# Patient Record
Sex: MALE | Race: WHITE | NOT HISPANIC OR LATINO | Employment: OTHER | URBAN - NONMETROPOLITAN AREA
[De-identification: names, ages, dates, MRNs, and addresses within clinical notes are randomized per-mention and may not be internally consistent; named-entity substitution may affect disease eponyms.]

---

## 2018-04-24 ENCOUNTER — OFFICE VISIT (OUTPATIENT)
Dept: UROLOGY | Facility: CLINIC | Age: 63
End: 2018-04-24

## 2018-04-24 VITALS — WEIGHT: 273 LBS | HEIGHT: 68 IN | BODY MASS INDEX: 41.37 KG/M2

## 2018-04-24 DIAGNOSIS — R79.89 LOW TESTOSTERONE: ICD-10-CM

## 2018-04-24 DIAGNOSIS — N40.0 BPH WITHOUT URINARY OBSTRUCTION: ICD-10-CM

## 2018-04-24 DIAGNOSIS — N39.3 STRESS INCONTINENCE OF URINE: Primary | ICD-10-CM

## 2018-04-24 DIAGNOSIS — N32.81 DETRUSOR INSTABILITY: ICD-10-CM

## 2018-04-24 LAB
BILIRUB BLD-MCNC: NEGATIVE MG/DL
CLARITY, POC: CLEAR
COLOR UR: YELLOW
GLUCOSE UR STRIP-MCNC: ABNORMAL MG/DL
KETONES UR QL: NEGATIVE
LEUKOCYTE EST, POC: NEGATIVE
NITRITE UR-MCNC: NEGATIVE MG/ML
PH UR: 5.5 [PH] (ref 5–8)
PROT UR STRIP-MCNC: NEGATIVE MG/DL
PSA SERPL-MCNC: 2.82 NG/ML (ref 0–4)
RBC # UR STRIP: NEGATIVE /UL
SP GR UR: 1.02 (ref 1–1.03)
TESTOST SERPL-MCNC: 248.12 NG/DL (ref 86.98–780.1)
UROBILINOGEN UR QL: NORMAL

## 2018-04-24 PROCEDURE — 36415 COLL VENOUS BLD VENIPUNCTURE: CPT | Performed by: UROLOGY

## 2018-04-24 PROCEDURE — 99204 OFFICE O/P NEW MOD 45 MIN: CPT | Performed by: UROLOGY

## 2018-04-24 PROCEDURE — 84153 ASSAY OF PSA TOTAL: CPT | Performed by: UROLOGY

## 2018-04-24 PROCEDURE — 81003 URINALYSIS AUTO W/O SCOPE: CPT | Performed by: UROLOGY

## 2018-04-24 PROCEDURE — 84403 ASSAY OF TOTAL TESTOSTERONE: CPT | Performed by: UROLOGY

## 2018-04-24 PROCEDURE — 51798 US URINE CAPACITY MEASURE: CPT | Performed by: UROLOGY

## 2018-04-24 RX ORDER — MONTELUKAST SODIUM 10 MG/1
10 TABLET ORAL NIGHTLY
COMMUNITY

## 2018-04-24 RX ORDER — ALLOPURINOL 300 MG/1
300 TABLET ORAL DAILY
COMMUNITY

## 2018-04-24 RX ORDER — FUROSEMIDE 40 MG/1
TABLET ORAL
COMMUNITY
Start: 2014-10-20

## 2018-04-24 RX ORDER — LOSARTAN POTASSIUM 100 MG/1
TABLET ORAL
COMMUNITY
Start: 2014-10-20

## 2018-04-24 RX ORDER — IBUPROFEN 600 MG/1
600 TABLET ORAL EVERY 6 HOURS PRN
COMMUNITY

## 2018-04-24 RX ORDER — METOPROLOL TARTRATE 100 MG/1
100 TABLET ORAL 2 TIMES DAILY
COMMUNITY
End: 2021-02-09 | Stop reason: SDUPTHER

## 2018-04-24 RX ORDER — DABIGATRAN ETEXILATE 150 MG/1
150 CAPSULE ORAL 2 TIMES DAILY
COMMUNITY
End: 2020-05-15

## 2018-04-24 NOTE — PROGRESS NOTES
"Chief Complaint:          Chief Complaint   Patient presents with   • Urinary Incontinence       HPI:   62 y.o. male.  62-year-old white male referred with the sudden onset of severe loss of control of his bladder he was constipated he has urge and urge related incontinence there is dribbling there is no blood there is no dysuria there is no fevers or chills there is no trauma he's morbidly obese he thinks he has good control of his diabetes and his atrial fibrillation.  His urine is basically positive for sugar at 1000 mg/dL and his postvoid residuals 15 cc. DARRIUS-androgen deficiency in the age male questionnaire  The patient was queried regarding the androgen deficiency in the age male questionnaire.  This is a validated questionnaire that was performed on a set of 314 [place] male physicians when it was positive it correlated directly with a 94% chance of low testosterone.  Patient indicates there is a decrease in libido or sex drive, a lack of energy, Decreased  strength and endurance, a decreased \"enjoyment of life\", sad and grumpy feelings with significant difficulty maintaining erections.  He is also been a recent deterioration regarding work performance.    Past Medical History:      No past medical history on file.      Current Meds:     No current outpatient prescriptions on file.     No current facility-administered medications for this visit.         Allergies:      Allergies not on file     Past Surgical History:     No past surgical history on file.      Social History:     Social History     Social History   • Marital status: Unknown     Spouse name: N/A   • Number of children: N/A   • Years of education: N/A     Occupational History   • Not on file.     Social History Main Topics   • Smoking status: Not on file   • Smokeless tobacco: Not on file   • Alcohol use Not on file   • Drug use: Unknown   • Sexual activity: Not on file     Other Topics Concern   • Not on file     Social History Narrative   • " No narrative on file       Family History:     No family history on file.    Review of Systems:     Review of Systems   Constitutional: Negative.  Negative for chills, fatigue and fever.   HENT: Negative.    Eyes: Negative.    Respiratory: Negative.  Negative for cough and shortness of breath.    Cardiovascular: Negative.  Negative for leg swelling.   Gastrointestinal: Negative.  Negative for abdominal pain, nausea and vomiting.   Endocrine: Negative.    Genitourinary: Positive for frequency and urgency.   Musculoskeletal: Negative.  Negative for back pain and joint swelling.   Allergic/Immunologic: Negative.    Neurological: Negative.  Negative for dizziness and headaches.   Hematological: Negative.    Psychiatric/Behavioral: Negative.  Negative for confusion.       Physical Exam:     Physical Exam   Constitutional: He is oriented to person, place, and time. He appears well-developed and well-nourished.   HENT:   Head: Normocephalic and atraumatic.   Eyes: Conjunctivae and EOM are normal. Pupils are equal, round, and reactive to light.   Neck: Normal range of motion.   Cardiovascular: Normal rate, regular rhythm, normal heart sounds and intact distal pulses.    Pulmonary/Chest: Effort normal and breath sounds normal.   Abdominal: Soft. Bowel sounds are normal.   Genitourinary: Rectum normal, prostate normal and penis normal.   Genitourinary Comments: Morbidly obese, Soft nontender abdomen with no organomegaly, rigidity, or tenderness.  He has normal external genitalia and uncircumcised phallus with a freely movable foreskin bilaterally descended testes without masses there is no inguinal hernias adenopathy or abnormalities he had good rectal tone and a large smooth firm prostate.  There is no nodularity or any suspicious rectal abnormalities     Musculoskeletal: Normal range of motion.   Neurological: He is alert and oriented to person, place, and time. He has normal reflexes.   Skin: Skin is warm and dry.    Psychiatric: He has a normal mood and affect. His behavior is normal. Judgment and thought content normal.   Nursing note and vitals reviewed.      I have reviewed the following portions of the patient's history: allergies, current medications, past family history, past medical history, past social history, past surgical history, problem list and ROS and confirm it's accurate.      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  I discussed the pathophysiology of PSA testing indicating its use in the diagnosis and management of prostate cancer.  I discussed the normal range being 0-4 but more appropriately being much closer to 0-2 in a normal male.  I discussed the fact that after certain age we don't recommend PSA testing especially in view of numerous comorbidities.  That this will not be a useful test.  I discussed many of the things that can artificially raised PSA including a recent infection, urinary tract infection, recent sexual intercourse.  Where even the type of movement such as manipulation of the prostate  riding a bicycle.  After all this is taken into account when the test is reviewed  the most important use of PSA which is the velocity measurement in other words the change of PSA with time as a very important factor in the use and that we look for greater than 20% rise over a year to help us make the prediction of prostate cancer.  I also discussed that the use with prostate cancer indicating that after a radical prostatectomy the PSA should be 0 and any rise indicates an early biochemical recurrence.  Low TestosteroneThis pleasant male patient presents today with signs and symptoms that are consistent with low testosterone he has positive Roel questionnaire by history this includes both  the sexual and nonsexual side effects.  Sexual side effects include inability to achieve and maintain an erection, in ability to maintain his erection and decreased interest and sexual activity.  Nonsexual symptomatology includes fatigue, difficulty completing a job, tiredness.  He has a discussion of the various forms testosterone available including parenteral, topical, and the form of a patch.  We discussed the efficacy of the gels, and the injections.  As well as the cost and benefits analysis.  We discussed the the studies a talked about heart disease and its effect on prostate cancer both of which are negligible.  He gives verbal consent to proceed with treatment.  He understands the risks and benefits of length he also completed his attempts at fertility he understands the partial effect on spermatogenesis     Patient's There is no height or weight on file to calculate BMI. BMI is above normal parameters. Follow-up plan includes:  educational material.          This document has been electronically signed by ISMA CASH MD April 24, 2018 2:17 PM

## 2018-04-25 PROBLEM — N32.81 DETRUSOR INSTABILITY: Status: ACTIVE | Noted: 2018-04-25

## 2018-05-08 ENCOUNTER — OFFICE VISIT (OUTPATIENT)
Dept: UROLOGY | Facility: CLINIC | Age: 63
End: 2018-05-08

## 2018-05-08 VITALS — WEIGHT: 273 LBS | BODY MASS INDEX: 41.37 KG/M2 | HEIGHT: 68 IN

## 2018-05-08 DIAGNOSIS — N39.3 STRESS INCONTINENCE OF URINE: Primary | ICD-10-CM

## 2018-05-08 DIAGNOSIS — N32.81 DETRUSOR INSTABILITY: ICD-10-CM

## 2018-05-08 DIAGNOSIS — R79.89 LOW TESTOSTERONE IN MALE: ICD-10-CM

## 2018-05-08 LAB
BILIRUB BLD-MCNC: NEGATIVE MG/DL
CLARITY, POC: CLEAR
COLOR UR: YELLOW
GLUCOSE UR STRIP-MCNC: ABNORMAL MG/DL
KETONES UR QL: NEGATIVE
LEUKOCYTE EST, POC: NEGATIVE
NITRITE UR-MCNC: NEGATIVE MG/ML
PH UR: 5 [PH] (ref 5–8)
PROT UR STRIP-MCNC: NEGATIVE MG/DL
RBC # UR STRIP: NEGATIVE /UL
SP GR UR: 1.01 (ref 1–1.03)
UROBILINOGEN UR QL: NORMAL

## 2018-05-08 PROCEDURE — 81003 URINALYSIS AUTO W/O SCOPE: CPT | Performed by: UROLOGY

## 2018-05-08 PROCEDURE — 99213 OFFICE O/P EST LOW 20 MIN: CPT | Performed by: UROLOGY

## 2018-05-08 RX ORDER — TESTOSTERONE CYPIONATE 200 MG/ML
INJECTION, SOLUTION INTRAMUSCULAR
Qty: 10 ML | Refills: 2 | Status: SHIPPED | OUTPATIENT
Start: 2018-05-08 | End: 2018-09-18 | Stop reason: SDUPTHER

## 2018-05-08 NOTE — PROGRESS NOTES
"Chief Complaint:          No chief complaint on file.      HPI:   62 y.o. male.  62-year-old white male referred with the sudden onset of severe loss of control of his bladder he was constipated he has urge and urge related incontinence there is dribbling there is no blood there is no dysuria there is no fevers or chills there is no trauma he's morbidly obese he thinks he has good control of his diabetes and his atrial fibrillation.  His urine is basically positive for sugar at 1000 mg/dL and his postvoid residuals 15 cc. ROEL-androgen deficiency in the age male questionnaire  The patient was queried regarding the androgen deficiency in the age male questionnaire.  This is a validated questionnaire that was performed on a set of 314 [place] male physicians when it was positive it correlated directly with a 94% chance of low testosterone.  Patient indicates there is a decrease in libido or sex drive, a lack of energy, Decreased  strength and endurance, a decreased \"enjoyment of life\", sad and grumpy feelings with significant difficulty maintaining erections.  He is also been a recent deterioration regarding work performance.  He returns today's PSA is 2.82 and his testosterone was 248 his frequency is certainly better.  These dramatically improved his sugars doing better as well he wants to try testosterone.  6 is reasonable I had an extensive discussion with him.Low TestosteroneThis pleasant male patient presents today with signs and symptoms that are consistent with low testosterone he has positive Roel questionnaire by history this includes both the sexual and nonsexual side effects.  Sexual side effects include inability to achieve and maintain an erection, in ability to maintain his erection and decreased interest and sexual activity.  Nonsexual symptomatology includes fatigue, difficulty completing a job, tiredness.  He has a discussion of the various forms testosterone available including parenteral, topical, " and the form of a patch.  We discussed the efficacy of the gels, and the injections.  As well as the cost and benefits analysis.  We discussed the the studies a talked about heart disease and its effect on prostate cancer both of which are negligible.  He gives verbal consent to proceed with treatment.  He understands the risks and benefits of length he also completed his attempts at fertility he understands the partial effect on spermatogenesis.  His urinalysis is negative today    Past Medical History:        Past Medical History:   Diagnosis Date   • Diabetes mellitus    • Hypertension          Current Meds:     Current Outpatient Prescriptions   Medication Sig Dispense Refill   • allopurinol (ZYLOPRIM) 300 MG tablet Take 300 mg by mouth Daily.     • dabigatran etexilate (PRADAXA) 150 MG capsu Take 150 mg by mouth 2 (Two) Times a Day.     • furosemide (LASIX) 40 MG tablet Take  by mouth.     • ibuprofen (ADVIL,MOTRIN) 600 MG tablet Take 600 mg by mouth Every 6 (Six) Hours As Needed for Mild Pain .     • losartan (COZAAR) 100 MG tablet Take  by mouth.     • metFORMIN (GLUCOPHAGE) 500 MG tablet Take 500 mg by mouth 2 (Two) Times a Day With Meals.     • metoprolol tartrate (LOPRESSOR) 100 MG tablet Take 100 mg by mouth 2 (Two) Times a Day.     • montelukast (SINGULAIR) 10 MG tablet Take 10 mg by mouth Every Night.       No current facility-administered medications for this visit.         Allergies:      No Known Allergies     Past Surgical History:     No past surgical history on file.      Social History:     Social History     Social History   • Marital status: Unknown     Spouse name: N/A   • Number of children: N/A   • Years of education: N/A     Occupational History   • Not on file.     Social History Main Topics   • Smoking status: Never Smoker   • Smokeless tobacco: Never Used   • Alcohol use No   • Drug use: No   • Sexual activity: No     Other Topics Concern   • Not on file     Social History Narrative   •  No narrative on file       Family History:     Family History   Problem Relation Age of Onset   • No Known Problems Father    • No Known Problems Mother        Review of Systems:     Review of Systems   Constitutional: Negative for chills, fatigue and fever.   Respiratory: Negative for cough, shortness of breath and wheezing.    Gastrointestinal: Negative for abdominal pain, nausea and vomiting.   Musculoskeletal: Negative for back pain and joint swelling.   Neurological: Negative for dizziness and headaches.   Psychiatric/Behavioral: Negative for confusion.       Physical Exam:     Physical Exam   Constitutional: He is oriented to person, place, and time. He appears well-developed and well-nourished.   HENT:   Head: Normocephalic and atraumatic.   Eyes: Conjunctivae and EOM are normal. Pupils are equal, round, and reactive to light.   Neck: Normal range of motion.   Cardiovascular: Normal rate, regular rhythm, normal heart sounds and intact distal pulses.    Pulmonary/Chest: Effort normal and breath sounds normal.   Abdominal: Soft. Bowel sounds are normal.   Genitourinary: Rectum normal, prostate normal and penis normal.   Musculoskeletal: Normal range of motion.   Neurological: He is alert and oriented to person, place, and time. He has normal reflexes.   Skin: Skin is warm and dry.   Psychiatric: He has a normal mood and affect. His behavior is normal. Judgment and thought content normal.   Nursing note and vitals reviewed.      I have reviewed the following portions of the patient's history: allergies, current medications, past family history, past medical history, past social history, past surgical history, problem list and ROS and confirm it's accurate.      Procedure:       Assessment/Plan:   Detrusor instability- continue Myrbetriq  Low TestosteroneThis pleasant male patient presents today with signs and symptoms that are consistent with low testosterone he has positive Roel questionnaire by history this  includes both the sexual and nonsexual side effects.  Sexual side effects include inability to achieve and maintain an erection, in ability to maintain his erection and decreased interest and sexual activity.  Nonsexual symptomatology includes fatigue, difficulty completing a job, tiredness.  He has a discussion of the various forms testosterone available including parenteral, topical, and the form of a patch.  We discussed the efficacy of the gels, and the injections.  As well as the cost and benefits analysis.  We discussed the the studies a talked about heart disease and its effect on prostate cancer both of which are negligible.  He gives verbal consent to proceed with treatment.  He understands the risks and benefits of length he also completed his attempts at fertility he understands the partial effect on spermatogenesis.  He has chosen to go with parenteral therapy he was given a prescription and talked the technique     Patient's There is no height or weight on file to calculate BMI. BMI is above normal parameters. Recommendations include: educational material.          This document has been electronically signed by ISMA CASH MD May 8, 2018 1:47 PM

## 2018-05-10 PROBLEM — R79.89 LOW TESTOSTERONE IN MALE: Status: ACTIVE | Noted: 2018-05-10

## 2018-06-19 ENCOUNTER — OFFICE VISIT (OUTPATIENT)
Dept: UROLOGY | Facility: CLINIC | Age: 63
End: 2018-06-19

## 2018-06-19 VITALS — BODY MASS INDEX: 41.43 KG/M2 | WEIGHT: 273.37 LBS | HEIGHT: 68 IN

## 2018-06-19 DIAGNOSIS — R79.89 LOW TESTOSTERONE IN MALE: ICD-10-CM

## 2018-06-19 DIAGNOSIS — N32.81 DETRUSOR INSTABILITY: Primary | ICD-10-CM

## 2018-06-19 PROCEDURE — 99214 OFFICE O/P EST MOD 30 MIN: CPT | Performed by: UROLOGY

## 2018-06-19 NOTE — PROGRESS NOTES
"Chief Complaint:          Chief Complaint   Patient presents with   • Urinary Incontinence     stress- 6 week follow up       HPI:   62 y.o. male.  He returns today for follow-up his urinary incontinence is completely resolved with the realization that the Jardience causes him to have a urinary frequency from the glucose load.  His sugars are doing better he is very happy with the testosterone and has lost a total of 19 pounds. Patient returns today for follow-up.  He is been on testosterone replacement therapy.  He reports a dramatic improvement in his Roel questionnaire: -ROEL-androgen deficiency in the age male questionnaire  The patient was queried regarding the androgen deficiency in the age male questionnaire.  This is a validated questionnaire that was performed on a set of 314 [place] male physicians when it was positive it correlated directly with a 94% chance of low testosterone.  Patient indicates there is a decrease in libido or sex drive, a lack of energy, Decreased  strength and endurance, a decreased \"enjoyment of life\", sad and grumpy feelings with significant difficulty maintaining erections.  He is also been a recent deterioration regarding work performance.  He reports weight loss.  He has good facility and the use of subcutaneous and intramuscular injections as well as comfort level and using the medication in a sterile fashion.  He understands he should use only the prescribed dose.  He's here for appropriate lab monitoring regarding this.  He understands this is a controlled substance and therefore must be watched closely will not be refilled and the medical loss or miss calculation of the dose.  He is very happy with the treatment and therefore wants to continue it.    Past Medical History:        Past Medical History:   Diagnosis Date   • Diabetes mellitus    • Hypertension          Current Meds:     Current Outpatient Prescriptions   Medication Sig Dispense Refill   • allopurinol (ZYLOPRIM) " 300 MG tablet Take 300 mg by mouth Daily.     • dabigatran etexilate (PRADAXA) 150 MG capsu Take 150 mg by mouth 2 (Two) Times a Day.     • furosemide (LASIX) 40 MG tablet Take  by mouth.     • ibuprofen (ADVIL,MOTRIN) 600 MG tablet Take 600 mg by mouth Every 6 (Six) Hours As Needed for Mild Pain .     • losartan (COZAAR) 100 MG tablet Take  by mouth.     • metFORMIN (GLUCOPHAGE) 500 MG tablet Take 500 mg by mouth 2 (Two) Times a Day With Meals.     • metoprolol tartrate (LOPRESSOR) 100 MG tablet Take 100 mg by mouth 2 (Two) Times a Day.     • montelukast (SINGULAIR) 10 MG tablet Take 10 mg by mouth Every Night.     • Syringe, Disposable, 3 ML misc Use 3 ml syringe with a 25 gauge  5/8 inch needle 24 each 6   • Testosterone Cypionate (DEPO-TESTOSTERONE) 200 MG/ML injection He is to use 1/2 cc every Monday and Thursday SQ 10 mL 2     No current facility-administered medications for this visit.         Allergies:      No Known Allergies     Past Surgical History:     No past surgical history on file.      Social History:     Social History     Social History   • Marital status: Unknown     Spouse name: N/A   • Number of children: N/A   • Years of education: N/A     Occupational History   • Not on file.     Social History Main Topics   • Smoking status: Never Smoker   • Smokeless tobacco: Never Used   • Alcohol use No   • Drug use: No   • Sexual activity: No     Other Topics Concern   • Not on file     Social History Narrative   • No narrative on file       Family History:     Family History   Problem Relation Age of Onset   • No Known Problems Father    • No Known Problems Mother        Review of Systems:     Review of Systems   Constitutional: Negative.    HENT: Negative.    Eyes: Negative.    Respiratory: Negative.    Cardiovascular: Negative.    Gastrointestinal: Negative.    Endocrine: Negative.    Musculoskeletal: Negative.    Allergic/Immunologic: Negative.    Neurological: Negative.    Hematological: Negative.     Psychiatric/Behavioral: Negative.        Physical Exam:     Physical Exam   Constitutional: He is oriented to person, place, and time. He appears well-developed and well-nourished.   HENT:   Head: Normocephalic and atraumatic.   Eyes: Conjunctivae and EOM are normal. Pupils are equal, round, and reactive to light.   Neck: Normal range of motion.   Cardiovascular: Normal rate, regular rhythm, normal heart sounds and intact distal pulses.    Pulmonary/Chest: Effort normal and breath sounds normal.   Abdominal: Soft. Bowel sounds are normal.   Musculoskeletal: Normal range of motion.   Neurological: He is alert and oriented to person, place, and time. He has normal reflexes.   Skin: Skin is warm and dry.   Psychiatric: He has a normal mood and affect. His behavior is normal. Judgment and thought content normal.   Nursing note and vitals reviewed.      I have reviewed the following portions of the patient's history: allergies, current medications, past family history, past medical history, past social history, past surgical history, problem list and ROS and confirm it's accurate.      Procedure:       Assessment/Plan:   Detrusor instability-resolved with the optimization of his blood sugar  -Low testosterone: patient is here for follow-up.  Since beginning the medication he's been very pleased.  He reports a dramatic improvement in his erections, ability to achieve and maintain an erection, improvement in libido, increase in frequency of morning erections, a noticeable weight loss consistent with the treatment.  No development of breast problems or abnormalities.  He's going to have appropriate safety laboratory parameters checked.   He understands that the new data implicates testosterone with the development of prostate cancer and this is all but been disproven and the medical literature as well as the risks of cardiovascular disease which is actually also been disproven.  He understands that while he is a candidate  for topical therapy if he is in contact with children this is not an option because it's been shown to accentuate genitalia development at an early age that this frequently irreversible.  He also understands this is a controlled substance and as such will not be prescribed without appropriate follow-up and appropriate laboratory investigation.  He understands effects on spermatogenesis including the fact that this is not always completely reversible and not always completely limited his ability to father a child.  He has demonstrated facility in the technique of both intramuscular and subcutaneous injection.  And has been taught sterility one drawing up the medication.     Patient's Body mass index is 41.58 kg/m². BMI is above normal parameters. Recommendations include: educational material.          This document has been electronically signed by ISMA CASH MD June 19, 2018 1:46 PM

## 2018-09-18 ENCOUNTER — OFFICE VISIT (OUTPATIENT)
Dept: UROLOGY | Facility: CLINIC | Age: 63
End: 2018-09-18

## 2018-09-18 VITALS — WEIGHT: 273.37 LBS | BODY MASS INDEX: 41.43 KG/M2 | HEIGHT: 68 IN

## 2018-09-18 DIAGNOSIS — E29.1 HYPOGONADISM IN MALE: Primary | ICD-10-CM

## 2018-09-18 DIAGNOSIS — R79.89 LOW TESTOSTERONE IN MALE: ICD-10-CM

## 2018-09-18 LAB
BASOPHILS # BLD AUTO: 0.02 10*3/MM3 (ref 0–0.3)
BASOPHILS NFR BLD AUTO: 0.2 % (ref 0–2)
DEPRECATED RDW RBC AUTO: 41.3 FL (ref 37–54)
EOSINOPHIL # BLD AUTO: 0.18 10*3/MM3 (ref 0–0.7)
EOSINOPHIL NFR BLD AUTO: 1.7 % (ref 0–5)
ERYTHROCYTE [DISTWIDTH] IN BLOOD BY AUTOMATED COUNT: 14.3 % (ref 11.5–14.5)
HCT VFR BLD AUTO: 42.2 % (ref 42–52)
HGB BLD-MCNC: 13.2 G/DL (ref 14–18)
IMM GRANULOCYTES # BLD: 0.02 10*3/MM3 (ref 0–0.03)
IMM GRANULOCYTES NFR BLD: 0.2 % (ref 0–0.5)
LYMPHOCYTES # BLD AUTO: 2.19 10*3/MM3 (ref 1–3)
LYMPHOCYTES NFR BLD AUTO: 21.2 % (ref 21–51)
MCH RBC QN AUTO: 25.3 PG (ref 27–33)
MCHC RBC AUTO-ENTMCNC: 31.3 G/DL (ref 33–37)
MCV RBC AUTO: 80.8 FL (ref 80–94)
MONOCYTES # BLD AUTO: 1.31 10*3/MM3 (ref 0.1–0.9)
MONOCYTES NFR BLD AUTO: 12.7 % (ref 0–10)
NEUTROPHILS # BLD AUTO: 6.59 10*3/MM3 (ref 1.4–6.5)
NEUTROPHILS NFR BLD AUTO: 64 % (ref 30–70)
PLATELET # BLD AUTO: 345 10*3/MM3 (ref 130–400)
PMV BLD AUTO: 9.3 FL (ref 6–10)
PSA SERPL-MCNC: 4.72 NG/ML (ref 0–4)
RBC # BLD AUTO: 5.22 10*6/MM3 (ref 4.7–6.1)
TESTOST SERPL-MCNC: 463.47 NG/DL (ref 86.98–780.1)
WBC NRBC COR # BLD: 10.31 10*3/MM3 (ref 4.5–12.5)

## 2018-09-18 PROCEDURE — 36415 COLL VENOUS BLD VENIPUNCTURE: CPT | Performed by: UROLOGY

## 2018-09-18 PROCEDURE — 84153 ASSAY OF PSA TOTAL: CPT | Performed by: UROLOGY

## 2018-09-18 PROCEDURE — 85025 COMPLETE CBC W/AUTO DIFF WBC: CPT | Performed by: UROLOGY

## 2018-09-18 PROCEDURE — 84403 ASSAY OF TOTAL TESTOSTERONE: CPT | Performed by: UROLOGY

## 2018-09-18 PROCEDURE — 99213 OFFICE O/P EST LOW 20 MIN: CPT | Performed by: UROLOGY

## 2018-09-18 RX ORDER — SILDENAFIL CITRATE 20 MG/1
TABLET ORAL
Qty: 24 TABLET | Refills: 11 | Status: SHIPPED | OUTPATIENT
Start: 2018-09-18 | End: 2019-01-14 | Stop reason: SDUPTHER

## 2018-09-18 RX ORDER — TESTOSTERONE CYPIONATE 200 MG/ML
INJECTION, SOLUTION INTRAMUSCULAR
Qty: 10 ML | Refills: 2 | Status: SHIPPED | OUTPATIENT
Start: 2018-09-18 | End: 2019-01-14 | Stop reason: SDUPTHER

## 2018-09-18 NOTE — PROGRESS NOTES
"Chief Complaint:          Chief Complaint   Patient presents with   • STRESS INCONTIENCE       HPI:   63 y.o. male.  63-year-old white male returns today he's doing a lot better he gets up twice at night now and otherwise loves the testosterone. Patient returns today for follow-up.  He is been on testosterone replacement therapy.  He reports a dramatic improvement in his Roel questionnaire: -ROEL-androgen deficiency in the age male questionnaire  The patient was queried regarding the androgen deficiency in the age male questionnaire.  This is a validated questionnaire that was performed on a set of 314 [place] male physicians when it was positive it correlated directly with a 94% chance of low testosterone.  Patient indicates there is a decrease in libido or sex drive, a lack of energy, Decreased  strength and endurance, a decreased \"enjoyment of life\", sad and grumpy feelings with significant difficulty maintaining erections.  He is also been a recent deterioration regarding work performance.  He reports weight loss.  He has good facility and the use of subcutaneous and intramuscular injections as well as comfort level and using the medication in a sterile fashion.  He understands he should use only the prescribed dose.  He's here for appropriate lab monitoring regarding this.  He understands this is a controlled substance and therefore must be watched closely will not be refilled and the medical loss or miss calculation of the dose.  He is very happy with the treatment and therefore wants to continue it.    Past Medical History:        Past Medical History:   Diagnosis Date   • Diabetes mellitus (CMS/HCC)    • Hypertension          Current Meds:     Current Outpatient Prescriptions   Medication Sig Dispense Refill   • allopurinol (ZYLOPRIM) 300 MG tablet Take 300 mg by mouth Daily.     • dabigatran etexilate (PRADAXA) 150 MG capsu Take 150 mg by mouth 2 (Two) Times a Day.     • furosemide (LASIX) 40 MG tablet " Take  by mouth.     • ibuprofen (ADVIL,MOTRIN) 600 MG tablet Take 600 mg by mouth Every 6 (Six) Hours As Needed for Mild Pain .     • losartan (COZAAR) 100 MG tablet Take  by mouth.     • metFORMIN (GLUCOPHAGE) 500 MG tablet Take 500 mg by mouth 2 (Two) Times a Day With Meals.     • metoprolol tartrate (LOPRESSOR) 100 MG tablet Take 100 mg by mouth 2 (Two) Times a Day.     • montelukast (SINGULAIR) 10 MG tablet Take 10 mg by mouth Every Night.     • Syringe, Disposable, 3 ML misc Use 3 ml syringe with a 25 gauge  5/8 inch needle 24 each 6   • Testosterone Cypionate (DEPO-TESTOSTERONE) 200 MG/ML injection He is to use 1/2 cc every Monday and Thursday SQ 10 mL 2     No current facility-administered medications for this visit.         Allergies:      No Known Allergies     Past Surgical History:     No past surgical history on file.      Social History:     Social History     Social History   • Marital status: Unknown     Spouse name: N/A   • Number of children: N/A   • Years of education: N/A     Occupational History   • Not on file.     Social History Main Topics   • Smoking status: Never Smoker   • Smokeless tobacco: Never Used   • Alcohol use No   • Drug use: No   • Sexual activity: No     Other Topics Concern   • Not on file     Social History Narrative   • No narrative on file       Family History:     Family History   Problem Relation Age of Onset   • No Known Problems Father    • No Known Problems Mother        Review of Systems:     Review of Systems   Constitutional: Negative.    HENT: Negative.    Eyes: Negative.    Respiratory: Negative.    Cardiovascular: Negative.    Gastrointestinal: Negative.    Endocrine: Negative.    Musculoskeletal: Negative.    Allergic/Immunologic: Negative.    Neurological: Negative.    Hematological: Negative.    Psychiatric/Behavioral: Negative.        Physical Exam:     Physical Exam   Constitutional: He is oriented to person, place, and time. He appears well-developed and  well-nourished.   HENT:   Head: Normocephalic and atraumatic.   Eyes: Pupils are equal, round, and reactive to light. Conjunctivae and EOM are normal.   Neck: Normal range of motion.   Cardiovascular: Normal rate, regular rhythm, normal heart sounds and intact distal pulses.    Pulmonary/Chest: Effort normal and breath sounds normal.   Abdominal: Soft. Bowel sounds are normal.   Genitourinary: Rectum normal, prostate normal and penis normal.   Musculoskeletal: Normal range of motion.   Neurological: He is alert and oriented to person, place, and time. He has normal reflexes.   Skin: Skin is warm and dry.   Psychiatric: He has a normal mood and affect. His behavior is normal. Judgment and thought content normal.   Nursing note and vitals reviewed.      I have reviewed the following portions of the patient's history: allergies, current medications, past family history, past medical history, past social history, past surgical history, problem list and ROS and confirm it's accurate.      Procedure:       Assessment/Plan:   -Low testosterone: patient is here for follow-up.  Since beginning the medication he's been very pleased.  He reports a dramatic improvement in his erections, ability to achieve and maintain an erection, improvement in libido, increase in frequency of morning erections, a noticeable weight loss consistent with the treatment.  No development of breast problems or abnormalities.  He's going to have appropriate safety laboratory parameters checked.   He understands that the new data implicates testosterone with the development of prostate cancer and this is all but been disproven and the medical literature as well as the risks of cardiovascular disease which is actually also been disproven.  He understands that while he is a candidate for topical therapy if he is in contact with children this is not an option because it's been shown to accentuate genitalia development at an early age that this frequently  irreversible.  He also understands this is a controlled substance and as such will not be prescribed without appropriate follow-up and appropriate laboratory investigation.  He understands effects on spermatogenesis including the fact that this is not always completely reversible and not always completely limited his ability to father a child.  He has demonstrated facility in the technique of both intramuscular and subcutaneous injection.  And has been taught sterility one drawing up the medication.     Patient's Body mass index is 41.58 kg/m². BMI is above normal parameters. Recommendations include: educational material.          This document has been electronically signed by ISMA CASH MD September 18, 2018 2:05 PM

## 2019-01-14 ENCOUNTER — OFFICE VISIT (OUTPATIENT)
Dept: UROLOGY | Facility: CLINIC | Age: 64
End: 2019-01-14

## 2019-01-14 VITALS — HEIGHT: 68 IN | WEIGHT: 273 LBS | BODY MASS INDEX: 41.37 KG/M2

## 2019-01-14 DIAGNOSIS — R79.89 LOW TESTOSTERONE IN MALE: ICD-10-CM

## 2019-01-14 DIAGNOSIS — E29.1 HYPOGONADISM IN MALE: ICD-10-CM

## 2019-01-14 LAB
DEPRECATED RDW RBC AUTO: 41.2 FL (ref 37–54)
ERYTHROCYTE [DISTWIDTH] IN BLOOD BY AUTOMATED COUNT: 13.8 % (ref 11.5–14.5)
HCT VFR BLD AUTO: 43.2 % (ref 42–52)
HGB BLD-MCNC: 14.3 G/DL (ref 14–18)
MCH RBC QN AUTO: 27.5 PG (ref 27–33)
MCHC RBC AUTO-ENTMCNC: 33.1 G/DL (ref 33–37)
MCV RBC AUTO: 83.1 FL (ref 80–94)
PLATELET # BLD AUTO: 334 10*3/MM3 (ref 130–400)
PMV BLD AUTO: 9.5 FL (ref 6–10)
PSA SERPL-MCNC: 3.18 NG/ML (ref 0–4)
RBC # BLD AUTO: 5.2 10*6/MM3 (ref 4.7–6.1)
TESTOST SERPL-MCNC: 250.19 NG/DL (ref 86.98–780.1)
WBC NRBC COR # BLD: 6.6 10*3/MM3 (ref 4.5–12.5)

## 2019-01-14 PROCEDURE — 36415 COLL VENOUS BLD VENIPUNCTURE: CPT | Performed by: UROLOGY

## 2019-01-14 PROCEDURE — 84403 ASSAY OF TOTAL TESTOSTERONE: CPT | Performed by: UROLOGY

## 2019-01-14 PROCEDURE — 85027 COMPLETE CBC AUTOMATED: CPT | Performed by: UROLOGY

## 2019-01-14 PROCEDURE — 84153 ASSAY OF PSA TOTAL: CPT | Performed by: UROLOGY

## 2019-01-14 PROCEDURE — 99214 OFFICE O/P EST MOD 30 MIN: CPT | Performed by: UROLOGY

## 2019-01-14 RX ORDER — TESTOSTERONE CYPIONATE 200 MG/ML
INJECTION, SOLUTION INTRAMUSCULAR
Qty: 10 ML | Refills: 2 | Status: SHIPPED | OUTPATIENT
Start: 2019-01-14 | End: 2020-05-15 | Stop reason: SDUPTHER

## 2019-01-14 RX ORDER — SILDENAFIL CITRATE 20 MG/1
TABLET ORAL
Qty: 24 TABLET | Refills: 11 | Status: SHIPPED | OUTPATIENT
Start: 2019-01-14 | End: 2020-03-30

## 2019-01-14 NOTE — PROGRESS NOTES
"Chief Complaint:          Chief Complaint   Patient presents with   • Hypogonadism     3 month f/u        HPI:   63 y.o. male.  63-year-old white male returns today he's doing a lot better he gets up twice at night now and otherwise loves the testosterone. Patient returns today for follow-up.  He is been on testosterone replacement therapy.  He reports a dramatic improvement in his Roel questionnaire: -ROEL-androgen deficiency in the age male questionnaire  The patient was queried regarding the androgen deficiency in the age male questionnaire.  This is a validated questionnaire that was performed on a set of 314 [place] male physicians when it was positive it correlated directly with a 94% chance of low testosterone.  Patient indicates there is a decrease in libido or sex drive, a lack of energy, Decreased  strength and endurance, a decreased \"enjoyment of life\", sad and grumpy feelings with significant difficulty maintaining erections.  He is also been a recent deterioration regarding work performance.  He reports weight loss.  He has good facility and the use of subcutaneous and intramuscular injections as well as comfort level and using the medication in a sterile fashion.  He understands he should use only the prescribed dose.  He's here for appropriate lab monitoring regarding this.  He understands this is a controlled substance and therefore must be watched closely will not be refilled and the medical loss or miss calculation of the dose.  He is very happy with the treatment and therefore wants to continue it.  He continues to do fantastic.  He has continued weight loss, there is no fevers, chills, he gets greater erections and now is getting morning erections as well    Past Medical History:        Past Medical History:   Diagnosis Date   • Diabetes mellitus (CMS/[facility])    • Hypertension          Current Meds:     Current Outpatient Medications   Medication Sig Dispense Refill   • allopurinol (ZYLOPRIM) 300 " MG tablet Take 300 mg by mouth Daily.     • dabigatran etexilate (PRADAXA) 150 MG capsu Take 150 mg by mouth 2 (Two) Times a Day.     • furosemide (LASIX) 40 MG tablet Take  by mouth.     • ibuprofen (ADVIL,MOTRIN) 600 MG tablet Take 600 mg by mouth Every 6 (Six) Hours As Needed for Mild Pain .     • losartan (COZAAR) 100 MG tablet Take  by mouth.     • metFORMIN (GLUCOPHAGE) 500 MG tablet Take 500 mg by mouth 2 (Two) Times a Day With Meals.     • metoprolol tartrate (LOPRESSOR) 100 MG tablet Take 100 mg by mouth 2 (Two) Times a Day.     • montelukast (SINGULAIR) 10 MG tablet Take 10 mg by mouth Every Night.     • sildenafil (REVATIO) 20 MG tablet 1-5 by mouth one hour prior to intercourse on an empty stomach 24 tablet 11   • Syringe, Disposable, 3 ML misc Use 3 ml syringe with a 25 gauge  5/8 inch needle 24 each 6   • Testosterone Cypionate (DEPO-TESTOSTERONE) 200 MG/ML injection He is to use 1/2 cc every Monday and Thursday SQ 10 mL 2     No current facility-administered medications for this visit.         Allergies:      No Known Allergies     Past Surgical History:     History reviewed. No pertinent surgical history.      Social History:     Social History     Socioeconomic History   • Marital status: Unknown     Spouse name: Not on file   • Number of children: Not on file   • Years of education: Not on file   • Highest education level: Not on file   Social Needs   • Financial resource strain: Not on file   • Food insecurity - worry: Not on file   • Food insecurity - inability: Not on file   • Transportation needs - medical: Not on file   • Transportation needs - non-medical: Not on file   Occupational History   • Not on file   Tobacco Use   • Smoking status: Never Smoker   • Smokeless tobacco: Never Used   Substance and Sexual Activity   • Alcohol use: No   • Drug use: No   • Sexual activity: No   Other Topics Concern   • Not on file   Social History Narrative   • Not on file       Family History:     Family  History   Problem Relation Age of Onset   • No Known Problems Father    • No Known Problems Mother        Review of Systems:     Review of Systems   Constitutional: Negative.    HENT: Negative.    Eyes: Negative.    Respiratory: Negative.    Cardiovascular: Negative.    Gastrointestinal: Negative.    Endocrine: Negative.    Musculoskeletal: Negative.    Allergic/Immunologic: Negative.    Neurological: Negative.    Hematological: Negative.    Psychiatric/Behavioral: Negative.        Physical Exam:     Physical Exam   Constitutional: He is oriented to person, place, and time. He appears well-developed and well-nourished.   HENT:   Head: Normocephalic and atraumatic.   Eyes: Conjunctivae and EOM are normal. Pupils are equal, round, and reactive to light.   Neck: Normal range of motion.   Cardiovascular: Normal rate, regular rhythm, normal heart sounds and intact distal pulses.   Pulmonary/Chest: Effort normal and breath sounds normal.   Abdominal: Soft. Bowel sounds are normal.   Genitourinary: Rectum normal, prostate normal and penis normal.   Musculoskeletal: Normal range of motion.   Neurological: He is alert and oriented to person, place, and time. He has normal reflexes.   Skin: Skin is warm and dry.   Psychiatric: He has a normal mood and affect. His behavior is normal. Judgment and thought content normal.   Nursing note and vitals reviewed.      I have reviewed the following portions of the patient's history: allergies, current medications, past family history, past medical history, past social history, past surgical history, problem list and ROS and confirm it's accurate.      Procedure:       Assessment/Plan:   -Low testosterone: patient is here for follow-up.  Since beginning the medication he's been very pleased.  He reports a dramatic improvement in his erections, ability to achieve and maintain an erection, improvement in libido, increase in frequency of morning erections, a noticeable weight loss  consistent with the treatment.  No development of breast problems or abnormalities.  He's going to have appropriate safety laboratory parameters checked.   He understands that the new data implicates testosterone with the development of prostate cancer and this is all but been disproven and the medical literature as well as the risks of cardiovascular disease which is actually also been disproven.  He understands that while he is a candidate for topical therapy if he is in contact with children this is not an option because it's been shown to accentuate genitalia development at an early age that this frequently irreversible.  He also understands this is a controlled substance and as such will not be prescribed without appropriate follow-up and appropriate laboratory investigation.  He understands effects on spermatogenesis including the fact that this is not always completely reversible and not always completely limited his ability to father a child.  He has demonstrated facility in the technique of both intramuscular and subcutaneous injection.  And has been taught sterility one drawing up the medication.     Patient's Body mass index is 41.51 kg/m². BMI is above normal parameters. Recommendations include: educational material.          This document has been electronically signed by ISMA CASH MD January 14, 2019 10:01 AM

## 2019-07-05 ENCOUNTER — TELEPHONE (OUTPATIENT)
Dept: UROLOGY | Facility: CLINIC | Age: 64
End: 2019-07-05

## 2019-07-05 NOTE — TELEPHONE ENCOUNTER
Pt wanted to if we had any samples of myrbetriq, cielo checked and we did not have any.     Requesting a refill called into [facility] in [place]     pts number is [phone]

## 2019-07-05 NOTE — TELEPHONE ENCOUNTER
Left message for the patient to call me regarding refill on myrbetriq not seeing where patient has taken the medication before.

## 2019-07-15 ENCOUNTER — OFFICE VISIT (OUTPATIENT)
Dept: UROLOGY | Facility: CLINIC | Age: 64
End: 2019-07-15

## 2019-07-15 VITALS
WEIGHT: 267.1 LBS | HEIGHT: 68 IN | DIASTOLIC BLOOD PRESSURE: 90 MMHG | BODY MASS INDEX: 40.48 KG/M2 | SYSTOLIC BLOOD PRESSURE: 148 MMHG

## 2019-07-15 DIAGNOSIS — R79.89 LOW TESTOSTERONE IN MALE: ICD-10-CM

## 2019-07-15 DIAGNOSIS — N32.81 DETRUSOR INSTABILITY: Primary | ICD-10-CM

## 2019-07-15 DIAGNOSIS — N42.9 DISORDER OF PROSTATE: ICD-10-CM

## 2019-07-15 LAB
DEPRECATED RDW RBC AUTO: 41.1 FL (ref 37–54)
ERYTHROCYTE [DISTWIDTH] IN BLOOD BY AUTOMATED COUNT: 12.5 % (ref 12.3–15.4)
HCT VFR BLD AUTO: 46.1 % (ref 37.5–51)
HGB BLD-MCNC: 14.8 G/DL (ref 13–17.7)
MCH RBC QN AUTO: 28.7 PG (ref 26.6–33)
MCHC RBC AUTO-ENTMCNC: 32.1 G/DL (ref 31.5–35.7)
MCV RBC AUTO: 89.3 FL (ref 79–97)
PLATELET # BLD AUTO: 352 10*3/MM3 (ref 140–450)
PMV BLD AUTO: 9.7 FL (ref 6–12)
PSA SERPL-MCNC: 5.52 NG/ML (ref 0–4)
RBC # BLD AUTO: 5.16 10*6/MM3 (ref 4.14–5.8)
TESTOST SERPL-MCNC: 363 NG/DL (ref 193–740)
WBC NRBC COR # BLD: 7.21 10*3/MM3 (ref 3.4–10.8)

## 2019-07-15 PROCEDURE — 85027 COMPLETE CBC AUTOMATED: CPT | Performed by: UROLOGY

## 2019-07-15 PROCEDURE — 84403 ASSAY OF TOTAL TESTOSTERONE: CPT | Performed by: UROLOGY

## 2019-07-15 PROCEDURE — 84153 ASSAY OF PSA TOTAL: CPT | Performed by: UROLOGY

## 2019-07-15 PROCEDURE — 99213 OFFICE O/P EST LOW 20 MIN: CPT | Performed by: UROLOGY

## 2019-07-15 RX ORDER — SOTALOL HYDROCHLORIDE 80 MG/1
0.5 TABLET ORAL EVERY 12 HOURS
COMMUNITY

## 2019-07-15 RX ORDER — SILDENAFIL CITRATE 20 MG/1
20 TABLET ORAL DAILY
Qty: 24 TABLET | Refills: 6 | Status: SHIPPED | OUTPATIENT
Start: 2019-07-15 | End: 2019-09-01

## 2019-07-15 RX ORDER — TESTOSTERONE CYPIONATE 200 MG/ML
INJECTION, SOLUTION INTRAMUSCULAR
Qty: 10 ML | Refills: 2 | Status: SHIPPED | OUTPATIENT
Start: 2019-07-15 | End: 2022-04-18

## 2019-07-15 RX ORDER — SYRINGE WITH NEEDLE, 1 ML 25GX5/8"
SYRINGE, EMPTY DISPOSABLE MISCELLANEOUS SEE ADMIN INSTRUCTIONS
Refills: 6 | COMMUNITY
Start: 2019-06-13 | End: 2022-04-18

## 2019-07-15 RX ORDER — CLONIDINE HYDROCHLORIDE 0.2 MG/1
0.2 TABLET ORAL
Refills: 2 | COMMUNITY
Start: 2019-05-28

## 2019-07-15 RX ORDER — PREDNISONE 10 MG/1
1 TABLET ORAL
COMMUNITY
Start: 2012-03-02

## 2019-07-15 RX ORDER — GLYBURIDE 5 MG/1
1 TABLET ORAL
COMMUNITY

## 2019-07-15 NOTE — PROGRESS NOTES
"Chief Complaint:          Chief Complaint   Patient presents with   • Hypogonadism     6mnth f/u       HPI:   63 y.o. male.  He was doing great with testosterone and is very pleased with the Myrbetriq has lost 20 pounds doing great.  Patient returns today for follow-up.  He is been on testosterone replacement therapy.  He reports a dramatic improvement in his Roel questionnaire: -ROEL-androgen deficiency in the age male questionnaire  The patient was queried regarding the androgen deficiency in the age male questionnaire.  This is a validated questionnaire that was performed on a set of 314 [place] male physicians when it was positive it correlated directly with a 94% chance of low testosterone.  Patient indicates there is a decrease in libido or sex drive, a lack of energy, Decreased  strength and endurance, a decreased \"enjoyment of life\", sad and grumpy feelings with significant difficulty maintaining erections.  He is also been a recent deterioration regarding work performance.  He reports weight loss.  He has good facility and the use of subcutaneous and intramuscular injections as well as comfort level and using the medication in a sterile fashion.  He understands he should use only the prescribed dose.  He's here for appropriate lab monitoring regarding this.  He understands this is a controlled substance and therefore must be watched closely will not be refilled and the medical loss or miss calculation of the dose.  He is very happy with the treatment and therefore wants to continue it.    Past Medical History:        Past Medical History:   Diagnosis Date   • Diabetes mellitus (CMS/HCC)    • Hypertension          Current Meds:     Current Outpatient Medications   Medication Sig Dispense Refill   • allopurinol (ZYLOPRIM) 300 MG tablet Take 300 mg by mouth Daily.     • B-D 3CC LUER-AGNIESZKA SYR 21GX1\" 21G X 1\" 3 ML misc See Admin Instructions.  6   • CloNIDine (CATAPRES) 0.2 MG tablet Take 0.2 mg by mouth every " night at bedtime.  2   • dabigatran etexilate (PRADAXA) 150 MG capsu Take 150 mg by mouth 2 (Two) Times a Day.     • furosemide (LASIX) 40 MG tablet Take  by mouth.     • glyBURIDE (DIAbeta) 5 MG tablet Take 1 tablet by mouth.     • ibuprofen (ADVIL,MOTRIN) 600 MG tablet Take 600 mg by mouth Every 6 (Six) Hours As Needed for Mild Pain .     • losartan (COZAAR) 100 MG tablet Take  by mouth.     • metFORMIN (GLUCOPHAGE) 500 MG tablet Take 500 mg by mouth 2 (Two) Times a Day With Meals.     • metoprolol tartrate (LOPRESSOR) 100 MG tablet Take 100 mg by mouth 2 (Two) Times a Day.     • montelukast (SINGULAIR) 10 MG tablet Take 10 mg by mouth Every Night.     • predniSONE (DELTASONE) 10 MG tablet Take 1 tablet by mouth.     • sildenafil (REVATIO) 20 MG tablet 1-5 by mouth one hour prior to intercourse on an empty stomach 24 tablet 11   • Syringe, Disposable, 3 ML misc Use 3 ml syringe with a 25 gauge  5/8 inch needle 24 each 6   • Testosterone Cypionate (DEPO-TESTOSTERONE) 200 MG/ML injection He is to use 1/2 cc every Monday and Thursday SQ 10 mL 2   • sotalol (BETAPACE) 80 MG tablet Take 0.5 tablets by mouth Every 12 (Twelve) Hours.       No current facility-administered medications for this visit.         Allergies:      No Known Allergies     Past Surgical History:     No past surgical history on file.      Social History:     Social History     Socioeconomic History   • Marital status: Unknown     Spouse name: Not on file   • Number of children: Not on file   • Years of education: Not on file   • Highest education level: Not on file   Tobacco Use   • Smoking status: Never Smoker   • Smokeless tobacco: Never Used   Substance and Sexual Activity   • Alcohol use: No   • Drug use: No   • Sexual activity: No       Family History:     Family History   Problem Relation Age of Onset   • No Known Problems Father    • No Known Problems Mother        Review of Systems:     Review of Systems   Constitutional: Negative.    HENT:  Negative.    Eyes: Negative.    Respiratory: Negative.    Cardiovascular: Negative.    Gastrointestinal: Negative.    Endocrine: Negative.    Musculoskeletal: Negative.    Allergic/Immunologic: Negative.    Neurological: Negative.    Hematological: Negative.    Psychiatric/Behavioral: Negative.        Physical Exam:     Physical Exam   Constitutional: He is oriented to person, place, and time. He appears well-developed and well-nourished.   HENT:   Head: Normocephalic and atraumatic.   Eyes: Conjunctivae and EOM are normal. Pupils are equal, round, and reactive to light.   Neck: Normal range of motion.   Cardiovascular: Normal rate, regular rhythm, normal heart sounds and intact distal pulses.   Pulmonary/Chest: Effort normal and breath sounds normal.   Abdominal: Soft. Bowel sounds are normal.   Musculoskeletal: Normal range of motion.   Neurological: He is alert and oriented to person, place, and time. He has normal reflexes.   Skin: Skin is warm and dry.   Psychiatric: He has a normal mood and affect. His behavior is normal. Judgment and thought content normal.   Nursing note and vitals reviewed.      I have reviewed the following portions of the patient's history: allergies, current medications, past family history, past medical history, past social history, past surgical history, problem list and ROS and confirm it's accurate.      Procedure:       Assessment/Plan:   Low testosterone:  patient is here for follow-up.  Since beginning the medication he's been very pleased.  He reports a dramatic improvement in his erections, ability to achieve and maintain an erection, improvement in libido, increase in frequency of morning erections, a noticeable weight loss consistent with the treatment.  No development of breast problems or abnormalities.  He's going to have appropriate safety laboratory parameters checked.   He understands that the new data implicates testosterone with the development of prostate cancer and  this is all but been disproven and the medical literature as well as the risks of cardiovascular disease which is actually also been disproven.  He understands that while he is a candidate for topical therapy if he is in contact with children this is not an option because it's been shown to accentuate genitalia development at an early age that this frequently irreversible.  He also understands this is a controlled substance and as such will not be prescribed without appropriate follow-up and appropriate laboratory investigation.  He understands effects on spermatogenesis including the fact that this is not always completely reversible and not always completely limited his ability to father a child.  He has demonstrated facility in the technique of both intramuscular and subcutaneous injection.  And has been taught sterility one drawing up the medication.  Detrusor instability-patient has been diagnosed with detrusor instability which is in irritative bladder symptomatology most likely related to factors such as intake of bladder irritants, postinfectious irritation, prolapse, with a very large differential diagnosis.  The mainstay of treatment has been tight cholinergics which basically caused the bladder to have decreased contractility.  We have discussed the side effects of these treatments including dry mouth, double vision, and increasing constipation.  Anticholinergic medication-we are recommending the use of an anticholinergic.  We discussed the class of drugs.  We discussed the older drug such as oxybutynin with his increased spectrum of side effects such as dry mouth, dry eyes constipation versus the newer medications with lower side effects but more difficult to obtain via insurance and much more expensive finally the newest class of drugs which is Myrbetriq which has a very favorable side effect profile but unfortunately is prohibitively expensive and oftentimes requires precertification of which may be  unsuccessful in many other cases            Patient's Body mass index is 40.61 kg/m². BMI is above normal parameters. Recommendations include: educational material.      Smoking Cessation Counseling:  Never a smoker.  Patient does not currently use any tobacco products.                    This document has been electronically signed by ISMA CASH MD July 15, 2019 10:03 AM

## 2019-07-16 ENCOUNTER — PRIOR AUTHORIZATION (OUTPATIENT)
Dept: UROLOGY | Facility: CLINIC | Age: 64
End: 2019-07-16

## 2019-07-17 DIAGNOSIS — N32.81 DETRUSOR INSTABILITY: Primary | ICD-10-CM

## 2019-07-17 RX ORDER — TROSPIUM CHLORIDE 20 MG/1
20 TABLET, FILM COATED ORAL DAILY
Qty: 30 TABLET | Refills: 11 | Status: SHIPPED | OUTPATIENT
Start: 2019-07-17 | End: 2022-04-18

## 2020-03-28 DIAGNOSIS — E29.1 HYPOGONADISM IN MALE: ICD-10-CM

## 2020-03-30 RX ORDER — SILDENAFIL CITRATE 20 MG/1
TABLET ORAL
Qty: 24 TABLET | Refills: 11 | Status: SHIPPED | OUTPATIENT
Start: 2020-03-30

## 2020-05-15 ENCOUNTER — OFFICE VISIT (OUTPATIENT)
Dept: UROLOGY | Facility: CLINIC | Age: 65
End: 2020-05-15

## 2020-05-15 VITALS — HEIGHT: 68 IN | BODY MASS INDEX: 40.61 KG/M2 | TEMPERATURE: 97.8 F

## 2020-05-15 DIAGNOSIS — E29.1 HYPOGONADISM IN MALE: Primary | ICD-10-CM

## 2020-05-15 DIAGNOSIS — N42.9 DISORDER OF PROSTATE: ICD-10-CM

## 2020-05-15 PROCEDURE — 82670 ASSAY OF TOTAL ESTRADIOL: CPT | Performed by: UROLOGY

## 2020-05-15 PROCEDURE — 85027 COMPLETE CBC AUTOMATED: CPT | Performed by: UROLOGY

## 2020-05-15 PROCEDURE — 84403 ASSAY OF TOTAL TESTOSTERONE: CPT | Performed by: UROLOGY

## 2020-05-15 PROCEDURE — 84153 ASSAY OF PSA TOTAL: CPT | Performed by: UROLOGY

## 2020-05-15 PROCEDURE — 99213 OFFICE O/P EST LOW 20 MIN: CPT | Performed by: UROLOGY

## 2020-05-15 RX ORDER — ATORVASTATIN CALCIUM 80 MG/1
TABLET, FILM COATED ORAL
COMMUNITY
Start: 2020-04-28

## 2020-05-15 RX ORDER — COLCHICINE 0.6 MG/1
TABLET ORAL
COMMUNITY
Start: 2020-05-07

## 2020-05-15 RX ORDER — TAMSULOSIN HYDROCHLORIDE 0.4 MG/1
1 CAPSULE ORAL NIGHTLY
Qty: 30 CAPSULE | Refills: 5 | Status: SHIPPED | OUTPATIENT
Start: 2020-05-15 | End: 2020-05-15 | Stop reason: SDUPTHER

## 2020-05-15 RX ORDER — METFORMIN HYDROCHLORIDE 500 MG/1
TABLET, EXTENDED RELEASE ORAL
COMMUNITY
Start: 2020-04-07 | End: 2021-02-09 | Stop reason: SDUPTHER

## 2020-05-15 RX ORDER — RIVAROXABAN 20 MG/1
TABLET, FILM COATED ORAL
COMMUNITY
Start: 2020-04-28

## 2020-05-15 RX ORDER — CEPHALEXIN 500 MG/1
CAPSULE ORAL
COMMUNITY
Start: 2020-05-07 | End: 2022-04-18

## 2020-05-15 RX ORDER — TAMSULOSIN HYDROCHLORIDE 0.4 MG/1
1 CAPSULE ORAL NIGHTLY
Qty: 30 CAPSULE | Refills: 5 | Status: SHIPPED | OUTPATIENT
Start: 2020-05-15 | End: 2021-04-09 | Stop reason: SDUPTHER

## 2020-05-15 NOTE — PROGRESS NOTES
"Chief Complaint:          Chief Complaint   Patient presents with   • Hypogonadism     6 mnth f/u       HPI:   64 y.o. male returns today.  He is in a wheelchair because of recurrent severe gout.  He stopped his testosterone claiming he gets microabscesses in his abdomen likely secondary to poor hygiene.  He is not interested in restarting and I will check appropriate labs.  He has had no other complaints or problems.  He has a PSA currently pending at the time of this dictation.  I am not going to restart testosterone      Past Medical History:        Past Medical History:   Diagnosis Date   • Diabetes mellitus (CMS/HCC)    • Hypertension          Current Meds:     Current Outpatient Medications   Medication Sig Dispense Refill   • allopurinol (ZYLOPRIM) 300 MG tablet Take 300 mg by mouth Daily.     • atorvastatin (LIPITOR) 80 MG tablet      • B-D 3CC LUER-AGNIESZKA SYR 21GX1\" 21G X 1\" 3 ML misc See Admin Instructions.  6   • cephalexin (KEFLEX) 500 MG capsule      • CloNIDine (CATAPRES) 0.2 MG tablet Take 0.2 mg by mouth every night at bedtime.  2   • colchicine 0.6 MG tablet      • furosemide (LASIX) 40 MG tablet Take  by mouth.     • glyBURIDE (DIAbeta) 5 MG tablet Take 1 tablet by mouth.     • ibuprofen (ADVIL,MOTRIN) 600 MG tablet Take 600 mg by mouth Every 6 (Six) Hours As Needed for Mild Pain .     • losartan (COZAAR) 100 MG tablet Take  by mouth.     • metFORMIN (GLUCOPHAGE) 500 MG tablet Take 500 mg by mouth 2 (Two) Times a Day With Meals.     • metFORMIN ER (GLUCOPHAGE-XR) 500 MG 24 hr tablet      • metoprolol tartrate (LOPRESSOR) 100 MG tablet Take 100 mg by mouth 2 (Two) Times a Day.     • Mirabegron ER (MYRBETRIQ) 50 MG tablet sustained-release 24 hour 24 hr tablet Take 50 mg by mouth Daily. 30 tablet 6   • montelukast (SINGULAIR) 10 MG tablet Take 10 mg by mouth Every Night.     • predniSONE (DELTASONE) 10 MG tablet Take 1 tablet by mouth.     • sildenafil (REVATIO) 20 MG tablet TAKE 1 TO 5 TABLETS ON HOUR " "PRIOR TO INTERCOURSE ON AN EMPTY STOMACH 24 tablet 11   • sotalol (BETAPACE) 80 MG tablet Take 0.5 tablets by mouth Every 12 (Twelve) Hours.     • Syringe 25G X 5/8\" 3 ML misc Use as directed 2 x weekly 24 each 3   • Syringe, Disposable, 3 ML misc Use 3 ml syringe with a 25 gauge  5/8 inch needle 24 each 6   • Testosterone Cypionate (DEPO-TESTOSTERONE) 200 MG/ML injection Inject 1/2 cc subcutaneously every Monday and Thursday 10 mL 2   • trospium (SANCTURA) 20 MG tablet Take 1 tablet by mouth Daily. 30 tablet 11   • XARELTO 20 MG tablet        No current facility-administered medications for this visit.         Allergies:      No Known Allergies     Past Surgical History:     History reviewed. No pertinent surgical history.      Social History:     Social History     Socioeconomic History   • Marital status: Unknown     Spouse name: Not on file   • Number of children: Not on file   • Years of education: Not on file   • Highest education level: Not on file   Tobacco Use   • Smoking status: Never Smoker   • Smokeless tobacco: Never Used   Substance and Sexual Activity   • Alcohol use: No   • Drug use: No   • Sexual activity: Never       Family History:     Family History   Problem Relation Age of Onset   • No Known Problems Father    • No Known Problems Mother        Review of Systems:     Review of Systems   Constitutional: Negative.    HENT: Negative.    Eyes: Negative.    Respiratory: Negative.    Cardiovascular: Negative.    Gastrointestinal: Negative.    Endocrine: Negative.    Musculoskeletal: Negative.    Allergic/Immunologic: Negative.    Neurological: Negative.    Hematological: Negative.    Psychiatric/Behavioral: Negative.        Physical Exam:     Physical Exam   Constitutional: He is oriented to person, place, and time. He appears well-developed and well-nourished.   HENT:   Head: Normocephalic and atraumatic.   Eyes: Pupils are equal, round, and reactive to light. Conjunctivae and EOM are normal.   Neck: " Normal range of motion.   Cardiovascular: Normal rate, regular rhythm, normal heart sounds and intact distal pulses.   Pulmonary/Chest: Effort normal and breath sounds normal.   Abdominal: Soft. Bowel sounds are normal.   Musculoskeletal: Normal range of motion.   Neurological: He is alert and oriented to person, place, and time. He has normal reflexes.   Skin: Skin is warm and dry.   Psychiatric: He has a normal mood and affect. His behavior is normal. Judgment and thought content normal.   Nursing note and vitals reviewed.      I have reviewed the following portions of the patient's history: allergies, current medications, past family history, past medical history, past social history, past surgical history, problem list and ROS and confirm it's accurate.      Procedure:       Assessment/Plan:   Elevated prostate specific antigen-we discussed the diagnosis of elevated prostate-specific antigen.  I explained the pathophysiology of PSA.  It is a serine protease that's function in the male reproductive tract is to facilitate the liquefaction of semen.  It is for this reason the body does not want it freely floating in the serum and why typically bound tightly to albumin.  We discussed why we used both a PSA free and total to determine the need for more aggressive therapy I discussed the normal range.  Additionally it was in the range of 1-4 but more recently has been downgraded to something less than 2 or even approaching 1.  I discussed the risk of family history particularly the fact that the average male has a 14% risk of prostate cancer and that in the face of a positive diagnosis in a father it will tablet and any other first-generation relative continued tablet insofar that a father and brother with prostate cancer will produce almost a 50% risk of prostate cancer.  I discussed the use of the temporal use of PSA as the best option for monitoring.  We also discussed the fact that an elevated PSA is an isolated  event does not mean that this is prostate cancer and should not engender worry in this regard. I discussed other things that can elevate PSA including constipation, prostatitis, infection, recent intercourse etc. as well as the risks and benefits associated with this.  Also discussed the fact that this is with a dilutional test and as a consequence of such were present produce slight variations on a single specimen.  Further discussed the risks and benefits of a prostate biopsy as well.      .      Patient's Body mass index is 40.61 kg/m². BMI is above normal parameters. Recommendations include: educational material.              This document has been electronically signed by ISMA CASH MD May 15, 2020 15:52

## 2020-05-16 LAB
DEPRECATED RDW RBC AUTO: 40.2 FL (ref 37–54)
ERYTHROCYTE [DISTWIDTH] IN BLOOD BY AUTOMATED COUNT: 13.1 % (ref 12.3–15.4)
ESTRADIOL SERPL HS-MCNC: 9.8 PG/ML
HCT VFR BLD AUTO: 42.3 % (ref 37.5–51)
HGB BLD-MCNC: 13.8 G/DL (ref 13–17.7)
MCH RBC QN AUTO: 27.9 PG (ref 26.6–33)
MCHC RBC AUTO-ENTMCNC: 32.6 G/DL (ref 31.5–35.7)
MCV RBC AUTO: 85.6 FL (ref 79–97)
PLATELET # BLD AUTO: 701 10*3/MM3 (ref 140–450)
PMV BLD AUTO: 9.3 FL (ref 6–12)
PSA SERPL-MCNC: 8.99 NG/ML (ref 0–4)
RBC # BLD AUTO: 4.94 10*6/MM3 (ref 4.14–5.8)
TESTOST SERPL-MCNC: 249 NG/DL (ref 193–740)
WBC NRBC COR # BLD: 9.36 10*3/MM3 (ref 3.4–10.8)

## 2020-05-18 PROBLEM — N42.9 DISORDER OF PROSTATE: Status: ACTIVE | Noted: 2020-05-18

## 2021-02-09 ENCOUNTER — OFFICE VISIT (OUTPATIENT)
Dept: UROLOGY | Facility: CLINIC | Age: 66
End: 2021-02-09

## 2021-02-09 VITALS — TEMPERATURE: 97 F | BODY MASS INDEX: 40.61 KG/M2 | HEIGHT: 68 IN

## 2021-02-09 DIAGNOSIS — N52.02 CORPORO-VENOUS OCCLUSIVE ERECTILE DYSFUNCTION: ICD-10-CM

## 2021-02-09 DIAGNOSIS — E29.1 HYPOGONADISM IN MALE: Primary | ICD-10-CM

## 2021-02-09 PROCEDURE — 99213 OFFICE O/P EST LOW 20 MIN: CPT | Performed by: UROLOGY

## 2021-02-09 PROCEDURE — 84153 ASSAY OF PSA TOTAL: CPT | Performed by: UROLOGY

## 2021-02-09 RX ORDER — GABAPENTIN 300 MG/1
1 CAPSULE ORAL DAILY
COMMUNITY
Start: 2021-01-27

## 2021-02-09 RX ORDER — TADALAFIL 20 MG/1
20 TABLET ORAL AS NEEDED
Qty: 20 TABLET | Refills: 1 | Status: SHIPPED | OUTPATIENT
Start: 2021-02-09 | End: 2021-04-30 | Stop reason: SDUPTHER

## 2021-02-09 RX ORDER — TERBINAFINE HYDROCHLORIDE 250 MG/1
TABLET ORAL TAKE AS DIRECTED
COMMUNITY
Start: 2021-01-27

## 2021-02-09 NOTE — PROGRESS NOTES
"Chief Complaint:          Chief Complaint   Patient presents with   • Hypogonadism     6 mnth f/u       HPI:   65 y.o. male returns today.  He was 4 months in the hospital he is currently in a wheelchair is lost his mother and he had a nervous breakdown he is on Viagra he has a new girlfriend at home no I want Cialis he says he is better off off testosterone he is voiding okay he did not refill his alpha blockade labs currently pending I will see him back on an as-needed basis      Past Medical History:        Past Medical History:   Diagnosis Date   • Diabetes mellitus (CMS/HCC)    • Hypertension    • Stroke (CMS/HCC)          Current Meds:     Current Outpatient Medications   Medication Sig Dispense Refill   • allopurinol (ZYLOPRIM) 300 MG tablet Take 300 mg by mouth Daily.     • atorvastatin (LIPITOR) 80 MG tablet      • B-D 3CC LUER-AGNIESZKA SYR 21GX1\" 21G X 1\" 3 ML misc See Admin Instructions.  6   • cephalexin (KEFLEX) 500 MG capsule      • CloNIDine (CATAPRES) 0.2 MG tablet Take 0.2 mg by mouth every night at bedtime.  2   • colchicine 0.6 MG tablet      • furosemide (LASIX) 40 MG tablet Take  by mouth.     • gabapentin (NEURONTIN) 300 MG capsule Take 1 capsule by mouth Daily.     • glyBURIDE (DIAbeta) 5 MG tablet Take 1 tablet by mouth.     • ibuprofen (ADVIL,MOTRIN) 600 MG tablet Take 600 mg by mouth Every 6 (Six) Hours As Needed for Mild Pain .     • losartan (COZAAR) 100 MG tablet Take  by mouth.     • metFORMIN (GLUCOPHAGE) 1000 MG tablet Take 1 tablet by mouth 2 (two) times a day.     • metoprolol tartrate (LOPRESSOR) 25 MG tablet Take 1 tablet by mouth 2 (two) times a day.     • Mirabegron ER (MYRBETRIQ) 50 MG tablet sustained-release 24 hour 24 hr tablet Take 50 mg by mouth Daily. 30 tablet 6   • montelukast (SINGULAIR) 10 MG tablet Take 10 mg by mouth Every Night.     • predniSONE (DELTASONE) 10 MG tablet Take 1 tablet by mouth.     • sildenafil (REVATIO) 20 MG tablet TAKE 1 TO 5 TABLETS ON HOUR PRIOR TO " "INTERCOURSE ON AN EMPTY STOMACH 24 tablet 11   • sotalol (BETAPACE) 80 MG tablet Take 0.5 tablets by mouth Every 12 (Twelve) Hours.     • Syringe 25G X 5/8\" 3 ML misc Use as directed 2 x weekly 24 each 3   • Syringe, Disposable, 3 ML misc Use 3 ml syringe with a 25 gauge  5/8 inch needle 24 each 6   • tamsulosin (Flomax) 0.4 MG capsule 24 hr capsule Take 1 capsule by mouth Every Night. 30 capsule 5   • terbinafine (lamiSIL) 250 MG tablet Take As Directed.     • Testosterone Cypionate (DEPO-TESTOSTERONE) 200 MG/ML injection Inject 1/2 cc subcutaneously every Monday and Thursday 10 mL 2   • trospium (SANCTURA) 20 MG tablet Take 1 tablet by mouth Daily. 30 tablet 11   • XARELTO 20 MG tablet        No current facility-administered medications for this visit.         Allergies:      No Known Allergies     Past Surgical History:     History reviewed. No pertinent surgical history.      Social History:     Social History     Socioeconomic History   • Marital status: Unknown     Spouse name: Not on file   • Number of children: Not on file   • Years of education: Not on file   • Highest education level: Not on file   Tobacco Use   • Smoking status: Never Smoker   • Smokeless tobacco: Never Used   Substance and Sexual Activity   • Alcohol use: No   • Drug use: No   • Sexual activity: Never       Family History:     Family History   Problem Relation Age of Onset   • No Known Problems Father    • No Known Problems Mother        Review of Systems:     Review of Systems   Constitutional: Negative.    HENT: Negative.    Eyes: Negative.    Respiratory: Negative.    Cardiovascular: Negative.    Gastrointestinal: Negative.    Endocrine: Negative.    Musculoskeletal: Negative.    Allergic/Immunologic: Negative.    Neurological: Negative.    Hematological: Negative.    Psychiatric/Behavioral: Negative.        Physical Exam:     Physical Exam  Vitals signs and nursing note reviewed.   Constitutional:       Appearance: He is " well-developed.   HENT:      Head: Normocephalic and atraumatic.   Eyes:      Conjunctiva/sclera: Conjunctivae normal.      Pupils: Pupils are equal, round, and reactive to light.   Neck:      Musculoskeletal: Normal range of motion.   Cardiovascular:      Rate and Rhythm: Normal rate and regular rhythm.      Heart sounds: Normal heart sounds.   Pulmonary:      Effort: Pulmonary effort is normal.      Breath sounds: Normal breath sounds.   Abdominal:      General: Bowel sounds are normal.      Palpations: Abdomen is soft.   Musculoskeletal: Normal range of motion.   Skin:     General: Skin is warm and dry.   Neurological:      Mental Status: He is alert and oriented to person, place, and time.      Deep Tendon Reflexes: Reflexes are normal and symmetric.   Psychiatric:         Behavior: Behavior normal.         Thought Content: Thought content normal.         Judgment: Judgment normal.         I have reviewed the following portions of the patient's history: allergies, current medications, past family history, past medical history, past social history, past surgical history, problem list and ROS and confirm it's accurate.      Procedure:       Assessment/Plan:   Erectile dysfunction-we discussed the anatomy and physiology of the penis and the endothelium.  We discussed the various forms of erectile dysfunction including peripheral vascular occlusive disease, postoperative, secondary to radiation treatments of the prostate, and arterial inflow.  We discussed the various treatment options available including oral medication and its various forms.  We discussed the use of both generic and non-generic Viagra.  We discussed Cialis and a longer half-life of 17 hours as well as the other 2 medications.  We discussed cost involved with this including the fact that the generic is much cheaper but is taken has multiple pills because they are 20 mg dosages.  We did discuss the other alternatives including Penile injections,  vacuum erection devices and surgical intervention reserved for only the most severe cases.  We discussed the need for testosterone in about 20% of cases of erectile dysfunction.  Start daily Cialis            Patient's Body mass index is 40.61 kg/m². BMI is above normal parameters. Recommendations include: educational material.              This document has been electronically signed by ISMA CASH MD February 9, 2021 10:35 EST

## 2021-02-10 LAB — PSA SERPL-MCNC: 4.41 NG/ML (ref 0–4)

## 2021-02-16 PROBLEM — N52.02 CORPORO-VENOUS OCCLUSIVE ERECTILE DYSFUNCTION: Status: ACTIVE | Noted: 2021-02-16

## 2021-04-02 DIAGNOSIS — R79.89 LOW TESTOSTERONE IN MALE: ICD-10-CM

## 2021-04-02 RX ORDER — TESTOSTERONE CYPIONATE 200 MG/ML
INJECTION, SOLUTION INTRAMUSCULAR
Qty: 10 ML | Refills: 2 | Status: CANCELLED | OUTPATIENT
Start: 2021-04-02

## 2021-04-07 DIAGNOSIS — E29.1 HYPOGONADISM IN MALE: ICD-10-CM

## 2021-04-07 RX ORDER — SILDENAFIL CITRATE 20 MG/1
TABLET ORAL
Qty: 24 TABLET | Refills: 11 | OUTPATIENT
Start: 2021-04-07

## 2021-04-09 DIAGNOSIS — E29.1 HYPOGONADISM IN MALE: ICD-10-CM

## 2021-04-09 RX ORDER — METOPROLOL TARTRATE 100 MG/1
1 TABLET ORAL 2 TIMES DAILY
COMMUNITY
Start: 2021-03-09

## 2021-04-09 RX ORDER — MECLIZINE HYDROCHLORIDE 25 MG/1
1 TABLET ORAL AS NEEDED
COMMUNITY
Start: 2021-03-02

## 2021-04-09 NOTE — TELEPHONE ENCOUNTER
He needs a refill on the flomax called in to do until his appointment on 4/30.  We had to reschedule his last appointment.

## 2021-04-11 RX ORDER — TAMSULOSIN HYDROCHLORIDE 0.4 MG/1
1 CAPSULE ORAL NIGHTLY
Qty: 30 CAPSULE | Refills: 5 | Status: SHIPPED | OUTPATIENT
Start: 2021-04-11 | End: 2021-04-30 | Stop reason: SDUPTHER

## 2021-04-30 ENCOUNTER — OFFICE VISIT (OUTPATIENT)
Dept: UROLOGY | Facility: CLINIC | Age: 66
End: 2021-04-30

## 2021-04-30 VITALS — BODY MASS INDEX: 40.62 KG/M2 | TEMPERATURE: 97.5 F | HEIGHT: 68 IN

## 2021-04-30 DIAGNOSIS — E29.1 HYPOGONADISM IN MALE: ICD-10-CM

## 2021-04-30 DIAGNOSIS — R79.89 LOW TESTOSTERONE IN MALE: ICD-10-CM

## 2021-04-30 DIAGNOSIS — N52.02 CORPORO-VENOUS OCCLUSIVE ERECTILE DYSFUNCTION: Primary | ICD-10-CM

## 2021-04-30 DIAGNOSIS — N42.9 DISORDER OF PROSTATE: ICD-10-CM

## 2021-04-30 PROCEDURE — 99213 OFFICE O/P EST LOW 20 MIN: CPT | Performed by: UROLOGY

## 2021-04-30 RX ORDER — SILDENAFIL CITRATE 20 MG/1
TABLET ORAL
Qty: 24 TABLET | Refills: 11 | OUTPATIENT
Start: 2021-04-30

## 2021-04-30 RX ORDER — TADALAFIL 20 MG/1
20 TABLET ORAL AS NEEDED
Qty: 20 TABLET | Refills: 1 | Status: SHIPPED | OUTPATIENT
Start: 2021-04-30 | End: 2022-02-16

## 2021-04-30 RX ORDER — TAMSULOSIN HYDROCHLORIDE 0.4 MG/1
1 CAPSULE ORAL NIGHTLY
Qty: 30 CAPSULE | Refills: 5 | Status: SHIPPED | OUTPATIENT
Start: 2021-04-30 | End: 2021-12-14

## 2021-12-14 DIAGNOSIS — E29.1 HYPOGONADISM IN MALE: ICD-10-CM

## 2021-12-14 RX ORDER — TAMSULOSIN HYDROCHLORIDE 0.4 MG/1
1 CAPSULE ORAL NIGHTLY
Qty: 30 CAPSULE | Refills: 5 | Status: SHIPPED | OUTPATIENT
Start: 2021-12-14 | End: 2022-09-06

## 2022-02-16 DIAGNOSIS — E29.1 HYPOGONADISM IN MALE: ICD-10-CM

## 2022-02-16 RX ORDER — TADALAFIL 20 MG/1
TABLET ORAL
Qty: 10 TABLET | Refills: 1 | Status: SHIPPED | OUTPATIENT
Start: 2022-02-16 | End: 2022-02-28

## 2022-02-26 DIAGNOSIS — E29.1 HYPOGONADISM IN MALE: ICD-10-CM

## 2022-02-28 RX ORDER — TADALAFIL 20 MG/1
TABLET ORAL
Qty: 10 TABLET | Refills: 1 | Status: SHIPPED | OUTPATIENT
Start: 2022-02-28 | End: 2022-04-18 | Stop reason: SDUPTHER

## 2022-04-18 ENCOUNTER — OFFICE VISIT (OUTPATIENT)
Dept: UROLOGY | Facility: CLINIC | Age: 67
End: 2022-04-18

## 2022-04-18 DIAGNOSIS — E29.1 HYPOGONADISM IN MALE: ICD-10-CM

## 2022-04-18 DIAGNOSIS — R79.89 LOW TESTOSTERONE IN MALE: Primary | ICD-10-CM

## 2022-04-18 PROCEDURE — 99213 OFFICE O/P EST LOW 20 MIN: CPT | Performed by: UROLOGY

## 2022-04-18 RX ORDER — ESCITALOPRAM OXALATE 10 MG/1
10 TABLET ORAL DAILY
COMMUNITY
Start: 2022-04-08

## 2022-04-18 RX ORDER — CHOLECALCIFEROL (VITAMIN D3) 1250 MCG
50000 CAPSULE ORAL WEEKLY
COMMUNITY
Start: 2022-02-26

## 2022-04-18 RX ORDER — LIDOCAINE AND PRILOCAINE 25; 25 MG/G; MG/G
CREAM TOPICAL TAKE AS DIRECTED
COMMUNITY
Start: 2022-04-04

## 2022-04-18 RX ORDER — TADALAFIL 20 MG/1
20 TABLET ORAL DAILY PRN
Qty: 30 TABLET | Refills: 6 | Status: SHIPPED | OUTPATIENT
Start: 2022-04-18 | End: 2022-11-17 | Stop reason: SDUPTHER

## 2022-04-18 RX ORDER — DILTIAZEM HYDROCHLORIDE 120 MG/1
120 CAPSULE, COATED, EXTENDED RELEASE ORAL DAILY
COMMUNITY
Start: 2022-02-23

## 2022-04-18 NOTE — PROGRESS NOTES
Chief Complaint:          ed    HPI:   66 y.o. male doing better is out of the wheelchair wants a refill on his tadalafil he is not on testosterone is not a candidate I will see him back on an as-needed basis      Past Medical History:        Past Medical History:   Diagnosis Date   • Diabetes mellitus (CMS/HCC)    • Hypertension    • Stroke (CMS/HCC)          Current Meds:     Current Outpatient Medications   Medication Sig Dispense Refill   • allopurinol (ZYLOPRIM) 300 MG tablet Take 300 mg by mouth Daily.     • atorvastatin (LIPITOR) 80 MG tablet      • Cholecalciferol (Vitamin D3) 1.25 MG (76561 UT) capsule Take 50,000 Units by mouth 1 (One) Time Per Week.     • CloNIDine (CATAPRES) 0.2 MG tablet Take 0.2 mg by mouth every night at bedtime.  2   • colchicine 0.6 MG tablet      • dilTIAZem CD (CARDIZEM CD) 120 MG 24 hr capsule Take 120 mg by mouth Daily.     • escitalopram (LEXAPRO) 10 MG tablet Take 10 mg by mouth Daily.     • furosemide (LASIX) 40 MG tablet Take  by mouth.     • gabapentin (NEURONTIN) 300 MG capsule Take 1 capsule by mouth Daily.     • glyBURIDE (DIAbeta) 5 MG tablet Take 1 tablet by mouth.     • ibuprofen (ADVIL,MOTRIN) 600 MG tablet Take 600 mg by mouth Every 6 (Six) Hours As Needed for Mild Pain .     • lidocaine-prilocaine (EMLA) 2.5-2.5 % cream Take As Directed.     • losartan (COZAAR) 100 MG tablet Take  by mouth.     • meclizine (ANTIVERT) 25 MG tablet Take 1 tablet by mouth As Needed.     • metFORMIN (GLUCOPHAGE) 1000 MG tablet Take 1 tablet by mouth 2 (two) times a day.     • metoprolol tartrate (LOPRESSOR) 100 MG tablet Take 1 tablet by mouth 2 (two) times a day.     • Mirabegron ER (MYRBETRIQ) 50 MG tablet sustained-release 24 hour 24 hr tablet Take 50 mg by mouth Daily. 30 tablet 6   • montelukast (SINGULAIR) 10 MG tablet Take 10 mg by mouth Every Night.     • predniSONE (DELTASONE) 10 MG tablet Take 1 tablet by mouth.     • sildenafil (REVATIO) 20 MG tablet TAKE 1 TO 5 TABLETS ON  HOUR PRIOR TO INTERCOURSE ON AN EMPTY STOMACH 24 tablet 11   • sotalol (BETAPACE) 80 MG tablet Take 0.5 tablets by mouth Every 12 (Twelve) Hours.     • tadalafil (CIALIS) 20 MG tablet TAKE 1 TABLET BY MOUTH DAILY AS NEEDED FOR ERECTILE DYSFUNCTION 10 tablet 1   • tamsulosin (FLOMAX) 0.4 MG capsule 24 hr capsule TAKE 1 CAPSULE BY MOUTH EVERY NIGHT. 30 capsule 5   • terbinafine (lamiSIL) 250 MG tablet Take As Directed.     • XARELTO 20 MG tablet        No current facility-administered medications for this visit.        Allergies:      No Known Allergies     Past Surgical History:     No past surgical history on file.      Social History:     Social History     Socioeconomic History   • Marital status: Unknown   Tobacco Use   • Smoking status: Never Smoker   • Smokeless tobacco: Never Used   Substance and Sexual Activity   • Alcohol use: No   • Drug use: No   • Sexual activity: Never       Family History:     Family History   Problem Relation Age of Onset   • No Known Problems Father    • No Known Problems Mother        Review of Systems:     Review of Systems   Constitutional: Negative.    HENT: Negative.    Eyes: Negative.    Respiratory: Negative.    Cardiovascular: Negative.    Gastrointestinal: Negative.    Endocrine: Negative.    Musculoskeletal: Negative.    Allergic/Immunologic: Negative.    Neurological: Negative.    Hematological: Negative.    Psychiatric/Behavioral: Negative.        Physical Exam:     Physical Exam  Vitals and nursing note reviewed.   Constitutional:       Appearance: He is well-developed.   HENT:      Head: Normocephalic and atraumatic.   Eyes:      Conjunctiva/sclera: Conjunctivae normal.      Pupils: Pupils are equal, round, and reactive to light.   Cardiovascular:      Rate and Rhythm: Normal rate and regular rhythm.      Heart sounds: Normal heart sounds.   Pulmonary:      Effort: Pulmonary effort is normal.      Breath sounds: Normal breath sounds.   Abdominal:      General: Bowel  sounds are normal.      Palpations: Abdomen is soft.   Musculoskeletal:         General: Normal range of motion.      Cervical back: Normal range of motion.   Skin:     General: Skin is warm and dry.   Neurological:      Mental Status: He is alert and oriented to person, place, and time.      Deep Tendon Reflexes: Reflexes are normal and symmetric.   Psychiatric:         Behavior: Behavior normal.         Thought Content: Thought content normal.         Judgment: Judgment normal.         I have reviewed the following portions of the patient's history: Allergies, current medications, past family history, past medical history, past social history, past surgical history, problem list, and ROS and confirm it is accurate.      Procedure:       Assessment/Plan:   Erectile dysfunction-we discussed the anatomy and physiology of the penis and the endothelium.  We discussed the various forms of erectile dysfunction including peripheral vascular occlusive disease, postoperative, secondary to radiation treatments of the prostate, and arterial inflow.  We discussed the various treatment options available including oral medication and its various forms.  We discussed the use of both generic and non-generic Viagra.  We discussed Cialis and a longer half-life of 17 hours as well as the other 2 medications.  We discussed cost involved with this including the fact that the generic is much cheaper but is taken as multiple pills because they are 20 mg dosages.  We did discuss the other alternatives including penile injections, vacuum erection devices and surgical intervention reserved for only the most severe cases.  We discussed the need for testosterone in about 20% of cases of erectile dysfunction.  Refill tadalafil              This document has been electronically signed by ISMA CASH MD April 18, 2022 15:17 EDT

## 2022-08-18 ENCOUNTER — TELEPHONE (OUTPATIENT)
Dept: UROLOGY | Facility: CLINIC | Age: 67
End: 2022-08-18

## 2022-08-18 NOTE — TELEPHONE ENCOUNTER
Provider: DR CASH  Caller: ALOK KARU      Phone Number: [phone]    Reason for Call: TO RESCHEDULE APPT ON 8/18/. PT INS LAPSED. HE IS RESCHEDULED FOR 11/17

## 2022-09-03 DIAGNOSIS — E29.1 HYPOGONADISM IN MALE: ICD-10-CM

## 2022-09-06 RX ORDER — TAMSULOSIN HYDROCHLORIDE 0.4 MG/1
CAPSULE ORAL
Qty: 30 CAPSULE | Refills: 5 | Status: SHIPPED | OUTPATIENT
Start: 2022-09-06

## 2022-11-17 ENCOUNTER — OFFICE VISIT (OUTPATIENT)
Dept: UROLOGY | Facility: CLINIC | Age: 67
End: 2022-11-17

## 2022-11-17 VITALS — BODY MASS INDEX: 40.47 KG/M2 | WEIGHT: 267 LBS | HEIGHT: 68 IN

## 2022-11-17 DIAGNOSIS — E29.1 HYPOGONADISM IN MALE: ICD-10-CM

## 2022-11-17 DIAGNOSIS — R79.89 LOW TESTOSTERONE IN MALE: Primary | ICD-10-CM

## 2022-11-17 DIAGNOSIS — N52.02 CORPORO-VENOUS OCCLUSIVE ERECTILE DYSFUNCTION: ICD-10-CM

## 2022-11-17 PROCEDURE — 99213 OFFICE O/P EST LOW 20 MIN: CPT | Performed by: UROLOGY

## 2022-11-17 RX ORDER — TADALAFIL 20 MG/1
20 TABLET ORAL DAILY PRN
Qty: 30 TABLET | Refills: 6 | Status: SHIPPED | OUTPATIENT
Start: 2022-11-17

## 2022-11-17 NOTE — PROGRESS NOTES
Chief Complaint:      Chief Complaint   Patient presents with   • Erectile Dysfunction     Follow up       HPI:   67 y.o. male returns today he is doing great he looks great feels great stream is good I refilled his Cialis form he is having active intercourse he has a girlfriend I will see him back on an as-needed basis    Past Medical History:     Past Medical History:   Diagnosis Date   • Diabetes mellitus (HCC)    • Hypertension    • Stroke (HCC)        Current Meds:     Current Outpatient Medications   Medication Sig Dispense Refill   • allopurinol (ZYLOPRIM) 300 MG tablet Take 300 mg by mouth Daily.     • atorvastatin (LIPITOR) 80 MG tablet      • Cholecalciferol (Vitamin D3) 1.25 MG (22861 UT) capsule Take 50,000 Units by mouth 1 (One) Time Per Week.     • CloNIDine (CATAPRES) 0.2 MG tablet Take 0.2 mg by mouth every night at bedtime.  2   • colchicine 0.6 MG tablet      • dilTIAZem CD (CARDIZEM CD) 120 MG 24 hr capsule Take 120 mg by mouth Daily.     • escitalopram (LEXAPRO) 10 MG tablet Take 10 mg by mouth Daily.     • furosemide (LASIX) 40 MG tablet Take  by mouth.     • gabapentin (NEURONTIN) 300 MG capsule Take 1 capsule by mouth Daily.     • glyBURIDE (DIAbeta) 5 MG tablet Take 1 tablet by mouth.     • ibuprofen (ADVIL,MOTRIN) 600 MG tablet Take 600 mg by mouth Every 6 (Six) Hours As Needed for Mild Pain .     • lidocaine-prilocaine (EMLA) 2.5-2.5 % cream Take As Directed.     • losartan (COZAAR) 100 MG tablet Take  by mouth.     • meclizine (ANTIVERT) 25 MG tablet Take 1 tablet by mouth As Needed.     • metFORMIN (GLUCOPHAGE) 1000 MG tablet Take 1 tablet by mouth 2 (two) times a day.     • metoprolol tartrate (LOPRESSOR) 100 MG tablet Take 1 tablet by mouth 2 (two) times a day.     • Mirabegron ER (MYRBETRIQ) 50 MG tablet sustained-release 24 hour 24 hr tablet Take 50 mg by mouth Daily. 30 tablet 6   • montelukast (SINGULAIR) 10 MG tablet Take 10 mg by mouth Every Night.     • predniSONE (DELTASONE) 10  MG tablet Take 1 tablet by mouth.     • sildenafil (REVATIO) 20 MG tablet TAKE 1 TO 5 TABLETS ON HOUR PRIOR TO INTERCOURSE ON AN EMPTY STOMACH 24 tablet 11   • sotalol (BETAPACE) 80 MG tablet Take 0.5 tablets by mouth Every 12 (Twelve) Hours.     • tadalafil (CIALIS) 20 MG tablet Take 1 tablet by mouth Daily As Needed for Erectile Dysfunction. 30 tablet 6   • tamsulosin (FLOMAX) 0.4 MG capsule 24 hr capsule TAKE 1 CAPSULE NIGHTLY 30 capsule 5   • terbinafine (lamiSIL) 250 MG tablet Take As Directed.     • XARELTO 20 MG tablet        No current facility-administered medications for this visit.        Allergies:      No Known Allergies     Past Surgical History:     History reviewed. No pertinent surgical history.    Social History:     Social History     Socioeconomic History   • Marital status: Unknown   Tobacco Use   • Smoking status: Never   • Smokeless tobacco: Never   Substance and Sexual Activity   • Alcohol use: No   • Drug use: No   • Sexual activity: Never       Family History:     Family History   Problem Relation Age of Onset   • No Known Problems Father    • No Known Problems Mother        Review of Systems:     Review of Systems   Constitutional: Negative.    HENT: Negative.    Eyes: Negative.    Respiratory: Negative.    Cardiovascular: Negative.    Gastrointestinal: Negative.    Endocrine: Negative.    Musculoskeletal: Negative.    Allergic/Immunologic: Negative.    Neurological: Negative.    Hematological: Negative.    Psychiatric/Behavioral: Negative.        Physical Exam:     Physical Exam  Vitals and nursing note reviewed.   Constitutional:       Appearance: He is well-developed.   HENT:      Head: Normocephalic and atraumatic.   Eyes:      Conjunctiva/sclera: Conjunctivae normal.      Pupils: Pupils are equal, round, and reactive to light.   Cardiovascular:      Rate and Rhythm: Normal rate and regular rhythm.      Heart sounds: Normal heart sounds.   Pulmonary:      Effort: Pulmonary effort is  normal.      Breath sounds: Normal breath sounds.   Abdominal:      General: Bowel sounds are normal.      Palpations: Abdomen is soft.   Musculoskeletal:         General: Normal range of motion.      Cervical back: Normal range of motion.   Skin:     General: Skin is warm and dry.   Neurological:      Mental Status: He is alert and oriented to person, place, and time.      Deep Tendon Reflexes: Reflexes are normal and symmetric.   Psychiatric:         Behavior: Behavior normal.         Thought Content: Thought content normal.         Judgment: Judgment normal.         I have reviewed the following portions of the patient's history: Allergies, current medications, past family history, past medical history, past social history, past surgical history, problem list, and ROS and confirm it is accurate.    Recent Image (CT and/or KUB):      CT Abdomen and Pelvis: No results found for this or any previous visit.       CT Stone Protocol: No results found for this or any previous visit.       KUB: No results found for this or any previous visit.       Labs (past 3 months):      No visits with results within 3 Month(s) from this visit.   Latest known visit with results is:   Office Visit on 02/09/2021   Component Date Value Ref Range Status   • PSA 02/09/2021 4.410 (H)  0.000 - 4.000 ng/mL Final        Procedure:       Assessment/Plan:   Erectile dysfunction-we discussed the anatomy and physiology of the penis and the endothelium.  We discussed the various forms of erectile dysfunction including peripheral vascular occlusive disease, postoperative, secondary to radiation treatments of the prostate, and arterial inflow.  We discussed the various treatment options available including oral medication and its various forms.  We discussed the use of both generic and non-generic Viagra.  We discussed Cialis and a longer half-life of 17 hours as well as the other 2 medications.  We discussed cost involved with this including the  fact that the generic is much cheaper but is taken as multiple pills because they are 20 mg dosages.  We did discuss the other alternatives including penile injections, vacuum erection devices and surgical intervention reserved for only the most severe cases.  We discussed the need for testosterone in about 20% of cases of erectile dysfunction.  Refill Cialis            This document has been electronically signed by ISMA CASH MD November 17, 2022 15:37 EST    Dictated Utilizing Dragon Dictation: Part of this note may be an electronic transcription/translation of spoken language to printed text using the Dragon Dictation System.

## 2023-05-08 DIAGNOSIS — E29.1 HYPOGONADISM IN MALE: ICD-10-CM

## 2023-05-08 RX ORDER — TAMSULOSIN HYDROCHLORIDE 0.4 MG/1
CAPSULE ORAL
Qty: 30 CAPSULE | Refills: 5 | Status: SHIPPED | OUTPATIENT
Start: 2023-05-08

## 2023-07-06 ENCOUNTER — HOSPITAL ENCOUNTER (INPATIENT)
Facility: HOSPITAL | Age: 68
LOS: 48 days | Discharge: HOME HEALTH | DRG: 056 | End: 2023-08-23
Attending: INTERNAL MEDICINE | Admitting: INTERNAL MEDICINE
Payer: MEDICARE

## 2023-07-06 DIAGNOSIS — I63.9 CEREBROVASCULAR ACCIDENT (CVA), UNSPECIFIED MECHANISM: Primary | ICD-10-CM

## 2023-07-06 LAB — GLUCOSE BLDC GLUCOMTR-MCNC: 114 MG/DL (ref 70–130)

## 2023-07-06 PROCEDURE — 82948 REAGENT STRIP/BLOOD GLUCOSE: CPT

## 2023-07-06 PROCEDURE — 99223 1ST HOSP IP/OBS HIGH 75: CPT | Performed by: INTERNAL MEDICINE

## 2023-07-06 RX ORDER — GLIPIZIDE 5 MG/1
5 TABLET ORAL
Status: CANCELLED | OUTPATIENT
Start: 2023-07-07

## 2023-07-06 RX ORDER — METOPROLOL TARTRATE 50 MG/1
50 TABLET, FILM COATED ORAL EVERY 12 HOURS SCHEDULED
Status: DISCONTINUED | OUTPATIENT
Start: 2023-07-06 | End: 2023-07-17

## 2023-07-06 RX ORDER — TAMSULOSIN HYDROCHLORIDE 0.4 MG/1
0.4 CAPSULE ORAL NIGHTLY
Status: CANCELLED | OUTPATIENT
Start: 2023-07-06

## 2023-07-06 RX ORDER — TAMSULOSIN HYDROCHLORIDE 0.4 MG/1
0.4 CAPSULE ORAL DAILY
Status: DISCONTINUED | OUTPATIENT
Start: 2023-07-07 | End: 2023-08-23 | Stop reason: HOSPADM

## 2023-07-06 RX ORDER — CHOLECALCIFEROL (VITAMIN D3) 1250 MCG
50000 CAPSULE ORAL WEEKLY
Status: CANCELLED | OUTPATIENT
Start: 2023-07-06

## 2023-07-06 RX ORDER — ALLOPURINOL 300 MG/1
300 TABLET ORAL DAILY
Status: DISCONTINUED | OUTPATIENT
Start: 2023-07-07 | End: 2023-08-23 | Stop reason: HOSPADM

## 2023-07-06 RX ORDER — TAMSULOSIN HYDROCHLORIDE 0.4 MG/1
1 CAPSULE ORAL NIGHTLY
Status: ON HOLD | COMMUNITY
End: 2023-08-23 | Stop reason: SDUPTHER

## 2023-07-06 RX ORDER — ATORVASTATIN CALCIUM 40 MG/1
80 TABLET, FILM COATED ORAL DAILY
Status: DISCONTINUED | OUTPATIENT
Start: 2023-07-07 | End: 2023-08-23 | Stop reason: HOSPADM

## 2023-07-06 RX ORDER — AMOXICILLIN 250 MG
2 CAPSULE ORAL 2 TIMES DAILY
Status: DISCONTINUED | OUTPATIENT
Start: 2023-07-06 | End: 2023-07-09

## 2023-07-06 RX ORDER — ERGOCALCIFEROL 1.25 MG/1
50000 CAPSULE ORAL WEEKLY
Status: ON HOLD | COMMUNITY
End: 2023-07-06

## 2023-07-06 RX ORDER — POLYETHYLENE GLYCOL 3350 17 G/17G
17 POWDER, FOR SOLUTION ORAL DAILY
Status: DISCONTINUED | OUTPATIENT
Start: 2023-07-07 | End: 2023-07-09

## 2023-07-06 RX ORDER — LOSARTAN POTASSIUM 25 MG/1
25 TABLET ORAL
Status: DISCONTINUED | OUTPATIENT
Start: 2023-07-07 | End: 2023-07-17

## 2023-07-06 RX ORDER — ONDANSETRON 4 MG/1
8 TABLET, ORALLY DISINTEGRATING ORAL EVERY 12 HOURS PRN
Status: CANCELLED | OUTPATIENT
Start: 2023-07-06

## 2023-07-06 RX ORDER — ALLOPURINOL 300 MG/1
300 TABLET ORAL DAILY
Status: CANCELLED | OUTPATIENT
Start: 2023-07-06

## 2023-07-06 RX ORDER — OXYCODONE HYDROCHLORIDE AND ACETAMINOPHEN 5; 325 MG/1; MG/1
1 TABLET ORAL EVERY 6 HOURS PRN
Status: DISCONTINUED | OUTPATIENT
Start: 2023-07-06 | End: 2023-07-18

## 2023-07-06 RX ORDER — ACETAMINOPHEN 325 MG/1
650 TABLET ORAL EVERY 6 HOURS PRN
Status: DISCONTINUED | OUTPATIENT
Start: 2023-07-06 | End: 2023-08-23 | Stop reason: HOSPADM

## 2023-07-06 RX ORDER — METOPROLOL TARTRATE 50 MG/1
50 TABLET, FILM COATED ORAL 2 TIMES DAILY
COMMUNITY
End: 2023-08-23 | Stop reason: HOSPADM

## 2023-07-06 RX ORDER — DILTIAZEM HCL 90 MG
90 TABLET ORAL DAILY
COMMUNITY
End: 2023-08-23 | Stop reason: HOSPADM

## 2023-07-06 RX ORDER — ONDANSETRON 8 MG/1
8 TABLET, ORALLY DISINTEGRATING ORAL EVERY 12 HOURS PRN
COMMUNITY
End: 2023-08-23 | Stop reason: HOSPADM

## 2023-07-06 RX ADMIN — DOCUSATE SODIUM 50 MG AND SENNOSIDES 8.6 MG 2 TABLET: 8.6; 5 TABLET, FILM COATED ORAL at 20:40

## 2023-07-06 RX ADMIN — RIVAROXABAN 20 MG: 20 TABLET, FILM COATED ORAL at 17:35
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
## 2023-07-06 NOTE — PROGRESS NOTES
"Patient Assessment Instrument  Quality Indicators - Admission FY 2023    Section A. Ethnicity/ Race/Language  Ethnicity:  Not of , /a, Armenian Origin  Race:  White  Preferred Language:  English  Requests  to Communicate:   No    Section A. Transportation      Section B. Hearing and Vision  Expression of Ideas and Wants: Expresses complex messages without difficulty and  with speech that is clear and easy to understand.  Understanding Verbal and Non-Verbal Content: Understands: Clear comprehension  without cues or repetitions.  Ability to Hear:  Adequate - no difficulty in normal conversation, social  interaction, listening to TV  Ability to See in Adequate Light:  Adequate - sees fine detail, such as regular  print in newspapers/books    Section B. Health Literacy  Frequency of Needing Assistance Reading:  Sometimes      Section C. Cognitive Patterns  Brief Interview for Mental Status (BIMS) was conducted.  Repetition of Three Words: Three words  Able to report correct year: Correct  Able to report correct month: Accurate within 5 days  Able to report correct day of the week: Correct  Able to recall \"sock\": Yes, after cuing  Able to recall \"blue\": Yes, no cue required  Able to recall \"bed\": Yes, no cue required    BIMS SUMMARY SCORE: 14 Cognitively intact Patient was able to complete the Brief  Interview for Mental Status    Section C. Signs and Symptoms of Delirium (from CAM)  Evidence of Acute Change in Mental Status:   No  Inattention: Behavior not present  Thinking Disorganized or Incoherent:   Behavior not present  Altered Level of Consciousness:   Behavior not present    Section D. Mood  Presence of little interest or pleasure in doing things:   No  Frequency of having little interest or pleasure in doing things:   Never or 1  day  Presence of feeling down, depressed, or hopeless:   No  Frequency of feeling down, depressed, or hopeless:   Never or 1 day   Interview Ended. Above " responses do not meet criteria to continue.  Total Severity Score:   00    Section D. Social Isolation  Frequecy of Feeling Lonely or Isolated:  Never    Section JI1512. Prior Functioning      Section VY9000. Prior Device Use      Section WW1704. Self Care Performance      Section [id number]. Self Care Discharge Goals      Section PF9379. Mobility Performance      Section [id number]. Mobility Discharge Goals      Section H. Bladder and Bowel      Section I. Active Diagnosis      Section J. Health Conditions      Section J. Health Conditions (Pain)  Pain Effect on Sleep:   Does not apply - Patient has not had any pain or hurting  in the past 5 days    Section K. Swallowing/Nutritional Status    Nutritional Approaches on Admission:    Section M. Skin Conditions  Unhealed Pressure Ulcer/Injuries at Stage 1 or Higher on Admission:  No.    Section N. Medication    Potential Clinically Significant Medication Issues: No issues found during  review      Section O. Special Treatments, Procedures, and Programs  None    Signed by: Valeria Maza RN

## 2023-07-06 NOTE — PROGRESS NOTES
Rehabilitation Nursing  Inpatient Rehabilitation Plan of Care Note    Plan of Care  Body Function Structure    Skin Integrity (Active)  Current Status (7/6/2023 4:18:00 PM): At Risk for Skin Breakdown  Weekly Goal: No Skin Breakdown  Discharge Goal: No Skin Breakdown    Safety    Potential for Injury (Active)  Current Status (7/6/2023 4:18:00 PM): Potential for Falls  Weekly Goal: No Falls  Discharge Goal: No Falls    Signed by: Valeria Maza RN

## 2023-07-07 ENCOUNTER — APPOINTMENT (OUTPATIENT)
Dept: GENERAL RADIOLOGY | Facility: HOSPITAL | Age: 68
DRG: 056 | End: 2023-07-07
Payer: MEDICARE

## 2023-07-07 LAB
ALBUMIN SERPL-MCNC: 3.3 G/DL (ref 3.5–5.2)
ALBUMIN/GLOB SERPL: 1.1 G/DL
ALP SERPL-CCNC: 77 U/L (ref 39–117)
ALT SERPL W P-5'-P-CCNC: 13 U/L (ref 1–41)
ANION GAP SERPL CALCULATED.3IONS-SCNC: 10 MMOL/L (ref 5–15)
AST SERPL-CCNC: 18 U/L (ref 1–40)
BASOPHILS # BLD AUTO: 0.06 10*3/MM3 (ref 0–0.2)
BASOPHILS NFR BLD AUTO: 0.8 % (ref 0–1.5)
BILIRUB SERPL-MCNC: 0.4 MG/DL (ref 0–1.2)
BUN SERPL-MCNC: 25 MG/DL (ref 8–23)
BUN/CREAT SERPL: 26.3 (ref 7–25)
CALCIUM SPEC-SCNC: 9.2 MG/DL (ref 8.6–10.5)
CHLORIDE SERPL-SCNC: 104 MMOL/L (ref 98–107)
CO2 SERPL-SCNC: 22 MMOL/L (ref 22–29)
CREAT SERPL-MCNC: 0.95 MG/DL (ref 0.76–1.27)
DEPRECATED RDW RBC AUTO: 40.8 FL (ref 37–54)
EGFRCR SERPLBLD CKD-EPI 2021: 88.8 ML/MIN/1.73
EOSINOPHIL # BLD AUTO: 0.1 10*3/MM3 (ref 0–0.4)
EOSINOPHIL NFR BLD AUTO: 1.3 % (ref 0.3–6.2)
ERYTHROCYTE [DISTWIDTH] IN BLOOD BY AUTOMATED COUNT: 12.8 % (ref 12.3–15.4)
GLOBULIN UR ELPH-MCNC: 3.1 GM/DL
GLUCOSE BLDC GLUCOMTR-MCNC: 145 MG/DL (ref 70–130)
GLUCOSE BLDC GLUCOMTR-MCNC: 162 MG/DL (ref 70–130)
GLUCOSE BLDC GLUCOMTR-MCNC: 168 MG/DL (ref 70–130)
GLUCOSE SERPL-MCNC: 113 MG/DL (ref 65–99)
HBA1C MFR BLD: 6 % (ref 4.8–5.6)
HCT VFR BLD AUTO: 34.3 % (ref 37.5–51)
HGB BLD-MCNC: 10.5 G/DL (ref 13–17.7)
IMM GRANULOCYTES # BLD AUTO: 0.03 10*3/MM3 (ref 0–0.05)
IMM GRANULOCYTES NFR BLD AUTO: 0.4 % (ref 0–0.5)
LYMPHOCYTES # BLD AUTO: 1.46 10*3/MM3 (ref 0.7–3.1)
LYMPHOCYTES NFR BLD AUTO: 19.6 % (ref 19.6–45.3)
MAGNESIUM SERPL-MCNC: 2.1 MG/DL (ref 1.6–2.4)
MCH RBC QN AUTO: 26.8 PG (ref 26.6–33)
MCHC RBC AUTO-ENTMCNC: 30.6 G/DL (ref 31.5–35.7)
MCV RBC AUTO: 87.5 FL (ref 79–97)
MONOCYTES # BLD AUTO: 0.94 10*3/MM3 (ref 0.1–0.9)
MONOCYTES NFR BLD AUTO: 12.6 % (ref 5–12)
NEUTROPHILS NFR BLD AUTO: 4.85 10*3/MM3 (ref 1.7–7)
NEUTROPHILS NFR BLD AUTO: 65.3 % (ref 42.7–76)
NRBC BLD AUTO-RTO: 0 /100 WBC (ref 0–0.2)
PLATELET # BLD AUTO: 361 10*3/MM3 (ref 140–450)
PMV BLD AUTO: 9.2 FL (ref 6–12)
POTASSIUM SERPL-SCNC: 4.5 MMOL/L (ref 3.5–5.2)
PROT SERPL-MCNC: 6.4 G/DL (ref 6–8.5)
RBC # BLD AUTO: 3.92 10*6/MM3 (ref 4.14–5.8)
SODIUM SERPL-SCNC: 136 MMOL/L (ref 136–145)
WBC NRBC COR # BLD: 7.44 10*3/MM3 (ref 3.4–10.8)

## 2023-07-07 PROCEDURE — 83036 HEMOGLOBIN GLYCOSYLATED A1C: CPT | Performed by: INTERNAL MEDICINE

## 2023-07-07 PROCEDURE — 80053 COMPREHEN METABOLIC PANEL: CPT | Performed by: INTERNAL MEDICINE

## 2023-07-07 PROCEDURE — 82948 REAGENT STRIP/BLOOD GLUCOSE: CPT

## 2023-07-07 PROCEDURE — 97112 NEUROMUSCULAR REEDUCATION: CPT

## 2023-07-07 PROCEDURE — 97112 NEUROMUSCULAR REEDUCATION: CPT | Performed by: OCCUPATIONAL THERAPIST

## 2023-07-07 PROCEDURE — 97167 OT EVAL HIGH COMPLEX 60 MIN: CPT | Performed by: OCCUPATIONAL THERAPIST

## 2023-07-07 PROCEDURE — 92523 SPEECH SOUND LANG COMPREHEN: CPT

## 2023-07-07 PROCEDURE — 99231 SBSQ HOSP IP/OBS SF/LOW 25: CPT | Performed by: INTERNAL MEDICINE

## 2023-07-07 PROCEDURE — 97161 PT EVAL LOW COMPLEX 20 MIN: CPT

## 2023-07-07 PROCEDURE — 92611 MOTION FLUOROSCOPY/SWALLOW: CPT

## 2023-07-07 PROCEDURE — 85025 COMPLETE CBC W/AUTO DIFF WBC: CPT | Performed by: INTERNAL MEDICINE

## 2023-07-07 PROCEDURE — 74230 X-RAY XM SWLNG FUNCJ C+: CPT | Performed by: RADIOLOGY

## 2023-07-07 PROCEDURE — 83735 ASSAY OF MAGNESIUM: CPT | Performed by: INTERNAL MEDICINE

## 2023-07-07 PROCEDURE — 97535 SELF CARE MNGMENT TRAINING: CPT | Performed by: OCCUPATIONAL THERAPIST

## 2023-07-07 PROCEDURE — 97530 THERAPEUTIC ACTIVITIES: CPT

## 2023-07-07 PROCEDURE — 74230 X-RAY XM SWLNG FUNCJ C+: CPT

## 2023-07-07 PROCEDURE — 97110 THERAPEUTIC EXERCISES: CPT

## 2023-07-07 PROCEDURE — 97110 THERAPEUTIC EXERCISES: CPT | Performed by: OCCUPATIONAL THERAPIST

## 2023-07-07 RX ORDER — CHOLECALCIFEROL (VITAMIN D3) 125 MCG
5 CAPSULE ORAL NIGHTLY PRN
Status: DISCONTINUED | OUTPATIENT
Start: 2023-07-07 | End: 2023-07-08

## 2023-07-07 RX ADMIN — ATORVASTATIN CALCIUM 80 MG: 40 TABLET, FILM COATED ORAL at 09:25

## 2023-07-07 RX ADMIN — DOCUSATE SODIUM 50 MG AND SENNOSIDES 8.6 MG 2 TABLET: 8.6; 5 TABLET, FILM COATED ORAL at 09:25

## 2023-07-07 RX ADMIN — LOSARTAN POTASSIUM 25 MG: 25 TABLET, FILM COATED ORAL at 09:25

## 2023-07-07 RX ADMIN — ALLOPURINOL 300 MG: 300 TABLET ORAL at 09:25

## 2023-07-07 RX ADMIN — MAGNESIUM GLUCONATE 500 MG ORAL TABLET 400 MG: 500 TABLET ORAL at 09:26

## 2023-07-07 RX ADMIN — Medication 5 MG: at 20:28

## 2023-07-07 RX ADMIN — RIVAROXABAN 20 MG: 20 TABLET, FILM COATED ORAL at 17:02

## 2023-07-07 RX ADMIN — POLYETHYLENE GLYCOL 3350 17 G: 17 POWDER, FOR SOLUTION ORAL at 09:25

## 2023-07-07 RX ADMIN — TAMSULOSIN HYDROCHLORIDE 0.4 MG: 0.4 CAPSULE ORAL at 09:25

## 2023-07-07 RX ADMIN — METOPROLOL TARTRATE 50 MG: 50 TABLET, FILM COATED ORAL at 09:25

## 2023-07-07 NOTE — PLAN OF CARE
Goal Outcome Evaluation:  Plan of Care Reviewed With: patient        Progress: improving  Outcome Evaluation: CONTINUE PLAN OF CARE; BED ALARM NOTED; MONITOR

## 2023-07-07 NOTE — MBS/VFSS/FEES
Inpatient Rehabilitation - Speech Language Pathology Initial Evaluation  [facility]  Speech/Language/Cognitive Assessment     Patient Name: Antonio Canseco  : 1955  MRN: [id number]  Today's Date: 2023             Admit Date: 2023     Antonio Canseco  was seen this am on IPR room 6B to participate in a s/l and cognitive  assessment for safety/function in adls. He was positioned upright and centered in bed to cooperatively participate. All results and observations of this evaluation are felt to be representative of his current functional status.    Mr. Canseco was transferred from [facility] to [facility] 23 for continued functional mobility intervention and reeducation s/p multiple embolic strokes predominantly in the right cerebellar right posterior communicating artery and right MCA and RADHA territories, along with embolic shower on left cerebral hemisphere. He initially presented to [facility] ED on  with hemoptysis. He was intubated for airway protection, transferred to [facility]. CT done at [facility] showed active extravasation into the oropharynx likely originating from the tonsillar mass.  Once the patient got to [facility] there was no further bleeding ENT follow the patient and ultimately a CT angiogram was done  without evidence of active extravasation noted.  Patient did develop left-sided weakness and confusion immediately after the  angiogram and a stroke alert was called.  Because of the risk of bleeding tPA was not pursued.  Initial CT was negative but CT on  showed interval development of a right frontal gray and white matter differentiation which could suggest a watershed infarct or embolic right MCA infarct.  Patient had been off anticoagulation because of the previously mentioned bleeding.  An MRI done of the head  showed multiple embolic strokes predominantly in the right cerebellar right posterior communicating artery and right MCA and RADHA  "territories along with embolic shower on left cerebral hemisphere.  Patient was started on Xarelto and has tolerated this well without further bleeding.  Due to the bleeding however it was not recommended he take antiplatelets at this time.  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  Patient is planning to follow-up with local oncologist.  CT/PET June 23 demonstrated innumerable avid nodes and mass on the left oropharynx.  It is noted on the Xarelto patient was noted to have some black tarry stools on June 21 and 22 however no hemoglobin drop was noted so this was not pursued further.  Patient continued to progress medically. He was transferred for further therapeutic interventions at [facility] level.    PMH is significant for atrial fibrillation, HTN, remote stroke w/o residual deficits.     He was participating in OT/PT at [facility], no formal SLP services noted, aside from MBS 6/21/23. Please see full dysphagia note for further details.     Social History     Socioeconomic History    Marital status: Unknown   Tobacco Use    Smoking status: Never    Smokeless tobacco: Never   Vaping Use    Vaping Use: Never used   Substance and Sexual Activity    Alcohol use: No    Drug use: No    Sexual activity: Defer      Diet Orders (active) (From admission, onward)       Start     Ordered    07/06/23 1650  Diet: Cardiac Diets; Healthy Heart (2-3 Na+); Texture: Regular Texture (IDDSI 7); Fluid Consistency: Thin (IDDSI 0)  Diet Effective Now         07/06/23 1649                  He is observed on ra w/o complications.     Receptive language skills are intact. He is able to id common objects and personal body parts, follow simple and multi-step directives, understand complex \"wh\" questions and participate in conversational exchange w/o difficulties.    Expressive language skills are intact. He is able to complete " "automatic speech tasks, confrontation naming tasks, complete complex oe sentences, respond to complet oe \"wh\" questions, repeat at word/sentence and multiple digit levels, as well as participate in complex conversational exchange. No aphasia, anomia or verbal apraxia evidenced.    Mr. Canseco is independently oriented to person, place and time. Immediate, STM and LTM are wfl w/ pt recalling recent adls and providing personal information w/o delays. Thought organization, processing and problem solving are wfl w/ pt demonstrating appropriate comparing/contrasting, understanding of idiomatic language, convergent and divergent thinking skills.    Facial/oral structures reveal a very trace asymmetry with left weakness, most notably in ROM tasks. No significant lingual deviation upon protrusion. Sensation intact.  Oral mucosa are moist, pink and clean. Secretions are clear, thin and controlled. OROM/JESSIKA is generally weak to imitate oral postures. Gag is not assessed. Volitional cough is adequate in intensity, clear in quality, nonproductive. Vocal quality is adequate in intensity, clear in quality w/ intelligible speech w/o oral apraxia noted.    Graphic and reading skills are intact, premorbid baseline status. He is able to id isolated letters, simple and moderate words, as well as sentences and small paragraphs. He is noted to present with visual inattention to the left, however, is aware of left environment, visually tracks to the left with target. He is further appreciated with moderate-severe deficits of left proprioception, requiring cues for awareness of left upper body leaning. He does not present with environmental, nor physical body neglect.     Pragmatic skills are wfl w/ appropriate eye gaze patterns and visual tracking. Language is appropriate in context w/ topic initiation and maintenance independently. Humor response is intact w/ appropriate affect.    Visit Dx:  No diagnosis found.  Patient Active Problem " List   Diagnosis    Detrusor instability    Low testosterone in male    Disorder of prostate    Corporo-venous occlusive erectile dysfunction    CVA (cerebral vascular accident)     Past Medical History:   Diagnosis Date    Diabetes mellitus     Hypertension     Stroke      Past Surgical History:   Procedure Laterality Date    US GUIDED LYMPH NODE BIOPSY  6/23/2023     EDUCATION  The patient has been educated in the following areas:   Cognitive Impairment Communication Impairment.    Impression: Mr. Canseco presents with speech, language and cognitive skills representative of his premorbid baseline function. He presents with a very trace left facial weakness in ROM, intact sensation, however, this does not negatively impact his speech intelligibilty, but is rather cosmetic. He is noted to present with visual inattention to the left, however, is aware of left environment, visually tracks to the left with target. He is further appreciated with moderate-severe deficits of left proprioception, requiring cues for awareness of left upper body leaning. He does not present with environmental, nor physical body neglect.     SLP Recommendation and Plan   No further formal SLP f/u recommended for s/l and cognitive skills. He did participate in instrumental MBS for objective evaluation of swallowing function.Please see full dysphagia note for details.    D/w Mr. Canseco results and recommendations w/ verbal understanding and agreement.    Thank you for allowing me to participate in the care of your patient-   Carmen Mendez M.S., [facility]/SLP                                                                                    Time Calculation:      Time Calculation- SLP       Time Calculation- SLP       Row Name 07/07/23 1033    SLP Start Time 0830  -Recorded by: VINNIE    SLP Stop Time 0915  -Recorded by: VINNIE    SLP Time Calculation (min) 45 min  -Recorded by: VINNIE    SLP Received On 07/07/23  -Recorded by: VINNEI              Untimed Charges        Row Name 23 1033    SLP Eval/Re-eval  ST Motion Fluoro Eval Swallow - 81397;ST Eval Speech and Production w/ Language - 38483  -Recorded by: VINNIE    09199-AS Eval Speech and Production w/ Language Minutes 45 (Comment) [id number]  -Recorded by: VINNIE    93606-TP Motion Fluoro Eval Swallow Minutes 45 (Comment) [id number]  -Recorded by: VINNIE              Total Minutes       Row Name 23 1033    Untimed Charges Total Minutes 90  -Recorded by: VINNIE     Total Minutes 90  -Recorded by: VINNIE                      User Key       Initials Name Provider Type    Carmen Santos, MS CCC-SLP Speech and Language Pathologist                    Therapy Charges for Today       Code Description Service Date Service Provider Modifiers Qty    44829400375 HC ST MOTION FLUORO EVAL SWALLOW 3 2023 Carmen Mendez, MS CCC-SLP GN 1    49182576002 HC ST EVAL SPEECH AND PROD W LANG  3 2023 Carmen Mendez, MS CCC-SLP GN 1               Carmen Mendez MS CCC-SLP  2023   and Inpatient Rehabilitation - Speech Language Pathology   Swallow Initial Evaluation  Ronnie  MODIFIED BARIUM SWALLOW STUDY     Patient Name: Antonio Canseco  : 1955  MRN: [id number]  Today's Date: 2023             Admit Date: 2023      Antonio Canseco  presents to the radiology suite this am from [facility] 6B to participate in an instrumental MBS to evaluate safety/efficacy of swallowing fnx, determine safest/least restrictive diet.    Mr. Canseco was transferred from [facility] to [facility] 23 for continued functional mobility intervention and reeducation s/p multiple embolic strokes predominantly in the right cerebellar right posterior communicating artery and right MCA and RADHA territories, along with embolic shower on left cerebral hemisphere. He initially presented to [facility] ED on  with hemoptysis. He was intubated for airway protection, transferred to [facility]. CT done at [facility] showed active  extravasation into the oropharynx likely originating from the tonsillar mass.  Once the patient got to [facility] there was no further bleeding ENT follow the patient and ultimately a CT angiogram was done June 20 without evidence of active extravasation noted.  Patient did develop left-sided weakness and confusion immediately after the June 20 angiogram and a stroke alert was called.  Because of the risk of bleeding tPA was not pursued.  Initial CT was negative but CT on June 22 showed interval development of a right frontal gray and white matter differentiation which could suggest a watershed infarct or embolic right MCA infarct.  Patient had been off anticoagulation because of the previously mentioned bleeding.  An MRI done of the head June 23 showed multiple embolic strokes predominantly in the right cerebellar right posterior communicating artery and right MCA and RADHA territories along with embolic shower on left cerebral hemisphere.  Patient was started on Xarelto and has tolerated this well without further bleeding.  Due to the bleeding however it was not recommended he take antiplatelets at this time.  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  Patient is planning to follow-up with local oncologist.  CT/PET June 23 demonstrated innumerable avid nodes and mass on the left oropharynx.  It is noted on the Xarelto patient was noted to have some black tarry stools on June 21 and 22 however no hemoglobin drop was noted so this was not pursued further.  Patient continued to progress medically. He was transferred for further therapeutic interventions at [facility] level.    PMH is significant for atrial fibrillation, HTN, remote stroke w/o residual deficits.     He was participating in OT/PT at [facility] prior to discharge to [facility]. No  formal SLP services noted, aside from MBS 6/21/23, which  revealed no laryngeal penetration or aspiration per radiology report available in EPIC EMR review.     He is currently on a regular consistency po diet, thin liquids.     He was felt to most benefit from instrumental MBS to re-evaluate swallowing function, rule out aspiration/silent aspiration per aforementioned oropharyngeal CA, pending oncology follow up. He was agreeable to this re-evaluation.        Social History     Socioeconomic History    Marital status: Unknown   Tobacco Use    Smoking status: Never    Smokeless tobacco: Never   Vaping Use    Vaping Use: Never used   Substance and Sexual Activity    Alcohol use: No    Drug use: No    Sexual activity: Defer      Diet Orders (active) (From admission, onward)       Start     Ordered    07/06/23 1650  Diet: Cardiac Diets; Healthy Heart (2-3 Na+); Texture: Regular Texture (IDDSI 7); Fluid Consistency: Thin (IDDSI 0)  Diet Effective Now         07/06/23 1649                  He was observed on ra w/o complications.     Risks and benefits of the procedure are explained w/ verbalizing understanding/agreement to participate. Proceed per protocol.     Mr. Canseco was positioned upright and centered in a soft strap supportive chair to accept multiple po presentations of solid cracker, puree, honey thick, nectar thick, and thin liquids via spoon, cup and straw, along w/ whole placebo pill in puree. He was able to self feed.     All views are from the lateral plane.     Facial/oral structures reveal a very trace asymmetry with left weakness, most notably in ROM tasks. No significant lingual deviation upon protrusion. Sensation intact.  Oral mucosa are moist, pink and clean. Secretions are clear, thin and controlled. OROM/JESSIKA is generally weak to imitate oral postures. Gag is not assessed. Volitional cough is adequate in intensity, clear in quality, nonproductive. Vocal quality is adequate in intensity, clear in quality w/ intelligible speech. He is a/a and cooperative  to participate, oriented to person, place, and time, follows simple directives, and participates in simple conversational exchanges. He denies any recent dysphagia or odynophagia. He reports good appetite. PO tray present in room this am for breakfast with good acceptance/intake appreciated.     Upon po presentations, adequate bolus anticipation w/ good labial seal for bolus clearance via spoon bowl, cup rim stability and suction via straw.  Bolus formation, manipulation,  and control are generally weak  w/ rotary mastication pattern. A-p transit is timely w/o oral residue. Tongue base retraction and linguavelar seal are adequate w/o premature spillage. No laryngeal penetration or aspiration is evidenced before the swallow.     Pharyngeal swallow is timely w/ adequate hyolaryngeal elevation and epiglottic inversion. Pharyngeal contraction is adequate w/o significant residue. No laryngeal penetration or aspiration evidenced during or after the swallow.     Partial esophageal sweep reveals no mucosal abnormalities. Motility is wfl w/o retrograde flow noted.      Visit Dx:   No diagnosis found.  Patient Active Problem List   Diagnosis    Detrusor instability    Low testosterone in male    Disorder of prostate    Corporo-venous occlusive erectile dysfunction    CVA (cerebral vascular accident)     Past Medical History:   Diagnosis Date    Diabetes mellitus     Hypertension     Stroke      Past Surgical History:   Procedure Laterality Date    US GUIDED LYMPH NODE BIOPSY  6/23/2023     EDUCATION  The patient has been educated in the following areas:   Dysphagia (Swallowing Impairment) Oral Care/Hydration.     Impression: Mr. Canseco presents with  trace facial asymmetry with left weakness, most notably in ROM tasks. No signficant lingual deviation upon protrusion. Sensation intact. This does not impact his oral phase of swallow, nor his speech clarity (please see full s/l/cognitive assessment for further details). He  presents with wfl oropharyngeal swallow w/o laryngeal penetration or aspiration across this evaluation.     Partial esophageal sweep reveals no mucosal abnormalities. Motility is wfl w/o retrograde flow noted.     He is recommended to continue least restrictive po diet regular consistencies, thin liquids. Medications whole in puree/thins. Upright and centered for all po intake. It is noted that he is tentative to follow up with local oncology for initiation of tx for his oropharyngeal CA. Today's MBS is an excellent baseline for his swallowing function. He will benefit from SLP follow up once his oncology treatment plan is initiated to monitor/assess swallowing function as his treatment progesses across his established clinical pathway. I did discuss this with him with verbal understanding/agreement.     SLP Recommendation and Plan   1. Continue regular consistency po diet, thin liquids.   2. Medications whole in puree/thins.   3. [facility] precautions.   4. Oral care protocol.  5.  It is noted that he is tentative to follow up with local oncology for initiation of tx for his oropharyngeal CA. Today's MBS is an excellent baseline for his swallowing function. He will benefit from SLP follow up once his oncology treatment plan is initiated to monitor/assess swallowing function as his treatment progesses across his established clinical pathway. I did discuss this with him with verbal understanding/agreement.     D/w Mr. Canseco results and recommendations w/ verbal understanding and agreement.     Thank you for allowing me to participate in the care of your patient-  Carmen Mendez M.S., [facility]/SLP                                                                                                Time Calculation:    Time Calculation- SLP       Time Calculation- SLP       Row Name 07/07/23 1033    SLP Start Time 0830  -Recorded by: VINNIE    SLP Stop Time 0915  -Recorded by: VINNIE    SLP Time Calculation (min) 45 min  -Recorded by: VINNIE     SLP Received On 07/07/23  -Recorded by: VINNIE              Untimed Charges       Row Name 07/07/23 1033    SLP Eval/Re-eval  ST Motion Fluoro Eval Swallow - 77389;ST Eval Speech and Production w/ Language - 32804  -Recorded by: VINNIE    97760-PM Eval Speech and Production w/ Language Minutes 45 (Comment) [id number]  -Recorded by: VINNIE    90688-TX Motion Fluoro Eval Swallow Minutes 45 (Comment) [id number]  -Recorded by: VINNIE              Total Minutes       Row Name 07/07/23 1033    Untimed Charges Total Minutes 90  -Recorded by: VINNIE     Total Minutes 90  -Recorded by: VINNIE                      User Key       Initials Name Provider Type    Carmen Santos, MS CCC-SLP Speech and Language Pathologist                    Therapy Charges for Today       Code Description Service Date Service Provider Modifiers Qty    34453100943 HC ST MOTION FLUORO EVAL SWALLOW 3 7/7/2023 Carmen Mendez, MS CCC-SLP GN 1    [id number] HC ST EVAL SPEECH AND PROD W LANG  3 7/7/2023 Carmen Mendez, MS CCC-SLP GN 1                 Carmen Mendez MS CCC-SLP  7/7/2023

## 2023-07-07 NOTE — PROGRESS NOTES
Patient Assessment Instrument  Quality Indicators - Admission FY 2023    Section A. Ethnicity/ Race/Language  Ethnicity:  Race:  Preferred Language:    Section A. Transportation  Issues Due to Lack of Transportation:   No    Section B. Hearing and Vision        Section B. Health Literacy        Section C. Cognitive Patterns      Section C. Signs and Symptoms of Delirium (from CAM)      Section D. Mood      Section D. Social Isolation      Section WH9042. Prior Functioning      Section JE8908. Prior Device Use  Patient does not use manual or motorized wheelchair or scooter, mechanical lift,  walker, or an orthotic/prosthesis.    Section FR4024. Self Care Performance      Section IK6920. Self Care Discharge Goals      Section PC8799. Mobility Performance      Section GK6569. Mobility Discharge Goals      Section H. Bladder and Bowel      Section I. Active Diagnosis      Section J. Health Conditions      Section J. Health Conditions (Pain)      Section K. Swallowing/Nutritional Status    Nutritional Approaches on Admission:    Section M. Skin Conditions      Section N. Medication        Section O. Special Treatments, Procedures, and Programs    Signed by: SUJATA Breen

## 2023-07-07 NOTE — PROGRESS NOTES
Inpatient Rehabilitation Functional Measures Assessment and Plan of Care    Plan of Care  Self Care    [OT] Dressing (Upper)(Active)  Current Status(07/07/2023): Max A  Weekly Goal(07/11/2023): Mod A  Discharge Goal: Min A    Performed Intervention(s)  ADL retraining, AE education, GMC/FMC theract, ROM, neuro re-ed, strengthening,  fxal mobility    Functional Measures  SOBIA Eating:  SOBIA Grooming:  SOBIA Bathing:  SOBIA Upper Body Dressing:  SOBIA Lower Body Dressing:  SOBIA Toileting:    SOBIA Bladder Management  Level of Assistance:  Frequency/Number of Accidents this Shift:    SOBIA Bowel Management  Level of Assistance:  Frequency/Number of Accidents this Shift:    SOBIA Bed/Chair/Wheelchair Transfer:  SOBIA Toilet Transfer:  SOBIA Tub/Shower Transfer:    Previously Documented Mode of Locomotion at Discharge:  [facility] Expected Mode of Locomotion at Discharge:  SOBIA Walk/Wheelchair:  SOBIA Stairs:    SOBIA Comprehension:  SOBIA Expression:  SOBIA Social Interaction:  SOBIA Problem Solving:  SOBIA Memory:    Therapy Mode Minutes  Occupational Therapy: Individual: 105 minutes.  Physical Therapy:  Speech Language Pathology:    Discharge Functional Goals:    Signed by: Lisa Sanchez OT

## 2023-07-07 NOTE — SIGNIFICANT NOTE
07/07/23 1236   Living Environment   People in Home other relative(s)   Name(s) of People in Home Cele Quinonez   Current Living Arrangements home   Primary Care Provided by self   Provides Primary Care For no one   Family Caregiver if Needed other relative(s);sibling(s);child(buddy), adult   Family Caregiver Names Pt says cousin Alejandra Quinonez, sister Mariah Conde and daughter Karolyn Padron will assist him with caregiving needs.   Quality of Family Relationships helpful;involved;supportive   Able to Return to Prior Arrangements yes   Living Arrangement Comments SS spoke to pt who states being  and living with cousin Alejandra Quinonez in a single wide mobile home with W/C ramp.  Pt has four children:  Karolyn Padron ([place]), Dr. Nickie Clements ([place], [place]), Arsen Canseco ([place]), and Farhad Canseco ([place]).   Pt receives Social Security income, SNAP benefits, [employer] Medicare replacement insurance and Medicaid QMB.  Pt does not have a POA or living will.  PCP is Dr. Adriana Ledesma.  Pt uses [facility] [place] # [facility].  Pt says he was independent with mobility, ADL's, cooking, doing laundry, driving, and shopping with cousin prior to admission.   Resource/Environmental Concerns   Resource/Environmental Concerns none   Transportation Concerns none   Transition Planning   Patient/Family Anticipates Transition to home with family   Patient/Family Anticipated Services at Transition   (To be determined closer to discharge.)   Transportation Anticipated family or friend will provide   Discharge Needs Assessment (IRF)   Current Outpatient/Agency/Support Group   (Pt did not receive home health or outpatient therapy prior to admission.  Pt has received [facility] in the past and prefers to use them again if HH is recommended at discharge.)   Concerns to be Addressed adjustment to diagnosis/illness   Equipment Currently Used at Home bath bench;wheelchair;rollator;ramp;commode;cane, straight;hospital bed;bp  cuff;oxygen;crutches  (Pt says he wears home oxygen at night x hours but unsure of liter flow.  Pt says most of his DME is from [facility].)   Current Discharge Risk chronically ill   Discharge Coordination/Progress Pt plans to return home with cousin Alejandra Quinonez who will assist with caregiving needs.  Pt says his sister Mariah Conde and daughter Karolyn Padron will assist with caregiving needs too.  Team conference will be held on 7-11-23.  SS will follow and assist with discharge planning.

## 2023-07-07 NOTE — PROGRESS NOTES
Patient Assessment Instrument  Quality Indicators - Admission FY 2023    Section A. Ethnicity/ Race/Language  Ethnicity:  Race:  Preferred Language:    Section A. Transportation      Section B. Hearing and Vision        Section B. Health Literacy        Section C. Cognitive Patterns      Section C. Signs and Symptoms of Delirium (from CAM)      Section D. Mood      Section D. Social Isolation      Section PI6520. Prior Functioning      Section OZ6982. Prior Device Use      Section AU4914. Self Care Performance     Eating: [place] sets up or cleans up; patient completes activity. [place] assists  only prior to or following the activity.   Oral Hygiene: [place] does less than half the effort. [place] lifts, holds or  supports trunk or limbs but provides less than half the effort.   Toileting Hygiene: [place] does all of the effort. Patient does none of the  effort to complete the activity. Or, the assistance of 2 or more helpers is  required for the patient to complete the activity.   Shower/Bathe Self: [place] does all of the effort. Patient does none of the  effort to complete the activity. Or, the assistance of 2 or more helpers is  required for the patient to complete the activity.   Upper Body Dressing: [place] does more than half the effort. [place] lifts or  holds trunk or limbs and provides more than half the effort.   Lower Body Dressing: [place] does all of the effort. Patient does none of the  effort to complete the activity. Or, the assistance of 2 or more helpers is  required for the patient to complete the activity.   Putting On/Taking Off Footwear: [place] does all of the effort. Patient does  none of the effort to complete the activity. Or, the assistance of 2 or more  helpers is required for the patient to complete the activity.    Section RN3567. Self Care Discharge Goals     Eating: [place] sets up or cleans up; patient completes activity. [place] assists  only prior to or following the activity.   Oral  Hygiene: [place] does less than half the effort. [place] lifts, holds or  supports trunk or limbs but provides less than half the effort.   Toileting Hygiene: [place] does less than half the effort. [place] lifts, holds  or supports trunk or limbs but provides less than half the effort.   Shower/Bathe Self: [place] does less than half the effort. [place] lifts, holds  or supports trunk or limbs but provides less than half the effort.   Upper Body Dressing: [place] does less than half the effort. [place] lifts, holds  or supports trunk or limbs but provides less than half the effort.   Lower Body Dressing: [place] does less than half the effort. [place] lifts, holds  or supports trunk or limbs but provides less than half the effort.   Putting On/Taking Off Footwear: [place] does less than half the effort. [place]  lifts, holds or supports trunk or limbs but provides less than half the effort.    Section [id number]. Mobility Performance      Section PD2906. Mobility Discharge Goals      Section H. Bladder and Bowel      Section I. Active Diagnosis      Section J. Health Conditions      Section J. Health Conditions (Pain)      Section K. Swallowing/Nutritional Status    Nutritional Approaches on Admission:    Section M. Skin Conditions      Section N. Medication        Section O. Special Treatments, Procedures, and Programs    Signed by: Lisa Sanchez, OT

## 2023-07-07 NOTE — PROGRESS NOTES
Assisted By: Samantha [facility]    CC: Follow-up on CVA    Interview History/HPI: Patient states he is doing well denies any pain but states he did not sleep well.  Otherwise he states he is breathing okay, he has eaten most of his breakfast.          Current Hospital Meds:  allopurinol, 300 mg, Oral, Daily  atorvastatin, 80 mg, Oral, Daily  losartan, 25 mg, Oral, Q24H  magnesium oxide, 400 mg, Oral, Daily  metoprolol tartrate, 50 mg, Oral, Q12H  polyethylene glycol, 17 g, Oral, Daily  rivaroxaban, 20 mg, Oral, Daily With Dinner  senna-docusate sodium, 2 tablet, Oral, BID  tamsulosin, 0.4 mg, Oral, Daily         Vitals:    07/06/23 2038   BP: 104/55   Pulse: 93   Resp:    Temp:    SpO2:          Intake/Output Summary (Last 24 hours) at 7/7/2023 0922  Last data filed at 7/7/2023 0900  Gross per 24 hour   Intake 840 ml   Output --   Net 840 ml       EXAM: 98.1, 20, 68, 134/74.  Lungs have bilateral breath sounds are clear without rhonchi rales wheezing, heart ris irregularly irregular rhythm with controlled rate without murmur or gallop, no change in strength from yesterday, his left leg appears flaccid, he has some left neglect of the leg and appears.      Diet: Cardiac Diets; Healthy Heart (2-3 Na+); Texture: Regular Texture (IDDSI 7); Fluid Consistency: Thin (IDDSI 0)        LABS:     Lab Results (last 48 hours)       Procedure Component Value Units Date/Time    Magnesium [[id number]]  (Normal) Collected: 07/07/23 0152    Specimen: Blood Updated: 07/07/23 0245     Magnesium 2.1 mg/dL     Comprehensive Metabolic Panel [[id number]]  (Abnormal) Collected: 07/07/23 0152    Specimen: Blood Updated: 07/07/23 0245     Glucose 113 mg/dL      BUN 25 mg/dL      Creatinine 0.95 mg/dL      Sodium 136 mmol/L      Potassium 4.5 mmol/L      Comment: Slight hemolysis detected by analyzer. Results may be affected.        Chloride 104 mmol/L      CO2 22.0 mmol/L      Calcium 9.2 mg/dL      Total Protein 6.4 g/dL      Albumin 3.3 g/dL      ALT  (SGPT) 13 U/L      AST (SGOT) 18 U/L      Alkaline Phosphatase 77 U/L      Total Bilirubin 0.4 mg/dL      Globulin 3.1 gm/dL      A/G Ratio 1.1 g/dL      BUN/Creatinine Ratio 26.3     Anion Gap 10.0 mmol/L      eGFR 88.8 mL/min/1.73     Narrative:      GFR Normal >60  Chronic Kidney Disease <60  Kidney Failure <15      Hemoglobin A1c [[id number]]  (Abnormal) Collected: 07/07/23 0152    Specimen: Blood Updated: 07/07/23 0232     Hemoglobin A1C 6.00 %     Narrative:      Hemoglobin A1C Ranges:    Increased Risk for Diabetes  5.7% to 6.4%  Diabetes                     >= 6.5%  Diabetic Goal                < 7.0%    CBC & Differential [[id number]]  (Abnormal) Collected: 07/07/23 0152    Specimen: Blood Updated: 07/07/23 0215    Narrative:      The following orders were created for panel order CBC & Differential.  Procedure                               Abnormality         Status                     ---------                               -----------         ------                     CBC Auto Differential[[id number]]        Abnormal            Final result                 Please view results for these tests on the individual orders.    CBC Auto Differential [[id number]]  (Abnormal) Collected: 07/07/23 0152    Specimen: Blood Updated: 07/07/23 0215     WBC 7.44 10*3/mm3      RBC 3.92 10*6/mm3      Hemoglobin 10.5 g/dL      Hematocrit 34.3 %      MCV 87.5 fL      MCH 26.8 pg      MCHC 30.6 g/dL      RDW 12.8 %      RDW-SD 40.8 fl      MPV 9.2 fL      Platelets 361 10*3/mm3      Neutrophil % 65.3 %      Lymphocyte % 19.6 %      Monocyte % 12.6 %      Eosinophil % 1.3 %      Basophil % 0.8 %      Immature Grans % 0.4 %      Neutrophils, Absolute 4.85 10*3/mm3      Lymphocytes, Absolute 1.46 10*3/mm3      Monocytes, Absolute 0.94 10*3/mm3      Eosinophils, Absolute 0.10 10*3/mm3      Basophils, Absolute 0.06 10*3/mm3      Immature Grans, Absolute 0.03 10*3/mm3      nRBC 0.0 /100 WBC     POC Glucose Once [[id number]]  (Normal)  Collected: 07/06/23 1937    Specimen: Blood Updated: 07/06/23 1942     Glucose 114 mg/dL      Comment: Meter: [id number] : [id number] Wilson                    Radiology:    Imaging Results (Last 72 Hours)       ** No results found for the last 72 hours. **                Assessment/Plan:   Status post bilateral CVAs but now with significant left deficit, he has some neglect, discussed with speech therapist, she is going to do another video on the patient to make sure consistencies are accurate, patient will undergo his physical and Occupational Therapy evaluations today as well.  She is on Xarelto for further stroke prevention as well as Lipitor.  Antiplatelets are being avoided due to risk of bleeding.    Diabetes,/impaired fasting glucose, A1c is 6 and glucose is acceptable off medication at this time.    Constipation, bowels did move overnight.    Hypertension, controlled, continue current medication    Atrial fibrillation, rate controlled on Lopressor and on Xarelto for for stroke prevention.    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    Hayder Mendoza MD

## 2023-07-07 NOTE — SIGNIFICANT NOTE
07/07/23 1236   Living Environment   People in Home other relative(s)   Name(s) of People in Home Cele Quinonez   Current Living Arrangements home   Primary Care Provided by self   Provides Primary Care For no one   Family Caregiver if Needed other relative(s);sibling(s);child(buddy), adult   Family Caregiver Names Pt says cousin Alejandra Quinonez, sister Mariah Conde and daughter Karolyn Padron will assist him with caregiving needs.   Quality of Family Relationships helpful;involved;supportive   Able to Return to Prior Arrangements yes   Living Arrangement Comments SS spoke to pt who states being  and living with cousin Alejandra Quinonez in a single wide mobile home with W/C ramp.  Pt has four children:  Karolyn Padron ([place]), Dr. Nickie Clements ([place], [place]), Arsen Canseco ([place]), and Farhad Canseco ([place]).   Pt receives Social Security income, SNAP benefits, [employer] Medicare replacement insurance and Medicaid QMB.  Pt does not have a POA or living will.  PCP is Dr. Adriana Ledesma.  Pt uses [facility] [place] # [facility].  Pt says he was independent with mobility, ADL's, cooking, doing laundry, driving, and shopping with cousin prior to admission.   Resource/Environmental Concerns   Resource/Environmental Concerns none   Transportation Concerns none   Transition Planning   Patient/Family Anticipates Transition to home with family   Patient/Family Anticipated Services at Transition   (To be determined closer to discharge.)   Transportation Anticipated family or friend will provide   Discharge Needs Assessment (IRF)   Current Outpatient/Agency/Support Group   (Pt did not receive home health or outpatient therapy prior to admission.  Pt has received [facility] in the past and prefers to use them again if HH is recommended at discharge.)   Concerns to be Addressed adjustment to diagnosis/illness   Equipment Currently Used at Home bath bench;wheelchair;rollator;ramp;commode;cane, straight;hospital bed;bp  cuff;oxygen;crutches  (Pt says he wears home oxygen at night x hours but unsure of liter flow.  Pt says most of his DME is from [facility].)   Current Discharge Risk chronically ill   Discharge Coordination/Progress Pt plans to return home with cousin Alejandra Quinonez who will assist with caregiving needs.  Pt says his sister Mariah Conde and daughter Karolyn Padron will assist with caregiving needs too.  Pt wants a DexCom at discharge and he plans to talk to MD about this request.  Team conference will be held on 7-11-23.  SS will follow and assist with discharge planning.

## 2023-07-07 NOTE — SIGNIFICANT NOTE
07/07/23 1252   Plan   Plan Spoke to pt's sister Mariah Conde [phone] who is agreeable to assist pt with needs and is hopeful pt can return home with cousin Alejandra at discharge.  Informed sister about team conference meeting on 7-11-23 and explained discharge date will be recommended.  Review SS role with discharge planning.  Will follow.   Patient/Family in Agreement with Plan yes

## 2023-07-07 NOTE — PROGRESS NOTES
Patient Assessment Instrument  Quality Indicators - Admission FY 2023    Section A. Ethnicity/ Race/Language  Ethnicity:  Race:  Preferred Language:    Section A. Transportation      Section B. Hearing and Vision        Section B. Health Literacy        Section C. Cognitive Patterns      Section C. Signs and Symptoms of Delirium (from [facility])      Section D. Mood      Section D. Social Isolation      Section EW1002. Prior Functioning      Section GM2786. Prior Device Use      Section UK4866. Self Care Performance      Section KI1602. Self Care Discharge Goals      Section CF8516. Mobility Performance     Roll Left and Right: [place] does all of the effort. Patient does none of the  effort to complete the activity. Or, the assistance of 2 or more helpers is  required for the patient to complete the activity.   Sit to Lying: [place] does all of the effort. Patient does none of the effort to  complete the activity. Or, the assistance of 2 or more helpers is required for  the patient to complete the activity.   Lying to Sitting on Side of Bed: [place] does all of the effort. Patient does  none of the effort to complete the activity. Or, the assistance of 2 or more  helpers is required for the patient to complete the activity.   Sit to Stand: Not attempted due to medical or safety concerns.   Chair/Bed to Chair Transfer: [place] does all of the effort. Patient does none  of the effort to complete the activity. Or, the assistance of 2 or more helpers  is required for the patient to complete the activity.   Toilet Transfer Not attempted due to medical or safety concerns.   Car Transfer: Not attempted due to medical or safety concerns.   Walk 10 Feet:   Not attempted due to medical or safety concerns.  1 Step Over Curb or Up/Down Stair:   Not attempted due to medical or safety  concerns.  Picking up an Object:   Not attempted due to medical or safety concerns.  Uses Wheelchair/Scooter: No    Section KC0552. Mobility Discharge  Goals     Sit to Stand: [place] provides verbal cues or touching/steadying assistance as  patient completes activity.   Chair/Bed to Chair Transfer: [place] provides verbal cues or touching/steadying  assistance as patient completes activity.   Walk Discharge Goals:   Walk 150 Feet: [place] provides verbal cues or touching/steadying assistance as  patient completes activity.    Section H. Bladder and Bowel      Section I. Active Diagnosis      Section J. Health Conditions      Section J. Health Conditions (Pain)      Section K. Swallowing/Nutritional Status    Nutritional Approaches on Admission:    Section M. Skin Conditions      Section N. Medication        Section O. Special Treatments, Procedures, and Programs    Signed by: Joslyn Garcia, Physical Therapist

## 2023-07-07 NOTE — PROGRESS NOTES
Inpatient Rehabilitation Functional Measures Assessment and Plan of Care    Plan of Care      Functional Measures  SOBIA Eating:  SOBIA Grooming:  SOBIA Bathing:  SOBIA Upper Body Dressing:  SOBIA Lower Body Dressing:  SOBIA Toileting:    SOBIA Bladder Management  Level of Assistance:  Frequency/Number of Accidents this Shift:    SOBIA Bowel Management  Level of Assistance:  Frequency/Number of Accidents this Shift:    SOBIA Bed/Chair/Wheelchair Transfer:  Bed/chair/wheelchair Transfer = 1.  Patient  performs less than 25% of effort, uses a mechanical device/total assistance for  transferring to and from the bed/chair/wheelchair.  SOBIA Toilet Transfer:  SOBIA Tub/Shower Transfer:    Previously Documented Mode of Locomotion at Discharge:  SOBIA Expected Mode of Locomotion at Discharge: The expected mode of most  frequently used locomotion, at discharge, is expected to be both walking and  wheelchair.  SOBIA Walk/Wheelchair:  WHEELCHAIR OBSERVATION   Wheelchair did not occur.    WALK OBSERVATION   Walking did not occur.  SOBIA Stairs:  Stairs did not occur.    SOBIA Comprehension:  SOBIA Expression:  SOBIA Social Interaction:  SOBIA Problem Solving:  SOBIA Memory:    Therapy Mode Minutes  Occupational Therapy:  Physical Therapy: Individual: 60 minutes.  Speech Language Pathology:    Discharge Functional Goals:  Bed, Chair, Wheelchair Transfers: 5  Walk: 5    Signed by: Joslyn Garcia, Physical Therapist

## 2023-07-07 NOTE — PROGRESS NOTES
Rehabilitation Nursing  Inpatient Rehabilitation Plan of Care Note    Plan of Care  Copy from POCBody Function Structure    Skin Integrity (Active)  Current Status (7/6/2023 4:18:00 PM): At Risk for Skin Breakdown  Weekly Goal: No Skin Breakdown  Discharge Goal: No Skin Breakdown    Safety    Potential for Injury (Active)  Current Status (7/6/2023 4:18:00 PM): Potential for Falls  Weekly Goal: No Falls  Discharge Goal: No Falls    Signed by: Nina Lamb, Nurse

## 2023-07-07 NOTE — THERAPY EVALUATION
Inpatient Rehabilitation - Occupational Therapy Initial Evaluation and Treatment Note    THALIA Trujillo     Patient Name: Antonio Canseco  : 1955  MRN: [id number]    Today's Date: 2023                 Admit Date: 2023       No diagnosis found.    Patient Active Problem List   Diagnosis    Detrusor instability    Low testosterone in male    Disorder of prostate    Corporo-venous occlusive erectile dysfunction    CVA (cerebral vascular accident)       Past Medical History:   Diagnosis Date    Diabetes mellitus     Hypertension     Stroke        Past Surgical History:   Procedure Laterality Date    US GUIDED LYMPH NODE BIOPSY  2023             IRF OT ASSESSMENT FLOWSHEET (last 12 hours)       IRF OT Evaluation and Treatment       Row Name 23 1442          OT Time and Intention    Document Type initial evaluation;daily treatment  -BF     Mode of Treatment occupational therapy  -BF     Patient Effort good  -BF     Symptoms Noted During/After Treatment none  -BF       Row Name 23 1442          General Information    Patient Profile Reviewed yes  -BF     Patient/Family/Caregiver Comments/Observations Pt supine in bed, agreeable to OT. 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 Pt required intubation and then had CVA on 23.  -BF     General Observations of Patient Pt leans to the L when sitting in w/c.  -BF     Existing Precautions/Restrictions fall;other (see comments)  L HP  -BF       Row Name 23 1442          Previous Level of Function/Home Environm    BADLs, Premorbid Functional Level independent  -BF       Row Name 23 1442          Living Environment    Current Living Arrangements home  -BF     People in Home other (see comments)  cousin  -BF       Row Name 23 1442          Home Use of Assistive/Adaptive Equipment    Equipment Currently Used at Home bath bench;wheelchair;rollator;ramp;commode;cane, straight;hospital bed;bp  cuff;oxygen;crutches  -BF       Row Name 07/07/23 1442          Cognition/Psychosocial    Orientation Status (Cognition) oriented x 3  -BF     Follows Commands (Cognition) verbal cues/prompting required;repetition of directions required;physical/tactile prompts required  -BF       Row Name 07/07/23 1442          Range of Motion Comprehensive    Comment, General Range of Motion L elbow 50% AROM, L hand and wrist 75% (limited opposition), no L shoulder AROM; RUE AROM WFL  -BF       Row Name 07/07/23 1442          Strength Comprehensive (MMT)    Comment, General Manual Muscle Testing (MMT) Assessment L elbow, hand, wrist 3-/5, L shoulder 0/5; RUE grossly 3+/5  -BF       Row Name 07/07/23 1442          Basic Activities of Daily Living (BADLs)    Basic Activities of Daily Living bathing;lower body dressing;upper body dressing;grooming;toileting;self-feeding  -BF       Row Name 07/07/23 1442          Bathing    Comment (Bathing) Total A  -BF       Row Name 07/07/23 1442          Upper Body Dressing    [place] Level (Upper Body Dressing) maximal assist (25-49% patient effort);verbal cues;nonverbal cues (demo/gesture)  -BF     Position (Upper Body Dressing) supine  -BF     Comment (Upper Body Dressing) Max A  -BF       Row Name 07/07/23 1442          Lower Body Dressing    [place] Level (Lower Body Dressing) dependent (less than 25% patient effort);verbal cues;nonverbal cues (demo/gesture)  -BF     Position (Lower Body Dressing) supine  -BF     Comment (Lower Body Dressing) Total A  -BF       Row Name 07/07/23 1442          Grooming    [place] Level (Grooming) moderate assist (50% patient effort);verbal cues;nonverbal cues (demo/gesture)  -BF     Position (Grooming) supported sitting  -BF     Comment (Grooming) Mod A  -BF       Row Name 07/07/23 1442          Toileting    [place] Level (Toileting) dependent (less than 25% patient effort);verbal cues  -BF     Position (Toileting) supine  -BF     Comment  (Toileting) Total A  -BF       Row Name 07/07/23 1442          Self-Feeding    Comment (Self-Feeding) Set-up  -       Row Name 07/07/23 1442          Transfer Assessment/Treatment    Transfers bed-chair transfer  -       Row Name 07/07/23 1442          Bed-Chair Transfer    Bed-Chair [place] (Transfers) dependent (less than 25% patient effort);2 person assist;verbal cues;nonverbal cues (demo/gesture)  -     Assistive Device (Bed-Chair Transfers) lift device;wheelchair  -BF       Row Name 07/07/23 1442          Motor Skills    Motor Skills neuro-muscular function;motor control/coordination interventions;coordination  -     Coordination fine motor deficit;gross motor deficit;left;upper extremity  -     Neuromuscular Function left;upper extremity;moderate impairment;severe impairment  -     Motor Control/Coordination Interventions neuro-muscular re-education;gross motor coordination activities;therapeutic exercise/ROM  LUE NDT, A/AAROM (L shoulder PROM), gross grasp/release (pt requires cues for gross release of objects), handgripper therex; BUE rickshaw 20 lbs X15X3  -       Row Name 07/07/23 1442          Neuromuscular Re-education    Interventions (Neuromuscular Re-education) facilitation/inhibition;massage  LUE  -     Positioning (Neuromuscular Re-education) supported;sitting  -       Row Name 07/07/23 1442          Positioning and Restraints    Post Treatment Position wheelchair  -BF     In Wheelchair sitting;call light within reach;encouraged to call for assist;with PT  w/ PT in PM  -       Row Name 07/07/23 1442          Therapy Assessment/Plan (OT)    Patient's Goals For Discharge return home;take care of myself at home  -       Row Name 07/07/23 1442          Therapy Assessment/Plan (OT)    OT Diagnosis CVA  -BF     Rehab Potential/Prognosis (OT) good, to achieve stated therapy goals;adequate, monitor progress closely  -     Frequency of Treatment (OT) 5 times per week  -      Estimated Duration of Therapy (OT) 3 weeks;4 weeks  -BF     Problem List (OT) problems related to;balance;coordination;mobility;motor control;range of motion (ROM);strength;postural control  -BF     Activity Limitations Related to Problem List (OT) unable to transfer safely;BADLs not performed adequately or safely  -BF     Planned Therapy Interventions (OT) activity tolerance training;adaptive equipment training;BADL retraining;neuromuscular control/coordination retraining;passive ROM/stretching;ROM/therapeutic exercise;strengthening exercise;transfer/mobility retraining  -BF       Row Name 07/07/23 1442          Therapy Plan Review/Discharge Plan (OT)    Therapy Plan Review (OT) patient  -BF     Anticipated Discharge Disposition (OT) home with assist;home with outpatient therapy services  TBD  -BF       Row Name 07/07/23 [facility]          IRF OT Goals    UB Dressing Goal Selection (OT-IRF) UB dressing, OT goal 1;UB dressing, OT goal 2  -BF     LB Dressing Goal Selection (OT-IRF) LB dressing, OT goal 1;LB dressing, OT goal 2  -BF       [facility] Name 07/07/23 [facility]          UB Dressing Goal 1 (OT-IRF)    Activity/Device (UB Dressing Goal 1, OT-IRF) upper body dressing  -BF     [place] (UB Dress Goal 1, OT-IRF) moderate assist (50-74% patient effort)  -BF     Time Frame (UB Dressing Goal 1, OT-IRF) short-term goal (STG);1 week;2 weeks  -BF       [facility] Name 07/07/23 [facility]          UB Dressing Goal 2 (OT-IRF)    Activity/Device (UB Dressing Goal 2, OT-IRF) upper body dressing  -BF     [place] (UB Dress Goal 2, OT-IRF) minimum assist (75% or more patient effort)  -BF     Time Frame (UB Dressing Goal 2, OT-IRF) by discharge  -BF       [facility] Name 07/07/23 [facility]          LB Dressing Goal 1 (OT-IRF)    Activity/Device (LB Dressing Goal 1, OT-IRF) lower body dressing  -BF     [place] (LB Dressing Goal 1, OT-IRF) maximum assist (25-49% patient effort)  -BF     Time Frame (LB Dressing Goal 1, OT-IRF) 1 week;2 weeks  -BF        Row Name 07/07/23 1442          LB Dressing Goal 2 (OT-IRF)    Activity/Device (LB Dressing Goal 2, OT-IRF) lower body dressing  -BF     [place] (LB Dressing Goal 2, OT-IRF) moderate assist (50-74% patient effort)  -BF     Time Frame (LB Dressing Goal 2, OT-IRF) by discharge  -BF               User Key  (r) = Recorded By, (t) = Taken By, (c) = Cosigned By      Initials Name Effective Dates     Lisa Sanchez OT 05/31/23 -                      Occupational Therapy Education       Title: PT OT SLP Therapies (Done)       Topic: Occupational Therapy (Done)       Point: ADL training (Done)       Description:   Instruct learner(s) on proper safety adaptation and remediation techniques during self care or transfers.   Instruct in proper use of assistive devices.                  Learning Progress Summary             Patient Acceptance, E, VU,NR by  at 7/7/2023 1442                         Point: Precautions (Done)       Description:   Instruct learner(s) on prescribed precautions during self-care and functional transfers.                  Learning Progress Summary             Patient Acceptance, E, VU,NR by  at 7/7/2023 1442                                         User Key       Initials Effective Dates Name Provider Type Discipline     05/31/23 -  Lisa Sanchez, CORY Occupational Therapist OT                        OT Recommendation and Plan    Planned Therapy Interventions (OT): activity tolerance training, adaptive equipment training, BADL retraining, neuromuscular control/coordination retraining, passive ROM/stretching, ROM/therapeutic exercise, strengthening exercise, transfer/mobility retraining                    Time Calculation:      Time Calculation- OT       Row Name 07/07/23 1518 07/07/23 1045          Time Calculation- OT    OT Start Time 1245  -BF 1045  -BF     OT Stop Time 1330  -BF 1145  -BF     OT Time Calculation (min) 45 min  -BF 60 min  -BF     Total Timed Code Minutes- OT  45 minute(s)  -BF 60 minute(s)  -BF     OT Non-Billable Time (min) -- 10 min  -BF               User Key  (r) = Recorded By, (t) = Taken By, (c) = Cosigned By      Initials Name Provider Type    BF Lisa Sanchez OT Occupational Therapist                  Therapy Charges for Today       Code Description Service Date Service Provider Modifiers Qty    15948815335 HC OT EVAL HIGH COMPLEXITY 3 7/7/2023 Lisa Sanchez OT GO 1    43745298961 HC OT SELF CARE/MGMT/TRAIN EA 15 MIN 7/7/2023 Lisa Sanchez OT GO 1    31077200091 HC OT NEUROMUSC RE EDUCATION EA 15 MIN 7/7/2023 Lisa Sanchez OT GO 2    52821985265 HC OT THER PROC EA 15 MIN 7/7/2023 Lisa Sanchez OT GO 1                     Lisa Sanchez OT  7/7/2023

## 2023-07-07 NOTE — PLAN OF CARE
Problem: Rehabilitation (IRF) Plan of Care  Goal: Patient-Specific Goal (Individualized)  Outcome: Ongoing, Progressing   Goal Outcome Evaluation:

## 2023-07-07 NOTE — PLAN OF CARE
Goal Outcome Evaluation:               Resting in bed with no complaints at this time. Visited with sister Mariah at bedside this evening. New admit to physical rehab unit. PT and OT to eval. Continue with current plan of care.

## 2023-07-07 NOTE — PROGRESS NOTES
Case Management  Inpatient Rehabilitation Plan of Care and Discharge Plan Note    Rehabilitation Diagnosis:  stroke  Date of Onset:  6-17-23    Medical Summary:  stroke, right RADHA, PCA, MCA infarct    Plan of Care      Expected Intensity:  Average of 3 hours of therapy 5 days/week.  Interdisciplinary Team:  Interdisciplinary Team: Medical Supervision and 24 Hour Rehabilitation Nursing.,  Physical Therapy:, Occupational Therapy:, Social Work, Therapeutic Recreation.  Physical Therapy Intensity/Duration: PT 1.5 hours per day/5 days per week  Occupational Therapy Intensity/Duration: OT 1.5 hours per day/5 days per week  Estimated Length of Stay/Anticipated Discharge Date: 14 days  Anticipated Discharge Destination:  Anticipated discharge destination from inpatient rehabilitation is community  discharge with assistance. Pt plans to return home with cousin at discharge.  Pt  says his cousin, sister and daughter can assist with needs.      Based on the patient's medical and functional status, their prognosis and  expected level of functional improvement is:  fair-good    Signed by: SUJATA Breen

## 2023-07-07 NOTE — THERAPY EVALUATION
Inpatient Rehabilitation - Physical Therapy Initial Evaluation        Ronnie     Patient Name: Antonio Canseco  : 1955  MRN: [id number]    Today's Date: 2023                    Admit Date: 2023      Visit Dx:   No diagnosis found.    Patient Active Problem List   Diagnosis   • Detrusor instability   • Low testosterone in male   • Disorder of prostate   • Corporo-venous occlusive erectile dysfunction   • CVA (cerebral vascular accident)       Past Medical History:   Diagnosis Date   • Diabetes mellitus    • Hypertension    • Stroke        Past Surgical History:   Procedure Laterality Date   • US GUIDED LYMPH NODE BIOPSY  2023       PT ASSESSMENT (last 12 hours)     IRF PT Evaluation and Treatment     Row Name 23 1430          PT Time and Intention    Document Type initial evaluation;daily treatment  -LB     Mode of Treatment individual therapy;physical therapy  -LB     Row Name 23 143          General Information    Patient Profile Reviewed yes  -LB     Existing Precautions/Restrictions fall  L HP, <trunk support/balance  -LB     Row Name 23 143          Pain Scale: FACES Pre/Post-Treatment    Pain: FACES Scale, Pretreatment 0-->no hurt  -LB     Posttreatment Pain Rating 0-->no hurt  -LB     Row Name 23 1430          Cognition/Psychosocial    Affect/Mental Status (Cognition) WFL  -LB     Follows Commands (Cognition) verbal cues/prompting required;physical/tactile prompts required  -LB     Personal Safety Interventions nonskid shoes/slippers when out of bed;supervised activity  vamshi for transfers, leg rests and L arm tray for extremity support  -LB     Row Name 23 1430          Range of Motion (ROM)    Range of Motion --  BLE with <flexibility, limited 25%, knees lack 10-15 degrees of extension, may be partially due to OA  -LB     Row Name 23 1430          Strength (Manual Muscle Testing)    Strength (Manual Muscle Testing) --  LLE flaccid 0/5  -LB     Row  Name 07/07/23 1430          Sensory    Additional Documentation --  he reports intact sensation in LLE and denies N/T or altered sensation  -LB     Row Name 07/07/23 1430          Bed-Chair Transfer    Bed-Chair [place]p (Transfers) dependent (less than 25% patient effort)  -LB     Assistive Device (Bed-Chair Transfers) lift device  -LB     Row Name 07/07/23 1430          Chair-Bed Transfer    Chair-Bed [place]p (Transfers) dependent (less than 25% patient effort)  -LB     Assistive Device (Chair-Bed Transfers) lift device  -LB     Row Name 07/07/23 1430          Safety Issues, Functional Mobility    Safety Issues Affecting Function (Mobility) --  he appears to have normal cognition for safety but is at high risk for falls due to LHP and poor trunk control  -LB     Impairments Affecting Function (Mobility) balance;coordination;endurance/activity tolerance;grasp;motor control;muscle tone abnormal;postural/trunk control;range of motion (ROM);strength  -LB     Row Name 07/07/23 1430          Balance    Static Sitting Balance --  max-dep in w/c with shifting forward without back support  -LB     Dynamic Sitting Balance --  sitting bag toss with RUE, assist required for balance  -LB     Comment, Balance he does not have enough trunk support yet to attempt unsupported sitting at EOB or standing frame  -LB     Row Name 07/07/23 1430          Motor Skills    Therapeutic Exercise --  sitting and supine ex-RLE AROM, LLE PROM  -LB     Row Name 07/07/23 1430          Positioning and Restraints    Pre-Treatment Position sitting in chair/recliner  -LB     In Wheelchair with other staff  with Karmen-Activities  -LB     Row Name 07/07/23 1430          Therapy Assessment/Plan (PT)    Patient's Goals For Discharge return home  -LB     Row Name 07/07/23 1430          Therapy Assessment/Plan (PT)    PT Diagnosis (PT) CVA with L HP, following angiography due to hemoptysis and recent diagnosis of throat CA  -LB     Rehab  Potential/Prognosis (PT) adequate, monitor progress closely  -LB     Frequency of Treatment (PT) 5 times per week  -LB     Estimated Duration of Therapy (PT) 4 weeks  -LB     Problem List (PT) balance;coordination;hemiparesis/hemiplegia;mobility;motor control;muscle tone;range of motion (ROM);strength;postural control  -LB     Activity Limitations Related to Problem List (PT) unable to ambulate safely;unable to transfer safely  -LB     Row Name 07/07/23 1430          Therapy Plan Review/Discharge Plan (PT)    Therapy Plan Review (PT) evaluation/treatment results reviewed;care plan/treatment goals reviewed;risks/benefits reviewed;participants voiced agreement with care plan  -LB     Anticipated Equipment Needs at Discharge (PT Eval) --  tbd  -LB     Anticipated Discharge Disposition (PT) home with assist;home with home health;home with outpatient therapy services  -LB     Row Name 07/07/23 1430          IRF PT Goals    Bed Mobility Goal Selection (PT-IRF) bed mobility, PT goal 1;bed mobility, PT goal 2  -LB     Transfer Goal Selection (PT-IRF) transfers, PT goal 1;transfers, PT goal 2  -LB     Gait (Walking Locomotion) Goal Selection (PT-IRF) gait, PT goal 1;gait, PT goal 2  -LB     Row Name 07/07/23 1430          Bed Mobility Goal 1 (PT-IRF)    Activity/Assistive Device (Bed Mobility Goal 1, PT-IRF) sit to supine/supine to sit  -LB     [place] Level (Bed Mobility Goal 1, PT-IRF) moderate assist (50-74% patient effort)  -LB     Time Frame (Bed Mobility Goal 1, PT-IRF) short-term goal (STG);2 weeks  -LB     Row Name 07/07/23 1430          Bed Mobility Goal 2 (PT-IRF)    Activity/Assistive Device (Bed Mobility Goal 2, PT-IRF) sit to supine/supine to sit  -LB     [place] Level (Bed Mobility Goal 2, PT-IRF) standby assist  -LB     Time Frame (Bed Mobility Goal 2, PT-IRF) by discharge  -LB     Row Name 07/07/23 1430          Transfer Goal 1 (PT-IRF)    Activity/Assistive Device (Transfer Goal 1, PT-IRF)  sit-to-stand/stand-to-sit;bed-to-chair/chair-to-bed  -LB     [place] Level (Transfer Goal 1, PT-IRF) moderate assist (50-74% patient effort)  -LB     Time Frame (Transfer Goal 1, PT-IRF) short-term goal (STG);2 weeks  -LB     Row Name 07/07/23 1430          Transfer Goal 2 (PT-IRF)    Activity/Assistive Device (Transfer Goal 2, PT-IRF) sit-to-stand/stand-to-sit;bed-to-chair/chair-to-bed  -LB     [place] Level (Transfer Goal 2, PT-IRF) standby assist  -LB     Time Frame (Transfer Goal 2, PT-IRF) by discharge  -LB     Row Name 07/07/23 1430          Gait/Walking Locomotion Goal 1 (PT-IRF)    Activity/Assistive Device (Gait/Walking Locomotion Goal 1, PT-IRF) --  handrail in leroy  -LB     Gait/Walking Locomotion Distance Goal 1 (PT-IRF) 20'  -LB     [place] Level (Gait/Walking Locomotion Goal 1, PT-IRF) moderate assist (50-74% patient effort)  x2  -LB     Time Frame (Gait/Walking Locomotion Goal 1, PT-IRF) short-term goal (STG);2 weeks  -LB     Row Name 07/07/23 1430          Gait/Walking Locomotion Goal 2 (PT-IRF)    Activity/Assistive Device (Gait/Walking Locomotion Goal 2, PT-IRF) walker, kevin  or AAD  -LB     Gait/Walking Locomotion Distance Goal 2 (PT-IRF) 150'  -LB     [place] Level (Gait/Walking Locomotion Goal 2, PT-IRF) standby assist  -LB     Time Frame (Gait/Walking Locomotion Goal 2, PT-IRF) by discharge  -LB           User Key  (r) = Recorded By, (t) = Taken By, (c) = Cosigned By    Initials Name Provider Type    Joslyn Beckford PT Physical Therapist                 Physical Therapy Education     Title: PT OT SLP Therapies (Done)     Topic: Physical Therapy (Done)     Point: Mobility training (Done)     Learning Progress Summary           Patient Acceptance, E,D, VU,NR by LB at 7/7/2023 1449                   Point: Home exercise program (Done)     Learning Progress Summary           Patient Acceptance, E,D, VU,NR by LB at 7/7/2023 1449                   Point: Body mechanics  (Done)     Learning Progress Summary           Patient Acceptance, E,D, VU,NR by LB at 7/7/2023 1449                   Point: Precautions (Done)     Learning Progress Summary           Patient Acceptance, E,D, VU,NR by  at 7/7/2023 1449                               User Key     Initials Effective Dates Name Provider Type Discipline     06/16/21 -  Joslyn Garcia PT Physical Therapist PT                PT Recommendation and Plan    Planned Therapy Interventions (PT): balance training, bed mobility training, gait training, motor coordination training, neuromuscular re-education, patient/family education, postural re-education, ROM (range of motion), strengthening, transfer training, wheelchair management/propulsion training  Frequency of Treatment (PT): 5 times per week  Anticipated Equipment Needs at Discharge (PT Eval):  (tbd)                  Time Calculation:      PT Charges     Row Name 07/07/23 1449             Time Calculation    Start Time 1330  -LB      Stop Time 1430  -LB      Time Calculation (min) 60 min  -LB      PT Received On 07/07/23  -LB      PT Goal Re-Cert Due Date 07/14/23  -LB            User Key  (r) = Recorded By, (t) = Taken By, (c) = Cosigned By    Initials Name Provider Type    LB Joslyn Garcia, PT Physical Therapist                Therapy Charges for Today     Code Description Service Date Service Provider Modifiers Qty    83556933804 HC PT EVAL LOW COMPLEXITY 1 7/7/2023 Joslyn Garcia, PT GP 1    30408985736 HC PT THER PROC EA 15 MIN 7/7/2023 Joslyn Garcia, PT GP 1    48934471266 HC PT NEUROMUSC RE EDUCATION EA 15 MIN 7/7/2023 Joslyn Garcia, PT GP 1    16222641598 HC PT THERAPEUTIC ACT EA 15 MIN 7/7/2023 Joslyn Garcia, PT GP 1                   Joslyn Garcia PT  7/7/2023

## 2023-07-08 LAB
GLUCOSE BLDC GLUCOMTR-MCNC: 110 MG/DL (ref 70–130)
GLUCOSE BLDC GLUCOMTR-MCNC: 114 MG/DL (ref 70–130)
GLUCOSE BLDC GLUCOMTR-MCNC: 130 MG/DL (ref 70–130)
GLUCOSE BLDC GLUCOMTR-MCNC: 203 MG/DL (ref 70–130)
HCT VFR BLD AUTO: 32.5 % (ref 37.5–51)
HGB BLD-MCNC: 10 G/DL (ref 13–17.7)

## 2023-07-08 PROCEDURE — 97112 NEUROMUSCULAR REEDUCATION: CPT

## 2023-07-08 PROCEDURE — 97530 THERAPEUTIC ACTIVITIES: CPT

## 2023-07-08 PROCEDURE — 82948 REAGENT STRIP/BLOOD GLUCOSE: CPT

## 2023-07-08 PROCEDURE — 85018 HEMOGLOBIN: CPT | Performed by: INTERNAL MEDICINE

## 2023-07-08 PROCEDURE — 97535 SELF CARE MNGMENT TRAINING: CPT

## 2023-07-08 PROCEDURE — 97110 THERAPEUTIC EXERCISES: CPT

## 2023-07-08 PROCEDURE — 99232 SBSQ HOSP IP/OBS MODERATE 35: CPT | Performed by: INTERNAL MEDICINE

## 2023-07-08 PROCEDURE — 85014 HEMATOCRIT: CPT | Performed by: INTERNAL MEDICINE

## 2023-07-08 RX ORDER — NYSTATIN 100000 [USP'U]/G
POWDER TOPICAL EVERY 12 HOURS SCHEDULED
Status: DISCONTINUED | OUTPATIENT
Start: 2023-07-08 | End: 2023-08-23 | Stop reason: HOSPADM

## 2023-07-08 RX ORDER — HYDROXYZINE HYDROCHLORIDE 25 MG/1
25 TABLET, FILM COATED ORAL NIGHTLY PRN
Status: DISCONTINUED | OUTPATIENT
Start: 2023-07-08 | End: 2023-08-23 | Stop reason: HOSPADM

## 2023-07-08 RX ORDER — PANTOPRAZOLE SODIUM 40 MG/1
40 TABLET, DELAYED RELEASE ORAL
Status: DISCONTINUED | OUTPATIENT
Start: 2023-07-08 | End: 2023-08-23 | Stop reason: HOSPADM

## 2023-07-08 RX ADMIN — TAMSULOSIN HYDROCHLORIDE 0.4 MG: 0.4 CAPSULE ORAL at 09:40

## 2023-07-08 RX ADMIN — ATORVASTATIN CALCIUM 80 MG: 40 TABLET, FILM COATED ORAL at 09:40

## 2023-07-08 RX ADMIN — RIVAROXABAN 20 MG: 20 TABLET, FILM COATED ORAL at 16:50

## 2023-07-08 RX ADMIN — PANTOPRAZOLE SODIUM 40 MG: 40 TABLET, DELAYED RELEASE ORAL at 12:55

## 2023-07-08 RX ADMIN — NYSTATIN: 100000 POWDER TOPICAL at 12:55

## 2023-07-08 RX ADMIN — ALLOPURINOL 300 MG: 300 TABLET ORAL at 09:40

## 2023-07-08 RX ADMIN — HYDROXYZINE HYDROCHLORIDE 25 MG: 25 TABLET, FILM COATED ORAL at 20:16

## 2023-07-08 RX ADMIN — LOSARTAN POTASSIUM 25 MG: 25 TABLET, FILM COATED ORAL at 09:40

## 2023-07-08 RX ADMIN — METOPROLOL TARTRATE 50 MG: 50 TABLET, FILM COATED ORAL at 09:40

## 2023-07-08 RX ADMIN — NYSTATIN 1 APPLICATION: 100000 POWDER TOPICAL at 20:12

## 2023-07-08 RX ADMIN — MAGNESIUM GLUCONATE 500 MG ORAL TABLET 400 MG: 500 TABLET ORAL at 09:45

## 2023-07-08 RX ADMIN — METOPROLOL TARTRATE 50 MG: 50 TABLET, FILM COATED ORAL at 20:15

## 2023-07-08 NOTE — PROGRESS NOTES
Occupational Therapy:    Physical Therapy: Individual: 100 minutes.    Speech Language Pathology:    Signed by: Connie Velasquez PTA

## 2023-07-08 NOTE — THERAPY TREATMENT NOTE
Inpatient Rehabilitation - Occupational Therapy Treatment Note     Ronnie     Patient Name: Antonio Canseco  : 1955  MRN: [id number]    Today's Date: 2023                 Admit Date: 2023       No diagnosis found.    Patient Active Problem List   Diagnosis   • Detrusor instability   • Low testosterone in male   • Disorder of prostate   • Corporo-venous occlusive erectile dysfunction   • CVA (cerebral vascular accident)       Past Medical History:   Diagnosis Date   • Diabetes mellitus    • Hypertension    • Stroke        Past Surgical History:   Procedure Laterality Date   • US GUIDED LYMPH NODE BIOPSY  2023             IRF OT ASSESSMENT FLOWSHEET (last 12 hours)     IRF OT Evaluation and Treatment     Row Name 23 1115          OT Time and Intention    Document Type daily treatment  -HB     Mode of Treatment individual therapy;occupational therapy  -HB     Total Minutes, Occupational Therapy 90  -HB     Patient Effort good  -HB     Symptoms Noted During/After Treatment none  -HB     Row Name 23 1115          General Information    Patient Profile Reviewed yes  -HB     Patient/Family/Caregiver Comments/Observations Patient and RN okd OT this date.  -HB     General Observations of Patient Patient tolerated OT well with no adverse reactions.  -HB     Existing Precautions/Restrictions fall;other (see comments)  L HP  -HB     Limitations/Impairments safety/cognitive  -HB     Row Name 23 1115          Pain Assessment    Pretreatment Pain Rating 0/10 - no pain  -HB     Posttreatment Pain Rating 0/10 - no pain  -HB     Row Name 23 1115          Cognition/Psychosocial    Affect/Mental Status (Cognition) WFL  -HB     Orientation Status (Cognition) oriented x 3  -HB     Follows Commands (Cognition) verbal cues/prompting required;repetition of directions required;physical/tactile prompts required  -HB     Row Name 23 1115          Grooming    [place] Level (Grooming)  grooming skills;moderate assist (50% patient effort);hair care, combing/brushing  -     Position (Grooming) supported sitting  -     Row Name 07/08/23 1115          Bed-Chair Transfer    Bed-Chair [place] (Transfers) dependent (less than 25% patient effort);2 person assist;verbal cues;nonverbal cues (demo/gesture)  -     Assistive Device (Bed-Chair Transfers) lift device;wheelchair  -     Row Name 07/08/23 1115          Safety Issues, Functional Mobility    Impairments Affecting Function (Mobility) strength;postural/trunk control;endurance/activity tolerance  inattention to left lateral lean in chair  -     Row Name 07/08/23 1115          Motor Skills    Motor Skills coordination;functional endurance;neuro-muscular function;motor control/coordination interventions  -     Coordination fine motor deficit;gross motor deficit;left;upper extremity;moderate impairment  -     Motor Control/Coordination Interventions fine motor manipulation/dexterity activities;gross motor coordination activities;motor pattern re-training;neuro-muscular re-education;therapeutic exercise/ROM;weight-bearing activities  -     Therapeutic Exercise shoulder;elbow/forearm;wrist;hand  -     Additional Documentation --  grasp and release/PROM/AROM;GE TA; rickshaw 20 lbs 3 sets 10 reps; pulleys; arm skate;emphasis to LUE; RUE for task completion; rest between tasks. full PROM noted throughout LUE; increased AROM in all joint of LUE.  -[facility] Name 07/08/23 1115          Neuromuscular Re-education    Interventions (Neuromuscular Re-education) facilitation/inhibition;weight bearing;massage  LUE  -     Positioning (Neuromuscular Re-education) gravity eliminated position for activity  LUE in GE plane; pt in sitting position  -[facility] Name 07/08/23 1115          Positioning and Restraints    Pre-Treatment Position --  pt getting back into chair from mat with PT  -     Post Treatment Position wheelchair  -     In  Wheelchair with PT  -           User Key  (r) = Recorded By, (t) = Taken By, (c) = Cosigned By    Initials Name Effective Dates     Veronica Robles OT 05/25/21 -                  Occupational Therapy Education     Title: PT OT SLP Therapies (Done)     Topic: Occupational Therapy (Done)     Point: ADL training (Done)     Description:   Instruct learner(s) on proper safety adaptation and remediation techniques during self care or transfers.   Instruct in proper use of assistive devices.              Learning Progress Summary           Patient Acceptance, E, VU,NR by  at 7/8/2023 1140    Acceptance, E, VU,NR by  at 7/7/2023 1442                   Point: Precautions (Done)     Description:   Instruct learner(s) on prescribed precautions during self-care and functional transfers.              Learning Progress Summary           Patient Acceptance, E, VU,NR by  at 7/8/2023 1140    Acceptance, E, VU,NR by  at 7/7/2023 1442                               User Key     Initials Effective Dates Name Provider Type Discipline     05/31/23 -  Lisa Sanchez OT Occupational Therapist OT     05/25/21 -  Veronica Robles OT Occupational Therapist OT                    OT Recommendation and Plan                         Time Calculation:      Time Calculation- OT     Row Name 07/08/23 1140             Time Calculation- OT    OT Start Time 0830  -      OT Stop Time 1000  -      OT Time Calculation (min) 90 min  -      Total Timed Code Minutes- OT 90 minute(s)  -            User Key  (r) = Recorded By, (t) = Taken By, (c) = Cosigned By    Initials Name Provider Type     Veronica Robles OT Occupational Therapist              Therapy Charges for Today     Code Description Service Date Service Provider Modifiers Qty    46345465255 HC OT SELF CARE/MGMT/TRAIN EA 15 MIN 7/8/2023 Veronica Robles OT GO 1    30748037585 HC OT NEUROMUSC RE EDUCATION EA 15 MIN 7/8/2023 Veronica Robles OT GO 3    [id number]  OT  THERAPEUTIC ACT EA 15 MIN 7/8/2023 Veronica Robles, OT GO 2                   Veronica Robles, CORY  7/8/2023

## 2023-07-08 NOTE — PLAN OF CARE
Goal Outcome Evaluation:  Plan of Care Reviewed With: patient        Progress: improving  Outcome Evaluation: patient up with therapies this shift and now resting in bed. continue plan of care

## 2023-07-08 NOTE — NURSING NOTE
HELD SCHEDULED LOPRESSOR 50MG PO D/T FAILURE TO MEET PREVIOUSLY ORDERED BLOOD PRESSURE AND HEART RATE PARAMETERS

## 2023-07-08 NOTE — PROGRESS NOTES
Rehabilitation Nursing  Inpatient Rehabilitation Plan of Care Note    Plan of Care  Copy from POC    Body Function Structure    Skin Integrity (Active)  Current Status (7/6/2023 4:18:00 PM): At Risk for Skin Breakdown  Weekly Goal: No Skin Breakdown  Discharge Goal: No Skin Breakdown    Safety    Potential for Injury (Active)  Current Status (7/6/2023 4:18:00 PM): Potential for Falls  Weekly Goal: No Falls  Discharge Goal: No Falls    RN Interventions    Body Function Structure -  RN: Assess skin q shift & PRN;turn patient q 2 hrs [RN]    Safety -  RN: Assess safety q hr; call light and items in reach; siderails up X2 [RN]    Signed by: Nurse Vitaliy

## 2023-07-08 NOTE — PROGRESS NOTES
Assisted By: Connie HANEY    CC: Follow-up on CVA    Interview History/HPI: Patient is without specific complaints, denies any pain, states his breathing is okay, he did not however sleep well with the melatonin.  Nursing also reports that his stool was black.  This had happened at [facility], his hemoglobin never changed so this was not pursued further.          Current Hospital Meds:  allopurinol, 300 mg, Oral, Daily  atorvastatin, 80 mg, Oral, Daily  losartan, 25 mg, Oral, Q24H  magnesium oxide, 400 mg, Oral, Daily  metoprolol tartrate, 50 mg, Oral, Q12H  polyethylene glycol, 17 g, Oral, Daily  rivaroxaban, 20 mg, Oral, Daily With Dinner  senna-docusate sodium, 2 tablet, Oral, BID  tamsulosin, 0.4 mg, Oral, Daily         Vitals:    07/08/23 0730   BP: 123/81   Pulse: 90   Resp: 20   Temp: 97.4 °F (36.3 °C)   SpO2: 96%         Intake/Output Summary (Last 24 hours) at 7/8/2023 1101  Last data filed at 7/8/2023 0900  Gross per 24 hour   Intake 2400 ml   Output 450 ml   Net 1950 ml       EXAM: His mucous membranes are pink both eyes and mouth, dentures in place speech is normal, lungs clear heart irregularly irregular controlled rhythm without murmur abdomen soft and benign no edema he can still move his left arm at the elbow and has some  strength minimal shoulder movement, still has a flaccid left leg.      Diet: Cardiac Diets; Healthy Heart (2-3 Na+); Texture: Regular Texture (IDDSI 7); Fluid Consistency: Thin (IDDSI 0)        LABS:     Lab Results (last 48 hours)       Procedure Component Value Units Date/Time    Hemoglobin & Hematocrit, Blood [[id number]]  (Abnormal) Collected: 07/08/23 0730    Specimen: Blood Updated: 07/08/23 0737     Hemoglobin 10.0 g/dL      Hematocrit 32.5 %     POC Glucose Once [[id number]]  (Normal) Collected: 07/08/23 0608    Specimen: Blood Updated: 07/08/23 0619     Glucose 110 mg/dL      Comment: Meter: [id number] : [id number] Neal       POC Glucose Once  [[id number]]  (Abnormal) Collected: 07/07/23 2005    Specimen: Blood Updated: 07/07/23 2020     Glucose 162 mg/dL      Comment: Meter: [id number] : [id number] Quang Neal       POC Glucose Once [[id number]]  (Abnormal) Collected: 07/07/23 1606    Specimen: Blood Updated: 07/07/23 1626     Glucose 168 mg/dL      Comment: Meter: [id number] : [id number] emiliano bailey       POC Glucose Once [[id number]]  (Abnormal) Collected: 07/07/23 1051    Specimen: Blood Updated: 07/07/23 1057     Glucose 145 mg/dL      Comment: Meter: [id number] : [id number] CHAYA FIRERO       Magnesium [[id number]]  (Normal) Collected: 07/07/23 0152    Specimen: Blood Updated: 07/07/23 0245     Magnesium 2.1 mg/dL     Comprehensive Metabolic Panel [[id number]]  (Abnormal) Collected: 07/07/23 0152    Specimen: Blood Updated: 07/07/23 0245     Glucose 113 mg/dL      BUN 25 mg/dL      Creatinine 0.95 mg/dL      Sodium 136 mmol/L      Potassium 4.5 mmol/L      Comment: Slight hemolysis detected by analyzer. Results may be affected.        Chloride 104 mmol/L      CO2 22.0 mmol/L      Calcium 9.2 mg/dL      Total Protein 6.4 g/dL      Albumin 3.3 g/dL      ALT (SGPT) 13 U/L      AST (SGOT) 18 U/L      Alkaline Phosphatase 77 U/L      Total Bilirubin 0.4 mg/dL      Globulin 3.1 gm/dL      A/G Ratio 1.1 g/dL      BUN/Creatinine Ratio 26.3     Anion Gap 10.0 mmol/L      eGFR 88.8 mL/min/1.73     Narrative:      GFR Normal >60  Chronic Kidney Disease <60  Kidney Failure <15      Hemoglobin A1c [[id number]]  (Abnormal) Collected: 07/07/23 0152    Specimen: Blood Updated: 07/07/23 0232     Hemoglobin A1C 6.00 %     Narrative:      Hemoglobin A1C Ranges:    Increased Risk for Diabetes  5.7% to 6.4%  Diabetes                     >= 6.5%  Diabetic Goal                < 7.0%    CBC & Differential [[id number]]  (Abnormal) Collected: 07/07/23 0152    Specimen: Blood Updated: 07/07/23 0215    Narrative:      The following orders were created for  panel order CBC & Differential.  Procedure                               Abnormality         Status                     ---------                               -----------         ------                     CBC Auto Differential[[id number]]        Abnormal            Final result                 Please view results for these tests on the individual orders.    CBC Auto Differential [[id number]]  (Abnormal) Collected: 07/07/23 0152    Specimen: Blood Updated: 07/07/23 0215     WBC 7.44 10*3/mm3      RBC 3.92 10*6/mm3      Hemoglobin 10.5 g/dL      Hematocrit 34.3 %      MCV 87.5 fL      MCH 26.8 pg      MCHC 30.6 g/dL      RDW 12.8 %      RDW-SD 40.8 fl      MPV 9.2 fL      Platelets 361 10*3/mm3      Neutrophil % 65.3 %      Lymphocyte % 19.6 %      Monocyte % 12.6 %      Eosinophil % 1.3 %      Basophil % 0.8 %      Immature Grans % 0.4 %      Neutrophils, Absolute 4.85 10*3/mm3      Lymphocytes, Absolute 1.46 10*3/mm3      Monocytes, Absolute 0.94 10*3/mm3      Eosinophils, Absolute 0.10 10*3/mm3      Basophils, Absolute 0.06 10*3/mm3      Immature Grans, Absolute 0.03 10*3/mm3      nRBC 0.0 /100 WBC     POC Glucose Once [[id number]]  (Normal) Collected: 07/06/23 1937    Specimen: Blood Updated: 07/06/23 1942     Glucose 114 mg/dL      Comment: Meter: [id number] : [id number] Andreina Crystal                    Radiology:    Imaging Results (Last 72 Hours)       Procedure Component Value Units Date/Time    FL Video Swallow Single Contrast [[id number]] Collected: 07/07/23 1050     Updated: 07/07/23 1052    Narrative:      EXAM:    FL Swallowing Function With Video/Cine     EXAM DATE:    7/7/2023 10:09 AM     CLINICAL HISTORY:    oropharyngeal CA     TECHNIQUE:    Fluoroscopy of the oral cavity through upper esophagus with video/cine  recording.  The study was performed in conjunction with speech  pathology.  Various consistencies of liquid and food were administered  orally by the speech pathologist.     Fluoroscopy  exposure time of  approximately 1 minute or less.     COMPARISON:    No relevant prior studies available.     FINDINGS:    PHARYNGEAL PHASE:  No penetration or aspiration.       Impression:        Normal fluoroscopic video swallow exam.  Please see the speech  pathologist's report for additional information.     This report was finalized on 7/7/2023 10:50 AM by Dr. Porfirio Montgomery MD.                   Assessment/Plan:   Status post bilateral CVAs with new left deficit and some neglect.  I did discuss with speech therapy yesterday, swallow was intact.  No further formal SLP was thought to be needed at this time.  With OT, patient required max assist with bathing, max assist with upper body dressing, he was dependent with lower body dressing, mod assist with grooming, dependent with toileting hygiene, set up for self-feeding.  With PT, he was dependent with transfers with a lift.,  They are working on balance training, bed mobility training, eventually helped to work on gait training, motor coordination training, neuromuscular reeducation as well as range of motion and strengthening.  Continue Xarelto for further stroke prevention as well as statin.    Black stool, this happened at [facility] as well, patient's mucous membranes are moist, he is not tachycardic and blood pressure is adequate, recheck H&H actually improved over yesterday, he has no abdominal pain, I did add a PPI but otherwise will monitor expectantly, recheck CBC in the a.m.    History of atrial fibrillation, rate controlled on Lopressor, continue Xarelto for stroke prevention.    Hypertension, controlled on Cozaar and Lopressor.    History of laryngeal cancer and B lineage non-Hodgkin's lymphoma, follow-up with oncology  as an outpatient.    Constipation, bowels did move yesterday.    Impaired fasting glucose, continue to monitor, with exception of one this a.m. glucoses have overall been controlled    Hayder Mendoza MD

## 2023-07-08 NOTE — THERAPY TREATMENT NOTE
Inpatient Rehabilitation - Physical Therapy Treatment Note       THALIA Trujillo     Patient Name: Antonio Canseco  : 1955  MRN: [id number]    Today's Date: 2023                    Admit Date: 2023      Visit Dx:   No diagnosis found.    Patient Active Problem List   Diagnosis    Detrusor instability    Low testosterone in male    Disorder of prostate    Corporo-venous occlusive erectile dysfunction    CVA (cerebral vascular accident)       Past Medical History:   Diagnosis Date    Diabetes mellitus     Hypertension     Stroke        Past Surgical History:   Procedure Laterality Date    US GUIDED LYMPH NODE BIOPSY  2023       PT ASSESSMENT (last 12 hours)       IRF PT Evaluation and Treatment       Row Name 23 1217          PT Time and Intention    Document Type daily treatment  -LL     Mode of Treatment individual therapy;physical therapy  -LL     Patient/Family/Caregiver Comments/Observations Patient reports that he didn't sleep well last night and is tired, but is agreeable to PT participation this date.  -LL       Row Name 23 1217          General Information    Patient Profile Reviewed yes  -LL     Existing Precautions/Restrictions fall  L HP, <trunk support/balance  -LL       Row Name 23 1217          Pain Scale: FACES Pre/Post-Treatment    Pain: FACES Scale, Pretreatment 0-->no hurt  -LL     Posttreatment Pain Rating 0-->no hurt  -LL       Row Name 23 1217          Cognition/Psychosocial    Affect/Mental Status (Cognition) WFL  -LL     Orientation Status (Cognition) oriented x 3  -LL     Follows Commands (Cognition) verbal cues/prompting required;physical/tactile prompts required  -LL     Personal Safety Interventions nonskid shoes/slippers when out of bed;supervised activity  vamshi for transfers, leg rests and L arm tray for extremity support  -LL       Row Name 23 1217          Bed Mobility    Rolling Left Major (Bed Mobility) maximum assist (25% patient  effort);verbal cues;nonverbal cues (demo/gesture)  -LL     Rolling Right Jewell (Bed Mobility) maximum assist (25% patient effort);dependent (less than 25% patient effort);verbal cues;nonverbal cues (demo/gesture)  -LL     Bed Mobility, Safety Issues decreased use of arms for pushing/pulling;decreased use of legs for bridging/pushing  -LL     Comment, (Bed Mobility) Bed mobility of rolling L/R for donning shorts and placement/removal of vamshi sling  -LL       Row Name 07/08/23 1217          Bed-Chair Transfer    Bed-Chair Jewell (Transfers) dependent (less than 25% patient effort)  -LL     Assistive Device (Bed-Chair Transfers) lift device  -LL       Row Name 07/08/23 1217          Chair-Bed Transfer    Chair-Bed Jewell (Transfers) dependent (less than 25% patient effort)  -LL     Assistive Device (Chair-Bed Transfers) lift device  -       Row Name 07/08/23 1217          Safety Issues, Functional Mobility    Impairments Affecting Function (Mobility) balance;coordination;endurance/activity tolerance;grasp;motor control;muscle tone abnormal;postural/trunk control;range of motion (ROM);strength  -LL       Row Name 07/08/23 1217          Balance    Comment, Balance WC balance working on maintaining nuetral sitting position  -       Row Name 07/08/23 1217          Motor Skills    Therapeutic Exercise hip;knee;ankle  Stretching to L LE; bridging, GS  -LL       Row Name 07/08/23 1217          Hip (Therapeutic Exercise)    Hip (Therapeutic Exercise) strengthening exercise  -LL     Hip Strengthening (Therapeutic Exercise) bilateral;flexion;extension;aBduction;aDduction;external rotation;internal rotation;heel slides;marching while seated;sitting;supine  L LE PROM; R LE AROM  -LL       Row Name 07/08/23 1217          Knee (Therapeutic Exercise)    Knee (Therapeutic Exercise) strengthening exercise  -LL     Knee Strengthening (Therapeutic Exercise) bilateral;flexion;extension;heel slides;marching while  seated;SAQ (short arc quad);LAQ (long arc quad);sitting;supine  -[facility] Name 07/08/23 1217          Ankle (Therapeutic Exercise)    Ankle (Therapeutic Exercise) strengthening exercise  -     Ankle Strengthening (Therapeutic Exercise) bilateral;dorsiflexion;plantarflexion;sitting;supine  L LE PROM; R LE AROM  -[facility] Name 07/08/23 1217          Positioning and Restraints    Pre-Treatment Position in bed  Prior to 1st; in WC w/ OT prior to 2nd  -LL     Post Treatment Position bed  After 2nd; in WC w/ OT after 1st  -LL     In Bed notified nsg;supine;call light within reach;encouraged to call for assist;side rails up x3;heels elevated;LUE elevated;RUE elevated  -[facility] Name 07/08/23 1217          Therapy Assessment/Plan (PT)    Patient's Goals For Discharge return home  -LL       Row Name 07/08/23 1217          Therapy Assessment/Plan (PT)    Rehab Potential/Prognosis (PT) adequate, monitor progress closely  -     Frequency of Treatment (PT) 5 times per week  -     Estimated Duration of Therapy (PT) 4 weeks  -     Problem List (PT) balance;coordination;hemiparesis/hemiplegia;mobility;motor control;muscle tone;range of motion (ROM);strength;postural control  -     Activity Limitations Related to Problem List (PT) unable to ambulate safely;unable to transfer safely  -[facility] Name 07/08/23 1217          Therapy Plan Review/Discharge Plan (PT)    Anticipated Equipment Needs at Discharge (PT Eval) --  tbd  -LL     Anticipated Discharge Disposition (PT) home with assist;home with home health;home with outpatient therapy services  -[facility] Name 07/08/23 1217          IRF PT Goals    Bed Mobility Goal Selection (PT-IRF) bed mobility, PT goal 1;bed mobility, PT goal 2  -LL     Transfer Goal Selection (PT-IRF) transfers, PT goal 1;transfers, PT goal 2  -LL     Gait (Walking Locomotion) Goal Selection (PT-IRF) gait, PT goal 1;gait, PT goal 2  -[facility] Name 07/08/23 1217          Bed  Mobility Goal 1 (PT-IRF)    Activity/Assistive Device (Bed Mobility Goal 1, PT-IRF) sit to supine/supine to sit  -LL     [place] Level (Bed Mobility Goal 1, PT-IRF) moderate assist (50-74% patient effort)  -LL     Time Frame (Bed Mobility Goal 1, PT-IRF) short-term goal (STG);2 weeks  -LL       Row Name 07/08/23 1217          Bed Mobility Goal 2 (PT-IRF)    Activity/Assistive Device (Bed Mobility Goal 2, PT-IRF) sit to supine/supine to sit  -LL     [place] Level (Bed Mobility Goal 2, PT-IRF) standby assist  -LL     Time Frame (Bed Mobility Goal 2, PT-IRF) by discharge  -LL       Row Name 07/08/23 1217          Transfer Goal 1 (PT-IRF)    Activity/Assistive Device (Transfer Goal 1, PT-IRF) sit-to-stand/stand-to-sit;bed-to-chair/chair-to-bed  -LL     [place] Level (Transfer Goal 1, PT-IRF) moderate assist (50-74% patient effort)  -LL     Time Frame (Transfer Goal 1, PT-IRF) short-term goal (STG);2 weeks  -LL       Row Name 07/08/23 1217          Transfer Goal 2 (PT-IRF)    Activity/Assistive Device (Transfer Goal 2, PT-IRF) sit-to-stand/stand-to-sit;bed-to-chair/chair-to-bed  -LL     [place] Level (Transfer Goal 2, PT-IRF) standby assist  -LL     Time Frame (Transfer Goal 2, PT-IRF) by discharge  -       Row Name 07/08/23 1217          Gait/Walking Locomotion Goal 1 (PT-IRF)    Activity/Assistive Device (Gait/Walking Locomotion Goal 1, PT-IRF) --  handrail in leroy  -LL     Gait/Walking Locomotion Distance Goal 1 (PT-IRF) 20'  -LL     [place] Level (Gait/Walking Locomotion Goal 1, PT-IRF) moderate assist (50-74% patient effort)  x2  -LL     Time Frame (Gait/Walking Locomotion Goal 1, PT-IRF) short-term goal (STG);2 weeks  -LL       Row Name 07/08/23 1217          Gait/Walking Locomotion Goal 2 (PT-IRF)    Activity/Assistive Device (Gait/Walking Locomotion Goal 2, PT-IRF) walker, kvein  or AAD  -LL     Gait/Walking Locomotion Distance Goal 2 (PT-IRF) 150'  -LL     [place] Level  (Gait/Walking Locomotion Goal 2, PT-IRF) standby assist  -LL     Time Frame (Gait/Walking Locomotion Goal 2, PT-IRF) by discharge  -LL               User Key  (r) = Recorded By, (t) = Taken By, (c) = Cosigned By      Initials Name Provider Type    LL Connie Velasquez PTA Physical Therapist Assistant                     Physical Therapy Education       Title: PT OT SLP Therapies (Done)       Topic: Physical Therapy (Done)       Point: Mobility training (Done)       Learning Progress Summary             Patient Acceptance, E,D, VU,NR by LL at 7/8/2023 1228    Acceptance, E,D, VU,NR by LB at 7/7/2023 1449                         Point: Home exercise program (Done)       Learning Progress Summary             Patient Acceptance, E,D, VU,NR by LL at 7/8/2023 1228    Acceptance, E,D, VU,NR by LB at 7/7/2023 1449                         Point: Body mechanics (Done)       Learning Progress Summary             Patient Acceptance, E,D, VU,NR by LL at 7/8/2023 1228    Acceptance, E,D, VU,NR by LB at 7/7/2023 1449                         Point: Precautions (Done)       Learning Progress Summary             Patient Acceptance, E,D, VU,NR by LL at 7/8/2023 1228    Acceptance, E,D, VU,NR by LB at 7/7/2023 1449                                         User Key       Initials Effective Dates Name Provider Type Discipline    LB 06/16/21 -  Joslyn Garcia, PT Physical Therapist PT    LL 05/02/16 -  Connie Velasquez PTA Physical Therapist Assistant PT                    PT Recommendation and Plan    Frequency of Treatment (PT): 5 times per week  Anticipated Equipment Needs at Discharge (PT Eval):  (tbd)                  Time Calculation:      PT Charges       Row Name 07/08/23 1229 07/08/23 1228          Time Calculation    Start Time 1000  -LL 0735  -LL     Stop Time 1045  -LL 0830  -LL     Time Calculation (min) 45 min  -LL 55 min  -LL     PT Received On -- 07/08/23  -LL        Time Calculation- PT    Total Timed Code Minutes-  PT 45 minute(s)  -LL 55 minute(s)  -LL               User Key  (r) = Recorded By, (t) = Taken By, (c) = Cosigned By      Initials Name Provider Type     Connie Velasquez PTA Physical Therapist Assistant                    Therapy Charges for Today       Code Description Service Date Service Provider Modifiers Qty    97805557690 HC PT NEUROMUSC RE EDUCATION EA 15 MIN 7/8/2023 Connie Velasquez PTA GP, CQ 3    46637598693 HC PT THERAPEUTIC ACT EA 15 MIN 7/8/2023 Connie Velasquez PTA GP, CQ 3    96634435670 HC PT THER PROC EA 15 MIN 7/8/2023 Connie Velasquez, JILL GP, CQ 1                     Connie. JILL Velasquez  7/8/2023

## 2023-07-08 NOTE — PROGRESS NOTES
Occupational Therapy: Individual: 90 minutes.    Physical Therapy:    Speech Language Pathology:    Signed by: Veronica Robles OT

## 2023-07-08 NOTE — PLAN OF CARE
Goal Outcome Evaluation:  Plan of Care Reviewed With: patient        Progress: improving  Outcome Evaluation: BED ALARM NOTED; CONTINUE PLAN OF CARE; MONITOR

## 2023-07-09 LAB
ANION GAP SERPL CALCULATED.3IONS-SCNC: 9.2 MMOL/L (ref 5–15)
BASOPHILS # BLD AUTO: 0.08 10*3/MM3 (ref 0–0.2)
BASOPHILS NFR BLD AUTO: 1.2 % (ref 0–1.5)
BUN SERPL-MCNC: 24 MG/DL (ref 8–23)
BUN/CREAT SERPL: 27.6 (ref 7–25)
CALCIUM SPEC-SCNC: 9 MG/DL (ref 8.6–10.5)
CHLORIDE SERPL-SCNC: 104 MMOL/L (ref 98–107)
CO2 SERPL-SCNC: 21.8 MMOL/L (ref 22–29)
CREAT SERPL-MCNC: 0.87 MG/DL (ref 0.76–1.27)
DEPRECATED RDW RBC AUTO: 40.1 FL (ref 37–54)
EGFRCR SERPLBLD CKD-EPI 2021: 95.8 ML/MIN/1.73
EOSINOPHIL # BLD AUTO: 0.17 10*3/MM3 (ref 0–0.4)
EOSINOPHIL NFR BLD AUTO: 2.6 % (ref 0.3–6.2)
ERYTHROCYTE [DISTWIDTH] IN BLOOD BY AUTOMATED COUNT: 12.6 % (ref 12.3–15.4)
GLUCOSE BLDC GLUCOMTR-MCNC: 119 MG/DL (ref 70–130)
GLUCOSE BLDC GLUCOMTR-MCNC: 135 MG/DL (ref 70–130)
GLUCOSE BLDC GLUCOMTR-MCNC: 172 MG/DL (ref 70–130)
GLUCOSE BLDC GLUCOMTR-MCNC: 179 MG/DL (ref 70–130)
GLUCOSE SERPL-MCNC: 119 MG/DL (ref 65–99)
HCT VFR BLD AUTO: 32.8 % (ref 37.5–51)
HGB BLD-MCNC: 10 G/DL (ref 13–17.7)
IMM GRANULOCYTES # BLD AUTO: 0.01 10*3/MM3 (ref 0–0.05)
IMM GRANULOCYTES NFR BLD AUTO: 0.2 % (ref 0–0.5)
LYMPHOCYTES # BLD AUTO: 1.33 10*3/MM3 (ref 0.7–3.1)
LYMPHOCYTES NFR BLD AUTO: 20.7 % (ref 19.6–45.3)
MCH RBC QN AUTO: 26.8 PG (ref 26.6–33)
MCHC RBC AUTO-ENTMCNC: 30.5 G/DL (ref 31.5–35.7)
MCV RBC AUTO: 87.9 FL (ref 79–97)
MONOCYTES # BLD AUTO: 0.79 10*3/MM3 (ref 0.1–0.9)
MONOCYTES NFR BLD AUTO: 12.3 % (ref 5–12)
NEUTROPHILS NFR BLD AUTO: 4.05 10*3/MM3 (ref 1.7–7)
NEUTROPHILS NFR BLD AUTO: 63 % (ref 42.7–76)
NRBC BLD AUTO-RTO: 0 /100 WBC (ref 0–0.2)
PLATELET # BLD AUTO: 355 10*3/MM3 (ref 140–450)
PMV BLD AUTO: 9 FL (ref 6–12)
POTASSIUM SERPL-SCNC: 4.2 MMOL/L (ref 3.5–5.2)
RBC # BLD AUTO: 3.73 10*6/MM3 (ref 4.14–5.8)
SODIUM SERPL-SCNC: 135 MMOL/L (ref 136–145)
WBC NRBC COR # BLD: 6.43 10*3/MM3 (ref 3.4–10.8)

## 2023-07-09 PROCEDURE — 85025 COMPLETE CBC W/AUTO DIFF WBC: CPT | Performed by: INTERNAL MEDICINE

## 2023-07-09 PROCEDURE — 80048 BASIC METABOLIC PNL TOTAL CA: CPT | Performed by: INTERNAL MEDICINE

## 2023-07-09 PROCEDURE — 82948 REAGENT STRIP/BLOOD GLUCOSE: CPT

## 2023-07-09 RX ORDER — POLYETHYLENE GLYCOL 3350 17 G/17G
17 POWDER, FOR SOLUTION ORAL DAILY
Status: DISCONTINUED | OUTPATIENT
Start: 2023-07-09 | End: 2023-07-13

## 2023-07-09 RX ADMIN — ALLOPURINOL 300 MG: 300 TABLET ORAL at 08:26

## 2023-07-09 RX ADMIN — HYDROXYZINE HYDROCHLORIDE 25 MG: 25 TABLET, FILM COATED ORAL at 21:40

## 2023-07-09 RX ADMIN — RIVAROXABAN 20 MG: 20 TABLET, FILM COATED ORAL at 17:53

## 2023-07-09 RX ADMIN — LOSARTAN POTASSIUM 25 MG: 25 TABLET, FILM COATED ORAL at 08:25

## 2023-07-09 RX ADMIN — NYSTATIN: 100000 POWDER TOPICAL at 08:26

## 2023-07-09 RX ADMIN — METOPROLOL TARTRATE 50 MG: 50 TABLET, FILM COATED ORAL at 21:40

## 2023-07-09 RX ADMIN — PANTOPRAZOLE SODIUM 40 MG: 40 TABLET, DELAYED RELEASE ORAL at 05:22

## 2023-07-09 RX ADMIN — TAMSULOSIN HYDROCHLORIDE 0.4 MG: 0.4 CAPSULE ORAL at 08:25

## 2023-07-09 RX ADMIN — ATORVASTATIN CALCIUM 80 MG: 40 TABLET, FILM COATED ORAL at 08:25

## 2023-07-09 RX ADMIN — NYSTATIN: 100000 POWDER TOPICAL at 21:40

## 2023-07-09 RX ADMIN — MAGNESIUM GLUCONATE 500 MG ORAL TABLET 400 MG: 500 TABLET ORAL at 08:29

## 2023-07-09 NOTE — PROGRESS NOTES
Patient is without complaints, he recorded 5 bowel movements yesterday but in discussion with nursing, this was at times very small and they thought that this was about the equivalent of 1-1/2 large bowel movements.  This being the case I have stopped the senna but left the MiraLAX going.  They still report this is being a black stool however his hemoglobin remained stable today, do not think he is having GI bleeding and again this was going on at the [facility].  Vital signs are good.  Patient is on Xarelto for his A-fib and stroke prevention.  Patient will be followed up with oncology as an outpatient for his B lineage lymphoma and laryngeal cancer.  He is going to see

## 2023-07-09 NOTE — PLAN OF CARE
Goal Outcome Evaluation:  Plan of Care Reviewed With: patient        Progress: improving  Outcome Evaluation: patient resting in bed at this time. continue plan of care.

## 2023-07-09 NOTE — PROGRESS NOTES
Rehabilitation Nursing  Inpatient Rehabilitation Plan of Care Note    Plan of Care  Copy from POC    Body Function Structure    Skin Integrity (Active)  Current Status (7/6/2023 4:18:00 PM): At Risk for Skin Breakdown  Weekly Goal: No Skin Breakdown  Discharge Goal: No Skin Breakdown    Safety    Potential for Injury (Active)  Current Status (7/6/2023 4:18:00 PM): Potential for Falls  Weekly Goal: No Falls  Discharge Goal: No Falls    Signed by: Darby Fritz Nurse

## 2023-07-10 LAB
GLUCOSE BLDC GLUCOMTR-MCNC: 140 MG/DL (ref 70–130)
GLUCOSE BLDC GLUCOMTR-MCNC: 150 MG/DL (ref 70–130)
GLUCOSE BLDC GLUCOMTR-MCNC: 185 MG/DL (ref 70–130)
GLUCOSE BLDC GLUCOMTR-MCNC: 186 MG/DL (ref 70–130)

## 2023-07-10 PROCEDURE — 97535 SELF CARE MNGMENT TRAINING: CPT | Performed by: OCCUPATIONAL THERAPIST

## 2023-07-10 PROCEDURE — 97112 NEUROMUSCULAR REEDUCATION: CPT

## 2023-07-10 PROCEDURE — 97110 THERAPEUTIC EXERCISES: CPT

## 2023-07-10 PROCEDURE — 97112 NEUROMUSCULAR REEDUCATION: CPT | Performed by: OCCUPATIONAL THERAPIST

## 2023-07-10 PROCEDURE — 97530 THERAPEUTIC ACTIVITIES: CPT

## 2023-07-10 PROCEDURE — 97110 THERAPEUTIC EXERCISES: CPT | Performed by: OCCUPATIONAL THERAPIST

## 2023-07-10 PROCEDURE — 82948 REAGENT STRIP/BLOOD GLUCOSE: CPT

## 2023-07-10 PROCEDURE — 99231 SBSQ HOSP IP/OBS SF/LOW 25: CPT | Performed by: FAMILY MEDICINE

## 2023-07-10 RX ADMIN — ALLOPURINOL 300 MG: 300 TABLET ORAL at 08:38

## 2023-07-10 RX ADMIN — PANTOPRAZOLE SODIUM 40 MG: 40 TABLET, DELAYED RELEASE ORAL at 06:17

## 2023-07-10 RX ADMIN — HYDROXYZINE HYDROCHLORIDE 25 MG: 25 TABLET, FILM COATED ORAL at 20:37

## 2023-07-10 RX ADMIN — ATORVASTATIN CALCIUM 80 MG: 40 TABLET, FILM COATED ORAL at 08:38

## 2023-07-10 RX ADMIN — NYSTATIN: 100000 POWDER TOPICAL at 20:37

## 2023-07-10 RX ADMIN — METOPROLOL TARTRATE 50 MG: 50 TABLET, FILM COATED ORAL at 20:37

## 2023-07-10 RX ADMIN — MAGNESIUM GLUCONATE 500 MG ORAL TABLET 400 MG: 500 TABLET ORAL at 08:40

## 2023-07-10 RX ADMIN — RIVAROXABAN 20 MG: 20 TABLET, FILM COATED ORAL at 17:44

## 2023-07-10 RX ADMIN — TAMSULOSIN HYDROCHLORIDE 0.4 MG: 0.4 CAPSULE ORAL at 08:37

## 2023-07-10 RX ADMIN — LOSARTAN POTASSIUM 25 MG: 25 TABLET, FILM COATED ORAL at 08:38

## 2023-07-10 RX ADMIN — NYSTATIN: 100000 POWDER TOPICAL at 08:38

## 2023-07-10 NOTE — PROGRESS NOTES
Inpatient Rehabilitation Functional Measures Assessment and Plan of Care    Plan of Care  Self Care    [OT] Dressing (Upper)(Active)  Current Status(07/10/2023): Max A  Weekly Goal(07/18/2023): Mod A  Discharge Goal: Min A    Functional Measures  SOBIA Eating:  SOBIA Grooming:  SOBIA Bathing:  SOBIA Upper Body Dressing:  SOBIA Lower Body Dressing:  SOBIA Toileting:    SOBIA Bladder Management  Level of Assistance:  Frequency/Number of Accidents this Shift:    SOBIA Bowel Management  Level of Assistance:  Frequency/Number of Accidents this Shift:    SOBIA Bed/Chair/Wheelchair Transfer:  SOBIA Toilet Transfer:  SOBIA Tub/Shower Transfer:    Previously Documented Mode of Locomotion at Discharge:  SOBIA Expected Mode of Locomotion at Discharge:  SOBIA Walk/Wheelchair:  SOBIA Stairs:    SOBIA Comprehension:  SOBIA Expression:  SOBIA Social Interaction:  SOBIA Problem Solving:  SOBIA Memory:    Therapy Mode Minutes  Occupational Therapy: Individual: 90 minutes.  Physical Therapy:  Speech Language Pathology:    Discharge Functional Goals:    Signed by: Lisa Sanchez OT

## 2023-07-10 NOTE — THERAPY TREATMENT NOTE
Inpatient Rehabilitation - Occupational Therapy Treatment Note     Ronnie     Patient Name: Antonio Canseco  : 1955  MRN: [id number]    Today's Date: 7/10/2023                 Admit Date: 2023       No diagnosis found.    Patient Active Problem List   Diagnosis    Detrusor instability    Low testosterone in male    Disorder of prostate    Corporo-venous occlusive erectile dysfunction    CVA (cerebral vascular accident)       Past Medical History:   Diagnosis Date    Diabetes mellitus     Hypertension     Stroke        Past Surgical History:   Procedure Laterality Date    US GUIDED LYMPH NODE BIOPSY  2023             IRF OT ASSESSMENT FLOWSHEET (last 12 hours)       IRF OT Evaluation and Treatment       Row Name 07/10/23 1456          OT Time and Intention    Document Type daily treatment  -BF     Mode of Treatment occupational therapy  -BF     Patient Effort good  -BF     Symptoms Noted During/After Treatment none  -BF       Row Name 07/10/23 1456          General Information    Patient/Family/Caregiver Comments/Observations Pt agreeable to OT,pt required frequent correction for leaning to the L. Shoulder flexion AROM improved this date to <25%.  -BF     Existing Precautions/Restrictions fall  L HP, <trunk support/balance  -BF     Limitations/Impairments safety/cognitive  -BF       Row Name 07/10/23 1456          Cognition/Psychosocial    Orientation Status (Cognition) oriented x 3  -BF     Follows Commands (Cognition) verbal cues/prompting required;repetition of directions required;physical/tactile prompts required;delayed response/completion;increased processing time needed  -BF       Row Name 07/10/23 1456          Grooming    [place] Level (Grooming) moderate assist (50% patient effort);verbal cues;nonverbal cues (demo/gesture)  -BF     Position (Grooming) supported sitting  -BF       Row Name 07/10/23 1456          Motor Skills    Neuromuscular Function left;upper extremity;moderate  impairment;severe impairment  -BF     Motor Control/Coordination Interventions gross motor coordination activities;neuro-muscular re-education;therapeutic exercise/ROM  LUE NDT, A/AAROM, gross grasp/release (pt requires frequent cues to use L hand vs. R and to release object from L hand). BUE rickshaw 20 lbs X15X4, BUE PRE 3 mins X2 w/ LUE supported  -BF       Row Name 07/10/23 1456          Neuromuscular Re-education    Interventions (Neuromuscular Re-education) facilitation/inhibition;massage  LUE  -BF     Positioning (Neuromuscular Re-education) supported;sitting  -BF       Row Name 07/10/23 1456          Positioning and Restraints    In Wheelchair sitting;with PT;with other staff  w/  in AM, w/ PT in PM  -BF               User Key  (r) = Recorded By, (t) = Taken By, (c) = Cosigned By      Initials Name Effective Dates     Lisa Sanchez OT 05/31/23 -                      Occupational Therapy Education       Title: PT OT SLP Therapies (Done)       Topic: Occupational Therapy (Done)       Point: ADL training (Done)       Description:   Instruct learner(s) on proper safety adaptation and remediation techniques during self care or transfers.   Instruct in proper use of assistive devices.                  Learning Progress Summary             Patient Acceptance, E, VU,NR by BF at 7/10/2023 1456    Acceptance, E, VU,NR by HB at 7/8/2023 1140    Acceptance, E, VU,NR by BF at 7/7/2023 1442                         Point: Precautions (Done)       Description:   Instruct learner(s) on prescribed precautions during self-care and functional transfers.                  Learning Progress Summary             Patient Acceptance, E, VU,NR by BF at 7/10/2023 1456    Acceptance, E, VU,NR by HB at 7/8/2023 1140    Acceptance, E, VU,NR by BF at 7/7/2023 1442                                         User Key       Initials Effective Dates Name Provider Type Discipline     05/31/23 -  Lisa Sanchez  Janie OT Occupational Therapist OT     05/25/21 -  Veronica Robles OT Occupational Therapist OT                        OT Recommendation and Plan    Planned Therapy Interventions (OT): activity tolerance training, adaptive equipment training, BADL retraining, neuromuscular control/coordination retraining, passive ROM/stretching, ROM/therapeutic exercise, strengthening exercise, transfer/mobility retraining                    Time Calculation:      Time Calculation- OT       Row Name 07/10/23 1506 07/10/23 1100          Time Calculation- OT    OT Start Time 1245  -BF 1100  -BF     OT Stop Time 1330  -BF 1145  -BF     OT Time Calculation (min) 45 min  -BF 45 min  -BF     Total Timed Code Minutes- OT 45 minute(s)  -BF 45 minute(s)  -BF     OT Non-Billable Time (min) -- 10 min  -BF               User Key  (r) = Recorded By, (t) = Taken By, (c) = Cosigned By      Initials Name Provider Type    BF Lisa Sanchez OT Occupational Therapist                  Therapy Charges for Today       Code Description Service Date Service Provider Modifiers Qty    54454057883 HC OT NEUROMUSC RE EDUCATION EA 15 MIN 7/10/2023 Lisa Sanchez OT GO 3    37025470599 HC OT THER PROC EA 15 MIN 7/10/2023 Lisa Sanchez OT GO 2    86715523609 HC OT SELF CARE/MGMT/TRAIN EA 15 MIN 7/10/2023 Lisa Sanchez OT GO 1                     Lisa Sanchez OT  7/10/2023

## 2023-07-10 NOTE — PLAN OF CARE
Goal Outcome Evaluation:  Plan of Care Reviewed With: patient        Progress: improving  Outcome Evaluation: patient up with therapies this shift. continue plan of care.

## 2023-07-10 NOTE — PROGRESS NOTES
Occupational Therapy:    Physical Therapy: Individual: 90 minutes.    Speech Language Pathology:    Signed by: Joslyn Garcia, Physical Therapist

## 2023-07-10 NOTE — THERAPY TREATMENT NOTE
Inpatient Rehabilitation - Physical Therapy Treatment Note        Ronnie     Patient Name: Antonio Canseco  : 1955  MRN: [id number]    Today's Date: 7/10/2023                    Admit Date: 2023      Visit Dx:   No diagnosis found.    Patient Active Problem List   Diagnosis    Detrusor instability    Low testosterone in male    Disorder of prostate    Corporo-venous occlusive erectile dysfunction    CVA (cerebral vascular accident)       Past Medical History:   Diagnosis Date    Diabetes mellitus     Hypertension     Stroke        Past Surgical History:   Procedure Laterality Date    US GUIDED LYMPH NODE BIOPSY  2023       PT ASSESSMENT (last 12 hours)       [facility] PT Evaluation and Treatment       Row Name 07/10/23 1446          PT Time and Intention    Document Type daily treatment  -LB     Mode of Treatment individual therapy;physical therapy  -LB       Row Name 07/10/23 1446          General Information    Patient Profile Reviewed yes  -LB     Existing Precautions/Restrictions fall  L HP, <trunk support/balance  -LB       Row Name 07/10/23 1446          Pain Scale: FACES Pre/Post-Treatment    Pain: FACES Scale, Pretreatment 0-->no hurt  -LB     Posttreatment Pain Rating 0-->no hurt  -LB       Row Name 07/10/23 1446          Cognition/Psychosocial    Affect/Mental Status (Cognition) WFL  -LB     Follows Commands (Cognition) verbal cues/prompting required;physical/tactile prompts required  -LB     Personal Safety Interventions gait belt;nonskid shoes/slippers when out of bed;supervised activity  L inattention  -LB       Row Name 07/10/23 1446          Bed Mobility    Rolling Left [place] (Bed Mobility) moderate assist (50% patient effort);verbal cues;nonverbal cues (demo/gesture)  -LB     Rolling Right [place] (Bed Mobility) dependent (less than 25% patient effort);verbal cues;nonverbal cues (demo/gesture)  -LB     Assistive Device (Bed Mobility) bed rails;draw sheet  -LB       Row Name  07/10/23 1446          Bed-Chair Transfer    Bed-Chair [place] (Transfers) dependent (less than 25% patient effort)  -LB     Assistive Device (Bed-Chair Transfers) lift device  -LB       Row Name 07/10/23 1446          Chair-Bed Transfer    Chair-Bed [place] (Transfers) dependent (less than 25% patient effort)  -LB     Assistive Device (Chair-Bed Transfers) lift device  -LB       Row Name 07/10/23 1446          Sit-Stand Transfer    Comment, (Sit-Stand Transfer) standing frame  -LB       Row Name 07/10/23 1446          Balance    Static Sitting Balance --  in w/c with no back support min A, cues for center of gravity  -LB     Dynamic Sitting Balance --  sitting in w/c bean bag toss and  with reacher bid, max A with leaning to L side and forward  -LB     Static Standing Balance --  standing frame 7 min, slumped to L side and forward on tray table  -LB       Row Name 07/10/23 1446          Motor Skills    Therapeutic Exercise --  sitting ex bid, R active, L passive  -LB       Row Name 07/10/23 1446          Positioning and Restraints    Pre-Treatment Position --  am-bed, pm-w/c  -LB     In Bed with other staff  pm, with PCA's  -LB     In Wheelchair with OT  am  -LB       Row Name 07/10/23 1446          Therapy Assessment/Plan (PT)    Patient's Goals For Discharge return home  -LB       Row Name 07/10/23 1446          Therapy Assessment/Plan (PT)    Rehab Potential/Prognosis (PT) adequate, monitor progress closely  -LB     Frequency of Treatment (PT) 5 times per week  -LB     Estimated Duration of Therapy (PT) 4 weeks  -LB     Problem List (PT) balance;coordination;hemiparesis/hemiplegia;mobility;motor control;muscle tone;range of motion (ROM);strength;postural control  -LB     Activity Limitations Related to Problem List (PT) unable to ambulate safely;unable to transfer safely  -LB       Row Name 07/10/23 1446          Daily Progress Summary (PT)    Recommendations (PT) continue PT  -LB       Row Name  07/10/23 1446          Therapy Plan Review/Discharge Plan (PT)    Anticipated Equipment Needs at Discharge (PT Eval) --  tbd  -LB     Anticipated Discharge Disposition (PT) home with assist;home with home health;home with outpatient therapy services  -LB       Row Name 07/10/23 1446          IRF PT Goals    Bed Mobility Goal Selection (PT-IRF) bed mobility, PT goal 1;bed mobility, PT goal 2  -LB     Transfer Goal Selection (PT-IRF) transfers, PT goal 1;transfers, PT goal 2  -LB     Gait (Walking Locomotion) Goal Selection (PT-IRF) gait, PT goal 1;gait, PT goal 2  -LB       Row Name 07/10/23 1446          Bed Mobility Goal 1 (PT-IRF)    Activity/Assistive Device (Bed Mobility Goal 1, PT-IRF) sit to supine/supine to sit  -LB     [place] Level (Bed Mobility Goal 1, PT-IRF) moderate assist (50-74% patient effort)  -LB     Time Frame (Bed Mobility Goal 1, PT-IRF) short-term goal (STG);2 weeks  -LB       Row Name 07/10/23 1446          Bed Mobility Goal 2 (PT-IRF)    Activity/Assistive Device (Bed Mobility Goal 2, PT-IRF) sit to supine/supine to sit  -LB     [place] Level (Bed Mobility Goal 2, PT-IRF) standby assist  -LB     Time Frame (Bed Mobility Goal 2, PT-IRF) by discharge  -LB       Row Name 07/10/23 1446          Transfer Goal 1 (PT-IRF)    Activity/Assistive Device (Transfer Goal 1, PT-IRF) sit-to-stand/stand-to-sit;bed-to-chair/chair-to-bed  -LB     [place] Level (Transfer Goal 1, PT-IRF) moderate assist (50-74% patient effort)  -LB     Time Frame (Transfer Goal 1, PT-IRF) short-term goal (STG);2 weeks  -LB       Row Name 07/10/23 1446          Transfer Goal 2 (PT-IRF)    Activity/Assistive Device (Transfer Goal 2, PT-IRF) sit-to-stand/stand-to-sit;bed-to-chair/chair-to-bed  -LB     [place] Level (Transfer Goal 2, PT-IRF) standby assist  -LB     Time Frame (Transfer Goal 2, PT-IRF) by discharge  -LB       Row Name 07/10/23 1446          Gait/Walking Locomotion Goal 1 (PT-IRF)     Activity/Assistive Device (Gait/Walking Locomotion Goal 1, PT-IRF) --  handrail in [place]  -LB     Gait/Walking Locomotion Distance Goal 1 (PT-IRF) 20'  -LB     [place] Level (Gait/Walking Locomotion Goal 1, PT-IRF) moderate assist (50-74% patient effort)  x2  -LB     Time Frame (Gait/Walking Locomotion Goal 1, PT-IRF) short-term goal (STG);2 weeks  -LB       Row Name 07/10/23 1446          Gait/Walking Locomotion Goal 2 (PT-IRF)    Activity/Assistive Device (Gait/Walking Locomotion Goal 2, PT-IRF) walker, kevin  or AAD  -LB     Gait/Walking Locomotion Distance Goal 2 (PT-IRF) 150'  -LB     [place] Level (Gait/Walking Locomotion Goal 2, PT-IRF) standby assist  -LB     Time Frame (Gait/Walking Locomotion Goal 2, PT-IRF) by discharge  -LB               User Key  (r) = Recorded By, (t) = Taken By, (c) = Cosigned By      Initials Name Provider Type    Joslyn Beckford, PT Physical Therapist                     Physical Therapy Education       Title: PT OT SLP Therapies (Done)       Topic: Physical Therapy (Done)       Point: Mobility training (Done)       Learning Progress Summary             Patient Acceptance, E, VU,NR by LB at 7/10/2023 1452    Acceptance, E,D, VU,NR by LL at 7/8/2023 1228    Acceptance, E,D, VU,NR by LB at 7/7/2023 1449                         Point: Home exercise program (Done)       Learning Progress Summary             Patient Acceptance, E, VU,NR by LB at 7/10/2023 1452    Acceptance, E,D, VU,NR by LL at 7/8/2023 1228    Acceptance, E,D, VU,NR by LB at 7/7/2023 1449                         Point: Body mechanics (Done)       Learning Progress Summary             Patient Acceptance, E, VU,NR by LB at 7/10/2023 1452    Acceptance, E,D, VU,NR by LL at 7/8/2023 1228    Acceptance, E,D, VU,NR by LB at 7/7/2023 1449                         Point: Precautions (Done)       Learning Progress Summary             Patient Acceptance, E, VU,NR by LB at 7/10/2023 1452    Acceptance, E,D, VU,NR  by LL at 7/8/2023 1228    Acceptance, E,D, VU,NR by LB at 7/7/2023 1449                                         User Key       Initials Effective Dates Name Provider Type Discipline     06/16/21 -  Joslyn Garcia, PT Physical Therapist PT    LL 05/02/16 -  Connie Velasquez PTA Physical Therapist Assistant PT                    PT Recommendation and Plan    Planned Therapy Interventions (PT): balance training, bed mobility training, gait training, motor coordination training, neuromuscular re-education, patient/family education, postural re-education, ROM (range of motion), strengthening, transfer training, wheelchair management/propulsion training  Frequency of Treatment (PT): 5 times per week  Anticipated Equipment Needs at Discharge (PT Eval):  (tbd)  Daily Progress Summary (PT)  Recommendations (PT): continue PT               Time Calculation:      PT Charges       Row Name 07/10/23 1453 07/10/23 1452          Time Calculation    Start Time 1330  -LB 1000  -LB     Stop Time 1415  -LB 1045  -LB     Time Calculation (min) 45 min  -LB 45 min  -LB     PT Received On -- 07/10/23  -LB               User Key  (r) = Recorded By, (t) = Taken By, (c) = Cosigned By      Initials Name Provider Type    LB Joslyn Garcia, PT Physical Therapist                    Therapy Charges for Today       Code Description Service Date Service Provider Modifiers Qty    09765506725 HC PT NEUROMUSC RE EDUCATION EA 15 MIN 7/10/2023 Joslyn Garcia, PT GP 2    60306054757 HC PT THER PROC EA 15 MIN 7/10/2023 Joslyn Garcia, PT GP 2    42600947537 HC PT THERAPEUTIC ACT EA 15 MIN 7/10/2023 Joslyn Garcia, PT GP 2                     Joslyn Garcia PT  7/10/2023

## 2023-07-10 NOTE — PLAN OF CARE
Pt Goal Outcome Evaluation:      Pt is progressing medically and physically with all therapies, continue with current plan of care.

## 2023-07-10 NOTE — PAYOR COMM NOTE
"Carmen Doe, RN   Utilization Review Nurse for Inpatient Rehab   Phone: [phone]  Fax: [phone]  Email: [email or web address]  [facility]  Facility NPI: [id number]    CLINICAL REVIEW FOR CONTINUED REHAB STAY APPROVAL  Member:  Antonio Canseco  :  1955  ID# [id number]  Auth# [id number]  Admission Date:  23  Discharge date is pending at this time;  Team Conference will be held on 23 to discuss discharge planning.    *Requesting additional 7 days    Antonio Canseco (67 y.o. Male)       Date of Birth   1955    Social Security Number       Address   [address] [place] [address]    Home Phone   [phone]    MRN   [id number]       Baptism   None    Marital Status   Unknown                            Admission Date   23    Admission Type   Urgent    Admitting Provider   Hayder Mendoza MD    Attending Provider   Hayder Mendoza MD    Department, Room/Bed   [facility], 106/2S       Discharge Date       Discharge Disposition       Discharge Destination                                 Attending Provider: Hayder Mendoza MD    Allergies: No Known Allergies    Isolation: None   Infection: None   Code Status: CPR    Ht: 172.7 cm (67.99\")   Wt: 121 kg (266 lb 12.1 oz)    Admission Cmt: None   Principal Problem: CVA (cerebral vascular accident) [I63.9]               Greer Conner, MSW     Case Management     Significant Note      Signed     Date of Service: 23 1253  Creation Time: 23     Signed              23 125   Plan   Plan Spoke to pt's sister Mariah Conde [phone] who is agreeable to assist pt with needs and is hopeful pt can return home with cousin Alejandra at discharge.  Informed sister about team conference meeting on 23 and explained discharge date will be recommended.  Review SS role with discharge planning.  Will follow.   Patient/Family in Agreement with Plan yes             "       Sj Jay MD   Physician  Hospitalist     Progress Notes      Signed     Date of Service: 07/10/23 0918  Creation Time: 07/10/23 0918     Signed       [facility]  PROGRESS NOTE     Patient Identification:  Name:  Antonio Canseco  Age:  67 y.o.  Sex:  male  :  1955  MRN:  [id number]  Visit Number:  [id number]  ROOM: [facility]      Primary Care Provider:  Adriana Ledesma MD     Length of stay in inpatient status:  4     Subjective      Chief Compliant:  No chief complaint on file.        History of Presenting Illness: 67-year-old gentleman with recent bilateral CVAs with new left-sided deficit.  Patient has a fairly dense left-sided hemiparesis with little to no movement in the left leg and is some restricted strength in the left upper extremity.  Patient has had black stools as well recently but apparently had had bleeding when he was at  in the back of his throat from a squamous cell CA and is thought that this may be some retained blood.  Hemoglobin has been stable throughout the admission.  Patient is treated for atrial fibrillation, hypertension and has non-Hodgkin's lymphoma as well.  Patient has no new complaints this morning     Objective      Current Hospital Meds:allopurinol, 300 mg, Oral, Daily  atorvastatin, 80 mg, Oral, Daily  losartan, 25 mg, Oral, Q24H  magnesium oxide, 400 mg, Oral, Daily  metoprolol tartrate, 50 mg, Oral, Q12H  nystatin, , Topical, Q12H  pantoprazole, 40 mg, Oral, Q AM  polyethylene glycol, 17 g, Oral, Daily  rivaroxaban, 20 mg, Oral, Daily With Dinner  tamsulosin, 0.4 mg, Oral, Daily     ----------------------------------------------------------------------------------------------------------------------  Vital Signs:  Temp:  [97.6 °F (36.4 °C)] 97.6 °F (36.4 °C)  Heart Rate:  [90-94] 90  Resp:  [18] 18  BP: (107-120)/(69-78) 107/69  SpO2:  [94 %-97 %] 97 %  on   ;   Device (Oxygen Therapy): room air  Body mass index is 40.57  kg/m².         Wt Readings from Last 3 Encounters:   07/07/23 121 kg (266 lb 12.1 oz)   11/17/22 121 kg (267 lb)   07/15/19 121 kg (267 lb 1.6 oz)      Intake & Output (last 3 days)           07/07 0701 07/08 0700 07/08 0701 07/09 0700 07/09 0701  07/10 0700 07/10 0701 07/11 0700     P.O. 2400 2520 2400       Total Intake(mL/kg) 2400 (19.8) 2520 (20.8) 2400 (19.8)       Urine (mL/kg/hr)   825 (0.3)         Stool   0         Total Output   825         Net +2400 +1695 +2400                     Urine Unmeasured Occurrence 6 x 7 x 7 x       Stool Unmeasured Occurrence 3 x 5 x                 Diet: Cardiac Diets; Healthy Heart (2-3 Na+); Texture: Regular Texture (IDDSI 7); Fluid Consistency: Thin (IDDSI 0)  ----------------------------------------------------------------------------------------------------------------------  Physical exam:  Constitutional: No acute distress  HEENT: Normocephalic atraumatic  Neck: Supple  Cardiovascular: Regular rate and rhythm at this time  Pulmonary/Chest: Clear to auscultation  Abdominal: Positive bowel sounds soft.   Musculoskeletal: No arthropathy  Neurological: Left-sided paresis 0-1 out of 5 strength in the left lower extremity 2 out of 5 strength to 3 out of 5 strength in the left upper extremity.  Skin: No rash  Peripheral vascular: No edema  Genitourinary:  ----------------------------------------------------------------------------------------------------------------------     Last echocardiogram:     ----------------------------------------------------------------------------------------------------------------------         Results from last 7 days   Lab Units 07/09/23 0125 07/08/23 0730 07/07/23 0152   WBC 10*3/mm3 6.43  --  7.44   HEMOGLOBIN g/dL 10.0* 10.0* 10.5*   HEMATOCRIT % 32.8* 32.5* 34.3*   MCV fL 87.9  --  87.5   MCHC g/dL 30.5*  --  30.6*   PLATELETS 10*3/mm3 355  --  361                Results from last 7 days   Lab Units 07/09/23 0125 07/07/23 0152    SODIUM mmol/L 135* 136   POTASSIUM mmol/L 4.2 4.5   MAGNESIUM mg/dL  --  2.1   CHLORIDE mmol/L 104 104   CO2 mmol/L 21.8* 22.0   BUN mg/dL 24* 25*   CREATININE mg/dL 0.87 0.95   CALCIUM mg/dL 9.0 9.2   GLUCOSE mg/dL 119* 113*   ALBUMIN g/dL  --  3.3*   BILIRUBIN mg/dL  --  0.4   ALK PHOS U/L  --  77   AST (SGOT) U/L  --  18   ALT (SGPT) U/L  --  13   Estimated Creatinine Clearance: 107 mL/min (by C-G formula based on SCr of 0.87 mg/dL).  No results found for: AMMONIA                      Glucose   Date/Time Value Ref Range Status   07/10/2023 0558 150 (H) 70 - 130 mg/dL Final       Comment:       Meter: [id number] : [id number] Calvin Crystal   07/09/2023 2029 172 (H) 70 - 130 mg/dL Final       Comment:       Meter: [id number] : [id number] Bhavna Crystal   07/09/2023 1600 135 (H) 70 - 130 mg/dL Final       Comment:       Meter: [id number] : [id number] CHAYA FIERRO   07/09/2023 1116 179 (H) 70 - 130 mg/dL Final       Comment:       Meter: [id number] : [id number] CHAYA FIRERO   07/09/2023 0557 119 70 - 130 mg/dL Final       Comment:       Meter: [id number] : [id number] SILVANO STACY   07/08/2023 1950 130 70 - 130 mg/dL Final       Comment:       Meter: [id number] : [id number] SILVANO REGIS   07/08/2023 1625 114 70 - 130 mg/dL Final       Comment:       Meter: [id number] : [id number] CHAYA FIERRO   07/08/2023 1055 203 (H) 70 - 130 mg/dL Final       Comment:       Meter: [id number] : [id number] CHAYA FIERRO      No results found for: TSH, FREET4  No results found for: PREGTESTUR, PREGSERUM, HCG, HCGQUANT  Pain Management Panel                 No data to display                   Brief Urine Lab Results         None             No results found for: BLOODCX      No results found for: URINECX  No results found for: WOUNDCX  No results found for: STOOLCX         I have personally looked at the labs and they are summarized  above.  ----------------------------------------------------------------------------------------------------------------------  Detailed radiology reports for the last 24 hours:     Imaging Results (Last 24 Hours)         ** No results found for the last 24 hours. **             Final impressions for the last 30 days of radiology reports:     CT Head Without Contrast     Result Date: 6/29/2023  Old and maturing subacute infarctions without gross hemorrhagic transformation. The possibility of new small interval infarctions in this case cannot be excluded. CRITICAL RESULT:   No. COMMUNICATION: Per this written report. Drafted by Neil Kearns on 6/29/2023 1:45 PM Final report signed by Neil Kearns on 6/29/2023 2:04 PM     CT Head Without Contrast     Result Date: 6/25/2023  There are again the multifocal areas of evolving acute infarction. There are again a small amount of blood products related to the right occipital lobe infarct. There is mild swelling related to the areas of infarction. No large hematoma or new infarct has otherwise developed. CRITICAL RESULT:   No. COMMUNICATION: Per this written report. Drafted by Greyson Bobby MD on 6/25/2023 8:38 AM Final report signed by Greyson Bobby MD on 6/25/2023 8:49 AM     CT Head Without Contrast     Result Date: 6/21/2023  Interval development of loss of gray-white differentiation over the right frontal convexity, probable middle cerebral artery-anterior cerebral artery watershed infarct or embolic right middle cerebral artery infarct. No hemorrhagic transformation, minimal mass-effect on adjacent sulci. Findings conveyed via Epic Messenger at 1846 hours, read at 1846 hours. Greyson Hamilton M.D. This report has been electronically signed and verified by the Radiologist whose name is printed above. DD:  06/21/2023/DT:  06/21/2023 This report contains privileged and confidential information and is intended solely for the use of the individual or  entity to which it is addressed. If you are not the intended recipient of this report, you are hereby notified that any copying, distribution, dissemination or action taken in relation to the contents of this report is strictly prohibited and may be unlawful. If you have received this report in error, please notify the sender immediately at [phone] and permanently delete the original report and destroy any copies or printouts.     CT Head Without Contrast     Result Date: 6/20/2023  Unchanged CT head, CT angiography head and neck. No definite acute brain abnormality. Right frontoparietal encephalomalacia. Old basal-ganglia lacunar infarct on the left. No hemodynamically significant arterial stenosis in the neck, unchanged. No extravasation. Multifocal moderate-marked intracranial arterial stenosis, unchanged. Left tonsillar mass, extensive cervical adenopathy, partially visualized mediastinal adenopathy, unchanged. Greyson Hamilton M.D. This report has been electronically signed and verified by the Radiologist whose name is printed above. DD:  06/20/2023/DT:  06/20/2023 This report contains privileged and confidential information and is intended solely for the use of the individual or entity to which it is addressed. If you are not the intended recipient of this report, you are hereby notified that any copying, distribution, dissemination or action taken in relation to the contents of this report is strictly prohibited and may be unlawful. If you have received this report in error, please notify the sender immediately at [phone] and permanently delete the original report and destroy any copies or printouts.     CT Head Without Contrast     Result Date: 6/17/2023  Head CT: 1.No acute intracranial abnormality. 2.Multiple old infarcts, largest within the right temporoparietal region. Neck CTA: 1.On the outside neck CT from earlier today, there was contrast visible pooling within the oropharynx and  hypopharynx which appeared to originate from the inferior pole of the left tonsillar mass. This pooling contrast is no longer visible, and there is currently no evidence of active contrast extravasation. There are prominent vessels which appear to be supplying the left tonsillar mass. 2.Extensive bilateral cervical lymphadenopathy as well as lymphadenopathy within the visualized mediastinum and axillae. 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 However, the remainder of the lymphadenopathy is very extensive and solid suggesting the possibility of a synchronous lymphoproliferative disorder. 3.No significant stenosis is identified within the cervical carotid and vertebral systems within the limitations of the exam. Head CTA: 1.Focal severe stenoses of the bilateral ACAs, MCAs, and PCAs as well as the right ICA. These are all unchanged with the exception of the stenosis at the left A3 segment, which is new compared to January 2021. 2.Moderate stenoses of the bilateral intradural vertebral arteries and intracranial left ICA. CRITICAL RESULT: No. COMMUNICATION: Per this written report. Drafted by Dominic Patterson MD on 6/17/2023 9:47 PM Final report signed by Dominic Patterson MD on 6/17/2023 11:04 PM     CT Angiogram Neck     Result Date: 6/20/2023  Unchanged CT head, CT angiography head and neck. No definite acute brain abnormality. Right frontoparietal encephalomalacia. Old basal-ganglia lacunar infarct on the left. No hemodynamically significant arterial stenosis in the neck, unchanged. No extravasation. Multifocal moderate-marked intracranial arterial stenosis, unchanged. Left tonsillar mass, extensive cervical adenopathy, partially visualized mediastinal adenopathy, unchanged. Greyson Hamilton M.D. This report has been electronically signed and verified by the Radiologist whose name is  printed above. DD:  06/20/2023/DT:  06/20/2023 This report contains privileged and confidential information and is intended solely for the use of the individual or entity to which it is addressed. If you are not the intended recipient of this report, you are hereby notified that any copying, distribution, dissemination or action taken in relation to the contents of this report is strictly prohibited and may be unlawful. If you have received this report in error, please notify the sender immediately at [phone] and permanently delete the original report and destroy any copies or printouts.     CT Angiogram Neck     Result Date: 6/17/2023  Head CT: 1.No acute intracranial abnormality. 2.Multiple old infarcts, largest within the right temporoparietal region. Neck CTA: 1.On the outside neck CT from earlier today, there was contrast visible pooling within the oropharynx and hypopharynx which appeared to originate from the inferior pole of the left tonsillar mass. This pooling contrast is no longer visible, and there is currently no evidence of active contrast extravasation. There are prominent vessels which appear to be supplying the left tonsillar mass. 2.Extensive bilateral cervical lymphadenopathy as well as lymphadenopathy within the visualized mediastinum and axillae. 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 However, the remainder of the lymphadenopathy is very extensive and solid suggesting the possibility of a synchronous lymphoproliferative disorder. 3.No significant stenosis is identified within the cervical carotid and vertebral systems within the limitations of the exam. Head CTA: 1.Focal severe stenoses of the bilateral ACAs, MCAs, and PCAs as well as the right ICA. These are all unchanged with the exception of the stenosis at the left A3 segment, which is new compared to January  2021. 2.Moderate stenoses of the bilateral intradural vertebral arteries and intracranial left ICA. CRITICAL RESULT: No. COMMUNICATION: Per this written report. Drafted by Dominic Patterson MD on 6/17/2023 9:47 PM Final report signed by Dominic Patterson MD on 6/17/2023 11:04 PM     MRI Brain Without Contrast     Result Date: 6/23/2023  Multifocal acute infarcts of the brain. Multifocal chronic infarcts of the brain, many of which were present in the 11/21/2019 study. Cervical lymphadenopathy. 27 mm nodule in the left neck may represent a pathologic lymph node or other mass. The previously described left oropharyngeal mass is better visualized in the 6/20/2023 CTA neck. CRITICAL RESULT: No. COMMUNICATION: Per this written report. Drafted by Melonie Polk MD on 6/23/2023 2:10 PM Final report signed by Melonie Polk MD on 6/23/2023 2:24 PM     CT Abdomen Pelvis With Contrast     Result Date: 6/17/2023  No findings of gastrointestinal bleed. Extensive upper abdominal, retroperitoneal and pelvic adenopathy. In the setting of known malignancy this is concerning for metastatic disease. Right adrenal gland indeterminate nodule. CRITICAL RESULT: No. COMMUNICATION: Per this written report. Drafted by Bre Shirley MD on 6/17/2023 9:32 PM Final report signed by Bre Shirley MD on 6/17/2023 9:43 PM     CT Angiogram Chest Pulmonary Embolism     Result Date: 6/17/2023  No pulmonary embolism. Left lower lobe groundglass nodular opacities may represent infectious or inflammatory process. Enlarged mediastinal and bilateral axillary lymph nodes unchanged from comparison. CRITICAL RESULT:   No. COMMUNICATION: Per this written report. Drafted by Bre Shirley MD on 6/17/2023 9:14 PM Final report signed by Bre Shirley MD on 6/17/2023 9:24 PM     US Guided Lymph Node Biopsy     Result Date: 6/23/2023  Successful ultrasound-guided right inguinal lymph node biopsy. Okay to return to floor 30 minutes postprocedure. CRITICAL RESULT: No.  COMMUNICATION: Per this written report. Preliminary report signed by Tony Ortega DO on 6/23/2023 10:04 AM By electronically signing this report, I, the attending physician, attest that I was present for the entire procedure(s) and agree with the final edited report. Drafted by Tony Ortega DO on 6/23/2023 9:53 AM Final report signed by Johnie Schmitt MD on 6/23/2023 11:43 AM     CT Angiogram Head     Result Date: 6/20/2023  Unchanged CT head, CT angiography head and neck. No definite acute brain abnormality. Right frontoparietal encephalomalacia. Old basal-ganglia lacunar infarct on the left. No hemodynamically significant arterial stenosis in the neck, unchanged. No extravasation. Multifocal moderate-marked intracranial arterial stenosis, unchanged. Left tonsillar mass, extensive cervical adenopathy, partially visualized mediastinal adenopathy, unchanged. Greyson Hamilton M.D. This report has been electronically signed and verified by the Radiologist whose name is printed above. DD:  06/20/2023/DT:  06/20/2023 This report contains privileged and confidential information and is intended solely for the use of the individual or entity to which it is addressed. If you are not the intended recipient of this report, you are hereby notified that any copying, distribution, dissemination or action taken in relation to the contents of this report is strictly prohibited and may be unlawful. If you have received this report in error, please notify the sender immediately at [phone] and permanently delete the original report and destroy any copies or printouts.     CT Angiogram Head     Result Date: 6/17/2023  Head CT: 1.No acute intracranial abnormality. 2.Multiple old infarcts, largest within the right temporoparietal region. Neck CTA: 1.On the outside neck CT from earlier today, there was contrast visible pooling within the oropharynx and hypopharynx which appeared to originate from the inferior pole of the  left tonsillar mass. This pooling contrast is no longer visible, and there is currently no evidence of active contrast extravasation. There are prominent vessels which appear to be supplying the left tonsillar mass. 2.Extensive bilateral cervical lymphadenopathy as well as lymphadenopathy within the visualized mediastinum and axillae. 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 However, the remainder of the lymphadenopathy is very extensive and solid suggesting the possibility of a synchronous lymphoproliferative disorder. 3.No significant stenosis is identified within the cervical carotid and vertebral systems within the limitations of the exam. Head CTA: 1.Focal severe stenoses of the bilateral ACAs, MCAs, and PCAs as well as the right ICA. These are all unchanged with the exception of the stenosis at the left A3 segment, which is new compared to January 2021. 2.Moderate stenoses of the bilateral intradural vertebral arteries and intracranial left ICA. CRITICAL RESULT: No. COMMUNICATION: Per this written report. Drafted by Dominic Patterson MD on 6/17/2023 9:47 PM Final report signed by Dominic Patterson MD on 6/17/2023 11:04 PM     FL Video Swallow Single Contrast     Result Date: 7/7/2023    Normal fluoroscopic video swallow exam.  Please see the speech pathologist's report for additional information.  This report was finalized on 7/7/2023 10:50 AM by Dr. Porfirio Montgomery MD.       PET/CT FDG Skull Base To Mid Thigh     Result Date: 6/26/2023  1. Innumerable enlarged FDG avid cervical, mediastinal, axillary, mesenteric, retroperitoneal and inguinal lymph nodes are suspicious for lymphoma correlation with tissue biopsy is suggested. 2. FDG avid asymmetric mass involving the left oropharynx most likely consistent with lymphoma involvement correlation with tissue biopsy is suggested. 3. Status post  ischemia involving left MCA and PCA. CRITICAL RESULT: No. COMMUNICATION: Per this written report. Drafted by Jj Johnson MD on 6/26/2023 5:21 PM Final report signed by Jj Johnson MD on 6/26/2023 8:12 PM     EEG     Result Date: 6/20/2023   Abnormal EEG Background was diffusely slow. This finding may suggest mild diffuse neuronal dysfunction that is non specific of etiology. No epileptiform activity. No seizure. Correlate clinically.      IR Angiogram Carotid Cerebral Bilateral     Result Date: 6/20/2023  Selective angiograms of the bilateral external carotid arteries and superselective angiograms of bilateral linguo-facial trunk and the internal maxillary artery is within normal limits. No evidence of active contrast extravasation, pseudoaneurysm, vasospasm or vessel injury. Since no target for embolization was identified and the patient does not have active oropharyngeal bleed, embolization was not performed. COMMUNICATION: Per this written report. The findings were communicated to the care team. Preliminary report signed by Nicolas Stallings MD on 6/20/2023 5:15 PM By electronically signing this report, I, the attending physician, attest that I was present for the entire procedure(s) and agree with the final edited report. Drafted by Nicolas Stallings MD on 6/20/2023 4:47 PM Final report signed by Kaylin Lee MD on 6/20/2023 5:33 PM     XR chest AP portable     Result Date: 6/17/2023  Endotracheal tube in good position. Images reviewed, interpreted, and dictated by Dr. Boo Hendrickson. Transcribed by Valente Nichols PA-C.     CT neck soft tissue with IV contrast     Result Date: 6/17/2023  Extensive, new cervical adenopathy concerning for underlying lymphoma or metastatic disease Images reviewed, interpreted, and dictated by Dr. Boo Hendrickson. Transcribed by Valente Nichols PA-C.     CT chest abdomen pelvis with contrast     Result Date: 6/9/2023  Extensive multistational adenopathy. Images reviewed,  interpreted, and dictated by Dr. SWETHA Singh. Transcribed by SAURAV Agudelo (ANGELES).     FL Esophogram w Modified Barium Swallow Single Contrast     Result Date: 6/21/2023  No aspiration or penetration Please see separate note by Speech therapy team for dietary recommendations. CRITICAL RESULT:   No. COMMUNICATION: Per this written report. Drafted by Itz Jack MD on 6/21/2023 9:00 AM Final report signed by Itz Jack MD on 6/21/2023 9:00 AM   I have personally looked at the radiology images and read the final radiology report.     Assessment & Plan    Status post bilateral CVAs with a new dense left-sided deficit.  Patient to resume PT and OT.  Patient currently on Xarelto.  Patient also on statin therapy.     Melanotic stool--patient has no signs or symptoms otherwise of bleeding.  Patient is on PPI at this time and will continue to follow hemoglobin closely.  Patient not tachycardic and not hypotensive either.  Suspect that he swallowed some blood when he was acutely bleeding from his throat while at      History of atrial fibrillation continue Lopressor and Xarelto     Hypertension well-controlled     History of laryngeal CA     Non-Hodgkin's lymphoma follow-up with Dr. Gregory as an outpatient     Anemia--hemoglobin has been stable.  Will monitor closely     VTE Prophylaxis:   Mechanical Order History:         None             Pharmalogical Order History:           Ordered     Dose Route Frequency Stop     07/06/23 1649   rivaroxaban (XARELTO) tablet 20 mg        Question:  Are you ordering rivaroxaban 10 mg for the prevention of blood clots in an acutely ill medical patient?  Answer:  No    20 mg PO Daily With Dinner --                          Sj Jay MD  [facility]  07/10/23  09:18 Connie Parkinson PTA   Physical Therapist Assistant  Physical Therapy     Therapy Treatment Note      Signed     Date of Service: 07/08/23 1229  Creation Time:  23 1229     Signed       Expand All Collapse All    Inpatient Rehabilitation - Physical Therapy Treatment Note                                                              THALIA Ronnie     Patient Name: Antonio Canseco                  : 1955                        MRN: [id number]     Today's Date: 2023                                                                                              Admit Date: 2023                 Visit Dx:   Visit Diagnosis   No diagnosis found.        Problem List       Patient Active Problem List   Diagnosis    Detrusor instability    Low testosterone in male    Disorder of prostate    Corporo-venous occlusive erectile dysfunction    CVA (cerebral vascular accident)            Medical History        Past Medical History:   Diagnosis Date    Diabetes mellitus      Hypertension      Stroke              Surgical History         Past Surgical History:   Procedure Laterality Date    US GUIDED LYMPH NODE BIOPSY   2023            PT ASSESSMENT (last 12 hours)            IRF PT Evaluation and Treatment         Row Name 23 1217                 PT Time and Intention     Document Type daily treatment  -LL       Mode of Treatment individual therapy;physical therapy  -LL       Patient/Family/Caregiver Comments/Observations Patient reports that he didn't sleep well last night and is tired, but is agreeable to PT participation this date.  -LL          Row Name 23 1217                 General Information     Patient Profile Reviewed yes  -LL       Existing Precautions/Restrictions fall  L HP, <trunk support/balance  -LL          Row Name 23 1217                 Pain Scale: FACES Pre/Post-Treatment     Pain: FACES Scale, Pretreatment 0-->no hurt  -LL       Posttreatment Pain Rating 0-->no hurt  -LL          Row Name 23 121                 Cognition/Psychosocial     Affect/Mental Status (Cognition) WFL  -LL       Orientation Status (Cognition) oriented x  3  -LL       Follows Commands (Cognition) verbal cues/prompting required;physical/tactile prompts required  -LL       Personal Safety Interventions nonskid shoes/slippers when out of bed;supervised activity  vamshi for transfers, leg rests and L arm tray for extremity support  -          Row Name 07/08/23 1217                 Bed Mobility     Rolling Left [place] (Bed Mobility) maximum assist (25% patient effort);verbal cues;nonverbal cues (demo/gesture)  -LL       Rolling Right [place] (Bed Mobility) maximum assist (25% patient effort);dependent (less than 25% patient effort);verbal cues;nonverbal cues (demo/gesture)  -LL       Bed Mobility, Safety Issues decreased use of arms for pushing/pulling;decreased use of legs for bridging/pushing  -LL       Comment, (Bed Mobility) Bed mobility of rolling L/R for donning shorts and placement/removal of vamshi sling  -          Row Name 07/08/23 1217                 Bed-Chair Transfer     Bed-Chair [place] (Transfers) dependent (less than 25% patient effort)  -LL       Assistive Device (Bed-Chair Transfers) lift device  -          Row Name 07/08/23 1217                 Chair-Bed Transfer     Chair-Bed [place] (Transfers) dependent (less than 25% patient effort)  -LL       Assistive Device (Chair-Bed Transfers) lift device  -          Row Name 07/08/23 1217                 Safety Issues, Functional Mobility     Impairments Affecting Function (Mobility) balance;coordination;endurance/activity tolerance;grasp;motor control;muscle tone abnormal;postural/trunk control;range of motion (ROM);strength  -          Row Name 07/08/23 1217                 Balance     Comment, Balance WC balance working on maintaining nuetral sitting position  -          Row Name 07/08/23 1217                 Motor Skills     Therapeutic Exercise hip;knee;ankle  Stretching to L LE; bridging, GS  -          Row Name 07/08/23 1217                 Hip (Therapeutic Exercise)      Hip (Therapeutic Exercise) strengthening exercise  -LL       Hip Strengthening (Therapeutic Exercise) bilateral;flexion;extension;aBduction;aDduction;external rotation;internal rotation;heel slides;marching while seated;sitting;supine  L LE PROM; R LE AROM  -LL          Row Name 07/08/23 1217                 Knee (Therapeutic Exercise)     Knee (Therapeutic Exercise) strengthening exercise  -LL       Knee Strengthening (Therapeutic Exercise) bilateral;flexion;extension;heel slides;marching while seated;SAQ (short arc quad);LAQ (long arc quad);sitting;supine  -LL          Row Name 07/08/23 1217                 Ankle (Therapeutic Exercise)     Ankle (Therapeutic Exercise) strengthening exercise  -LL       Ankle Strengthening (Therapeutic Exercise) bilateral;dorsiflexion;plantarflexion;sitting;supine  L LE PROM; R LE AROM  -LL          Row Name 07/08/23 1217                 Positioning and Restraints     Pre-Treatment Position in bed  Prior to 1st; in WC w/ OT prior to 2nd  -LL       Post Treatment Position bed  After 2nd; in WC w/ OT after 1st  -LL       In Bed notified nsg;supine;call light within reach;encouraged to call for assist;side rails up x3;heels elevated;LUE elevated;RUE elevated  -          Row Name 07/08/23 1217                 Therapy Assessment/Plan (PT)     Patient's Goals For Discharge return home  -          Row Name 07/08/23 1217                 Therapy Assessment/Plan (PT)     Rehab Potential/Prognosis (PT) adequate, monitor progress closely  -       Frequency of Treatment (PT) 5 times per week  -       Estimated Duration of Therapy (PT) 4 weeks  -       Problem List (PT) balance;coordination;hemiparesis/hemiplegia;mobility;motor control;muscle tone;range of motion (ROM);strength;postural control  -       Activity Limitations Related to Problem List (PT) unable to ambulate safely;unable to transfer safely  -          Row Name 07/08/23 1217                 Therapy Plan  Review/Discharge Plan (PT)     Anticipated Equipment Needs at Discharge (PT Eval) --  tbd  -LL       Anticipated Discharge Disposition (PT) home with assist;home with home health;home with outpatient therapy services  -          Row Name 07/08/23 1217                 IRF PT Goals     Bed Mobility Goal Selection (PT-IRF) bed mobility, PT goal 1;bed mobility, PT goal 2  -LL       Transfer Goal Selection (PT-IRF) transfers, PT goal 1;transfers, PT goal 2  -LL       Gait (Walking Locomotion) Goal Selection (PT-IRF) gait, PT goal 1;gait, PT goal 2  -LL          Row Name 07/08/23 1217                 Bed Mobility Goal 1 (PT-IRF)     Activity/Assistive Device (Bed Mobility Goal 1, PT-IRF) sit to supine/supine to sit  -LL       [place] Level (Bed Mobility Goal 1, PT-IRF) moderate assist (50-74% patient effort)  -LL       Time Frame (Bed Mobility Goal 1, PT-IRF) short-term goal (STG);2 weeks  -          Row Name 07/08/23 1217                 Bed Mobility Goal 2 (PT-IRF)     Activity/Assistive Device (Bed Mobility Goal 2, PT-IRF) sit to supine/supine to sit  -LL       [place] (Bed Mobility Goal 2, PT-IRF) standby assist  -LL       Time Frame (Bed Mobility Goal 2, PT-IRF) by discharge  -[facility] Name 07/08/23 1217                 Transfer Goal 1 (PT-IRF)     Activity/Assistive Device (Transfer Goal 1, PT-IRF) sit-to-stand/stand-to-sit;bed-to-chair/chair-to-bed  -LL       [place] (Transfer Goal 1, PT-IRF) moderate assist (50-74% patient effort)  -LL       Time Frame (Transfer Goal 1, PT-IRF) short-term goal (STG);2 weeks  -          Row Name 07/08/23 1217                 Transfer Goal 2 (PT-IRF)     Activity/Assistive Device (Transfer Goal 2, PT-IRF) sit-to-stand/stand-to-sit;bed-to-chair/chair-to-bed  -LL       [place] (Transfer Goal 2, PT-IRF) standby assist  -LL       Time Frame (Transfer Goal 2, PT-IRF) by discharge  -          Row Name 07/08/23 1217                  Gait/Walking Locomotion Goal 1 (PT-IRF)     Activity/Assistive Device (Gait/Walking Locomotion Goal 1, PT-IRF) --  handrail in [place]  -LL       Gait/Walking Locomotion Distance Goal 1 (PT-IRF) 20'  -LL       [place] Level (Gait/Walking Locomotion Goal 1, PT-IRF) moderate assist (50-74% patient effort)  x2  -LL       Time Frame (Gait/Walking Locomotion Goal 1, PT-IRF) short-term goal (STG);2 weeks  -LL          Row Name 23 1217                 Gait/Walking Locomotion Goal 2 (PT-IRF)     Activity/Assistive Device (Gait/Walking Locomotion Goal 2, PT-IRF) walker, kevin  or AAD  -LL       Gait/Walking Locomotion Distance Goal 2 (PT-IRF) 150'  -LL       [place] Level (Gait/Walking Locomotion Goal 2, PT-IRF) standby assist  -LL       Time Frame (Gait/Walking Locomotion Goal 2, PT-IRF) by discharge  -LL                    User Key  (r) = Recorded By, (t) = Taken By, (c) = Cosigned By        Initials Name Provider Type     LL Connie Velasquez PTA Physical Therapist Assistant                               PT Recommendation and Plan     Frequency of Treatment (PT): 5 times per week  Anticipated Equipment Needs at Discharge (PT Eval):  (tbd)            Time Calculation:        PT Charges         Row Name 23 1229 23 1228                  Time Calculation     Start Time 1000  -LL 0735  -LL       Stop Time 1045  -LL 0830  -LL       Time Calculation (min) 45 min  -LL 55 min  -LL       PT Received On -- 23  -LL               Time Calculation- PT     Total Timed Code Minutes- PT 45 minute(s)  -LL 55 minute(s)  -LL                     Veronica Robles, CORY   Occupational Therapist  Occupational Therapy     Therapy Treatment Note      Signed     Date of Service: 23 1141  Creation Time: 23 114     Signed       Expand All Collapse All    Inpatient Rehabilitation - Occupational Therapy Treatment Note     THALIA Trujillo     Patient Name: Antonio Canseco                  : 1955                         MRN: [id number]     Today's Date: 7/8/2023                                                                               Admit Date: 7/6/2023        Visit Diagnosis   No diagnosis found.        Problem List       Patient Active Problem List   Diagnosis    Detrusor instability    Low testosterone in male    Disorder of prostate    Corporo-venous occlusive erectile dysfunction    CVA (cerebral vascular accident)            Medical History        Past Medical History:   Diagnosis Date    Diabetes mellitus      Hypertension      Stroke              Surgical History         Past Surgical History:   Procedure Laterality Date    US GUIDED LYMPH NODE BIOPSY   6/23/2023                               IRF OT ASSESSMENT FLOWSHEET (last 12 hours)               IRF OT Evaluation and Treatment      Row Name 07/08/23 1115                 OT Time and Intention     Document Type daily treatment  -HB       Mode of Treatment individual therapy;occupational therapy  -HB       Total Minutes, Occupational Therapy 90  -HB       Patient Effort good  -HB       Symptoms Noted During/After Treatment none  -HB       Row Name 07/08/23 1115                 General Information     Patient Profile Reviewed yes  -HB       Patient/Family/Caregiver Comments/Observations Patient and RN okd OT this date.  -HB       General Observations of Patient Patient tolerated OT well with no adverse reactions.  -HB       Existing Precautions/Restrictions fall;other (see comments)  L HP  -HB       Limitations/Impairments safety/cognitive  -HB       Row Name 07/08/23 1115                 Pain Assessment     Pretreatment Pain Rating 0/10 - no pain  -HB       Posttreatment Pain Rating 0/10 - no pain  -HB       Row Name 07/08/23 1115                 Cognition/Psychosocial     Affect/Mental Status (Cognition) WFL  -HB       Orientation Status (Cognition) oriented x 3  -HB       Follows Commands (Cognition) verbal cues/prompting required;repetition of directions  required;physical/tactile prompts required  -       Row Name 07/08/23 1115                 Grooming     [place] Level (Grooming) grooming skills;moderate assist (50% patient effort);hair care, combing/brushing  -       Position (Grooming) supported sitting  -       Row Name 07/08/23 1115                 Bed-Chair Transfer     Bed-Chair [place] (Transfers) dependent (less than 25% patient effort);2 person assist;verbal cues;nonverbal cues (demo/gesture)  -       Assistive Device (Bed-Chair Transfers) lift device;wheelchair  -       Row Name 07/08/23 1115                 Safety Issues, Functional Mobility     Impairments Affecting Function (Mobility) strength;postural/trunk control;endurance/activity tolerance  inattention to left lateral lean in chair  -       Row Name 07/08/23 1115                 Motor Skills     Motor Skills coordination;functional endurance;neuro-muscular function;motor control/coordination interventions  -       Coordination fine motor deficit;gross motor deficit;left;upper extremity;moderate impairment  -       Motor Control/Coordination Interventions fine motor manipulation/dexterity activities;gross motor coordination activities;motor pattern re-training;neuro-muscular re-education;therapeutic exercise/ROM;weight-bearing activities  -       Therapeutic Exercise shoulder;elbow/forearm;wrist;hand  -       Additional Documentation --  grasp and release/PROM/AROM;GE TA; rickshaw 20 lbs 3 sets 10 reps; pulleys; arm skate;emphasis to LUE; RUE for task completion; rest between tasks. full PROM noted throughout LUE; increased AROM in all joint of LUE.  -       Row Name 07/08/23 1115                 Neuromuscular Re-education     Interventions (Neuromuscular Re-education) facilitation/inhibition;weight bearing;massage  LUE  -       Positioning (Neuromuscular Re-education) gravity eliminated position for activity  LUE in GE plane; pt in sitting position  -[facility]  Name 23 1115                 Positioning and Restraints     Pre-Treatment Position --  pt getting back into chair from mat with PT  -HB       Post Treatment Position wheelchair  -HB       In Wheelchair with PT  -HB                     User Key  (r) = Recorded By, (t) = Taken By, (c) = Cosigned By     Initials Name Effective Dates     HB Veronica Robles, OT 21 -                          OT Recommendation and Plan        Time Calculation:               Time Calculation- OT      Row Name 23 1140                       Time Calculation- OT     OT Start Time 0830  -HB         OT Stop Time 1000  -HB         OT Time Calculation (min) 90 min  -HB         Total Timed Code Minutes- OT 90 minute(s)  -HB                         Lisa Sanchez, OT   Occupational Therapist  Occupational Therapy     Therapy Evaluation      Signed     Date of Service: 23  Creation Time: 23     Signed       Expand All Collapse All    Inpatient Rehabilitation - Occupational Therapy Initial Evaluation and Treatment Note     THALIA Whitlash     Patient Name: Antonio Canseco                  : 1955                        MRN: [id number]     Today's Date: 2023                                                                               Admit Date: 2023        Visit Diagnosis   No diagnosis found.        Problem List       Patient Active Problem List   Diagnosis    Detrusor instability    Low testosterone in male    Disorder of prostate    Corporo-venous occlusive erectile dysfunction    CVA (cerebral vascular accident)            Medical History        Past Medical History:   Diagnosis Date    Diabetes mellitus      Hypertension      Stroke              Surgical History         Past Surgical History:   Procedure Laterality Date    US GUIDED LYMPH NODE BIOPSY   2023                           IRF OT ASSESSMENT FLOWSHEET (last 12 hours)            IRF OT Evaluation and Treatment         Row Name  07/07/23 1442                 OT Time and Intention     Document Type initial evaluation;daily treatment  -BF       Mode of Treatment occupational therapy  -BF       Patient Effort good  -BF       Symptoms Noted During/After Treatment none  -BF          Row Name 07/07/23 1442                 General Information     Patient Profile Reviewed yes  -BF       Patient/Family/Caregiver Comments/Observations Pt supine in bed, agreeable to OT. 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 Pt required intubation and then had CVA on 6/20/23.  -BF       General Observations of Patient Pt leans to the L when sitting in w/c.  -BF       Existing Precautions/Restrictions fall;other (see comments)  L HP  -BF          Row Name 07/07/23 1442                 Previous Level of Function/Home Environm     BADLs, Premorbid Functional Level independent  -BF          Row Name 07/07/23 1442                 Living Environment     Current Living Arrangements home  -BF       People in Home other (see comments)  cousin  -BF          Row Name 07/07/23 1442                 Home Use of Assistive/Adaptive Equipment     Equipment Currently Used at Home bath bench;wheelchair;rollator;ramp;commode;cane, straight;hospital bed;bp cuff;oxygen;crutches  -BF          Row Name 07/07/23 1442                 Cognition/Psychosocial     Orientation Status (Cognition) oriented x 3  -BF       Follows Commands (Cognition) verbal cues/prompting required;repetition of directions required;physical/tactile prompts required  -BF          Row Name 07/07/23 1442                 Range of Motion Comprehensive     Comment, General Range of Motion L elbow 50% AROM, L hand and wrist 75% (limited opposition), no L shoulder AROM; RUE AROM WFL  -BF          Row Name 07/07/23 1442                 Strength Comprehensive (MMT)     Comment, General Manual Muscle Testing (MMT) Assessment L elbow, hand, wrist 3-/5, L shoulder 0/5; RUE grossly  3+/5  -BF          Row Name 07/07/23 1442                 Basic Activities of Daily Living (BADLs)     Basic Activities of Daily Living bathing;lower body dressing;upper body dressing;grooming;toileting;self-feeding  -BF          Row Name 07/07/23 1442                 Bathing     Comment (Bathing) Total A  -BF          Row Name 07/07/23 1442                 Upper Body Dressing     [place] Level (Upper Body Dressing) maximal assist (25-49% patient effort);verbal cues;nonverbal cues (demo/gesture)  -BF       Position (Upper Body Dressing) supine  -BF       Comment (Upper Body Dressing) Max A  -BF          Row Name 07/07/23 1442                 Lower Body Dressing     [place] Level (Lower Body Dressing) dependent (less than 25% patient effort);verbal cues;nonverbal cues (demo/gesture)  -BF       Position (Lower Body Dressing) supine  -BF       Comment (Lower Body Dressing) Total A  -BF          Row Name 07/07/23 1442                 Grooming     [place] Level (Grooming) moderate assist (50% patient effort);verbal cues;nonverbal cues (demo/gesture)  -BF       Position (Grooming) supported sitting  -BF       Comment (Grooming) Mod A  -BF          Row Name 07/07/23 1442                 Toileting     [place] Level (Toileting) dependent (less than 25% patient effort);verbal cues  -BF       Position (Toileting) supine  -BF       Comment (Toileting) Total A  -BF          Row Name 07/07/23 1442                 Self-Feeding     Comment (Self-Feeding) Set-up  -BF          Row Name 07/07/23 1442                 Transfer Assessment/Treatment     Transfers bed-chair transfer  -BF          Row Name 07/07/23 1442                 Bed-Chair Transfer     Bed-Chair [place] (Transfers) dependent (less than 25% patient effort);2 person assist;verbal cues;nonverbal cues (demo/gesture)  -BF       Assistive Device (Bed-Chair Transfers) lift device;wheelchair  -BF          Row Name 07/07/23 1442                 Motor  Skills     Motor Skills neuro-muscular function;motor control/coordination interventions;coordination  -BF       Coordination fine motor deficit;gross motor deficit;left;upper extremity  -BF       Neuromuscular Function left;upper extremity;moderate impairment;severe impairment  -BF       Motor Control/Coordination Interventions neuro-muscular re-education;gross motor coordination activities;therapeutic exercise/ROM  LUE NDT, A/AAROM (L shoulder PROM), gross grasp/release (pt requires cues for gross release of objects), handgripper therex; BUE rickshaw 20 lbs [id number]  -BF          Row Name 07/07/23 1442                 Neuromuscular Re-education     Interventions (Neuromuscular Re-education) facilitation/inhibition;massage  LUE  -BF       Positioning (Neuromuscular Re-education) supported;sitting  -BF          Row Name 07/07/23 1442                 Positioning and Restraints     Post Treatment Position wheelchair  -BF       In Wheelchair sitting;call light within reach;encouraged to call for assist;with PT  w/ PT in PM  -BF          Row Name 07/07/23 1442                 Therapy Assessment/Plan (OT)     Patient's Goals For Discharge return home;take care of myself at home  -          Row Name 07/07/23 1442                 Therapy Assessment/Plan (OT)     OT Diagnosis CVA  -BF       Rehab Potential/Prognosis (OT) good, to achieve stated therapy goals;adequate, monitor progress closely  -BF       Frequency of Treatment (OT) 5 times per week  -BF       Estimated Duration of Therapy (OT) 3 weeks;4 weeks  -BF       Problem List (OT) problems related to;balance;coordination;mobility;motor control;range of motion (ROM);strength;postural control  -       Activity Limitations Related to Problem List (OT) unable to transfer safely;BADLs not performed adequately or safely  -       Planned Therapy Interventions (OT) activity tolerance training;adaptive equipment training;BADL retraining;neuromuscular control/coordination  retraining;passive ROM/stretching;ROM/therapeutic exercise;strengthening exercise;transfer/mobility retraining  -BF          Row Name 07/07/23 1442                 Therapy Plan Review/Discharge Plan (OT)     Therapy Plan Review (OT) patient  -BF       Anticipated Discharge Disposition (OT) home with assist;home with outpatient therapy services  TBD  -BF          Row Name 07/07/23 1442                 IRF OT Goals     UB Dressing Goal Selection (OT-IRF) UB dressing, OT goal 1;UB dressing, OT goal 2  -BF       LB Dressing Goal Selection (OT-IRF) LB dressing, OT goal 1;LB dressing, OT goal 2  -BF          [facility] Name 07/07/23 1442                 UB Dressing Goal 1 (OT-IRF)     Activity/Device (UB Dressing Goal 1, OT-IRF) upper body dressing  -BF       [place] (UB Dress Goal 1, OT-IRF) moderate assist (50-74% patient effort)  -BF       Time Frame (UB Dressing Goal 1, OT-IRF) short-term goal (STG);1 week;2 weeks  -BF          Row Name 07/07/23 [facility]                 UB Dressing Goal 2 (OT-IRF)     Activity/Device (UB Dressing Goal 2, OT-IRF) upper body dressing  -BF       [place] (UB Dress Goal 2, OT-IRF) minimum assist (75% or more patient effort)  -BF       Time Frame (UB Dressing Goal 2, OT-IRF) by discharge  -BF          [facility] Name 07/07/23 [facility]                 LB Dressing Goal 1 (OT-IRF)     Activity/Device (LB Dressing Goal 1, OT-IRF) lower body dressing  -BF       [place] (LB Dressing Goal 1, OT-IRF) maximum assist (25-49% patient effort)  -BF       Time Frame (LB Dressing Goal 1, OT-IRF) 1 week;2 weeks  -BF          Row Name 07/07/23 1442                 LB Dressing Goal 2 (OT-IRF)     Activity/Device (LB Dressing Goal 2, OT-IRF) lower body dressing  -BF       [place] (LB Dressing Goal 2, OT-IRF) moderate assist (50-74% patient effort)  -BF       Time Frame (LB Dressing Goal 2, OT-IRF) by discharge  -BF                    User Key  (r) = Recorded By, (t) = Taken By, (c) = Cosigned By        Initials  Name Effective Dates     BF Lisa Sanchez, OT 23 -                                   OT Recommendation and Plan     Planned Therapy Interventions (OT): activity tolerance training, adaptive equipment training, BADL retraining, neuromuscular control/coordination retraining, passive ROM/stretching, ROM/therapeutic exercise, strengthening exercise, transfer/mobility retraining         Time Calculation:        Time Calculation- OT         Row Name 23 1518 23 1045                  Time Calculation- OT     OT Start Time 1245  -BF 1045  -BF       OT Stop Time 1330  -BF 1145  -BF       OT Time Calculation (min) 45 min  -BF 60 min  -BF       Total Timed Code Minutes- OT 45 minute(s)  -BF 60 minute(s)  -BF       OT Non-Billable Time (min) -- 10 min  -BF                         Carmen Mendez MS CCC-SLP   Speech and Language Pathologist  Speech Therapy     MBS/VFSS/FEES      Signed     Date of Service: 23 1036  Creation Time: 23     Signed       Expand All Collapse All    Inpatient Rehabilitation - Speech Language Pathology Initial Evaluation   Ronnie  Speech/Language/Cognitive Assessment     Patient Name: Antonio Canseco                  : 1955                        MRN: [id number]  Today's Date: 2023                                                                          Admit Date: 2023     Antonio Canseco  was seen this am on [facility] room 6B to participate in a s/l and cognitive  assessment for safety/function in adls. He was positioned upright and centered in bed to cooperatively participate. All results and observations of this evaluation are felt to be representative of his current functional status.     Mr. Canseco was transferred from [facility] to [facility] 23 for continued functional mobility intervention and reeducation s/p multiple embolic strokes predominantly in the right cerebellar right posterior communicating artery and right MCA and RADHA  territories, along with embolic shower on left cerebral hemisphere. He initially presented to [facility] ED on June 17 with hemoptysis. He was intubated for airway protection, transferred to [facility]. CT done at [facility] showed active extravasation into the oropharynx likely originating from the tonsillar mass.  Once the patient got to [facility] there was no further bleeding ENT follow the patient and ultimately a CT angiogram was done June 20 without evidence of active extravasation noted.  Patient did develop left-sided weakness and confusion immediately after the June 20 angiogram and a stroke alert was called.  Because of the risk of bleeding tPA was not pursued.  Initial CT was negative but CT on June 22 showed interval development of a right frontal gray and white matter differentiation which could suggest a watershed infarct or embolic right MCA infarct.  Patient had been off anticoagulation because of the previously mentioned bleeding.  An MRI done of the head June 23 showed multiple embolic strokes predominantly in the right cerebellar right posterior communicating artery and right MCA and RADHA territories along with embolic shower on left cerebral hemisphere.  Patient was started on Xarelto and has tolerated this well without further bleeding.  Due to the bleeding however it was not recommended he take antiplatelets at this time.  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  Patient is planning to follow-up with local oncologist.  CT/PET June 23 demonstrated innumerable avid nodes and mass on the left oropharynx.  It is noted on the Xarelto patient was noted to have some black tarry stools on June 21 and 22 however no hemoglobin drop was noted so this was not pursued further.  Patient continued to progress medically. He was transferred for further therapeutic interventions at  "IPR level.     PMH is significant for atrial fibrillation, HTN, remote stroke w/o residual deficits.      He was participating in OT/PT at [facility], no formal SLP services noted, aside from MBS 6/21/23. Please see full dysphagia note for further details.      Social History   Social History           Socioeconomic History    Marital status: Unknown   Tobacco Use    Smoking status: Never    Smokeless tobacco: Never   Vaping Use    Vaping Use: Never used   Substance and Sexual Activity    Alcohol use: No    Drug use: No    Sexual activity: Defer         Diet Orders (active) (From admission, onward)          Start     Ordered     07/06/23 1650   Diet: Cardiac Diets; Healthy Heart (2-3 Na+); Texture: Regular Texture (IDDSI 7); Fluid Consistency: Thin (IDDSI 0)  Diet Effective Now         07/06/23 1649                       He is observed on ra w/o complications.      Receptive language skills are intact. He is able to id common objects and personal body parts, follow simple and multi-step directives, understand complex \"wh\" questions and participate in conversational exchange w/o difficulties.     Expressive language skills are intact. He is able to complete automatic speech tasks, confrontation naming tasks, complete complex oe sentences, respond to complet oe \"wh\" questions, repeat at word/sentence and multiple digit levels, as well as participate in complex conversational exchange. No aphasia, anomia or verbal apraxia evidenced.     Mr. Canseco is independently oriented to person, place and time. Immediate, STM and LTM are wfl w/ pt recalling recent adls and providing personal information w/o delays. Thought organization, processing and problem solving are wfl w/ pt demonstrating appropriate comparing/contrasting, understanding of idiomatic language, convergent and divergent thinking skills.     Facial/oral structures reveal a very trace asymmetry with left weakness, most notably in ROM tasks. No significant lingual " deviation upon protrusion. Sensation intact.  Oral mucosa are moist, pink and clean. Secretions are clear, thin and controlled. OROM/JESSIKA is generally weak to imitate oral postures. Gag is not assessed. Volitional cough is adequate in intensity, clear in quality, nonproductive. Vocal quality is adequate in intensity, clear in quality w/ intelligible speech w/o oral apraxia noted.     Graphic and reading skills are intact, premorbid baseline status. He is able to id isolated letters, simple and moderate words, as well as sentences and small paragraphs. He is noted to present with visual inattention to the left, however, is aware of left environment, visually tracks to the left with target. He is further appreciated with moderate-severe deficits of left proprioception, requiring cues for awareness of left upper body leaning. He does not present with environmental, nor physical body neglect.      Pragmatic skills are wfl w/ appropriate eye gaze patterns and visual tracking. Language is appropriate in context w/ topic initiation and maintenance independently. Humor response is intact w/ appropriate affect.     Visit Dx:  Visit Diagnosis   No diagnosis found.     Problem List       Patient Active Problem List   Diagnosis    Detrusor instability    Low testosterone in male    Disorder of prostate    Corporo-venous occlusive erectile dysfunction    CVA (cerebral vascular accident)         Medical History        Past Medical History:   Diagnosis Date    Diabetes mellitus      Hypertension      Stroke           Surgical History         Past Surgical History:   Procedure Laterality Date    US GUIDED LYMPH NODE BIOPSY   6/23/2023         EDUCATION  The patient has been educated in the following areas:   Cognitive Impairment Communication Impairment.     Impression: Mr. Canseco presents with speech, language and cognitive skills representative of his premorbid baseline function. He presents with a very trace left facial weakness  in ROM, intact sensation, however, this does not negatively impact his speech intelligibilty, but is rather cosmetic. He is noted to present with visual inattention to the left, however, is aware of left environment, visually tracks to the left with target. He is further appreciated with moderate-severe deficits of left proprioception, requiring cues for awareness of left upper body leaning. He does not present with environmental, nor physical body neglect.      SLP Recommendation and Plan   No further formal SLP f/u recommended for s/l and cognitive skills. He did participate in instrumental MBS for objective evaluation of swallowing function.Please see full dysphagia note for details.     D/w Mr. Canseco results and recommendations w/ verbal understanding and agreement.     Thank you for allowing me to participate in the care of your patient-   Carmen Mendez M.S., [facility]/SLP           SLP GOALS            Time Calculation:        Time Calculation- SLP         Time Calculation- SLP         Row Name 07/07/23 1033     SLP Start Time 0830  -Recorded by: VINNIE     SLP Stop Time 0915  -Recorded by: VINNIE     SLP Time Calculation (min) 45 min  -Recorded by: IVNNIE     SLP Received On 07/07/23  -Recorded by: VINNIE                  Untimed Charges         Row Name 07/07/23 1033     SLP Eval/Re-eval  ST Motion Fluoro Eval Swallow - 19410;ST Eval Speech and Production w/ Language - 68005  -Recorded by: VINNIE     59839-W[place] Eval Speech and Production w/ Language Minutes 45 (Comment) [id number]  -Recorded by: VINNIE     58788-TM Motion Fluoro Eval Swallow Minutes 45 (Comment) [id number]  -Recorded by: VINNIE                  Total Minutes         Row Name 07/07/23 1033     Untimed Charges Total Minutes 90  -Recorded by: VINNIE      Total Minutes 90  -Recorded by: VINNIE

## 2023-07-10 NOTE — PROGRESS NOTES
[facility]  PROGRESS NOTE     Patient Identification:  Name:  Antonio Canseco  Age:  67 y.o.  Sex:  male  :  1955  MRN:  [id number]  Visit Number:  [id number]  ROOM: [facility]     Primary Care Provider:  Adriana Ledesma MD    Length of stay in inpatient status:  4    Subjective     Chief Compliant:  No chief complaint on file.      History of Presenting Illness: 67-year-old gentleman with recent bilateral CVAs with new left-sided deficit.  Patient has a fairly dense left-sided hemiparesis with little to no movement in the left leg and is some restricted strength in the left upper extremity.  Patient has had black stools as well recently but apparently had had bleeding when he was at  in the back of his throat from a squamous cell CA and is thought that this may be some retained blood.  Hemoglobin has been stable throughout the admission.  Patient is treated for atrial fibrillation, hypertension and has non-Hodgkin's lymphoma as well.  Patient has no new complaints this morning    Objective     Current Hospital Meds:allopurinol, 300 mg, Oral, Daily  atorvastatin, 80 mg, Oral, Daily  losartan, 25 mg, Oral, Q24H  magnesium oxide, 400 mg, Oral, Daily  metoprolol tartrate, 50 mg, Oral, Q12H  nystatin, , Topical, Q12H  pantoprazole, 40 mg, Oral, Q AM  polyethylene glycol, 17 g, Oral, Daily  rivaroxaban, 20 mg, Oral, Daily With Dinner  tamsulosin, 0.4 mg, Oral, Daily       ----------------------------------------------------------------------------------------------------------------------  Vital Signs:  Temp:  [97.6 °F (36.4 °C)] 97.6 °F (36.4 °C)  Heart Rate:  [90-94] 90  Resp:  [18] 18  BP: (107-120)/(69-78) 107/69  SpO2:  [94 %-97 %] 97 %  on   ;   Device (Oxygen Therapy): room air  Body mass index is 40.57 kg/m².    Wt Readings from Last 3 Encounters:   23 121 kg (266 lb 12.1 oz)   22 121 kg (267 lb)   07/15/19 121 kg (267 lb 1.6 oz)     Intake & Output (last 3 days)           0701 07/08 0700 07/08 0701 07/09 0700 07/09 0701  07/10 0700 07/10 0701 07/11 0700    P.O. 2400 2520 2400     Total Intake(mL/kg) 2400 (19.8) 2520 (20.8) 2400 (19.8)     Urine (mL/kg/hr)  825 (0.3)      Stool  0      Total Output  825      Net +2400 +1695 +2400             Urine Unmeasured Occurrence 6 x 7 x 7 x     Stool Unmeasured Occurrence 3 x 5 x            Diet: Cardiac Diets; Healthy Heart (2-3 Na+); Texture: Regular Texture (IDDSI 7); Fluid Consistency: Thin (IDDSI 0)  ----------------------------------------------------------------------------------------------------------------------  Physical exam:  Constitutional: No acute distress  HEENT: Normocephalic atraumatic  Neck: Supple  Cardiovascular: Regular rate and rhythm at this time  Pulmonary/Chest: Clear to auscultation  Abdominal: Positive bowel sounds soft.   Musculoskeletal: No arthropathy  Neurological: Left-sided paresis 0-1 out of 5 strength in the left lower extremity 2 out of 5 strength to 3 out of 5 strength in the left upper extremity.  Skin: No rash  Peripheral vascular: No edema  Genitourinary:  ----------------------------------------------------------------------------------------------------------------------    Last echocardiogram:    ----------------------------------------------------------------------------------------------------------------------  Results from last 7 days   Lab Units 07/09/23 0125 07/08/23 0730 07/07/23  0152   WBC 10*3/mm3 6.43  --  7.44   HEMOGLOBIN g/dL 10.0* 10.0* 10.5*   HEMATOCRIT % 32.8* 32.5* 34.3*   MCV fL 87.9  --  87.5   MCHC g/dL 30.5*  --  30.6*   PLATELETS 10*3/mm3 355  --  361         Results from last 7 days   Lab Units 07/09/23 0125 07/07/23  0152   SODIUM mmol/L 135* 136   POTASSIUM mmol/L 4.2 4.5   MAGNESIUM mg/dL  --  2.1   CHLORIDE mmol/L 104 104   CO2 mmol/L 21.8* 22.0   BUN mg/dL 24* 25*   CREATININE mg/dL 0.87 0.95   CALCIUM mg/dL 9.0 9.2   GLUCOSE mg/dL 119* 113*   ALBUMIN g/dL  --   3.3*   BILIRUBIN mg/dL  --  0.4   ALK PHOS U/L  --  77   AST (SGOT) U/L  --  18   ALT (SGPT) U/L  --  13   Estimated Creatinine Clearance: 107 mL/min (by C-G formula based on SCr of 0.87 mg/dL).  No results found for: AMMONIA              Glucose   Date/Time Value Ref Range Status   07/10/2023 0558 150 (H) 70 - 130 mg/dL Final     Comment:     Meter: [id number] : [id number] Bhavna Crystal   07/09/2023 2029 172 (H) 70 - 130 mg/dL Final     Comment:     Meter: [id number] : [id number] Saxon Crystal   07/09/2023 1600 135 (H) 70 - 130 mg/dL Final     Comment:     Meter: [id number] : [id number] CHAYA FIERRO   07/09/2023 1116 179 (H) 70 - 130 mg/dL Final     Comment:     Meter: [id number] : [id number] CHAYA FIERRO   07/09/2023 0557 119 70 - 130 mg/dL Final     Comment:     Meter: [id number] : [id number] SILVANO STACY   07/08/2023 1950 130 70 - 130 mg/dL Final     Comment:     Meter: [id number] : [id number] SILVANO STACY   07/08/2023 1625 114 70 - 130 mg/dL Final     Comment:     Meter: [id number] : [id number] CHAYA MANCINIPHERD   07/08/2023 1055 203 (H) 70 - 130 mg/dL Final     Comment:     Meter: [id number] : [id number] CHAYA MANCINIPHERD     No results found for: TSH, FREET4  No results found for: PREGTESTUR, PREGSERUM, HCG, HCGQUANT  Pain Management Panel           No data to display              Brief Urine Lab Results       None          No results found for: BLOODCX      No results found for: URINECX  No results found for: WOUNDCX  No results found for: STOOLCX        I have personally looked at the labs and they are summarized above.  ----------------------------------------------------------------------------------------------------------------------  Detailed radiology reports for the last 24 hours:    Imaging Results (Last 24 Hours)       ** No results found for the last 24 hours. **          Final impressions for the last 30 days of radiology reports:    CT Head Without  Contrast    Result Date: 6/29/2023  Old and maturing subacute infarctions without gross hemorrhagic transformation. The possibility of new small interval infarctions in this case cannot be excluded. CRITICAL RESULT:   No. COMMUNICATION: Per this written report. Drafted by Neil Kearns on 6/29/2023 1:45 PM Final report signed by Neil Kearns on 6/29/2023 2:04 PM    CT Head Without Contrast    Result Date: 6/25/2023  There are again the multifocal areas of evolving acute infarction. There are again a small amount of blood products related to the right occipital lobe infarct. There is mild swelling related to the areas of infarction. No large hematoma or new infarct has otherwise developed. CRITICAL RESULT:   No. COMMUNICATION: Per this written report. Drafted by Greyson Bobby MD on 6/25/2023 8:38 AM Final report signed by Greyson Bobby MD on 6/25/2023 8:49 AM    CT Head Without Contrast    Result Date: 6/21/2023  Interval development of loss of gray-white differentiation over the right frontal convexity, probable middle cerebral artery-anterior cerebral artery watershed infarct or embolic right middle cerebral artery infarct. No hemorrhagic transformation, minimal mass-effect on adjacent sulci. Findings conveyed via Epic Messenger at 1846 hours, read at 1846 hours. Greyson Hamilton M.D. This report has been electronically signed and verified by the Radiologist whose name is printed above. DD:  06/21/2023/DT:  06/21/2023 This report contains privileged and confidential information and is intended solely for the use of the individual or entity to which it is addressed. If you are not the intended recipient of this report, you are hereby notified that any copying, distribution, dissemination or action taken in relation to the contents of this report is strictly prohibited and may be unlawful. If you have received this report in error, please notify the sender immediately at [phone] and  permanently delete the original report and destroy any copies or printouts.    CT Head Without Contrast    Result Date: 6/20/2023  Unchanged CT head, CT angiography head and neck. No definite acute brain abnormality. Right frontoparietal encephalomalacia. Old basal-ganglia lacunar infarct on the left. No hemodynamically significant arterial stenosis in the neck, unchanged. No extravasation. Multifocal moderate-marked intracranial arterial stenosis, unchanged. Left tonsillar mass, extensive cervical adenopathy, partially visualized mediastinal adenopathy, unchanged. Greyson Hamilton M.D. This report has been electronically signed and verified by the Radiologist whose name is printed above. DD:  06/20/2023/DT:  06/20/2023 This report contains privileged and confidential information and is intended solely for the use of the individual or entity to which it is addressed. If you are not the intended recipient of this report, you are hereby notified that any copying, distribution, dissemination or action taken in relation to the contents of this report is strictly prohibited and may be unlawful. If you have received this report in error, please notify the sender immediately at [phone] and permanently delete the original report and destroy any copies or printouts.    CT Head Without Contrast    Result Date: 6/17/2023  Head CT: 1.No acute intracranial abnormality. 2.Multiple old infarcts, largest within the right temporoparietal region. Neck CTA: 1.On the outside neck CT from earlier today, there was contrast visible pooling within the oropharynx and hypopharynx which appeared to originate from the inferior pole of the left tonsillar mass. This pooling contrast is no longer visible, and there is currently no evidence of active contrast extravasation. There are prominent vessels which appear to be supplying the left tonsillar mass. 2.Extensive bilateral cervical lymphadenopathy as well as lymphadenopathy within the  visualized mediastinum and axillae. 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 However, the remainder of the lymphadenopathy is very extensive and solid suggesting the possibility of a synchronous lymphoproliferative disorder. 3.No significant stenosis is identified within the cervical carotid and vertebral systems within the limitations of the exam. Head CTA: 1.Focal severe stenoses of the bilateral ACAs, MCAs, and PCAs as well as the right ICA. These are all unchanged with the exception of the stenosis at the left A3 segment, which is new compared to January 2021. 2.Moderate stenoses of the bilateral intradural vertebral arteries and intracranial left ICA. CRITICAL RESULT: No. COMMUNICATION: Per this written report. Drafted by Dominic Patterson MD on 6/17/2023 9:47 PM Final report signed by Dominic Patterson MD on 6/17/2023 11:04 PM    CT Angiogram Neck    Result Date: 6/20/2023  Unchanged CT head, CT angiography head and neck. No definite acute brain abnormality. Right frontoparietal encephalomalacia. Old basal-ganglia lacunar infarct on the left. No hemodynamically significant arterial stenosis in the neck, unchanged. No extravasation. Multifocal moderate-marked intracranial arterial stenosis, unchanged. Left tonsillar mass, extensive cervical adenopathy, partially visualized mediastinal adenopathy, unchanged. Greyson Hamilton M.D. This report has been electronically signed and verified by the Radiologist whose name is printed above. DD:  06/20/2023/DT:  06/20/2023 This report contains privileged and confidential information and is intended solely for the use of the individual or entity to which it is addressed. If you are not the intended recipient of this report, you are hereby notified that any copying, distribution, dissemination or action taken in relation to the contents of this  report is strictly prohibited and may be unlawful. If you have received this report in error, please notify the sender immediately at [phone] and permanently delete the original report and destroy any copies or printouts.    CT Angiogram Neck    Result Date: 6/17/2023  Head CT: 1.No acute intracranial abnormality. 2.Multiple old infarcts, largest within the right temporoparietal region. Neck CTA: 1.On the outside neck CT from earlier today, there was contrast visible pooling within the oropharynx and hypopharynx which appeared to originate from the inferior pole of the left tonsillar mass. This pooling contrast is no longer visible, and there is currently no evidence of active contrast extravasation. There are prominent vessels which appear to be supplying the left tonsillar mass. 2.Extensive bilateral cervical lymphadenopathy as well as lymphadenopathy within the visualized mediastinum and axillae. 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 However, the remainder of the lymphadenopathy is very extensive and solid suggesting the possibility of a synchronous lymphoproliferative disorder. 3.No significant stenosis is identified within the cervical carotid and vertebral systems within the limitations of the exam. Head CTA: 1.Focal severe stenoses of the bilateral ACAs, MCAs, and PCAs as well as the right ICA. These are all unchanged with the exception of the stenosis at the left A3 segment, which is new compared to January 2021. 2.Moderate stenoses of the bilateral intradural vertebral arteries and intracranial left ICA. CRITICAL RESULT: No. COMMUNICATION: Per this written report. Drafted by Dominic Patterson MD on 6/17/2023 9:47 PM Final report signed by Dominic Patterson MD on 6/17/2023 11:04 PM    MRI Brain Without Contrast    Result Date: 6/23/2023  Multifocal acute infarcts of the brain.  Multifocal chronic infarcts of the brain, many of which were present in the 11/21/2019 study. Cervical lymphadenopathy. 27 mm nodule in the left neck may represent a pathologic lymph node or other mass. The previously described left oropharyngeal mass is better visualized in the 6/20/2023 CTA neck. CRITICAL RESULT: No. COMMUNICATION: Per this written report. Drafted by Melonie Polk MD on 6/23/2023 2:10 PM Final report signed by Melonie Polk MD on 6/23/2023 2:24 PM    CT Abdomen Pelvis With Contrast    Result Date: 6/17/2023  No findings of gastrointestinal bleed. Extensive upper abdominal, retroperitoneal and pelvic adenopathy. In the setting of known malignancy this is concerning for metastatic disease. Right adrenal gland indeterminate nodule. CRITICAL RESULT: No. COMMUNICATION: Per this written report. Drafted by Bre Shirley MD on 6/17/2023 9:32 PM Final report signed by Bre Shirley MD on 6/17/2023 9:43 PM    CT Angiogram Chest Pulmonary Embolism    Result Date: 6/17/2023  No pulmonary embolism. Left lower lobe groundglass nodular opacities may represent infectious or inflammatory process. Enlarged mediastinal and bilateral axillary lymph nodes unchanged from comparison. CRITICAL RESULT:   No. COMMUNICATION: Per this written report. Drafted by Bre Shirley MD on 6/17/2023 9:14 PM Final report signed by Bre Shirley MD on 6/17/2023 9:24 PM    US Guided Lymph Node Biopsy    Result Date: 6/23/2023  Successful ultrasound-guided right inguinal lymph node biopsy. Okay to return to floor 30 minutes postprocedure. CRITICAL RESULT: No. COMMUNICATION: Per this written report. Preliminary report signed by Tony Ortega DO on 6/23/2023 10:04 AM By electronically signing this report, I, the attending physician, attest that I was present for the entire procedure(s) and agree with the final edited report. Drafted by Tony Ortega DO on 6/23/2023 9:53 AM Final report signed by Johnie Schmitt MD on 6/23/2023 11:43 AM    CT  Angiogram Head    Result Date: 6/20/2023  Unchanged CT head, CT angiography head and neck. No definite acute brain abnormality. Right frontoparietal encephalomalacia. Old basal-ganglia lacunar infarct on the left. No hemodynamically significant arterial stenosis in the neck, unchanged. No extravasation. Multifocal moderate-marked intracranial arterial stenosis, unchanged. Left tonsillar mass, extensive cervical adenopathy, partially visualized mediastinal adenopathy, unchanged. Greyson Hamilton M.D. This report has been electronically signed and verified by the Radiologist whose name is printed above. DD:  06/20/2023/DT:  06/20/2023 This report contains privileged and confidential information and is intended solely for the use of the individual or entity to which it is addressed. If you are not the intended recipient of this report, you are hereby notified that any copying, distribution, dissemination or action taken in relation to the contents of this report is strictly prohibited and may be unlawful. If you have received this report in error, please notify the sender immediately at [phone] and permanently delete the original report and destroy any copies or printouts.    CT Angiogram Head    Result Date: 6/17/2023  Head CT: 1.No acute intracranial abnormality. 2.Multiple old infarcts, largest within the right temporoparietal region. Neck CTA: 1.On the outside neck CT from earlier today, there was contrast visible pooling within the oropharynx and hypopharynx which appeared to originate from the inferior pole of the left tonsillar mass. This pooling contrast is no longer visible, and there is currently no evidence of active contrast extravasation. There are prominent vessels which appear to be supplying the left tonsillar mass. 2.Extensive bilateral cervical lymphadenopathy as well as lymphadenopathy within the visualized mediastinum and axillae. 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 However, the remainder of the lymphadenopathy is very extensive and solid suggesting the possibility of a synchronous lymphoproliferative disorder. 3.No significant stenosis is identified within the cervical carotid and vertebral systems within the limitations of the exam. Head CTA: 1.Focal severe stenoses of the bilateral ACAs, MCAs, and PCAs as well as the right ICA. These are all unchanged with the exception of the stenosis at the left A3 segment, which is new compared to January 2021. 2.Moderate stenoses of the bilateral intradural vertebral arteries and intracranial left ICA. CRITICAL RESULT: No. COMMUNICATION: Per this written report. Drafted by Dominic Patterson MD on 6/17/2023 9:47 PM Final report signed by Dominic Patterson MD on 6/17/2023 11:04 PM    FL Video Swallow Single Contrast    Result Date: 7/7/2023    Normal fluoroscopic video swallow exam.  Please see the speech pathologist's report for additional information.  This report was finalized on 7/7/2023 10:50 AM by Dr. Porfirio Montgomery MD.      PET/CT FDG Skull Base To Mid Thigh    Result Date: 6/26/2023  1. Innumerable enlarged FDG avid cervical, mediastinal, axillary, mesenteric, retroperitoneal and inguinal lymph nodes are suspicious for lymphoma correlation with tissue biopsy is suggested. 2. FDG avid asymmetric mass involving the left oropharynx most likely consistent with lymphoma involvement correlation with tissue biopsy is suggested. 3. Status post ischemia involving left MCA and PCA. CRITICAL RESULT: No. COMMUNICATION: Per this written report. Drafted by Jj Johnson MD on 6/26/2023 5:21 PM Final report signed by Jj Johnson MD on 6/26/2023 8:12 PM    EEG    Result Date: 6/20/2023   Abnormal EEG Background was diffusely slow. This finding may suggest mild diffuse neuronal dysfunction that is non specific of etiology.  No epileptiform activity. No seizure. Correlate clinically.     IR Angiogram Carotid Cerebral Bilateral    Result Date: 6/20/2023  Selective angiograms of the bilateral external carotid arteries and superselective angiograms of bilateral linguo-facial trunk and the internal maxillary artery is within normal limits. No evidence of active contrast extravasation, pseudoaneurysm, vasospasm or vessel injury. Since no target for embolization was identified and the patient does not have active oropharyngeal bleed, embolization was not performed. COMMUNICATION: Per this written report. The findings were communicated to the care team. Preliminary report signed by Nicolas Stallings MD on 6/20/2023 5:15 PM By electronically signing this report, I, the attending physician, attest that I was present for the entire procedure(s) and agree with the final edited report. Drafted by Nicolas Stallings MD on 6/20/2023 4:47 PM Final report signed by Kaylin Lee MD on 6/20/2023 5:33 PM    XR chest AP portable    Result Date: 6/17/2023  Endotracheal tube in good position. Images reviewed, interpreted, and dictated by Dr. Boo Hendrickson. Transcribed by Valente Nichols PA-C.    CT neck soft tissue with IV contrast    Result Date: 6/17/2023  Extensive, new cervical adenopathy concerning for underlying lymphoma or metastatic disease Images reviewed, interpreted, and dictated by Dr. Boo Hendrickson. Transcribed by Valente Nichols PA-C.    CT chest abdomen pelvis with contrast    Result Date: 6/9/2023  Extensive multistational adenopathy. Images reviewed, interpreted, and dictated by Dr. SWETHA Singh. Transcribed by SAURAV Agudelo (R).    FL Esophogram w Modified Barium Swallow Single Contrast    Result Date: 6/21/2023  No aspiration or penetration Please see separate note by Speech therapy team for dietary recommendations. CRITICAL RESULT:   No. COMMUNICATION: Per this written report. Drafted by Itz Jack MD on 6/21/2023 9:00 AM Final  report signed by Itz Jack MD on 6/21/2023 9:00 AM   I have personally looked at the radiology images and read the final radiology report.    Assessment & Plan    Status post bilateral CVAs with a new dense left-sided deficit.  Patient to resume PT and OT.  Patient currently on Xarelto.  Patient also on statin therapy.    Melanotic stool--patient has no signs or symptoms otherwise of bleeding.  Patient is on PPI at this time and will continue to follow hemoglobin closely.  Patient not tachycardic and not hypotensive either.  Suspect that he swallowed some blood when he was acutely bleeding from his throat while at     History of atrial fibrillation continue Lopressor and Xarelto    Hypertension well-controlled    History of laryngeal CA    Non-Hodgkin's lymphoma follow-up with Dr. Gregory as an outpatient    Anemia--hemoglobin has been stable.  Will monitor closely    VTE Prophylaxis:   Mechanical Order History:       None          Pharmalogical Order History:        Ordered     Dose Route Frequency Stop    07/06/23 2897  rivaroxaban (XARELTO) tablet 20 mg        Question:  Are you ordering rivaroxaban 10 mg for the prevention of blood clots in an acutely ill medical patient?  Answer:  No    20 mg PO Daily With Dinner --                    Sj Jay MD  [facility]  07/10/23  09:18 EDT

## 2023-07-10 NOTE — PROGRESS NOTES
Rehabilitation Nursing  Inpatient Rehabilitation Plan of Care Note    Plan of Care  Copy from POC    Body Function Structure    Skin Integrity (Active)  Current Status (7/6/2023 4:18:00 PM): At Risk for Skin Breakdown  Weekly Goal: No Skin Breakdown  Discharge Goal: No Skin Breakdown    Safety    Potential for Injury (Active)  Current Status (7/6/2023 4:18:00 PM): Potential for Falls  Weekly Goal: No Falls  Discharge Goal: No Falls    RN Interventions    Body Function Structure -  RN: Assess skin q shift & PRN;turn patient q 2 hrs [RN]    Safety -  RN: Assess safety q hr; call light and items in reach; siderails up X2 [RN]  RN: BED ALARM [RN]    Signed by: Nurse Vitaliy

## 2023-07-11 LAB
ANION GAP SERPL CALCULATED.3IONS-SCNC: 10.6 MMOL/L (ref 5–15)
BASOPHILS # BLD AUTO: 0.08 10*3/MM3 (ref 0–0.2)
BASOPHILS NFR BLD AUTO: 1.1 % (ref 0–1.5)
BUN SERPL-MCNC: 21 MG/DL (ref 8–23)
BUN/CREAT SERPL: 25.6 (ref 7–25)
CALCIUM SPEC-SCNC: 9 MG/DL (ref 8.6–10.5)
CHLORIDE SERPL-SCNC: 103 MMOL/L (ref 98–107)
CO2 SERPL-SCNC: 21.4 MMOL/L (ref 22–29)
CREAT SERPL-MCNC: 0.82 MG/DL (ref 0.76–1.27)
DEPRECATED RDW RBC AUTO: 38.9 FL (ref 37–54)
EGFRCR SERPLBLD CKD-EPI 2021: 97.5 ML/MIN/1.73
EOSINOPHIL # BLD AUTO: 0.18 10*3/MM3 (ref 0–0.4)
EOSINOPHIL NFR BLD AUTO: 2.4 % (ref 0.3–6.2)
ERYTHROCYTE [DISTWIDTH] IN BLOOD BY AUTOMATED COUNT: 12.6 % (ref 12.3–15.4)
GLUCOSE BLDC GLUCOMTR-MCNC: 130 MG/DL (ref 70–130)
GLUCOSE BLDC GLUCOMTR-MCNC: 150 MG/DL (ref 70–130)
GLUCOSE BLDC GLUCOMTR-MCNC: 164 MG/DL (ref 70–130)
GLUCOSE BLDC GLUCOMTR-MCNC: 198 MG/DL (ref 70–130)
GLUCOSE SERPL-MCNC: 109 MG/DL (ref 65–99)
HCT VFR BLD AUTO: 33.5 % (ref 37.5–51)
HGB BLD-MCNC: 10.5 G/DL (ref 13–17.7)
IMM GRANULOCYTES # BLD AUTO: 0.03 10*3/MM3 (ref 0–0.05)
IMM GRANULOCYTES NFR BLD AUTO: 0.4 % (ref 0–0.5)
LYMPHOCYTES # BLD AUTO: 1.42 10*3/MM3 (ref 0.7–3.1)
LYMPHOCYTES NFR BLD AUTO: 19.1 % (ref 19.6–45.3)
MCH RBC QN AUTO: 26.7 PG (ref 26.6–33)
MCHC RBC AUTO-ENTMCNC: 31.3 G/DL (ref 31.5–35.7)
MCV RBC AUTO: 85.2 FL (ref 79–97)
MONOCYTES # BLD AUTO: 0.9 10*3/MM3 (ref 0.1–0.9)
MONOCYTES NFR BLD AUTO: 12.1 % (ref 5–12)
NEUTROPHILS NFR BLD AUTO: 4.81 10*3/MM3 (ref 1.7–7)
NEUTROPHILS NFR BLD AUTO: 64.9 % (ref 42.7–76)
NRBC BLD AUTO-RTO: 0 /100 WBC (ref 0–0.2)
PLATELET # BLD AUTO: 322 10*3/MM3 (ref 140–450)
PMV BLD AUTO: 9.1 FL (ref 6–12)
POTASSIUM SERPL-SCNC: 4 MMOL/L (ref 3.5–5.2)
RBC # BLD AUTO: 3.93 10*6/MM3 (ref 4.14–5.8)
SODIUM SERPL-SCNC: 135 MMOL/L (ref 136–145)
WBC NRBC COR # BLD: 7.42 10*3/MM3 (ref 3.4–10.8)

## 2023-07-11 PROCEDURE — 99231 SBSQ HOSP IP/OBS SF/LOW 25: CPT | Performed by: FAMILY MEDICINE

## 2023-07-11 PROCEDURE — 82948 REAGENT STRIP/BLOOD GLUCOSE: CPT

## 2023-07-11 PROCEDURE — 97530 THERAPEUTIC ACTIVITIES: CPT

## 2023-07-11 PROCEDURE — 80048 BASIC METABOLIC PNL TOTAL CA: CPT | Performed by: FAMILY MEDICINE

## 2023-07-11 PROCEDURE — 97112 NEUROMUSCULAR REEDUCATION: CPT

## 2023-07-11 PROCEDURE — 85025 COMPLETE CBC W/AUTO DIFF WBC: CPT | Performed by: FAMILY MEDICINE

## 2023-07-11 PROCEDURE — 97110 THERAPEUTIC EXERCISES: CPT

## 2023-07-11 PROCEDURE — 97535 SELF CARE MNGMENT TRAINING: CPT

## 2023-07-11 RX ORDER — BISACODYL 5 MG/1
10 TABLET, DELAYED RELEASE ORAL DAILY PRN
Status: DISCONTINUED | OUTPATIENT
Start: 2023-07-11 | End: 2023-08-16

## 2023-07-11 RX ORDER — AMOXICILLIN 250 MG
2 CAPSULE ORAL 2 TIMES DAILY
Status: DISCONTINUED | OUTPATIENT
Start: 2023-07-11 | End: 2023-07-13

## 2023-07-11 RX ADMIN — HYDROXYZINE HYDROCHLORIDE 25 MG: 25 TABLET, FILM COATED ORAL at 20:52

## 2023-07-11 RX ADMIN — METOPROLOL TARTRATE 50 MG: 50 TABLET, FILM COATED ORAL at 20:52

## 2023-07-11 RX ADMIN — ACETAMINOPHEN 650 MG: 325 TABLET ORAL at 20:52

## 2023-07-11 RX ADMIN — BISACODYL 10 MG: 5 TABLET, COATED ORAL at 23:41

## 2023-07-11 RX ADMIN — METOPROLOL TARTRATE 50 MG: 50 TABLET, FILM COATED ORAL at 09:10

## 2023-07-11 RX ADMIN — ALLOPURINOL 300 MG: 300 TABLET ORAL at 09:09

## 2023-07-11 RX ADMIN — LOSARTAN POTASSIUM 25 MG: 25 TABLET, FILM COATED ORAL at 09:09

## 2023-07-11 RX ADMIN — MAGNESIUM GLUCONATE 500 MG ORAL TABLET 400 MG: 500 TABLET ORAL at 09:09

## 2023-07-11 RX ADMIN — TAMSULOSIN HYDROCHLORIDE 0.4 MG: 0.4 CAPSULE ORAL at 09:09

## 2023-07-11 RX ADMIN — NYSTATIN: 100000 POWDER TOPICAL at 20:52

## 2023-07-11 RX ADMIN — ATORVASTATIN CALCIUM 80 MG: 40 TABLET, FILM COATED ORAL at 09:09

## 2023-07-11 RX ADMIN — NYSTATIN: 100000 POWDER TOPICAL at 09:10

## 2023-07-11 RX ADMIN — DOCUSATE SODIUM 50 MG AND SENNOSIDES 8.6 MG 2 TABLET: 8.6; 5 TABLET, FILM COATED ORAL at 23:41

## 2023-07-11 RX ADMIN — PANTOPRAZOLE SODIUM 40 MG: 40 TABLET, DELAYED RELEASE ORAL at 05:33

## 2023-07-11 RX ADMIN — RIVAROXABAN 20 MG: 20 TABLET, FILM COATED ORAL at 17:28

## 2023-07-11 NOTE — PLAN OF CARE
Goal Outcome Evaluation:            Pt is progressing medically and physically with all therapies, continue with current plan of care.

## 2023-07-11 NOTE — PROGRESS NOTES
[facility]  PROGRESS NOTE     Patient Identification:  Name:  Antonio Canseco  Age:  67 y.o.  Sex:  male  :  1955  MRN:  [id number]  Visit Number:  [id number]  ROOM: [facility]     Primary Care Provider:  Adriana Ledesma MD    Length of stay in inpatient status:  5    Subjective     Chief Compliant:  No chief complaint on file.      History of Presenting Illness: 67-year-old gentleman with recent bilateral CVAs.  Patient has flaccid left lower extremity and left upper extremity is 2-3 out of 5 strength.  Patient has no new complaints at this time    Objective     Current Hospital Meds:allopurinol, 300 mg, Oral, Daily  atorvastatin, 80 mg, Oral, Daily  losartan, 25 mg, Oral, Q24H  magnesium oxide, 400 mg, Oral, Daily  metoprolol tartrate, 50 mg, Oral, Q12H  nystatin, , Topical, Q12H  pantoprazole, 40 mg, Oral, Q AM  polyethylene glycol, 17 g, Oral, Daily  rivaroxaban, 20 mg, Oral, Daily With Dinner  tamsulosin, 0.4 mg, Oral, Daily       ----------------------------------------------------------------------------------------------------------------------  Vital Signs:  Temp:  [97.4 °F (36.3 °C)-97.8 °F (36.6 °C)] 97.4 °F (36.3 °C)  Heart Rate:  [91-99] 99  Resp:  [18] 18  BP: (116-120)/(68-70) 120/70  SpO2:  [97 %-98 %] 97 %  on   ;   Device (Oxygen Therapy): room air  Body mass index is 40.57 kg/m².    Wt Readings from Last 3 Encounters:   23 121 kg (266 lb 12.1 oz)   22 121 kg (267 lb)   07/15/19 121 kg (267 lb 1.6 oz)     Intake & Output (last 3 days)         07/08 0701  07/09 0700 07/09 0701  07/10 0700 07/10 0701  07/11 0700 07/11 0701  07/12 07    P.O. 2520 2520 660 240    Total Intake(mL/kg) 2520 (20.8) 2520 (20.8) 660 (5.5) 240 (2)    Urine (mL/kg/hr) 825 (0.3)       Stool 0       Total Output 825       Net +1695 +2520 +660 +240            Urine Unmeasured Occurrence 7 x 7 x 5 x     Stool Unmeasured Occurrence 5 x  1 x           Diet: Cardiac Diets; Healthy Heart (2-3 Na+);  Texture: Regular Texture (IDDSI 7); Fluid Consistency: Thin (IDDSI 0)  ----------------------------------------------------------------------------------------------------------------------  Physical exam: Patient with no acute distress.  Patient eating breakfast this morning without difficulty.  Patient does have again flaccid left lower extremity and 2 out of 5 strength in the left upper extremity.  No other acute abnormalities noted.  --------------------------------------------------------------------------------------------------------------    Last echocardiogram:    ----------------------------------------------------------------------------------------------------------------------  Results from last 7 days   Lab Units 07/11/23  0301 07/09/23  0125 07/08/23  0730 07/07/23  0152   WBC 10*3/mm3 7.42 6.43  --  7.44   HEMOGLOBIN g/dL 10.5* 10.0* 10.0* 10.5*   HEMATOCRIT % 33.5* 32.8* 32.5* 34.3*   MCV fL 85.2 87.9  --  87.5   MCHC g/dL 31.3* 30.5*  --  30.6*   PLATELETS 10*3/mm3 322 355  --  361         Results from last 7 days   Lab Units 07/11/23  0301 07/09/23  0125 07/07/23  0152   SODIUM mmol/L 135* 135* 136   POTASSIUM mmol/L 4.0 4.2 4.5   MAGNESIUM mg/dL  --   --  2.1   CHLORIDE mmol/L 103 104 104   CO2 mmol/L 21.4* 21.8* 22.0   BUN mg/dL 21 24* 25*   CREATININE mg/dL 0.82 0.87 0.95   CALCIUM mg/dL 9.0 9.0 9.2   GLUCOSE mg/dL 109* 119* 113*   ALBUMIN g/dL  --   --  3.3*   BILIRUBIN mg/dL  --   --  0.4   ALK PHOS U/L  --   --  77   AST (SGOT) U/L  --   --  18   ALT (SGPT) U/L  --   --  13   Estimated Creatinine Clearance: 113.6 mL/min (by C-G formula based on SCr of 0.82 mg/dL).  No results found for: AMMONIA              Glucose   Date/Time Value Ref Range Status   07/11/2023 0603 130 70 - 130 mg/dL Final     Comment:     Meter: [id number] : [id number] SILVANO STACY   07/10/2023 2006 185 (H) 70 - 130 mg/dL Final     Comment:     Meter: [id number] : [id number] Annita Raz   07/10/2023 1635 140 (H) 70  - 130 mg/dL Final     Comment:     Meter: [id number] : [id number] Vinny Lemus   07/10/2023 1055 186 (H) 70 - 130 mg/dL Final     Comment:     Meter: [id number] : [id number] Ham China   07/10/2023 0558 150 (H) 70 - 130 mg/dL Final     Comment:     Meter: [id number] : [id number] Elida Crystal   07/09/2023 2029 172 (H) 70 - 130 mg/dL Final     Comment:     Meter: [id number] : [id number] Elida Crystal   07/09/2023 1600 135 (H) 70 - 130 mg/dL Final     Comment:     Meter: [id number] : [id number] CHAYA FIERRO   07/09/2023 1116 179 (H) 70 - 130 mg/dL Final     Comment:     Meter: [id number] : [id number] CHAYA FIERRO     No results found for: TSH, FREET4  No results found for: PREGTESTUR, PREGSERUM, HCG, HCGQUANT  Pain Management Panel           No data to display              Brief Urine Lab Results       None          No results found for: BLOODCX      No results found for: URINECX  No results found for: WOUNDCX  No results found for: STOOLCX        I have personally looked at the labs and they are summarized above.  ----------------------------------------------------------------------------------------------------------------------  Detailed radiology reports for the last 24 hours:    Imaging Results (Last 24 Hours)       ** No results found for the last 24 hours. **          Final impressions for the last 30 days of radiology reports:    CT Head Without Contrast    Result Date: 6/29/2023  Old and maturing subacute infarctions without gross hemorrhagic transformation. The possibility of new small interval infarctions in this case cannot be excluded. CRITICAL RESULT:   No. COMMUNICATION: Per this written report. Drafted by Neil Kearns on 6/29/2023 1:45 PM Final report signed by Neil Kearns on 6/29/2023 2:04 PM    CT Head Without Contrast    Result Date: 6/25/2023  There are again the multifocal areas of evolving acute infarction. There are again a small amount of blood  products related to the right occipital lobe infarct. There is mild swelling related to the areas of infarction. No large hematoma or new infarct has otherwise developed. CRITICAL RESULT:   No. COMMUNICATION: Per this written report. Drafted by Greyson Bobby MD on 6/25/2023 8:38 AM Final report signed by Greyson Bobby MD on 6/25/2023 8:49 AM    CT Head Without Contrast    Result Date: 6/21/2023  Interval development of loss of gray-white differentiation over the right frontal convexity, probable middle cerebral artery-anterior cerebral artery watershed infarct or embolic right middle cerebral artery infarct. No hemorrhagic transformation, minimal mass-effect on adjacent sulci. Findings conveyed via Epic Messenger at 1846 hours, read at 1846 hours. Greyson Hamilton M.D. This report has been electronically signed and verified by the Radiologist whose name is printed above. DD:  06/21/2023/DT:  06/21/2023 This report contains privileged and confidential information and is intended solely for the use of the individual or entity to which it is addressed. If you are not the intended recipient of this report, you are hereby notified that any copying, distribution, dissemination or action taken in relation to the contents of this report is strictly prohibited and may be unlawful. If you have received this report in error, please notify the sender immediately at [phone] and permanently delete the original report and destroy any copies or printouts.    CT Head Without Contrast    Result Date: 6/20/2023  Unchanged CT head, CT angiography head and neck. No definite acute brain abnormality. Right frontoparietal encephalomalacia. Old basal-ganglia lacunar infarct on the left. No hemodynamically significant arterial stenosis in the neck, unchanged. No extravasation. Multifocal moderate-marked intracranial arterial stenosis, unchanged. Left tonsillar mass, extensive cervical adenopathy, partially visualized mediastinal  adenopathy, unchanged. Greyson Hamilton M.D. This report has been electronically signed and verified by the Radiologist whose name is printed above. DD:  06/20/2023/DT:  06/20/2023 This report contains privileged and confidential information and is intended solely for the use of the individual or entity to which it is addressed. If you are not the intended recipient of this report, you are hereby notified that any copying, distribution, dissemination or action taken in relation to the contents of this report is strictly prohibited and may be unlawful. If you have received this report in error, please notify the sender immediately at [phone] and permanently delete the original report and destroy any copies or printouts.    CT Head Without Contrast    Result Date: 6/17/2023  Head CT: 1.No acute intracranial abnormality. 2.Multiple old infarcts, largest within the right temporoparietal region. Neck CTA: 1.On the outside neck CT from earlier today, there was contrast visible pooling within the oropharynx and hypopharynx which appeared to originate from the inferior pole of the left tonsillar mass. This pooling contrast is no longer visible, and there is currently no evidence of active contrast extravasation. There are prominent vessels which appear to be supplying the left tonsillar mass. 2.Extensive bilateral cervical lymphadenopathy as well as lymphadenopathy within the visualized mediastinum and axillae. 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 However, the remainder of the lymphadenopathy is very extensive and solid suggesting the possibility of a synchronous lymphoproliferative disorder. 3.No significant stenosis is identified within the cervical carotid and vertebral systems within the limitations of the exam. Head CTA: 1.Focal severe stenoses of the bilateral ACAs, MCAs, and PCAs as  well as the right ICA. These are all unchanged with the exception of the stenosis at the left A3 segment, which is new compared to January 2021. 2.Moderate stenoses of the bilateral intradural vertebral arteries and intracranial left ICA. CRITICAL RESULT: No. COMMUNICATION: Per this written report. Drafted by Dominic Patterson MD on 6/17/2023 9:47 PM Final report signed by Dominic Patterson MD on 6/17/2023 11:04 PM    CT Angiogram Neck    Result Date: 6/20/2023  Unchanged CT head, CT angiography head and neck. No definite acute brain abnormality. Right frontoparietal encephalomalacia. Old basal-ganglia lacunar infarct on the left. No hemodynamically significant arterial stenosis in the neck, unchanged. No extravasation. Multifocal moderate-marked intracranial arterial stenosis, unchanged. Left tonsillar mass, extensive cervical adenopathy, partially visualized mediastinal adenopathy, unchanged. Greyson Hamilton M.D. This report has been electronically signed and verified by the Radiologist whose name is printed above. DD:  06/20/2023/DT:  06/20/2023 This report contains privileged and confidential information and is intended solely for the use of the individual or entity to which it is addressed. If you are not the intended recipient of this report, you are hereby notified that any copying, distribution, dissemination or action taken in relation to the contents of this report is strictly prohibited and may be unlawful. If you have received this report in error, please notify the sender immediately at [phone] and permanently delete the original report and destroy any copies or printouts.    CT Angiogram Neck    Result Date: 6/17/2023  Head CT: 1.No acute intracranial abnormality. 2.Multiple old infarcts, largest within the right temporoparietal region. Neck CTA: 1.On the outside neck CT from earlier today, there was contrast visible pooling within the oropharynx and hypopharynx which appeared to originate from  the inferior pole of the left tonsillar mass. This pooling contrast is no longer visible, and there is currently no evidence of active contrast extravasation. There are prominent vessels which appear to be supplying the left tonsillar mass. 2.Extensive bilateral cervical lymphadenopathy as well as lymphadenopathy within the visualized mediastinum and axillae. 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 However, the remainder of the lymphadenopathy is very extensive and solid suggesting the possibility of a synchronous lymphoproliferative disorder. 3.No significant stenosis is identified within the cervical carotid and vertebral systems within the limitations of the exam. Head CTA: 1.Focal severe stenoses of the bilateral ACAs, MCAs, and PCAs as well as the right ICA. These are all unchanged with the exception of the stenosis at the left A3 segment, which is new compared to January 2021. 2.Moderate stenoses of the bilateral intradural vertebral arteries and intracranial left ICA. CRITICAL RESULT: No. COMMUNICATION: Per this written report. Drafted by Dominic Patterson MD on 6/17/2023 9:47 PM Final report signed by Dominic Patterson MD on 6/17/2023 11:04 PM    MRI Brain Without Contrast    Result Date: 6/23/2023  Multifocal acute infarcts of the brain. Multifocal chronic infarcts of the brain, many of which were present in the 11/21/2019 study. Cervical lymphadenopathy. 27 mm nodule in the left neck may represent a pathologic lymph node or other mass. The previously described left oropharyngeal mass is better visualized in the 6/20/2023 CTA neck. CRITICAL RESULT: No. COMMUNICATION: Per this written report. Drafted by Melonie Polk MD on 6/23/2023 2:10 PM Final report signed by Melonie Polk MD on 6/23/2023 2:24 PM    CT Abdomen Pelvis With Contrast    Result Date: 6/17/2023  No findings of gastrointestinal  bleed. Extensive upper abdominal, retroperitoneal and pelvic adenopathy. In the setting of known malignancy this is concerning for metastatic disease. Right adrenal gland indeterminate nodule. CRITICAL RESULT: No. COMMUNICATION: Per this written report. Drafted by Bre Shirley MD on 6/17/2023 9:32 PM Final report signed by Bre Shirley MD on 6/17/2023 9:43 PM    CT Angiogram Chest Pulmonary Embolism    Result Date: 6/17/2023  No pulmonary embolism. Left lower lobe groundglass nodular opacities may represent infectious or inflammatory process. Enlarged mediastinal and bilateral axillary lymph nodes unchanged from comparison. CRITICAL RESULT:   No. COMMUNICATION: Per this written report. Drafted by Bre Shirley MD on 6/17/2023 9:14 PM Final report signed by Bre Shirley MD on 6/17/2023 9:24 PM    US Guided Lymph Node Biopsy    Result Date: 6/23/2023  Successful ultrasound-guided right inguinal lymph node biopsy. Okay to return to floor 30 minutes postprocedure. CRITICAL RESULT: No. COMMUNICATION: Per this written report. Preliminary report signed by Tony Ortega DO on 6/23/2023 10:04 AM By electronically signing this report, I, the attending physician, attest that I was present for the entire procedure(s) and agree with the final edited report. Drafted by Tony Ortega DO on 6/23/2023 9:53 AM Final report signed by Johnie Schmitt MD on 6/23/2023 11:43 AM    CT Angiogram Head    Result Date: 6/20/2023  Unchanged CT head, CT angiography head and neck. No definite acute brain abnormality. Right frontoparietal encephalomalacia. Old basal-ganglia lacunar infarct on the left. No hemodynamically significant arterial stenosis in the neck, unchanged. No extravasation. Multifocal moderate-marked intracranial arterial stenosis, unchanged. Left tonsillar mass, extensive cervical adenopathy, partially visualized mediastinal adenopathy, unchanged. Greyson Hamilton M.D. This report has been electronically signed and  verified by the Radiologist whose name is printed above. DD:  06/20/2023/DT:  06/20/2023 This report contains privileged and confidential information and is intended solely for the use of the individual or entity to which it is addressed. If you are not the intended recipient of this report, you are hereby notified that any copying, distribution, dissemination or action taken in relation to the contents of this report is strictly prohibited and may be unlawful. If you have received this report in error, please notify the sender immediately at [phone] and permanently delete the original report and destroy any copies or printouts.    CT Angiogram Head    Result Date: 6/17/2023  Head CT: 1.No acute intracranial abnormality. 2.Multiple old infarcts, largest within the right temporoparietal region. Neck CTA: 1.On the outside neck CT from earlier today, there was contrast visible pooling within the oropharynx and hypopharynx which appeared to originate from the inferior pole of the left tonsillar mass. This pooling contrast is no longer visible, and there is currently no evidence of active contrast extravasation. There are prominent vessels which appear to be supplying the left tonsillar mass. 2.Extensive bilateral cervical lymphadenopathy as well as lymphadenopathy within the visualized mediastinum and axillae. 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 However, the remainder of the lymphadenopathy is very extensive and solid suggesting the possibility of a synchronous lymphoproliferative disorder. 3.No significant stenosis is identified within the cervical carotid and vertebral systems within the limitations of the exam. Head CTA: 1.Focal severe stenoses of the bilateral ACAs, MCAs, and PCAs as well as the right ICA. These are all unchanged with the exception of the stenosis at the left A3  segment, which is new compared to January 2021. 2.Moderate stenoses of the bilateral intradural vertebral arteries and intracranial left ICA. CRITICAL RESULT: No. COMMUNICATION: Per this written report. Drafted by Dominic Patterson MD on 6/17/2023 9:47 PM Final report signed by oDminic Patterson MD on 6/17/2023 11:04 PM    FL Video Swallow Single Contrast    Result Date: 7/7/2023    Normal fluoroscopic video swallow exam.  Please see the speech pathologist's report for additional information.  This report was finalized on 7/7/2023 10:50 AM by Dr. Porfirio Montgomery MD.      PET/CT FDG Skull Base To Mid Thigh    Result Date: 6/26/2023  1. Innumerable enlarged FDG avid cervical, mediastinal, axillary, mesenteric, retroperitoneal and inguinal lymph nodes are suspicious for lymphoma correlation with tissue biopsy is suggested. 2. FDG avid asymmetric mass involving the left oropharynx most likely consistent with lymphoma involvement correlation with tissue biopsy is suggested. 3. Status post ischemia involving left MCA and PCA. CRITICAL RESULT: No. COMMUNICATION: Per this written report. Drafted by Jj Johnson MD on 6/26/2023 5:21 PM Final report signed by Jj Johnson MD on 6/26/2023 8:12 PM    EEG    Result Date: 6/20/2023   Abnormal EEG Background was diffusely slow. This finding may suggest mild diffuse neuronal dysfunction that is non specific of etiology. No epileptiform activity. No seizure. Correlate clinically.     IR Angiogram Carotid Cerebral Bilateral    Result Date: 6/20/2023  Selective angiograms of the bilateral external carotid arteries and superselective angiograms of bilateral linguo-facial trunk and the internal maxillary artery is within normal limits. No evidence of active contrast extravasation, pseudoaneurysm, vasospasm or vessel injury. Since no target for embolization was identified and the patient does not have active oropharyngeal bleed, embolization was not performed.  COMMUNICATION: Per this written report. The findings were communicated to the care team. Preliminary report signed by Nicolas Stallings MD on 6/20/2023 5:15 PM By electronically signing this report, I, the attending physician, attest that I was present for the entire procedure(s) and agree with the final edited report. Drafted by Nicolas Stallings MD on 6/20/2023 4:47 PM Final report signed by Kaylin Lee MD on 6/20/2023 5:33 PM    XR chest AP portable    Result Date: 6/17/2023  Endotracheal tube in good position. Images reviewed, interpreted, and dictated by Dr. Boo Hendrickson. Transcribed by Valente Nichols PA-C.    CT neck soft tissue with IV contrast    Result Date: 6/17/2023  Extensive, new cervical adenopathy concerning for underlying lymphoma or metastatic disease Images reviewed, interpreted, and dictated by Dr. Boo Hendrickson. Transcribed by Valente Nichols PA-C.    FL Esophogram w Modified Barium Swallow Single Contrast    Result Date: 6/21/2023  No aspiration or penetration Please see separate note by Speech therapy team for dietary recommendations. CRITICAL RESULT:   No. COMMUNICATION: Per this written report. Drafted by Itz Jack MD on 6/21/2023 9:00 AM Final report signed by Itz Jack MD on 6/21/2023 9:00 AM   I have personally looked at the radiology images and read the final radiology report.    Assessment & Plan    Status post bilateral CVAs with new dense left-sided deficit--patient requiring Tye lift for transfers.  Was able to participate in the standing frame for 7 minutes yesterday.  Patient is dependent for ADLs and does tend to lean to the left at this time with some left-sided neglect.  Patient is currently on Xarelto and statin therapy.    History of melanotic stool--thought that patient may have swallowed some blood when he did have his acute bleeding while at .  Patient hemoglobin has been stable throughout the admission here and is having no difficulties    History of atrial  fibrillation patient currently on Lopressor and Xarelto.    Hypertension well-controlled.    History of laryngeal CA    Non-Hodgkin's lymphoma patient is to follow-up with oncology as an outpatient    Anemia hemoglobin has been stable.    VTE Prophylaxis:   Mechanical Order History:       None          Pharmalogical Order History:        Ordered     Dose Route Frequency Stop    07/06/23 1649  rivaroxaban (XARELTO) tablet 20 mg        Question:  Are you ordering rivaroxaban 10 mg for the prevention of blood clots in an acutely ill medical patient?  Answer:  No    20 mg PO Daily With Dinner --                      Sj Jay MD  [facility]  07/11/23  10:19 EDT

## 2023-07-11 NOTE — PROGRESS NOTES
Rehabilitation Nursing  Inpatient Rehabilitation Plan of Care Note    Plan of Care  Copy from POCBody Function Structure    Skin Integrity (Active)  Current Status (7/6/2023 4:18:00 PM): At Risk for Skin Breakdown  Weekly Goal: No Skin Breakdown  Discharge Goal: No Skin Breakdown    Safety    Potential for Injury (Active)  Current Status (7/6/2023 4:18:00 PM): Potential for Falls  Weekly Goal: No Falls  Discharge Goal: No Falls    Signed by: Luz Elena Pierce Nurse

## 2023-07-11 NOTE — THERAPY TREATMENT NOTE
Inpatient Rehabilitation - Occupational Therapy Treatment Note     Ronnie     Patient Name: Antonio Canseco  : 1955  MRN: [id number]    Today's Date: 2023                 Admit Date: 2023       No diagnosis found.    Patient Active Problem List   Diagnosis    Detrusor instability    Low testosterone in male    Disorder of prostate    Corporo-venous occlusive erectile dysfunction    CVA (cerebral vascular accident)       Past Medical History:   Diagnosis Date    Diabetes mellitus     Hypertension     Stroke        Past Surgical History:   Procedure Laterality Date    US GUIDED LYMPH NODE BIOPSY  2023             IRF OT ASSESSMENT FLOWSHEET (last 12 hours)       IRF OT Evaluation and Treatment       Row Name 23 1435          OT Time and Intention    Document Type daily treatment  -TM     Mode of Treatment occupational therapy  -TM     Patient Effort adequate  -TM     Symptoms Noted During/After Treatment none  -TM       Row Name 23 1435          General Information    General Observations of Patient Pt agreeable for therapy. Pt required constant correction for posture/positioning for leaning to left side in w/c.  -TM     Existing Precautions/Restrictions fall  -TM     Limitations/Impairments safety/cognitive  -TM       Row Name 23 1435          Cognition/Psychosocial    Orientation Status (Cognition) oriented to;person;place;situation  -TM     Follows Commands (Cognition) follows one-step commands;verbal cues/prompting required;physical/tactile prompts required;repetition of directions required  -TM     Cognitive Function safety deficit  -TM       Row Name 23 1435          Upper Body Dressing    Comment (Upper Body Dressing) totalA/maxA  -TM       Row Name 23 1435          Bed-Chair Transfer    Bed-Chair [place] (Transfers) dependent (less than 25% patient effort);2 person assist;verbal cues  -TM     Assistive Device (Bed-Chair Transfers) wheelchair;lift device   -TM       Row Name 07/11/23 1435          Chair-Bed Transfer    Chair-Bed [place] (Transfers) --  -TM     Assistive Device (Chair-Bed Transfers) --  -TM       Row Name 07/11/23 1435          Safety Issues, Functional Mobility    Safety Issues Affecting Function (Mobility) insight into deficits/self-awareness;safety precaution awareness;awareness of need for assistance;other (see comments)  pt required constant assistance for posture/positioning in w/c due to pt leaning/pushing to the left side.  -TM       Row Name 07/11/23 1435          Motor Skills    Motor Skills coordination;functional endurance;neuro-muscular function;motor control/coordination interventions  -TM     Neuromuscular Function left;upper extremity  -TM     Motor Control/Coordination Interventions therapeutic exercise/ROM;fine motor manipulation/dexterity activities;gross motor coordination activities;other (see comments)  LUE AROM/AAROM, gross grasp/release; RUE ther ex/act, GMC/FMC  -TM       Row Name 07/11/23 1435          Neuromuscular Re-education    Interventions (Neuromuscular Re-education) facilitation/inhibition;other (see comments)  LUE neuro re-ed  -TM     Positioning (Neuromuscular Re-education) sitting  -TM               User Key  (r) = Recorded By, (t) = Taken By, (c) = Cosigned By      Initials Name Effective Dates    Marielos Garcia, OT 06/16/21 -                      Occupational Therapy Education       Title: PT OT SLP Therapies (Done)       Topic: Occupational Therapy (Done)       Point: ADL training (Done)       Description:   Instruct learner(s) on proper safety adaptation and remediation techniques during self care or transfers.   Instruct in proper use of assistive devices.                  Learning Progress Summary             Patient Acceptance, D,E, NR,VU by TM at 7/11/2023 1434    Acceptance, E, VU,NR by BF at 7/10/2023 1456    Acceptance, E, VU,NR by  at 7/8/2023 1140    Acceptance, E, VU,NR by BF at  7/7/2023 1442                         Point: Precautions (Done)       Description:   Instruct learner(s) on prescribed precautions during self-care and functional transfers.                  Learning Progress Summary             Patient Acceptance, D,E, NR,VU by TM at 7/11/2023 1434    Acceptance, E, VU,NR by BF at 7/10/2023 1456    Acceptance, E, VU,NR by HB at 7/8/2023 1140    Acceptance, E, VU,NR by BF at 7/7/2023 1442                                         User Key       Initials Effective Dates Name Provider Type Discipline    BF 05/31/23 -  Lisa Sanchez, OT Occupational Therapist OT    TM 06/16/21 -  Marielos Capone, OT Occupational Therapist OT    HB 05/25/21 -  Veronica Robles OT Occupational Therapist OT                        OT Recommendation and Plan                         Time Calculation:      Time Calculation- OT       Row Name 07/11/23 1434 07/11/23 1433          Time Calculation- OT    OT Start Time 1230  -TM 1100  -TM     OT Stop Time 1330  -TM 1140  -TM     OT Time Calculation (min) 60 min  -TM 40 min  -TM     Total Timed Code Minutes- OT 60 minute(s)  -TM 40 minute(s)  -TM     OT Non-Billable Time (min) -- 15 min  -TM               User Key  (r) = Recorded By, (t) = Taken By, (c) = Cosigned By      Initials Name Provider Type     Marielos Capone, OT Occupational Therapist                  Therapy Charges for Today       Code Description Service Date Service Provider Modifiers Qty    18949401498 HC OT NEUROMUSC RE EDUCATION EA 15 MIN 7/11/2023 Marielos Capone, OT GO 3    46885169224 HC OT SELF CARE/MGMT/TRAIN EA 15 MIN 7/11/2023 Marielos Capone OT GO 1    41679691028 HC OT THERAPEUTIC ACT EA 15 MIN 7/11/2023 Marielos Capone OT GO 3                     Marielos Capone OT  7/11/2023

## 2023-07-11 NOTE — PLAN OF CARE
Goal Outcome Evaluation:          Problem: Rehabilitation (IRF) Plan of Care  Goal: Plan of Care Review  Outcome: Ongoing, Progressing  Flowsheets (Taken 7/10/2023 1117 by Karyn Miller RN)  Progress: improving  Plan of Care Reviewed With: patient  Goal: Patient-Specific Goal (Individualized)  Outcome: Ongoing, Progressing  Goal: Absence of New-Onset Illness or Injury  Outcome: Ongoing, Progressing  Intervention: Prevent Fall and Fall Injury  Recent Flowsheet Documentation  Taken 7/11/2023 0800 by Madie Maza RN  Safety Promotion/Fall Prevention:   assistive device/personal items within reach   safety round/check completed  Intervention: Prevent Infection  Recent Flowsheet Documentation  Taken 7/11/2023 0800 by Madie Maza RN  Infection Prevention: hand hygiene promoted  Goal: Optimal Comfort and Wellbeing  Outcome: Ongoing, Progressing  Goal: Home and Community Transition Plan Established  Outcome: Ongoing, Progressing     Problem: Diabetes Comorbidity  Goal: Blood Glucose Level Within Targeted Range  Outcome: Ongoing, Progressing     Problem: Skin Injury Risk Increased  Goal: Skin Health and Integrity  Outcome: Ongoing, Progressing     Problem: Fall Injury Risk  Goal: Absence of Fall and Fall-Related Injury  Outcome: Ongoing, Progressing  Intervention: Identify and Manage Contributors  Recent Flowsheet Documentation  Taken 7/11/2023 0800 by Madie Maza RN  Medication Review/Management: medications reviewed  Intervention: Promote Injury-Free Environment  Recent Flowsheet Documentation  Taken 7/11/2023 0800 by Madie Maza RN  Safety Promotion/Fall Prevention:   assistive device/personal items within reach   safety round/check completed     Problem: Mobility Impairment  Goal: Optimal Mobility Young and Safety  Outcome: Ongoing, Progressing

## 2023-07-11 NOTE — PROGRESS NOTES
[facility]PS CM[facility] Coordinator  Inpatient Rehabilitation Admission    Ethnic Group: White.  Marital Status:  Marital Status: .    IRF Admission Date:  07/06/2023  Admission Class: Initial Rehab.  Admit From:  [facility]    Pre-Hospital Living: Home. Pre-Hospital Living  With: (2) Family/Relatives.    Payment Sources: Primary: Medicare - Medicare Advantage  Secondary: Not Listed.  Impairment Group: 01.1 Left Body Involvement (Right Brain)  Date of Onset of Impairment: 06/17/2023    Etiologic Diagnosis Code(s):  Rank Code      Description  1    I63.411   Cerebral infarction due to embolism of right                 middle cerebral artery    Comorbidities:      Height on Admission: 68 inches.  Weight on Admission: 267 pounds.    Are there any arthritis conditions recorded for Impairment Group, Etiologic  Diagnosis, or Comorbid Conditions that meet all of the regulatory requirements  for IRF classification (in 42 .29(b)(2)(x), (xi), and xii))?    SOBIA Bladder Accidents:  0 - Accidents.  Bladder Score = 1.  Five (5) or more  bladder accidents.  SOBIA Bowel Accident: 0 -Accidents.  Bowel Score = 1.  Five (5) or more  bowel accidents.    Signed by: Carmen Doe, Supervisor

## 2023-07-11 NOTE — PROGRESS NOTES
Patient Assessment Instrument  Quality Indicators - Admission FY 2023    Section A. Ethnicity/ Race/Language  Ethnicity:  Race:  Preferred Language:    Section A. Transportation      Section B. Hearing and Vision        Section B. Health Literacy        Section C. Cognitive Patterns      Section C. Signs and Symptoms of Delirium (from CAM)      Section D. Mood      Section D. Social Isolation      Section EU1384. Prior Functioning    Self Care: Patient completed all the activities by themself, with or without an  assistive device, with no assistance from a helper.  Indoor Mobility: Patient completed the activities by themself, with or without  an assistive device, with no assistance from a helper.  Stairs: Patient completed the activities by themself, with or without an  assistive device, with no assistance from a helper.  Functional Cognition: Patient completed the activities by themself, with or  without an assistive device, with no assistance from a helper.    Section BI0710. Prior Device Use      Section ZA8263. Self Care Performance      Section TO0479. Self Care Discharge Goals      Section [id number]. Mobility Performance      Section ZX4635. Mobility Discharge Goals      Section H. Bladder and Bowel  Bladder Continence: Always incontinent.  Bowel Continence: Always incontinent (no episodes of continent bowel movements).    Section I. Active Diagnosis  Comorbidities and Co-existing Conditions:   Diabetes Mellitus (DM) - e.g.,  diabetic retinopathy, nephropathy, and neuropathy).    Section J. Health Conditions  Patient has not had any falls in the past year. Patient has had major surgery  during the 100 days prior to admission.    Section J. Health Conditions (Pain)      Section K. Swallowing/Nutritional Status  Regular food (solids and liquids swallowed safely without supervision or  modified food or liquid consistency).  Nutritional Approaches on Admission:  Nutritional Approaches on Admission:  Therapeutic diet  (e.g., low salt, diabetic, low cholesterol)    Section M. Skin Conditions      Section N. Medication    Potential Clinically Significant Medication Issues: No issues found during  review  Patient is taking medications in the following pharmacological classification:  E. Anticoagulant An indication is noted for all medications in the Anticoagulant  drug class. H. Opioid An indication is noted for all medications in the Opiod  drug class.  Potential Clinically Significant Medication Issues: No issues found during  review    Section O. Special Treatments, Procedures, and Programs    Signed by: Carmen Doe, Supervisor

## 2023-07-11 NOTE — THERAPY TREATMENT NOTE
Inpatient Rehabilitation - Physical Therapy Treatment Note       THALIA Trujillo     Patient Name: Antonio Canseco  : 1955  MRN: [id number]    Today's Date: 2023                    Admit Date: 2023      Visit Dx:   No diagnosis found.    Patient Active Problem List   Diagnosis    Detrusor instability    Low testosterone in male    Disorder of prostate    Corporo-venous occlusive erectile dysfunction    CVA (cerebral vascular accident)       Past Medical History:   Diagnosis Date    Diabetes mellitus     Hypertension     Stroke        Past Surgical History:   Procedure Laterality Date    US GUIDED LYMPH NODE BIOPSY  2023       PT ASSESSMENT (last 12 hours)       IRF PT Evaluation and Treatment       Row Name 23 141          PT Time and Intention    Document Type daily treatment  -LB     Mode of Treatment individual therapy;physical therapy  -LB       Row Name 23 141          General Information    Patient Profile Reviewed yes  -LB     Existing Precautions/Restrictions fall  L HP, <trunk support/balance  -LB       Row Name 23          Pain Scale: FACES Pre/Post-Treatment    Pain: FACES Scale, Pretreatment 0-->no hurt  -LB     Posttreatment Pain Rating 0-->no hurt  -LB       Row Name 23 141          Cognition/Psychosocial    Affect/Mental Status (Cognition) --  more L neglect today, <self awareness for posture/positioning  -LB     Follows Commands (Cognition) verbal cues/prompting required;physical/tactile prompts required  -LB     Personal Safety Interventions nonskid shoes/slippers when out of bed;supervised activity  -LB       Row Name 23 141          Bed Mobility    Rolling Left [place] (Bed Mobility) verbal cues;nonverbal cues (demo/gesture);minimum assist (75% patient effort);moderate assist (50% patient effort)  -LB     Rolling Right [place] (Bed Mobility) dependent (less than 25% patient effort);verbal cues;nonverbal cues (demo/gesture)  -LB      Assistive Device (Bed Mobility) bed rails;draw sheet  -LB       Row Name 07/11/23 1417          Bed-Chair Transfer    Bed-Chair [place] (Transfers) dependent (less than 25% patient effort)  -LB     Assistive Device (Bed-Chair Transfers) lift device  -LB       Row Name 07/11/23 1417          Chair-Bed Transfer    Chair-Bed [place] (Transfers) dependent (less than 25% patient effort)  -LB     Assistive Device (Chair-Bed Transfers) lift device  -LB       Row Name 07/11/23 1417          Safety Issues, Functional Mobility    Safety Issues Affecting Function (Mobility) awareness of need for assistance;insight into deficits/self-awareness;safety precaution awareness  he has more pushing to left side today and is at higher risk of falling out of the chair. he requires constant assist for safety.  -LB       Row Name 07/11/23 1417          Balance    Static Sitting Balance --  pushing to left side, max-dep to correct, <positional awareness  -LB     Dynamic Sitting Balance --  sitting bag toss and  with reacher 2 sets  -LB     Static Standing Balance --  standing frame 10 min, R hip slumped to R side and L shoulder slumped to L side, cannot correct with cueing or assist. used a mirror for feedback but still lacks awareness  -LB       Row Name 07/11/23 1417          Motor Skills    Therapeutic Exercise --  sitting ex-R active L passive, am 1 set, pm 2 sets  -LB       Row Name 07/11/23 1417          Neuromuscular Re-education    Interventions (Neuromuscular Re-education) inhibitory positioning  -LB     Positioning (Neuromuscular Re-education) sitting;supported  -LB       Row Name 07/11/23 1417          Positioning and Restraints    Pre-Treatment Position --  am-bed, pm-w/c  -LB     In Wheelchair --  am-OT, pm-with staff at nursing station  -LB       Row Name 07/11/23 1417          Daily Progress Summary (PT)    Daily Progress Summary (PT) he had more L neglect and <balance due to pushing. he is not currently  able to correct and requires supervision in w/c for safety.  -LB     Recommendations (PT) continue PT  -LB               User Key  (r) = Recorded By, (t) = Taken By, (c) = Cosigned By      Initials Name Provider Type    LB Joslyn Garcia, PT Physical Therapist                     Physical Therapy Education       Title: PT OT SLP Therapies (Done)       Topic: Physical Therapy (Done)       Point: Mobility training (Done)       Learning Progress Summary             Patient Acceptance, E, VU,NR by LB at 7/11/2023 1426    Acceptance, E, VU,NR by LB at 7/10/2023 1452    Acceptance, E,D, VU,NR by LL at 7/8/2023 1228    Acceptance, E,D, VU,NR by LB at 7/7/2023 1449                         Point: Home exercise program (Done)       Learning Progress Summary             Patient Acceptance, E, VU,NR by LB at 7/11/2023 1426    Acceptance, E, VU,NR by LB at 7/10/2023 1452    Acceptance, E,D, VU,NR by LL at 7/8/2023 1228    Acceptance, E,D, VU,NR by LB at 7/7/2023 1449                         Point: Body mechanics (Done)       Learning Progress Summary             Patient Acceptance, E, VU,NR by LB at 7/11/2023 1426    Acceptance, E, VU,NR by LB at 7/10/2023 1452    Acceptance, E,D, VU,NR by LL at 7/8/2023 1228    Acceptance, E,D, VU,NR by LB at 7/7/2023 1449                         Point: Precautions (Done)       Learning Progress Summary             Patient Acceptance, E, VU,NR by LB at 7/11/2023 1426    Acceptance, E, VU,NR by LB at 7/10/2023 1452    Acceptance, E,D, VU,NR by LL at 7/8/2023 1228    Acceptance, E,D, VU,NR by LB at 7/7/2023 1449                                         User Key       Initials Effective Dates Name Provider Type Discipline    LB 06/16/21 -  Joslyn Garcia, PT Physical Therapist PT    LL 05/02/16 -  Connie Velasquez PTA Physical Therapist Assistant PT                    PT Recommendation and Plan    Planned Therapy Interventions (PT): balance training, bed mobility training, gait  training, motor coordination training, neuromuscular re-education, patient/family education, postural re-education, ROM (range of motion), strengthening, transfer training, wheelchair management/propulsion training  Frequency of Treatment (PT): 5 times per week  Anticipated Equipment Needs at Discharge (PT Eval):  (tbd)  Daily Progress Summary (PT)  Daily Progress Summary (PT): he had more L neglect and <balance due to pushing. he is not currently able to correct and requires supervision in w/c for safety.  Recommendations (PT): continue PT               Time Calculation:      PT Charges       Row Name 07/11/23 1427 07/11/23 1426          Time Calculation    Start Time 1335  -LB 1000  -LB     Stop Time 1420  -LB 1045  -LB     Time Calculation (min) 45 min  -LB 45 min  -LB     PT Received On -- 07/11/23  -LB               User Key  (r) = Recorded By, (t) = Taken By, (c) = Cosigned By      Initials Name Provider Type    LB Joslyn Garcia, PT Physical Therapist                    Therapy Charges for Today       Code Description Service Date Service Provider Modifiers Qty    61760356143 HC PT NEUROMUSC RE EDUCATION EA 15 MIN 7/10/2023 Joslyn Garcia, PT GP 2    63488587508 HC PT THER PROC EA 15 MIN 7/10/2023 Joslyn Garcia, PT GP 2    07254148918 HC PT THERAPEUTIC ACT EA 15 MIN 7/10/2023 Joslyn Garcia, PT GP 2    91480637379 HC PT THER PROC EA 15 MIN 7/11/2023 Joslyn Garcia, PT GP 2    [id number]  PT NEUROMUSC RE EDUCATION EA 15 MIN 7/11/2023 Joslyn Garcia, PT GP 2    19446252241 HC PT THERAPEUTIC ACT EA 15 MIN 7/11/2023 Joslyn Garcia, PT GP 2                     Joslyn Garcia, PT  7/11/2023

## 2023-07-11 NOTE — SIGNIFICANT NOTE
07/11/23 9968   Plan   Plan Team conference held today.  Spoke to pt about how he is doing in therapy and plans for discharge on 8-4-23.  Pt plans to return home with cousin Alejandra at discharge who will assist with care along with his sister Mariah and daughter Karolyn may help too.  Will follow.   Patient/Family in Agreement with Plan yes

## 2023-07-12 LAB
GLUCOSE BLDC GLUCOMTR-MCNC: 133 MG/DL (ref 70–130)
GLUCOSE BLDC GLUCOMTR-MCNC: 151 MG/DL (ref 70–130)
GLUCOSE BLDC GLUCOMTR-MCNC: 201 MG/DL (ref 70–130)
GLUCOSE BLDC GLUCOMTR-MCNC: 206 MG/DL (ref 70–130)

## 2023-07-12 PROCEDURE — 97530 THERAPEUTIC ACTIVITIES: CPT

## 2023-07-12 PROCEDURE — 97110 THERAPEUTIC EXERCISES: CPT

## 2023-07-12 PROCEDURE — 97112 NEUROMUSCULAR REEDUCATION: CPT

## 2023-07-12 PROCEDURE — 99231 SBSQ HOSP IP/OBS SF/LOW 25: CPT | Performed by: FAMILY MEDICINE

## 2023-07-12 PROCEDURE — 97535 SELF CARE MNGMENT TRAINING: CPT

## 2023-07-12 PROCEDURE — 82948 REAGENT STRIP/BLOOD GLUCOSE: CPT

## 2023-07-12 RX ADMIN — HYDROXYZINE HYDROCHLORIDE 25 MG: 25 TABLET, FILM COATED ORAL at 20:59

## 2023-07-12 RX ADMIN — MAGNESIUM GLUCONATE 500 MG ORAL TABLET 400 MG: 500 TABLET ORAL at 09:06

## 2023-07-12 RX ADMIN — LOSARTAN POTASSIUM 25 MG: 25 TABLET, FILM COATED ORAL at 09:06

## 2023-07-12 RX ADMIN — RIVAROXABAN 20 MG: 20 TABLET, FILM COATED ORAL at 18:04

## 2023-07-12 RX ADMIN — METOPROLOL TARTRATE 50 MG: 50 TABLET, FILM COATED ORAL at 09:06

## 2023-07-12 RX ADMIN — TAMSULOSIN HYDROCHLORIDE 0.4 MG: 0.4 CAPSULE ORAL at 09:06

## 2023-07-12 RX ADMIN — METOPROLOL TARTRATE 50 MG: 50 TABLET, FILM COATED ORAL at 20:59

## 2023-07-12 RX ADMIN — PANTOPRAZOLE SODIUM 40 MG: 40 TABLET, DELAYED RELEASE ORAL at 05:32

## 2023-07-12 RX ADMIN — ATORVASTATIN CALCIUM 80 MG: 40 TABLET, FILM COATED ORAL at 09:07

## 2023-07-12 RX ADMIN — NYSTATIN: 100000 POWDER TOPICAL at 21:00

## 2023-07-12 RX ADMIN — ALLOPURINOL 300 MG: 300 TABLET ORAL at 09:07

## 2023-07-12 RX ADMIN — DOCUSATE SODIUM 50 MG AND SENNOSIDES 8.6 MG 2 TABLET: 8.6; 5 TABLET, FILM COATED ORAL at 09:06

## 2023-07-12 RX ADMIN — NYSTATIN: 100000 POWDER TOPICAL at 09:07

## 2023-07-12 NOTE — PROGRESS NOTES
Case Management  Inpatient Rehabilitation Team Conference    Conference Date/Time: 7/11/2023 6:29:34 AM    Team Conference Attendees:  MD Wendi James SW Jessica Bill, RN,   ALON Fan, PT  Marielos Capone OT    Demographics            Age: 67Y            Gender: Male    Admission Date: 7/6/2023 4:18:00 PM  Rehabilitation Diagnosis:  stroke  Comorbidities:      Plan of Care  Anticipated Discharge Date/Estimated Length of Stay: 14 days  Anticipated Discharge Destination: Community discharge with assistance  Discharge Plan : Pt plans to return home with cousin at discharge.  Pt says his  cousin, sister and daughter can assist with needs.  Medical Necessity Expected Level Rationale: fair-good  Intensity and Duration: an average of 3 hours/5 days per week  Medical Supervision and 24 Hour Rehab Nursing: x  Physical Therapy: x  PT Intensity/Duration: PT 1.5 hours per day/5 days per week  Occupational Therapy: x  OT Intensity/Duration: OT 1.5 hours per day/5 days per week  Social Work: x  Therapeutic Recreation: x  Updated (if changes indicated)    Anticipated Discharge Date/Estimated Length of Stay:   8-4-23      Discharge Plan of Care:    Based on the patient's medical and functional status, their prognosis and  expected level of functional improvement is: fair      Interdisciplinary Problem/Goals/Status  Body Function Structure    [RN] Skin Integrity(Active)  Current Status(07/06/2023): At Risk for Skin Breakdown  Weekly Goal(07/13/2023): No Skin Breakdown  Discharge Goal: No Skin Breakdown        Safety    [RN] Potential for Injury(Active)  Current Status(07/06/2023): Potential for Falls  Weekly Goal(07/13/2023): No Falls  Discharge Goal: No Falls        Mobility    [PT] Bed/Chair/Wheelchair(Active)  Current Status(07/07/2023): Dep/Tye  Weekly Goal(07/14/2023): mod A  Discharge Goal: SBA    [PT] Walk(Active)  Current Status(07/07/2023): unable  Weekly  Goal(07/14/2023): amb 20' mod A x2 hallrail  Discharge Goal: amb 150' HW SBA        Self Care    [OT] Dressing (Upper)(Active)  Current Status(07/10/2023): Max A  Weekly Goal(07/18/2023): Mod A  Discharge Goal: Min A    Comments: Pt plans to return home with cousin at discharge.  Pt's sister and  cousin to assist pt at home as long as they can meet caregiving needs.  Daughter  Karolyn may assist some too with care.    Signed by: SUJATA Breen    Physician CoSigned By: Sj Jay 07/12/2023 07:22:41

## 2023-07-12 NOTE — PROGRESS NOTES
"Patient Assessment Instrument  Quality Indicators - Admission FY 2023    Section A. Ethnicity/ Race/Language  Ethnicity:  Not of , /a, [place] Origin  Race:  White  Preferred Language:  English  Requests  to Communicate:   No    Section A. Transportation      Section B. Hearing and Vision  Expression of Ideas and Wants: Expresses complex messages without difficulty and  with speech that is clear and easy to understand.  Understanding Verbal and Non-Verbal Content: Understands: Clear comprehension  without cues or repetitions.  Ability to Hear:  Adequate - no difficulty in normal conversation, social  interaction, listening to TV  Ability to See in Adequate Light:  Adequate - sees fine detail, such as regular  print in newspapers/books    Section B. Health Literacy  Frequency of Needing Assistance Reading:  Sometimes      Section C. Cognitive Patterns  Brief Interview for Mental Status (BIMS) was conducted.  Repetition of Three Words: Three words  Able to report correct year: Correct  Able to report correct month: Accurate within 5 days  Able to report correct day of the week: Correct  Able to recall \"sock\": Yes, after cuing  Able to recall \"blue\": Yes, no cue required  Able to recall \"bed\": Yes, no cue required    BIMS SUMMARY SCORE: 14 Cognitively intact Patient was able to complete the Brief  Interview for Mental Status    Section C. Signs and Symptoms of Delirium (from CAM)  Evidence of Acute Change in Mental Status:   No  Inattention: Behavior not present  Thinking Disorganized or Incoherent:   Behavior not present  Altered Level of Consciousness:   Behavior not present    Section D. Mood  Presence of little interest or pleasure in doing things:   No  Frequency of having little interest or pleasure in doing things:   Never or 1  day  Presence of feeling down, depressed, or hopeless:   No  Frequency of feeling down, depressed, or hopeless:   Never or 1 day   Interview Ended. Above " responses do not meet criteria to continue.  Total Severity Score:   00    Section D. Social Isolation  Frequecy of Feeling Lonely or Isolated:  Never    Section BV2306. Prior Functioning      Section IR5956. Prior Device Use      Section GM7478. Self Care Performance      Section FS1457. Self Care Discharge Goals      Section AL3069. Mobility Performance      Section UV9135. Mobility Discharge Goals      Section H. Bladder and Bowel      Section I. Active Diagnosis      Section J. Health Conditions      Section J. Health Conditions (Pain)  Pain Effect on Sleep:   Does not apply - Patient has not had any pain or hurting  in the past 5 days    Section K. Swallowing/Nutritional Status    Nutritional Approaches on Admission:    Section M. Skin Conditions  Unhealed Pressure Ulcer/Injuries at Stage 1 or Higher on Admission:  No.    Section N. Medication    Potential Clinically Significant Medication Issues: No issues found during  review      Section O. Special Treatments, Procedures, and Programs  IV Access: Central  (e.g., PICC, tunneled, port)    Signed by: Carmen Doe, Supervisor

## 2023-07-12 NOTE — THERAPY TREATMENT NOTE
Inpatient Rehabilitation - Occupational Therapy Treatment Note     Ronnie     Patient Name: Antonio Canseco  : 1955  MRN: [id number]    Today's Date: 2023                 Admit Date: 2023       No diagnosis found.    Patient Active Problem List   Diagnosis    Detrusor instability    Low testosterone in male    Disorder of prostate    Corporo-venous occlusive erectile dysfunction    CVA (cerebral vascular accident)       Past Medical History:   Diagnosis Date    Diabetes mellitus     Hypertension     Stroke        Past Surgical History:   Procedure Laterality Date    US GUIDED LYMPH NODE BIOPSY  2023             IRF OT ASSESSMENT FLOWSHEET (last 12 hours)       IRF OT Evaluation and Treatment       Row Name 23 1407          OT Time and Intention    Document Type daily treatment  -TM     Mode of Treatment occupational therapy  -TM     Patient Effort adequate  -TM     Symptoms Noted During/After Treatment none  -TM       Row Name 23 140          General Information    General Observations of Patient Pt agreeable for therapy.  -TM     Existing Precautions/Restrictions fall  -TM     Limitations/Impairments safety/cognitive  -TM       Row Name 23 1407          Cognition/Psychosocial    Orientation Status (Cognition) oriented to;person;place;situation  -TM     Follows Commands (Cognition) follows one-step commands;verbal cues/prompting required;repetition of directions required;physical/tactile prompts required  -TM     Cognitive Function safety deficit  -TM       Row Name 23 1407          Lower Body Dressing    Position (Lower Body Dressing) supine  -TM     Comment (Lower Body Dressing) totalA  -TM       Row Name 23 1407          Bed-Chair Transfer    Bed-Chair [place] (Transfers) dependent (less than 25% patient effort);2 person assist;verbal cues  -TM     Assistive Device (Bed-Chair Transfers) wheelchair;lift device  -TM       Row Name 23 1406           Safety Issues, Functional Mobility    Safety Issues Affecting Function (Mobility) insight into deficits/self-awareness;safety precaution awareness;awareness of need for assistance  -TM       Row Name 07/12/23 1407          Motor Skills    Motor Skills coordination;functional endurance;neuro-muscular function;motor control/coordination interventions  -TM     Neuromuscular Function left;upper extremity  -TM     Motor Control/Coordination Interventions therapeutic exercise/ROM;fine motor manipulation/dexterity activities;gross motor coordination activities;neuro-muscular re-education;other (see comments)  LUE AROM/AAROM, C/FMC, grasp/release, RUE ther ex/act, C/FMC, bilateral coord  -TM       Row Name 07/12/23 1407          Neuromuscular Re-education    Interventions (Neuromuscular Re-education) facilitation/inhibition;other (see comments)  LUE neuro re-ed  -TM     Positioning (Neuromuscular Re-education) sitting  -TM               User Key  (r) = Recorded By, (t) = Taken By, (c) = Cosigned By      Initials Name Effective Dates     Marielos Capone, OT 06/16/21 -                      Occupational Therapy Education       Title: PT OT SLP Therapies (Done)       Topic: Occupational Therapy (Done)       Point: ADL training (Done)       Description:   Instruct learner(s) on proper safety adaptation and remediation techniques during self care or transfers.   Instruct in proper use of assistive devices.                  Learning Progress Summary             Patient Acceptance, E,D, VU,NR by TM at 7/12/2023 1407    Acceptance, D,E, NR,VU by TM at 7/11/2023 1434    Acceptance, E, VU,NR by BF at 7/10/2023 1456    Acceptance, E, VU,NR by  at 7/8/2023 1140    Acceptance, E, VU,NR by BF at 7/7/2023 1442                         Point: Precautions (Done)       Description:   Instruct learner(s) on prescribed precautions during self-care and functional transfers.                  Learning Progress Summary              Patient Acceptance, E,D, VU,NR by TM at 7/12/2023 1407    Acceptance, D,E, NR,VU by TM at 7/11/2023 1434    Acceptance, E, VU,NR by BF at 7/10/2023 1456    Acceptance, E, VU,NR by HB at 7/8/2023 1140    Acceptance, E, VU,NR by BF at 7/7/2023 1442                                         User Key       Initials Effective Dates Name Provider Type Discipline    BF 05/31/23 - 07/10/23 Lisa Sanchez, OT Occupational Therapist OT    TM 06/16/21 -  Marielos Capone, OT Occupational Therapist OT    HB 05/25/21 -  Veronica Robles OT Occupational Therapist OT                        OT Recommendation and Plan                         Time Calculation:      Time Calculation- OT       Row Name 07/12/23 1405             Time Calculation- OT    OT Start Time 1000  -TM      OT Stop Time 1130  -TM      OT Time Calculation (min) 90 min  -TM      Total Timed Code Minutes- OT 90 minute(s)  -TM      OT Non-Billable Time (min) 15 min  -TM                User Key  (r) = Recorded By, (t) = Taken By, (c) = Cosigned By      Initials Name Provider Type     Marielos Capone, OT Occupational Therapist                  Therapy Charges for Today       Code Description Service Date Service Provider Modifiers Qty    47269198578 HC OT NEUROMUSC RE EDUCATION EA 15 MIN 7/11/2023 Marielos Capone, OT GO 3    11072445014 HC OT SELF CARE/MGMT/TRAIN EA 15 MIN 7/11/2023 Marielos Capone, OT GO 1    04806299481 HC OT THERAPEUTIC ACT EA 15 MIN 7/11/2023 Marielos Capone, OT GO 3    85017811582 HC OT SELF CARE/MGMT/TRAIN EA 15 MIN 7/12/2023 Marielos Capone, OT GO 1    67330877060 HC OT NEUROMUSC RE EDUCATION EA 15 MIN 7/12/2023 Marielos Capone, OT GO 3    67466072435 HC OT THERAPEUTIC ACT EA 15 MIN 7/12/2023 Marielos Capone, OT GO 2                     Marielos Capone OT  7/12/2023

## 2023-07-12 NOTE — PROGRESS NOTES
Physical Medicine and Rehabilitation  Inpatient Rehabilitation Interdisciplinary Plan of Care    Demographics            Age: 67Y            Gender: Male    Admission Date: 7/6/2023 4:18:00 PM  Rehabilitation Diagnosis:  stroke    Plan of Care  Anticipated Discharge Date/Estimated Length of Stay: 8-4-23  Anticipated Discharge Destination: Community discharge with assistance  Discharge Plan : Pt plans to return home with cousin at discharge.  Pt says his  cousin, sister and daughter can assist with needs.  Medical Necessity Expected Level Rationale: fair  Intensity and Duration: an average of 3 hours/5 days per week  Medical Supervision and 24 Hour Rehab Nursing: x  Physical Therapy: x  PT Intensity/Duration: PT 1.5 hours per day/5 days per week  Occupational Therapy: x  OT Intensity/Duration: OT 1.5 hours per day/5 days per week  Social Work: x  Therapeutic Recreation: x  Updated (if changes indicated)  No changes to plan.    Based on the patient's medical and functional status, their prognosis and  expected level of functional improvement is: fair    Interdisciplinary Problem/Goals/Status  Copy from POCMobility    [PT] Bed/Chair/Wheelchair (Active)  Current Status (7/7/2023 12:00:00 AM): Dep/Tye  Weekly Goal: mod A  Discharge Goal: SBA    [PT] Walk (Active)  Current Status (7/7/2023 12:00:00 AM): unable  Weekly Goal: amb 20' mod A x2 hallrail  Discharge Goal: amb 150' HW SBA    Self Care    [OT] Dressing (Upper) (Active)  Current Status (7/10/2023 12:00:00 AM): Max A  Weekly Goal: Mod A  Discharge Goal: Min A    Body Function Structure    [RN] Skin Integrity (Active)  Current Status (7/6/2023 4:18:00 PM): At Risk for Skin Breakdown  Weekly Goal: No Skin Breakdown  Discharge Goal: No Skin Breakdown    Safety    [RN] Potential for Injury (Active)  Current Status (7/6/2023 4:18:00 PM): Potential for Falls  Weekly Goal: No Falls  Discharge Goal: No Falls    Medical Problems    Comments: Pt plans to return home with  cousin at discharge.  Pt's sister and  cousin to assist pt at home as long as they can meet caregiving needs.  Daughter  Karolyn may assist some too with care.    Signed by: Sj Jay, Physician

## 2023-07-12 NOTE — PROGRESS NOTES
Occupational Therapy: Individual: 90 minutes.    Physical Therapy:    Speech Language Pathology:    Signed by: Marielos Capone OT

## 2023-07-12 NOTE — THERAPY TREATMENT NOTE
Inpatient Rehabilitation - Physical Therapy Treatment Note        Ronnie     Patient Name: Antonio Canseco  : 1955  MRN: [id number]    Today's Date: 2023                    Admit Date: 2023      Visit Dx:   No diagnosis found.    Patient Active Problem List   Diagnosis    Detrusor instability    Low testosterone in male    Disorder of prostate    Corporo-venous occlusive erectile dysfunction    CVA (cerebral vascular accident)       Past Medical History:   Diagnosis Date    Diabetes mellitus     Hypertension     Stroke        Past Surgical History:   Procedure Laterality Date    US GUIDED LYMPH NODE BIOPSY  2023       PT ASSESSMENT (last 12 hours)       IRF PT Evaluation and Treatment       Row Name 23 1142          PT Time and Intention    Document Type daily treatment  -LB     Mode of Treatment individual therapy;physical therapy  -LB       Row Name 23 1142          General Information    Patient Profile Reviewed yes  -LB     Existing Precautions/Restrictions fall  L HP, <trunk support/balance  -LB       Row Name 23 1142          Pain Scale: FACES Pre/Post-Treatment    Pain: FACES Scale, Pretreatment 0-->no hurt  -LB     Posttreatment Pain Rating 0-->no hurt  -LB       Row Name 23 1142          Cognition/Psychosocial    Follows Commands (Cognition) verbal cues/prompting required;physical/tactile prompts required  -LB     Personal Safety Interventions nonskid shoes/slippers when out of bed;supervised activity  -LB       Row Name 23 1142          Bed Mobility    Rolling Left [place] (Bed Mobility) verbal cues;nonverbal cues (demo/gesture);moderate assist (50% patient effort)  -LB     Rolling Right [place] (Bed Mobility) dependent (less than 25% patient effort);verbal cues;nonverbal cues (demo/gesture)  -LB     Assistive Device (Bed Mobility) bed rails;draw sheet  -LB     Comment, (Bed Mobility) he had multiple bm's during therapy so most of his time was  spent with bed mobility for hygiene  -LB       Row Name 07/12/23 1142          Bed-Chair Transfer    Bed-Chair [place] (Transfers) dependent (less than 25% patient effort)  -LB     Assistive Device (Bed-Chair Transfers) lift device  -LB       Row Name 07/12/23 1142          Chair-Bed Transfer    Chair-Bed [place] (Transfers) dependent (less than 25% patient effort)  -LB     Assistive Device (Chair-Bed Transfers) lift device  -LB       Row Name 07/12/23 1142          Balance    Static Standing Balance --  standing frame 10 min, he reported having a BM while standing so he was retuned to bed afterwards for hygiene  -LB       Row Name 07/12/23 1142          Positioning and Restraints    Pre-Treatment Position in bed  -LB     In Bed call light within reach;encouraged to call for assist  -LB       Row Name 07/12/23 1142          Daily Progress Summary (PT)    Recommendations (PT) continue PT  -LB               User Key  (r) = Recorded By, (t) = Taken By, (c) = Cosigned By      Initials Name Provider Type    Joslyn Beckford, PT Physical Therapist                     Physical Therapy Education       Title: PT OT SLP Therapies (Done)       Topic: Physical Therapy (Done)       Point: Mobility training (Done)       Learning Progress Summary             Patient Acceptance, E, VU,NR by LB at 7/12/2023 1144    Acceptance, E, VU,NR by LB at 7/11/2023 1426    Acceptance, E, VU,NR by LB at 7/10/2023 1452    Acceptance, E,D, VU,NR by LL at 7/8/2023 1228    Acceptance, E,D, VU,NR by LB at 7/7/2023 1449                         Point: Home exercise program (Done)       Learning Progress Summary             Patient Acceptance, E, VU,NR by LB at 7/12/2023 1144    Acceptance, E, VU,NR by LB at 7/11/2023 1426    Acceptance, E, VU,NR by LB at 7/10/2023 1452    Acceptance, E,D, VU,NR by LL at 7/8/2023 1228    Acceptance, E,D, VU,NR by LB at 7/7/2023 1449                         Point: Body mechanics (Done)       Learning  Progress Summary             Patient Acceptance, E, VU,NR by LB at 7/12/2023 1144    Acceptance, E, VU,NR by LB at 7/11/2023 1426    Acceptance, E, VU,NR by LB at 7/10/2023 1452    Acceptance, E,D, VU,NR by LL at 7/8/2023 1228    Acceptance, E,D, VU,NR by LB at 7/7/2023 1449                         Point: Precautions (Done)       Learning Progress Summary             Patient Acceptance, E, VU,NR by LB at 7/12/2023 1144    Acceptance, E, VU,NR by LB at 7/11/2023 1426    Acceptance, E, VU,NR by LB at 7/10/2023 1452    Acceptance, E,D, VU,NR by LL at 7/8/2023 1228    Acceptance, E,D, VU,NR by LB at 7/7/2023 1449                                         User Key       Initials Effective Dates Name Provider Type Discipline     06/16/21 -  Joslyn Garcia, PT Physical Therapist PT    LL 05/02/16 -  Connie Velasquez PTA Physical Therapist Assistant PT                    PT Recommendation and Plan    Planned Therapy Interventions (PT): balance training, bed mobility training, gait training, motor coordination training, neuromuscular re-education, patient/family education, postural re-education, ROM (range of motion), strengthening, transfer training, wheelchair management/propulsion training  Frequency of Treatment (PT): 5 times per week  Anticipated Equipment Needs at Discharge (PT Eval):  (tbd)  Daily Progress Summary (PT)  Daily Progress Summary (PT): he had more L neglect and <balance due to pushing. he is not currently able to correct and requires supervision in w/c for safety.  Recommendations (PT): continue PT               Time Calculation:      PT Charges       Row Name 07/12/23 1144             Time Calculation    Start Time 0830  -LB      Stop Time 1000  -LB      Time Calculation (min) 90 min  -LB      PT Received On 07/12/23  -LB                User Key  (r) = Recorded By, (t) = Taken By, (c) = Cosigned By      Initials Name Provider Type    LB Joslyn Garcia, PT Physical Therapist                     Therapy Charges for Today       Code Description Service Date Service Provider Modifiers Qty    57392960674  PT THER PROC EA 15 MIN 7/11/2023 Joslyn Garcia, PT GP 2    57129778596 HC PT NEUROMUSC RE EDUCATION EA 15 MIN 7/11/2023 Joslyn Garcia, PT GP 2    83410993371 HC PT THERAPEUTIC ACT EA 15 MIN 7/11/2023 Joslyn Garcia, PT GP 2    89584252860 HC PT THER PROC EA 15 MIN 7/12/2023 Joslyn Garcia, PT GP 2    [id number]  PT THERAPEUTIC ACT EA 15 MIN 7/12/2023 Joslyn Garcia, PT GP 4                     Joslyn Garcia, PT  7/12/2023

## 2023-07-12 NOTE — PROGRESS NOTES
[facility]  PROGRESS NOTE     Patient Identification:  Name:  Antonio Canseco  Age:  67 y.o.  Sex:  male  :  1955  MRN:  [id number]  Visit Number:  [id number]  ROOM: [facility]     Primary Care Provider:  Adriana Ledesma MD    Length of stay in inpatient status:  6    Subjective     Chief Compliant:  No chief complaint on file.      History of Presenting Illness: 67-year-old gentleman with history of bilateral CVAs with new left-sided deficit with flaccid left lower extremity and decreased strength in the left upper extremity as well.  Patient states he is doing well with his therapy at this time has no new complaints    Objective     Current Hospital Meds:allopurinol, 300 mg, Oral, Daily  atorvastatin, 80 mg, Oral, Daily  losartan, 25 mg, Oral, Q24H  magnesium oxide, 400 mg, Oral, Daily  metoprolol tartrate, 50 mg, Oral, Q12H  nystatin, , Topical, Q12H  pantoprazole, 40 mg, Oral, Q AM  polyethylene glycol, 17 g, Oral, Daily  rivaroxaban, 20 mg, Oral, Daily With Dinner  senna-docusate sodium, 2 tablet, Oral, BID  tamsulosin, 0.4 mg, Oral, Daily       ----------------------------------------------------------------------------------------------------------------------  Vital Signs:  Temp:  [97.5 °F (36.4 °C)-98.1 °F (36.7 °C)] 98.1 °F (36.7 °C)  Heart Rate:  [71-92] 92  Resp:  [14-18] 14  BP: (113-116)/(67-71) 113/71  SpO2:  [94 %-96 %] 96 %  on   ;   Device (Oxygen Therapy): room air  Body mass index is 40.57 kg/m².    Wt Readings from Last 3 Encounters:   23 121 kg (266 lb 12.1 oz)   22 121 kg (267 lb)   07/15/19 121 kg (267 lb 1.6 oz)     Intake & Output (last 3 days)         07/09 0701  07/10 0700 07/10 0701  07/11 0700 07/11 0701  07/12 0700 07/12 07 0700    P.O. 2520 660 1320     Total Intake(mL/kg) 2520 (20.8) 660 (5.5) 1320 (10.9)     Urine (mL/kg/hr)        Stool        Total Output        Net +2520 +660 +1320             Urine Unmeasured Occurrence 7 x 5 x 5 x     Stool  Unmeasured Occurrence  1 x            Diet: Cardiac Diets; Healthy Heart (2-3 Na+); Texture: Regular Texture (IDDSI 7); Fluid Consistency: Thin (IDDSI 0)  ----------------------------------------------------------------------------------------------------------------------  Physical exam:  Constitutional: No acute distress  HEENT: Normocephalic atraumatic  Neck: Supple  Cardiovascular: Regular rate and rhythm  Pulmonary/Chest: Clear to auscultation  Abdominal: Positive bowel sounds soft.   Musculoskeletal: No arthropathy  Neurological: Left-sided paresis with 0 out of 5 strength in the left lower extremity 2-3 out of 5 strength in the left upper extremity  Skin: No rash  Peripheral vascular: No edema  Genitourinary:  ----------------------------------------------------------------------------------------------------------------------    Last echocardiogram:    ----------------------------------------------------------------------------------------------------------------------  Results from last 7 days   Lab Units 07/11/23  0301 07/09/23  0125 07/08/23  0730 07/07/23  0152   WBC 10*3/mm3 7.42 6.43  --  7.44   HEMOGLOBIN g/dL 10.5* 10.0* 10.0* 10.5*   HEMATOCRIT % 33.5* 32.8* 32.5* 34.3*   MCV fL 85.2 87.9  --  87.5   MCHC g/dL 31.3* 30.5*  --  30.6*   PLATELETS 10*3/mm3 322 355  --  361         Results from last 7 days   Lab Units 07/11/23  0301 07/09/23  0125 07/07/23  0152   SODIUM mmol/L 135* 135* 136   POTASSIUM mmol/L 4.0 4.2 4.5   MAGNESIUM mg/dL  --   --  2.1   CHLORIDE mmol/L 103 104 104   CO2 mmol/L 21.4* 21.8* 22.0   BUN mg/dL 21 24* 25*   CREATININE mg/dL 0.82 0.87 0.95   CALCIUM mg/dL 9.0 9.0 9.2   GLUCOSE mg/dL 109* 119* 113*   ALBUMIN g/dL  --   --  3.3*   BILIRUBIN mg/dL  --   --  0.4   ALK PHOS U/L  --   --  77   AST (SGOT) U/L  --   --  18   ALT (SGPT) U/L  --   --  13   Estimated Creatinine Clearance: 113.6 mL/min (by C-G formula based on SCr of 0.82 mg/dL).  No results found for: AMMONIA               Glucose   Date/Time Value Ref Range Status   07/12/2023 0609 133 (H) 70 - 130 mg/dL Final     Comment:     Meter: [id number] : [id number] [place]   07/11/2023 2009 198 (H) 70 - 130 mg/dL Final     Comment:     Meter: [id number] : [id number] [place]   07/11/2023 1615 150 (H) 70 - 130 mg/dL Final     Comment:     Meter: [id number] : [id number] emiliano bailey   07/11/2023 1118 164 (H) 70 - 130 mg/dL Final     Comment:     Meter: [id number] : [id number] Judi Harrington   07/11/2023 0603 130 70 - 130 mg/dL Final     Comment:     Meter: [id number] : [id number] SILVANO STACY   07/10/2023 2006 185 (H) 70 - 130 mg/dL Final     Comment:     Meter: [id number] : [id number] Annita Moralesn   07/10/2023 1635 140 (H) 70 - 130 mg/dL Final     Comment:     Meter: [id number] : [id number] Vinny Lemus   07/10/2023 1055 186 (H) 70 - 130 mg/dL Final     Comment:     Meter: [id number] : [id number] Cheek Chikis     No results found for: TSH, FREET4  No results found for: PREGTESTUR, PREGSERUM, HCG, HCGQUANT  Pain Management Panel           No data to display              Brief Urine Lab Results       None          No results found for: BLOODCX      No results found for: URINECX  No results found for: WOUNDCX  No results found for: STOOLCX        I have personally looked at the labs and they are summarized above.  ----------------------------------------------------------------------------------------------------------------------  Detailed radiology reports for the last 24 hours:    Imaging Results (Last 24 Hours)       ** No results found for the last 24 hours. **          Final impressions for the last 30 days of radiology reports:    CT Head Without Contrast    Result Date: 6/29/2023  Old and maturing subacute infarctions without gross hemorrhagic transformation. The possibility of new small interval infarctions in this case cannot be excluded. CRITICAL RESULT:   No. COMMUNICATION: Per this  written report. Drafted by Neil Kearns on 6/29/2023 1:45 PM Final report signed by Neil Kearns on 6/29/2023 2:04 PM    CT Head Without Contrast    Result Date: 6/25/2023  There are again the multifocal areas of evolving acute infarction. There are again a small amount of blood products related to the right occipital lobe infarct. There is mild swelling related to the areas of infarction. No large hematoma or new infarct has otherwise developed. CRITICAL RESULT:   No. COMMUNICATION: Per this written report. Drafted by Greyson Bobby MD on 6/25/2023 8:38 AM Final report signed by Greyson Bobby MD on 6/25/2023 8:49 AM    CT Head Without Contrast    Result Date: 6/21/2023  Interval development of loss of gray-white differentiation over the right frontal convexity, probable middle cerebral artery-anterior cerebral artery watershed infarct or embolic right middle cerebral artery infarct. No hemorrhagic transformation, minimal mass-effect on adjacent sulci. Findings conveyed via Epic Messenger at 1846 hours, read at 1846 hours. Greyson Hamilton M.D. This report has been electronically signed and verified by the Radiologist whose name is printed above. DD:  06/21/2023/DT:  06/21/2023 This report contains privileged and confidential information and is intended solely for the use of the individual or entity to which it is addressed. If you are not the intended recipient of this report, you are hereby notified that any copying, distribution, dissemination or action taken in relation to the contents of this report is strictly prohibited and may be unlawful. If you have received this report in error, please notify the sender immediately at [phone] and permanently delete the original report and destroy any copies or printouts.    CT Head Without Contrast    Result Date: 6/20/2023  Unchanged CT head, CT angiography head and neck. No definite acute brain abnormality. Right frontoparietal  encephalomalacia. Old basal-ganglia lacunar infarct on the left. No hemodynamically significant arterial stenosis in the neck, unchanged. No extravasation. Multifocal moderate-marked intracranial arterial stenosis, unchanged. Left tonsillar mass, extensive cervical adenopathy, partially visualized mediastinal adenopathy, unchanged. Greyson Hamilton M.D. This report has been electronically signed and verified by the Radiologist whose name is printed above. DD:  06/20/2023/DT:  06/20/2023 This report contains privileged and confidential information and is intended solely for the use of the individual or entity to which it is addressed. If you are not the intended recipient of this report, you are hereby notified that any copying, distribution, dissemination or action taken in relation to the contents of this report is strictly prohibited and may be unlawful. If you have received this report in error, please notify the sender immediately at [phone] and permanently delete the original report and destroy any copies or printouts.    CT Head Without Contrast    Result Date: 6/17/2023  Head CT: 1.No acute intracranial abnormality. 2.Multiple old infarcts, largest within the right temporoparietal region. Neck CTA: 1.On the outside neck CT from earlier today, there was contrast visible pooling within the oropharynx and hypopharynx which appeared to originate from the inferior pole of the left tonsillar mass. This pooling contrast is no longer visible, and there is currently no evidence of active contrast extravasation. There are prominent vessels which appear to be supplying the left tonsillar mass. 2.Extensive bilateral cervical lymphadenopathy as well as lymphadenopathy within the visualized mediastinum and axillae. 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 However, the remainder of the  lymphadenopathy is very extensive and solid suggesting the possibility of a synchronous lymphoproliferative disorder. 3.No significant stenosis is identified within the cervical carotid and vertebral systems within the limitations of the exam. Head CTA: 1.Focal severe stenoses of the bilateral ACAs, MCAs, and PCAs as well as the right ICA. These are all unchanged with the exception of the stenosis at the left A3 segment, which is new compared to January 2021. 2.Moderate stenoses of the bilateral intradural vertebral arteries and intracranial left ICA. CRITICAL RESULT: No. COMMUNICATION: Per this written report. Drafted by Dominic Patterson MD on 6/17/2023 9:47 PM Final report signed by Dominic Patterson MD on 6/17/2023 11:04 PM    CT Angiogram Neck    Result Date: 6/20/2023  Unchanged CT head, CT angiography head and neck. No definite acute brain abnormality. Right frontoparietal encephalomalacia. Old basal-ganglia lacunar infarct on the left. No hemodynamically significant arterial stenosis in the neck, unchanged. No extravasation. Multifocal moderate-marked intracranial arterial stenosis, unchanged. Left tonsillar mass, extensive cervical adenopathy, partially visualized mediastinal adenopathy, unchanged. Greyson Hamilton M.D. This report has been electronically signed and verified by the Radiologist whose name is printed above. DD:  06/20/2023/DT:  06/20/2023 This report contains privileged and confidential information and is intended solely for the use of the individual or entity to which it is addressed. If you are not the intended recipient of this report, you are hereby notified that any copying, distribution, dissemination or action taken in relation to the contents of this report is strictly prohibited and may be unlawful. If you have received this report in error, please notify the sender immediately at [phone] and permanently delete the original report and destroy any copies or printouts.    CT  Angiogram Neck    Result Date: 6/17/2023  Head CT: 1.No acute intracranial abnormality. 2.Multiple old infarcts, largest within the right temporoparietal region. Neck CTA: 1.On the outside neck CT from earlier today, there was contrast visible pooling within the oropharynx and hypopharynx which appeared to originate from the inferior pole of the left tonsillar mass. This pooling contrast is no longer visible, and there is currently no evidence of active contrast extravasation. There are prominent vessels which appear to be supplying the left tonsillar mass. 2.Extensive bilateral cervical lymphadenopathy as well as lymphadenopathy within the visualized mediastinum and axillae. 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 However, the remainder of the lymphadenopathy is very extensive and solid suggesting the possibility of a synchronous lymphoproliferative disorder. 3.No significant stenosis is identified within the cervical carotid and vertebral systems within the limitations of the exam. Head CTA: 1.Focal severe stenoses of the bilateral ACAs, MCAs, and PCAs as well as the right ICA. These are all unchanged with the exception of the stenosis at the left A3 segment, which is new compared to January 2021. 2.Moderate stenoses of the bilateral intradural vertebral arteries and intracranial left ICA. CRITICAL RESULT: No. COMMUNICATION: Per this written report. Drafted by Dominic Patterson MD on 6/17/2023 9:47 PM Final report signed by Dominic Patterson MD on 6/17/2023 11:04 PM    MRI Brain Without Contrast    Result Date: 6/23/2023  Multifocal acute infarcts of the brain. Multifocal chronic infarcts of the brain, many of which were present in the 11/21/2019 study. Cervical lymphadenopathy. 27 mm nodule in the left neck may represent a pathologic lymph node or other mass. The previously described left  oropharyngeal mass is better visualized in the 6/20/2023 CTA neck. CRITICAL RESULT: No. COMMUNICATION: Per this written report. Drafted by Melonie Polk MD on 6/23/2023 2:10 PM Final report signed by Melonie Polk MD on 6/23/2023 2:24 PM    CT Abdomen Pelvis With Contrast    Result Date: 6/17/2023  No findings of gastrointestinal bleed. Extensive upper abdominal, retroperitoneal and pelvic adenopathy. In the setting of known malignancy this is concerning for metastatic disease. Right adrenal gland indeterminate nodule. CRITICAL RESULT: No. COMMUNICATION: Per this written report. Drafted by Bre Shirley MD on 6/17/2023 9:32 PM Final report signed by Bre Shirley MD on 6/17/2023 9:43 PM    CT Angiogram Chest Pulmonary Embolism    Result Date: 6/17/2023  No pulmonary embolism. Left lower lobe groundglass nodular opacities may represent infectious or inflammatory process. Enlarged mediastinal and bilateral axillary lymph nodes unchanged from comparison. CRITICAL RESULT:   No. COMMUNICATION: Per this written report. Drafted by Bre Shirley MD on 6/17/2023 9:14 PM Final report signed by Bre Shirley MD on 6/17/2023 9:24 PM    US Guided Lymph Node Biopsy    Result Date: 6/23/2023  Successful ultrasound-guided right inguinal lymph node biopsy. Okay to return to floor 30 minutes postprocedure. CRITICAL RESULT: No. COMMUNICATION: Per this written report. Preliminary report signed by Tony Ortega DO on 6/23/2023 10:04 AM By electronically signing this report, I, the attending physician, attest that I was present for the entire procedure(s) and agree with the final edited report. Drafted by Tony Ortega DO on 6/23/2023 9:53 AM Final report signed by Johnie Schmitt MD on 6/23/2023 11:43 AM    CT Angiogram Head    Result Date: 6/20/2023  Unchanged CT head, CT angiography head and neck. No definite acute brain abnormality. Right frontoparietal encephalomalacia. Old basal-ganglia lacunar infarct on the left. No hemodynamically  significant arterial stenosis in the neck, unchanged. No extravasation. Multifocal moderate-marked intracranial arterial stenosis, unchanged. Left tonsillar mass, extensive cervical adenopathy, partially visualized mediastinal adenopathy, unchanged. Greyson Hamilton M.D. This report has been electronically signed and verified by the Radiologist whose name is printed above. DD:  06/20/2023/DT:  06/20/2023 This report contains privileged and confidential information and is intended solely for the use of the individual or entity to which it is addressed. If you are not the intended recipient of this report, you are hereby notified that any copying, distribution, dissemination or action taken in relation to the contents of this report is strictly prohibited and may be unlawful. If you have received this report in error, please notify the sender immediately at [phone] and permanently delete the original report and destroy any copies or printouts.    CT Angiogram Head    Result Date: 6/17/2023  Head CT: 1.No acute intracranial abnormality. 2.Multiple old infarcts, largest within the right temporoparietal region. Neck CTA: 1.On the outside neck CT from earlier today, there was contrast visible pooling within the oropharynx and hypopharynx which appeared to originate from the inferior pole of the left tonsillar mass. This pooling contrast is no longer visible, and there is currently no evidence of active contrast extravasation. There are prominent vessels which appear to be supplying the left tonsillar mass. 2.Extensive bilateral cervical lymphadenopathy as well as lymphadenopathy within the visualized mediastinum and axillae. 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 However, the remainder of the lymphadenopathy is very extensive and solid suggesting the possibility of a synchronous  lymphoproliferative disorder. 3.No significant stenosis is identified within the cervical carotid and vertebral systems within the limitations of the exam. Head CTA: 1.Focal severe stenoses of the bilateral ACAs, MCAs, and PCAs as well as the right ICA. These are all unchanged with the exception of the stenosis at the left A3 segment, which is new compared to January 2021. 2.Moderate stenoses of the bilateral intradural vertebral arteries and intracranial left ICA. CRITICAL RESULT: No. COMMUNICATION: Per this written report. Drafted by Dominic Patterson MD on 6/17/2023 9:47 PM Final report signed by Dominic Patterson MD on 6/17/2023 11:04 PM    FL Video Swallow Single Contrast    Result Date: 7/7/2023    Normal fluoroscopic video swallow exam.  Please see the speech pathologist's report for additional information.  This report was finalized on 7/7/2023 10:50 AM by Dr. Porfirio Montgomery MD.      PET/CT FDG Skull Base To Mid Thigh    Result Date: 6/26/2023  1. Innumerable enlarged FDG avid cervical, mediastinal, axillary, mesenteric, retroperitoneal and inguinal lymph nodes are suspicious for lymphoma correlation with tissue biopsy is suggested. 2. FDG avid asymmetric mass involving the left oropharynx most likely consistent with lymphoma involvement correlation with tissue biopsy is suggested. 3. Status post ischemia involving left MCA and PCA. CRITICAL RESULT: No. COMMUNICATION: Per this written report. Drafted by Jj Johnson MD on 6/26/2023 5:21 PM Final report signed by Jj Johnson MD on 6/26/2023 8:12 PM    EEG    Result Date: 6/20/2023   Abnormal EEG Background was diffusely slow. This finding may suggest mild diffuse neuronal dysfunction that is non specific of etiology. No epileptiform activity. No seizure. Correlate clinically.     IR Angiogram Carotid Cerebral Bilateral    Result Date: 6/20/2023  Selective angiograms of the bilateral external carotid arteries and superselective angiograms  of bilateral linguo-facial trunk and the internal maxillary artery is within normal limits. No evidence of active contrast extravasation, pseudoaneurysm, vasospasm or vessel injury. Since no target for embolization was identified and the patient does not have active oropharyngeal bleed, embolization was not performed. COMMUNICATION: Per this written report. The findings were communicated to the care team. Preliminary report signed by Nicolas Stallings MD on 6/20/2023 5:15 PM By electronically signing this report, I, the attending physician, attest that I was present for the entire procedure(s) and agree with the final edited report. Drafted by Nicolas Stallings MD on 6/20/2023 4:47 PM Final report signed by Kaylin Lee MD on 6/20/2023 5:33 PM    XR chest AP portable    Result Date: 6/17/2023  Endotracheal tube in good position. Images reviewed, interpreted, and dictated by Dr. Boo Hendrickson. Transcribed by Valente Nichols PA-C.    CT neck soft tissue with IV contrast    Result Date: 6/17/2023  Extensive, new cervical adenopathy concerning for underlying lymphoma or metastatic disease Images reviewed, interpreted, and dictated by Dr. Boo Hendrickson. Transcribed by Valente Nichols PA-C.    FL Esophogram w Modified Barium Swallow Single Contrast    Result Date: 6/21/2023  No aspiration or penetration Please see separate note by Speech therapy team for dietary recommendations. CRITICAL RESULT:   No. COMMUNICATION: Per this written report. Drafted by Itz Jack MD on 6/21/2023 9:00 AM Final report signed by Itz Jack MD on 6/21/2023 9:00 AM   I have personally looked at the radiology images and read the final radiology report.    Assessment & Plan    Status post bilateral CVAs with new dense left-sided deficit.  Patient currently on Xarelto and statin therapy.  Patient requiring total maximum assistance for upper body dressing; dependent for.  Is working on balance issues as he does have a tendency to drift to the left.   Is working on neuromuscular reeducation activities as well    History of atrial fibrillation currently on Lopressor and Xarelto.  Rate is controlled    Hypertension controlled    Non-Hodgkin's lymphoma will be following with oncology as an outpatient    History of laryngeal CA    Anemia has been stable.    VTE Prophylaxis:   Mechanical Order History:       None          Pharmalogical Order History:        Ordered     Dose Route Frequency Stop    07/06/23 4615  rivaroxaban (XARELTO) tablet 20 mg        Question:  Are you ordering rivaroxaban 10 mg for the prevention of blood clots in an acutely ill medical patient?  Answer:  No    20 mg PO Daily With Dinner --                        Sj Jay MD  [facility]  07/12/23  10:29 EDT

## 2023-07-13 LAB
GLUCOSE BLDC GLUCOMTR-MCNC: 137 MG/DL (ref 70–130)
GLUCOSE BLDC GLUCOMTR-MCNC: 149 MG/DL (ref 70–130)
GLUCOSE BLDC GLUCOMTR-MCNC: 150 MG/DL (ref 70–130)
GLUCOSE BLDC GLUCOMTR-MCNC: 181 MG/DL (ref 70–130)

## 2023-07-13 PROCEDURE — 82948 REAGENT STRIP/BLOOD GLUCOSE: CPT

## 2023-07-13 PROCEDURE — 97112 NEUROMUSCULAR REEDUCATION: CPT | Performed by: OCCUPATIONAL THERAPIST

## 2023-07-13 PROCEDURE — 97530 THERAPEUTIC ACTIVITIES: CPT

## 2023-07-13 PROCEDURE — 97112 NEUROMUSCULAR REEDUCATION: CPT

## 2023-07-13 PROCEDURE — 97110 THERAPEUTIC EXERCISES: CPT

## 2023-07-13 PROCEDURE — 97110 THERAPEUTIC EXERCISES: CPT | Performed by: OCCUPATIONAL THERAPIST

## 2023-07-13 PROCEDURE — 97535 SELF CARE MNGMENT TRAINING: CPT | Performed by: OCCUPATIONAL THERAPIST

## 2023-07-13 RX ADMIN — ALLOPURINOL 300 MG: 300 TABLET ORAL at 08:24

## 2023-07-13 RX ADMIN — MAGNESIUM GLUCONATE 500 MG ORAL TABLET 400 MG: 500 TABLET ORAL at 08:25

## 2023-07-13 RX ADMIN — NYSTATIN: 100000 POWDER TOPICAL at 08:25

## 2023-07-13 RX ADMIN — METOPROLOL TARTRATE 50 MG: 50 TABLET, FILM COATED ORAL at 21:50

## 2023-07-13 RX ADMIN — RIVAROXABAN 20 MG: 20 TABLET, FILM COATED ORAL at 19:04

## 2023-07-13 RX ADMIN — ATORVASTATIN CALCIUM 80 MG: 40 TABLET, FILM COATED ORAL at 08:24

## 2023-07-13 RX ADMIN — PANTOPRAZOLE SODIUM 40 MG: 40 TABLET, DELAYED RELEASE ORAL at 05:29

## 2023-07-13 RX ADMIN — METOPROLOL TARTRATE 50 MG: 50 TABLET, FILM COATED ORAL at 08:23

## 2023-07-13 RX ADMIN — OXYCODONE AND ACETAMINOPHEN 1 TABLET: 5; 325 TABLET ORAL at 21:50

## 2023-07-13 RX ADMIN — NYSTATIN: 100000 POWDER TOPICAL at 21:52

## 2023-07-13 RX ADMIN — LOSARTAN POTASSIUM 25 MG: 25 TABLET, FILM COATED ORAL at 08:24

## 2023-07-13 RX ADMIN — TAMSULOSIN HYDROCHLORIDE 0.4 MG: 0.4 CAPSULE ORAL at 08:24

## 2023-07-13 NOTE — PLAN OF CARE
Goal Outcome Evaluation:                 Resting in bed with no complaints at this time. Continue current plan of care.

## 2023-07-13 NOTE — THERAPY TREATMENT NOTE
Inpatient Rehabilitation - Physical Therapy Treatment Note       THALIA Trujillo     Patient Name: Antonio Canseco  : 1955  MRN: [id number]    Today's Date: 2023                    Admit Date: 2023      Visit Dx:   No diagnosis found.    Patient Active Problem List   Diagnosis    Detrusor instability    Low testosterone in male    Disorder of prostate    Corporo-venous occlusive erectile dysfunction    CVA (cerebral vascular accident)       Past Medical History:   Diagnosis Date    Diabetes mellitus     Hypertension     Stroke        Past Surgical History:   Procedure Laterality Date    US GUIDED LYMPH NODE BIOPSY  2023       PT ASSESSMENT (last 12 hours)       IRF PT Evaluation and Treatment       Row Name 23 1217          PT Time and Intention    Document Type daily treatment  -LL     Mode of Treatment individual therapy;physical therapy  -LL     Patient/Family/Caregiver Comments/Observations Patient voiced no new c/o and was agreeable to PT participation.  -LL       Row Name 23 1217          General Information    Patient Profile Reviewed yes  -LL     Existing Precautions/Restrictions fall  L HP, <trunk support/balance  -LL       Row Name 23 1217          Pain Scale: FACES Pre/Post-Treatment    Pain: FACES Scale, Pretreatment 0-->no hurt  -LL     Posttreatment Pain Rating 0-->no hurt  -LL       Row Name 23 1217          Cognition/Psychosocial    Orientation Status (Cognition) oriented to;person;place;situation  -LL     Follows Commands (Cognition) verbal cues/prompting required;physical/tactile prompts required  -LL     Personal Safety Interventions nonskid shoes/slippers when out of bed;supervised activity;fall prevention program maintained  -LL     Cognitive Function safety deficit  -LL       Row Name 23 1217          Mobility    Therapeutic Standing Program standing frame utilized  x 21 min working on neutral posture and self correction with use of mirror for visual  feedback  -LL     Additional Documentation Therapeutic Standing Program (Row)  -       Row Name 07/13/23 1217          Bed Mobility    Rolling Left [place] (Bed Mobility) verbal cues;nonverbal cues (demo/gesture);moderate assist (50% patient effort)  -LL     Rolling Right [place] (Bed Mobility) dependent (less than 25% patient effort);verbal cues;nonverbal cues (demo/gesture);maximum assist (25% patient effort)  -LL     Assistive Device (Bed Mobility) bed rails;draw sheet  -LL     Comment, (Bed Mobility) Bed mobility for dressing and  -LL       Row Name 07/13/23 1217          Bed-Chair Transfer    Bed-Chair [place] (Transfers) dependent (less than 25% patient effort)  -LL     Assistive Device (Bed-Chair Transfers) lift device  -       Row Name 07/13/23 1217          Chair-Bed Transfer    Chair-Bed [place] (Transfers) dependent (less than 25% patient effort)  -LL     Assistive Device (Chair-Bed Transfers) lift device  -       Row Name 07/13/23 1217          Balance    Comment, Balance EOM sitting balance with mod/max A x 2 working on neutral posture and self correction of posture with use of mirror for visual feedback  -LL       Row Name 07/13/23 1217          Motor Skills    Therapeutic Exercise --  GS/QS  -       Row Name 07/13/23 1217          Hip (Therapeutic Exercise)    Hip Strengthening (Therapeutic Exercise) bilateral;aBduction;aDduction;heel slides;supine  L LE AAROM/PROM; R LE AROM  -LL       Row Name 07/13/23 1217          Knee (Therapeutic Exercise)    Knee Strengthening (Therapeutic Exercise) bilateral;heel slides;supine  L LE AAROM/PROM; R LE AROM  -LL       Row Name 07/13/23 1217          Ankle (Therapeutic Exercise)    Ankle Strengthening (Therapeutic Exercise) bilateral;dorsiflexion;plantarflexion;supine  L LE AAROM/PROM; R LE AROM  -LL       Row Name 07/13/23 1217          Positioning and Restraints    Pre-Treatment Position in bed  -LL     Post Treatment Position  wheelchair  -LL     In Wheelchair sitting;notified nsg;legs elevated  In hallway in front of nurses station  -LL               User Key  (r) = Recorded By, (t) = Taken By, (c) = Cosigned By      Initials Name Provider Type    Connie Isabel PTA Physical Therapist Assistant                     Physical Therapy Education       Title: PT OT SLP Therapies (Done)       Topic: Physical Therapy (Done)       Point: Mobility training (Done)       Learning Progress Summary             Patient Acceptance, E,D, VU,NR by LL at 7/13/2023 1358    Acceptance, E, VU,NR by LB at 7/12/2023 1144    Acceptance, E, VU,NR by LB at 7/11/2023 1426    Acceptance, E, VU,NR by LB at 7/10/2023 1452    Acceptance, E,D, VU,NR by LL at 7/8/2023 1228    Acceptance, E,D, VU,NR by LB at 7/7/2023 1449                         Point: Home exercise program (Done)       Learning Progress Summary             Patient Acceptance, E,D, VU,NR by LL at 7/13/2023 1358    Acceptance, E, VU,NR by LB at 7/12/2023 1144    Acceptance, E, VU,NR by LB at 7/11/2023 1426    Acceptance, E, VU,NR by LB at 7/10/2023 1452    Acceptance, E,D, VU,NR by LL at 7/8/2023 1228    Acceptance, E,D, VU,NR by LB at 7/7/2023 1449                         Point: Body mechanics (Done)       Learning Progress Summary             Patient Acceptance, E,D, VU,NR by LL at 7/13/2023 1358    Acceptance, E, VU,NR by LB at 7/12/2023 1144    Acceptance, E, VU,NR by LB at 7/11/2023 1426    Acceptance, E, VU,NR by LB at 7/10/2023 1452    Acceptance, E,D, VU,NR by LL at 7/8/2023 1228    Acceptance, E,D, VU,NR by LB at 7/7/2023 1449                         Point: Precautions (Done)       Learning Progress Summary             Patient Acceptance, E,D, VU,NR by LL at 7/13/2023 1358    Acceptance, E, VU,NR by LB at 7/12/2023 1144    Acceptance, E, VU,NR by LB at 7/11/2023 1426    Acceptance, E, VU,NR by LB at 7/10/2023 1452    Acceptance, E,D, VU,NR by LL at 7/8/2023 1228    Acceptance, E,D, VU,NR by  LB at 7/7/2023 1449                                         User Key       Initials Effective Dates Name Provider Type Discipline    LB 06/16/21 -  Joslyn Garcia, PT Physical Therapist PT    LL 05/02/16 -  Cnonie Velasquez PTA Physical Therapist Assistant PT                    PT Recommendation and Plan    Frequency of Treatment (PT): 5 times per week  Anticipated Equipment Needs at Discharge (PT Eval):  (tbd)                  Time Calculation:      PT Charges       Row Name 07/13/23 1358             Time Calculation    Start Time 0720  -LL      Stop Time 0915  -LL      Time Calculation (min) 115 min  -LL      PT Received On 07/13/23  -LL         Time Calculation- PT    Total Timed Code Minutes-  minute(s)  -LL                User Key  (r) = Recorded By, (t) = Taken By, (c) = Cosigned By      Initials Name Provider Type    LL Connie Velasquez PTA Physical Therapist Assistant                    Therapy Charges for Today       Code Description Service Date Service Provider Modifiers Qty    54392627957 HC PT NEUROMUSC RE EDUCATION EA 15 MIN 7/13/2023 Connie Velasquez PTA GP, CQ 2    15626909193 HC PT THERAPEUTIC ACT EA 15 MIN 7/13/2023 Connie Velasquez PTA GP, CQ 2    77996154307 HC PT THER PROC EA 15 MIN 7/13/2023 Connie Velasquez PTA GP, CQ 4                     Connie. JILL Velasquez  7/13/2023

## 2023-07-13 NOTE — THERAPY TREATMENT NOTE
Inpatient Rehabilitation - Occupational Therapy Treatment Note     Ronnie     Patient Name: Antonio Canseco  : 1955  MRN: [id number]    Today's Date: 2023                 Admit Date: 2023       No diagnosis found.    Patient Active Problem List   Diagnosis    Detrusor instability    Low testosterone in male    Disorder of prostate    Corporo-venous occlusive erectile dysfunction    CVA (cerebral vascular accident)       Past Medical History:   Diagnosis Date    Diabetes mellitus     Hypertension     Stroke        Past Surgical History:   Procedure Laterality Date    US GUIDED LYMPH NODE BIOPSY  2023             IRF OT ASSESSMENT FLOWSHEET (last 12 hours)       IRF OT Evaluation and Treatment       Row Name 23 1344          OT Time and Intention    Document Type daily treatment  -BF     Mode of Treatment occupational therapy  -BF     Patient Effort good  -BF     Symptoms Noted During/After Treatment none  -BF       Row Name 23 1343          General Information    Patient/Family/Caregiver Comments/Observations Pt with much improved postural control this date, demonstrated LUE shoulder AROM 50-75%. Pt continues to require frequent verbal/physical cues to use L hand and demonstrated impaired sequencing/planning with U/B dressing.  -BF     Existing Precautions/Restrictions fall  L HP, <trunk support/balance  -BF     Limitations/Impairments safety/cognitive  -BF       Row Name 23 2432          Cognition/Psychosocial    Orientation Status (Cognition) oriented to;person;place;situation  -BF     Follows Commands (Cognition) verbal cues/prompting required;physical/tactile prompts required;repetition of directions required;increased processing time needed  -BF     Cognitive Function executive function deficit  -BF     Executive Function Deficit (Cognition) organization/sequencing;problem-solving/reasoning  -BF       Row Name 23 7547          Upper Body Dressing    [place]  Level (Upper Body Dressing) maximal assist (25-49% patient effort);verbal cues;nonverbal cues (demo/gesture)  -BF     Position (Upper Body Dressing) supported sitting  -BF     Comment (Upper Body Dressing) Max A  -BF       Row Name 07/13/23 1344          Lower Body Dressing    [place] Level (Lower Body Dressing) maximum assist (25% patient effort);verbal cues;nonverbal cues (demo/gesture)  -BF     Position (Lower Body Dressing) supported sitting  -BF     Comment (Lower Body Dressing) Max A  -BF       Row Name 07/13/23 1344          Self-Feeding    [place] Level (Self-Feeding) set up  -BF     Position (Self-Feeding) supported sitting  -BF       Row Name 07/13/23 1344          Motor Skills    Neuromuscular Function left;upper extremity;moderate impairment  -BF     Motor Control/Coordination Interventions fine motor manipulation/dexterity activities;gross motor coordination activities;neuro-muscular re-education;therapeutic exercise/ROM  LUE AA/AROM, GMC/FMC theract, reaching; BUE PRE 3 mins X3, BUE rickshaw 25 lbs X15X4, pulleys  -BF       Row Name 07/13/23 1344          Neuromuscular Re-education    Interventions (Neuromuscular Re-education) facilitation/inhibition  LUE  -BF     Positioning (Neuromuscular Re-education) sitting  -BF       Row Name 07/13/23 1344          Positioning and Restraints    In Wheelchair sitting;notified nsg;patient within staff view  at nsg station  -BF               User Key  (r) = Recorded By, (t) = Taken By, (c) = Cosigned By      Initials Name Effective Dates    Lisa West OT 07/11/23 -                      Occupational Therapy Education       Title: PT OT SLP Therapies (Done)       Topic: Occupational Therapy (Done)       Point: ADL training (Done)       Description:   Instruct learner(s) on proper safety adaptation and remediation techniques during self care or transfers.   Instruct in proper use of assistive devices.                  Learning Progress Summary              Patient Acceptance, E, VU,NR by BF at 7/13/2023 1343    Acceptance, E,D, VU,NR by TM at 7/12/2023 1407    Acceptance, D,E, NR,VU by TM at 7/11/2023 1434    Acceptance, E, VU,NR by BF at 7/10/2023 1456    Acceptance, E, VU,NR by HB at 7/8/2023 1140    Acceptance, E, VU,NR by BF at 7/7/2023 1442                         Point: Precautions (Done)       Description:   Instruct learner(s) on prescribed precautions during self-care and functional transfers.                  Learning Progress Summary             Patient Acceptance, E, VU,NR by BF at 7/13/2023 1343    Acceptance, E,D, VU,NR by TM at 7/12/2023 1407    Acceptance, D,E, NR,VU by TM at 7/11/2023 1434    Acceptance, E, VU,NR by BF at 7/10/2023 1456    Acceptance, E, VU,NR by HB at 7/8/2023 1140    Acceptance, E, VU,NR by BF at 7/7/2023 1442                                         User Key       Initials Effective Dates Name Provider Type Discipline    BF 05/31/23 - 07/10/23 Lisa Sanchez, OT Occupational Therapist OT    BF 07/11/23 -  Lisa Sanchez, OT Occupational Therapist OT    TM 06/16/21 -  Marielos Capone, OT Occupational Therapist OT    HB 05/25/21 -  Veronica Robles, OT Occupational Therapist OT                        OT Recommendation and Plan    Planned Therapy Interventions (OT): activity tolerance training, adaptive equipment training, BADL retraining, neuromuscular control/coordination retraining, passive ROM/stretching, ROM/therapeutic exercise, strengthening exercise, transfer/mobility retraining                    Time Calculation:      Time Calculation- OT       Row Name 07/13/23 1350             Time Calculation- OT    OT Start Time 1000  -BF      OT Stop Time 1140  -BF      OT Time Calculation (min) 100 min  -BF      Total Timed Code Minutes-  minute(s)  -BF      OT Non-Billable Time (min) 10 min  -BF                User Key  (r) = Recorded By, (t) = Taken By, (c) = Cosigned By      Initials Name Provider  Type    BF Lisa Sanchez OT Occupational Therapist                  Therapy Charges for Today       Code Description Service Date Service Provider Modifiers Qty    12419502705 HC OT SELF CARE/MGMT/TRAIN EA 15 MIN 7/13/2023 Lisa Sanchez OT GO 2    76871806151 HC OT NEUROMUSC RE EDUCATION EA 15 MIN 7/13/2023 Lisa Sanchez OT GO 3    99516068916 HC OT THER PROC EA 15 MIN 7/13/2023 Lisa Sanchez OT GO 2                     Lisa Sanchez OT  7/13/2023

## 2023-07-13 NOTE — PROGRESS NOTES
Occupational Therapy:    Physical Therapy: Individual: 115 minutes.    Speech Language Pathology:    Signed by: Connie Velasquez PTA

## 2023-07-13 NOTE — PLAN OF CARE
Goal Outcome Evaluation:    Problem: Rehabilitation (IRF) Plan of Care  Goal: Plan of Care Review  Outcome: Ongoing, Progressing  Flowsheets (Taken 7/10/2023 1117 by Karyn Miller RN)  Progress: improving  Plan of Care Reviewed With: patient  Goal: Patient-Specific Goal (Individualized)  Outcome: Ongoing, Progressing  Goal: Absence of New-Onset Illness or Injury  Outcome: Ongoing, Progressing  Intervention: Prevent Fall and Fall Injury  Recent Flowsheet Documentation  Taken 7/13/2023 1200 by Madie Maza RN  Safety Promotion/Fall Prevention:   assistive device/personal items within reach   safety round/check completed  Taken 7/13/2023 1000 by Madie Maza RN  Safety Promotion/Fall Prevention:   assistive device/personal items within reach   safety round/check completed  Taken 7/13/2023 0800 by Madie Maza RN  Safety Promotion/Fall Prevention:   assistive device/personal items within reach   safety round/check completed  Intervention: Prevent Infection  Recent Flowsheet Documentation  Taken 7/13/2023 1200 by Madie Maza RN  Infection Prevention: hand hygiene promoted  Taken 7/13/2023 1000 by Madie Maza RN  Infection Prevention: hand hygiene promoted  Taken 7/13/2023 0800 by Madie Maza RN  Infection Prevention: hand hygiene promoted  Intervention: Prevent VTE (Venous Thromboembolism)  Recent Flowsheet Documentation  Taken 7/13/2023 0823 by Madie Maza RN  VTE Prevention/Management: (xarelto) other (see comments)  Goal: Optimal Comfort and Wellbeing  Outcome: Ongoing, Progressing  Goal: Home and Community Transition Plan Established  Outcome: Ongoing, Progressing     Problem: Diabetes Comorbidity  Goal: Blood Glucose Level Within Targeted Range  Outcome: Ongoing, Progressing     Problem: Skin Injury Risk Increased  Goal: Skin Health and Integrity  Outcome: Ongoing, Progressing  Intervention: Optimize Skin Protection  Recent Flowsheet Documentation  Taken 7/13/2023  0823 by Madie Maza RN  Pressure Reduction Techniques: frequent weight shift encouraged  Pressure Reduction Devices: heel offloading device utilized  Skin Protection: adhesive use limited     Problem: Fall Injury Risk  Goal: Absence of Fall and Fall-Related Injury  Outcome: Ongoing, Progressing  Intervention: Identify and Manage Contributors  Recent Flowsheet Documentation  Taken 7/13/2023 1200 by Madie Maza RN  Medication Review/Management: medications reviewed  Taken 7/13/2023 1000 by Madie Maza RN  Medication Review/Management: medications reviewed  Taken 7/13/2023 0800 by Madie Maza RN  Medication Review/Management: medications reviewed  Intervention: Promote Injury-Free Environment  Recent Flowsheet Documentation  Taken 7/13/2023 1200 by Madie Maza RN  Safety Promotion/Fall Prevention:   assistive device/personal items within reach   safety round/check completed  Taken 7/13/2023 1000 by Madie Maza RN  Safety Promotion/Fall Prevention:   assistive device/personal items within reach   safety round/check completed  Taken 7/13/2023 0800 by Madie Maza RN  Safety Promotion/Fall Prevention:   assistive device/personal items within reach   safety round/check completed     Problem: Mobility Impairment  Goal: Optimal Mobility [place] and Safety  Outcome: Ongoing, Progressing

## 2023-07-13 NOTE — PROGRESS NOTES
Occupational Therapy: Individual: 100 minutes.    Physical Therapy:    Speech Language Pathology:    Signed by: Lisa Sanchez OT

## 2023-07-14 LAB
ANION GAP SERPL CALCULATED.3IONS-SCNC: 8.8 MMOL/L (ref 5–15)
BASOPHILS # BLD AUTO: 0.05 10*3/MM3 (ref 0–0.2)
BASOPHILS NFR BLD AUTO: 0.7 % (ref 0–1.5)
BUN SERPL-MCNC: 21 MG/DL (ref 8–23)
BUN/CREAT SERPL: 21 (ref 7–25)
CALCIUM SPEC-SCNC: 8.8 MG/DL (ref 8.6–10.5)
CHLORIDE SERPL-SCNC: 104 MMOL/L (ref 98–107)
CO2 SERPL-SCNC: 23.2 MMOL/L (ref 22–29)
CREAT SERPL-MCNC: 1 MG/DL (ref 0.76–1.27)
DEPRECATED RDW RBC AUTO: 39.8 FL (ref 37–54)
EGFRCR SERPLBLD CKD-EPI 2021: 83.5 ML/MIN/1.73
EOSINOPHIL # BLD AUTO: 0.2 10*3/MM3 (ref 0–0.4)
EOSINOPHIL NFR BLD AUTO: 2.9 % (ref 0.3–6.2)
ERYTHROCYTE [DISTWIDTH] IN BLOOD BY AUTOMATED COUNT: 12.6 % (ref 12.3–15.4)
GLUCOSE BLDC GLUCOMTR-MCNC: 127 MG/DL (ref 70–130)
GLUCOSE BLDC GLUCOMTR-MCNC: 136 MG/DL (ref 70–130)
GLUCOSE BLDC GLUCOMTR-MCNC: 143 MG/DL (ref 70–130)
GLUCOSE BLDC GLUCOMTR-MCNC: 149 MG/DL (ref 70–130)
GLUCOSE BLDC GLUCOMTR-MCNC: 207 MG/DL (ref 70–130)
GLUCOSE SERPL-MCNC: 100 MG/DL (ref 65–99)
HCT VFR BLD AUTO: 30.3 % (ref 37.5–51)
HCT VFR BLD AUTO: 31.9 % (ref 37.5–51)
HGB BLD-MCNC: 10 G/DL (ref 13–17.7)
HGB BLD-MCNC: 9.5 G/DL (ref 13–17.7)
IMM GRANULOCYTES # BLD AUTO: 0.02 10*3/MM3 (ref 0–0.05)
IMM GRANULOCYTES NFR BLD AUTO: 0.3 % (ref 0–0.5)
LYMPHOCYTES # BLD AUTO: 1.36 10*3/MM3 (ref 0.7–3.1)
LYMPHOCYTES NFR BLD AUTO: 19.8 % (ref 19.6–45.3)
MCH RBC QN AUTO: 27.1 PG (ref 26.6–33)
MCHC RBC AUTO-ENTMCNC: 31.4 G/DL (ref 31.5–35.7)
MCV RBC AUTO: 86.6 FL (ref 79–97)
MONOCYTES # BLD AUTO: 0.78 10*3/MM3 (ref 0.1–0.9)
MONOCYTES NFR BLD AUTO: 11.4 % (ref 5–12)
NEUTROPHILS NFR BLD AUTO: 4.45 10*3/MM3 (ref 1.7–7)
NEUTROPHILS NFR BLD AUTO: 64.9 % (ref 42.7–76)
NRBC BLD AUTO-RTO: 0 /100 WBC (ref 0–0.2)
PLATELET # BLD AUTO: 257 10*3/MM3 (ref 140–450)
PMV BLD AUTO: 9.8 FL (ref 6–12)
POTASSIUM SERPL-SCNC: 4.2 MMOL/L (ref 3.5–5.2)
QT INTERVAL: 410 MS
QTC INTERVAL: 419 MS
RBC # BLD AUTO: 3.5 10*6/MM3 (ref 4.14–5.8)
SODIUM SERPL-SCNC: 136 MMOL/L (ref 136–145)
WBC NRBC COR # BLD: 6.86 10*3/MM3 (ref 3.4–10.8)

## 2023-07-14 PROCEDURE — 85018 HEMOGLOBIN: CPT | Performed by: FAMILY MEDICINE

## 2023-07-14 PROCEDURE — 85014 HEMATOCRIT: CPT | Performed by: FAMILY MEDICINE

## 2023-07-14 PROCEDURE — 93005 ELECTROCARDIOGRAM TRACING: CPT | Performed by: FAMILY MEDICINE

## 2023-07-14 PROCEDURE — 80048 BASIC METABOLIC PNL TOTAL CA: CPT | Performed by: FAMILY MEDICINE

## 2023-07-14 PROCEDURE — 97112 NEUROMUSCULAR REEDUCATION: CPT

## 2023-07-14 PROCEDURE — 97112 NEUROMUSCULAR REEDUCATION: CPT | Performed by: OCCUPATIONAL THERAPIST

## 2023-07-14 PROCEDURE — 99231 SBSQ HOSP IP/OBS SF/LOW 25: CPT | Performed by: FAMILY MEDICINE

## 2023-07-14 PROCEDURE — 97110 THERAPEUTIC EXERCISES: CPT

## 2023-07-14 PROCEDURE — 97530 THERAPEUTIC ACTIVITIES: CPT | Performed by: OCCUPATIONAL THERAPIST

## 2023-07-14 PROCEDURE — 97530 THERAPEUTIC ACTIVITIES: CPT

## 2023-07-14 PROCEDURE — 97535 SELF CARE MNGMENT TRAINING: CPT | Performed by: OCCUPATIONAL THERAPIST

## 2023-07-14 PROCEDURE — 82948 REAGENT STRIP/BLOOD GLUCOSE: CPT

## 2023-07-14 PROCEDURE — 85025 COMPLETE CBC W/AUTO DIFF WBC: CPT | Performed by: FAMILY MEDICINE

## 2023-07-14 RX ADMIN — ALLOPURINOL 300 MG: 300 TABLET ORAL at 09:08

## 2023-07-14 RX ADMIN — OXYCODONE AND ACETAMINOPHEN 1 TABLET: 5; 325 TABLET ORAL at 09:08

## 2023-07-14 RX ADMIN — RIVAROXABAN 20 MG: 20 TABLET, FILM COATED ORAL at 17:12

## 2023-07-14 RX ADMIN — METOPROLOL TARTRATE 50 MG: 50 TABLET, FILM COATED ORAL at 09:08

## 2023-07-14 RX ADMIN — TAMSULOSIN HYDROCHLORIDE 0.4 MG: 0.4 CAPSULE ORAL at 09:08

## 2023-07-14 RX ADMIN — LOSARTAN POTASSIUM 25 MG: 25 TABLET, FILM COATED ORAL at 09:08

## 2023-07-14 RX ADMIN — NYSTATIN: 100000 POWDER TOPICAL at 09:09

## 2023-07-14 RX ADMIN — MAGNESIUM GLUCONATE 500 MG ORAL TABLET 400 MG: 500 TABLET ORAL at 09:08

## 2023-07-14 RX ADMIN — PANTOPRAZOLE SODIUM 40 MG: 40 TABLET, DELAYED RELEASE ORAL at 06:00

## 2023-07-14 RX ADMIN — ATORVASTATIN CALCIUM 80 MG: 40 TABLET, FILM COATED ORAL at 09:08

## 2023-07-14 RX ADMIN — HYDROXYZINE HYDROCHLORIDE 25 MG: 25 TABLET, FILM COATED ORAL at 20:52

## 2023-07-14 RX ADMIN — METOPROLOL TARTRATE 50 MG: 50 TABLET, FILM COATED ORAL at 20:53

## 2023-07-14 NOTE — PROGRESS NOTES
Occupational Therapy: Individual: 70 minutes.    Physical Therapy:    Speech Language Pathology:    Signed by: Lisa Sanchez OT

## 2023-07-14 NOTE — PROGRESS NOTES
Occupational Therapy:    Physical Therapy: Individual: 90 minutes.    Speech Language Pathology:    Signed by: Pj Spear PT

## 2023-07-14 NOTE — PROGRESS NOTES
Rehabilitation Nursing  Inpatient Rehabilitation Plan of Care Note    Plan of Care  Body Function Structure    Skin Integrity (Active)  Current Status (7/6/2023 4:18:00 PM): At Risk for Skin Breakdown  Weekly Goal: No Skin Breakdown  Discharge Goal: No Skin Breakdown    Safety    Potential for Injury (Active)  Current Status (7/6/2023 4:18:00 PM): Potential for Falls  Weekly Goal: No Falls  Discharge Goal: No FallsC    Signed by: Samantha Hayes Nurse

## 2023-07-14 NOTE — PROGRESS NOTES
[facility]  PROGRESS NOTE     Patient Identification:  Name:  Antonio Canseco  Age:  67 y.o.  Sex:  male  :  1955  MRN:  [id number]  Visit Number:  [id number]  ROOM: [facility]     Primary Care Provider:  Adriana Ledesma MD    Length of stay in inpatient status:  8    Subjective     Chief Compliant:  No chief complaint on file.      History of Presenting Illness: 67-year-old gentleman with a history of bilateral CVAs with new left-sided deficit with flaccid left lower extremity and decreased strength in left upper extremity but improving.  Patient was working in the standing frame this morning when he became diaphoretic and had a syncopal episode.  Patient denied any chest pain associated with the episode.  Denies shortness of breath at this time.  Did have a couple of loose stools last evening.    Objective     Current Hospital Meds:allopurinol, 300 mg, Oral, Daily  atorvastatin, 80 mg, Oral, Daily  losartan, 25 mg, Oral, Q24H  magnesium oxide, 400 mg, Oral, Daily  metoprolol tartrate, 50 mg, Oral, Q12H  nystatin, , Topical, Q12H  pantoprazole, 40 mg, Oral, Q AM  rivaroxaban, 20 mg, Oral, Daily With Dinner  tamsulosin, 0.4 mg, Oral, Daily       ----------------------------------------------------------------------------------------------------------------------  Vital Signs:  Temp:  [97.2 °F (36.2 °C)] 97.2 °F (36.2 °C)  Heart Rate:  [78] 78  Resp:  [20] 20  BP: (98)/(60) 98/60  SpO2:  [98 %] 98 %  on   ;   Device (Oxygen Therapy): room air  Body mass index is 40.57 kg/m².    Wt Readings from Last 3 Encounters:   23 121 kg (266 lb 12.1 oz)   22 121 kg (267 lb)   07/15/19 121 kg (267 lb 1.6 oz)     Intake & Output (last 3 days)          0701  07/15 0700    P.O. 1320 360 840 120    Total Intake(mL/kg) 1320 (10.9) 360 (3) 840 (6.9) 120 (1)    Net +1320 +360 +840 +120            Urine Unmeasured Occurrence 5 x 2 x 5 x      Stool Unmeasured Occurrence  5 x 2 x           Diet: Cardiac Diets; Healthy Heart (2-3 Na+); Texture: Regular Texture (IDDSI 7); Fluid Consistency: Thin (IDDSI 0)  ----------------------------------------------------------------------------------------------------------------------  Physical exam:  Constitutional: No acute distress  HEENT: Normocephalic atraumatic  Neck:   Supple  Cardiovascular: Irregularly irregular, ventricular rate in the 60s  Pulmonary/Chest: Clear to auscultation  Abdominal: Positive bowel sounds soft.   Musculoskeletal: No arthropathy  Neurological: 0-1 out of 5 strength in the left leg 3-4 out of 5 strength in the left upper extremity  Skin: No rash  Peripheral vascular: No edema  Genitourinary:  ----------------------------------------------------------------------------------------------------------------------    Last echocardiogram:    ----------------------------------------------------------------------------------------------------------------------  Results from last 7 days   Lab Units 07/14/23  1028 07/14/23  0104 07/11/23  0301 07/09/23  0125   WBC 10*3/mm3  --  6.86 7.42 6.43   HEMOGLOBIN g/dL 10.0* 9.5* 10.5* 10.0*   HEMATOCRIT % 31.9* 30.3* 33.5* 32.8*   MCV fL  --  86.6 85.2 87.9   MCHC g/dL  --  31.4* 31.3* 30.5*   PLATELETS 10*3/mm3  --  257 322 355         Results from last 7 days   Lab Units 07/14/23  0104 07/11/23  0301 07/09/23  0125   SODIUM mmol/L 136 135* 135*   POTASSIUM mmol/L 4.2 4.0 4.2   CHLORIDE mmol/L 104 103 104   CO2 mmol/L 23.2 21.4* 21.8*   BUN mg/dL 21 21 24*   CREATININE mg/dL 1.00 0.82 0.87   CALCIUM mg/dL 8.8 9.0 9.0   GLUCOSE mg/dL 100* 109* 119*   Estimated Creatinine Clearance: 93.1 mL/min (by C-G formula based on SCr of 1 mg/dL).  No results found for: AMMONIA              Glucose   Date/Time Value Ref Range Status   07/14/2023 1058 149 (H) 70 - 130 mg/dL Final     Comment:     Meter: [id number] : [id number] CYNDY PORTER   07/14/2023 0957  143 (H) 70 - 130 mg/dL Final     Comment:     Meter: [id number] : [id number] CHAYA FIERRO   07/14/2023 0624 127 70 - 130 mg/dL Final     Comment:     Meter: [id number] : [id number] ANSELMO WOODY   07/13/2023 1919 181 (H) 70 - 130 mg/dL Final     Comment:     Meter: [id number] : [id number] ANSELMO LACY   07/13/2023 1612 149 (H) 70 - 130 mg/dL Final     Comment:     Meter: [id number] : [id number] NICHOLE BOWLING   07/13/2023 1102 150 (H) 70 - 130 mg/dL Final     Comment:     Meter: [id number] : [id number] NICHOLE BOWLING   07/13/2023 0619 137 (H) 70 - 130 mg/dL Final     Comment:     Meter: [id number] : [id number] Quang Neal   07/12/2023 1946 201 (H) 70 - 130 mg/dL Final     Comment:     Meter: [id number] : [id number] Quang Dick Val     No results found for: TSH, FREET4  No results found for: PREGTESTUR, PREGSERUM, HCG, HCGQUANT  Pain Management Panel           No data to display              Brief Urine Lab Results       None          No results found for: BLOODCX      No results found for: URINECX  No results found for: WOUNDCX  No results found for: STOOLCX        I have personally looked at the labs and they are summarized above.  ----------------------------------------------------------------------------------------------------------------------  Detailed radiology reports for the last 24 hours:    Imaging Results (Last 24 Hours)       ** No results found for the last 24 hours. **          Final impressions for the last 30 days of radiology reports:    CT Head Without Contrast    Result Date: 6/29/2023  Old and maturing subacute infarctions without gross hemorrhagic transformation. The possibility of new small interval infarctions in this case cannot be excluded. CRITICAL RESULT:   No. COMMUNICATION: Per this written report. Drafted by Neil Kearns on 6/29/2023 1:45 PM Final report signed by Neil Kearns on 6/29/2023 2:04 PM    CT Head Without Contrast    Result  Date: 6/25/2023  There are again the multifocal areas of evolving acute infarction. There are again a small amount of blood products related to the right occipital lobe infarct. There is mild swelling related to the areas of infarction. No large hematoma or new infarct has otherwise developed. CRITICAL RESULT:   No. COMMUNICATION: Per this written report. Drafted by Greyson Bobby MD on 6/25/2023 8:38 AM Final report signed by Greyson Bobby MD on 6/25/2023 8:49 AM    CT Head Without Contrast    Result Date: 6/21/2023  Interval development of loss of gray-white differentiation over the right frontal convexity, probable middle cerebral artery-anterior cerebral artery watershed infarct or embolic right middle cerebral artery infarct. No hemorrhagic transformation, minimal mass-effect on adjacent sulci. Findings conveyed via Epic Messenger at 1846 hours, read at 1846 hours. Greyson Hamilton M.D. This report has been electronically signed and verified by the Radiologist whose name is printed above. DD:  06/21/2023/DT:  06/21/2023 This report contains privileged and confidential information and is intended solely for the use of the individual or entity to which it is addressed. If you are not the intended recipient of this report, you are hereby notified that any copying, distribution, dissemination or action taken in relation to the contents of this report is strictly prohibited and may be unlawful. If you have received this report in error, please notify the sender immediately at [phone] and permanently delete the original report and destroy any copies or printouts.    CT Head Without Contrast    Result Date: 6/20/2023  Unchanged CT head, CT angiography head and neck. No definite acute brain abnormality. Right frontoparietal encephalomalacia. Old basal-ganglia lacunar infarct on the left. No hemodynamically significant arterial stenosis in the neck, unchanged. No extravasation. Multifocal moderate-marked  intracranial arterial stenosis, unchanged. Left tonsillar mass, extensive cervical adenopathy, partially visualized mediastinal adenopathy, unchanged. Greyson Hamilton M.D. This report has been electronically signed and verified by the Radiologist whose name is printed above. DD:  06/20/2023/DT:  06/20/2023 This report contains privileged and confidential information and is intended solely for the use of the individual or entity to which it is addressed. If you are not the intended recipient of this report, you are hereby notified that any copying, distribution, dissemination or action taken in relation to the contents of this report is strictly prohibited and may be unlawful. If you have received this report in error, please notify the sender immediately at [phone] and permanently delete the original report and destroy any copies or printouts.    CT Head Without Contrast    Result Date: 6/17/2023  Head CT: 1.No acute intracranial abnormality. 2.Multiple old infarcts, largest within the right temporoparietal region. Neck CTA: 1.On the outside neck CT from earlier today, there was contrast visible pooling within the oropharynx and hypopharynx which appeared to originate from the inferior pole of the left tonsillar mass. This pooling contrast is no longer visible, and there is currently no evidence of active contrast extravasation. There are prominent vessels which appear to be supplying the left tonsillar mass. 2.Extensive bilateral cervical lymphadenopathy as well as lymphadenopathy within the visualized mediastinum and axillae. 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 However, the remainder of the lymphadenopathy is very extensive and solid suggesting the possibility of a synchronous lymphoproliferative disorder. 3.No significant stenosis is identified within the cervical carotid and  vertebral systems within the limitations of the exam. Head CTA: 1.Focal severe stenoses of the bilateral ACAs, MCAs, and PCAs as well as the right ICA. These are all unchanged with the exception of the stenosis at the left A3 segment, which is new compared to January 2021. 2.Moderate stenoses of the bilateral intradural vertebral arteries and intracranial left ICA. CRITICAL RESULT: No. COMMUNICATION: Per this written report. Drafted by Dominic Patterson MD on 6/17/2023 9:47 PM Final report signed by Dominic Patterson MD on 6/17/2023 11:04 PM    CT Angiogram Neck    Result Date: 6/20/2023  Unchanged CT head, CT angiography head and neck. No definite acute brain abnormality. Right frontoparietal encephalomalacia. Old basal-ganglia lacunar infarct on the left. No hemodynamically significant arterial stenosis in the neck, unchanged. No extravasation. Multifocal moderate-marked intracranial arterial stenosis, unchanged. Left tonsillar mass, extensive cervical adenopathy, partially visualized mediastinal adenopathy, unchanged. Greyson Hamilton M.D. This report has been electronically signed and verified by the Radiologist whose name is printed above. DD:  06/20/2023/DT:  06/20/2023 This report contains privileged and confidential information and is intended solely for the use of the individual or entity to which it is addressed. If you are not the intended recipient of this report, you are hereby notified that any copying, distribution, dissemination or action taken in relation to the contents of this report is strictly prohibited and may be unlawful. If you have received this report in error, please notify the sender immediately at [phone] and permanently delete the original report and destroy any copies or printouts.    CT Angiogram Neck    Result Date: 6/17/2023  Head CT: 1.No acute intracranial abnormality. 2.Multiple old infarcts, largest within the right temporoparietal region. Neck CTA: 1.On the outside neck  CT from earlier today, there was contrast visible pooling within the oropharynx and hypopharynx which appeared to originate from the inferior pole of the left tonsillar mass. This pooling contrast is no longer visible, and there is currently no evidence of active contrast extravasation. There are prominent vessels which appear to be supplying the left tonsillar mass. 2.Extensive bilateral cervical lymphadenopathy as well as lymphadenopathy within the visualized mediastinum and axillae. 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 However, the remainder of the lymphadenopathy is very extensive and solid suggesting the possibility of a synchronous lymphoproliferative disorder. 3.No significant stenosis is identified within the cervical carotid and vertebral systems within the limitations of the exam. Head CTA: 1.Focal severe stenoses of the bilateral ACAs, MCAs, and PCAs as well as the right ICA. These are all unchanged with the exception of the stenosis at the left A3 segment, which is new compared to January 2021. 2.Moderate stenoses of the bilateral intradural vertebral arteries and intracranial left ICA. CRITICAL RESULT: No. COMMUNICATION: Per this written report. Drafted by Dominic Patterson MD on 6/17/2023 9:47 PM Final report signed by Dominic Patterson MD on 6/17/2023 11:04 PM    MRI Brain Without Contrast    Result Date: 6/23/2023  Multifocal acute infarcts of the brain. Multifocal chronic infarcts of the brain, many of which were present in the 11/21/2019 study. Cervical lymphadenopathy. 27 mm nodule in the left neck may represent a pathologic lymph node or other mass. The previously described left oropharyngeal mass is better visualized in the 6/20/2023 CTA neck. CRITICAL RESULT: No. COMMUNICATION: Per this written report. Drafted by Melonie Polk MD on 6/23/2023 2:10 PM Final report signed  by Melonie Polk MD on 6/23/2023 2:24 PM    CT Abdomen Pelvis With Contrast    Result Date: 6/17/2023  No findings of gastrointestinal bleed. Extensive upper abdominal, retroperitoneal and pelvic adenopathy. In the setting of known malignancy this is concerning for metastatic disease. Right adrenal gland indeterminate nodule. CRITICAL RESULT: No. COMMUNICATION: Per this written report. Drafted by Bre Shirley MD on 6/17/2023 9:32 PM Final report signed by Bre Shirley MD on 6/17/2023 9:43 PM    CT Angiogram Chest Pulmonary Embolism    Result Date: 6/17/2023  No pulmonary embolism. Left lower lobe groundglass nodular opacities may represent infectious or inflammatory process. Enlarged mediastinal and bilateral axillary lymph nodes unchanged from comparison. CRITICAL RESULT:   No. COMMUNICATION: Per this written report. Drafted by Bre Shirley MD on 6/17/2023 9:14 PM Final report signed by Bre Shirley MD on 6/17/2023 9:24 PM    US Guided Lymph Node Biopsy    Result Date: 6/23/2023  Successful ultrasound-guided right inguinal lymph node biopsy. Okay to return to floor 30 minutes postprocedure. CRITICAL RESULT: No. COMMUNICATION: Per this written report. Preliminary report signed by Tony Ortega DO on 6/23/2023 10:04 AM By electronically signing this report, I, the attending physician, attest that I was present for the entire procedure(s) and agree with the final edited report. Drafted by Tony Ortega DO on 6/23/2023 9:53 AM Final report signed by Johnie Schmitt MD on 6/23/2023 11:43 AM    CT Angiogram Head    Result Date: 6/20/2023  Unchanged CT head, CT angiography head and neck. No definite acute brain abnormality. Right frontoparietal encephalomalacia. Old basal-ganglia lacunar infarct on the left. No hemodynamically significant arterial stenosis in the neck, unchanged. No extravasation. Multifocal moderate-marked intracranial arterial stenosis, unchanged. Left tonsillar mass, extensive cervical adenopathy,  partially visualized mediastinal adenopathy, unchanged. Greyson Hamilton M.D. This report has been electronically signed and verified by the Radiologist whose name is printed above. DD:  06/20/2023/DT:  06/20/2023 This report contains privileged and confidential information and is intended solely for the use of the individual or entity to which it is addressed. If you are not the intended recipient of this report, you are hereby notified that any copying, distribution, dissemination or action taken in relation to the contents of this report is strictly prohibited and may be unlawful. If you have received this report in error, please notify the sender immediately at [phone] and permanently delete the original report and destroy any copies or printouts.    CT Angiogram Head    Result Date: 6/17/2023  Head CT: 1.No acute intracranial abnormality. 2.Multiple old infarcts, largest within the right temporoparietal region. Neck CTA: 1.On the outside neck CT from earlier today, there was contrast visible pooling within the oropharynx and hypopharynx which appeared to originate from the inferior pole of the left tonsillar mass. This pooling contrast is no longer visible, and there is currently no evidence of active contrast extravasation. There are prominent vessels which appear to be supplying the left tonsillar mass. 2.Extensive bilateral cervical lymphadenopathy as well as lymphadenopathy within the visualized mediastinum and axillae. 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 However, the remainder of the lymphadenopathy is very extensive and solid suggesting the possibility of a synchronous lymphoproliferative disorder. 3.No significant stenosis is identified within the cervical carotid and vertebral systems within the limitations of the exam. Head CTA: 1.Focal severe stenoses of the  bilateral ACAs, MCAs, and PCAs as well as the right ICA. These are all unchanged with the exception of the stenosis at the left A3 segment, which is new compared to January 2021. 2.Moderate stenoses of the bilateral intradural vertebral arteries and intracranial left ICA. CRITICAL RESULT: No. COMMUNICATION: Per this written report. Drafted by Dominic Patterson MD on 6/17/2023 9:47 PM Final report signed by Dominic Patterson MD on 6/17/2023 11:04 PM    FL Video Swallow Single Contrast    Result Date: 7/7/2023    Normal fluoroscopic video swallow exam.  Please see the speech pathologist's report for additional information.  This report was finalized on 7/7/2023 10:50 AM by Dr. Porfirio Montgomery MD.      PET/CT FDG Skull Base To Mid Thigh    Result Date: 6/26/2023  1. Innumerable enlarged FDG avid cervical, mediastinal, axillary, mesenteric, retroperitoneal and inguinal lymph nodes are suspicious for lymphoma correlation with tissue biopsy is suggested. 2. FDG avid asymmetric mass involving the left oropharynx most likely consistent with lymphoma involvement correlation with tissue biopsy is suggested. 3. Status post ischemia involving left MCA and PCA. CRITICAL RESULT: No. COMMUNICATION: Per this written report. Drafted by Jj Johnson MD on 6/26/2023 5:21 PM Final report signed by Jj Johnson MD on 6/26/2023 8:12 PM    EEG    Result Date: 6/20/2023   Abnormal EEG Background was diffusely slow. This finding may suggest mild diffuse neuronal dysfunction that is non specific of etiology. No epileptiform activity. No seizure. Correlate clinically.     IR Angiogram Carotid Cerebral Bilateral    Result Date: 6/20/2023  Selective angiograms of the bilateral external carotid arteries and superselective angiograms of bilateral linguo-facial trunk and the internal maxillary artery is within normal limits. No evidence of active contrast extravasation, pseudoaneurysm, vasospasm or vessel injury. Since no target  for embolization was identified and the patient does not have active oropharyngeal bleed, embolization was not performed. COMMUNICATION: Per this written report. The findings were communicated to the care team. Preliminary report signed by Nicolas Stallings MD on 6/20/2023 5:15 PM By electronically signing this report, I, the attending physician, attest that I was present for the entire procedure(s) and agree with the final edited report. Drafted by Nicolas Stallings MD on 6/20/2023 4:47 PM Final report signed by Kaylin Lee MD on 6/20/2023 5:33 PM    XR chest AP portable    Result Date: 6/17/2023  Endotracheal tube in good position. Images reviewed, interpreted, and dictated by Dr. Boo Hendrickson. Transcribed by Valente Nichols PA-C.    CT neck soft tissue with IV contrast    Result Date: 6/17/2023  Extensive, new cervical adenopathy concerning for underlying lymphoma or metastatic disease Images reviewed, interpreted, and dictated by Dr. Boo Hendrickson. Transcribed by Valente Nichols PA-C.    FL Esophogram w Modified Barium Swallow Single Contrast    Result Date: 6/21/2023  No aspiration or penetration Please see separate note by Speech therapy team for dietary recommendations. CRITICAL RESULT:   No. COMMUNICATION: Per this written report. Drafted by Itz Jack MD on 6/21/2023 9:00 AM Final report signed by Itz Jack MD on 6/21/2023 9:00 AM   I have personally looked at the radiology images and read the final radiology report.    Assessment & Plan    Status post bilateral CVAs with new dense left-sided deficit--currently on Xarelto and statin therapy.  Patient requiring moderate to assistance to dependence for bed mobility; dependent for bed to chair transfer requiring lift device; same for chair to bed transfer.  Requires maximum assistance for upper body dressing; maximum assist for lower body dressing.    Syncope--I did obtain EKG which did show patient to be in atrial fibrillation with ventricular rate of 63.   I did also repeat hemoglobin as patient had bleeding at the last hospitalization.  Patient apparently had diarrhea last evening.  However, hemoglobin is stable at 10.  Suspect patient did have a vagal episode versus orthostatic hypotension    Atrial fibrillation patient currently on Lopressor and Xarelto.  Rate is well controlled we will monitor closely    Hypertension controlled    Non-Hodgkin's lymphoma follow-up with oncology as an outpatient    History of laryngeal CA    Anemia stable    VTE Prophylaxis:   Mechanical Order History:       None          Pharmalogical Order History:        Ordered     Dose Route Frequency Stop    07/06/23 2834  rivaroxaban (XARELTO) tablet 20 mg        Question:  Are you ordering rivaroxaban 10 mg for the prevention of blood clots in an acutely ill medical patient?  Answer:  No    20 mg PO Daily With Dinner --                        Sj Jay MD  [facility]  07/14/23  12:12 EDT

## 2023-07-14 NOTE — PLAN OF CARE
Problem: Rehabilitation ([facility]) Plan of Care  Goal: Plan of Care Review  Outcome: Ongoing, Progressing  Flowsheets (Taken 7/14/2023 0449)  Progress: no change  Plan of Care Reviewed With: patient  Outcome Evaluation:   Patient calm and cooperative   no acute distress noted   resting well throughout the night  Goal: Patient-Specific Goal (Individualized)  Outcome: Ongoing, Progressing  Goal: Absence of New-Onset Illness or Injury  Outcome: Ongoing, Progressing  Intervention: Prevent Fall and Fall Injury  Recent Flowsheet Documentation  Taken 7/14/2023 0400 by Phuong Padron, RN  Safety Promotion/Fall Prevention: safety round/check completed  Taken 7/14/2023 0200 by Phuong Padron, RN  Safety Promotion/Fall Prevention: safety round/check completed  Taken 7/14/2023 0000 by Phuong Padron, RN  Safety Promotion/Fall Prevention: safety round/check completed  Taken 7/13/2023 2200 by Phuong Padron, RN  Safety Promotion/Fall Prevention: safety round/check completed  Taken 7/13/2023 2000 by Phuong Padron, RN  Safety Promotion/Fall Prevention:   safety round/check completed   room organization consistent   nonskid shoes/slippers when out of bed   mobility aid in reach   fall prevention program maintained   clutter free environment maintained   assistive device/personal items within reach  Goal: Optimal Comfort and Wellbeing  Outcome: Ongoing, Progressing  Goal: Home and Community Transition Plan Established  Outcome: Ongoing, Progressing   Goal Outcome Evaluation:  Plan of Care Reviewed With: patient        Progress: no change  Outcome Evaluation: Patient calm and cooperative; no acute distress noted; resting well throughout the night

## 2023-07-14 NOTE — THERAPY TREATMENT NOTE
Inpatient Rehabilitation - Physical Therapy Treatment Note       THALIA Trujillo     Patient Name: Antonio Canseco  : 1955  MRN: [id number]    Today's Date: 2023                    Admit Date: 2023      Visit Dx:   No diagnosis found.    Patient Active Problem List   Diagnosis    Detrusor instability    Low testosterone in male    Disorder of prostate    Corporo-venous occlusive erectile dysfunction    CVA (cerebral vascular accident)       Past Medical History:   Diagnosis Date    Diabetes mellitus     Hypertension     Stroke        Past Surgical History:   Procedure Laterality Date    US GUIDED LYMPH NODE BIOPSY  2023       PT ASSESSMENT (last 12 hours)       IRF PT Evaluation and Treatment       Row Name 23 1442          PT Time and Intention    Document Type daily treatment  -KM     Mode of Treatment physical therapy  -KM       Row Name 23 1442          General Information    Patient Profile Reviewed yes  -KM     Existing Precautions/Restrictions fall  L HP, <trunk support/balance  -KM       Row Name 23 1442          Cognition/Psychosocial    Affect/Mental Status (Cognition) WFL  -KM     Orientation Status (Cognition) oriented x 3  -KM     Follows Commands (Cognition) verbal cues/prompting required;physical/tactile prompts required;follows one-step commands  -KM     Personal Safety Interventions gait belt;muscle strengthening facilitated;nonskid shoes/slippers when out of bed;supervised activity  -KM     Cognitive Function safety deficit  -KM       Row Name 23 1442          Bed Mobility    Rolling Left [place] (Bed Mobility) verbal cues;nonverbal cues (demo/gesture);moderate assist (50% patient effort);maximum assist (25% patient effort)  -KM     Rolling Right [place] (Bed Mobility) dependent (less than 25% patient effort);verbal cues;nonverbal cues (demo/gesture);maximum assist (25% patient effort)  -KM     Bed Mobility, Safety Issues decreased use of arms for  "pushing/pulling;decreased use of legs for bridging/pushing  -KM     Assistive Device (Bed Mobility) bed rails;draw sheet  -KM       Row Name 07/14/23 1442          Bed-Chair Transfer    Bed-Chair [place] (Transfers) dependent (less than 25% patient effort)  -KM     Assistive Device (Bed-Chair Transfers) lift device  -KM       Row Name 07/14/23 1442          Chair-Bed Transfer    Chair-Bed [place] (Transfers) dependent (less than 25% patient effort)  -KM     Assistive Device (Chair-Bed Transfers) lift device  -KM       Row Name 07/14/23 1442          Safety Issues, Functional Mobility    Impairments Affecting Function (Mobility) balance;coordination;endurance/activity tolerance;grasp;motor control;muscle tone abnormal;postural/trunk control;range of motion (ROM);strength  -KM       Row Name 07/14/23 1442          Motor Skills    Comments, Therapeutic Exercise seated ther-ex: marchmagda, kicks (LLE AAROM); supine ther-ex: SLR, heel slides, ankle pf/df (LLE PROM)  -KM     Additional Documentation Comments, Therapeutic Exercise (Row)  -KM       Row Name 07/14/23 1442          Neuromuscular Re-education    Interventions (Neuromuscular Re-education) facilitation/inhibition  -KM     Positioning (Neuromuscular Re-education) supine  -KM       Row Name 07/14/23 1442          Positioning and Restraints    Pre-Treatment Position in bed  -KM     Post Treatment Position bed  -KM     In Bed supine;call light within reach;encouraged to call for assist  -KM       Row Name 07/14/23 1442          Daily Progress Summary (PT)    Daily Progress Summary (PT) Pt. was able to perform 18 minutes in standing frame prior to BP dropping to 96/60 and being sat down in w/c in AM. He was able to tolerate NMES and ther-ex in supine during PM. Pt. claimed to be \"feeling good\" at end of afternoon session w/ no complaints. Pt. would continue to benefit from skilled PT services.  -KM               User Key  (r) = Recorded By, (t) = Taken By, (c) " = Cosigned By      Initials Name Provider Type    Pj Frank PT Physical Therapist                     Physical Therapy Education       Title: PT OT SLP Therapies (Done)       Topic: Physical Therapy (Done)       Point: Mobility training (Done)       Learning Progress Summary             Patient Acceptance, E,D, VU,NR by LL at 7/13/2023 1358    Acceptance, E, VU,NR by LB at 7/12/2023 1144    Acceptance, E, VU,NR by LB at 7/11/2023 1426    Acceptance, E, VU,NR by LB at 7/10/2023 1452    Acceptance, E,D, VU,NR by LL at 7/8/2023 1228    Acceptance, E,D, VU,NR by LB at 7/7/2023 1449                         Point: Home exercise program (Done)       Learning Progress Summary             Patient Acceptance, E,D, VU,NR by LL at 7/13/2023 1358    Acceptance, E, VU,NR by LB at 7/12/2023 1144    Acceptance, E, VU,NR by LB at 7/11/2023 1426    Acceptance, E, VU,NR by LB at 7/10/2023 1452    Acceptance, E,D, VU,NR by LL at 7/8/2023 1228    Acceptance, E,D, VU,NR by LB at 7/7/2023 1449                         Point: Body mechanics (Done)       Learning Progress Summary             Patient Acceptance, E,D, VU,NR by LL at 7/13/2023 1358    Acceptance, E, VU,NR by LB at 7/12/2023 1144    Acceptance, E, VU,NR by LB at 7/11/2023 1426    Acceptance, E, VU,NR by LB at 7/10/2023 1452    Acceptance, E,D, VU,NR by LL at 7/8/2023 1228    Acceptance, E,D, VU,NR by LB at 7/7/2023 1449                         Point: Precautions (Done)       Learning Progress Summary             Patient Acceptance, E,D, VU,NR by LL at 7/13/2023 1358    Acceptance, E, VU,NR by LB at 7/12/2023 1144    Acceptance, E, VU,NR by LB at 7/11/2023 1426    Acceptance, E, VU,NR by LB at 7/10/2023 1452    Acceptance, E,D, VU,NR by LL at 7/8/2023 1228    Acceptance, E,D, VU,NR by LB at 7/7/2023 1449                                         User Key       Initials Effective Dates Name Provider Type Discipline     06/16/21 -  Joslyn Garcia, PT Physical  "Therapist PT    LL 05/02/16 -  Connie Velasquez PTA Physical Therapist Assistant PT                    PT Recommendation and Plan          Daily Progress Summary (PT)  Daily Progress Summary (PT): Pt. was able to perform 18 minutes in standing frame prior to BP dropping to 96/60 and being sat down in w/c in AM. He was able to tolerate NMES and ther-ex in supine during PM. Pt. claimed to be \"feeling good\" at end of afternoon session w/ no complaints. Pt. would continue to benefit from skilled PT services.               Time Calculation:      PT Charges       Row Name 07/14/23 1441 07/14/23 1440          Time Calculation    Start Time 1245  -KM 0915  -KM     Stop Time 1330  -KM 1000  -KM     Time Calculation (min) 45 min  -KM 45 min  -KM     PT Received On -- 07/14/23  -KM        Time Calculation- PT    Total Timed Code Minutes- PT 45 minute(s)  -KM 45 minute(s)  -KM               User Key  (r) = Recorded By, (t) = Taken By, (c) = Cosigned By      Initials Name Provider Type    Pj Frank, PT Physical Therapist                    Therapy Charges for Today       Code Description Service Date Service Provider Modifiers Qty    69276030659 HC PT THERAPEUTIC ACT EA 15 MIN 7/14/2023 Pj Spear, PT GP 3    84305028558 HC PT NEUROMUSC RE EDUCATION EA 15 MIN 7/14/2023 Pj Spear, PT GP 2    14615183241 HC PT THER PROC EA 15 MIN 7/14/2023 Pj Spear, PT GP 1                     Pj Spear PT  7/14/2023    "

## 2023-07-14 NOTE — THERAPY TREATMENT NOTE
Inpatient Rehabilitation - Occupational Therapy Progress Note and Treatment Note     Ronnie     Patient Name: Antonio Canseco  : 1955  MRN: [id number]    Today's Date: 2023                 Admit Date: 2023       No diagnosis found.    Patient Active Problem List   Diagnosis    Detrusor instability    Low testosterone in male    Disorder of prostate    Corporo-venous occlusive erectile dysfunction    CVA (cerebral vascular accident)       Past Medical History:   Diagnosis Date    Diabetes mellitus     Hypertension     Stroke        Past Surgical History:   Procedure Laterality Date    US GUIDED LYMPH NODE BIOPSY  2023             IRF OT ASSESSMENT FLOWSHEET (last 12 hours)       IRF OT Evaluation and Treatment       Row Name 23 1508          OT Time and Intention    Document Type daily treatment;progress note  -BF     Mode of Treatment occupational therapy  -BF     Patient Effort good  -BF     Symptoms Noted During/After Treatment fatigue  -BF     Evaluation/Treatment Not Performed patient/family declined, not feeling well  -BF     Comment, Evaluation/Treatment Not Performed Pt had syncopal episode prior to OT session in AM and felt too fatigued to participate in therapy at the time. Pt reported he felt better in PM and was agreeable to OT.  -BF       Row Name 23 1508          General Information    Existing Precautions/Restrictions fall  L HP, <trunk support/balance  -BF     Limitations/Impairments safety/cognitive  -BF       Row Name 23 1508          Cognition/Psychosocial    Orientation Status (Cognition) oriented x 3  -BF     Follows Commands (Cognition) verbal cues/prompting required;repetition of directions required;physical/tactile prompts required;increased processing time needed  -BF     Executive Function Deficit (Cognition) organization/sequencing;problem-solving/reasoning  -BF       Row Name 23 1503          Range of Motion Comprehensive    Comment, General  Range of Motion L shoulder 75% AROM, remaining BUE AROM WFL.  -BF       Row Name 07/14/23 1508          Strength Comprehensive (MMT)    Comment, General Manual Muscle Testing (MMT) Assessment L shoulder 3-/5, L elbow, hand, wrist 3/5; RUE grossly 3+/5  -BF       Row Name 07/14/23 1508          Bathing    Comment (Bathing) Max A  -BF       Row Name 07/14/23 1508          Upper Body Dressing    Comment (Upper Body Dressing) Max A  -BF       Row Name 07/14/23 1508          Lower Body Dressing    Comment (Lower Body Dressing) Max A  -BF       Row Name 07/14/23 1508          Grooming    Comment (Grooming) Mod A  -BF       Row Name 07/14/23 1508          Toileting    Comment (Toileting) Total A  -BF       Row Name 07/14/23 1508          Self-Feeding    Comment (Self-Feeding) Set-up  -BF       Row Name 07/14/23 1508          Motor Skills    Coordination fine motor deficit;gross motor deficit;left;moderate impairment  -BF     Neuromuscular Function left;upper extremity;moderate impairment  -BF     Motor Control/Coordination Interventions fine motor manipulation/dexterity activities;gross motor coordination activities;neuro-muscular re-education;therapeutic exercise/ROM  LUE GMC/FMC theract, reaching, strengthening theract; theraband, handgripper therex  -BF       Row Name 07/14/23 1508          Neuromuscular Re-education    Interventions (Neuromuscular Re-education) facilitation/inhibition  -BF     Positioning (Neuromuscular Re-education) supine  -BF       Row Name 07/14/23 1508          Positioning and Restraints    In Bed supine;call light within reach;encouraged to call for assist  -BF               User Key  (r) = Recorded By, (t) = Taken By, (c) = Cosigned By      Initials Name Effective Dates    BF Maryann Sanchezshahida Lima, OT 07/11/23 -                      Occupational Therapy Education       Title: PT OT SLP Therapies (Done)       Topic: Occupational Therapy (Done)       Point: ADL training (Done)       Description:    Instruct learner(s) on proper safety adaptation and remediation techniques during self care or transfers.   Instruct in proper use of assistive devices.                  Learning Progress Summary             Patient Acceptance, E, VU,NR by BF at 7/14/2023 1508    Acceptance, E, VU,NR by BF at 7/13/2023 1343    Acceptance, E,D, VU,NR by TM at 7/12/2023 1407    Acceptance, D,E, NR,VU by TM at 7/11/2023 1434    Acceptance, E, VU,NR by BF at 7/10/2023 1456    Acceptance, E, VU,NR by HB at 7/8/2023 1140    Acceptance, E, VU,NR by BF at 7/7/2023 1442                         Point: Precautions (Done)       Description:   Instruct learner(s) on prescribed precautions during self-care and functional transfers.                  Learning Progress Summary             Patient Acceptance, E, VU,NR by BF at 7/14/2023 1508    Acceptance, E, VU,NR by BF at 7/13/2023 1343    Acceptance, E,D, VU,NR by TM at 7/12/2023 1407    Acceptance, D,E, NR,VU by TM at 7/11/2023 1434    Acceptance, E, VU,NR by BF at 7/10/2023 1456    Acceptance, E, VU,NR by HB at 7/8/2023 1140    Acceptance, E, VU,NR by BF at 7/7/2023 1442                                         User Key       Initials Effective Dates Name Provider Type Discipline     05/31/23 - 07/10/23 Lisa Sanchez, OT Occupational Therapist OT    BF 07/11/23 -  Lisa Sanchez, OT Occupational Therapist OT     06/16/21 -  Marielos Capone OT Occupational Therapist OT     05/25/21 -  Veronica Robles OT Occupational Therapist OT                        OT Recommendation and Plan    Planned Therapy Interventions (OT): activity tolerance training, adaptive equipment training, BADL retraining, neuromuscular control/coordination retraining, passive ROM/stretching, ROM/therapeutic exercise, strengthening exercise, transfer/mobility retraining                    Time Calculation:      Time Calculation- OT       Row Name 07/14/23 1517             Time Calculation- OT    OT  Start Time 1345  -BF      OT Stop Time 1455  -BF      OT Time Calculation (min) 70 min  -BF      Total Timed Code Minutes- OT 70 minute(s)  -BF      OT Non-Billable Time (min) 10 min  -BF                User Key  (r) = Recorded By, (t) = Taken By, (c) = Cosigned By      Initials Name Provider Type    BF Lisa Sanchez OT Occupational Therapist                  Therapy Charges for Today       Code Description Service Date Service Provider Modifiers Qty    24750233138 HC OT SELF CARE/MGMT/TRAIN EA 15 MIN 7/13/2023 Lisa Sanchez, OT GO 2    69761142329 HC OT NEUROMUSC RE EDUCATION EA 15 MIN 7/13/2023 Lisa Sanchez OT GO 3    17283615827 HC OT THER PROC EA 15 MIN 7/13/2023 Lisa Sanchez, OT GO 2    73170196939 HC OT SELF CARE/MGMT/TRAIN EA 15 MIN 7/14/2023 Lisa Sanchez OT GO 1    04887984962 HC OT NEUROMUSC RE EDUCATION EA 15 MIN 7/14/2023 Lisa Sanchez OT GO 3    29625008527 HC OT THERAPEUTIC ACT EA 15 MIN 7/14/2023 Lisa Sanchez, OT GO 1                     Lisa Sanchez OT  7/14/2023

## 2023-07-15 LAB
GLUCOSE BLDC GLUCOMTR-MCNC: 136 MG/DL (ref 70–130)
GLUCOSE BLDC GLUCOMTR-MCNC: 159 MG/DL (ref 70–130)
GLUCOSE BLDC GLUCOMTR-MCNC: 176 MG/DL (ref 70–130)
GLUCOSE BLDC GLUCOMTR-MCNC: 181 MG/DL (ref 70–130)

## 2023-07-15 PROCEDURE — 99231 SBSQ HOSP IP/OBS SF/LOW 25: CPT | Performed by: FAMILY MEDICINE

## 2023-07-15 PROCEDURE — 82948 REAGENT STRIP/BLOOD GLUCOSE: CPT

## 2023-07-15 RX ADMIN — ATORVASTATIN CALCIUM 80 MG: 40 TABLET, FILM COATED ORAL at 08:20

## 2023-07-15 RX ADMIN — HYDROXYZINE HYDROCHLORIDE 25 MG: 25 TABLET, FILM COATED ORAL at 20:38

## 2023-07-15 RX ADMIN — MAGNESIUM GLUCONATE 500 MG ORAL TABLET 400 MG: 500 TABLET ORAL at 08:22

## 2023-07-15 RX ADMIN — ALLOPURINOL 300 MG: 300 TABLET ORAL at 08:20

## 2023-07-15 RX ADMIN — LOSARTAN POTASSIUM 25 MG: 25 TABLET, FILM COATED ORAL at 08:20

## 2023-07-15 RX ADMIN — PANTOPRAZOLE SODIUM 40 MG: 40 TABLET, DELAYED RELEASE ORAL at 05:13

## 2023-07-15 RX ADMIN — NYSTATIN 1 APPLICATION: 100000 POWDER TOPICAL at 08:20

## 2023-07-15 RX ADMIN — NYSTATIN 1 APPLICATION: 100000 POWDER TOPICAL at 20:37

## 2023-07-15 RX ADMIN — METOPROLOL TARTRATE 50 MG: 50 TABLET, FILM COATED ORAL at 08:20

## 2023-07-15 RX ADMIN — TAMSULOSIN HYDROCHLORIDE 0.4 MG: 0.4 CAPSULE ORAL at 08:20

## 2023-07-15 RX ADMIN — ACETAMINOPHEN 650 MG: 325 TABLET ORAL at 19:44

## 2023-07-15 RX ADMIN — METOPROLOL TARTRATE 50 MG: 50 TABLET, FILM COATED ORAL at 20:37

## 2023-07-15 RX ADMIN — RIVAROXABAN 20 MG: 20 TABLET, FILM COATED ORAL at 17:11

## 2023-07-15 NOTE — PROGRESS NOTES
[facility]  PROGRESS NOTE     Patient Identification:  Name:  Antonio Canseco  Age:  67 y.o.  Sex:  male  :  1955  MRN:  [id number]  Visit Number:  [id number]  ROOM: [facility]     Primary Care Provider:  Adriana Ledesma MD    Length of stay in inpatient status:  9    Subjective     Chief Compliant:  No chief complaint on file.      History of Presenting Illness: 67-year-old gentleman with a history of bilateral CVAs with new left-sided deficit.  Patient had orthostatic And syncopal episode yesterday during his time in the standing frame.  Patient states he feels much better today.  Work-up was negative yesterday.    Objective     Current Hospital Meds:allopurinol, 300 mg, Oral, Daily  atorvastatin, 80 mg, Oral, Daily  losartan, 25 mg, Oral, Q24H  magnesium oxide, 400 mg, Oral, Daily  metoprolol tartrate, 50 mg, Oral, Q12H  nystatin, , Topical, Q12H  pantoprazole, 40 mg, Oral, Q AM  rivaroxaban, 20 mg, Oral, Daily With Dinner  tamsulosin, 0.4 mg, Oral, Daily       ----------------------------------------------------------------------------------------------------------------------  Vital Signs:  Temp:  [97.3 °F (36.3 °C)-98.9 °F (37.2 °C)] 97.3 °F (36.3 °C)  Heart Rate:  [78-85] 85  Resp:  [18] 18  BP: ()/(60-90) 127/74  SpO2:  [97 %-99 %] 97 %  on   ;   Device (Oxygen Therapy): room air  Body mass index is 40.57 kg/m².    Wt Readings from Last 3 Encounters:   23 121 kg (266 lb 12.1 oz)   22 121 kg (267 lb)   07/15/19 121 kg (267 lb 1.6 oz)     Intake & Output (last 3 days)         07/12 0701  07/13 0700 07/13 0701  07/14 0700 07/14 0701  07/15 0700 07/15 0701  07/16 0700    P.O. 360 840 480 240    Total Intake(mL/kg) 360 (3) 840 (6.9) 480 (4) 240 (2)    Net +360 +840 +480 +240            Urine Unmeasured Occurrence 2 x 5 x 5 x     Stool Unmeasured Occurrence 5 x 2 x            Diet: Cardiac Diets; Healthy Heart (2-3 Na+); Texture: Regular Texture (IDDSI 7); Fluid Consistency: Thin  (IDDSI 0)  ----------------------------------------------------------------------------------------------------------------------  Physical exam:  Constitutional: No acute distress  HEENT: Normocephalic atraumatic  Neck: Supple  Cardiovascular: Irregular irregular, ventricular rate controlled  Pulmonary/Chest: Clear to auscultation  Abdominal: Positive bowel sounds soft.   Musculoskeletal: No arthropathy  Neurological: Left hemiparesis with 0-1 out of 5 strength in the left lower extremity and 3 out of 5 strength in the left upper extremity  Skin: No rash  Peripheral vascular: No edema  Genitourinary:  ----------------------------------------------------------------------------------------------------------------------    Last echocardiogram:    ----------------------------------------------------------------------------------------------------------------------  Results from last 7 days   Lab Units 07/14/23  1028 07/14/23  0104 07/11/23  0301 07/09/23  0125   WBC 10*3/mm3  --  6.86 7.42 6.43   HEMOGLOBIN g/dL 10.0* 9.5* 10.5* 10.0*   HEMATOCRIT % 31.9* 30.3* 33.5* 32.8*   MCV fL  --  86.6 85.2 87.9   MCHC g/dL  --  31.4* 31.3* 30.5*   PLATELETS 10*3/mm3  --  257 322 355         Results from last 7 days   Lab Units 07/14/23  0104 07/11/23  0301 07/09/23  0125   SODIUM mmol/L 136 135* 135*   POTASSIUM mmol/L 4.2 4.0 4.2   CHLORIDE mmol/L 104 103 104   CO2 mmol/L 23.2 21.4* 21.8*   BUN mg/dL 21 21 24*   CREATININE mg/dL 1.00 0.82 0.87   CALCIUM mg/dL 8.8 9.0 9.0   GLUCOSE mg/dL 100* 109* 119*   Estimated Creatinine Clearance: 93.1 mL/min (by C-G formula based on SCr of 1 mg/dL).  No results found for: AMMONIA              Glucose   Date/Time Value Ref Range Status   07/15/2023 0617 136 (H) 70 - 130 mg/dL Final     Comment:     Meter: [id number] : [id number] Andreina Crystal   07/14/2023 2100 136 (H) 70 - 130 mg/dL Final     Comment:     Meter: [id number] : [id number] Andreina Crystal   07/14/2023 1612 207 (H) 70 -  130 mg/dL Final     Comment:     Meter: [id number] : [id number] CHAYA FIERRO   07/14/2023 1058 149 (H) 70 - 130 mg/dL Final     Comment:     Meter: [id number] : [id number] CYNDY PORTER   07/14/2023 0957 143 (H) 70 - 130 mg/dL Final     Comment:     Meter: [id number] : [id number] CHAYA MORAESERD   07/14/2023 0624 127 70 - 130 mg/dL Final     Comment:     Meter: [id number] : [id number] ANSELMO LACY   07/13/2023 1919 181 (H) 70 - 130 mg/dL Final     Comment:     Meter: [id number] : [id number] ANSELMO LACY   07/13/2023 1612 149 (H) 70 - 130 mg/dL Final     Comment:     Meter: [id number] : [id number] NICHOLE BOWLING     No results found for: TSH, FREET4  No results found for: PREGTESTUR, PREGSERUM, HCG, HCGQUANT  Pain Management Panel           No data to display              Brief Urine Lab Results       None          No results found for: BLOODCX      No results found for: URINECX  No results found for: WOUNDCX  No results found for: STOOLCX        I have personally looked at the labs and they are summarized above.  ----------------------------------------------------------------------------------------------------------------------  Detailed radiology reports for the last 24 hours:    Imaging Results (Last 24 Hours)       ** No results found for the last 24 hours. **          Final impressions for the last 30 days of radiology reports:    CT Head Without Contrast    Result Date: 6/29/2023  Old and maturing subacute infarctions without gross hemorrhagic transformation. The possibility of new small interval infarctions in this case cannot be excluded. CRITICAL RESULT:   No. COMMUNICATION: Per this written report. Drafted by Neil Kearns on 6/29/2023 1:45 PM Final report signed by Neil Kearns on 6/29/2023 2:04 PM    CT Head Without Contrast    Result Date: 6/25/2023  There are again the multifocal areas of evolving acute infarction. There are again a small amount of blood products  related to the right occipital lobe infarct. There is mild swelling related to the areas of infarction. No large hematoma or new infarct has otherwise developed. CRITICAL RESULT:   No. COMMUNICATION: Per this written report. Drafted by Greyson Bobby MD on 6/25/2023 8:38 AM Final report signed by Greyson Bobby MD on 6/25/2023 8:49 AM    CT Head Without Contrast    Result Date: 6/21/2023  Interval development of loss of gray-white differentiation over the right frontal convexity, probable middle cerebral artery-anterior cerebral artery watershed infarct or embolic right middle cerebral artery infarct. No hemorrhagic transformation, minimal mass-effect on adjacent sulci. Findings conveyed via Epic Messenger at 1846 hours, read at 1846 hours. Greyson Hamilton M.D. This report has been electronically signed and verified by the Radiologist whose name is printed above. DD:  06/21/2023/DT:  06/21/2023 This report contains privileged and confidential information and is intended solely for the use of the individual or entity to which it is addressed. If you are not the intended recipient of this report, you are hereby notified that any copying, distribution, dissemination or action taken in relation to the contents of this report is strictly prohibited and may be unlawful. If you have received this report in error, please notify the sender immediately at [phone] and permanently delete the original report and destroy any copies or printouts.    CT Head Without Contrast    Result Date: 6/20/2023  Unchanged CT head, CT angiography head and neck. No definite acute brain abnormality. Right frontoparietal encephalomalacia. Old basal-ganglia lacunar infarct on the left. No hemodynamically significant arterial stenosis in the neck, unchanged. No extravasation. Multifocal moderate-marked intracranial arterial stenosis, unchanged. Left tonsillar mass, extensive cervical adenopathy, partially visualized mediastinal  adenopathy, unchanged. Greyson Hamilton M.D. This report has been electronically signed and verified by the Radiologist whose name is printed above. DD:  06/20/2023/DT:  06/20/2023 This report contains privileged and confidential information and is intended solely for the use of the individual or entity to which it is addressed. If you are not the intended recipient of this report, you are hereby notified that any copying, distribution, dissemination or action taken in relation to the contents of this report is strictly prohibited and may be unlawful. If you have received this report in error, please notify the sender immediately at [phone] and permanently delete the original report and destroy any copies or printouts.    CT Head Without Contrast    Result Date: 6/17/2023  Head CT: 1.No acute intracranial abnormality. 2.Multiple old infarcts, largest within the right temporoparietal region. Neck CTA: 1.On the outside neck CT from earlier today, there was contrast visible pooling within the oropharynx and hypopharynx which appeared to originate from the inferior pole of the left tonsillar mass. This pooling contrast is no longer visible, and there is currently no evidence of active contrast extravasation. There are prominent vessels which appear to be supplying the left tonsillar mass. 2.Extensive bilateral cervical lymphadenopathy as well as lymphadenopathy within the visualized mediastinum and axillae. 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 However, the remainder of the lymphadenopathy is very extensive and solid suggesting the possibility of a synchronous lymphoproliferative disorder. 3.No significant stenosis is identified within the cervical carotid and vertebral systems within the limitations of the exam. Head CTA: 1.Focal severe stenoses of the bilateral ACAs, MCAs, and PCAs as  well as the right ICA. These are all unchanged with the exception of the stenosis at the left A3 segment, which is new compared to January 2021. 2.Moderate stenoses of the bilateral intradural vertebral arteries and intracranial left ICA. CRITICAL RESULT: No. COMMUNICATION: Per this written report. Drafted by Dominic Patterson MD on 6/17/2023 9:47 PM Final report signed by Dominic Patterson MD on 6/17/2023 11:04 PM    CT Angiogram Neck    Result Date: 6/20/2023  Unchanged CT head, CT angiography head and neck. No definite acute brain abnormality. Right frontoparietal encephalomalacia. Old basal-ganglia lacunar infarct on the left. No hemodynamically significant arterial stenosis in the neck, unchanged. No extravasation. Multifocal moderate-marked intracranial arterial stenosis, unchanged. Left tonsillar mass, extensive cervical adenopathy, partially visualized mediastinal adenopathy, unchanged. Greyson Hamilton M.D. This report has been electronically signed and verified by the Radiologist whose name is printed above. DD:  06/20/2023/DT:  06/20/2023 This report contains privileged and confidential information and is intended solely for the use of the individual or entity to which it is addressed. If you are not the intended recipient of this report, you are hereby notified that any copying, distribution, dissemination or action taken in relation to the contents of this report is strictly prohibited and may be unlawful. If you have received this report in error, please notify the sender immediately at [phone] and permanently delete the original report and destroy any copies or printouts.    CT Angiogram Neck    Result Date: 6/17/2023  Head CT: 1.No acute intracranial abnormality. 2.Multiple old infarcts, largest within the right temporoparietal region. Neck CTA: 1.On the outside neck CT from earlier today, there was contrast visible pooling within the oropharynx and hypopharynx which appeared to originate from  the inferior pole of the left tonsillar mass. This pooling contrast is no longer visible, and there is currently no evidence of active contrast extravasation. There are prominent vessels which appear to be supplying the left tonsillar mass. 2.Extensive bilateral cervical lymphadenopathy as well as lymphadenopathy within the visualized mediastinum and axillae. 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 However, the remainder of the lymphadenopathy is very extensive and solid suggesting the possibility of a synchronous lymphoproliferative disorder. 3.No significant stenosis is identified within the cervical carotid and vertebral systems within the limitations of the exam. Head CTA: 1.Focal severe stenoses of the bilateral ACAs, MCAs, and PCAs as well as the right ICA. These are all unchanged with the exception of the stenosis at the left A3 segment, which is new compared to January 2021. 2.Moderate stenoses of the bilateral intradural vertebral arteries and intracranial left ICA. CRITICAL RESULT: No. COMMUNICATION: Per this written report. Drafted by Dominic Patterson MD on 6/17/2023 9:47 PM Final report signed by Dominic Patterson MD on 6/17/2023 11:04 PM    MRI Brain Without Contrast    Result Date: 6/23/2023  Multifocal acute infarcts of the brain. Multifocal chronic infarcts of the brain, many of which were present in the 11/21/2019 study. Cervical lymphadenopathy. 27 mm nodule in the left neck may represent a pathologic lymph node or other mass. The previously described left oropharyngeal mass is better visualized in the 6/20/2023 CTA neck. CRITICAL RESULT: No. COMMUNICATION: Per this written report. Drafted by Melonie Polk MD on 6/23/2023 2:10 PM Final report signed by Melonie Polk MD on 6/23/2023 2:24 PM    CT Abdomen Pelvis With Contrast    Result Date: 6/17/2023  No findings of gastrointestinal  bleed. Extensive upper abdominal, retroperitoneal and pelvic adenopathy. In the setting of known malignancy this is concerning for metastatic disease. Right adrenal gland indeterminate nodule. CRITICAL RESULT: No. COMMUNICATION: Per this written report. Drafted by Bre Shirley MD on 6/17/2023 9:32 PM Final report signed by Bre Shirley MD on 6/17/2023 9:43 PM    CT Angiogram Chest Pulmonary Embolism    Result Date: 6/17/2023  No pulmonary embolism. Left lower lobe groundglass nodular opacities may represent infectious or inflammatory process. Enlarged mediastinal and bilateral axillary lymph nodes unchanged from comparison. CRITICAL RESULT:   No. COMMUNICATION: Per this written report. Drafted by Bre Shirley MD on 6/17/2023 9:14 PM Final report signed by Bre Shirley MD on 6/17/2023 9:24 PM    US Guided Lymph Node Biopsy    Result Date: 6/23/2023  Successful ultrasound-guided right inguinal lymph node biopsy. Okay to return to floor 30 minutes postprocedure. CRITICAL RESULT: No. COMMUNICATION: Per this written report. Preliminary report signed by Tony Ortega DO on 6/23/2023 10:04 AM By electronically signing this report, I, the attending physician, attest that I was present for the entire procedure(s) and agree with the final edited report. Drafted by Tony Ortega DO on 6/23/2023 9:53 AM Final report signed by Johnie Schmitt MD on 6/23/2023 11:43 AM    CT Angiogram Head    Result Date: 6/20/2023  Unchanged CT head, CT angiography head and neck. No definite acute brain abnormality. Right frontoparietal encephalomalacia. Old basal-ganglia lacunar infarct on the left. No hemodynamically significant arterial stenosis in the neck, unchanged. No extravasation. Multifocal moderate-marked intracranial arterial stenosis, unchanged. Left tonsillar mass, extensive cervical adenopathy, partially visualized mediastinal adenopathy, unchanged. Greyson Hamilton M.D. This report has been electronically signed and  verified by the Radiologist whose name is printed above. DD:  06/20/2023/DT:  06/20/2023 This report contains privileged and confidential information and is intended solely for the use of the individual or entity to which it is addressed. If you are not the intended recipient of this report, you are hereby notified that any copying, distribution, dissemination or action taken in relation to the contents of this report is strictly prohibited and may be unlawful. If you have received this report in error, please notify the sender immediately at [phone] and permanently delete the original report and destroy any copies or printouts.    CT Angiogram Head    Result Date: 6/17/2023  Head CT: 1.No acute intracranial abnormality. 2.Multiple old infarcts, largest within the right temporoparietal region. Neck CTA: 1.On the outside neck CT from earlier today, there was contrast visible pooling within the oropharynx and hypopharynx which appeared to originate from the inferior pole of the left tonsillar mass. This pooling contrast is no longer visible, and there is currently no evidence of active contrast extravasation. There are prominent vessels which appear to be supplying the left tonsillar mass. 2.Extensive bilateral cervical lymphadenopathy as well as lymphadenopathy within the visualized mediastinum and axillae. 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 However, the remainder of the lymphadenopathy is very extensive and solid suggesting the possibility of a synchronous lymphoproliferative disorder. 3.No significant stenosis is identified within the cervical carotid and vertebral systems within the limitations of the exam. Head CTA: 1.Focal severe stenoses of the bilateral ACAs, MCAs, and PCAs as well as the right ICA. These are all unchanged with the exception of the stenosis at the left A3  segment, which is new compared to January 2021. 2.Moderate stenoses of the bilateral intradural vertebral arteries and intracranial left ICA. CRITICAL RESULT: No. COMMUNICATION: Per this written report. Drafted by Dominic Patterson MD on 6/17/2023 9:47 PM Final report signed by Dominic Patterson MD on 6/17/2023 11:04 PM    FL Video Swallow Single Contrast    Result Date: 7/7/2023    Normal fluoroscopic video swallow exam.  Please see the speech pathologist's report for additional information.  This report was finalized on 7/7/2023 10:50 AM by Dr. Porfirio Montgomery MD.      PET/CT FDG Skull Base To Mid Thigh    Result Date: 6/26/2023  1. Innumerable enlarged FDG avid cervical, mediastinal, axillary, mesenteric, retroperitoneal and inguinal lymph nodes are suspicious for lymphoma correlation with tissue biopsy is suggested. 2. FDG avid asymmetric mass involving the left oropharynx most likely consistent with lymphoma involvement correlation with tissue biopsy is suggested. 3. Status post ischemia involving left MCA and PCA. CRITICAL RESULT: No. COMMUNICATION: Per this written report. Drafted by Jj Johnson MD on 6/26/2023 5:21 PM Final report signed by Jj Johnson MD on 6/26/2023 8:12 PM    EEG    Result Date: 6/20/2023   Abnormal EEG Background was diffusely slow. This finding may suggest mild diffuse neuronal dysfunction that is non specific of etiology. No epileptiform activity. No seizure. Correlate clinically.     IR Angiogram Carotid Cerebral Bilateral    Result Date: 6/20/2023  Selective angiograms of the bilateral external carotid arteries and superselective angiograms of bilateral linguo-facial trunk and the internal maxillary artery is within normal limits. No evidence of active contrast extravasation, pseudoaneurysm, vasospasm or vessel injury. Since no target for embolization was identified and the patient does not have active oropharyngeal bleed, embolization was not performed.  COMMUNICATION: Per this written report. The findings were communicated to the care team. Preliminary report signed by Nicolas Stallings MD on 6/20/2023 5:15 PM By electronically signing this report, I, the attending physician, attest that I was present for the entire procedure(s) and agree with the final edited report. Drafted by Nicolas Stallings MD on 6/20/2023 4:47 PM Final report signed by Kaylin Lee MD on 6/20/2023 5:33 PM    XR chest AP portable    Result Date: 6/17/2023  Endotracheal tube in good position. Images reviewed, interpreted, and dictated by Dr. Boo Hendrickson. Transcribed by Valente Nichols PA-C.    CT neck soft tissue with IV contrast    Result Date: 6/17/2023  Extensive, new cervical adenopathy concerning for underlying lymphoma or metastatic disease Images reviewed, interpreted, and dictated by Dr. Boo Hendrickson. Transcribed by Valente Nichols PA-C.    FL Esophogram w Modified Barium Swallow Single Contrast    Result Date: 6/21/2023  No aspiration or penetration Please see separate note by Speech therapy team for dietary recommendations. CRITICAL RESULT:   No. COMMUNICATION: Per this written report. Drafted by Itz Jack MD on 6/21/2023 9:00 AM Final report signed by Itz Jack MD on 6/21/2023 9:00 AM   I have personally looked at the radiology images and read the final radiology report.    Assessment & Plan    Status post bilateral CVAs with new left sided hemiparesis which is fairly dense especially in the left lower extremity.  Patient currently on Xarelto and statin therapy.  Patient requiring maximum assistance for bathing, upper body dressing and lower body dressing.  Moderate assistance for grooming; total assist for toileting.  Requiring max moderate to maximum assistance for but bed mobility; requires lift device for bed to chair and chair to bed transfers.  Did work in the standing frame briefly yesterday but did have orthostatic episode which resulted in syncope.    Syncope--EKG  showed atrial fibrillation with a ventricular rate of 63.  Hemoglobin was stable yesterday.  Patient has had no other difficulties and has not had any complaints of chest pain or shortness of breath.    Atrial fibrillation patient currently on Lopressor and Xarelto.    Hypertension controlled and at this time patient is normotensive    Non-Hodgkin's lymphoma recommend outpatient follow-up with oncology    History of laryngeal CA    Anemia has been stable    VTE Prophylaxis:   Mechanical Order History:       None          Pharmalogical Order History:        Ordered     Dose Route Frequency Stop    07/06/23 2429  rivaroxaban (XARELTO) tablet 20 mg        Question:  Are you ordering rivaroxaban 10 mg for the prevention of blood clots in an acutely ill medical patient?  Answer:  No    20 mg PO Daily With Dinner --                        Sj Jay MD  [facility]  07/15/23  09:39 EDT

## 2023-07-15 NOTE — PROGRESS NOTES
Rehabilitation Nursing  Inpatient Rehabilitation Plan of Care Note    Plan of Care  Copy from POC    Body Function Structure    Skin Integrity (Active)  Current Status (7/6/2023 4:18:00 PM): At Risk for Skin Breakdown  Weekly Goal: No Skin Breakdown  Discharge Goal: No Skin Breakdown    Safety    Potential for Injury (Active)  Current Status (7/6/2023 4:18:00 PM): Potential for Falls  Weekly Goal: No Falls  Discharge Goal: No Falls    Signed by: Darby Frizt Nurse

## 2023-07-16 LAB
GLUCOSE BLDC GLUCOMTR-MCNC: 119 MG/DL (ref 70–130)
GLUCOSE BLDC GLUCOMTR-MCNC: 129 MG/DL (ref 70–130)
GLUCOSE BLDC GLUCOMTR-MCNC: 144 MG/DL (ref 70–130)
GLUCOSE BLDC GLUCOMTR-MCNC: 154 MG/DL (ref 70–130)

## 2023-07-16 PROCEDURE — 82948 REAGENT STRIP/BLOOD GLUCOSE: CPT

## 2023-07-16 RX ADMIN — MAGNESIUM GLUCONATE 500 MG ORAL TABLET 400 MG: 500 TABLET ORAL at 09:26

## 2023-07-16 RX ADMIN — TAMSULOSIN HYDROCHLORIDE 0.4 MG: 0.4 CAPSULE ORAL at 09:24

## 2023-07-16 RX ADMIN — METOPROLOL TARTRATE 50 MG: 50 TABLET, FILM COATED ORAL at 20:10

## 2023-07-16 RX ADMIN — NYSTATIN: 100000 POWDER TOPICAL at 20:09

## 2023-07-16 RX ADMIN — ALLOPURINOL 300 MG: 300 TABLET ORAL at 09:23

## 2023-07-16 RX ADMIN — ATORVASTATIN CALCIUM 80 MG: 40 TABLET, FILM COATED ORAL at 09:23

## 2023-07-16 RX ADMIN — LOSARTAN POTASSIUM 25 MG: 25 TABLET, FILM COATED ORAL at 09:23

## 2023-07-16 RX ADMIN — METOPROLOL TARTRATE 50 MG: 50 TABLET, FILM COATED ORAL at 09:24

## 2023-07-16 RX ADMIN — RIVAROXABAN 20 MG: 20 TABLET, FILM COATED ORAL at 17:40

## 2023-07-16 RX ADMIN — NYSTATIN 1 APPLICATION: 100000 POWDER TOPICAL at 09:24

## 2023-07-16 RX ADMIN — PANTOPRAZOLE SODIUM 40 MG: 40 TABLET, DELAYED RELEASE ORAL at 05:18

## 2023-07-16 NOTE — PROGRESS NOTES
Rehabilitation Nursing  Inpatient Rehabilitation Plan of Care Note    Plan of Care  Copy from POCBody Function Structure    Skin Integrity (Active)  Current Status (7/6/2023 4:18:00 PM): At Risk for Skin Breakdown  Weekly Goal: No Skin Breakdown  Discharge Goal: No Skin Breakdown    Safety    Potential for Injury (Active)  Current Status (7/6/2023 4:18:00 PM): Potential for Falls  Weekly Goal: No Falls  Discharge Goal: No Falls    Signed by: Lise Baca Nurse

## 2023-07-17 LAB
ANION GAP SERPL CALCULATED.3IONS-SCNC: 9.4 MMOL/L (ref 5–15)
BASOPHILS # BLD AUTO: 0.04 10*3/MM3 (ref 0–0.2)
BASOPHILS NFR BLD AUTO: 0.6 % (ref 0–1.5)
BUN SERPL-MCNC: 22 MG/DL (ref 8–23)
BUN/CREAT SERPL: 22.4 (ref 7–25)
CALCIUM SPEC-SCNC: 9.5 MG/DL (ref 8.6–10.5)
CHLORIDE SERPL-SCNC: 103 MMOL/L (ref 98–107)
CO2 SERPL-SCNC: 24.6 MMOL/L (ref 22–29)
CREAT SERPL-MCNC: 0.98 MG/DL (ref 0.76–1.27)
DEPRECATED RDW RBC AUTO: 39.2 FL (ref 37–54)
EGFRCR SERPLBLD CKD-EPI 2021: 85.6 ML/MIN/1.73
EOSINOPHIL # BLD AUTO: 0.15 10*3/MM3 (ref 0–0.4)
EOSINOPHIL NFR BLD AUTO: 2.2 % (ref 0.3–6.2)
ERYTHROCYTE [DISTWIDTH] IN BLOOD BY AUTOMATED COUNT: 12.6 % (ref 12.3–15.4)
GLUCOSE BLDC GLUCOMTR-MCNC: 117 MG/DL (ref 70–130)
GLUCOSE BLDC GLUCOMTR-MCNC: 139 MG/DL (ref 70–130)
GLUCOSE BLDC GLUCOMTR-MCNC: 141 MG/DL (ref 70–130)
GLUCOSE BLDC GLUCOMTR-MCNC: 205 MG/DL (ref 70–130)
GLUCOSE SERPL-MCNC: 127 MG/DL (ref 65–99)
HCT VFR BLD AUTO: 34.7 % (ref 37.5–51)
HGB BLD-MCNC: 10.8 G/DL (ref 13–17.7)
IMM GRANULOCYTES # BLD AUTO: 0.03 10*3/MM3 (ref 0–0.05)
IMM GRANULOCYTES NFR BLD AUTO: 0.4 % (ref 0–0.5)
LYMPHOCYTES # BLD AUTO: 1.37 10*3/MM3 (ref 0.7–3.1)
LYMPHOCYTES NFR BLD AUTO: 20.5 % (ref 19.6–45.3)
MCH RBC QN AUTO: 26.5 PG (ref 26.6–33)
MCHC RBC AUTO-ENTMCNC: 31.1 G/DL (ref 31.5–35.7)
MCV RBC AUTO: 85.3 FL (ref 79–97)
MONOCYTES # BLD AUTO: 0.72 10*3/MM3 (ref 0.1–0.9)
MONOCYTES NFR BLD AUTO: 10.8 % (ref 5–12)
NEUTROPHILS NFR BLD AUTO: 4.38 10*3/MM3 (ref 1.7–7)
NEUTROPHILS NFR BLD AUTO: 65.5 % (ref 42.7–76)
NRBC BLD AUTO-RTO: 0 /100 WBC (ref 0–0.2)
PLATELET # BLD AUTO: 285 10*3/MM3 (ref 140–450)
PMV BLD AUTO: 9.7 FL (ref 6–12)
POTASSIUM SERPL-SCNC: 4 MMOL/L (ref 3.5–5.2)
RBC # BLD AUTO: 4.07 10*6/MM3 (ref 4.14–5.8)
SODIUM SERPL-SCNC: 137 MMOL/L (ref 136–145)
WBC NRBC COR # BLD: 6.69 10*3/MM3 (ref 3.4–10.8)

## 2023-07-17 PROCEDURE — 82948 REAGENT STRIP/BLOOD GLUCOSE: CPT

## 2023-07-17 PROCEDURE — 85025 COMPLETE CBC W/AUTO DIFF WBC: CPT | Performed by: FAMILY MEDICINE

## 2023-07-17 PROCEDURE — 97110 THERAPEUTIC EXERCISES: CPT

## 2023-07-17 PROCEDURE — 97530 THERAPEUTIC ACTIVITIES: CPT | Performed by: OCCUPATIONAL THERAPIST

## 2023-07-17 PROCEDURE — 97112 NEUROMUSCULAR REEDUCATION: CPT | Performed by: OCCUPATIONAL THERAPIST

## 2023-07-17 PROCEDURE — 97530 THERAPEUTIC ACTIVITIES: CPT

## 2023-07-17 PROCEDURE — 97110 THERAPEUTIC EXERCISES: CPT | Performed by: OCCUPATIONAL THERAPIST

## 2023-07-17 PROCEDURE — 99231 SBSQ HOSP IP/OBS SF/LOW 25: CPT | Performed by: INTERNAL MEDICINE

## 2023-07-17 PROCEDURE — 80048 BASIC METABOLIC PNL TOTAL CA: CPT | Performed by: FAMILY MEDICINE

## 2023-07-17 PROCEDURE — 97112 NEUROMUSCULAR REEDUCATION: CPT

## 2023-07-17 PROCEDURE — 97535 SELF CARE MNGMENT TRAINING: CPT | Performed by: OCCUPATIONAL THERAPIST

## 2023-07-17 RX ADMIN — ATORVASTATIN CALCIUM 80 MG: 40 TABLET, FILM COATED ORAL at 09:03

## 2023-07-17 RX ADMIN — TAMSULOSIN HYDROCHLORIDE 0.4 MG: 0.4 CAPSULE ORAL at 09:03

## 2023-07-17 RX ADMIN — MAGNESIUM GLUCONATE 500 MG ORAL TABLET 400 MG: 500 TABLET ORAL at 09:05

## 2023-07-17 RX ADMIN — METOPROLOL TARTRATE 25 MG: 25 TABLET, FILM COATED ORAL at 20:53

## 2023-07-17 RX ADMIN — NYSTATIN: 100000 POWDER TOPICAL at 20:53

## 2023-07-17 RX ADMIN — ALLOPURINOL 300 MG: 300 TABLET ORAL at 09:03

## 2023-07-17 RX ADMIN — NYSTATIN 1 APPLICATION: 100000 POWDER TOPICAL at 09:05

## 2023-07-17 RX ADMIN — ACETAMINOPHEN 650 MG: 325 TABLET ORAL at 23:06

## 2023-07-17 RX ADMIN — HYDROXYZINE HYDROCHLORIDE 25 MG: 25 TABLET, FILM COATED ORAL at 23:06

## 2023-07-17 RX ADMIN — PANTOPRAZOLE SODIUM 40 MG: 40 TABLET, DELAYED RELEASE ORAL at 05:26

## 2023-07-17 RX ADMIN — RIVAROXABAN 20 MG: 20 TABLET, FILM COATED ORAL at 17:13

## 2023-07-17 NOTE — PROGRESS NOTES
Rehabilitation Nursing  Inpatient Rehabilitation Plan of Care Note    Plan of Care  Copy from POCBody Function Structure    Skin Integrity (Active)  Current Status (7/6/2023 4:18:00 PM): At Risk for Skin Breakdown  Weekly Goal: No Skin Breakdown  Discharge Goal: No Skin Breakdown    Safety    Potential for Injury (Active)  Current Status (7/6/2023 4:18:00 PM): Potential for Falls  Weekly Goal: No Falls  Discharge Goal: No Falls    Signed by: Melissa Pacheco RN

## 2023-07-17 NOTE — PLAN OF CARE
Goal Outcome Evaluation:      No complaints noted at this time. Continue current plan of care.

## 2023-07-17 NOTE — PROGRESS NOTES
Occupational Therapy:    Physical Therapy: Individual: 105 minutes.    Speech Language Pathology:    Signed by: Vu Brown PTA

## 2023-07-17 NOTE — THERAPY PROGRESS REPORT/RE-CERT
Inpatient Rehabilitation - Physical Therapy Progress Note        Ronnie     Patient Name: Antonio Canseco  : 1955  MRN: [id number]    Today's Date: 2023                    Admit Date: 2023      Visit Dx:   No diagnosis found.    Patient Active Problem List   Diagnosis    Detrusor instability    Low testosterone in male    Disorder of prostate    Corporo-venous occlusive erectile dysfunction    CVA (cerebral vascular accident)       Past Medical History:   Diagnosis Date    Diabetes mellitus     Hypertension     Stroke        Past Surgical History:   Procedure Laterality Date    US GUIDED LYMPH NODE BIOPSY  2023       PT ASSESSMENT (last 12 hours)       [facility] PT Evaluation and Treatment       Row Name 23 1514          PT Time and Intention    Document Type daily treatment;progress note  -RG     Mode of Treatment physical therapy  -RG     Patient/Family/Caregiver Comments/Observations Pt and nursing in agreement for skilled PT on this date. Pt's BP in supine was 148/65, seated 124/60, standing 105/62, 92/51, then sat down 109/67.  -RG       Row Name 23 1514          General Information    Patient Profile Reviewed yes  -RG     Existing Precautions/Restrictions fall  L HP, <trunk support/balance  -RG       Row Name 23 1514          Cognition/Psychosocial    Affect/Mental Status (Cognition) WFL  -RG     Orientation Status (Cognition) oriented x 3  -RG     Follows Commands (Cognition) verbal cues/prompting required;physical/tactile prompts required;follows one-step commands  -RG     Personal Safety Interventions gait belt;nonskid shoes/slippers when out of bed;fall prevention program maintained  -RG     Cognitive Function safety deficit  -RG       Row Name 23 1514          Mobility    Therapeutic Standing Program standing frame utilized  -RG     Additional Documentation Therapeutic Standing Program (Row)  12 minutes  -RG       Row Name 23 1514          Bed Mobility     Rolling Left [place] (Bed Mobility) verbal cues;nonverbal cues (demo/gesture);moderate assist (50% patient effort);maximum assist (25% patient effort)  -RG     Rolling Right [place] (Bed Mobility) dependent (less than 25% patient effort);verbal cues;nonverbal cues (demo/gesture);maximum assist (25% patient effort)  -RG     Bed Mobility, Safety Issues decreased use of arms for pushing/pulling;decreased use of legs for bridging/pushing  -     Assistive Device (Bed Mobility) bed rails;draw sheet  -       Row Name 07/17/23 1514          Bed-Chair Transfer    Bed-Chair [place] (Transfers) dependent (less than 25% patient effort)  -RG     Assistive Device (Bed-Chair Transfers) lift device  -       Row Name 07/17/23 1514          Chair-Bed Transfer    Chair-Bed [place] (Transfers) dependent (less than 25% patient effort)  -RG     Assistive Device (Chair-Bed Transfers) lift device  -       Row Name 07/17/23 1514          Gait/Stairs (Locomotion)    Comment, (Gait/Stairs) unable  -       Row Name 07/17/23 1514          Safety Issues, Functional Mobility    Impairments Affecting Function (Mobility) balance;coordination;endurance/activity tolerance;grasp;motor control;muscle tone abnormal;postural/trunk control;range of motion (ROM);strength  -       Row Name 07/17/23 1514          Hip (Therapeutic Exercise)    Hip Strengthening (Therapeutic Exercise) bilateral;flexion;aBduction;aDduction;marching while seated;sitting;resistance band;green;10 repetitions;2 sets  -       Row Name 07/17/23 1514          Knee (Therapeutic Exercise)    Knee Strengthening (Therapeutic Exercise) bilateral;flexion;extension;marching while seated;LAQ (long arc quad);sitting;10 repetitions;2 sets;resistance band;green  -       Row Name 07/17/23 1514          Ankle (Therapeutic Exercise)    Ankle Strengthening (Therapeutic Exercise) bilateral;dorsiflexion;plantarflexion;sitting;10 repetitions;2 sets  -       Row  Name 07/17/23 1514          Positioning and Restraints    Pre-Treatment Position in bed  -RG     Post Treatment Position wheelchair  -RG     In Wheelchair notified nsg;sitting;with OT  -RG       Row Name 07/17/23 1514          Bed Mobility Goal 1 (PT-IRF)    Progress/Outcomes (Bed Mobility Goal 1, PT-IRF) goal ongoing  -RG       Row Name 07/17/23 1514          Bed Mobility Goal 2 (PT-IRF)    Progress/Outcomes (Bed Mobility Goal 2, PT-IRF) goal ongoing  -RG       Row Name 07/17/23 1514          Transfer Goal 1 (PT-IRF)    Progress/Outcomes (Transfer Goal 1, PT-IRF) goal ongoing  -RG       Row Name 07/17/23 1514          Transfer Goal 2 (PT-IRF)    Progress/Outcomes (Transfer Goal 2, PT-IRF) goal ongoing  -RG       Row Name 07/17/23 1514          Gait/Walking Locomotion Goal 1 (PT-IRF)    Progress/Outcomes (Gait/Walking Locomotion Goal 1, PT-IRF) goal ongoing  -RG       Row Name 07/17/23 1514          Gait/Walking Locomotion Goal 2 (PT-IRF)    Progress/Outcomes (Gait/Walking Locomotion Goal 2, PT-IRF) goal ongoing  -RG               User Key  (r) = Recorded By, (t) = Taken By, (c) = Cosigned By      Initials Name Provider Type    Vu Galloway PTA Physical Therapist Assistant                     Physical Therapy Education       Title: PT OT SLP Therapies (Done)       Topic: Physical Therapy (Done)       Point: Mobility training (Done)       Learning Progress Summary             Patient Acceptance, E,D, VU,NR by RG at 7/17/2023 1522    Acceptance, E,D, VU,NR by LL at 7/13/2023 1358    Acceptance, E, VU,NR by LB at 7/12/2023 1144    Acceptance, E, VU,NR by LB at 7/11/2023 1426    Acceptance, E, VU,NR by LB at 7/10/2023 1452    Acceptance, E,D, VU,NR by LL at 7/8/2023 1228    Acceptance, E,D, VU,NR by LB at 7/7/2023 1449                         Point: Home exercise program (Done)       Learning Progress Summary             Patient Acceptance, E,D, VU,NR by RG at 7/17/2023 1522    Acceptance, E,D, VU,NR by LL at  7/13/2023 1358    Acceptance, E, VU,NR by LB at 7/12/2023 1144    Acceptance, E, VU,NR by LB at 7/11/2023 1426    Acceptance, E, VU,NR by LB at 7/10/2023 1452    Acceptance, E,D, VU,NR by LL at 7/8/2023 1228    Acceptance, E,D, VU,NR by LB at 7/7/2023 1449                         Point: Body mechanics (Done)       Learning Progress Summary             Patient Acceptance, E,D, VU,NR by RG at 7/17/2023 1522    Acceptance, E,D, VU,NR by LL at 7/13/2023 1358    Acceptance, E, VU,NR by LB at 7/12/2023 1144    Acceptance, E, VU,NR by LB at 7/11/2023 1426    Acceptance, E, VU,NR by LB at 7/10/2023 1452    Acceptance, E,D, VU,NR by LL at 7/8/2023 1228    Acceptance, E,D, VU,NR by LB at 7/7/2023 1449                         Point: Precautions (Done)       Learning Progress Summary             Patient Acceptance, E,D, VU,NR by RG at 7/17/2023 1522    Acceptance, E,D, VU,NR by LL at 7/13/2023 1358    Acceptance, E, VU,NR by LB at 7/12/2023 1144    Acceptance, E, VU,NR by LB at 7/11/2023 1426    Acceptance, E, VU,NR by LB at 7/10/2023 1452    Acceptance, E,D, VU,NR by LL at 7/8/2023 1228    Acceptance, E,D, VU,NR by LB at 7/7/2023 1449                                         User Key       Initials Effective Dates Name Provider Type Discipline    LB 06/16/21 -  Joslyn Garcia, PT Physical Therapist PT    LL 05/02/16 -  Connie Velasquez, JILL Physical Therapist Assistant PT    RG 06/16/21 -  Vu Brown PTA Physical Therapist Assistant PT                    PT Recommendation and Plan                          Time Calculation:      PT Charges       Row Name 07/17/23 1522             Time Calculation    Start Time 0730  -RG      Stop Time 0915  -RG      Time Calculation (min) 105 min  -RG      PT Received On 07/17/23  -RG         Time Calculation- PT    Total Timed Code Minutes-  minute(s)  -RG                User Key  (r) = Recorded By, (t) = Taken By, (c) = Cosigned By      Initials Name Provider Type    RG  Vu Brown PTA Physical Therapist Assistant                    Therapy Charges for Today       Code Description Service Date Service Provider Modifiers Qty    74422672422 HC PT NEUROMUSC RE EDUCATION EA 15 MIN 7/17/2023 Vu Brown PTA GP, CQ 1    69626525275 HC PT THERAPEUTIC ACT EA 15 MIN 7/17/2023 Vu Brown PTA GP, CQ 2    53799734366 HC PT THER PROC EA 15 MIN 7/17/2023 Vu Brown PTA GP, CQ 3                     Vu Brown PTA  7/17/2023

## 2023-07-17 NOTE — PAYOR COMM NOTE
"Carmen Doe, RN   Utilization Review Nurse for Inpatient Rehab   Phone: [phone]  Fax: [phone]  Email: [email or web address]  [facility]  Facility NPI: [id number]     CLINICAL REVIEW FOR CONTINUED REHAB STAY APPROVAL  Member:  Antonio Canseco  :  1955  ID# [id number]  Auth# [id number]  Admission Date:  23  Discharge Date:  23  *Requesting additional 7 days    Antonio Canseco (67 y.o. Male)       Date of Birth   1955    Social Security Number       Address   [address] [place] [address]    Home Phone   [phone]    MRN   [id number]       Pentec[employer]alism   None    Marital Status                               Admission Date   23    Admission Type   Elective    Admitting Provider   Hayder Mendoza MD    Attending Provider   Hayder Mendoza MD    Department, Room/Bed   [facility], 106/2S       Discharge Date       Discharge Disposition       Discharge Destination                                 Attending Provider: Hayder Mendoza MD    Allergies: No Known Allergies    Isolation: None   Infection: None   Code Status: CPR    Ht: 172.7 cm (67.99\")   Wt: 121 kg (266 lb 12.1 oz)    Admission Cmt: None   Principal Problem: CVA (cerebral vascular accident) [I63.9]                 Greer Conner MSW     Case Management     Significant Note      Signed     Date of Service: 23  Creation Time: 23     Signed              23   Plan   Plan SS spoke to sister Mariah [phone] about how pt is doing in therapy and plans for discharge on 23 if family are able to meet caregiving needs.  Pt lives with cousin Alejandra and wants to return to this home at discharge.  Sister, cousin and daughter Karolyn plan to assist with pt's care at home.  Discussed family coming to rehab prior to discharge to observe therapy and receive education.  Sister will inform cousin about pt's discharge date.  Will " follow.                   Sj Jay MD   Physician  Hospitalist     Progress Notes      Signed     Date of Service: 07/15/23 0939  Creation Time: 07/15/23 0939     Signed       [facility]  PROGRESS NOTE     Patient Identification:  Name:  Antonio Canseco  Age:  67 y.o.  Sex:  male  :  1955  MRN:  [id number]  Visit Number:  [id number]  ROOM: [facility]      Primary Care Provider:  Adriana Ledesma MD     Length of stay in inpatient status:  9     Subjective      Chief Compliant:  No chief complaint on file.        History of Presenting Illness: 67-year-old gentleman with a history of bilateral CVAs with new left-sided deficit.  Patient had orthostatic And syncopal episode yesterday during his time in the standing frame.  Patient states he feels much better today.  Work-up was negative yesterday.     Objective      Current Hospital Meds:allopurinol, 300 mg, Oral, Daily  atorvastatin, 80 mg, Oral, Daily  losartan, 25 mg, Oral, Q24H  magnesium oxide, 400 mg, Oral, Daily  metoprolol tartrate, 50 mg, Oral, Q12H  nystatin, , Topical, Q12H  pantoprazole, 40 mg, Oral, Q AM  rivaroxaban, 20 mg, Oral, Daily With Dinner  tamsulosin, 0.4 mg, Oral, Daily     ----------------------------------------------------------------------------------------------------------------------  Vital Signs:  Temp:  [97.3 °F (36.3 °C)-98.9 °F (37.2 °C)] 97.3 °F (36.3 °C)  Heart Rate:  [78-85] 85  Resp:  [18] 18  BP: ()/(60-90) 127/74  SpO2:  [97 %-99 %] 97 %  on   ;   Device (Oxygen Therapy): room air  Body mass index is 40.57 kg/m².         Wt Readings from Last 3 Encounters:   23 121 kg (266 lb 12.1 oz)   22 121 kg (267 lb)   07/15/19 121 kg (267 lb 1.6 oz)      Intake & Output (last 3 days)            07 0700 07/14 0701  07/15 0700 07/15 07 0700     P.O. 360 840 480 240     Total Intake(mL/kg) 360 (3) 840 (6.9) 480 (4) 240 (2)     Net  +360 +840 +480 +240                   Urine Unmeasured Occurrence 2 x 5 x 5 x       Stool Unmeasured Occurrence 5 x 2 x                 Diet: Cardiac Diets; Healthy Heart (2-3 Na+); Texture: Regular Texture (IDDSI 7); Fluid Consistency: Thin (IDDSI 0)  ----------------------------------------------------------------------------------------------------------------------  Physical exam:  Constitutional: No acute distress  HEENT: Normocephalic atraumatic  Neck: Supple  Cardiovascular: Irregular irregular, ventricular rate controlled  Pulmonary/Chest: Clear to auscultation  Abdominal: Positive bowel sounds soft.   Musculoskeletal: No arthropathy  Neurological: Left hemiparesis with 0-1 out of 5 strength in the left lower extremity and 3 out of 5 strength in the left upper extremity  Skin: No rash  Peripheral vascular: No edema  Genitourinary:  ----------------------------------------------------------------------------------------------------------------------     Last echocardiogram:     ----------------------------------------------------------------------------------------------------------------------          Results from last 7 days   Lab Units 07/14/23  1028 07/14/23  0104 07/11/23  0301 07/09/23  0125   WBC 10*3/mm3  --  6.86 7.42 6.43   HEMOGLOBIN g/dL 10.0* 9.5* 10.5* 10.0*   HEMATOCRIT % 31.9* 30.3* 33.5* 32.8*   MCV fL  --  86.6 85.2 87.9   MCHC g/dL  --  31.4* 31.3* 30.5*   PLATELETS 10*3/mm3  --  257 322 355                 Results from last 7 days   Lab Units 07/14/23  0104 07/11/23  0301 07/09/23  0125   SODIUM mmol/L 136 135* 135*   POTASSIUM mmol/L 4.2 4.0 4.2   CHLORIDE mmol/L 104 103 104   CO2 mmol/L 23.2 21.4* 21.8*   BUN mg/dL 21 21 24*   CREATININE mg/dL 1.00 0.82 0.87   CALCIUM mg/dL 8.8 9.0 9.0   GLUCOSE mg/dL 100* 109* 119*   Estimated Creatinine Clearance: 93.1 mL/min (by C-G formula based on SCr of 1 mg/dL).  No results found for: AMMONIA                      Glucose   Date/Time Value Ref  Range Status   07/15/2023 0617 136 (H) 70 - 130 mg/dL Final       Comment:       Meter: [id number] : [id number] Andreina Crystal   07/14/2023 2100 136 (H) 70 - 130 mg/dL Final       Comment:       Meter: [id number] : [id number] Andreina Crystal   07/14/2023 1612 207 (H) 70 - 130 mg/dL Final       Comment:       Meter: [id number] : [id number] CHAYA FIERRO   07/14/2023 1058 149 (H) 70 - 130 mg/dL Final       Comment:       Meter: [id number] : [id number] NAYLOR CHARLOTTE   07/14/2023 0957 143 (H) 70 - 130 mg/dL Final       Comment:       Meter: [id number] : [id number] CHAYA FIERRO   07/14/2023 0624 127 70 - 130 mg/dL Final       Comment:       Meter: [id number] : [id number] ANSELMO LACY   07/13/2023 1919 181 (H) 70 - 130 mg/dL Final       Comment:       Meter: [id number] : [id number] ANSELMO LACY   07/13/2023 1612 149 (H) 70 - 130 mg/dL Final       Comment:       Meter: [id number] : [id number] NICHOLE BOWLING      No results found for: TSH, FREET4  No results found for: PREGTESTUR, PREGSERUM, HCG, HCGQUANT  Pain Management Panel                 No data to display                   Brief Urine Lab Results         None             No results found for: BLOODCX      No results found for: URINECX  No results found for: WOUNDCX  No results found for: STOOLCX         I have personally looked at the labs and they are summarized above.  ----------------------------------------------------------------------------------------------------------------------  Detailed radiology reports for the last 24 hours:     Imaging Results (Last 24 Hours)         ** No results found for the last 24 hours. **             Final impressions for the last 30 days of radiology reports:     CT Head Without Contrast     Result Date: 6/29/2023  Old and maturing subacute infarctions without gross hemorrhagic transformation. The possibility of new small interval infarctions in this case cannot be excluded. CRITICAL RESULT:    No. COMMUNICATION: Per this written report. Drafted by Neil Kearns on 6/29/2023 1:45 PM Final report signed by Neil Kearns on 6/29/2023 2:04 PM     CT Head Without Contrast     Result Date: 6/25/2023  There are again the multifocal areas of evolving acute infarction. There are again a small amount of blood products related to the right occipital lobe infarct. There is mild swelling related to the areas of infarction. No large hematoma or new infarct has otherwise developed. CRITICAL RESULT:   No. COMMUNICATION: Per this written report. Drafted by Greyson Bobby MD on 6/25/2023 8:38 AM Final report signed by Greyson Bobby MD on 6/25/2023 8:49 AM     CT Head Without Contrast     Result Date: 6/21/2023  Interval development of loss of gray-white differentiation over the right frontal convexity, probable middle cerebral artery-anterior cerebral artery watershed infarct or embolic right middle cerebral artery infarct. No hemorrhagic transformation, minimal mass-effect on adjacent sulci. Findings conveyed via Epic Messenger at 1846 hours, read at 1846 hours. Greyson Hamilton M.D. This report has been electronically signed and verified by the Radiologist whose name is printed above. DD:  06/21/2023/DT:  06/21/2023 This report contains privileged and confidential information and is intended solely for the use of the individual or entity to which it is addressed. If you are not the intended recipient of this report, you are hereby notified that any copying, distribution, dissemination or action taken in relation to the contents of this report is strictly prohibited and may be unlawful. If you have received this report in error, please notify the sender immediately at [phone] and permanently delete the original report and destroy any copies or printouts.     CT Head Without Contrast     Result Date: 6/20/2023  Unchanged CT head, CT angiography head and neck. No definite acute brain abnormality.  Right frontoparietal encephalomalacia. Old basal-ganglia lacunar infarct on the left. No hemodynamically significant arterial stenosis in the neck, unchanged. No extravasation. Multifocal moderate-marked intracranial arterial stenosis, unchanged. Left tonsillar mass, extensive cervical adenopathy, partially visualized mediastinal adenopathy, unchanged. Greyson Hamilton M.D. This report has been electronically signed and verified by the Radiologist whose name is printed above. DD:  06/20/2023/DT:  06/20/2023 This report contains privileged and confidential information and is intended solely for the use of the individual or entity to which it is addressed. If you are not the intended recipient of this report, you are hereby notified that any copying, distribution, dissemination or action taken in relation to the contents of this report is strictly prohibited and may be unlawful. If you have received this report in error, please notify the sender immediately at [phone] and permanently delete the original report and destroy any copies or printouts.     CT Head Without Contrast     Result Date: 6/17/2023  Head CT: 1.No acute intracranial abnormality. 2.Multiple old infarcts, largest within the right temporoparietal region. Neck CTA: 1.On the outside neck CT from earlier today, there was contrast visible pooling within the oropharynx and hypopharynx which appeared to originate from the inferior pole of the left tonsillar mass. This pooling contrast is no longer visible, and there is currently no evidence of active contrast extravasation. There are prominent vessels which appear to be supplying the left tonsillar mass. 2.Extensive bilateral cervical lymphadenopathy as well as lymphadenopathy within the visualized mediastinum and axillae. 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 However, the  remainder of the lymphadenopathy is very extensive and solid suggesting the possibility of a synchronous lymphoproliferative disorder. 3.No significant stenosis is identified within the cervical carotid and vertebral systems within the limitations of the exam. Head CTA: 1.Focal severe stenoses of the bilateral ACAs, MCAs, and PCAs as well as the right ICA. These are all unchanged with the exception of the stenosis at the left A3 segment, which is new compared to January 2021. 2.Moderate stenoses of the bilateral intradural vertebral arteries and intracranial left ICA. CRITICAL RESULT: No. COMMUNICATION: Per this written report. Drafted by Dominic Patterson MD on 6/17/2023 9:47 PM Final report signed by Dominic Patterson MD on 6/17/2023 11:04 PM     CT Angiogram Neck     Result Date: 6/20/2023  Unchanged CT head, CT angiography head and neck. No definite acute brain abnormality. Right frontoparietal encephalomalacia. Old basal-ganglia lacunar infarct on the left. No hemodynamically significant arterial stenosis in the neck, unchanged. No extravasation. Multifocal moderate-marked intracranial arterial stenosis, unchanged. Left tonsillar mass, extensive cervical adenopathy, partially visualized mediastinal adenopathy, unchanged. Greyson Hamilton M.D. This report has been electronically signed and verified by the Radiologist whose name is printed above. DD:  06/20/2023/DT:  06/20/2023 This report contains privileged and confidential information and is intended solely for the use of the individual or entity to which it is addressed. If you are not the intended recipient of this report, you are hereby notified that any copying, distribution, dissemination or action taken in relation to the contents of this report is strictly prohibited and may be unlawful. If you have received this report in error, please notify the sender immediately at [phone] and permanently delete the original report and destroy any copies or  printouts.     CT Angiogram Neck     Result Date: 6/17/2023  Head CT: 1.No acute intracranial abnormality. 2.Multiple old infarcts, largest within the right temporoparietal region. Neck CTA: 1.On the outside neck CT from earlier today, there was contrast visible pooling within the oropharynx and hypopharynx which appeared to originate from the inferior pole of the left tonsillar mass. This pooling contrast is no longer visible, and there is currently no evidence of active contrast extravasation. There are prominent vessels which appear to be supplying the left tonsillar mass. 2.Extensive bilateral cervical lymphadenopathy as well as lymphadenopathy within the visualized mediastinum and axillae. 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 However, the remainder of the lymphadenopathy is very extensive and solid suggesting the possibility of a synchronous lymphoproliferative disorder. 3.No significant stenosis is identified within the cervical carotid and vertebral systems within the limitations of the exam. Head CTA: 1.Focal severe stenoses of the bilateral ACAs, MCAs, and PCAs as well as the right ICA. These are all unchanged with the exception of the stenosis at the left A3 segment, which is new compared to January 2021. 2.Moderate stenoses of the bilateral intradural vertebral arteries and intracranial left ICA. CRITICAL RESULT: No. COMMUNICATION: Per this written report. Drafted by Dominic Patterson MD on 6/17/2023 9:47 PM Final report signed by Dominic Patterson MD on 6/17/2023 11:04 PM     MRI Brain Without Contrast     Result Date: 6/23/2023  Multifocal acute infarcts of the brain. Multifocal chronic infarcts of the brain, many of which were present in the 11/21/2019 study. Cervical lymphadenopathy. 27 mm nodule in the left neck may represent a pathologic lymph node or other mass. The  previously described left oropharyngeal mass is better visualized in the 6/20/2023 CTA neck. CRITICAL RESULT: No. COMMUNICATION: Per this written report. Drafted by Melonie Polk MD on 6/23/2023 2:10 PM Final report signed by Melonie Polk MD on 6/23/2023 2:24 PM     CT Abdomen Pelvis With Contrast     Result Date: 6/17/2023  No findings of gastrointestinal bleed. Extensive upper abdominal, retroperitoneal and pelvic adenopathy. In the setting of known malignancy this is concerning for metastatic disease. Right adrenal gland indeterminate nodule. CRITICAL RESULT: No. COMMUNICATION: Per this written report. Drafted by Bre Shirley MD on 6/17/2023 9:32 PM Final report signed by Bre Shirley MD on 6/17/2023 9:43 PM     CT Angiogram Chest Pulmonary Embolism     Result Date: 6/17/2023  No pulmonary embolism. Left lower lobe groundglass nodular opacities may represent infectious or inflammatory process. Enlarged mediastinal and bilateral axillary lymph nodes unchanged from comparison. CRITICAL RESULT:   No. COMMUNICATION: Per this written report. Drafted by Bre Shirley MD on 6/17/2023 9:14 PM Final report signed by Bre Shirley MD on 6/17/2023 9:24 PM     US Guided Lymph Node Biopsy     Result Date: 6/23/2023  Successful ultrasound-guided right inguinal lymph node biopsy. Okay to return to floor 30 minutes postprocedure. CRITICAL RESULT: No. COMMUNICATION: Per this written report. Preliminary report signed by Tony Ortega DO on 6/23/2023 10:04 AM By electronically signing this report, I, the attending physician, attest that I was present for the entire procedure(s) and agree with the final edited report. Drafted by Tony Ortega DO on 6/23/2023 9:53 AM Final report signed by Johnie Schmitt MD on 6/23/2023 11:43 AM     CT Angiogram Head     Result Date: 6/20/2023  Unchanged CT head, CT angiography head and neck. No definite acute brain abnormality. Right frontoparietal encephalomalacia. Old basal-ganglia lacunar infarct  on the left. No hemodynamically significant arterial stenosis in the neck, unchanged. No extravasation. Multifocal moderate-marked intracranial arterial stenosis, unchanged. Left tonsillar mass, extensive cervical adenopathy, partially visualized mediastinal adenopathy, unchanged. Greyson Hamilton M.D. This report has been electronically signed and verified by the Radiologist whose name is printed above. DD:  06/20/2023/DT:  06/20/2023 This report contains privileged and confidential information and is intended solely for the use of the individual or entity to which it is addressed. If you are not the intended recipient of this report, you are hereby notified that any copying, distribution, dissemination or action taken in relation to the contents of this report is strictly prohibited and may be unlawful. If you have received this report in error, please notify the sender immediately at [phone] and permanently delete the original report and destroy any copies or printouts.     CT Angiogram Head     Result Date: 6/17/2023  Head CT: 1.No acute intracranial abnormality. 2.Multiple old infarcts, largest within the right temporoparietal region. Neck CTA: 1.On the outside neck CT from earlier today, there was contrast visible pooling within the oropharynx and hypopharynx which appeared to originate from the inferior pole of the left tonsillar mass. This pooling contrast is no longer visible, and there is currently no evidence of active contrast extravasation. There are prominent vessels which appear to be supplying the left tonsillar mass. 2.Extensive bilateral cervical lymphadenopathy as well as lymphadenopathy within the visualized mediastinum and axillae. 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 However, the remainder of the lymphadenopathy is very extensive and solid suggesting the  possibility of a synchronous lymphoproliferative disorder. 3.No significant stenosis is identified within the cervical carotid and vertebral systems within the limitations of the exam. Head CTA: 1.Focal severe stenoses of the bilateral ACAs, MCAs, and PCAs as well as the right ICA. These are all unchanged with the exception of the stenosis at the left A3 segment, which is new compared to January 2021. 2.Moderate stenoses of the bilateral intradural vertebral arteries and intracranial left ICA. CRITICAL RESULT: No. COMMUNICATION: Per this written report. Drafted by Dominic Patterson MD on 6/17/2023 9:47 PM Final report signed by Dominic Patterson MD on 6/17/2023 11:04 PM     FL Video Swallow Single Contrast     Result Date: 7/7/2023    Normal fluoroscopic video swallow exam.  Please see the speech pathologist's report for additional information.  This report was finalized on 7/7/2023 10:50 AM by Dr. Porfirio Montgomery MD.       PET/CT FDG Skull Base To Mid Thigh     Result Date: 6/26/2023  1. Innumerable enlarged FDG avid cervical, mediastinal, axillary, mesenteric, retroperitoneal and inguinal lymph nodes are suspicious for lymphoma correlation with tissue biopsy is suggested. 2. FDG avid asymmetric mass involving the left oropharynx most likely consistent with lymphoma involvement correlation with tissue biopsy is suggested. 3. Status post ischemia involving left MCA and PCA. CRITICAL RESULT: No. COMMUNICATION: Per this written report. Drafted by Jj Johnson MD on 6/26/2023 5:21 PM Final report signed by Jj Johnson MD on 6/26/2023 8:12 PM     EEG     Result Date: 6/20/2023   Abnormal EEG Background was diffusely slow. This finding may suggest mild diffuse neuronal dysfunction that is non specific of etiology. No epileptiform activity. No seizure. Correlate clinically.      IR Angiogram Carotid Cerebral Bilateral     Result Date: 6/20/2023  Selective angiograms of the bilateral external carotid  arteries and superselective angiograms of bilateral linguo-facial trunk and the internal maxillary artery is within normal limits. No evidence of active contrast extravasation, pseudoaneurysm, vasospasm or vessel injury. Since no target for embolization was identified and the patient does not have active oropharyngeal bleed, embolization was not performed. COMMUNICATION: Per this written report. The findings were communicated to the care team. Preliminary report signed by Nicolas Stallings MD on 6/20/2023 5:15 PM By electronically signing this report, I, the attending physician, attest that I was present for the entire procedure(s) and agree with the final edited report. Drafted by Nicolas Stallings MD on 6/20/2023 4:47 PM Final report signed by Kaylin Lee MD on 6/20/2023 5:33 PM     XR chest AP portable     Result Date: 6/17/2023  Endotracheal tube in good position. Images reviewed, interpreted, and dictated by Dr. Boo Hendrickson. Transcribed by Valente Nichols PA-C.     CT neck soft tissue with IV contrast     Result Date: 6/17/2023  Extensive, new cervical adenopathy concerning for underlying lymphoma or metastatic disease Images reviewed, interpreted, and dictated by Dr. Boo Hendrickson. Transcribed by Valente Nichols PA-C.     FL Esophogram w Modified Barium Swallow Single Contrast     Result Date: 6/21/2023  No aspiration or penetration Please see separate note by Speech therapy team for dietary recommendations. CRITICAL RESULT:   No. COMMUNICATION: Per this written report. Drafted by Itz Jack MD on 6/21/2023 9:00 AM Final report signed by Itz Jack MD on 6/21/2023 9:00 AM   I have personally looked at the radiology images and read the final radiology report.     Assessment & Plan    Status post bilateral CVAs with new left sided hemiparesis which is fairly dense especially in the left lower extremity.  Patient currently on Xarelto and statin therapy.  Patient requiring maximum assistance for bathing, upper  body dressing and lower body dressing.  Moderate assistance for grooming; total assist for toileting.  Requiring max moderate to maximum assistance for but bed mobility; requires lift device for bed to chair and chair to bed transfers.  Did work in the standing frame briefly yesterday but did have orthostatic episode which resulted in syncope.     Syncope--EKG showed atrial fibrillation with a ventricular rate of 63.  Hemoglobin was stable yesterday.  Patient has had no other difficulties and has not had any complaints of chest pain or shortness of breath.     Atrial fibrillation patient currently on Lopressor and Xarelto.     Hypertension controlled and at this time patient is normotensive     Non-Hodgkin's lymphoma recommend outpatient follow-up with oncology     History of laryngeal CA     Anemia has been stable     VTE Prophylaxis:   Mechanical Order History:         None             Pharmalogical Order History:           Ordered     Dose Route Frequency Stop     23 1649   rivaroxaban (XARELTO) tablet 20 mg        Question:  Are you ordering rivaroxaban 10 mg for the prevention of blood clots in an acutely ill medical patient?  Answer:  No    20 mg PO Daily With Dinner --                                Sj Jay MD  [facility] Hospitalist  07/15/23  09:39 EDT                  Pj Spear, PT   Physical Therapist  Physical Therapy     Therapy Treatment Note      Signed     Date of Service: 23  Creation Time: 23     Signed       Expand All Collapse All    Inpatient Rehabilitation - Physical Therapy Treatment Note                                                              [facility]     Patient Name: Antonio Canseco                  : 1955                        MRN: [id number]     Today's Date: 2023                                                                                            Admit Date: 2023                 Visit Dx:   Visit Diagnosis   No  diagnosis found.        Problem List       Patient Active Problem List   Diagnosis    Detrusor instability    Low testosterone in male    Disorder of prostate    Corporo-venous occlusive erectile dysfunction    CVA (cerebral vascular accident)            Medical History        Past Medical History:   Diagnosis Date    Diabetes mellitus      Hypertension      Stroke              Surgical History         Past Surgical History:   Procedure Laterality Date    US GUIDED LYMPH NODE BIOPSY   6/23/2023            PT ASSESSMENT (last 12 hours)            IRF PT Evaluation and Treatment         Row Name 07/14/23 1442                 PT Time and Intention     Document Type daily treatment  -KM       Mode of Treatment physical therapy  -KM          Row Name 07/14/23 1442                 General Information     Patient Profile Reviewed yes  -KM       Existing Precautions/Restrictions fall  L HP, <trunk support/balance  -KM          Row Name 07/14/23 1442                 Cognition/Psychosocial     Affect/Mental Status (Cognition) WFL  -KM       Orientation Status (Cognition) oriented x 3  -KM       Follows Commands (Cognition) verbal cues/prompting required;physical/tactile prompts required;follows one-step commands  -KM       Personal Safety Interventions gait belt;muscle strengthening facilitated;nonskid shoes/slippers when out of bed;supervised activity  -KM       Cognitive Function safety deficit  -KM          Row Name 07/14/23 1442                 Bed Mobility     Rolling Left [place] (Bed Mobility) verbal cues;nonverbal cues (demo/gesture);moderate assist (50% patient effort);maximum assist (25% patient effort)  -KM       Rolling Right [place] (Bed Mobility) dependent (less than 25% patient effort);verbal cues;nonverbal cues (demo/gesture);maximum assist (25% patient effort)  -KM       Bed Mobility, Safety Issues decreased use of arms for pushing/pulling;decreased use of legs for bridging/pushing  -KM        "Assistive Device (Bed Mobility) bed rails;draw sheet  -KM          Row Name 07/14/23 1442                 Bed-Chair Transfer     Bed-Chair [place] (Transfers) dependent (less than 25% patient effort)  -KM       Assistive Device (Bed-Chair Transfers) lift device  -KM          Row Name 07/14/23 1442                 Chair-Bed Transfer     Chair-Bed [place] (Transfers) dependent (less than 25% patient effort)  -KM       Assistive Device (Chair-Bed Transfers) lift device  -KM          Row Name 07/14/23 1442                 Safety Issues, Functional Mobility     Impairments Affecting Function (Mobility) balance;coordination;endurance/activity tolerance;grasp;motor control;muscle tone abnormal;postural/trunk control;range of motion (ROM);strength  -KM          Row Name 07/14/23 1442                 Motor Skills     Comments, Therapeutic Exercise seated ther-ex: marchmagda, kicks (LLE AAROM); supine ther-ex: SLR, heel slides, ankle pf/df (LLE PROM)  -KM       Additional Documentation Comments, Therapeutic Exercise (Row)  -KM          Row Name 07/14/23 1442                 Neuromuscular Re-education     Interventions (Neuromuscular Re-education) facilitation/inhibition  -KM       Positioning (Neuromuscular Re-education) supine  -KM          Row Name 07/14/23 1442                 Positioning and Restraints     Pre-Treatment Position in bed  -KM       Post Treatment Position bed  -KM       In Bed supine;call light within reach;encouraged to call for assist  -KM          Row Name 07/14/23 1442                 Daily Progress Summary (PT)     Daily Progress Summary (PT) Pt. was able to perform 18 minutes in standing frame prior to BP dropping to 96/60 and being sat down in w/c in AM. He was able to tolerate NMES and ther-ex in supine during PM. Pt. claimed to be \"feeling good\" at end of afternoon session w/ no complaints. Pt. would continue to benefit from skilled PT services.  -KM                    User Key  (r) = " "Recorded By, (t) = Taken By, (c) = Cosigned By        Initials Name Provider Type     Pj Frank, PT Physical Therapist                                         PT Recommendation and Plan     Daily Progress Summary (PT)  Daily Progress Summary (PT): Pt. was able to perform 18 minutes in standing frame prior to BP dropping to 96/60 and being sat down in w/c in AM. He was able to tolerate NMES and ther-ex in supine during PM. Pt. claimed to be \"feeling good\" at end of afternoon session w/ no complaints. Pt. would continue to benefit from skilled PT services.              Time Calculation:        PT Charges         Row Name 23 1441 23 1440                  Time Calculation     Start Time 1245  -KM 0915  -KM       Stop Time 1330  -KM 1000  -KM       Time Calculation (min) 45 min  -KM 45 min  -KM       PT Received On -- 23  -KM               Time Calculation- PT     Total Timed Code Minutes- PT 45 minute(s)  -KM 45 minute(s)  -Lisa Sanchez OT   Occupational Therapist  Occupational Therapy     Therapy Treatment Note      Signed     Date of Service: 23  Creation Time: 23     Signed       Expand All Collapse All    Inpatient Rehabilitation - Occupational Therapy Progress Note and Treatment Note     THALIA Trujillo     Patient Name: Antonio Canseco                  : 1955                        MRN: [id number]     Today's Date: 2023                                                                             Admit Date: 2023        Visit Diagnosis   No diagnosis found.        Problem List       Patient Active Problem List   Diagnosis    Detrusor instability    Low testosterone in male    Disorder of prostate    Corporo-venous occlusive erectile dysfunction    CVA (cerebral vascular accident)            Medical History        Past Medical History:   Diagnosis Date    Diabetes mellitus      Hypertension      Stroke              Surgical " History         Past Surgical History:   Procedure Laterality Date    US GUIDED LYMPH NODE BIOPSY   6/23/2023                           IRF OT ASSESSMENT FLOWSHEET (last 12 hours)            IRF OT Evaluation and Treatment         Row Name 07/14/23 1508                 OT Time and Intention     Document Type daily treatment;progress note  -BF       Mode of Treatment occupational therapy  -BF       Patient Effort good  -BF       Symptoms Noted During/After Treatment fatigue  -BF       Evaluation/Treatment Not Performed patient/family declined, not feeling well  -BF       Comment, Evaluation/Treatment Not Performed Pt had syncopal episode prior to OT session in AM and felt too fatigued to participate in therapy at the time. Pt reported he felt better in PM and was agreeable to OT.  -BF          Row Name 07/14/23 1508                 General Information     Existing Precautions/Restrictions fall  L HP, <trunk support/balance  -BF       Limitations/Impairments safety/cognitive  -BF          Row Name 07/14/23 1508                 Cognition/Psychosocial     Orientation Status (Cognition) oriented x 3  -BF       Follows Commands (Cognition) verbal cues/prompting required;repetition of directions required;physical/tactile prompts required;increased processing time needed  -BF       Executive Function Deficit (Cognition) organization/sequencing;problem-solving/reasoning  -BF          Row Name 07/14/23 1508                 Range of Motion Comprehensive     Comment, General Range of Motion L shoulder 75% AROM, remaining BUE AROM WFL.  -BF          Row Name 07/14/23 1508                 Strength Comprehensive (MMT)     Comment, General Manual Muscle Testing (MMT) Assessment L shoulder 3-/5, L elbow, hand, wrist 3/5; RUE grossly 3+/5  -BF          Row Name 07/14/23 1508                 Bathing     Comment (Bathing) Max A  -BF          Row Name 07/14/23 1508                 Upper Body Dressing     Comment (Upper Body Dressing)  Max A  -BF          Row Name 07/14/23 1508                 Lower Body Dressing     Comment (Lower Body Dressing) Max A  -BF          Row Name 07/14/23 1508                 Grooming     Comment (Grooming) Mod A  -BF          Row Name 07/14/23 1508                 Toileting     Comment (Toileting) Total A  -BF          Row Name 07/14/23 1508                 Self-Feeding     Comment (Self-Feeding) Set-up  -BF          Row Name 07/14/23 1508                 Motor Skills     Coordination fine motor deficit;gross motor deficit;left;moderate impairment  -BF       Neuromuscular Function left;upper extremity;moderate impairment  -BF       Motor Control/Coordination Interventions fine motor manipulation/dexterity activities;gross motor coordination activities;neuro-muscular re-education;therapeutic exercise/ROM  LUE GMC/FMC theract, reaching, strengthening theract; theraband, handgripper therex  -BF          Row Name 07/14/23 1508                 Neuromuscular Re-education     Interventions (Neuromuscular Re-education) facilitation/inhibition  -BF       Positioning (Neuromuscular Re-education) supine  -BF          Row Name 07/14/23 1508                 Positioning and Restraints     In Bed supine;call light within reach;encouraged to call for assist  -BF                    User Key  (r) = Recorded By, (t) = Taken By, (c) = Cosigned By        Initials Name Effective Dates     BF Lisa Sanchez, OT 07/11/23 -                                   OT Recommendation and Plan     Planned Therapy Interventions (OT): activity tolerance training, adaptive equipment training, BADL retraining, neuromuscular control/coordination retraining, passive ROM/stretching, ROM/therapeutic exercise, strengthening exercise, transfer/mobility retraining           Time Calculation:        Time Calculation- OT         Row Name 07/14/23 1517                       Time Calculation- OT     OT Start Time 1345  -BF         OT Stop Time 1455  -BF          OT Time Calculation (min) 70 min  -BF         Total Timed Code Minutes- OT 70 minute(s)  -BF         OT Non-Billable Time (min) 10 min  -BF

## 2023-07-17 NOTE — PROGRESS NOTES
Assisted By: Physical therapy    CC: Follow-up on CVA    Interview History/HPI: Patient was seen multiple times, he felt okay this a.m., he felt okay in the [place] lift, he did have a drop in his blood pressure but he really was not that symptomatic when he was in the standing frame.          Current Hospital Meds:  allopurinol, 300 mg, Oral, Daily  atorvastatin, 80 mg, Oral, Daily  magnesium oxide, 400 mg, Oral, Daily  metoprolol tartrate, 25 mg, Oral, Q12H  nystatin, , Topical, Q12H  pantoprazole, 40 mg, Oral, Q AM  rivaroxaban, 20 mg, Oral, Daily With Dinner  tamsulosin, 0.4 mg, Oral, Daily         Vitals:    07/17/23 0915   BP: 102/69   Pulse: 98   Resp: 20   Temp: 98 °F (36.7 °C)   SpO2: 93%         Intake/Output Summary (Last 24 hours) at 7/17/2023 1146  Last data filed at 7/17/2023 0915  Gross per 24 hour   Intake 1200 ml   Output --   Net 1200 ml       EXAM: Lungs are clear, heart is irregularly irregular rhythm without murmur rub or gallop controlled rate, abdomen is soft, no edema, he still has some movement in his left arm and hand, still left leg is flaccid.  It is good.      Diet: Cardiac Diets; Healthy Heart (2-3 Na+); Texture: Regular Texture (IDDSI 7); Fluid Consistency: Thin (IDDSI 0)        LABS:     Lab Results (last 48 hours)       Procedure Component Value Units Date/Time    POC Glucose Once [[id number]]  (Abnormal) Collected: 07/17/23 1057    Specimen: Blood Updated: 07/17/23 1107     Glucose 141 mg/dL      Comment: Meter: [id number] : [id number] Ham Diop       POC Glucose Once [[id number]]  (Normal) Collected: 07/17/23 0607    Specimen: Blood Updated: 07/17/23 0613     Glucose 117 mg/dL      Comment: Meter: [id number] : [id number] SILVANO STACY       Basic Metabolic Panel [[id number]]  (Abnormal) Collected: 07/17/23 0010    Specimen: Blood Updated: 07/17/23 0157     Glucose 127 mg/dL      BUN 22 mg/dL      Creatinine 0.98 mg/dL      Sodium 137 mmol/L      Potassium 4.0 mmol/L       Chloride 103 mmol/L      CO2 24.6 mmol/L      Calcium 9.5 mg/dL      BUN/Creatinine Ratio 22.4     Anion Gap 9.4 mmol/L      eGFR 85.6 mL/min/1.73     Narrative:      GFR Normal >60  Chronic Kidney Disease <60  Kidney Failure <15      CBC & Differential [[id number]]  (Abnormal) Collected: 07/17/23 0010    Specimen: Blood Updated: 07/17/23 0135    Narrative:      The following orders were created for panel order CBC & Differential.  Procedure                               Abnormality         Status                     ---------                               -----------         ------                     CBC Auto Differential[[id number]]        Abnormal            Final result                 Please view results for these tests on the individual orders.    CBC Auto Differential [[id number]]  (Abnormal) Collected: 07/17/23 0010    Specimen: Blood Updated: 07/17/23 0135     WBC 6.69 10*3/mm3      RBC 4.07 10*6/mm3      Hemoglobin 10.8 g/dL      Hematocrit 34.7 %      MCV 85.3 fL      MCH 26.5 pg      MCHC 31.1 g/dL      RDW 12.6 %      RDW-SD 39.2 fl      MPV 9.7 fL      Platelets 285 10*3/mm3      Neutrophil % 65.5 %      Lymphocyte % 20.5 %      Monocyte % 10.8 %      Eosinophil % 2.2 %      Basophil % 0.6 %      Immature Grans % 0.4 %      Neutrophils, Absolute 4.38 10*3/mm3      Lymphocytes, Absolute 1.37 10*3/mm3      Monocytes, Absolute 0.72 10*3/mm3      Eosinophils, Absolute 0.15 10*3/mm3      Basophils, Absolute 0.04 10*3/mm3      Immature Grans, Absolute 0.03 10*3/mm3      nRBC 0.0 /100 WBC     POC Glucose Once [[id number]]  (Abnormal) Collected: 07/16/23 2036    Specimen: Blood Updated: 07/16/23 2042     Glucose 154 mg/dL      Comment: Meter: [id number] : [id number] Dizkon Crystal       POC Glucose Once [[id number]]  (Normal) Collected: 07/16/23 1616    Specimen: Blood Updated: 07/16/23 1622     Glucose 129 mg/dL      Comment: Meter: [id number] : [id number] GenemationPHERD       POC Glucose Once  [[id number]]  (Abnormal) Collected: 07/16/23 1130    Specimen: Blood Updated: 07/16/23 1140     Glucose 144 mg/dL      Comment: Meter: [id number] : [id number] Vinny Lemus       POC Glucose Once [[id number]]  (Normal) Collected: 07/16/23 0632    Specimen: Blood Updated: 07/16/23 0637     Glucose 119 mg/dL      Comment: Meter: [id number] : [id number] Andreina Crystal       POC Glucose Once [[id number]]  (Abnormal) Collected: 07/15/23 2009    Specimen: Blood Updated: 07/15/23 2015     Glucose 176 mg/dL      Comment: Meter: [id number] : [id number] Andreina Crystal       POC Glucose Once [[id number]]  (Abnormal) Collected: 07/15/23 1550    Specimen: Blood Updated: 07/15/23 1556     Glucose 181 mg/dL      Comment: Meter: [id number] : [id number] CHAYA FIERRO                    Radiology:    Imaging Results (Last 72 Hours)       ** No results found for the last 72 hours. **                Assessment/Plan:   Status post CVA, left leg paralysis and minimal left upper extremity.  Patient is on Xarelto for further stroke prevention as he has atrial fibrillation.  Patient is getting orthostatic and had a syncopal episode Friday in the standing frame.  He did better today but still had a drop in blood pressure, I have decreased his Lopressor dosing but still placed holding parameters on this, feel like he needs this for rate control for his atrial fibrillation.  Patient does have mildly reduced LV function 52% however I do not think he is going to tolerate Cozaar and this has been stopped.  He is going to wear an abdominal binder.  If these measures fail to help the orthostasis may have to consider midodrine therapy..    Atrial fibrillation, as above, rate controlled but will monitor intermittently with his decrease in Lopressor, continue Xarelto for further stroke prevention.    Hypertension, as above, currently not an issue    Non-Hodgkin's lymphoma, he will follow-up outpatient with oncology    Laryngeal cancer,  outpatient oncology follow-up    Anemia, has been stable    Glucoses appear controlled.    Bowels are moving    Hayder Mendoza MD

## 2023-07-17 NOTE — PROGRESS NOTES
Inpatient Rehabilitation Functional Measures Assessment and Plan of Care    Plan of Care  Self Care    [OT] Dressing (Upper)(Active)  Current Status(07/17/2023): Max/Mod A  Weekly Goal(07/25/2023): Mod A  Discharge Goal: Min A    Functional Measures  SOBIA Eating:  SOBIA Grooming:  SOBIA Bathing:  SOBIA Upper Body Dressing:  SOBIA Lower Body Dressing:  SOBIA Toileting:    SOBIA Bladder Management  Level of Assistance:  Frequency/Number of Accidents this Shift:    SOBIA Bowel Management  Level of Assistance:  Frequency/Number of Accidents this Shift:    SOBIA Bed/Chair/Wheelchair Transfer:  SOBIA Toilet Transfer:  SOBIA Tub/Shower Transfer:    Previously Documented Mode of Locomotion at Discharge:  SOBIA Expected Mode of Locomotion at Discharge:  SOBIA Walk/Wheelchair:  SOBIA Stairs:    SOBIA Comprehension:  SOBIA Expression:  SOBIA Social Interaction:  SOBIA Problem Solving:  SOBIA Memory:    Therapy Mode Minutes  Occupational Therapy: Individual: 100 minutes.  Physical Therapy:  Speech Language Pathology:    Discharge Functional Goals:    Signed by: Lisa Sanchez OT

## 2023-07-17 NOTE — THERAPY TREATMENT NOTE
Inpatient Rehabilitation - Occupational Therapy Treatment Note     Ronnie     Patient Name: Antonio Canseco  : 1955  MRN: [id number]    Today's Date: 2023                 Admit Date: 2023       No diagnosis found.    Patient Active Problem List   Diagnosis    Detrusor instability    Low testosterone in male    Disorder of prostate    Corporo-venous occlusive erectile dysfunction    CVA (cerebral vascular accident)       Past Medical History:   Diagnosis Date    Diabetes mellitus     Hypertension     Stroke        Past Surgical History:   Procedure Laterality Date    US GUIDED LYMPH NODE BIOPSY  2023             IRF OT ASSESSMENT FLOWSHEET (last 12 hours)       IRF OT Evaluation and Treatment       Row Name 23 1431          OT Time and Intention    Document Type daily treatment  -BF     Mode of Treatment occupational therapy  -BF     Patient Effort good  -BF     Symptoms Noted During/After Treatment fatigue  -BF       Row Name 23 1431          General Information    Existing Precautions/Restrictions fall  L HP, <trunk support/balance  -BF     Limitations/Impairments safety/cognitive  -BF       Row Name 23 1431          Cognition/Psychosocial    Orientation Status (Cognition) oriented x 3  -BF     Follows Commands (Cognition) follows one-step commands;verbal cues/prompting required;repetition of directions required;physical/tactile prompts required;increased processing time needed;delayed response/completion  -BF       Row Name 23 1431          Grooming    [place] Level (Grooming) moderate assist (50% patient effort)  -BF     Position (Grooming) supported sitting  -BF       Row Name 23 1431          Motor Skills    Neuromuscular Function left;upper extremity;moderate impairment  -BF     Motor Control/Coordination Interventions fine motor manipulation/dexterity activities;gross motor coordination activities;therapeutic exercise/ROM  CHRISE AVA SCOTT/BLANCA, C/FMC  theract, reaching; OMAR menezes 20 lbs X15X3, pulleys  -BF       Row Name 07/17/23 1431          Neuromuscular Re-education    Interventions (Neuromuscular Re-education) facilitation/inhibition  -BF     Positioning (Neuromuscular Re-education) sitting;supported  -BF       Row Name 07/17/23 1431          Positioning and Restraints    In Wheelchair sitting;with other staff  w/   -BF               User Key  (r) = Recorded By, (t) = Taken By, (c) = Cosigned By      Initials Name Effective Dates    BF Lisa Sanchez, OT 07/11/23 -                      Occupational Therapy Education       Title: PT OT SLP Therapies (Done)       Topic: Occupational Therapy (Done)       Point: ADL training (Done)       Description:   Instruct learner(s) on proper safety adaptation and remediation techniques during self care or transfers.   Instruct in proper use of assistive devices.                  Learning Progress Summary             Patient Acceptance, E, VU,NR by BF at 7/17/2023 1431    Acceptance, E, VU,NR by BF at 7/17/2023 1430    Acceptance, E, VU,NR by BF at 7/14/2023 1508    Acceptance, E, VU,NR by BF at 7/13/2023 1343    Acceptance, E,D, VU,NR by TM at 7/12/2023 1407    Acceptance, D,E, NR,VU by TM at 7/11/2023 1434    Acceptance, E, VU,NR by BF at 7/10/2023 1456    Acceptance, E, VU,NR by HB at 7/8/2023 1140    Acceptance, E, VU,NR by BF at 7/7/2023 1442                         Point: Precautions (Done)       Description:   Instruct learner(s) on prescribed precautions during self-care and functional transfers.                  Learning Progress Summary             Patient Acceptance, E, VU,NR by BF at 7/17/2023 1431    Acceptance, E, VU,NR by BF at 7/17/2023 1430    Acceptance, E, VU,NR by BF at 7/14/2023 1508    Acceptance, E, VU,NR by BF at 7/13/2023 1343    Acceptance, E,D, VU,NR by TM at 7/12/2023 1407    Acceptance, D,E, NR,VU by TM at 7/11/2023 1434    Acceptance, E, VU,NR by BF at 7/10/2023  1456    Acceptance, E, VU,NR by HB at 7/8/2023 1140    Acceptance, E, VU,NR by BF at 7/7/2023 1442                                         User Key       Initials Effective Dates Name Provider Type Discipline    BF 05/31/23 - 07/10/23 Lisa Sanchez, OT Occupational Therapist OT    BF 07/11/23 -  Lisa Sanchez OT Occupational Therapist OT    TM 06/16/21 -  Marielos Capone, OT Occupational Therapist OT    HB 05/25/21 -  Veronica Robles OT Occupational Therapist OT                        OT Recommendation and Plan    Planned Therapy Interventions (OT): activity tolerance training, adaptive equipment training, BADL retraining, neuromuscular control/coordination retraining, passive ROM/stretching, ROM/therapeutic exercise, strengthening exercise, transfer/mobility retraining                    Time Calculation:      Time Calculation- OT       Row Name 07/17/23 1435             Time Calculation- OT    OT Start Time 1010  -BF      OT Stop Time 1140  -BF      OT Time Calculation (min) 90 min  -BF      Total Timed Code Minutes- OT 90 minute(s)  -BF      OT Non-Billable Time (min) 10 min  -BF                User Key  (r) = Recorded By, (t) = Taken By, (c) = Cosigned By      Initials Name Provider Type     Daniel Lisa Lima OT Occupational Therapist                  Therapy Charges for Today       Code Description Service Date Service Provider Modifiers Qty    30667662247 HC OT SELF CARE/MGMT/TRAIN EA 15 MIN 7/17/2023 Lisa Sanchez OT GO 1    15027546603 HC OT NEUROMUSC RE EDUCATION EA 15 MIN 7/17/2023 Lisa Sanchez OT GO 3    43161447692 HC OT THER PROC EA 15 MIN 7/17/2023 Lisa Sanchez OT GO 2    27054039948 HC OT THERAPEUTIC ACT EA 15 MIN 7/17/2023 Lisa Sanchez OT GO 1                     Lisa Sancehz OT  7/17/2023

## 2023-07-17 NOTE — THERAPY TREATMENT NOTE
Inpatient Rehabilitation - Physical Therapy Treatment Note       THALIA Trujillo     Patient Name: Antonio Canseco  : 1955  MRN: [id number]    Today's Date: 2023                    Admit Date: 2023      Visit Dx:   No diagnosis found.    Patient Active Problem List   Diagnosis    Detrusor instability    Low testosterone in male    Disorder of prostate    Corporo-venous occlusive erectile dysfunction    CVA (cerebral vascular accident)       Past Medical History:   Diagnosis Date    Diabetes mellitus     Hypertension     Stroke        Past Surgical History:   Procedure Laterality Date    US GUIDED LYMPH NODE BIOPSY  2023       PT ASSESSMENT (last 12 hours)       IRF PT Evaluation and Treatment       Row Name 23 1514          PT Time and Intention    Document Type daily treatment  -RG     Mode of Treatment physical therapy  -RG     Patient/Family/Caregiver Comments/Observations Pt and nursing in agreement for skilled PT on this date. Pt's BP in supine was 148/65, seated 124/60, standing 105/62, 92/51, then sat down 109/67.  -RG       Row Name 23 1514          General Information    Patient Profile Reviewed yes  -RG     Existing Precautions/Restrictions fall  L HP, <trunk support/balance  -RG       Row Name 23 1514          Cognition/Psychosocial    Affect/Mental Status (Cognition) WFL  -RG     Orientation Status (Cognition) oriented x 3  -RG     Follows Commands (Cognition) verbal cues/prompting required;physical/tactile prompts required;follows one-step commands  -RG     Personal Safety Interventions gait belt;nonskid shoes/slippers when out of bed;fall prevention program maintained  -RG     Cognitive Function safety deficit  -RG       Row Name 23 1514          Mobility    Therapeutic Standing Program standing frame utilized  -RG     Additional Documentation Therapeutic Standing Program (Row)  12 minutes  -RG       Row Name 23 1514          Bed Mobility    Rolling Left  [place] (Bed Mobility) verbal cues;nonverbal cues (demo/gesture);moderate assist (50% patient effort);maximum assist (25% patient effort)  -RG     Rolling Right [place] (Bed Mobility) dependent (less than 25% patient effort);verbal cues;nonverbal cues (demo/gesture);maximum assist (25% patient effort)  -RG     Bed Mobility, Safety Issues decreased use of arms for pushing/pulling;decreased use of legs for bridging/pushing  -RG     Assistive Device (Bed Mobility) bed rails;draw sheet  -       Row Name 07/17/23 1514          Bed-Chair Transfer    Bed-Chair [place] (Transfers) dependent (less than 25% patient effort)  -RG     Assistive Device (Bed-Chair Transfers) lift device  -       Row Name 07/17/23 1514          Chair-Bed Transfer    Chair-Bed [place] (Transfers) dependent (less than 25% patient effort)  -RG     Assistive Device (Chair-Bed Transfers) lift device  -       Row Name 07/17/23 1514          Gait/Stairs (Locomotion)    Comment, (Gait/Stairs) unable  -       Row Name 07/17/23 1514          Safety Issues, Functional Mobility    Impairments Affecting Function (Mobility) balance;coordination;endurance/activity tolerance;grasp;motor control;muscle tone abnormal;postural/trunk control;range of motion (ROM);strength  -       Row Name 07/17/23 1514          Hip (Therapeutic Exercise)    Hip Strengthening (Therapeutic Exercise) bilateral;flexion;aBduction;aDduction;marching while seated;sitting;resistance band;green;10 repetitions;2 sets  -       Row Name 07/17/23 1514          Knee (Therapeutic Exercise)    Knee Strengthening (Therapeutic Exercise) bilateral;flexion;extension;marching while seated;LAQ (long arc quad);sitting;10 repetitions;2 sets;resistance band;green  -       Row Name 07/17/23 1514          Ankle (Therapeutic Exercise)    Ankle Strengthening (Therapeutic Exercise) bilateral;dorsiflexion;plantarflexion;sitting;10 repetitions;2 sets  -[facility] Name 07/17/23  1514          Positioning and Restraints    Pre-Treatment Position in bed  -RG     Post Treatment Position wheelchair  -RG     In Wheelchair notified nsg;sitting;with OT  -RG               User Key  (r) = Recorded By, (t) = Taken By, (c) = Cosigned By      Initials Name Provider Type    Vu Galloway PTA Physical Therapist Assistant                     Physical Therapy Education       Title: PT OT SLP Therapies (Done)       Topic: Physical Therapy (Done)       Point: Mobility training (Done)       Learning Progress Summary             Patient Acceptance, E,D, VU,NR by RG at 7/17/2023 1522    Acceptance, E,D, VU,NR by LL at 7/13/2023 1358    Acceptance, E, VU,NR by LB at 7/12/2023 1144    Acceptance, E, VU,NR by LB at 7/11/2023 1426    Acceptance, E, VU,NR by LB at 7/10/2023 1452    Acceptance, E,D, VU,NR by LL at 7/8/2023 1228    Acceptance, E,D, VU,NR by LB at 7/7/2023 1449                         Point: Home exercise program (Done)       Learning Progress Summary             Patient Acceptance, E,D, VU,NR by RG at 7/17/2023 1522    Acceptance, E,D, VU,NR by LL at 7/13/2023 1358    Acceptance, E, VU,NR by LB at 7/12/2023 1144    Acceptance, E, VU,NR by LB at 7/11/2023 1426    Acceptance, E, VU,NR by LB at 7/10/2023 1452    Acceptance, E,D, VU,NR by LL at 7/8/2023 1228    Acceptance, E,D, VU,NR by LB at 7/7/2023 1449                         Point: Body mechanics (Done)       Learning Progress Summary             Patient Acceptance, E,D, VU,NR by RG at 7/17/2023 1522    Acceptance, E,D, VU,NR by LL at 7/13/2023 1358    Acceptance, E, VU,NR by LB at 7/12/2023 1144    Acceptance, E, VU,NR by LB at 7/11/2023 1426    Acceptance, E, VU,NR by LB at 7/10/2023 1452    Acceptance, E,D, VU,NR by LL at 7/8/2023 1228    Acceptance, E,D, VU,NR by LB at 7/7/2023 1449                         Point: Precautions (Done)       Learning Progress Summary             Patient Acceptance, E,D, VU,NR by RG at 7/17/2023 2413     Acceptance, E,D, VU,NR by LL at 7/13/2023 1358    Acceptance, E, VU,NR by LB at 7/12/2023 1144    Acceptance, E, VU,NR by LB at 7/11/2023 1426    Acceptance, E, VU,NR by LB at 7/10/2023 1452    Acceptance, E,D, VU,NR by LL at 7/8/2023 1228    Acceptance, E,D, VU,NR by LB at 7/7/2023 1449                                         User Key       Initials Effective Dates Name Provider Type Discipline    LB 06/16/21 -  Joslyn Garcia, PT Physical Therapist PT    LL 05/02/16 -  Connie Velasquez PTA Physical Therapist Assistant PT    RG 06/16/21 -  Vu Borwn PTA Physical Therapist Assistant PT                    PT Recommendation and Plan                          Time Calculation:      PT Charges       Row Name 07/17/23 1522             Time Calculation    Start Time 0730  -RG      Stop Time 0915  -RG      Time Calculation (min) 105 min  -RG      PT Received On 07/17/23  -RG         Time Calculation- PT    Total Timed Code Minutes-  minute(s)  -RG                User Key  (r) = Recorded By, (t) = Taken By, (c) = Cosigned By      Initials Name Provider Type    RG Vu Brown PTA Physical Therapist Assistant                    Therapy Charges for Today       Code Description Service Date Service Provider Modifiers Qty    70345940348 HC PT NEUROMUSC RE EDUCATION EA 15 MIN 7/17/2023 Vu Brown PTA GP, CQ 1    30104453899 HC PT THERAPEUTIC ACT EA 15 MIN 7/17/2023 Vu Brown PTA GP, CQ 2    49290696197 HC PT THER PROC EA 15 MIN 7/17/2023 Vu Brown PTA GP, CQ 3                     Vu Brown PTA  7/17/2023

## 2023-07-18 LAB
GLUCOSE BLDC GLUCOMTR-MCNC: 111 MG/DL (ref 70–130)
GLUCOSE BLDC GLUCOMTR-MCNC: 172 MG/DL (ref 70–130)
GLUCOSE BLDC GLUCOMTR-MCNC: 185 MG/DL (ref 70–130)
GLUCOSE BLDC GLUCOMTR-MCNC: 203 MG/DL (ref 70–130)

## 2023-07-18 PROCEDURE — 97530 THERAPEUTIC ACTIVITIES: CPT

## 2023-07-18 PROCEDURE — 97110 THERAPEUTIC EXERCISES: CPT | Performed by: OCCUPATIONAL THERAPIST

## 2023-07-18 PROCEDURE — 97535 SELF CARE MNGMENT TRAINING: CPT | Performed by: OCCUPATIONAL THERAPIST

## 2023-07-18 PROCEDURE — 97110 THERAPEUTIC EXERCISES: CPT

## 2023-07-18 PROCEDURE — 97112 NEUROMUSCULAR REEDUCATION: CPT | Performed by: OCCUPATIONAL THERAPIST

## 2023-07-18 PROCEDURE — 82948 REAGENT STRIP/BLOOD GLUCOSE: CPT

## 2023-07-18 PROCEDURE — 99231 SBSQ HOSP IP/OBS SF/LOW 25: CPT | Performed by: INTERNAL MEDICINE

## 2023-07-18 PROCEDURE — 97112 NEUROMUSCULAR REEDUCATION: CPT

## 2023-07-18 RX ADMIN — METOPROLOL TARTRATE 25 MG: 25 TABLET, FILM COATED ORAL at 21:05

## 2023-07-18 RX ADMIN — RIVAROXABAN 20 MG: 20 TABLET, FILM COATED ORAL at 17:55

## 2023-07-18 RX ADMIN — MAGNESIUM GLUCONATE 500 MG ORAL TABLET 400 MG: 500 TABLET ORAL at 09:14

## 2023-07-18 RX ADMIN — PANTOPRAZOLE SODIUM 40 MG: 40 TABLET, DELAYED RELEASE ORAL at 05:43

## 2023-07-18 RX ADMIN — HYDROXYZINE HYDROCHLORIDE 25 MG: 25 TABLET, FILM COATED ORAL at 21:06

## 2023-07-18 RX ADMIN — NYSTATIN: 100000 POWDER TOPICAL at 21:07

## 2023-07-18 RX ADMIN — ACETAMINOPHEN 650 MG: 325 TABLET ORAL at 21:05

## 2023-07-18 RX ADMIN — ALLOPURINOL 300 MG: 300 TABLET ORAL at 09:03

## 2023-07-18 RX ADMIN — METOPROLOL TARTRATE 25 MG: 25 TABLET, FILM COATED ORAL at 12:22

## 2023-07-18 RX ADMIN — TAMSULOSIN HYDROCHLORIDE 0.4 MG: 0.4 CAPSULE ORAL at 09:03

## 2023-07-18 RX ADMIN — ATORVASTATIN CALCIUM 80 MG: 40 TABLET, FILM COATED ORAL at 09:03

## 2023-07-18 RX ADMIN — NYSTATIN 1 APPLICATION: 100000 POWDER TOPICAL at 09:07

## 2023-07-18 NOTE — THERAPY TREATMENT NOTE
Inpatient Rehabilitation - Physical Therapy Treatment Note       THALIA Trujillo     Patient Name: Antonio Canseco  : 1955  MRN: [id number]    Today's Date: 2023                    Admit Date: 2023      Visit Dx:   No diagnosis found.    Patient Active Problem List   Diagnosis    Detrusor instability    Low testosterone in male    Disorder of prostate    Corporo-venous occlusive erectile dysfunction    CVA (cerebral vascular accident)       Past Medical History:   Diagnosis Date    Diabetes mellitus     Hypertension     Stroke        Past Surgical History:   Procedure Laterality Date    US GUIDED LYMPH NODE BIOPSY  2023       PT ASSESSMENT (last 12 hours)       IRF PT Evaluation and Treatment       Row Name 23 1510          PT Time and Intention    Document Type daily treatment  -RG     Mode of Treatment physical therapy  -RG     Patient/Family/Caregiver Comments/Observations Pt and nursing in agreement for skilled PT on this date. Pt began standing in // bars today Mod-Max assist x 2.  -RG       Row Name 23 1510          General Information    Patient Profile Reviewed yes  -RG     Existing Precautions/Restrictions fall  L HP, <trunk support/balance  -RG       Row Name 23 1510          Cognition/Psychosocial    Affect/Mental Status (Cognition) WFL  -RG     Orientation Status (Cognition) oriented x 3  -RG     Follows Commands (Cognition) verbal cues/prompting required;physical/tactile prompts required;follows one-step commands  -RG     Personal Safety Interventions gait belt;fall prevention program maintained;nonskid shoes/slippers when out of bed  -RG     Cognitive Function safety deficit  -RG       Row Name 23 1510          Bed Mobility    Rolling Left [place] (Bed Mobility) verbal cues;nonverbal cues (demo/gesture);moderate assist (50% patient effort);maximum assist (25% patient effort)  -RG     Rolling Right [place] (Bed Mobility) dependent (less than 25% patient  effort);verbal cues;nonverbal cues (demo/gesture);maximum assist (25% patient effort)  -RG     Bed Mobility, Safety Issues decreased use of arms for pushing/pulling;decreased use of legs for bridging/pushing  -RG     Assistive Device (Bed Mobility) bed rails;draw sheet  -RG       Row Name 07/18/23 1510          Bed-Chair Transfer    Bed-Chair [place] (Transfers) dependent (less than 25% patient effort)  -RG     Assistive Device (Bed-Chair Transfers) lift device  -RG       Row Name 07/18/23 1510          Chair-Bed Transfer    Chair-Bed [place] (Transfers) dependent (less than 25% patient effort)  -RG     Assistive Device (Chair-Bed Transfers) lift device  -RG       Row Name 07/18/23 1510          Sit-Stand Transfer    Sit-Stand [place] (Transfers) verbal cues;nonverbal cues (demo/gesture);maximum assist (25% patient effort);moderate assist (50% patient effort);2 person assist  -RG     Assistive Device (Sit-Stand Transfers) parallel bars  -RG     Comment, (Sit-Stand Transfer) cues to correct body mechanics and tighten quad  -RG       Row Name 07/18/23 1510          Safety Issues, Functional Mobility    Impairments Affecting Function (Mobility) balance;coordination;endurance/activity tolerance;grasp;motor control;muscle tone abnormal;postural/trunk control;range of motion (ROM);strength  -       Row Name 07/18/23 1510          Hip (Therapeutic Exercise)    Hip Strengthening (Therapeutic Exercise) bilateral;flexion;aBduction;aDduction;marching while seated;sitting;2 lb free weight;resistance band;green;10 repetitions;2 sets  -       Row Name 07/18/23 1510          Knee (Therapeutic Exercise)    Knee Strengthening (Therapeutic Exercise) bilateral;flexion;extension;marching while seated;LAQ (long arc quad);sitting;2 lb free weight;resistance band;green;10 repetitions;2 sets  -       Row Name 07/18/23 1510          Ankle (Therapeutic Exercise)    Ankle Strengthening (Therapeutic Exercise)  bilateral;dorsiflexion;plantarflexion;sitting;2 lb free weight;10 repetitions;2 sets  -       Row Name 07/18/23 1510          Modality Treatment    Treatment Location 1 (Modality) L ant. tib  -RG     Modality Performed electrical stimulation  -RG     Comment, Modality Treatment 60-70 mA 15 minutes  -RG       Row Name 07/18/23 1510          Modality Intervention (Comprehensive)    Additional Documentation Modality Treatment (Row)  -       Row Name 07/18/23 1510          Positioning and Restraints    Pre-Treatment Position sitting in chair/recliner  -RG     Post Treatment Position bed  -RG     In Bed notified nsg;supine;call light within reach;encouraged to call for assist;legs elevated  -RG       Row Name 07/18/23 1510          Bed Mobility Goal 1 (PT-IRF)    Progress/Outcomes (Bed Mobility Goal 1, PT-IRF) goal ongoing  -       Row Name 07/18/23 1510          Bed Mobility Goal 2 (PT-IRF)    Progress/Outcomes (Bed Mobility Goal 2, PT-IRF) goal ongoing  -       Row Name 07/18/23 1510          Transfer Goal 1 (PT-IRF)    Progress/Outcomes (Transfer Goal 1, PT-IRF) goal ongoing  -RG       Row Name 07/18/23 1510          Transfer Goal 2 (PT-IRF)    Progress/Outcomes (Transfer Goal 2, PT-IRF) goal ongoing  -       Row Name 07/18/23 1510          Gait/Walking Locomotion Goal 1 (PT-IRF)    Progress/Outcomes (Gait/Walking Locomotion Goal 1, PT-IRF) goal ongoing  -       Row Name 07/18/23 1510          Gait/Walking Locomotion Goal 2 (PT-IRF)    Progress/Outcomes (Gait/Walking Locomotion Goal 2, PT-IRF) goal ongoing  -               User Key  (r) = Recorded By, (t) = Taken By, (c) = Cosigned By      Initials Name Provider Type    uV Galloway PTA Physical Therapist Assistant                     Physical Therapy Education       Title: PT OT SLP Therapies (Done)       Topic: Physical Therapy (Done)       Point: Mobility training (Done)       Learning Progress Summary             Patient Acceptance, E,D, VU,NR  by RG at 7/18/2023 1520    Acceptance, E,D, VU,NR by RG at 7/17/2023 1522    Acceptance, E,D, VU,NR by LL at 7/13/2023 1358    Acceptance, E, VU,NR by LB at 7/12/2023 1144    Acceptance, E, VU,NR by LB at 7/11/2023 1426    Acceptance, E, VU,NR by LB at 7/10/2023 1452    Acceptance, E,D, VU,NR by LL at 7/8/2023 1228    Acceptance, E,D, VU,NR by LB at 7/7/2023 1449                         Point: Home exercise program (Done)       Learning Progress Summary             Patient Acceptance, E,D, VU,NR by RG at 7/18/2023 1520    Acceptance, E,D, VU,NR by RG at 7/17/2023 1522    Acceptance, E,D, VU,NR by LL at 7/13/2023 1358    Acceptance, E, VU,NR by LB at 7/12/2023 1144    Acceptance, E, VU,NR by LB at 7/11/2023 1426    Acceptance, E, VU,NR by LB at 7/10/2023 1452    Acceptance, E,D, VU,NR by LL at 7/8/2023 1228    Acceptance, E,D, VU,NR by LB at 7/7/2023 1449                         Point: Body mechanics (Done)       Learning Progress Summary             Patient Acceptance, E,D, VU,NR by RG at 7/18/2023 1520    Acceptance, E,D, VU,NR by RG at 7/17/2023 1522    Acceptance, E,D, VU,NR by LL at 7/13/2023 1358    Acceptance, E, VU,NR by LB at 7/12/2023 1144    Acceptance, E, VU,NR by LB at 7/11/2023 1426    Acceptance, E, VU,NR by LB at 7/10/2023 1452    Acceptance, E,D, VU,NR by LL at 7/8/2023 1228    Acceptance, E,D, VU,NR by LB at 7/7/2023 1449                         Point: Precautions (Done)       Learning Progress Summary             Patient Acceptance, E,D, VU,NR by RG at 7/18/2023 1520    Acceptance, E,D, VU,NR by RG at 7/17/2023 1522    Acceptance, E,D, VU,NR by LL at 7/13/2023 1358    Acceptance, E, VU,NR by LB at 7/12/2023 1144    Acceptance, E, VU,NR by LB at 7/11/2023 1426    Acceptance, E, VU,NR by LB at 7/10/2023 1452    Acceptance, E,D, VU,NR by LL at 7/8/2023 1228    Acceptance, E,D, VU,NR by LB at 7/7/2023 1449                                         User Key       Initials Effective Dates Name Provider  Type Discipline    LB 06/16/21 -  Joslyn Garcia, PT Physical Therapist PT    LL 05/02/16 -  Connie Velasquez PTA Physical Therapist Assistant PT    RG 06/16/21 -  Vu Brown PTA Physical Therapist Assistant PT                    PT Recommendation and Plan                          Time Calculation:      PT Charges       Row Name 07/18/23 1520             Time Calculation    Start Time 1000  -RG      Stop Time 1130  -RG      Time Calculation (min) 90 min  -RG      PT Received On 07/18/23  -RG         Time Calculation- PT    Total Timed Code Minutes- PT 90 minute(s)  -RG                User Key  (r) = Recorded By, (t) = Taken By, (c) = Cosigned By      Initials Name Provider Type    RG Vu Brown PTA Physical Therapist Assistant                    Therapy Charges for Today       Code Description Service Date Service Provider Modifiers Qty    54629702117 HC PT NEUROMUSC RE EDUCATION EA 15 MIN 7/17/2023 Vu Brown, PTA GP, CQ 1    20449845806 HC PT THERAPEUTIC ACT EA 15 MIN 7/17/2023 Vu Brown, PTA GP, CQ 2    32742532180 HC PT THER PROC EA 15 MIN 7/17/2023 Vu Brown, PTA GP, CQ 3    51119907151 HC PT NEUROMUSC RE EDUCATION EA 15 MIN 7/18/2023 Vu Brown, PTA GP, CQ 1    59997849213 HC PT THERAPEUTIC ACT EA 15 MIN 7/18/2023 Vu Brown, PTA GP, CQ 2    59236643583 HC PT THER PROC EA 15 MIN 7/18/2023 Vu Brown PTA GP, CQ 3                     Vu Brown PTA  7/18/2023

## 2023-07-18 NOTE — THERAPY TREATMENT NOTE
Inpatient Rehabilitation - Occupational Therapy Treatment Note     Ronnie     Patient Name: Antonio Canseco  : 1955  MRN: [id number]    Today's Date: 2023                 Admit Date: 2023       No diagnosis found.    Patient Active Problem List   Diagnosis    Detrusor instability    Low testosterone in male    Disorder of prostate    Corporo-venous occlusive erectile dysfunction    CVA (cerebral vascular accident)       Past Medical History:   Diagnosis Date    Diabetes mellitus     Hypertension     Stroke        Past Surgical History:   Procedure Laterality Date    US GUIDED LYMPH NODE BIOPSY  2023             IRF OT ASSESSMENT FLOWSHEET (last 12 hours)       IRF OT Evaluation and Treatment       Row Name 23 1334          OT Time and Intention    Document Type daily treatment  -BF     Mode of Treatment occupational therapy  -BF     Patient Effort good  -BF     Symptoms Noted During/After Treatment none  -BF       Row Name 23 1331          General Information    Existing Precautions/Restrictions fall  L HP, <trunk support/balance  -BF     Limitations/Impairments safety/cognitive  -BF       Row Name 23 1330          Cognition/Psychosocial    Orientation Status (Cognition) oriented x 3  -BF     Follows Commands (Cognition) verbal cues/prompting required;repetition of directions required;physical/tactile prompts required;delayed response/completion;increased processing time needed  -BF       Row Name 23 8277          Lower Body Dressing    [place] Level (Lower Body Dressing) maximum assist (25% patient effort);verbal cues;nonverbal cues (demo/gesture)  -BF     Position (Lower Body Dressing) supine  -BF     Comment (Lower Body Dressing) Max A  -BF       Row Name 23 7958          Grooming    [place] Level (Grooming) moderate assist (50% patient effort)  -BF     Position (Grooming) supported sitting  -BF       Row Name 23 2021          Chair-Bed Transfer     Chair-Bed [place] (Transfers) dependent (less than 25% patient effort);2 person assist;verbal cues;nonverbal cues (demo/gesture)  -BF     Assistive Device (Chair-Bed Transfers) wheelchair  -BF       Row Name 07/18/23 1334          Motor Skills    Motor Control/Coordination Interventions fine motor manipulation/dexterity activities;gross motor coordination activities;neuro-muscular re-education;therapeutic exercise/ROM  LUE A/AAROM, GMC/FMC theract, reaching; OMAR [place] 20 lbs [id number]  -BF       Row Name 07/18/23 1334          Neuromuscular Re-education    Interventions (Neuromuscular Re-education) facilitation/inhibition  -BF     Positioning (Neuromuscular Re-education) sitting;supported  -BF       Row Name 07/18/23 1334          Positioning and Restraints    In Wheelchair sitting;notified nsg;patient within staff view  -BF               User Key  (r) = Recorded By, (t) = Taken By, (c) = Cosigned By      Initials Name Effective Dates    BF Lisa Sanchez OT 07/11/23 -                      Occupational Therapy Education       Title: PT OT SLP Therapies (Done)       Topic: Occupational Therapy (Done)       Point: ADL training (Done)       Description:   Instruct learner(s) on proper safety adaptation and remediation techniques during self care or transfers.   Instruct in proper use of assistive devices.                  Learning Progress Summary             Patient Acceptance, E, VU,NR by BF at 7/18/2023 1334    Acceptance, E, VU,NR by BF at 7/17/2023 1431    Acceptance, E, VU,NR by BF at 7/17/2023 1430    Acceptance, E, VU,NR by BF at 7/14/2023 1508    Acceptance, E, VU,NR by BF at 7/13/2023 1343    Acceptance, E,D, VU,NR by TM at 7/12/2023 1407    Acceptance, D,E, NR,VU by TM at 7/11/2023 1434    Acceptance, E, VU,NR by BF at 7/10/2023 1456    Acceptance, E, VU,NR by HB at 7/8/2023 1140    Acceptance, E, VU,NR by BF at 7/7/2023 1442                         Point: Precautions (Done)        Description:   Instruct learner(s) on prescribed precautions during self-care and functional transfers.                  Learning Progress Summary             Patient Acceptance, E, VU,NR by BF at 7/18/2023 1334    Acceptance, E, VU,NR by BF at 7/17/2023 1431    Acceptance, E, VU,NR by BF at 7/17/2023 1430    Acceptance, E, VU,NR by BF at 7/14/2023 1508    Acceptance, E, VU,NR by BF at 7/13/2023 1343    Acceptance, E,D, VU,NR by TM at 7/12/2023 1407    Acceptance, D,E, NR,VU by TM at 7/11/2023 1434    Acceptance, E, VU,NR by BF at 7/10/2023 1456    Acceptance, E, VU,NR by HB at 7/8/2023 1140    Acceptance, E, VU,NR by BF at 7/7/2023 1442                                         User Key       Initials Effective Dates Name Provider Type Discipline    BF 05/31/23 - 07/10/23 Lisa Sanchez, OT Occupational Therapist OT    BF 07/11/23 -  Lisa Sanchez, OT Occupational Therapist OT    TM 06/16/21 -  Marielos Capone, OT Occupational Therapist OT    HB 05/25/21 -  Veronica Robles, OT Occupational Therapist OT                        OT Recommendation and Plan    Planned Therapy Interventions (OT): activity tolerance training, adaptive equipment training, BADL retraining, neuromuscular control/coordination retraining, passive ROM/stretching, ROM/therapeutic exercise, strengthening exercise, transfer/mobility retraining                    Time Calculation:      Time Calculation- OT       Row Name 07/18/23 1338             Time Calculation- OT    OT Start Time 0805  -BF      OT Stop Time 0935  -BF      OT Time Calculation (min) 90 min  -BF      Total Timed Code Minutes- OT 90 minute(s)  -BF      OT Non-Billable Time (min) 10 min  -BF                User Key  (r) = Recorded By, (t) = Taken By, (c) = Cosigned By      Initials Name Provider Type    BF Lisa Sanchez, OT Occupational Therapist                  Therapy Charges for Today       Code Description Service Date Service Provider Modifiers Qty     44037797376 HC OT SELF CARE/MGMT/TRAIN EA 15 MIN 7/17/2023 SanchezLisa, OT GO 1    97775135192 HC OT NEUROMUSC RE EDUCATION EA 15 MIN 7/17/2023 SanchezLisa, OT GO 3    41332173889 HC OT THER PROC EA 15 MIN 7/17/2023 SanchezLisa, OT GO 2    09372178941 HC OT THERAPEUTIC ACT EA 15 MIN 7/17/2023 Lisa Sanchez, OT GO 1    33381361100 HC OT SELF CARE/MGMT/TRAIN EA 15 MIN 7/18/2023 SanchezLisa, OT GO 2    20658593542 HC OT NEUROMUSC RE EDUCATION EA 15 MIN 7/18/2023 Lisa Sanchez, OT GO 3    73327687678 HC OT THER PROC EA 15 MIN 7/18/2023 Lisa Sanchez, OT GO 1                     Lisa Sanchez, OT  7/18/2023

## 2023-07-18 NOTE — PROGRESS NOTES
Occupational Therapy:    Physical Therapy: Individual: 90 minutes.    Speech Language Pathology:    Signed by: Vu Brown PTA

## 2023-07-18 NOTE — PLAN OF CARE
Problem: Rehabilitation (IRF) Plan of Care  Goal: Plan of Care Review  Outcome: Ongoing, Progressing  Flowsheets (Taken 7/18/2023 0444)  Progress: no change  Plan of Care Reviewed With: patient  Outcome Evaluation:   Patient calm and cooperative   resting well throughout most the night   frequently seeking out staff   no acute distress, will continue to monitor   call light with in reach.  Goal: Patient-Specific Goal (Individualized)  Outcome: Ongoing, Progressing  Goal: Absence of New-Onset Illness or Injury  Outcome: Ongoing, Progressing  Intervention: Prevent Fall and Fall Injury  Recent Flowsheet Documentation  Taken 7/18/2023 0400 by Phuong Padron, RN  Safety Promotion/Fall Prevention: safety round/check completed  Taken 7/18/2023 0200 by Phuong Padron, RN  Safety Promotion/Fall Prevention: safety round/check completed  Taken 7/18/2023 0002 by Phuong Padron, RN  Safety Promotion/Fall Prevention:   safety round/check completed   room organization consistent  Taken 7/17/2023 2200 by Phuong Padron, RN  Safety Promotion/Fall Prevention: safety round/check completed  Taken 7/17/2023 2001 by Phuong Padron, RN  Safety Promotion/Fall Prevention:   safety round/check completed   fall prevention program maintained   room organization consistent   nonskid shoes/slippers when out of bed   clutter free environment maintained   assistive device/personal items within reach  Intervention: Prevent Infection  Recent Flowsheet Documentation  Taken 7/17/2023 2001 by Phuong Padron, RN  Infection Prevention:   personal protective equipment utilized   hand hygiene promoted   rest/sleep promoted  Goal: Optimal Comfort and Wellbeing  Outcome: Ongoing, Progressing  Goal: Home and Community Transition Plan Established  Outcome: Ongoing, Progressing   Goal Outcome Evaluation:  Plan of Care Reviewed With: patient        Progress: no change  Outcome Evaluation: Patient calm  and cooperative; resting well throughout most the night; frequently seeking out staff; no acute distress, will continue to monitor; call light with in reach.

## 2023-07-18 NOTE — PROGRESS NOTES
Assisted By: PT    CC: Follow-up on CVA    Interview History/HPI: Patient states that he is doing okay, no acute complaints at this time.  I observed him and he actually with 2 person assistance was able to  the parallel bars.  He is tolerating his diet, denies any acute pain          Current Hospital Meds:  allopurinol, 300 mg, Oral, Daily  atorvastatin, 80 mg, Oral, Daily  magnesium oxide, 400 mg, Oral, Daily  metoprolol tartrate, 25 mg, Oral, Q12H  nystatin, , Topical, Q12H  pantoprazole, 40 mg, Oral, Q AM  rivaroxaban, 20 mg, Oral, Daily With Dinner  tamsulosin, 0.4 mg, Oral, Daily         Vitals:    07/18/23 1222   BP: 124/60   Pulse: 95   Resp:    Temp:    SpO2:          Intake/Output Summary (Last 24 hours) at 7/18/2023 1247  Last data filed at 7/18/2023 0900  Gross per 24 hour   Intake 1440 ml   Output --   Net 1440 ml       EXAM: Temperature is 98.1, respiratory rate 20, saturation is 96%.  Patient is awake alert and in no distress, still has some movement in his left hand and arm, he has understandable speech, I did not get any movement of the left leg.  Lungs are clear heart is an irregular irregular controlled rhythm in the 90s, no edema.  There is a firm white mass left-sided neck area which has not new for the patient.  This has been biopsied.      Diet: Cardiac Diets; Healthy Heart (2-3 Na+); Texture: Regular Texture (IDDSI 7); Fluid Consistency: Thin (IDDSI 0)        LABS:     Lab Results (last 48 hours)       Procedure Component Value Units Date/Time    POC Glucose Once [[id number]]  (Abnormal) Collected: 07/18/23 1109    Specimen: Blood Updated: 07/18/23 1115     Glucose 185 mg/dL      Comment: Meter: [id number] : [id number] NICHOLE BOWLING       POC Glucose Once [[id number]]  (Normal) Collected: 07/18/23 0559    Specimen: Blood Updated: 07/18/23 0607     Glucose 111 mg/dL      Comment: Meter: [id number] : [id number] Vito STANLEY       POC Glucose Once [[id number]]  (Abnormal)  Collected: 07/17/23 1953    Specimen: Blood Updated: 07/17/23 2000     Glucose 205 mg/dL      Comment: Meter: [id number] : [id number] Quang Neal       POC Glucose Once [[id number]]  (Abnormal) Collected: 07/17/23 1628    Specimen: Blood Updated: 07/17/23 1648     Glucose 139 mg/dL      Comment: Meter: [id number] : [id number] Vinny Mariah       POC Glucose Once [[id number]]  (Abnormal) Collected: 07/17/23 1057    Specimen: Blood Updated: 07/17/23 1107     Glucose 141 mg/dL      Comment: Meter: [id number] : [id number] Cheek Chikis       POC Glucose Once [[id number]]  (Normal) Collected: 07/17/23 0607    Specimen: Blood Updated: 07/17/23 0613     Glucose 117 mg/dL      Comment: Meter: [id number] : [id number] SILVANO REGIS       Basic Metabolic Panel [[id number]]  (Abnormal) Collected: 07/17/23 0010    Specimen: Blood Updated: 07/17/23 0157     Glucose 127 mg/dL      BUN 22 mg/dL      Creatinine 0.98 mg/dL      Sodium 137 mmol/L      Potassium 4.0 mmol/L      Chloride 103 mmol/L      CO2 24.6 mmol/L      Calcium 9.5 mg/dL      BUN/Creatinine Ratio 22.4     Anion Gap 9.4 mmol/L      eGFR 85.6 mL/min/1.73     Narrative:      GFR Normal >60  Chronic Kidney Disease <60  Kidney Failure <15      CBC & Differential [[id number]]  (Abnormal) Collected: 07/17/23 0010    Specimen: Blood Updated: 07/17/23 0135    Narrative:      The following orders were created for panel order CBC & Differential.  Procedure                               Abnormality         Status                     ---------                               -----------         ------                     CBC Auto Differential[[id number]]        Abnormal            Final result                 Please view results for these tests on the individual orders.    CBC Auto Differential [[id number]]  (Abnormal) Collected: 07/17/23 0010    Specimen: Blood Updated: 07/17/23 0135     WBC 6.69 10*3/mm3      RBC 4.07 10*6/mm3      Hemoglobin 10.8 g/dL       Hematocrit 34.7 %      MCV 85.3 fL      MCH 26.5 pg      MCHC 31.1 g/dL      RDW 12.6 %      RDW-SD 39.2 fl      MPV 9.7 fL      Platelets 285 10*3/mm3      Neutrophil % 65.5 %      Lymphocyte % 20.5 %      Monocyte % 10.8 %      Eosinophil % 2.2 %      Basophil % 0.6 %      Immature Grans % 0.4 %      Neutrophils, Absolute 4.38 10*3/mm3      Lymphocytes, Absolute 1.37 10*3/mm3      Monocytes, Absolute 0.72 10*3/mm3      Eosinophils, Absolute 0.15 10*3/mm3      Basophils, Absolute 0.04 10*3/mm3      Immature Grans, Absolute 0.03 10*3/mm3      nRBC 0.0 /100 WBC     POC Glucose Once [[id number]]  (Abnormal) Collected: 07/16/23 2036    Specimen: Blood Updated: 07/16/23 2042     Glucose 154 mg/dL      Comment: Meter: [id number] : [id number] Andreina Crystal       POC Glucose Once [[id number]]  (Normal) Collected: 07/16/23 1616    Specimen: Blood Updated: 07/16/23 1622     Glucose 129 mg/dL      Comment: Meter: [id number] : [id number] CHAYA FIERRO                    Radiology:    Imaging Results (Last 72 Hours)       ** No results found for the last 72 hours. 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  Patient is undergoing occupational and physical therapy.  With PT, he did not have orthostatic symptoms today.  He stood in the parallel bars today as above, yesterday he was in the standing frame.  Patient is max to dependent with bed mobility, yesterday he was dependent on a lift device for transfers and was unable to participate in gait.  With OT, patient was mod assist with grooming, patient continues to participate in neuromuscular reeducation activities, motor skills, range of motion and strengthening.    Atrial fibrillation, rate controlled even with lower dose Lopressor, on Xarelto for stroke prevention.    Orthostasis, seems to be improved with medication adjustment.    HFpEF, compensated at the current time, we are limited on the medication we can  use secondary to his orthostasis.  EF is 52%, Cozaar had to be stopped due to lower blood pressure as well.  Fluid status appears good at this time.    Laryngeal cancer and non-Hodgkin's lymphoma, outpatient follow-up with oncology    Diabetes, there is only been 1 glucose reading over 200 last 24 hours overall running good on no medication, continue to follow    Patient is only had 2 Percocet in the last 8 days that I can see, I am going to stop this now, Tylenol as needed    Prostatism, stable on Flomax    Hayder Mendoza MD

## 2023-07-18 NOTE — PROGRESS NOTES
Case Management  Inpatient Rehabilitation Team Conference    Conference Date/Time: 7/18/2023 7:50:59 AM    Team Conference Attendees:  MD Wendi Funes SW Jessica Bill, RN,   ALON Horowitz, PT  Lisa Sanchez OT    Demographics            Age: 67Y            Gender: Male    Admission Date: 7/6/2023 4:18:00 PM  Rehabilitation Diagnosis:  stroke  Comorbidities:      Plan of Care  Anticipated Discharge Date/Estimated Length of Stay: 8-4-23  Anticipated Discharge Destination: Community discharge with assistance  Discharge Plan : Pt plans to return home with cousin at discharge.  Pt says his  cousin, sister and daughter can assist with needs.  Medical Necessity Expected Level Rationale: fair  Intensity and Duration: an average of 3 hours/5 days per week  Medical Supervision and 24 Hour Rehab Nursing: x  Physical Therapy: x  PT Intensity/Duration: PT 1.5 hours per day/5 days per week  Occupational Therapy: x  OT Intensity/Duration: OT 1.5 hours per day/5 days per week  Social Work: x  Therapeutic Recreation: x  Updated (if changes indicated)    Anticipated Discharge Date/Estimated Length of Stay:   8-4-23      Discharge Plan of Care:    Based on the patient's medical and functional status, their prognosis and  expected level of functional improvement is: fair      Interdisciplinary Problem/Goals/Status  Body Function Structure    [RN] Skin Integrity(Active)  Current Status(07/06/2023): At Risk for Skin Breakdown  Weekly Goal(08/04/2023): No Skin Breakdown  Discharge Goal: No Skin Breakdown        Safety    [RN] Potential for Injury(Active)  Current Status(07/06/2023): Potential for Falls  Weekly Goal(08/04/2023): No Falls  Discharge Goal: No Falls        Mobility    [PT] Bed/Chair/Wheelchair(Active)  Current Status(07/07/2023): Dep/Tye  Weekly Goal(07/14/2023): mod A  Discharge Goal: SBA    [PT] Walk(Active)  Current Status(07/07/2023): unable  Weekly  Goal(07/14/2023): amb 20' mod A x2 hallrail  Discharge Goal: amb 150' HW SBA        Self Care    [OT] Dressing (Upper)(Active)  Current Status(07/17/2023): Max/Mod A  Weekly Goal(07/25/2023): Mod A  Discharge Goal: Min A    Comments: Pt plans to return home with cousin at discharge.  Pt's sister and  cousin to assist pt at home as long as they can meet caregiving needs.  Daughter  Karolyn may assist some too with care.    Signed by: SUJATA Breen    Physician CoSigned By: Hayder Mendoza 07/18/2023 14:26:07

## 2023-07-19 LAB
GLUCOSE BLDC GLUCOMTR-MCNC: 102 MG/DL (ref 70–130)
GLUCOSE BLDC GLUCOMTR-MCNC: 110 MG/DL (ref 70–130)
GLUCOSE BLDC GLUCOMTR-MCNC: 171 MG/DL (ref 70–130)
GLUCOSE BLDC GLUCOMTR-MCNC: 200 MG/DL (ref 70–130)

## 2023-07-19 PROCEDURE — 97535 SELF CARE MNGMENT TRAINING: CPT

## 2023-07-19 PROCEDURE — 97530 THERAPEUTIC ACTIVITIES: CPT

## 2023-07-19 PROCEDURE — 97112 NEUROMUSCULAR REEDUCATION: CPT

## 2023-07-19 PROCEDURE — 97110 THERAPEUTIC EXERCISES: CPT

## 2023-07-19 PROCEDURE — 82948 REAGENT STRIP/BLOOD GLUCOSE: CPT

## 2023-07-19 RX ADMIN — RIVAROXABAN 20 MG: 20 TABLET, FILM COATED ORAL at 17:08

## 2023-07-19 RX ADMIN — NYSTATIN: 100000 POWDER TOPICAL at 20:37

## 2023-07-19 RX ADMIN — ALLOPURINOL 300 MG: 300 TABLET ORAL at 08:47

## 2023-07-19 RX ADMIN — HYDROXYZINE HYDROCHLORIDE 25 MG: 25 TABLET, FILM COATED ORAL at 20:36

## 2023-07-19 RX ADMIN — NYSTATIN: 100000 POWDER TOPICAL at 08:49

## 2023-07-19 RX ADMIN — METOPROLOL TARTRATE 25 MG: 25 TABLET, FILM COATED ORAL at 08:47

## 2023-07-19 RX ADMIN — MAGNESIUM GLUCONATE 500 MG ORAL TABLET 400 MG: 500 TABLET ORAL at 08:49

## 2023-07-19 RX ADMIN — ATORVASTATIN CALCIUM 80 MG: 40 TABLET, FILM COATED ORAL at 08:47

## 2023-07-19 RX ADMIN — METOPROLOL TARTRATE 25 MG: 25 TABLET, FILM COATED ORAL at 20:36

## 2023-07-19 RX ADMIN — PANTOPRAZOLE SODIUM 40 MG: 40 TABLET, DELAYED RELEASE ORAL at 05:17

## 2023-07-19 RX ADMIN — TAMSULOSIN HYDROCHLORIDE 0.4 MG: 0.4 CAPSULE ORAL at 08:47

## 2023-07-19 NOTE — THERAPY TREATMENT NOTE
Inpatient Rehabilitation - Physical Therapy Treatment Note        Ronnie     Patient Name: Antonio Canseco  : 1955  MRN: [id number]    Today's Date: 2023                    Admit Date: 2023      Visit Dx:   No diagnosis found.    Patient Active Problem List   Diagnosis    Detrusor instability    Low testosterone in male    Disorder of prostate    Corporo-venous occlusive erectile dysfunction    CVA (cerebral vascular accident)       Past Medical History:   Diagnosis Date    Diabetes mellitus     Hypertension     Stroke        Past Surgical History:   Procedure Laterality Date    US GUIDED LYMPH NODE BIOPSY  2023       PT ASSESSMENT (last 12 hours)       IRF PT Evaluation and Treatment       Row Name 23 1455          PT Time and Intention    Document Type daily treatment  -RG     Mode of Treatment physical therapy  -RG     Patient/Family/Caregiver Comments/Observations Pt and nursing in agreement for skilled PT on this date.  -RG       Row Name 23 1455          General Information    Patient Profile Reviewed yes  -RG     Existing Precautions/Restrictions fall  L HP, <trunk support/balance  -RG       Row Name 23 1455          Cognition/Psychosocial    Affect/Mental Status (Cognition) WFL  -RG     Orientation Status (Cognition) oriented x 3  -RG     Follows Commands (Cognition) verbal cues/prompting required;physical/tactile prompts required;follows one-step commands  -RG     Personal Safety Interventions gait belt;fall prevention program maintained;nonskid shoes/slippers when out of bed  -RG     Cognitive Function safety deficit  -RG       Row Name 23 1455          Bed Mobility    Rolling Left [place] (Bed Mobility) verbal cues;nonverbal cues (demo/gesture);moderate assist (50% patient effort);maximum assist (25% patient effort)  -RG     Rolling Right [place] (Bed Mobility) dependent (less than 25% patient effort);verbal cues;nonverbal cues  (demo/gesture);maximum assist (25% patient effort)  -     Bed Mobility, Safety Issues decreased use of arms for pushing/pulling;decreased use of legs for bridging/pushing  -RG     Assistive Device (Bed Mobility) bed rails;draw sheet  -       Row Name 07/19/23 1455          Bed-Chair Transfer    Bed-Chair [place] (Transfers) dependent (less than 25% patient effort)  -RG     Assistive Device (Bed-Chair Transfers) lift device  -       Row Name 07/19/23 1455          Chair-Bed Transfer    Chair-Bed [place] (Transfers) dependent (less than 25% patient effort)  -RG     Assistive Device (Chair-Bed Transfers) lift device  -       Row Name 07/19/23 1455          Sit-Stand Transfer    Sit-Stand [place] (Transfers) verbal cues;nonverbal cues (demo/gesture);maximum assist (25% patient effort);moderate assist (50% patient effort);2 person assist  -RG     Assistive Device (Sit-Stand Transfers) parallel bars  -       Row Name 07/19/23 1455          Stand-Sit Transfer    Stand-Sit [place] (Transfers) moderate assist (50% patient effort);nonverbal cues (demo/gesture);verbal cues;2 person assist  -RG     Assistive Device (Stand-Sit Transfers) parallel bars  -RG     Comment, (Stand-Sit Transfer) Lateral lean to the L noted.  Verba and tactile cues for proper body mechanics.  -       Row Name 07/19/23 1455          Gait/Stairs (Locomotion)    Comment, (Gait/Stairs) unable  -       Row Name 07/19/23 1455          Safety Issues, Functional Mobility    Impairments Affecting Function (Mobility) balance;coordination;endurance/activity tolerance;grasp;motor control;muscle tone abnormal;postural/trunk control;range of motion (ROM);strength  -       Row Name 07/19/23 1455          Hip (Therapeutic Exercise)    Hip Strengthening (Therapeutic Exercise) bilateral;flexion;aBduction;aDduction;marching while seated;sitting;2 lb free weight;resistance band;green;10 repetitions;2 sets  -[facility] Name 07/19/23  1455          Knee (Therapeutic Exercise)    Knee Strengthening (Therapeutic Exercise) flexion;marching while seated;LAQ (long arc quad);sitting;2 lb free weight;resistance band;green;10 repetitions;2 sets;right  -RG       Row Name 07/19/23 1455          Ankle (Therapeutic Exercise)    Ankle Strengthening (Therapeutic Exercise) dorsiflexion;plantarflexion;sitting;2 lb free weight;resistance band;green;10 repetitions;2 sets;right  -RG       Row Name 07/19/23 1455          Neuromuscular Re-education    Interventions (Neuromuscular Re-education) weight bearing;passive elongation  -RG     Positioning (Neuromuscular Re-education) standing;sitting  -RG       Row Name 07/19/23 1455          Modality Treatment    Treatment Location 1 (Modality) L ant tib  -RG     Modality Performed electrical stimulation  -RG     Modality Treatment Results no redness before or after. dorsiflexion observed with contraction  -RG     Comment, Modality Treatment 59 mA 20 minutes  -RG       Row Name 07/19/23 1455          Positioning and Restraints    Pre-Treatment Position in bed  -RG     Post Treatment Position wheelchair  -RG     In Wheelchair notified nsg;sitting;with OT;legs elevated  -RG       Row Name 07/19/23 1455          Bed Mobility Goal 1 (PT-IRF)    Progress/Outcomes (Bed Mobility Goal 1, PT-IRF) goal ongoing  -RG       Row Name 07/19/23 1455          Bed Mobility Goal 2 (PT-IRF)    Progress/Outcomes (Bed Mobility Goal 2, PT-IRF) goal ongoing  -RG       Row Name 07/19/23 1455          Transfer Goal 1 (PT-IRF)    Progress/Outcomes (Transfer Goal 1, PT-IRF) goal ongoing  -RG       Row Name 07/19/23 1455          Transfer Goal 2 (PT-IRF)    Progress/Outcomes (Transfer Goal 2, PT-IRF) goal ongoing  -RG       Row Name 07/19/23 1455          Gait/Walking Locomotion Goal 1 (PT-IRF)    Progress/Outcomes (Gait/Walking Locomotion Goal 1, PT-IRF) goal ongoing  -RG       Row Name 07/19/23 1455          Gait/Walking Locomotion Goal 2 (PT-IRF)     Progress/Outcomes (Gait/Walking Locomotion Goal 2, PT-IRF) goal ongoing  -RG               User Key  (r) = Recorded By, (t) = Taken By, (c) = Cosigned By      Initials Name Provider Type    Vu Galloway PTA Physical Therapist Assistant                     Physical Therapy Education       Title: PT OT SLP Therapies (Done)       Topic: Physical Therapy (Done)       Point: Mobility training (Done)       Learning Progress Summary             Patient Acceptance, E,D, VU,NR by RG at 7/19/2023 1508    Acceptance, E,D, VU,NR by RG at 7/18/2023 1520    Acceptance, E,D, VU,NR by RG at 7/17/2023 1522    Acceptance, E,D, VU,NR by LL at 7/13/2023 1358    Acceptance, E, VU,NR by LB at 7/12/2023 1144    Acceptance, E, VU,NR by LB at 7/11/2023 1426    Acceptance, E, VU,NR by LB at 7/10/2023 1452    Acceptance, E,D, VU,NR by LL at 7/8/2023 1228    Acceptance, E,D, VU,NR by LB at 7/7/2023 1449                         Point: Home exercise program (Done)       Learning Progress Summary             Patient Acceptance, E,D, VU,NR by RG at 7/19/2023 1508    Acceptance, E,D, VU,NR by RG at 7/18/2023 1520    Acceptance, E,D, VU,NR by RG at 7/17/2023 1522    Acceptance, E,D, VU,NR by LL at 7/13/2023 1358    Acceptance, E, VU,NR by LB at 7/12/2023 1144    Acceptance, E, VU,NR by LB at 7/11/2023 1426    Acceptance, E, VU,NR by LB at 7/10/2023 1452    Acceptance, E,D, VU,NR by LL at 7/8/2023 1228    Acceptance, E,D, VU,NR by LB at 7/7/2023 1449                         Point: Body mechanics (Done)       Learning Progress Summary             Patient Acceptance, E,D, VU,NR by RG at 7/19/2023 1508    Acceptance, E,D, VU,NR by RG at 7/18/2023 1520    Acceptance, E,D, VU,NR by RG at 7/17/2023 1522    Acceptance, E,D, VU,NR by LL at 7/13/2023 1358    Acceptance, E, VU,NR by LB at 7/12/2023 1144    Acceptance, E, VU,NR by LB at 7/11/2023 1426    Acceptance, E, VU,NR by LB at 7/10/2023 1452    Acceptance, E,D, VU,NR by LL at 7/8/2023 1228     Acceptance, E,D, VU,NR by LB at 7/7/2023 1449                         Point: Precautions (Done)       Learning Progress Summary             Patient Acceptance, E,D, VU,NR by RG at 7/19/2023 1508    Acceptance, E,D, VU,NR by RG at 7/18/2023 1520    Acceptance, E,D, VU,NR by RG at 7/17/2023 1522    Acceptance, E,D, VU,NR by LL at 7/13/2023 1358    Acceptance, E, VU,NR by LB at 7/12/2023 1144    Acceptance, E, VU,NR by LB at 7/11/2023 1426    Acceptance, E, VU,NR by LB at 7/10/2023 1452    Acceptance, E,D, VU,NR by LL at 7/8/2023 1228    Acceptance, E,D, VU,NR by LB at 7/7/2023 1449                                         User Key       Initials Effective Dates Name Provider Type Discipline     06/16/21 -  Joslyn Garcia, PT Physical Therapist PT     05/02/16 -  Connie Velasquez PTA Physical Therapist Assistant PT     06/16/21 -  Vu Brown PTA Physical Therapist Assistant PT                    PT Recommendation and Plan                          Time Calculation:      PT Charges       Row Name 07/19/23 1508             Time Calculation    Start Time 0745  -RG      Stop Time 0915  -RG      Time Calculation (min) 90 min  -RG      PT Received On 07/19/23  -RG         Time Calculation- PT    Total Timed Code Minutes- PT 90 minute(s)  -RG                User Key  (r) = Recorded By, (t) = Taken By, (c) = Cosigned By      Initials Name Provider Type     Vu Brown PTA Physical Therapist Assistant                    Therapy Charges for Today       Code Description Service Date Service Provider Modifiers Qty    18521485710 HC PT NEUROMUSC RE EDUCATION EA 15 MIN 7/18/2023 Vu Brown PTA GP, CQ 1    94669665630 HC PT THERAPEUTIC ACT EA 15 MIN 7/18/2023 Vu Brown PTA GP, CQ 2    61607412545 HC PT THER PROC EA 15 MIN 7/18/2023 Vu Brown PTA GP, CQ 3    23682641073 HC PT NEUROMUSC RE EDUCATION EA 15 MIN 7/19/2023 Vu Brown, PTA GP, CQ 1    76247472385 HC PT THERAPEUTIC ACT EA 15 MIN  7/19/2023 Vu Brown, JILL GP, CQ 2    95843857316 HC PT THER PROC EA 15 MIN 7/19/2023 Vu Brown PTA GP, CQ 3                     Vu Brown, JILL  7/19/2023

## 2023-07-19 NOTE — THERAPY TREATMENT NOTE
Inpatient Rehabilitation - Occupational Therapy Treatment Note     Ronnie     Patient Name: Antonio Canseco  : 1955  MRN: [id number]    Today's Date: 2023                 Admit Date: 2023       No diagnosis found.    Patient Active Problem List   Diagnosis    Detrusor instability    Low testosterone in male    Disorder of prostate    Corporo-venous occlusive erectile dysfunction    CVA (cerebral vascular accident)       Past Medical History:   Diagnosis Date    Diabetes mellitus     Hypertension     Stroke        Past Surgical History:   Procedure Laterality Date    US GUIDED LYMPH NODE BIOPSY  2023             IRF OT ASSESSMENT FLOWSHEET (last 12 hours)       IRF OT Evaluation and Treatment       Row Name 23 1412          OT Time and Intention    Document Type daily treatment  -HB     Mode of Treatment individual therapy;occupational therapy  -HB     Total Minutes, Occupational Therapy 90  -HB     Patient Effort good  -HB     Symptoms Noted During/After Treatment none  -HB       Row Name 23 1412          General Information    Patient Profile Reviewed yes  -HB     Patient/Family/Caregiver Comments/Observations Patient and RN okd OT this date.  -HB     General Observations of Patient Patient tolerated OT well with no adverse reactions. Pt with improved AROM LUE.  -HB     Existing Precautions/Restrictions fall  -HB     Limitations/Impairments safety/cognitive  -HB       Row Name 23 1412          Pain Assessment    Pretreatment Pain Rating 0/10 - no pain  -HB     Posttreatment Pain Rating 0/10 - no pain  -HB       Row Name 23 1412          Cognition/Psychosocial    Affect/Mental Status (Cognition) WFL  -HB     Orientation Status (Cognition) oriented x 3  -HB     Follows Commands (Cognition) verbal cues/prompting required;physical/tactile prompts required;follows one-step commands  -HB       Row Name 23 1412          Upper Body Dressing    [place] Level (Upper  Body Dressing) upper body dressing skills;minimum assist (75% or more patient effort)  educated on kevin dressing  -     Position (Upper Body Dressing) supported sitting  -       Row Name 07/19/23 1412          Self-Feeding    [place] Level (Self-Feeding) feeding skills;set up  -HB     Position (Self-Feeding) supported sitting  -       Row Name 07/19/23 1412          Motor Skills    Motor Skills coordination;functional endurance;neuro-muscular function;motor control/coordination interventions  -     Motor Control/Coordination Interventions fine motor manipulation/dexterity activities;gross motor coordination activities;motor pattern re-training;neuro-muscular re-education;therapeutic exercise/ROM;weight-bearing activities  -     Therapeutic Exercise shoulder;elbow/forearm;wrist;hand  -     Comments, Therapeutic Exercise --  LUE with improvement grossly; grasp and release, BUE TA; rickshaw 25 lbs 4 sets 20 reps; pulleys; PRE 2x 5 min all with LUE; RUE TA to increase endurance; rest between tasks  -       Row Name 07/19/23 1412          Neuromuscular Re-education    Interventions (Neuromuscular Re-education) weight bearing;massage;facilitation/inhibition  LUE  -       Row Name 07/19/23 1412          Balance    Additional Documentation --  pt with increased trunk control this date. pt still needs verbal/tactile cues to maintain midline; however pt progressing greatly  -[facility] Name 07/19/23 1412          Positioning and Restraints    Pre-Treatment Position sitting in chair/recliner  -     Post Treatment Position wheelchair  -     In Bed encouraged to call for assist  pt left at RN station and activites coordinator to wash pt hair  -       Row Name 07/19/23 1412          UB Dressing Goal 1 (OT-IRF)    Activity/Device (UB Dressing Goal 1, OT-IRF) upper body dressing  -     [place] (UB Dress Goal 1, OT-IRF) moderate assist (50-74% patient effort)  -     Time Frame (UB Dressing Goal  1, OT-IRF) short-term goal (STG);1 week;2 weeks  -HB     Progress/Outcomes (UB Dressing Goal 1, OT-IRF) goal ongoing  -HB       Row Name 07/19/23 1412          UB Dressing Goal 2 (OT-IRF)    Activity/Device (UB Dressing Goal 2, OT-IRF) upper body dressing  -HB     [place] (UB Dress Goal 2, OT-IRF) minimum assist (75% or more patient effort)  -HB     Time Frame (UB Dressing Goal 2, OT-IRF) by discharge  -HB     Progress/Outcomes (UB Dressing Goal 2, OT-IRF) goal ongoing  -HB               User Key  (r) = Recorded By, (t) = Taken By, (c) = Cosigned By      Initials Name Effective Dates    HB Travis Veronica, OT 05/25/21 -                      Occupational Therapy Education       Title: PT OT SLP Therapies (Done)       Topic: Occupational Therapy (Done)       Point: ADL training (Done)       Description:   Instruct learner(s) on proper safety adaptation and remediation techniques during self care or transfers.   Instruct in proper use of assistive devices.                  Learning Progress Summary             Patient Acceptance, E, VU,NR by HB at 7/19/2023 1421    Acceptance, E, VU,NR by BF at 7/18/2023 1334    Acceptance, E, VU,NR by BF at 7/17/2023 1431    Acceptance, E, VU,NR by BF at 7/17/2023 1430    Acceptance, E, VU,NR by BF at 7/14/2023 1508    Acceptance, E, VU,NR by BF at 7/13/2023 1343    Acceptance, E,D, VU,NR by TM at 7/12/2023 1407    Acceptance, D,E, NR,VU by TM at 7/11/2023 1434    Acceptance, E, VU,NR by BF at 7/10/2023 1456    Acceptance, E, VU,NR by HB at 7/8/2023 1140    Acceptance, E, VU,NR by BF at 7/7/2023 1442                         Point: Precautions (Done)       Description:   Instruct learner(s) on prescribed precautions during self-care and functional transfers.                  Learning Progress Summary             Patient Acceptance, E, VU,NR by HB at 7/19/2023 1421    Acceptance, E, VU,NR by BF at 7/18/2023 1334    Acceptance, E, VU,NR by BF at 7/17/2023 1431    Acceptance, E, VU,NR  by BF at 7/17/2023 1430    Acceptance, E, VU,NR by BF at 7/14/2023 1508    Acceptance, E, VU,NR by BF at 7/13/2023 1343    Acceptance, E,D, VU,NR by TM at 7/12/2023 1407    Acceptance, D,E, NR,VU by TM at 7/11/2023 1434    Acceptance, E, VU,NR by BF at 7/10/2023 1456    Acceptance, E, VU,NR by HB at 7/8/2023 1140    Acceptance, E, VU,NR by BF at 7/7/2023 1442                                         User Key       Initials Effective Dates Name Provider Type Discipline    BF 05/31/23 - 07/10/23 Lisa Sanchez, OT Occupational Therapist OT    BF 07/11/23 -  Lisa Sanchez, OT Occupational Therapist OT    TM 06/16/21 -  Marielos Capone OT Occupational Therapist OT     05/25/21 -  Veronica Robles OT Occupational Therapist OT                        OT Recommendation and Plan                         Time Calculation:      Time Calculation- OT       Row Name 07/19/23 1420             Time Calculation- OT    OT Start Time 0915  -HB      OT Stop Time 1045  -HB      OT Time Calculation (min) 90 min  -HB      Total Timed Code Minutes- OT 90 minute(s)  -HB                User Key  (r) = Recorded By, (t) = Taken By, (c) = Cosigned By      Initials Name Provider Type    HB Veronica Robles, OT Occupational Therapist                  Therapy Charges for Today       Code Description Service Date Service Provider Modifiers Qty    43478630229 HC OT NEUROMUSC RE EDUCATION EA 15 MIN 7/19/2023 Veronica Robles OT GO 3    12949912949 HC OT THERAPEUTIC ACT EA 15 MIN 7/19/2023 Veronica Robles OT GO 2    92505284527 HC OT SELF CARE/MGMT/TRAIN EA 15 MIN 7/19/2023 Veronica Robles OT GO 1                     Veronica Robles OT  7/19/2023

## 2023-07-19 NOTE — PROGRESS NOTES
Patient was seen prior to therapy and during physical therapy, patient was able to stand with less assistance today in the parallel bars.  He was without new complaints.  He is able to raise his left arm off the bed at about 180 degrees.  Vital signs are good.  Glucoses are acceptable.  He is on Xarelto for further stroke prevention.  Tolerating his diet.

## 2023-07-19 NOTE — PLAN OF CARE
Problem: Rehabilitation (IRF) Plan of Care  Goal: Absence of New-Onset Illness or Injury  Outcome: Ongoing, Progressing   Goal Outcome Evaluation:

## 2023-07-19 NOTE — SIGNIFICANT NOTE
07/18/23 4537   Plan   Plan Team conference held today.  SS spoke to pt about how he is doing in therapy and plans for discharge are still scheduled on 8-4-23.  Discussed family coming to rehab closer to discharge date to observe him in therapy and receive education.  Pt plans to return home with cousin Alejandra assisting with care.  Sister Mariah will assist with care and daughter Karolyn may help too.  Will follow.

## 2023-07-19 NOTE — PROGRESS NOTES
Occupational Therapy:    Physical Therapy: Individual: 90 minutes.    Speech Language Pathology:    Signed by: Vu Brown PTA     normal...

## 2023-07-20 LAB
GLUCOSE BLDC GLUCOMTR-MCNC: 108 MG/DL (ref 70–130)
GLUCOSE BLDC GLUCOMTR-MCNC: 128 MG/DL (ref 70–130)
GLUCOSE BLDC GLUCOMTR-MCNC: 140 MG/DL (ref 70–130)

## 2023-07-20 PROCEDURE — 97110 THERAPEUTIC EXERCISES: CPT | Performed by: OCCUPATIONAL THERAPIST

## 2023-07-20 PROCEDURE — 97535 SELF CARE MNGMENT TRAINING: CPT | Performed by: OCCUPATIONAL THERAPIST

## 2023-07-20 PROCEDURE — 99231 SBSQ HOSP IP/OBS SF/LOW 25: CPT | Performed by: INTERNAL MEDICINE

## 2023-07-20 PROCEDURE — 97112 NEUROMUSCULAR REEDUCATION: CPT | Performed by: OCCUPATIONAL THERAPIST

## 2023-07-20 PROCEDURE — 97110 THERAPEUTIC EXERCISES: CPT

## 2023-07-20 PROCEDURE — 82948 REAGENT STRIP/BLOOD GLUCOSE: CPT

## 2023-07-20 PROCEDURE — 97112 NEUROMUSCULAR REEDUCATION: CPT

## 2023-07-20 PROCEDURE — 97530 THERAPEUTIC ACTIVITIES: CPT

## 2023-07-20 RX ADMIN — MAGNESIUM GLUCONATE 500 MG ORAL TABLET 400 MG: 500 TABLET ORAL at 08:41

## 2023-07-20 RX ADMIN — METOPROLOL TARTRATE 25 MG: 25 TABLET, FILM COATED ORAL at 20:15

## 2023-07-20 RX ADMIN — ALLOPURINOL 300 MG: 300 TABLET ORAL at 08:36

## 2023-07-20 RX ADMIN — RIVAROXABAN 20 MG: 20 TABLET, FILM COATED ORAL at 17:14

## 2023-07-20 RX ADMIN — HYDROXYZINE HYDROCHLORIDE 25 MG: 25 TABLET, FILM COATED ORAL at 20:15

## 2023-07-20 RX ADMIN — METOPROLOL TARTRATE 25 MG: 25 TABLET, FILM COATED ORAL at 08:36

## 2023-07-20 RX ADMIN — NYSTATIN: 100000 POWDER TOPICAL at 08:40

## 2023-07-20 RX ADMIN — ATORVASTATIN CALCIUM 80 MG: 40 TABLET, FILM COATED ORAL at 08:36

## 2023-07-20 RX ADMIN — NYSTATIN 1 APPLICATION: 100000 POWDER TOPICAL at 20:14

## 2023-07-20 RX ADMIN — PANTOPRAZOLE SODIUM 40 MG: 40 TABLET, DELAYED RELEASE ORAL at 05:03

## 2023-07-20 RX ADMIN — TAMSULOSIN HYDROCHLORIDE 0.4 MG: 0.4 CAPSULE ORAL at 08:36

## 2023-07-20 NOTE — PROGRESS NOTES
Occupational Therapy: Individual: 90 minutes.    Physical Therapy:    Speech Language Pathology:    Signed by: Lisa Sanchez OT

## 2023-07-20 NOTE — PLAN OF CARE
Problem: Rehabilitation (IRF) Plan of Care  Goal: Absence of New-Onset Illness or Injury  Intervention: Prevent Fall and Fall Injury  Recent Flowsheet Documentation  Taken 7/20/2023 1200 by Samantha Hayes RN  Safety Promotion/Fall Prevention:   fall prevention program maintained   nonskid shoes/slippers when out of bed   safety round/check completed  Taken 7/20/2023 1000 by Samantha Hayes RN  Safety Promotion/Fall Prevention:   fall prevention program maintained   nonskid shoes/slippers when out of bed   safety round/check completed  Taken 7/20/2023 0800 by Samantha Hayes, RN  Safety Promotion/Fall Prevention:   fall prevention program maintained   nonskid shoes/slippers when out of bed   safety round/check completed  Taken 7/20/2023 0747 by Samantha Hayes, RN  Safety Promotion/Fall Prevention:   fall prevention program maintained   nonskid shoes/slippers when out of bed   safety round/check completed   Goal Outcome Evaluation:

## 2023-07-20 NOTE — PROGRESS NOTES
Assisted By: Patient was seen while out in the leroy and from the nurses station and then later in occupational therapy.    CC: Follow-up on CVA    Interview History/HPI: Patient states he is doing okay no new complaints bowels are moving tolerating his diet.  No pain complaints.  His biggest complaint was sitting up in the leroy between therapy sessions however he has a tendency to lean forward in the wheelchair and he needs close observation so as to not leaned too far forward and come out of the wheelchair.          Current Hospital Meds:  allopurinol, 300 mg, Oral, Daily  atorvastatin, 80 mg, Oral, Daily  magnesium oxide, 400 mg, Oral, Daily  metoprolol tartrate, 25 mg, Oral, Q12H  nystatin, , Topical, Q12H  pantoprazole, 40 mg, Oral, Q AM  rivaroxaban, 20 mg, Oral, Daily With Dinner  tamsulosin, 0.4 mg, Oral, Daily         Vitals:    07/20/23 0747   BP: 121/71   Pulse: 70   Resp: 20   Temp: 98.5 °F (36.9 °C)   SpO2:          Intake/Output Summary (Last 24 hours) at 7/20/2023 1224  Last data filed at 7/20/2023 0900  Gross per 24 hour   Intake 240 ml   Output --   Net 240 ml       EXAM: Patient is pleasant, no distress, he is actually able to lift his left arm over his head,  strength is improving he still has very little left shoulder movement.  Very little left leg movement, he did have the stem this morning on his left leg.  Lungs are clear, heart is a controlled irregular regular rhythm without murmur, no edema      Diet: Cardiac Diets; Healthy Heart (2-3 Na+); Texture: Regular Texture (IDDSI 7); Fluid Consistency: Thin (IDDSI 0)        LABS:     Lab Results (last 48 hours)       Procedure Component Value Units Date/Time    POC Glucose Once [[id number]]  (Abnormal) Collected: 07/20/23 1049    Specimen: Blood Updated: 07/20/23 1056     Glucose 140 mg/dL      Comment: Meter: [id number] : [id number] NICHOLE TRAN       POC Glucose Once [[id number]]  (Normal) Collected: 07/20/23 0654    Specimen: Blood  Updated: 07/20/23 0701     Glucose 128 mg/dL      Comment: Meter: [id number] : [id number] ANSELMO LACY       POC Glucose Once [[id number]]  (Abnormal) Collected: 07/19/23 1902    Specimen: Blood Updated: 07/19/23 1908     Glucose 200 mg/dL      Comment: Meter: [id number] : [id number] ANSELMO LACY       POC Glucose Once [[id number]]  (Normal) Collected: 07/19/23 1625    Specimen: Blood Updated: 07/19/23 1632     Glucose 110 mg/dL      Comment: Meter: [id number] : [id number] NICHOLE BOWLING       POC Glucose Once [[id number]]  (Abnormal) Collected: 07/19/23 1042    Specimen: Blood Updated: 07/19/23 1048     Glucose 171 mg/dL      Comment: Meter: [id number] : [id number] NICHOLE BOWLING       POC Glucose Once [[id number]]  (Normal) Collected: 07/19/23 0610    Specimen: Blood Updated: 07/19/23 0626     Glucose 102 mg/dL      Comment: Meter: [id number] : [id number] [place] Mayelin Val       POC Glucose Once [[id number]]  (Abnormal) Collected: 07/18/23 1948    Specimen: Blood Updated: 07/1955     Glucose 172 mg/dL      Comment: Meter: [id number] : [id number] [place] Mayelin Val       POC Glucose Once [[id number]]  (Abnormal) Collected: 07/18/23 1615    Specimen: Blood Updated: 07/18/23 1622     Glucose 203 mg/dL      Comment: Meter: [id number] : [id number] NICHOLE BOWLING                    Radiology:    Imaging Results (Last 72 Hours)       ** No results found for the last 72 hours. **                Assessment/Plan:   Status post right MCA/RADHA territory CVAs with significant left residual, patient is working with occupational physical therapy.  With OT today, patient is mod to max with bed mobility, max assist of 2 bed to chair, mod assist of 2 sit to stand and stand to sit.  He has been unable to due to gait as of yet but is improving with his standing.  Patient is with OT min assist for upper body dressing, set up for self-feeding, he is noted to have left upper extremity improved grasp and release.   Continue to work on strengthening, ADL retraining, neuro reeducation, fine motor movements range of motion.    Atrial fibrillation, rate controlled on Lopressor, on Xarelto for stroke prevention.    Orthostasis, this has improved with medication adjustment.    HFpEF, remains compensated, as per my previous note Cozaar had to be stopped due to low blood pressure, he remains euvolemic at this time.    Laryngeal cancer and non-Hodgkin's lymphoma, he will have outpatient follow-up with oncology once he has completed the rehab process.    Diabetes, glucoses under excellent control, on no medication.  I have changed his Accu-Cheks to twice a day before meals.    Prostatism, urinating well on Flomax.    CMP/CBC was unremarkable on the 17th.    Hayder Mendoza MD

## 2023-07-20 NOTE — THERAPY TREATMENT NOTE
Inpatient Rehabilitation - Occupational Therapy Treatment Note     Ronnie     Patient Name: Antonio Canseco  : 1955  MRN: [id number]    Today's Date: 2023                 Admit Date: 2023       No diagnosis found.    Patient Active Problem List   Diagnosis    Detrusor instability    Low testosterone in male    Disorder of prostate    Corporo-venous occlusive erectile dysfunction    CVA (cerebral vascular accident)       Past Medical History:   Diagnosis Date    Diabetes mellitus     Hypertension     Stroke        Past Surgical History:   Procedure Laterality Date    US GUIDED LYMPH NODE BIOPSY  2023             IRF OT ASSESSMENT FLOWSHEET (last 12 hours)       IRF OT Evaluation and Treatment       Row Name 23 1259          OT Time and Intention    Document Type daily treatment  -BF     Mode of Treatment occupational therapy  -BF     Patient Effort good  -BF     Symptoms Noted During/After Treatment fatigue  -BF       Row Name 23 1254          General Information    Existing Precautions/Restrictions fall  L HP, <trunk support/balance  -BF     Limitations/Impairments safety/cognitive  -BF       Row Name 23 1259          Cognition/Psychosocial    Orientation Status (Cognition) oriented x 3  -BF     Follows Commands (Cognition) verbal cues/prompting required;repetition of directions required;physical/tactile prompts required;delayed response/completion;increased processing time needed  -BF     Executive Function Deficit (Cognition) organization/sequencing;problem-solving/reasoning  -BF       Row Name 23 1259          Chair-Bed Transfer    Chair-Bed [place] (Transfers) dependent (less than 25% patient effort)  -BF     Assistive Device (Chair-Bed Transfers) lift device;wheelchair  -BF       Row Name 23 1258          Motor Skills    Neuromuscular Function left;upper extremity;moderate impairment  -BF     Motor Control/Coordination Interventions fine motor  manipulation/dexterity activities;gross motor coordination activities;therapeutic exercise/ROM;neuro-muscular re-education  DIOMEDES ESCALANTE/BLANCA , GMC/FMC theract, strengthening; BUE rickshaw 20 lbs X15X2, BUE PRE 3 mins X2, pulleys, pushbox  -BF       Row Name 07/20/23 1259          Neuromuscular Re-education    Interventions (Neuromuscular Re-education) facilitation/inhibition  -BF     Positioning (Neuromuscular Re-education) sitting;supported  -BF       Row Name 07/20/23 1259          Positioning and Restraints    In Bed supine;notified nsg;call light within reach;encouraged to call for assist;LULITA elevated  -BF       Row Name 07/20/23 1259          Vital Signs    Intra Systolic BP Rehab 96  -BF     Intra Treatment Diastolic BP 56  -BF     Post Systolic BP Rehab 115  -BF     Post Treatment Diastolic BP 68  -BF               User Key  (r) = Recorded By, (t) = Taken By, (c) = Cosigned By      Initials Name Effective Dates    BF Lisa Sanchez OT 07/11/23 -                      Occupational Therapy Education       Title: PT OT SLP Therapies (In Progress)       Topic: Occupational Therapy (In Progress)       Point: ADL training (In Progress)       Description:   Instruct learner(s) on proper safety adaptation and remediation techniques during self care or transfers.   Instruct in proper use of assistive devices.                  Learning Progress Summary             Patient Acceptance, E, NR by BF at 7/20/2023 1259    Acceptance, TB, NR by DL at 7/19/2023 2036    Acceptance, E, VU,NR by HB at 7/19/2023 1421    Acceptance, E, VU,NR by BF at 7/18/2023 1334    Acceptance, E, VU,NR by BF at 7/17/2023 1431    Acceptance, E, VU,NR by BF at 7/17/2023 1430    Acceptance, E, VU,NR by BF at 7/14/2023 1508    Acceptance, E, VU,NR by BF at 7/13/2023 1343    Acceptance, E,D, VU,NR by TM at 7/12/2023 1407    Acceptance, D,E, NR,VU by TM at 7/11/2023 1434    Acceptance, E, VU,NR by BF at 7/10/2023 1456    Acceptance, E, VU,NR by HB  at 7/8/2023 1140    Acceptance, E, VU,NR by BF at 7/7/2023 1442                         Point: Precautions (In Progress)       Description:   Instruct learner(s) on prescribed precautions during self-care and functional transfers.                  Learning Progress Summary             Patient Acceptance, E, NR by BF at 7/20/2023 1259    Acceptance, TB, NR by DL at 7/19/2023 2036    Acceptance, E, VU,NR by HB at 7/19/2023 1421    Acceptance, E, VU,NR by BF at 7/18/2023 1334    Acceptance, E, VU,NR by BF at 7/17/2023 1431    Acceptance, E, VU,NR by BF at 7/17/2023 1430    Acceptance, E, VU,NR by BF at 7/14/2023 1508    Acceptance, E, VU,NR by BF at 7/13/2023 1343    Acceptance, E,D, VU,NR by TM at 7/12/2023 1407    Acceptance, D,E, NR,VU by TM at 7/11/2023 1434    Acceptance, E, VU,NR by BF at 7/10/2023 1456    Acceptance, E, VU,NR by HB at 7/8/2023 1140    Acceptance, E, VU,NR by BF at 7/7/2023 1442                                         User Key       Initials Effective Dates Name Provider Type Discipline    BF 05/31/23 - 07/10/23 Lisa Sanchez, OT Occupational Therapist OT    BF 07/11/23 -  Lisa Sanchez, OT Occupational Therapist OT    TM 06/16/21 -  Marielos Capone, OT Occupational Therapist OT    HB 05/25/21 -  Veronica Robles OT Occupational Therapist OT    DL 03/16/22 -  Nadja Velasquez, RN Registered Nurse Nurse                        OT Recommendation and Plan    Planned Therapy Interventions (OT): activity tolerance training, adaptive equipment training, BADL retraining, neuromuscular control/coordination retraining, passive ROM/stretching, ROM/therapeutic exercise, strengthening exercise, transfer/mobility retraining                    Time Calculation:      Time Calculation- OT       Row Name 07/20/23 1304             Time Calculation- OT    OT Start Time 0955  -BF      OT Stop Time 1125  -BF      OT Time Calculation (min) 90 min  -BF      Total Timed Code Minutes- OT 90 minute(s)   -BF      OT Non-Billable Time (min) 10 min  -BF                User Key  (r) = Recorded By, (t) = Taken By, (c) = Cosigned By      Initials Name Provider Type    Lisa West OT Occupational Therapist                  Therapy Charges for Today       Code Description Service Date Service Provider Modifiers Qty    42952089698 HC OT SELF CARE/MGMT/TRAIN EA 15 MIN 7/20/2023 Lisa Sanchez OT GO 1    [id number]  OT NEUROMUSC RE EDUCATION EA 15 MIN 7/20/2023 Lisa Sanchez OT GO 3    [id number]  OT THER PROC EA 15 MIN 7/20/2023 Lisa Sanchez OT GO 2                     Lisa Sanchez OT  7/20/2023

## 2023-07-20 NOTE — THERAPY TREATMENT NOTE
Inpatient Rehabilitation - Physical Therapy Treatment Note        Ronnie     Patient Name: Antonio Canseco  : 1955  MRN: [id number]    Today's Date: 2023                    Admit Date: 2023      Visit Dx:   No diagnosis found.    Patient Active Problem List   Diagnosis    Detrusor instability    Low testosterone in male    Disorder of prostate    Corporo-venous occlusive erectile dysfunction    CVA (cerebral vascular accident)       Past Medical History:   Diagnosis Date    Diabetes mellitus     Hypertension     Stroke        Past Surgical History:   Procedure Laterality Date    US GUIDED LYMPH NODE BIOPSY  2023       PT ASSESSMENT (last 12 hours)       IRF PT Evaluation and Treatment       Row Name 23 1053          PT Time and Intention    Document Type daily treatment  -RG     Mode of Treatment physical therapy  -RG     Patient/Family/Caregiver Comments/Observations Pt and nursing in agreement for skilled PT on this date.  -RG       Row Name 23 1053          General Information    Patient Profile Reviewed yes  -RG     Existing Precautions/Restrictions fall  L HP, <trunk support/balance  -RG       Row Name 23 1053          Cognition/Psychosocial    Affect/Mental Status (Cognition) WFL  -RG     Orientation Status (Cognition) oriented x 3  -RG     Follows Commands (Cognition) verbal cues/prompting required;physical/tactile prompts required;follows one-step commands  -RG     Personal Safety Interventions gait belt;nonskid shoes/slippers when out of bed;fall prevention program maintained  -RG     Cognitive Function safety deficit  -RG       Row Name 23 1053          Bed Mobility    Rolling Left [place] (Bed Mobility) verbal cues;nonverbal cues (demo/gesture);moderate assist (50% patient effort);maximum assist (25% patient effort)  -RG     Rolling Right [place] (Bed Mobility) dependent (less than 25% patient effort);verbal cues;nonverbal cues  (demo/gesture);maximum assist (25% patient effort)  -RG     Bed Mobility, Safety Issues decreased use of arms for pushing/pulling;decreased use of legs for bridging/pushing  -RG     Assistive Device (Bed Mobility) bed rails;draw sheet  -RG       Row Name 07/20/23 1053          Bed-Chair Transfer    Bed-Chair [place] (Transfers) maximum assist (25% patient effort);2 person assist;verbal cues;nonverbal cues (demo/gesture)  -RG     Assistive Device (Bed-Chair Transfers) wheelchair  -RG       Row Name 07/20/23 1053          Sit-Stand Transfer    Sit-Stand [place] (Transfers) verbal cues;nonverbal cues (demo/gesture);moderate assist (50% patient effort);2 person assist  -RG     Assistive Device (Sit-Stand Transfers) parallel bars  -RG       Row Name 07/20/23 1053          Stand-Sit Transfer    Stand-Sit [place] (Transfers) moderate assist (50% patient effort);nonverbal cues (demo/gesture);verbal cues;2 person assist  -RG     Assistive Device (Stand-Sit Transfers) parallel bars  -RG       Row Name 07/20/23 1053          Gait/Stairs (Locomotion)    Comment, (Gait/Stairs) unable  -RG       Row Name 07/20/23 1053          Safety Issues, Functional Mobility    Impairments Affecting Function (Mobility) balance;coordination;endurance/activity tolerance;grasp;motor control;muscle tone abnormal;postural/trunk control;range of motion (ROM);strength  -RG       Row Name 07/20/23 1053          Balance    Static Sitting Balance verbal cues;non-verbal cues (demo/gesture);minimal assist  -RG     Position, Sitting Balance sitting edge of bed;sitting in chair;supported  -RG     Comment, Balance Verbal cues required to weight shift to R to correct sitting posture and reduce fall risk.  -RG       Row Name 07/20/23 1053          Hip (Therapeutic Exercise)    Hip Strengthening (Therapeutic Exercise) bilateral;flexion;aBduction;aDduction;marching while seated;sitting;2 lb free weight;resistance band;green;10 repetitions;2 sets   -RG       Row Name 07/20/23 1053          Knee (Therapeutic Exercise)    Knee Strengthening (Therapeutic Exercise) bilateral;flexion;extension;marching while seated;LAQ (long arc quad);sitting;2 lb free weight;resistance band;green;10 repetitions;2 sets  -RG       Row Name 07/20/23 1053          Ankle (Therapeutic Exercise)    Ankle Strengthening (Therapeutic Exercise) bilateral;dorsiflexion;plantarflexion;sitting;10 repetitions;2 sets  -RG       Row Name 07/20/23 1053          Modality Treatment    Treatment Location 1 (Modality) L Ant Tib 50 mA  -RG     Treatment Location 2 (Modality) L quad 24 mA  -RG     Modality Treatment Results no redness noted before or after  -RG     Comment, Modality Treatment 20 minutes  -RG       Row Name 07/20/23 1053          Positioning and Restraints    Pre-Treatment Position in bed  -RG     Post Treatment Position wheelchair  -RG     In Wheelchair notified nsg;sitting;with OT;legs elevated;patient within staff view  -RG       Row Name 07/20/23 1053          Bed Mobility Goal 1 (PT-IRF)    Progress/Outcomes (Bed Mobility Goal 1, PT-IRF) goal ongoing  -RG       Row Name 07/20/23 1053          Bed Mobility Goal 2 (PT-IRF)    Progress/Outcomes (Bed Mobility Goal 2, PT-IRF) goal ongoing  -RG       Row Name 07/20/23 1053          Transfer Goal 1 (PT-IRF)    Progress/Outcomes (Transfer Goal 1, PT-IRF) goal ongoing  -RG       Row Name 07/20/23 1053          Transfer Goal 2 (PT-IRF)    Progress/Outcomes (Transfer Goal 2, PT-IRF) goal ongoing  -RG       Row Name 07/20/23 1053          Gait/Walking Locomotion Goal 1 (PT-IRF)    Progress/Outcomes (Gait/Walking Locomotion Goal 1, PT-IRF) goal ongoing  -RG       Row Name 07/20/23 1053          Gait/Walking Locomotion Goal 2 (PT-IRF)    Progress/Outcomes (Gait/Walking Locomotion Goal 2, PT-IRF) goal ongoing  -RG               User Key  (r) = Recorded By, (t) = Taken By, (c) = Cosigned By      Initials Name Provider Type    Vu Galloway, JILL  Physical Therapist Assistant                     Physical Therapy Education       Title: PT OT SLP Therapies (In Progress)       Topic: Physical Therapy (Done)       Point: Mobility training (Done)       Learning Progress Summary             Patient Acceptance, E,D, VU,NR by RG at 7/20/2023 1107    Acceptance, TB, NR by DL at 7/19/2023 2036    Acceptance, E,D, VU,NR by RG at 7/19/2023 1508    Acceptance, E,D, VU,NR by RG at 7/18/2023 1520    Acceptance, E,D, VU,NR by RG at 7/17/2023 1522    Acceptance, E,D, VU,NR by LL at 7/13/2023 1358    Acceptance, E, VU,NR by LB at 7/12/2023 1144    Acceptance, E, VU,NR by LB at 7/11/2023 1426    Acceptance, E, VU,NR by LB at 7/10/2023 1452    Acceptance, E,D, VU,NR by LL at 7/8/2023 1228    Acceptance, E,D, VU,NR by LB at 7/7/2023 1449                         Point: Home exercise program (Done)       Learning Progress Summary             Patient Acceptance, E,D, VU,NR by RG at 7/20/2023 1107    Acceptance, TB, NR by DL at 7/19/2023 2036    Acceptance, E,D, VU,NR by RG at 7/19/2023 1508    Acceptance, E,D, VU,NR by RG at 7/18/2023 1520    Acceptance, E,D, VU,NR by RG at 7/17/2023 1522    Acceptance, E,D, VU,NR by LL at 7/13/2023 1358    Acceptance, E, VU,NR by LB at 7/12/2023 1144    Acceptance, E, VU,NR by LB at 7/11/2023 1426    Acceptance, E, VU,NR by LB at 7/10/2023 1452    Acceptance, E,D, VU,NR by LL at 7/8/2023 1228    Acceptance, E,D, VU,NR by LB at 7/7/2023 1449                         Point: Body mechanics (Done)       Learning Progress Summary             Patient Acceptance, E,D, VU,NR by RG at 7/20/2023 1107    Acceptance, TB, NR by DL at 7/19/2023 2036    Acceptance, E,D, VU,NR by RG at 7/19/2023 1508    Acceptance, E,D, VU,NR by RG at 7/18/2023 1520    Acceptance, E,D, VU,NR by RG at 7/17/2023 1522    Acceptance, E,D, VU,NR by LL at 7/13/2023 1358    Acceptance, E, VU,NR by LB at 7/12/2023 1144    Acceptance, E, VU,NR by LB at 7/11/2023 1426    Acceptance, E, VU,NR  by LB at 7/10/2023 1452    Acceptance, E,D, VU,NR by LL at 7/8/2023 1228    Acceptance, E,D, VU,NR by LB at 7/7/2023 1449                         Point: Precautions (Done)       Learning Progress Summary             Patient Acceptance, E,D, VU,NR by RG at 7/20/2023 1107    Acceptance, TB, NR by DL at 7/19/2023 2036    Acceptance, E,D, VU,NR by RG at 7/19/2023 1508    Acceptance, E,D, VU,NR by RG at 7/18/2023 1520    Acceptance, E,D, VU,NR by RG at 7/17/2023 1522    Acceptance, E,D, VU,NR by LL at 7/13/2023 1358    Acceptance, E, VU,NR by LB at 7/12/2023 1144    Acceptance, E, VU,NR by LB at 7/11/2023 1426    Acceptance, E, VU,NR by LB at 7/10/2023 1452    Acceptance, E,D, VU,NR by LL at 7/8/2023 1228    Acceptance, E,D, VU,NR by LB at 7/7/2023 1449                                         User Key       Initials Effective Dates Name Provider Type Discipline    LB 06/16/21 -  Joslyn Garcia, PT Physical Therapist PT    LL 05/02/16 -  Connie Velasquez PTA Physical Therapist Assistant PT    RG 06/16/21 -  Vu Brown PTA Physical Therapist Assistant PT    DL 03/16/22 -  Nadja Velasquez, RN Registered Nurse Nurse                    PT Recommendation and Plan                          Time Calculation:      PT Charges       Row Name 07/20/23 1114             Time Calculation    Start Time 0745  -RG      Stop Time 0915  -RG      Time Calculation (min) 90 min  -RG      PT Received On 07/20/23  -RG         Time Calculation- PT    Total Timed Code Minutes- PT 90 minute(s)  -RG                User Key  (r) = Recorded By, (t) = Taken By, (c) = Cosigned By      Initials Name Provider Type    RG Vu Brown PTA Physical Therapist Assistant                    Therapy Charges for Today       Code Description Service Date Service Provider Modifiers Qty    73418708027 HC PT NEUROMUSC RE EDUCATION EA 15 MIN 7/19/2023 Vu Brown PTA GP, CQ 1    37478393575 HC PT THERAPEUTIC ACT EA 15 MIN 7/19/2023 Vu Brown, PTA  GP, CQ 2    61896878137 HC PT THER PROC EA 15 MIN 7/19/2023 Vu Brown PTA GP, CQ 3    76971864143 HC PT NEUROMUSC RE EDUCATION EA 15 MIN 7/20/2023 Vu Brown PTA GP, CQ 2    70591432949 HC PT THERAPEUTIC ACT EA 15 MIN 7/20/2023 Vu Brown PTA GP, CQ 2    59028042443 HC PT THER PROC EA 15 MIN 7/20/2023 Vu Brown PTA GP, CQ 2                     Vu Brown PTA  7/20/2023

## 2023-07-20 NOTE — SIGNIFICANT NOTE
07/20/23 1013   Plan   Plan SS spoke to sister Mariah [phone] about how pt is doing in therapy and plans for discharge on 8-4-23 if family are able to meet caregiving needs.  Discussed coming to rehab next week to observe pt in therapy and receive education from therapy to help determine if family can care for pt at home.  Attempted to contact pt's cousin Alejandra Quinonez [phone] without success; voicemail is full.  Sister says she will try to contact cousin.  Sister says pt's daughter Karolyn works at night and helps with caring for her grandchild but plans to help some with pt's care.  Sister will call SS after talking to cousin and pt's daughter about coming to rehab next week for observation/education.  Discussed option of SNF for continued nursing/rehab if family are unable to care for pt's needs at home.  Will follow and assist with discharge planning.   Patient/Family in Agreement with Plan yes

## 2023-07-20 NOTE — PLAN OF CARE
Goal Outcome Evaluation:  Plan of Care Reviewed With: patient reports pleased with progress with therapy. Has no complaints at present. Will continue to monitor.

## 2023-07-20 NOTE — PROGRESS NOTES
[facility] Coordinator  Inpatient Rehabilitation Admission    Ethnic Group:  Marital Status:    IRF Admission Date:  07/06/2023  Admission Class:  Admit From:    Pre-Hospital Living:    Payment Sources:  Impairment Group:  Date of Onset of Impairment:    Etiologic Diagnosis Code(s):  Rank Code      Description  1    I63.411   Cerebral infarction due to embolism of right                 middle cerebral artery    Comorbidities:  Rank Code      Description    1    C85.15    Unspecified B-cell lymphoma, lymph nodes of                 inguinal region and lower limb  2    C32.9     Malignant neoplasm of larynx, unspecified  3    E11.9     Type 2 diabetes mellitus without complications  4    I10       Essential (primary) hypertension  5    K59.00    Constipation, unspecified  6    I48.91    Unspecified atrial fibrillation  7    M10.9     Gout, unspecified  8    Z51.89    Encounter for other specified aftercare  9    Z60.4     Social exclusion and rejection  10   Z91.81    History of falling    Height on Admission:  Weight on Admission:    Are there any arthritis conditions recorded for Impairment Group, Etiologic  Diagnosis, or Comorbid Conditions that meet all of the regulatory requirements  for IRF classification (in 42 .29(b)(2)(x), (xi), and xii))?  No    SOBIA Bladder Accidents:   - Accidents.    SOBIA Bowel Accident:  -Accidents.    Signed by: Nurse Nadya

## 2023-07-21 LAB
GLUCOSE BLDC GLUCOMTR-MCNC: 116 MG/DL (ref 70–130)
GLUCOSE BLDC GLUCOMTR-MCNC: 132 MG/DL (ref 70–130)
GLUCOSE BLDC GLUCOMTR-MCNC: 161 MG/DL (ref 70–130)

## 2023-07-21 PROCEDURE — 97110 THERAPEUTIC EXERCISES: CPT | Performed by: OCCUPATIONAL THERAPIST

## 2023-07-21 PROCEDURE — 97110 THERAPEUTIC EXERCISES: CPT

## 2023-07-21 PROCEDURE — 82948 REAGENT STRIP/BLOOD GLUCOSE: CPT

## 2023-07-21 PROCEDURE — 97112 NEUROMUSCULAR REEDUCATION: CPT

## 2023-07-21 PROCEDURE — 97032 APPL MODALITY 1+ESTIM EA 15: CPT

## 2023-07-21 PROCEDURE — 97112 NEUROMUSCULAR REEDUCATION: CPT | Performed by: OCCUPATIONAL THERAPIST

## 2023-07-21 PROCEDURE — 97530 THERAPEUTIC ACTIVITIES: CPT

## 2023-07-21 PROCEDURE — 99231 SBSQ HOSP IP/OBS SF/LOW 25: CPT | Performed by: INTERNAL MEDICINE

## 2023-07-21 PROCEDURE — 97535 SELF CARE MNGMENT TRAINING: CPT | Performed by: OCCUPATIONAL THERAPIST

## 2023-07-21 RX ADMIN — ALLOPURINOL 300 MG: 300 TABLET ORAL at 09:25

## 2023-07-21 RX ADMIN — NYSTATIN: 100000 POWDER TOPICAL at 20:27

## 2023-07-21 RX ADMIN — METOPROLOL TARTRATE 25 MG: 25 TABLET, FILM COATED ORAL at 09:26

## 2023-07-21 RX ADMIN — ATORVASTATIN CALCIUM 80 MG: 40 TABLET, FILM COATED ORAL at 09:26

## 2023-07-21 RX ADMIN — ACETAMINOPHEN 650 MG: 325 TABLET ORAL at 20:26

## 2023-07-21 RX ADMIN — METOPROLOL TARTRATE 25 MG: 25 TABLET, FILM COATED ORAL at 20:26

## 2023-07-21 RX ADMIN — RIVAROXABAN 20 MG: 20 TABLET, FILM COATED ORAL at 16:52

## 2023-07-21 RX ADMIN — MAGNESIUM GLUCONATE 500 MG ORAL TABLET 400 MG: 500 TABLET ORAL at 09:25

## 2023-07-21 RX ADMIN — TAMSULOSIN HYDROCHLORIDE 0.4 MG: 0.4 CAPSULE ORAL at 09:26

## 2023-07-21 RX ADMIN — NYSTATIN: 100000 POWDER TOPICAL at 10:48

## 2023-07-21 RX ADMIN — PANTOPRAZOLE SODIUM 40 MG: 40 TABLET, DELAYED RELEASE ORAL at 05:13

## 2023-07-21 RX ADMIN — HYDROXYZINE HYDROCHLORIDE 25 MG: 25 TABLET, FILM COATED ORAL at 20:27

## 2023-07-21 NOTE — THERAPY RE-EVALUATION
Inpatient Rehabilitation - Physical Therapy Re-Evaluation        Ronnie     Patient Name: Antonio Canseco  : 1955  MRN: [id number]    Today's Date: 2023                    Admit Date: 2023      Visit Dx:   No diagnosis found.    Patient Active Problem List   Diagnosis    Detrusor instability    Low testosterone in male    Disorder of prostate    Corporo-venous occlusive erectile dysfunction    CVA (cerebral vascular accident)       Past Medical History:   Diagnosis Date    Diabetes mellitus     Hypertension     Stroke        Past Surgical History:   Procedure Laterality Date    US GUIDED LYMPH NODE BIOPSY  2023       PT ASSESSMENT (last 12 hours)       IRF PT Evaluation and Treatment       Row Name 23 1605          PT Time and Intention    Document Type re-evaluation;daily treatment  -LB     Mode of Treatment individual therapy;physical therapy  -LB       Row Name 23 160          General Information    Patient Profile Reviewed yes  -LB     Existing Precautions/Restrictions fall  L HP, <trunk support/balance, L side inattention  -LB       Row Name 23 160          Pain Scale: FACES Pre/Post-Treatment    Pain: FACES Scale, Pretreatment 0-->no hurt  -LB     Posttreatment Pain Rating 0-->no hurt  -LB       Row Name 23 1605          Cognition/Psychosocial    Affect/Mental Status (Cognition) WFL  -LB     Follows Commands (Cognition) verbal cues/prompting required;physical/tactile prompts required  -LB     Personal Safety Interventions gait belt;nonskid shoes/slippers when out of bed;supervised activity  -LB     Safety Deficit (Cognition) insight into deficits/self-awareness;impulsivity  L side inattention  -LB       Row Name 23 1605          Strength (Manual Muscle Testing)    Strength (Manual Muscle Testing) --  LLE 0/5 except trace in glutes and hip add  -LB       Row Name 23 1605          Bed Mobility    Supine-Sit-Supine [place] (Bed Mobility) maximum  assist (25% patient effort);1 person assist;2 person assist;verbal cues;nonverbal cues (demo/gesture)  -LB       Row Name 07/21/23 1605          Bed-Chair Transfer    Bed-Chair [place] (Transfers) maximum assist (25% patient effort);1 person assist;2 person assist;verbal cues;nonverbal cues (demo/gesture)  -LB       Row Name 07/21/23 1605          Chair-Bed Transfer    Chair-Bed [place] (Transfers) maximum assist (25% patient effort);1 person assist;2 person assist;verbal cues;nonverbal cues (demo/gesture)  -LB       Row Name 07/21/23 1605          Sit-Stand Transfer    Sit-Stand [place] (Transfers) moderate assist (50% patient effort);verbal cues;nonverbal cues (demo/gesture)  -LB     Assistive Device (Sit-Stand Transfers) parallel bars  -LB       Row Name 07/21/23 1605          Stand-Sit Transfer    Stand-Sit [place] (Transfers) moderate assist (50% patient effort);verbal cues;nonverbal cues (demo/gesture)  -LB     Assistive Device (Stand-Sit Transfers) parallel bars  -LB       Row Name 07/21/23 1605          Safety Issues, Functional Mobility    Safety Issues Affecting Function (Mobility) impulsivity;insight into deficits/self-awareness  L side inattention  -LB     Impairments Affecting Function (Mobility) balance;coordination;endurance/activity tolerance;grasp;motor control;muscle tone abnormal;postural/trunk control;range of motion (ROM);strength  -LB       Row Name 07/21/23 1605          Balance    Static Sitting Balance --  CGA-min A  -LB     Static Standing Balance --  mod A in PB but flexed posture-cues to stand upright, shift cog forward, and extend hips. 20 sec x3  -LB       Row Name 07/21/23 1605          Motor Skills    Therapeutic Exercise --  sitting and supine ex-LLE P/AAROM, RLE AROM  -LB       Row Name 07/21/23 1605          Modality Treatment    Treatment Location 1 (Modality) L ant tib  -LB     Treatment Location 2 (Modality) L quad  -LB     Modality Performed electrical  stimulation  -LB     Modality Treatment Results contraction noted at ant. tib.  -LB     Comment, Modality Treatment 20 min  -LB       Row Name 07/21/23 1605          Positioning and Restraints    Pre-Treatment Position sitting in chair/recliner  -LB     In Wheelchair patient within staff view;encouraged to call for assist;legs elevated  -LB       Row Name 07/21/23 1605          Weekly Progress Summary (PT)    Functional Goal Overall Progress (PT) progressing toward functional goals as expected  -LB     Weekly Progress Summary (PT) he can now transfer from bed to chair with max A 1-2 people instead of using the vamshi lift. he is also standing in PB with assist instead of requiring the standing frame. LLE is still basically flaccid.  -LB     Impairments Still Limiting Function (PT) balance impairment;flexibility decreased;functional activity tolerance impairment;motor control impaired;muscle tone abnormality;postural control impaired;range of motion deficit;strength deficit  -LB     Recommendations (PT) continue PT  -LB       Row Name 07/21/23 1605          Bed Mobility Goal 1 (PT-IRF)    Progress/Outcomes (Bed Mobility Goal 1, PT-IRF) goal ongoing  -LB       Row Name 07/21/23 1605          Bed Mobility Goal 2 (PT-IRF)    Progress/Outcomes (Bed Mobility Goal 2, PT-IRF) goal ongoing  -LB       Row Name 07/21/23 1605          Transfer Goal 1 (PT-IRF)    Progress/Outcomes (Transfer Goal 1, PT-IRF) goal ongoing  -LB       Row Name 07/21/23 1605          Transfer Goal 2 (PT-IRF)    Progress/Outcomes (Transfer Goal 2, PT-IRF) goal ongoing  -LB       Row Name 07/21/23 1605          Gait/Walking Locomotion Goal 1 (PT-IRF)    Progress/Outcomes (Gait/Walking Locomotion Goal 1, PT-IRF) goal ongoing  -LB       Row Name 07/21/23 1605          Gait/Walking Locomotion Goal 2 (PT-IRF)    Progress/Outcomes (Gait/Walking Locomotion Goal 2, PT-IRF) goal ongoing  -LB               User Key  (r) = Recorded By, (t) = Taken By, (c) =  Cosigned By      Initials Name Provider Type    Joslyn Beckford, LYNDON Physical Therapist                     Physical Therapy Education       Title: PT OT SLP Therapies (Done)       Topic: Physical Therapy (Done)       Point: Mobility training (Done)       Learning Progress Summary             Patient Acceptance, E, VU,NR by LB at 7/21/2023 1615    Acceptance, E,D, VU,NR by RG at 7/20/2023 1107    Acceptance, TB, NR by DL at 7/19/2023 2036    Acceptance, E,D, VU,NR by RG at 7/19/2023 1508    Acceptance, E,D, VU,NR by RG at 7/18/2023 1520    Acceptance, E,D, VU,NR by RG at 7/17/2023 1522    Acceptance, E,D, VU,NR by LL at 7/13/2023 1358    Acceptance, E, VU,NR by LB at 7/12/2023 1144    Acceptance, E, VU,NR by LB at 7/11/2023 1426    Acceptance, E, VU,NR by LB at 7/10/2023 1452    Acceptance, E,D, VU,NR by LL at 7/8/2023 1228    Acceptance, E,D, VU,NR by LB at 7/7/2023 1449                         Point: Home exercise program (Done)       Learning Progress Summary             Patient Acceptance, E, VU,NR by LB at 7/21/2023 1615    Acceptance, E,D, VU,NR by RG at 7/20/2023 1107    Acceptance, TB, NR by DL at 7/19/2023 2036    Acceptance, E,D, VU,NR by RG at 7/19/2023 1508    Acceptance, E,D, VU,NR by RG at 7/18/2023 1520    Acceptance, E,D, VU,NR by RG at 7/17/2023 1522    Acceptance, E,D, VU,NR by LL at 7/13/2023 1358    Acceptance, E, VU,NR by LB at 7/12/2023 1144    Acceptance, E, VU,NR by LB at 7/11/2023 1426    Acceptance, E, VU,NR by LB at 7/10/2023 1452    Acceptance, E,D, VU,NR by LL at 7/8/2023 1228    Acceptance, E,D, VU,NR by LB at 7/7/2023 1449                         Point: Body mechanics (Done)       Learning Progress Summary             Patient Acceptance, E, VU,NR by LB at 7/21/2023 1615    Acceptance, E,D, VU,NR by RG at 7/20/2023 1107    Acceptance, TB, NR by DL at 7/19/2023 2036    Acceptance, E,D, VU,NR by RG at 7/19/2023 1508    Acceptance, E,D, VU,NR by RG at 7/18/2023 1520    Acceptance,  E,D, VU,NR by RG at 7/17/2023 1522    Acceptance, E,D, VU,NR by LL at 7/13/2023 1358    Acceptance, E, VU,NR by LB at 7/12/2023 1144    Acceptance, E, VU,NR by LB at 7/11/2023 1426    Acceptance, E, VU,NR by LB at 7/10/2023 1452    Acceptance, E,D, VU,NR by LL at 7/8/2023 1228    Acceptance, E,D, VU,NR by LB at 7/7/2023 1449                         Point: Precautions (Done)       Learning Progress Summary             Patient Acceptance, E, VU,NR by LB at 7/21/2023 1615    Acceptance, E,D, VU,NR by RG at 7/20/2023 1107    Acceptance, TB, NR by DL at 7/19/2023 2036    Acceptance, E,D, VU,NR by RG at 7/19/2023 1508    Acceptance, E,D, VU,NR by RG at 7/18/2023 1520    Acceptance, E,D, VU,NR by RG at 7/17/2023 1522    Acceptance, E,D, VU,NR by LL at 7/13/2023 1358    Acceptance, E, VU,NR by LB at 7/12/2023 1144    Acceptance, E, VU,NR by LB at 7/11/2023 1426    Acceptance, E, VU,NR by LB at 7/10/2023 1452    Acceptance, E,D, VU,NR by LL at 7/8/2023 1228    Acceptance, E,D, VU,NR by LB at 7/7/2023 1449                                         User Key       Initials Effective Dates Name Provider Type Discipline    LB 06/16/21 -  Joslyn Garcia, PT Physical Therapist PT    LL 05/02/16 -  Connie Velasquez, JILL Physical Therapist Assistant PT    RG 06/16/21 -  Vu Brown PTA Physical Therapist Assistant PT    DL 03/16/22 -  Nadja Velasquez, RN Registered Nurse Nurse                    PT Recommendation and Plan    Planned Therapy Interventions (PT): balance training, bed mobility training, gait training, motor coordination training, neuromuscular re-education, patient/family education, postural re-education, ROM (range of motion), strengthening, transfer training, wheelchair management/propulsion training  Frequency of Treatment (PT): 5 times per week  Anticipated Equipment Needs at Discharge (PT Eval):  (tbd)  Daily Progress Summary (PT)  Daily Progress Summary (PT): he had more L neglect and <balance due to  pushing. he is not currently able to correct and requires supervision in w/c for safety.  Recommendations (PT): continue PT               Time Calculation:      PT Charges       Row Name 07/21/23 1615             Time Calculation    Start Time 1000  -LB      Stop Time 1130  -LB      Time Calculation (min) 90 min  -LB      PT Received On 07/21/23  -LB      PT Goal Re-Cert Due Date 07/28/23  -LB                User Key  (r) = Recorded By, (t) = Taken By, (c) = Cosigned By      Initials Name Provider Type    Joslyn Beckford, PT Physical Therapist                    Therapy Charges for Today       Code Description Service Date Service Provider Modifiers Qty    43553931543 HC PT ELEC STIM EA-PER 15 MIN 7/21/2023 Joslyn Garcia, PT GP 1    62245150086 HC PT THER PROC EA 15 MIN 7/21/2023 Joslyn Garcia, PT GP 2    43760048905 HC PT THERAPEUTIC ACT EA 15 MIN 7/21/2023 Joslyn Garcia, PT GP 2    25070639183 HC PT NEUROMUSC RE EDUCATION EA 15 MIN 7/21/2023 Joslyn Garcia, PT GP 1                     Joslyn Garcia PT  7/21/2023

## 2023-07-21 NOTE — THERAPY TREATMENT NOTE
Inpatient Rehabilitation - Occupational Therapy Progress Note and Treatment Note     Ronnie     Patient Name: Antonio Canseco  : 1955  MRN: [id number]    Today's Date: 2023                 Admit Date: 2023       No diagnosis found.    Patient Active Problem List   Diagnosis    Detrusor instability    Low testosterone in male    Disorder of prostate    Corporo-venous occlusive erectile dysfunction    CVA (cerebral vascular accident)       Past Medical History:   Diagnosis Date    Diabetes mellitus     Hypertension     Stroke        Past Surgical History:   Procedure Laterality Date    US GUIDED LYMPH NODE BIOPSY  2023             IRF OT ASSESSMENT FLOWSHEET (last 12 hours)       IRF OT Evaluation and Treatment       Row Name 23 1248          OT Time and Intention    Document Type daily treatment;progress note  -BF     Mode of Treatment occupational therapy  -BF     Patient Effort good  -BF     Symptoms Noted During/After Treatment fatigue  -BF       Row Name 23 1248          General Information    Existing Precautions/Restrictions fall  L HP, <trunk support/balance  -BF     Limitations/Impairments safety/cognitive  -BF       Row Name 23 1248          Cognition/Psychosocial    Orientation Status (Cognition) oriented x 3  -BF     Follows Commands (Cognition) verbal cues/prompting required;repetition of directions required;physical/tactile prompts required;increased processing time needed;delayed response/completion  -BF     Executive Function Deficit (Cognition) organization/sequencing;problem-solving/reasoning  -BF       Row Name 23 1248          Bathing    Comment (Bathing) Mod/Max A  -BF       Row Name 23 1248          Upper Body Dressing    [place] Level (Upper Body Dressing) minimum assist (75% or more patient effort);verbal cues;nonverbal cues (demo/gesture)  -BF     Position (Upper Body Dressing) supported sitting  -BF     Comment (Upper Body Dressing)  Min A- frequent verbal cues  -BF       Row Name 07/21/23 1248          Lower Body Dressing    Comment (Lower Body Dressing) Max A  -BF       Row Name 07/21/23 1248          Grooming    Comment (Grooming) Min A  -BF       Row Name 07/21/23 1248          Toileting    Comment (Toileting) Total A  -BF       Row Name 07/21/23 1248          Self-Feeding    Comment (Self-Feeding) Set-up  -BF       Row Name 07/21/23 1248          Motor Skills    Neuromuscular Function left;upper extremity;moderate impairment  -BF     Motor Control/Coordination Interventions fine motor manipulation/dexterity activities;gross motor coordination activities;therapeutic exercise/ROM;neuro-muscular re-education  LUE AA/AROM, GMC/FMC theract, strengthening; BUE pulleys, PRE 10 mins  -BF       Row Name 07/21/23 1248          Neuromuscular Re-education    Interventions (Neuromuscular Re-education) facilitation/inhibition  -BF     Positioning (Neuromuscular Re-education) sitting;supported  -BF       Row Name 07/21/23 1248          Positioning and Restraints    In Wheelchair sitting;with PT  -BF               User Key  (r) = Recorded By, (t) = Taken By, (c) = Cosigned By      Initials Name Effective Dates    BF Daniel Lisashahida Lima, OT 07/11/23 -                      Occupational Therapy Education       Title: PT OT SLP Therapies (Done)       Topic: Occupational Therapy (Done)       Point: ADL training (Done)       Description:   Instruct learner(s) on proper safety adaptation and remediation techniques during self care or transfers.   Instruct in proper use of assistive devices.                  Learning Progress Summary             Patient Acceptance, E, NR,VU by BF at 7/21/2023 1248    Acceptance, E, NR by BF at 7/20/2023 1259    Acceptance, TB, NR by DL at 7/19/2023 2036    Acceptance, E, VU,NR by HB at 7/19/2023 1421    Acceptance, E, VU,NR by BF at 7/18/2023 1334    Acceptance, E, VU,NR by BF at 7/17/2023 1431    Acceptance, E, VU,NR by BF at  7/17/2023 1430    Acceptance, E, VU,NR by BF at 7/14/2023 1508    Acceptance, E, VU,NR by BF at 7/13/2023 1343    Acceptance, E,D, VU,NR by TM at 7/12/2023 1407    Acceptance, D,E, NR,VU by TM at 7/11/2023 1434    Acceptance, E, VU,NR by BF at 7/10/2023 1456    Acceptance, E, VU,NR by HB at 7/8/2023 1140    Acceptance, E, VU,NR by BF at 7/7/2023 1442                         Point: Precautions (Done)       Description:   Instruct learner(s) on prescribed precautions during self-care and functional transfers.                  Learning Progress Summary             Patient Acceptance, E, NR,VU by BF at 7/21/2023 1248    Acceptance, E, NR by BF at 7/20/2023 1259    Acceptance, TB, NR by DL at 7/19/2023 2036    Acceptance, E, VU,NR by HB at 7/19/2023 1421    Acceptance, E, VU,NR by BF at 7/18/2023 1334    Acceptance, E, VU,NR by BF at 7/17/2023 1431    Acceptance, E, VU,NR by BF at 7/17/2023 1430    Acceptance, E, VU,NR by BF at 7/14/2023 1508    Acceptance, E, VU,NR by BF at 7/13/2023 1343    Acceptance, E,D, VU,NR by TM at 7/12/2023 1407    Acceptance, D,E, NR,VU by TM at 7/11/2023 1434    Acceptance, E, VU,NR by BF at 7/10/2023 1456    Acceptance, E, VU,NR by HB at 7/8/2023 1140    Acceptance, E, VU,NR by BF at 7/7/2023 1442                                         User Key       Initials Effective Dates Name Provider Type Discipline     05/31/23 - 07/10/23 Lisa Sanchez, OT Occupational Therapist OT    BF 07/11/23 -  Lisa Sanchez, OT Occupational Therapist OT    TM 06/16/21 -  Marielos Capone, OT Occupational Therapist OT     05/25/21 -  Veronica Robles OT Occupational Therapist OT    DL 03/16/22 -  Nadja Velasquez, RN Registered Nurse Nurse                        OT Recommendation and Plan    Planned Therapy Interventions (OT): activity tolerance training, adaptive equipment training, BADL retraining, neuromuscular control/coordination retraining, passive ROM/stretching, ROM/therapeutic  exercise, strengthening exercise, transfer/mobility retraining                    Time Calculation:      Time Calculation- OT       Row Name 07/21/23 1252             Time Calculation- OT    OT Start Time 0820  -BF      OT Stop Time 1000  -BF      OT Time Calculation (min) 100 min  -BF      Total Timed Code Minutes-  minute(s)  -BF      OT Non-Billable Time (min) 10 min  -BF                User Key  (r) = Recorded By, (t) = Taken By, (c) = Cosigned By      Initials Name Provider Type     Lisa Sanchez OT Occupational Therapist                  Therapy Charges for Today       Code Description Service Date Service Provider Modifiers Qty    56774050150 HC OT SELF CARE/MGMT/TRAIN EA 15 MIN 7/20/2023 Lisa Sanchez OT GO 1    10409483292 HC OT NEUROMUSC RE EDUCATION EA 15 MIN 7/20/2023 Lisa Sanchez, OT GO 3    50082893079 HC OT THER PROC EA 15 MIN 7/20/2023 Lisa Sanchez, OT GO 2    18697134868 HC OT SELF CARE/MGMT/TRAIN EA 15 MIN 7/21/2023 Lisa Sanchez, OT GO 2    55309176448 HC OT NEUROMUSC RE EDUCATION EA 15 MIN 7/21/2023 Lisa Sanchez, OT GO 3    48818868005 HC OT THER PROC EA 15 MIN 7/21/2023 Lisa Sanchez, OT GO 2                     Lisa Sanchez, OT  7/21/2023

## 2023-07-21 NOTE — NURSING NOTE
"OFFERED TO MOVE PATIENT TO ANOTHER ROOM D/T POSSIBLE LEAK ABOVE 106 BED A, BUT PATIENT REFUSED TO MOVE. MAINTENANCE EVALUATED LEAK AND CONCLUDED PATIENT DIDN'T HAVE TO BE MOVED BECAUSE \"THE LEAK IS OLD.\"  "

## 2023-07-21 NOTE — PROGRESS NOTES
Assisted By: Maurisio CHI    CC: Follow-up on CVA    Interview History/HPI: Patient states he is doing well, no complaints, no chest pain or palpitations, bowels are moving and he is tolerating his diet.          Current Hospital Meds:  allopurinol, 300 mg, Oral, Daily  atorvastatin, 80 mg, Oral, Daily  magnesium oxide, 400 mg, Oral, Daily  metoprolol tartrate, 25 mg, Oral, Q12H  nystatin, , Topical, Q12H  pantoprazole, 40 mg, Oral, Q AM  rivaroxaban, 20 mg, Oral, Daily With Dinner  tamsulosin, 0.4 mg, Oral, Daily         Vitals:    07/21/23 0700   BP: 136/83   Pulse: 91   Resp: 18   Temp: 97.4 °F (36.3 °C)   SpO2: 98%         Intake/Output Summary (Last 24 hours) at 7/21/2023 1305  Last data filed at 7/21/2023 0900  Gross per 24 hour   Intake 840 ml   Output --   Net 840 ml       EXAM: He is able to lift his left arm above his head, still poor shoulder movement, lungs are clear, heart is irregular irregular controlled rhythm without murmur, abdomen soft, benign no significant edema, really cannot get much movement out of his left leg possibly minimal proximally.      Diet: Cardiac Diets; Healthy Heart (2-3 Na+); Texture: Regular Texture (IDDSI 7); Fluid Consistency: Thin (IDDSI 0)        LABS:     Lab Results (last 48 hours)       Procedure Component Value Units Date/Time    POC Glucose Once [[id number]]  (Abnormal) Collected: 07/21/23 1121    Specimen: Blood Updated: 07/21/23 1128     Glucose 132 mg/dL      Comment: Meter: [id number] : [id number] CHAYA FIERRO       POC Glucose Once [[id number]]  (Normal) Collected: 07/21/23 0624    Specimen: Blood Updated: 07/21/23 0631     Glucose 116 mg/dL      Comment: Meter: [id number] : [id number] Start Crystal       POC Glucose Once [[id number]]  (Normal) Collected: 07/20/23 1607    Specimen: Blood Updated: 07/20/23 1617     Glucose 108 mg/dL      Comment: Meter: [id number] : [id number] NICHOLE BOWLING       POC Glucose Once [[id number]]  (Abnormal) Collected: 07/20/23  1049    Specimen: Blood Updated: 07/20/23 1056     Glucose 140 mg/dL      Comment: Meter: [id number] : [id number] NICHOLE BOWLING       POC Glucose Once [[id number]]  (Normal) Collected: 07/20/23 0654    Specimen: Blood Updated: 07/20/23 0701     Glucose 128 mg/dL      Comment: Meter: [id number] : [id number] ANSELMO LACY       POC Glucose Once [[id number]]  (Abnormal) Collected: 07/19/23 1902    Specimen: Blood Updated: 07/19/23 1908     Glucose 200 mg/dL      Comment: Meter: [id number] : [id number] ANSELMO LACY       POC Glucose Once [[id number]]  (Normal) Collected: 07/19/23 1625    Specimen: Blood Updated: 07/19/23 1632     Glucose 110 mg/dL      Comment: Meter: [id number] : [id number] NICHOLE BOWLING                    Radiology:    Imaging Results (Last 72 Hours)       ** No results found for the last 72 hours. **                Assessment/Plan:   Status post CVA, left residual, patient is working with occupational physical therapy, he is on a regular consistency diet at the current time.  With OT, patient is min assist for upper body dressing, max lower body dressing, minimal assist for toileting, dependent with toileting hygiene and set up for self-feeding.  PT assessment from today is still pending, see my note from yesterday as far as the PT progress.    Atrial fibrillation, rate controlled, on Xarelto for further stroke prevention.    Anemia, has been stable, continue to follow intermittently, recheck labs in the a.m.    Laryngeal cancer as well as non-Hodgkin's lymphoma, patient will be following up with oncology as an outpatient.    Prostatism, he continues to void well on Flomax.    HFpEF, compensated, he is off his Cozaar secondary to having a low blood pressure affecting his therapy.  Blood pressure is acceptable at this time.    Hayder Mendoza MD

## 2023-07-21 NOTE — PLAN OF CARE
Goal Outcome Evaluation:     Problem: Rehabilitation (IRF) Plan of Care  Goal: Plan of Care Review  Outcome: Ongoing, Progressing  Goal: Patient-Specific Goal (Individualized)  Outcome: Ongoing, Progressing  Goal: Absence of New-Onset Illness or Injury  Outcome: Ongoing, Progressing  Intervention: Prevent Fall and Fall Injury  Recent Flowsheet Documentation  Taken 7/21/2023 0800 by Maurisio Estrella RN  Safety Promotion/Fall Prevention: safety round/check completed  Intervention: Prevent Infection  Recent Flowsheet Documentation  Taken 7/21/2023 0800 by Maurisio Estrella RN  Infection Prevention:   hand hygiene promoted   rest/sleep promoted  Intervention: Prevent VTE (Venous Thromboembolism)  Recent Flowsheet Documentation  Taken 7/21/2023 0800 by Maurisio Estrella RN  VTE Prevention/Management: (xarelto) other (see comments)  Goal: Optimal Comfort and Wellbeing  Outcome: Ongoing, Progressing  Goal: Home and Community Transition Plan Established  Outcome: Ongoing, Progressing     Problem: Diabetes Comorbidity  Goal: Blood Glucose Level Within Targeted Range  Outcome: Ongoing, Progressing  Intervention: Monitor and Manage Glycemia  Recent Flowsheet Documentation  Taken 7/21/2023 0800 by Maurisio Estrella RN  Glycemic Management: blood glucose monitored     Problem: Skin Injury Risk Increased  Goal: Skin Health and Integrity  Outcome: Ongoing, Progressing  Intervention: Promote and Optimize Oral Intake  Recent Flowsheet Documentation  Taken 7/21/2023 0800 by Maurisio Estrella RN  Oral Nutrition Promotion: physical activity promoted  Intervention: Optimize Skin Protection  Recent Flowsheet Documentation  Taken 7/21/2023 0800 by Maurisio Estrella RN  Pressure Reduction Techniques:   frequent weight shift encouraged   heels elevated off bed   weight shift assistance provided  Pressure Reduction Devices:   heel offloading device utilized   positioning supports utilized   pressure-redistributing mattress utilized  Skin Protection:    adhesive use limited   incontinence pads utilized     Problem: Fall Injury Risk  Goal: Absence of Fall and Fall-Related Injury  Outcome: Ongoing, Progressing  Intervention: Identify and Manage Contributors  Recent Flowsheet Documentation  Taken 7/21/2023 0800 by Maurisio Estrella RN  Medication Review/Management: medications reviewed  Intervention: Promote Injury-Free Environment  Recent Flowsheet Documentation  Taken 7/21/2023 0800 by Maurisio Estrella RN  Safety Promotion/Fall Prevention: safety round/check completed     Problem: Mobility Impairment  Goal: Optimal Mobility Richardson and Safety  Outcome: Ongoing, Progressing

## 2023-07-22 LAB
ALBUMIN SERPL-MCNC: 3.2 G/DL (ref 3.5–5.2)
ALBUMIN/GLOB SERPL: 1.2 G/DL
ALP SERPL-CCNC: 84 U/L (ref 39–117)
ALT SERPL W P-5'-P-CCNC: 16 U/L (ref 1–41)
ANION GAP SERPL CALCULATED.3IONS-SCNC: 9.8 MMOL/L (ref 5–15)
AST SERPL-CCNC: 15 U/L (ref 1–40)
BASOPHILS # BLD AUTO: 0.05 10*3/MM3 (ref 0–0.2)
BASOPHILS NFR BLD AUTO: 0.7 % (ref 0–1.5)
BILIRUB SERPL-MCNC: 0.5 MG/DL (ref 0–1.2)
BUN SERPL-MCNC: 16 MG/DL (ref 8–23)
BUN/CREAT SERPL: 20 (ref 7–25)
CALCIUM SPEC-SCNC: 8.9 MG/DL (ref 8.6–10.5)
CHLORIDE SERPL-SCNC: 104 MMOL/L (ref 98–107)
CO2 SERPL-SCNC: 23.2 MMOL/L (ref 22–29)
CREAT SERPL-MCNC: 0.8 MG/DL (ref 0.76–1.27)
DEPRECATED RDW RBC AUTO: 38.5 FL (ref 37–54)
EGFRCR SERPLBLD CKD-EPI 2021: 98.2 ML/MIN/1.73
EOSINOPHIL # BLD AUTO: 0.19 10*3/MM3 (ref 0–0.4)
EOSINOPHIL NFR BLD AUTO: 2.8 % (ref 0.3–6.2)
ERYTHROCYTE [DISTWIDTH] IN BLOOD BY AUTOMATED COUNT: 12.5 % (ref 12.3–15.4)
GLOBULIN UR ELPH-MCNC: 2.7 GM/DL
GLUCOSE BLDC GLUCOMTR-MCNC: 119 MG/DL (ref 70–130)
GLUCOSE BLDC GLUCOMTR-MCNC: 131 MG/DL (ref 70–130)
GLUCOSE BLDC GLUCOMTR-MCNC: 181 MG/DL (ref 70–130)
GLUCOSE BLDC GLUCOMTR-MCNC: 222 MG/DL (ref 70–130)
GLUCOSE SERPL-MCNC: 109 MG/DL (ref 65–99)
HCT VFR BLD AUTO: 32.1 % (ref 37.5–51)
HGB BLD-MCNC: 10.1 G/DL (ref 13–17.7)
IMM GRANULOCYTES # BLD AUTO: 0.02 10*3/MM3 (ref 0–0.05)
IMM GRANULOCYTES NFR BLD AUTO: 0.3 % (ref 0–0.5)
LYMPHOCYTES # BLD AUTO: 1.2 10*3/MM3 (ref 0.7–3.1)
LYMPHOCYTES NFR BLD AUTO: 18 % (ref 19.6–45.3)
MCH RBC QN AUTO: 26.7 PG (ref 26.6–33)
MCHC RBC AUTO-ENTMCNC: 31.5 G/DL (ref 31.5–35.7)
MCV RBC AUTO: 84.9 FL (ref 79–97)
MONOCYTES # BLD AUTO: 0.87 10*3/MM3 (ref 0.1–0.9)
MONOCYTES NFR BLD AUTO: 13 % (ref 5–12)
NEUTROPHILS NFR BLD AUTO: 4.35 10*3/MM3 (ref 1.7–7)
NEUTROPHILS NFR BLD AUTO: 65.2 % (ref 42.7–76)
NRBC BLD AUTO-RTO: 0 /100 WBC (ref 0–0.2)
PLATELET # BLD AUTO: 232 10*3/MM3 (ref 140–450)
PMV BLD AUTO: 9.1 FL (ref 6–12)
POTASSIUM SERPL-SCNC: 3.8 MMOL/L (ref 3.5–5.2)
PROT SERPL-MCNC: 5.9 G/DL (ref 6–8.5)
RBC # BLD AUTO: 3.78 10*6/MM3 (ref 4.14–5.8)
SODIUM SERPL-SCNC: 137 MMOL/L (ref 136–145)
WBC NRBC COR # BLD: 6.68 10*3/MM3 (ref 3.4–10.8)

## 2023-07-22 PROCEDURE — 80053 COMPREHEN METABOLIC PANEL: CPT | Performed by: INTERNAL MEDICINE

## 2023-07-22 PROCEDURE — 85025 COMPLETE CBC W/AUTO DIFF WBC: CPT | Performed by: INTERNAL MEDICINE

## 2023-07-22 PROCEDURE — 82948 REAGENT STRIP/BLOOD GLUCOSE: CPT

## 2023-07-22 PROCEDURE — 99231 SBSQ HOSP IP/OBS SF/LOW 25: CPT | Performed by: INTERNAL MEDICINE

## 2023-07-22 RX ADMIN — RIVAROXABAN 20 MG: 20 TABLET, FILM COATED ORAL at 17:17

## 2023-07-22 RX ADMIN — NYSTATIN: 100000 POWDER TOPICAL at 20:44

## 2023-07-22 RX ADMIN — ACETAMINOPHEN 650 MG: 325 TABLET ORAL at 20:43

## 2023-07-22 RX ADMIN — PANTOPRAZOLE SODIUM 40 MG: 40 TABLET, DELAYED RELEASE ORAL at 05:59

## 2023-07-22 RX ADMIN — NYSTATIN: 100000 POWDER TOPICAL at 08:44

## 2023-07-22 RX ADMIN — METOPROLOL TARTRATE 25 MG: 25 TABLET, FILM COATED ORAL at 08:41

## 2023-07-22 RX ADMIN — ALLOPURINOL 300 MG: 300 TABLET ORAL at 08:41

## 2023-07-22 RX ADMIN — MAGNESIUM GLUCONATE 500 MG ORAL TABLET 400 MG: 500 TABLET ORAL at 08:44

## 2023-07-22 RX ADMIN — HYDROXYZINE HYDROCHLORIDE 25 MG: 25 TABLET, FILM COATED ORAL at 20:43

## 2023-07-22 RX ADMIN — ATORVASTATIN CALCIUM 80 MG: 40 TABLET, FILM COATED ORAL at 08:41

## 2023-07-22 RX ADMIN — TAMSULOSIN HYDROCHLORIDE 0.4 MG: 0.4 CAPSULE ORAL at 08:41

## 2023-07-22 NOTE — PROGRESS NOTES
Rehabilitation Nursing  Inpatient Rehabilitation Plan of Care Note    Plan of Care  Copy from POCBody Function Structure    Skin Integrity (Active)  Current Status (7/6/2023 4:18:00 PM): At Risk for Skin Breakdown  Weekly Goal: No Skin Breakdown  Discharge Goal: No Skin Breakdown    Safety    Potential for Injury (Active)  Current Status (7/6/2023 4:18:00 PM): Potential for Falls  Weekly Goal: No Falls  Discharge Goal: No Falls    Signed by: Marco Lo RN

## 2023-07-22 NOTE — PROGRESS NOTES
Assisted By: Marco CHI    CC: Follow-up on CVA    Interview History/HPI: Patient states he is doing well, slept well, he states his food is good, he is feeding himself, no new complaints no new events overnight.          Current Hospital Meds:  allopurinol, 300 mg, Oral, Daily  atorvastatin, 80 mg, Oral, Daily  magnesium oxide, 400 mg, Oral, Daily  metoprolol tartrate, 25 mg, Oral, Q12H  nystatin, , Topical, Q12H  pantoprazole, 40 mg, Oral, Q AM  rivaroxaban, 20 mg, Oral, Daily With Dinner  tamsulosin, 0.4 mg, Oral, Daily         Vitals:    07/22/23 0700   BP: 136/56   Pulse: 92   Resp: 18   Temp: 98.1 °F (36.7 °C)   SpO2: 100%         Intake/Output Summary (Last 24 hours) at 7/22/2023 0855  Last data filed at 7/22/2023 0500  Gross per 24 hour   Intake 1320 ml   Output 150 ml   Net 1170 ml       EXAM: I have patient shake hands with his left hand, his  strength appears to be improving, he can still lift this above his head although his shoulder movement still is minimal on the left.  When I ask him to push with his left foot, he was able to adjust his weight and push with the leg but no distal movement.  Lungs are clear, heart is an irregular irregular rhythm without murmur      Diet: Cardiac Diets; Healthy Heart (2-3 Na+); Texture: Regular Texture (IDDSI 7); Fluid Consistency: Thin (IDDSI 0)        LABS:     Lab Results (last 48 hours)       Procedure Component Value Units Date/Time    POC Glucose Once [[id number]]  (Normal) Collected: 07/22/23 0623    Specimen: Blood Updated: 07/22/23 0641     Glucose 119 mg/dL      Comment: Meter: [id number] : [id number] [facility]       Comprehensive Metabolic Panel [[id number]]  (Abnormal) Collected: 07/22/23 0111    Specimen: Blood Updated: 07/22/23 0202     Glucose 109 mg/dL      BUN 16 mg/dL      Creatinine 0.80 mg/dL      Sodium 137 mmol/L      Potassium 3.8 mmol/L      Chloride 104 mmol/L      CO2 23.2 mmol/L      Calcium 8.9 mg/dL      Total Protein 5.9 g/dL       Albumin 3.2 g/dL      ALT (SGPT) 16 U/L      AST (SGOT) 15 U/L      Alkaline Phosphatase 84 U/L      Total Bilirubin 0.5 mg/dL      Globulin 2.7 gm/dL      A/G Ratio 1.2 g/dL      BUN/Creatinine Ratio 20.0     Anion Gap 9.8 mmol/L      eGFR 98.2 mL/min/1.73     Narrative:      GFR Normal >60  Chronic Kidney Disease <60  Kidney Failure <15      CBC & Differential [[id number]]  (Abnormal) Collected: 07/22/23 0111    Specimen: Blood Updated: 07/22/23 0138    Narrative:      The following orders were created for panel order CBC & Differential.  Procedure                               Abnormality         Status                     ---------                               -----------         ------                     CBC Auto Differential[[id number]]        Abnormal            Final result                 Please view results for these tests on the individual orders.    CBC Auto Differential [[id number]]  (Abnormal) Collected: 07/22/23 0111    Specimen: Blood Updated: 07/22/23 0138     WBC 6.68 10*3/mm3      RBC 3.78 10*6/mm3      Hemoglobin 10.1 g/dL      Hematocrit 32.1 %      MCV 84.9 fL      MCH 26.7 pg      MCHC 31.5 g/dL      RDW 12.5 %      RDW-SD 38.5 fl      MPV 9.1 fL      Platelets 232 10*3/mm3      Neutrophil % 65.2 %      Lymphocyte % 18.0 %      Monocyte % 13.0 %      Eosinophil % 2.8 %      Basophil % 0.7 %      Immature Grans % 0.3 %      Neutrophils, Absolute 4.35 10*3/mm3      Lymphocytes, Absolute 1.20 10*3/mm3      Monocytes, Absolute 0.87 10*3/mm3      Eosinophils, Absolute 0.19 10*3/mm3      Basophils, Absolute 0.05 10*3/mm3      Immature Grans, Absolute 0.02 10*3/mm3      nRBC 0.0 /100 WBC     POC Glucose Once [[id number]]  (Abnormal) Collected: 07/21/23 1616    Specimen: Blood Updated: 07/21/23 1626     Glucose 161 mg/dL      Comment: Meter: [id number] : [id number] emiliano bailey       POC Glucose Once [[id number]]  (Abnormal) Collected: 07/21/23 1121    Specimen: Blood Updated: 07/21/23 1128      Glucose 132 mg/dL      Comment: Meter: [id number] : [id number] CHAYA MORAESERD       POC Glucose Once [[id number]]  (Normal) Collected: 07/21/23 0624    Specimen: Blood Updated: 07/21/23 0631     Glucose 116 mg/dL      Comment: Meter: [id number] : [id number] Kent Crystal       POC Glucose Once [[id number]]  (Normal) Collected: 07/20/23 1607    Specimen: Blood Updated: 07/20/23 1617     Glucose 108 mg/dL      Comment: Meter: [id number] : [id number] NICHOLE BOWLING       POC Glucose Once [[id number]]  (Abnormal) Collected: 07/20/23 1049    Specimen: Blood Updated: 07/20/23 1056     Glucose 140 mg/dL      Comment: Meter: [id number] : [id number] NICHOLE BOWLING                    Radiology:    Imaging Results (Last 72 Hours)       ** No results found for the last 72 hours. **                Assessment/Plan:   Status post CVA with left residual.  Patient is undergoing occupational and physical therapy.  With PT, patient is two-person max assist for bed mobility, max assist bed to chair and chair to bed, mod assist for sit to  the parallel bars, mod assist stand to sit parallel bars.  Noted he had some left-sided inattention.  Balance static sitting contact-guard to min assist, static standing mod assist in parallel bars with a flexed posture.  Cued to stand upright.  With OT, mod to max bathing, min assist upper body dressing, max assist lower body dressing, min assist grooming, total assist toileting, set up for self-feeding.  Continue current plan, patient is progressing towards his goals.  He remains eager to work with our therapists.  He remains on Xarelto for further stroke prevention.    Atrial fibrillation, rate controlled on current dose of Lopressor, this had to be decreased due to his lower blood pressure earlier in the stay.  He is on Xarelto for further stroke prevention which also serves for DVT prophylaxis.    Laryngeal cancer as well as non-Hodgkin's lymphoma, follow-up with oncology as an  outpatient.    HFpEF, compensated, Cozaar stopped earlier in the stay due to lower blood pressure.  Bowels are moving.        Hayder Mendoza MD

## 2023-07-22 NOTE — PLAN OF CARE
Problem: Rehabilitation (IRF) Plan of Care  Goal: Plan of Care Review  Outcome: Ongoing, Progressing  Flowsheets (Taken 7/22/2023 0509)  Progress: no change  Plan of Care Reviewed With: patient  Outcome Evaluation:   Patient calm and cooperative   no acute distress noted   pt resting well throughout the night.  Goal: Patient-Specific Goal (Individualized)  Outcome: Ongoing, Progressing  Goal: Absence of New-Onset Illness or Injury  Outcome: Ongoing, Progressing  Intervention: Prevent Fall and Fall Injury  Recent Flowsheet Documentation  Taken 7/22/2023 0400 by Phuong Padron RN  Safety Promotion/Fall Prevention: safety round/check completed  Taken 7/22/2023 0200 by Phuong Padron RN  Safety Promotion/Fall Prevention: safety round/check completed  Taken 7/22/2023 0000 by Phuong Padron RN  Safety Promotion/Fall Prevention: safety round/check completed  Taken 7/21/2023 2200 by Phuong Padron RN  Safety Promotion/Fall Prevention: safety round/check completed  Taken 7/21/2023 2000 by Phuong Padron RN  Safety Promotion/Fall Prevention:   safety round/check completed   fall prevention program maintained   room organization consistent   nonskid shoes/slippers when out of bed   clutter free environment maintained   assistive device/personal items within reach  Intervention: Prevent Infection  Recent Flowsheet Documentation  Taken 7/21/2023 2000 by Phuong Padron RN  Infection Prevention:   personal protective equipment utilized   hand hygiene promoted   rest/sleep promoted  Goal: Optimal Comfort and Wellbeing  Outcome: Ongoing, Progressing  Goal: Home and Community Transition Plan Established  Outcome: Ongoing, Progressing   Goal Outcome Evaluation:  Plan of Care Reviewed With: patient        Progress: no change  Outcome Evaluation: Patient calm and cooperative; no acute distress noted; pt resting well throughout the night.

## 2023-07-22 NOTE — PLAN OF CARE
Problem: Rehabilitation (IRF) Plan of Care  Goal: Plan of Care Review  Outcome: Ongoing, Progressing  Flowsheets (Taken 7/22/2023 7005)  Progress: improving  Plan of Care Reviewed With: patient  Goal: Patient-Specific Goal (Individualized)  Outcome: Ongoing, Progressing  Goal: Absence of New-Onset Illness or Injury  Outcome: Ongoing, Progressing  Goal: Optimal Comfort and Wellbeing  Outcome: Ongoing, Progressing  Goal: Home and Community Transition Plan Established  Outcome: Ongoing, Progressing     Problem: Diabetes Comorbidity  Goal: Blood Glucose Level Within Targeted Range  Outcome: Ongoing, Progressing     Problem: Skin Injury Risk Increased  Goal: Skin Health and Integrity  Outcome: Ongoing, Progressing     Problem: Fall Injury Risk  Goal: Absence of Fall and Fall-Related Injury  Outcome: Ongoing, Progressing     Problem: Mobility Impairment  Goal: Optimal Mobility Maribel and Safety  Outcome: Ongoing, Progressing   Goal Outcome Evaluation:  Plan of Care Reviewed With: patient        Progress: improving

## 2023-07-23 LAB
GLUCOSE BLDC GLUCOMTR-MCNC: 121 MG/DL (ref 70–130)
GLUCOSE BLDC GLUCOMTR-MCNC: 130 MG/DL (ref 70–130)
GLUCOSE BLDC GLUCOMTR-MCNC: 142 MG/DL (ref 70–130)

## 2023-07-23 PROCEDURE — 82948 REAGENT STRIP/BLOOD GLUCOSE: CPT

## 2023-07-23 RX ADMIN — ALLOPURINOL 300 MG: 300 TABLET ORAL at 10:23

## 2023-07-23 RX ADMIN — METOPROLOL TARTRATE 25 MG: 25 TABLET, FILM COATED ORAL at 21:28

## 2023-07-23 RX ADMIN — NYSTATIN 1 APPLICATION: 100000 POWDER TOPICAL at 10:24

## 2023-07-23 RX ADMIN — NYSTATIN: 100000 POWDER TOPICAL at 21:30

## 2023-07-23 RX ADMIN — ACETAMINOPHEN 650 MG: 325 TABLET ORAL at 21:29

## 2023-07-23 RX ADMIN — MAGNESIUM GLUCONATE 500 MG ORAL TABLET 400 MG: 500 TABLET ORAL at 10:23

## 2023-07-23 RX ADMIN — HYDROXYZINE HYDROCHLORIDE 25 MG: 25 TABLET, FILM COATED ORAL at 21:29

## 2023-07-23 RX ADMIN — TAMSULOSIN HYDROCHLORIDE 0.4 MG: 0.4 CAPSULE ORAL at 10:23

## 2023-07-23 RX ADMIN — PANTOPRAZOLE SODIUM 40 MG: 40 TABLET, DELAYED RELEASE ORAL at 05:52

## 2023-07-23 RX ADMIN — ATORVASTATIN CALCIUM 80 MG: 40 TABLET, FILM COATED ORAL at 10:23

## 2023-07-23 RX ADMIN — RIVAROXABAN 20 MG: 20 TABLET, FILM COATED ORAL at 17:46

## 2023-07-23 RX ADMIN — METOPROLOL TARTRATE 25 MG: 25 TABLET, FILM COATED ORAL at 10:23

## 2023-07-23 NOTE — PLAN OF CARE
Problem: Rehabilitation (IRF) Plan of Care  Goal: Plan of Care Review  Outcome: Ongoing, Progressing  Flowsheets (Taken 7/23/2023 0536)  Progress: no change  Plan of Care Reviewed With: patient  Outcome Evaluation:   Patient calm and cooperative   no acute distress   resting well throughout the night  Goal: Patient-Specific Goal (Individualized)  Outcome: Ongoing, Progressing  Goal: Absence of New-Onset Illness or Injury  Outcome: Ongoing, Progressing  Intervention: Prevent Fall and Fall Injury  Recent Flowsheet Documentation  Taken 7/23/2023 0400 by Phuong Padron, RN  Safety Promotion/Fall Prevention: safety round/check completed  Taken 7/23/2023 0000 by Phuong Padron, RN  Safety Promotion/Fall Prevention: safety round/check completed  Taken 7/22/2023 2200 by Phuong Padron, RN  Safety Promotion/Fall Prevention: safety round/check completed  Taken 7/22/2023 2002 by Phuong Padron, RN  Safety Promotion/Fall Prevention:   assistive device/personal items within reach   nonskid shoes/slippers when out of bed   clutter free environment maintained   room organization consistent   fall prevention program maintained   safety round/check completed  Intervention: Prevent Infection  Recent Flowsheet Documentation  Taken 7/22/2023 2002 by Phuong Padron, RN  Infection Prevention:   hand hygiene promoted   personal protective equipment utilized   rest/sleep promoted  Goal: Optimal Comfort and Wellbeing  Outcome: Ongoing, Progressing  Goal: Home and Community Transition Plan Established  Outcome: Ongoing, Progressing   Goal Outcome Evaluation:  Plan of Care Reviewed With: patient        Progress: no change  Outcome Evaluation: Patient calm and cooperative; no acute distress; resting well throughout the night

## 2023-07-23 NOTE — NURSING NOTE
Transferred patient from room 109a to 110b due to roommate conflict. Patients father notified. aLL  belongings transferred with patient

## 2023-07-23 NOTE — PROGRESS NOTES
Rehabilitation Nursing  Inpatient Rehabilitation Plan of Care Note    Plan of Care  Body Function Structure    Skin Integrity (Active)  Current Status (7/6/2023 4:18:00 PM): At Risk for Skin Breakdown  Weekly Goal: No Skin Breakdown  Discharge Goal: No Skin Breakdown    Safety    Potential for Injury (Active)  Current Status (7/6/2023 4:18:00 PM): Potential for Falls  Weekly Goal: No Falls  Discharge Goal: No Falls    Signed by: Phuong Padron RN

## 2023-07-24 LAB
GLUCOSE BLDC GLUCOMTR-MCNC: 103 MG/DL (ref 70–130)
GLUCOSE BLDC GLUCOMTR-MCNC: 125 MG/DL (ref 70–130)
GLUCOSE BLDC GLUCOMTR-MCNC: 140 MG/DL (ref 70–130)
GLUCOSE BLDC GLUCOMTR-MCNC: 170 MG/DL (ref 70–130)

## 2023-07-24 PROCEDURE — 82948 REAGENT STRIP/BLOOD GLUCOSE: CPT

## 2023-07-24 PROCEDURE — 97112 NEUROMUSCULAR REEDUCATION: CPT

## 2023-07-24 PROCEDURE — 97530 THERAPEUTIC ACTIVITIES: CPT

## 2023-07-24 PROCEDURE — 97110 THERAPEUTIC EXERCISES: CPT

## 2023-07-24 PROCEDURE — 97535 SELF CARE MNGMENT TRAINING: CPT

## 2023-07-24 PROCEDURE — 97116 GAIT TRAINING THERAPY: CPT

## 2023-07-24 PROCEDURE — 99231 SBSQ HOSP IP/OBS SF/LOW 25: CPT | Performed by: FAMILY MEDICINE

## 2023-07-24 RX ADMIN — NYSTATIN 1 APPLICATION: 100000 POWDER TOPICAL at 09:41

## 2023-07-24 RX ADMIN — ALLOPURINOL 300 MG: 300 TABLET ORAL at 09:40

## 2023-07-24 RX ADMIN — ATORVASTATIN CALCIUM 80 MG: 40 TABLET, FILM COATED ORAL at 09:40

## 2023-07-24 RX ADMIN — RIVAROXABAN 20 MG: 20 TABLET, FILM COATED ORAL at 17:29

## 2023-07-24 RX ADMIN — NYSTATIN: 100000 POWDER TOPICAL at 22:50

## 2023-07-24 RX ADMIN — TAMSULOSIN HYDROCHLORIDE 0.4 MG: 0.4 CAPSULE ORAL at 09:40

## 2023-07-24 RX ADMIN — METOPROLOL TARTRATE 25 MG: 25 TABLET, FILM COATED ORAL at 21:43

## 2023-07-24 RX ADMIN — PANTOPRAZOLE SODIUM 40 MG: 40 TABLET, DELAYED RELEASE ORAL at 06:19

## 2023-07-24 RX ADMIN — MAGNESIUM GLUCONATE 500 MG ORAL TABLET 400 MG: 500 TABLET ORAL at 12:58

## 2023-07-24 NOTE — PLAN OF CARE
Problem: Rehabilitation (IRF) Plan of Care  Goal: Plan of Care Review  Outcome: Ongoing, Progressing  Flowsheets  Taken 7/24/2023 0434  Plan of Care Reviewed With: patient  Outcome Evaluation:   Patient alert and oriented   calm and cooperative   no acute distress noted  Taken 7/23/2023 0536  Progress: no change  Goal: Patient-Specific Goal (Individualized)  Outcome: Ongoing, Progressing  Goal: Absence of New-Onset Illness or Injury  Outcome: Ongoing, Progressing  Intervention: Prevent Fall and Fall Injury  Recent Flowsheet Documentation  Taken 7/24/2023 0400 by Phuong Padron RN  Safety Promotion/Fall Prevention: safety round/check completed  Taken 7/24/2023 0200 by Phuong Padron, RN  Safety Promotion/Fall Prevention:   safety round/check completed   room organization consistent  Taken 7/24/2023 0000 by Phuong Padron RN  Safety Promotion/Fall Prevention: safety round/check completed  Taken 7/23/2023 2200 by Phuong Padron RN  Safety Promotion/Fall Prevention: safety round/check completed  Taken 7/23/2023 2003 by Phuong Padron RN  Safety Promotion/Fall Prevention:   assistive device/personal items within reach   nonskid shoes/slippers when out of bed   clutter free environment maintained   room organization consistent   fall prevention program maintained   safety round/check completed  Intervention: Prevent Infection  Recent Flowsheet Documentation  Taken 7/23/2023 2003 by Phuong Padron, RN  Infection Prevention:   hand hygiene promoted   personal protective equipment utilized   rest/sleep promoted  Goal: Optimal Comfort and Wellbeing  Outcome: Ongoing, Progressing  Goal: Home and Community Transition Plan Established  Outcome: Ongoing, Progressing   Goal Outcome Evaluation:  Plan of Care Reviewed With: patient        Progress: no change  Outcome Evaluation: Patient alert and oriented; calm and cooperative; no acute distress noted

## 2023-07-24 NOTE — THERAPY TREATMENT NOTE
Inpatient Rehabilitation - Physical Therapy Treatment Note        [facility]     Patient Name: Antonio Canseco  : 1955  MRN: [id number]    Today's Date: 2023                    Admit Date: 2023      Visit Dx:   No diagnosis found.    Patient Active Problem List   Diagnosis    Detrusor instability    Low testosterone in male    Disorder of prostate    Corporo-venous occlusive erectile dysfunction    CVA (cerebral vascular accident)       Past Medical History:   Diagnosis Date    Diabetes mellitus     Hypertension     Stroke        Past Surgical History:   Procedure Laterality Date    US GUIDED LYMPH NODE BIOPSY  2023       PT ASSESSMENT (last 12 hours)       IRF PT Evaluation and Treatment       Row Name 23 143          PT Time and Intention    Document Type daily treatment  -LB     Mode of Treatment individual therapy;physical therapy  -LB       Row Name 23 143          General Information    Patient Profile Reviewed yes  -LB     Existing Precautions/Restrictions fall  L HP, <trunk support/balance, L side inattention  -LB       Row Name 23 143          Pain Scale: FACES Pre/Post-Treatment    Pain: FACES Scale, Pretreatment 0-->no hurt  -LB     Posttreatment Pain Rating 0-->no hurt  -LB       Row Name 23 143          Cognition/Psychosocial    Affect/Mental Status (Cognition) WFL  -LB     Follows Commands (Cognition) verbal cues/prompting required;physical/tactile prompts required  -LB     Personal Safety Interventions gait belt;nonskid shoes/slippers when out of bed;supervised activity  -LB     Safety Deficit (Cognition) impulsivity;insight into deficits/self-awareness;safety precautions awareness  -LB       Row Name 23 143          Sit-Stand Transfer    Sit-Stand [place] (Transfers) verbal cues;nonverbal cues (demo/gesture);minimum assist (75% patient effort);moderate assist (50% patient effort)  -LB     Assistive Device (Sit-Stand Transfers) parallel bars   -LB       Row Name 07/24/23 1436          Stand-Sit Transfer    Stand-Sit [place] (Transfers) verbal cues;nonverbal cues (demo/gesture);minimum assist (75% patient effort);moderate assist (50% patient effort)  -LB     Assistive Device (Stand-Sit Transfers) parallel bars  -LB       Row Name 07/24/23 1436          Gait/Stairs (Locomotion)    [place] (Gait) maximum assist (25% patient effort);verbal cues;nonverbal cues (demo/gesture)  -LB     Assistive Device (Gait) parallel bars  -LB     Distance in Feet (Gait) 3' 4' 4'  -LB     Pattern (Gait) --  dependent to advance LLE  -LB     Deviations/Abnormal Patterns (Gait) base of support, narrow;hebert decreased;gait speed decreased;stride length decreased;weight shifting decreased  -LB     Bilateral Gait Deviations forward flexed posture  -LB     Comment, (Gait/Stairs) max cueing to weight shift in stance phase, for upright posture due to flexing at hips and trunk, for hand placement on PBs.  -LB       Row Name 07/24/23 1436          Balance    Dynamic Sitting Balance --  sitting bag toss and  with reacher, he leans forward fully on his legs, cues for cog or he would fall forward out of chair.  -LB     Static Standing Balance --  mod A in PB-he flexes at hips and trunk, cues to stand erect and shift cog forward and to R side.  -LB       Row Name 07/24/23 1436          Motor Skills    Therapeutic Exercise --  sitting ex-RLE AROM, no active on LLE  -LB       Row Name 07/24/23 1436          Modality Treatment    Treatment Location 1 (Modality) L ant tib  -LB     Treatment Location 2 (Modality) L quad  -LB     Modality Performed electrical stimulation  -LB     Comment, Modality Treatment 20 min  -LB       Row Name 07/24/23 1436          Positioning and Restraints    Pre-Treatment Position sitting in chair/recliner  -LB     In Wheelchair patient within staff view  in [place]eroy at nursing station for lunch  -LB       Row Name 07/24/23 1436          Daily  Progress Summary (PT)    Recommendations (PT) continue PT  -LB       Row Name 07/24/23 1436          Bed Mobility Goal 1 (PT-IRF)    Progress/Outcomes (Bed Mobility Goal 1, PT-IRF) goal ongoing  -LB       Row Name 07/24/23 1436          Bed Mobility Goal 2 (PT-IRF)    Progress/Outcomes (Bed Mobility Goal 2, PT-IRF) goal ongoing  -LB       Row Name 07/24/23 1436          Transfer Goal 1 (PT-IRF)    Progress/Outcomes (Transfer Goal 1, PT-IRF) goal ongoing  -LB       Row Name 07/24/23 1436          Transfer Goal 2 (PT-IRF)    Progress/Outcomes (Transfer Goal 2, PT-IRF) goal ongoing  -LB       Row Name 07/24/23 1436          Gait/Walking Locomotion Goal 1 (PT-IRF)    Progress/Outcomes (Gait/Walking Locomotion Goal 1, PT-IRF) goal ongoing  -LB       Row Name 07/24/23 1436          Gait/Walking Locomotion Goal 2 (PT-IRF)    Progress/Outcomes (Gait/Walking Locomotion Goal 2, PT-IRF) goal ongoing  -LB               User Key  (r) = Recorded By, (t) = Taken By, (c) = Cosigned By      Initials Name Provider Type    Joslyn Beckford, PT Physical Therapist                     Physical Therapy Education       Title: PT OT SLP Therapies (Done)       Topic: Physical Therapy (Done)       Point: Mobility training (Done)       Learning Progress Summary             Patient Acceptance, E, VU,NR by LB at 7/24/2023 1444    Acceptance, E, VU,NR by LB at 7/21/2023 1615    Acceptance, E,D, VU,NR by RG at 7/20/2023 1107    Acceptance, TB, NR by DL at 7/19/2023 2036    Acceptance, E,D, VU,NR by RG at 7/19/2023 1508    Acceptance, E,D, VU,NR by RG at 7/18/2023 1520    Acceptance, E,D, VU,NR by RG at 7/17/2023 1522    Acceptance, E,D, VU,NR by LL at 7/13/2023 1358    Acceptance, E, VU,NR by LB at 7/12/2023 1144    Acceptance, E, VU,NR by LB at 7/11/2023 1426    Acceptance, E, VU,NR by LB at 7/10/2023 1452    Acceptance, E,D, VU,NR by LL at 7/8/2023 1228    Acceptance, E,D, VU,NR by LB at 7/7/2023 1449                         Point: Home  exercise program (Done)       Learning Progress Summary             Patient Acceptance, E, VU,NR by LB at 7/24/2023 1444    Acceptance, E, VU,NR by LB at 7/21/2023 1615    Acceptance, E,D, VU,NR by RG at 7/20/2023 1107    Acceptance, TB, NR by DL at 7/19/2023 2036    Acceptance, E,D, VU,NR by RG at 7/19/2023 1508    Acceptance, E,D, VU,NR by RG at 7/18/2023 1520    Acceptance, E,D, VU,NR by RG at 7/17/2023 1522    Acceptance, E,D, VU,NR by LL at 7/13/2023 1358    Acceptance, E, VU,NR by LB at 7/12/2023 1144    Acceptance, E, VU,NR by LB at 7/11/2023 1426    Acceptance, E, VU,NR by LB at 7/10/2023 1452    Acceptance, E,D, VU,NR by LL at 7/8/2023 1228    Acceptance, E,D, VU,NR by LB at 7/7/2023 1449                         Point: Body mechanics (Done)       Learning Progress Summary             Patient Acceptance, E, VU,NR by LB at 7/24/2023 1444    Acceptance, E, VU,NR by LB at 7/21/2023 1615    Acceptance, E,D, VU,NR by RG at 7/20/2023 1107    Acceptance, TB, NR by DL at 7/19/2023 2036    Acceptance, E,D, VU,NR by RG at 7/19/2023 1508    Acceptance, E,D, VU,NR by RG at 7/18/2023 1520    Acceptance, E,D, VU,NR by RG at 7/17/2023 1522    Acceptance, E,D, VU,NR by LL at 7/13/2023 1358    Acceptance, E, VU,NR by LB at 7/12/2023 1144    Acceptance, E, VU,NR by LB at 7/11/2023 1426    Acceptance, E, VU,NR by LB at 7/10/2023 1452    Acceptance, E,D, VU,NR by LL at 7/8/2023 1228    Acceptance, E,D, VU,NR by LB at 7/7/2023 1449                         Point: Precautions (Done)       Learning Progress Summary             Patient Acceptance, E, VU,NR by LB at 7/24/2023 1444    Acceptance, E, VU,NR by LB at 7/21/2023 1615    Acceptance, E,D, VU,NR by RG at 7/20/2023 1107    Acceptance, TB, NR by DL at 7/19/2023 2036    Acceptance, E,D, VU,NR by RG at 7/19/2023 1508    Acceptance, E,D, VU,NR by RG at 7/18/2023 1520    Acceptance, E,D, VU,NR by RG at 7/17/2023 1522    Acceptance, E,D, VU,NR by LL at 7/13/2023 1358    Acceptance,  E, VU,NR by LB at 7/12/2023 1144    Acceptance, E, VU,NR by LB at 7/11/2023 1426    Acceptance, E, VU,NR by LB at 7/10/2023 1452    Acceptance, E,D, VU,NR by LL at 7/8/2023 1228    Acceptance, E,D, VU,NR by LB at 7/7/2023 1449                                         User Key       Initials Effective Dates Name Provider Type Discipline    LB 06/16/21 -  Joslyn Garcia, PT Physical Therapist PT    LL 05/02/16 -  Connie Velasquez PTA Physical Therapist Assistant PT    RG 06/16/21 -  Vu Brown PTA Physical Therapist Assistant PT    DL 03/16/22 -  Nadja Velasquez, RN Registered Nurse Nurse                    PT Recommendation and Plan    Planned Therapy Interventions (PT): balance training, bed mobility training, gait training, motor coordination training, neuromuscular re-education, patient/family education, postural re-education, ROM (range of motion), strengthening, transfer training, wheelchair management/propulsion training  Frequency of Treatment (PT): 5 times per week  Anticipated Equipment Needs at Discharge (PT Eval):  (tbd)  Daily Progress Summary (PT)  Daily Progress Summary (PT): he had more L neglect and <balance due to pushing. he is not currently able to correct and requires supervision in w/c for safety.  Recommendations (PT): continue PT               Time Calculation:      PT Charges       Row Name 07/24/23 1444             Time Calculation    Start Time 1000  -LB      Stop Time 1130  -LB      Time Calculation (min) 90 min  -LB      PT Received On 07/24/23  -LB                User Key  (r) = Recorded By, (t) = Taken By, (c) = Cosigned By      Initials Name Provider Type    LB Joslyn Garcia, PT Physical Therapist                    Therapy Charges for Today       Code Description Service Date Service Provider Modifiers Qty    15703651851 HC GAIT TRAINING EA 15 MIN 7/24/2023 Joslyn Garcia, PT GP 1    02446277059 HC PT THER PROC EA 15 MIN 7/24/2023 Joslyn Garcia, PT GP  2    [id number]  PT NEUROMUSC RE EDUCATION EA 15 MIN 7/24/2023 Joslyn Garcia, PT GP 1    14451863454 HC PT THERAPEUTIC ACT EA 15 MIN 7/24/2023 Joslyn Garcia, PT GP 2                     Joslyn Garcia, PT  7/24/2023

## 2023-07-24 NOTE — NURSING NOTE
Patient stating he does not feel good, thinks blood sugar is low; BG checked 103; VS WNL; no SOA, no acute distress, pt requesting snack, given peanut butter and crackers, with Sprite zero per pt request; will continue to monitor.

## 2023-07-24 NOTE — THERAPY TREATMENT NOTE
Inpatient Rehabilitation - Occupational Therapy Treatment Note     [place]     Patient Name: Antonio Canseco  : 1955  MRN: [id number]    Today's Date: 2023                 Admit Date: 2023       No diagnosis found.    Patient Active Problem List   Diagnosis    Detrusor instability    Low testosterone in male    Disorder of prostate    Corporo-venous occlusive erectile dysfunction    CVA (cerebral vascular accident)       Past Medical History:   Diagnosis Date    Diabetes mellitus     Hypertension     Stroke        Past Surgical History:   Procedure Laterality Date    US GUIDED LYMPH NODE BIOPSY  2023             IRF OT ASSESSMENT FLOWSHEET (last 12 hours)       IRF OT Evaluation and Treatment       Row Name 23 1332          OT Time and Intention    Document Type daily treatment  -HB     Mode of Treatment individual therapy;occupational therapy  -HB     Total Minutes, Occupational Therapy 100  -HB     Patient Effort good  -HB     Symptoms Noted During/After Treatment none  -HB       Row Name 23 4464          General Information    Patient Profile Reviewed yes  -HB     Patient/Family/Caregiver Comments/Observations Patient and RN okd OT this date.  -HB     General Observations of Patient Patient tolerated OT well with no adverse reactions.  -HB     Existing Precautions/Restrictions fall  -HB     Limitations/Impairments safety/cognitive  -HB     Comment, General Information --  pt with left inattention; mod verbal/tactile cues to attend to the left. pt with increased trunk control this date. pt still requires cues to maintain midline.  -HB       Row Name 23 6909          Pain Assessment    Pretreatment Pain Rating 0/10 - no pain  -HB     Posttreatment Pain Rating 0/10 - no pain  -HB       Row Name 23 1332          Cognition/Psychosocial    Affect/Mental Status (Cognition) WFL  -HB     Orientation Status (Cognition) oriented x 3  -HB     Follows Commands (Cognition) verbal  cues/prompting required;physical/tactile prompts required  -       Row Name 07/24/23 1338          Lower Body Dressing    [place] Level (Lower Body Dressing) maximum assist (25% patient effort);verbal cues;nonverbal cues (demo/gesture)  -     Position (Lower Body Dressing) supine  -       Row Name 07/24/23 1338          Bed Mobility    Bed Mobility supine-sit  -     Supine-Sit [place] (Bed Mobility) verbal cues;nonverbal cues (demo/gesture);2 person assist;maximum assist (25% patient effort)  -       Row Name 07/24/23 1338          Bed-Chair Transfer    Bed-Chair [place] (Transfers) 2 person assist;maximum assist (25% patient effort);verbal cues;nonverbal cues (demo/gesture)  -     Assistive Device (Bed-Chair Transfers) wheelchair  -       Row Name 07/24/23 1338          Sit-Stand Transfer    Sit-Stand [place] (Transfers) maximum assist (25% patient effort);nonverbal cues (demo/gesture);verbal cues;2 person assist  -[facility] Name 07/24/23 1338          Stand-Sit Transfer    Stand-Sit [place] (Transfers) 2 person assist;maximum assist (25% patient effort);nonverbal cues (demo/gesture);verbal cues  -       Row Name 07/24/23 1338          Motor Skills    Motor Skills coordination;functional endurance;motor control/coordination interventions;neuro-muscular function  -     Motor Control/Coordination Interventions fine motor manipulation/dexterity activities;gross motor coordination activities;motor pattern re-training;neuro-muscular re-education;therapeutic exercise/ROM;weight-bearing activities  -     Therapeutic Exercise shoulder;elbow/forearm;wrist;hand  -     Additional Documentation --  2 lb wrist weight for shoulder and elbow flexion exercises 3 sets 10reps; PRE 3x4 min;[facility] Left hand grasp/release; increase use of LUE grossly; rest between tasks  -       Row Name 07/24/23 1338          Positioning and Restraints    Pre-Treatment Position in bed  -     Post  Treatment Position wheelchair  -HB     In Bed with PT  -HB       Row Name 07/24/23 1338          Therapy Plan Review/Discharge Plan (OT)    Therapy Plan Review (OT) evaluation/treatment results reviewed;care plan/treatment goals reviewed;patient  -HB       Row Name 07/24/23 1338          UB Dressing Goal 1 (OT-IRF)    Activity/Device (UB Dressing Goal 1, OT-IRF) upper body dressing  -HB     [place] (UB Dress Goal 1, OT-IRF) moderate assist (50-74% patient effort)  -HB     Time Frame (UB Dressing Goal 1, OT-IRF) short-term goal (STG);1 week;2 weeks  -HB     Progress/Outcomes (UB Dressing Goal 1, OT-IRF) goal met  -HB       Row Name 07/24/23 1338          UB Dressing Goal 2 (OT-IRF)    Activity/Device (UB Dressing Goal 2, OT-IRF) upper body dressing  -HB     [place] (UB Dress Goal 2, OT-IRF) contact guard required  -HB     Time Frame (UB Dressing Goal 2, OT-IRF) by discharge  -HB     Progress/Outcomes (UB Dressing Goal 2, OT-IRF) goal met  -HB       Row Name 07/24/23 1338          LB Dressing Goal 1 (OT-IRF)    Activity/Device (LB Dressing Goal 1, OT-IRF) lower body dressing  -HB     [place] (LB Dressing Goal 1, OT-IRF) maximum assist (25-49% patient effort)  -HB     Time Frame (LB Dressing Goal 1, OT-IRF) 1 week;2 weeks  -HB     Progress/Outcomes (LB Dressing Goal 1, OT-IRF) goal met  -HB       Row Name 07/24/23 1338          LB Dressing Goal 2 (OT-IRF)    Activity/Device (LB Dressing Goal 2, OT-IRF) lower body dressing  -HB     [place] (LB Dressing Goal 2, OT-IRF) moderate assist (50-74% patient effort)  -HB     Time Frame (LB Dressing Goal 2, OT-IRF) by discharge  -HB     Progress/Outcomes (LB Dressing Goal 2, OT-IRF) goal ongoing  -HB               User Key  (r) = Recorded By, (t) = Taken By, (c) = Cosigned By      Initials Name Effective Dates    HB Travis Veronica, OT 05/25/21 -                      Occupational Therapy Education       Title: PT OT SLP Therapies (Done)       Topic: Occupational  Therapy (Done)       Point: ADL training (Done)       Description:   Instruct learner(s) on proper safety adaptation and remediation techniques during self care or transfers.   Instruct in proper use of assistive devices.                  Learning Progress Summary             Patient Acceptance, E, VU,NR by HB at 7/24/2023 1355    Acceptance, E, NR,VU by BF at 7/21/2023 1248    Acceptance, E, NR by BF at 7/20/2023 1259    Acceptance, TB, NR by DL at 7/19/2023 2036    Acceptance, E, VU,NR by HB at 7/19/2023 1421    Acceptance, E, VU,NR by BF at 7/18/2023 1334    Acceptance, E, VU,NR by BF at 7/17/2023 1431    Acceptance, E, VU,NR by BF at 7/17/2023 1430    Acceptance, E, VU,NR by BF at 7/14/2023 1508    Acceptance, E, VU,NR by BF at 7/13/2023 1343    Acceptance, E,D, VU,NR by TM at 7/12/2023 1407    Acceptance, D,E, NR,VU by TM at 7/11/2023 1434    Acceptance, E, VU,NR by BF at 7/10/2023 1456    Acceptance, E, VU,NR by HB at 7/8/2023 1140    Acceptance, E, VU,NR by BF at 7/7/2023 1442                         Point: Precautions (Done)       Description:   Instruct learner(s) on prescribed precautions during self-care and functional transfers.                  Learning Progress Summary             Patient Acceptance, E, VU,NR by HB at 7/24/2023 1355    Acceptance, E, NR,VU by BF at 7/21/2023 1248    Acceptance, E, NR by BF at 7/20/2023 1259    Acceptance, TB, NR by DL at 7/19/2023 2036    Acceptance, E, VU,NR by HB at 7/19/2023 1421    Acceptance, E, VU,NR by BF at 7/18/2023 1334    Acceptance, E, VU,NR by BF at 7/17/2023 1431    Acceptance, E, VU,NR by BF at 7/17/2023 1430    Acceptance, E, VU,NR by BF at 7/14/2023 1508    Acceptance, E, VU,NR by BF at 7/13/2023 1343    Acceptance, E,D, VU,NR by TM at 7/12/2023 1407    Acceptance, D,E, NR,VU by TM at 7/11/2023 1434    Acceptance, E, VU,NR by BF at 7/10/2023 1456    Acceptance, E, VU,NR by HB at 7/8/2023 1140    Acceptance, E, VU,NR by BF at 7/7/2023 1442                                          User Key       Initials Effective Dates Name Provider Type Discipline    BF 05/31/23 - 07/10/23 Lisa Sanchez, OT Occupational Therapist OT    BF 07/11/23 -  Lisa Sanchez, OT Occupational Therapist OT    TM 06/16/21 -  Marielos Capone OT Occupational Therapist OT    HB 05/25/21 -  Veronica Robles OT Occupational Therapist OT    DL 03/16/22 -  Nadja Velasquez RN Registered Nurse Nurse                        OT Recommendation and Plan                         Time Calculation:      Time Calculation- OT       Row Name 07/24/23 1355             Time Calculation- OT    OT Start Time 0820  -HB      OT Stop Time 1000  -HB      OT Time Calculation (min) 100 min  -HB      Total Timed Code Minutes-  minute(s)  -HB                User Key  (r) = Recorded By, (t) = Taken By, (c) = Cosigned By      Initials Name Provider Type    HB Veronica Robles, OT Occupational Therapist                  Therapy Charges for Today       Code Description Service Date Service Provider Modifiers Qty    28345996538 HC OT THERAPEUTIC ACT EA 15 MIN 7/24/2023 Veronica Robles OT GO 2    32249677947 HC OT SELF CARE/MGMT/TRAIN EA 15 MIN 7/24/2023 Veronica Robles OT GO 2    79329996361 HC OT NEUROMUSC RE EDUCATION EA 15 MIN 7/24/2023 Veronica Robles OT GO 3                     Veronica Robles OT  7/24/2023

## 2023-07-24 NOTE — PROGRESS NOTES
Occupational Therapy: Individual: 100 minutes.    Physical Therapy:    Speech Language Pathology:    Signed by: Veroncia Robles OT

## 2023-07-24 NOTE — PAYOR COMM NOTE
"Carmen Doe, RN   Utilization Review Nurse for Inpatient Rehab   Phone: [phone]  Fax: [phone]  Email: [email or web address]  [facility]  Facility NPI: [id number]     CLINICAL REVIEW FOR CONTINUED REHAB STAY APPROVAL  Member:  Antonio Canseco  :  1955  ID# [id number]  Auth# [id number]  Admission Date:  23  Discharge Date:  23  *Requesting additional 7 days    Antonio Canseco (67 y.o. Male)       Date of Birth   1955    Social Security Number       Address   [address] [place] [address]    Home Phone   [phone]    MRN   [id number]       Denominational   None    Marital Status                               Admission Date   23    Admission Type   Elective    Admitting Provider   Hayder Mendoza MD    Attending Provider   Hayder Mendoza MD    Department, Room/Bed   [facility] REHABILITATION, 110/2S       Discharge Date       Discharge Disposition       Discharge Destination                                 Attending Provider: Hayder Mendoaz MD    Allergies: No Known Allergies    Isolation: None   Infection: None   Code Status: CPR    Ht: 172.7 cm (67.99\")   Wt: 121 kg (266 lb 12.1 oz)    Admission Cmt: None   Principal Problem: CVA (cerebral vascular accident) [I63.9]                   Greer Conner MSW     Case Management     Significant Note      Signed     Date of Service: 23 1017  Creation Time: 23 1017     Signed              23 1013   Plan   Plan SS spoke to sister Mariah [phone] about how pt is doing in therapy and plans for discharge on 23 if family are able to meet caregiving needs.  Discussed coming to rehab next week to observe pt in therapy and receive education from therapy to help determine if family can care for pt at home.  Attempted to contact pt's cousin Alejandra Quinonez [phone] without success; voicemail is full.  Sister says she will try to contact cousin.  Sister says pt's " daughter Karolyn works at night and helps with caring for her grandchild but plans to help some with pt's care.  Sister will call SS after talking to cousin and pt's daughter about coming to rehab next week for observation/education.  Discussed option of SNF for continued nursing/rehab if family are unable to care for pt's needs at home.  Will follow and assist with discharge planning.   Patient/Family in Agreement with Plan yes                       Hayder Mendoza MD   Physician  Medicine     Progress Notes      Signed     Date of Service: 07/22/23 0855  Creation Time: 07/22/23 0855     Signed         Assisted By: Marco CHI     CC: Follow-up on CVA     Interview History/HPI: Patient states he is doing well, slept well, he states his food is good, he is feeding himself, no new complaints no new events overnight.              Current Hospital Meds:  allopurinol, 300 mg, Oral, Daily  atorvastatin, 80 mg, Oral, Daily  magnesium oxide, 400 mg, Oral, Daily  metoprolol tartrate, 25 mg, Oral, Q12H  nystatin, , Topical, Q12H  pantoprazole, 40 mg, Oral, Q AM  rivaroxaban, 20 mg, Oral, Daily With Dinner  tamsulosin, 0.4 mg, Oral, Daily            Vitals:     07/22/23 0700   BP: 136/56   Pulse: 92   Resp: 18   Temp: 98.1 °F (36.7 °C)   SpO2: 100%            Intake/Output Summary (Last 24 hours) at 7/22/2023 0855  Last data filed at 7/22/2023 0500      Gross per 24 hour   Intake 1320 ml   Output 150 ml   Net 1170 ml         EXAM: I have patient shake hands with his left hand, his  strength appears to be improving, he can still lift this above his head although his shoulder movement still is minimal on the left.  When I ask him to push with his left foot, he was able to adjust his weight and push with the leg but no distal movement.  Lungs are clear, heart is an irregular irregular rhythm without murmur        Diet: Cardiac Diets; Healthy Heart (2-3 Na+); Texture: Regular Texture (IDDSI 7); Fluid Consistency: Thin (IDDSI 0)            LABS:      Lab Results (last 48 hours)         Procedure Component Value Units Date/Time     POC Glucose Once [[id number]]  (Normal) Collected: 07/22/23 0623     Specimen: Blood Updated: 07/22/23 0641       Glucose 119 mg/dL         Comment: Meter: [id number] : [id number] Bhavna Wilson        Comprehensive Metabolic Panel [[id number]]  (Abnormal) Collected: 07/22/23 0111     Specimen: Blood Updated: 07/22/23 0202       Glucose 109 mg/dL         BUN 16 mg/dL         Creatinine 0.80 mg/dL         Sodium 137 mmol/L         Potassium 3.8 mmol/L         Chloride 104 mmol/L         CO2 23.2 mmol/L         Calcium 8.9 mg/dL         Total Protein 5.9 g/dL         Albumin 3.2 g/dL         ALT (SGPT) 16 U/L         AST (SGOT) 15 U/L         Alkaline Phosphatase 84 U/L         Total Bilirubin 0.5 mg/dL         Globulin 2.7 gm/dL         A/G Ratio 1.2 g/dL         BUN/Creatinine Ratio 20.0       Anion Gap 9.8 mmol/L         eGFR 98.2 mL/min/1.73       Narrative:       GFR Normal >60  Chronic Kidney Disease <60  Kidney Failure <15        CBC & Differential [[id number]]  (Abnormal) Collected: 07/22/23 0111     Specimen: Blood Updated: 07/22/23 0138     Narrative:       The following orders were created for panel order CBC & Differential.  Procedure                               Abnormality         Status                     ---------                               -----------         ------                     CBC Auto Differential[[id number]]        Abnormal            Final result                  Please view results for these tests on the individual orders.     CBC Auto Differential [[id number]]  (Abnormal) Collected: 07/22/23 0111     Specimen: Blood Updated: 07/22/23 0138       WBC 6.68 10*3/mm3         RBC 3.78 10*6/mm3         Hemoglobin 10.1 g/dL         Hematocrit 32.1 %         MCV 84.9 fL         MCH 26.7 pg         MCHC 31.5 g/dL         RDW 12.5 %         RDW-SD 38.5 fl         MPV 9.1 fL         Platelets 232  10*3/mm3         Neutrophil % 65.2 %         Lymphocyte % 18.0 %         Monocyte % 13.0 %         Eosinophil % 2.8 %         Basophil % 0.7 %         Immature Grans % 0.3 %         Neutrophils, Absolute 4.35 10*3/mm3         Lymphocytes, Absolute 1.20 10*3/mm3         Monocytes, Absolute 0.87 10*3/mm3         Eosinophils, Absolute 0.19 10*3/mm3         Basophils, Absolute 0.05 10*3/mm3         Immature Grans, Absolute 0.02 10*3/mm3         nRBC 0.0 /100 WBC       POC Glucose Once [[id number]]  (Abnormal) Collected: 07/21/23 1616     Specimen: Blood Updated: 07/21/23 1626       Glucose 161 mg/dL         Comment: Meter: [id number] : [id number] emiliano bailey        POC Glucose Once [[id number]]  (Abnormal) Collected: 07/21/23 1121     Specimen: Blood Updated: 07/21/23 1128       Glucose 132 mg/dL         Comment: Meter: [id number] : [id number] CHAYA FIERRO        POC Glucose Once [[id number]]  (Normal) Collected: 07/21/23 0624     Specimen: Blood Updated: 07/21/23 0631       Glucose 116 mg/dL         Comment: Meter: [id number] : [id number] Bhavna Crystal        POC Glucose Once [[id number]]  (Normal) Collected: 07/20/23 1607     Specimen: Blood Updated: 07/20/23 1617       Glucose 108 mg/dL         Comment: Meter: [id number] : [id number] NICHOLE BOWLING        POC Glucose Once [[id number]]  (Abnormal) Collected: 07/20/23 1049     Specimen: Blood Updated: 07/20/23 1056       Glucose 140 mg/dL         Comment: Meter: [id number] : [id number] NICHOLE BOWLING                         Radiology:     Imaging Results (Last 72 Hours)         ** No results found for the last 72 hours. **                      Assessment/Plan:   Status post CVA with left residual.  Patient is undergoing occupational and physical therapy.  With PT, patient is two-person max assist for bed mobility, max assist bed to chair and chair to bed, mod assist for sit to  the parallel bars, mod assist stand to sit parallel bars.  Noted  he had some left-sided inattention.  Balance static sitting contact-guard to min assist, static standing mod assist in parallel bars with a flexed posture.  Cued to stand upright.  With OT, mod to max bathing, min assist upper body dressing, max assist lower body dressing, min assist grooming, total assist toileting, set up for self-feeding.  Continue current plan, patient is progressing towards his goals.  He remains eager to work with our therapists.  He remains on Xarelto for further stroke prevention.     Atrial fibrillation, rate controlled on current dose of Lopressor, this had to be decreased due to his lower blood pressure earlier in the stay.  He is on Xarelto for further stroke prevention which also serves for DVT prophylaxis.     Laryngeal cancer as well as non-Hodgkin's lymphoma, follow-up with oncology as an outpatient.     HFpEF, compensated, Cozaar stopped earlier in the stay due to lower blood pressure.  Bowels are moving.           MD Jose Haney, Joslyn Maza, PT   Physical Therapist  Physical Therapy     Therapy Re-Evaluation      Signed     Date of Service: 23  Creation Time: 23     Signed       Expand All Collapse All    Inpatient Rehabilitation - Physical Therapy Re-Evaluation                                                               Ronnie     Patient Name: Antonio Canseco                  : 1955                        MRN: [id number]     Today's Date: 2023                                                                                            Admit Date: 2023                 Visit Dx:   Visit Diagnosis   No diagnosis found.        Problem List       Patient Active Problem List   Diagnosis    Detrusor instability    Low testosterone in male    Disorder of prostate    Corporo-venous occlusive erectile dysfunction    CVA (cerebral vascular accident)            Medical History        Past Medical History:   Diagnosis Date     Diabetes mellitus      Hypertension      Stroke              Surgical History         Past Surgical History:   Procedure Laterality Date    US GUIDED LYMPH NODE BIOPSY   6/23/2023            PT ASSESSMENT (last 12 hours)            IRF PT Evaluation and Treatment         Row Name 07/21/23 1605                 PT Time and Intention     Document Type re-evaluation;daily treatment  -LB       Mode of Treatment individual therapy;physical therapy  -LB          Row Name 07/21/23 1605                 General Information     Patient Profile Reviewed yes  -LB       Existing Precautions/Restrictions fall  L HP, <trunk support/balance, L side inattention  -LB          Row Name 07/21/23 1605                 Pain Scale: FACES Pre/Post-Treatment     Pain: FACES Scale, Pretreatment 0-->no hurt  -LB       Posttreatment Pain Rating 0-->no hurt  -LB          Row Name 07/21/23 1605                 Cognition/Psychosocial     Affect/Mental Status (Cognition) WFL  -LB       Follows Commands (Cognition) verbal cues/prompting required;physical/tactile prompts required  -LB       Personal Safety Interventions gait belt;nonskid shoes/slippers when out of bed;supervised activity  -LB       Safety Deficit (Cognition) insight into deficits/self-awareness;impulsivity  L side inattention  -LB          Row Name 07/21/23 1605                 Strength (Manual Muscle Testing)     Strength (Manual Muscle Testing) --  LLE 0/5 except trace in glutes and hip add  -LB          Row Name 07/21/23 1605                 Bed Mobility     Supine-Sit-Supine [place] (Bed Mobility) maximum assist (25% patient effort);1 person assist;2 person assist;verbal cues;nonverbal cues (demo/gesture)  -LB          Row Name 07/21/23 1605                 Bed-Chair Transfer     Bed-Chair [place] (Transfers) maximum assist (25% patient effort);1 person assist;2 person assist;verbal cues;nonverbal cues (demo/gesture)  -LB          Row Name 07/21/23 1605                  Chair-Bed Transfer     Chair-Bed [place] (Transfers) maximum assist (25% patient effort);1 person assist;2 person assist;verbal cues;nonverbal cues (demo/gesture)  -LB          Row Name 07/21/23 1605                 Sit-Stand Transfer     Sit-Stand [place] (Transfers) moderate assist (50% patient effort);verbal cues;nonverbal cues (demo/gesture)  -LB       Assistive Device (Sit-Stand Transfers) parallel bars  -LB          Row Name 07/21/23 1605                 Stand-Sit Transfer     Stand-Sit [place] (Transfers) moderate assist (50% patient effort);verbal cues;nonverbal cues (demo/gesture)  -LB       Assistive Device (Stand-Sit Transfers) parallel bars  -LB          Row Name 07/21/23 1605                 Safety Issues, Functional Mobility     Safety Issues Affecting Function (Mobility) impulsivity;insight into deficits/self-awareness  L side inattention  -LB       Impairments Affecting Function (Mobility) balance;coordination;endurance/activity tolerance;grasp;motor control;muscle tone abnormal;postural/trunk control;range of motion (ROM);strength  -LB          Row Name 07/21/23 1605                 Balance     Static Sitting Balance --  CGA-min A  -LB       Static Standing Balance --  mod A in PB but flexed posture-cues to stand upright, shift cog forward, and extend hips. 20 sec x3  -LB          Row Name 07/21/23 1605                 Motor Skills     Therapeutic Exercise --  sitting and supine ex-LLE P/AAROM, RLE AROM  -LB          Row Name 07/21/23 1605                 Modality Treatment     Treatment Location 1 (Modality) L ant tib  -LB       Treatment Location 2 (Modality) L quad  -LB       Modality Performed electrical stimulation  -LB       Modality Treatment Results contraction noted at ant. tib.  -LB       Comment, Modality Treatment 20 min  -LB          Row Name 07/21/23 1605                 Positioning and Restraints     Pre-Treatment Position sitting in chair/recliner  -LB       In  Wheelchair patient within staff view;encouraged to call for assist;legs elevated  -LB          Row Name 07/21/23 1605                 Weekly Progress Summary (PT)     Functional Goal Overall Progress (PT) progressing toward functional goals as expected  -LB       Weekly Progress Summary (PT) he can now transfer from bed to chair with max A 1-2 people instead of using the vamshi lift. he is also standing in PB with assist instead of requiring the standing frame. LLE is still basically flaccid.  -LB       Impairments Still Limiting Function (PT) balance impairment;flexibility decreased;functional activity tolerance impairment;motor control impaired;muscle tone abnormality;postural control impaired;range of motion deficit;strength deficit  -LB       Recommendations (PT) continue PT  -LB          Row Name 07/21/23 1605                 Bed Mobility Goal 1 (PT-IRF)     Progress/Outcomes (Bed Mobility Goal 1, PT-IRF) goal ongoing  -LB          Row Name 07/21/23 1605                 Bed Mobility Goal 2 (PT-IRF)     Progress/Outcomes (Bed Mobility Goal 2, PT-IRF) goal ongoing  -LB          Row Name 07/21/23 1605                 Transfer Goal 1 (PT-IRF)     Progress/Outcomes (Transfer Goal 1, PT-IRF) goal ongoing  -LB          Row Name 07/21/23 1605                 Transfer Goal 2 (PT-IRF)     Progress/Outcomes (Transfer Goal 2, PT-IRF) goal ongoing  -LB          Row Name 07/21/23 1605                 Gait/Walking Locomotion Goal 1 (PT-IRF)     Progress/Outcomes (Gait/Walking Locomotion Goal 1, PT-IRF) goal ongoing  -LB          Row Name 07/21/23 1605                 Gait/Walking Locomotion Goal 2 (PT-IRF)     Progress/Outcomes (Gait/Walking Locomotion Goal 2, PT-IRF) goal ongoing  -LB                    User Key  (r) = Recorded By, (t) = Taken By, (c) = Cosigned By        Initials Name Provider Type     Joslyn Beckford, PT Physical Therapist                                     PT Recommendation and Plan     Planned  Therapy Interventions (PT): balance training, bed mobility training, gait training, motor coordination training, neuromuscular re-education, patient/family education, postural re-education, ROM (range of motion), strengthening, transfer training, wheelchair management/propulsion training  Frequency of Treatment (PT): 5 times per week  Anticipated Equipment Needs at Discharge (PT Eval):  (tbd)  Daily Progress Summary (PT)  Daily Progress Summary (PT): he had more L neglect and <balance due to pushing. he is not currently able to correct and requires supervision in w/c for safety.  Recommendations (PT): continue PT                           Time Calculation:        PT Charges         Row Name 23 1615                       Time Calculation     Start Time 1000  -LB         Stop Time 1130  -LB         Time Calculation (min) 90 min  -LB         PT Received On 23  -LB         PT Goal Re-Cert Due Date 23  -LB                         Lisa Sanchez, OT   Occupational Therapist  Occupational Therapy     Therapy Treatment Note      Signed     Date of Service: 23 125  Creation Time: 23     Signed       Expand All Collapse All    Inpatient Rehabilitation - Occupational Therapy Progress Note and Treatment Note     THALIA Trujillo     Patient Name: Antonio Canseco                  : 1955                        MRN: [id number]     Today's Date: 2023                                                                             Admit Date: 2023        Visit Diagnosis   No diagnosis found.        Problem List       Patient Active Problem List   Diagnosis    Detrusor instability    Low testosterone in male    Disorder of prostate    Corporo-venous occlusive erectile dysfunction    CVA (cerebral vascular accident)            Medical History        Past Medical History:   Diagnosis Date    Diabetes mellitus      Hypertension      Stroke              Surgical History         Past  Surgical History:   Procedure Laterality Date    US GUIDED LYMPH NODE BIOPSY   6/23/2023                           IRF OT ASSESSMENT FLOWSHEET (last 12 hours)            IRF OT Evaluation and Treatment         Row Name 07/21/23 1248                 OT Time and Intention     Document Type daily treatment;progress note  -BF       Mode of Treatment occupational therapy  -BF       Patient Effort good  -BF       Symptoms Noted During/After Treatment fatigue  -BF          Row Name 07/21/23 1248                 General Information     Existing Precautions/Restrictions fall  L HP, <trunk support/balance  -BF       Limitations/Impairments safety/cognitive  -BF          Row Name 07/21/23 1248                 Cognition/Psychosocial     Orientation Status (Cognition) oriented x 3  -BF       Follows Commands (Cognition) verbal cues/prompting required;repetition of directions required;physical/tactile prompts required;increased processing time needed;delayed response/completion  -BF       Executive Function Deficit (Cognition) organization/sequencing;problem-solving/reasoning  -BF          Row Name 07/21/23 1248                 Bathing     Comment (Bathing) Mod/Max A  -BF          Row Name 07/21/23 1248                 Upper Body Dressing     [place] Level (Upper Body Dressing) minimum assist (75% or more patient effort);verbal cues;nonverbal cues (demo/gesture)  -BF       Position (Upper Body Dressing) supported sitting  -BF       Comment (Upper Body Dressing) Min A- frequent verbal cues  -BF          Row Name 07/21/23 1248                 Lower Body Dressing     Comment (Lower Body Dressing) Max A  -BF          Row Name 07/21/23 1248                 Grooming     Comment (Grooming) Min A  -BF          Row Name 07/21/23 1248                 Toileting     Comment (Toileting) Total A  -BF          Row Name 07/21/23 1248                 Self-Feeding     Comment (Self-Feeding) Set-up  -BF          Row Name 07/21/23 1248                  Motor Skills     Neuromuscular Function left;upper extremity;moderate impairment  -BF       Motor Control/Coordination Interventions fine motor manipulation/dexterity activities;gross motor coordination activities;therapeutic exercise/ROM;neuro-muscular re-education  LUE AA/AROM, GMC/FMC theract, strengthening; BUE pulleys, PRE 10 mins  -BF          Row Name 07/21/23 1248                 Neuromuscular Re-education     Interventions (Neuromuscular Re-education) facilitation/inhibition  -BF       Positioning (Neuromuscular Re-education) sitting;supported  -BF          Row Name 07/21/23 1248                 Positioning and Restraints     In Wheelchair sitting;with PT  -BF                    User Key  (r) = Recorded By, (t) = Taken By, (c) = Cosigned By        Initials Name Effective Dates     BF Lisa Sanchez, OT 07/11/23 -                                          OT Recommendation and Plan     Planned Therapy Interventions (OT): activity tolerance training, adaptive equipment training, BADL retraining, neuromuscular control/coordination retraining, passive ROM/stretching, ROM/therapeutic exercise, strengthening exercise, transfer/mobility retraining           Time Calculation:        Time Calculation- OT         Row Name 07/21/23 1252                       Time Calculation- OT     OT Start Time 0820  -BF         OT Stop Time 1000  -BF         OT Time Calculation (min) 100 min  -BF         Total Timed Code Minutes-  minute(s)  -BF         OT Non-Billable Time (min) 10 min  -BF

## 2023-07-24 NOTE — PROGRESS NOTES
[facility]  PROGRESS NOTE     Patient Identification:  Name:  Antonio Canseco  Age:  67 y.o.  Sex:  male  :  1955  MRN:  [id number]  Visit Number:  [id number]  ROOM: [facility]     Primary Care Provider:  Adriana Ledesma MD    Length of stay in inpatient status:  18    Subjective     Chief Compliant:  No chief complaint on file.      History of Presenting Illness: 67-year-old gentleman who is status post CVA with left-sided weakness with left lower extremity markedly weaker than left upper extremity.  Patient is made good improvement in left upper extremity strength over the past week.  No other acute complaints at this time    Objective     Current Hospital Meds:allopurinol, 300 mg, Oral, Daily  atorvastatin, 80 mg, Oral, Daily  magnesium oxide, 400 mg, Oral, Daily  metoprolol tartrate, 25 mg, Oral, Q12H  nystatin, , Topical, Q12H  pantoprazole, 40 mg, Oral, Q AM  rivaroxaban, 20 mg, Oral, Daily With Dinner  tamsulosin, 0.4 mg, Oral, Daily       ----------------------------------------------------------------------------------------------------------------------  Vital Signs:  Temp:  [98.2 °F (36.8 °C)-98.6 °F (37 °C)] 98.6 °F (37 °C)  Heart Rate:  [89-92] 92  Resp:  [18] 18  BP: (118-124)/(60-84) 118/60  SpO2:  [95 %-96 %] 95 %  on   ;   Device (Oxygen Therapy): room air  Body mass index is 40.57 kg/m².    Wt Readings from Last 3 Encounters:   23 121 kg (266 lb 12.1 oz)   22 121 kg (267 lb)   07/15/19 121 kg (267 lb 1.6 oz)     Intake & Output (last 3 days)          07 07 0700    P.O. 1320 1680 1560 360    Total Intake(mL/kg) 1320 (10.9) 1680 (13.9) 1560 (12.9) 360 (3)    Urine (mL/kg/hr) 150 (0.1)  200 (0.1)     Stool 0  0     Total Output 150  200     Net +1170 +1680 +1360 +360            Urine Unmeasured Occurrence 5 x 8 x 4 x 1 x    Stool Unmeasured Occurrence 5 x 4 x 2 x           Diet: Cardiac Diets;  Healthy Heart (2-3 Na+); Texture: Regular Texture (IDDSI 7); Fluid Consistency: Thin (IDDSI 0)  ----------------------------------------------------------------------------------------------------------------------  Physical exam:  Constitutional: No acute distress  HEENT: Normocephalic atraumatic  Neck:   Supple  Cardiovascular: Irregularly irregular rate and rhythm  Pulmonary/Chest: Clear to auscultation  Abdominal: Positive bowel sounds soft.   Musculoskeletal: No arthropathy  Neurological: Left upper extremity 4 out of 5 strength; left lower extremity 2-3 out of 5  Skin: No rash  Peripheral vascular: No edema  Genitourinary:  ----------------------------------------------------------------------------------------------------------------------    Last echocardiogram:    ----------------------------------------------------------------------------------------------------------------------  Results from last 7 days   Lab Units 07/22/23  0111   WBC 10*3/mm3 6.68   HEMOGLOBIN g/dL 10.1*   HEMATOCRIT % 32.1*   MCV fL 84.9   MCHC g/dL 31.5   PLATELETS 10*3/mm3 232         Results from last 7 days   Lab Units 07/22/23  0111   SODIUM mmol/L 137   POTASSIUM mmol/L 3.8   CHLORIDE mmol/L 104   CO2 mmol/L 23.2   BUN mg/dL 16   CREATININE mg/dL 0.80   CALCIUM mg/dL 8.9   GLUCOSE mg/dL 109*   ALBUMIN g/dL 3.2*   BILIRUBIN mg/dL 0.5   ALK PHOS U/L 84   AST (SGOT) U/L 15   ALT (SGPT) U/L 16   Estimated Creatinine Clearance: 116.4 mL/min (by C-G formula based on SCr of 0.8 mg/dL).  No results found for: AMMONIA              Glucose   Date/Time Value Ref Range Status   07/24/2023 0630 140 (H) 70 - 130 mg/dL Final     Comment:     Meter: [id number] : [id number] ANSELMO CHIN   07/24/2023 0120 103 70 - 130 mg/dL Final     Comment:     Meter: [id number] : [id number] ANSELMO GIUSEPPE   07/23/2023 1627 130 70 - 130 mg/dL Final     Comment:     Meter: [id number] : [id number] CYNDY PORTER   07/23/2023 1124 142 (H) 70 - 130 mg/dL  Final     Comment:     Meter: [id number] : [id number] CHAYA MANCINIPHERD   07/23/2023 0636 121 70 - 130 mg/dL Final     Comment:     Meter: [id number] : [id number] Bhavna Crystal   07/22/2023 1922 222 (H) 70 - 130 mg/dL Final     Comment:     Meter: [id number] : [id number] ANSELMO LACY   07/22/2023 1618 181 (H) 70 - 130 mg/dL Final     Comment:     Meter: [id number] : [id number] emiliano bailey   07/22/2023 1105 131 (H) 70 - 130 mg/dL Final     Comment:     Meter: [id number] : [id number] CHAYA MORAESERD     No results found for: TSH, FREET4  No results found for: PREGTESTUR, PREGSERUM, HCG, HCGQUANT  Pain Management Panel           No data to display              Brief Urine Lab Results       None          No results found for: BLOODCX      No results found for: URINECX  No results found for: WOUNDCX  No results found for: STOOLCX        I have personally looked at the labs and they are summarized above.  ----------------------------------------------------------------------------------------------------------------------  Detailed radiology reports for the last 24 hours:    Imaging Results (Last 24 Hours)       ** No results found for the last 24 hours. **          Final impressions for the last 30 days of radiology reports:    CT Head Without Contrast    Result Date: 6/29/2023  Old and maturing subacute infarctions without gross hemorrhagic transformation. The possibility of new small interval infarctions in this case cannot be excluded. CRITICAL RESULT:   No. COMMUNICATION: Per this written report. Drafted by Neil Kearns on 6/29/2023 1:45 PM Final report signed by Neil Kearns on 6/29/2023 2:04 PM    CT Head Without Contrast    Result Date: 6/25/2023  There are again the multifocal areas of evolving acute infarction. There are again a small amount of blood products related to the right occipital lobe infarct. There is mild swelling related to the areas of infarction. No large hematoma  or new infarct has otherwise developed. CRITICAL RESULT:   No. COMMUNICATION: Per this written report. Drafted by Greyson Bobby MD on 6/25/2023 8:38 AM Final report signed by Greyson Bobby MD on 6/25/2023 8:49 AM    MRI Brain Without Contrast    Result Date: 6/23/2023  Multifocal acute infarcts of the brain. Multifocal chronic infarcts of the brain, many of which were present in the 11/21/2019 study. Cervical lymphadenopathy. 27 mm nodule in the left neck may represent a pathologic lymph node or other mass. The previously described left oropharyngeal mass is better visualized in the 6/20/2023 CTA neck. CRITICAL RESULT: No. COMMUNICATION: Per this written report. Drafted by Melonie Polk MD on 6/23/2023 2:10 PM Final report signed by Melonie Polk MD on 6/23/2023 2:24 PM    FL Video Swallow Single Contrast    Result Date: 7/7/2023    Normal fluoroscopic video swallow exam.  Please see the speech pathologist's report for additional information.  This report was finalized on 7/7/2023 10:50 AM by Dr. Porfirio Montgomery MD.      PET/CT FDG Skull Base To Mid Thigh    Result Date: 6/26/2023  1. Innumerable enlarged FDG avid cervical, mediastinal, axillary, mesenteric, retroperitoneal and inguinal lymph nodes are suspicious for lymphoma correlation with tissue biopsy is suggested. 2. FDG avid asymmetric mass involving the left oropharynx most likely consistent with lymphoma involvement correlation with tissue biopsy is suggested. 3. Status post ischemia involving left MCA and PCA. CRITICAL RESULT: No. COMMUNICATION: Per this written report. Drafted by Jj Johnson MD on 6/26/2023 5:21 PM Final report signed by Jj Johnson MD on 6/26/2023 8:12 PM   I have personally looked at the radiology images and read the final radiology report.    Assessment & Plan    Status post CVA with residual left hemiparesis--currently on Xarelto, high-dose statin therapy.  Patient requiring maximum assist for bed mobility; maximum  assist for bed to chair chair to bed transfer; moderate assistance for sit to stand and stand to sit transfers.  Requires maximum assistance for lower body dressing; minimum assist for upper body dressing; minimum assist for grooming; total assistance for toileting.    Atrial fibrillation--rate is controlled continue Xarelto    History of laryngeal CA/non-Hodgkin's lymphoma recommend outpatient oncology follow-up    BPH continue Flomax.  Symptoms currently well compensated    CHF preserved EF blood pressure reasonably good at this time.  Patient is off Cozaar secondary to hypotension.  Patient compensated otherwise    Anemia stable    VTE Prophylaxis:   Mechanical Order History:       None          Pharmalogical Order History:        Ordered     Dose Route Frequency Stop    07/06/23 8209  rivaroxaban (XARELTO) tablet 20 mg        Question:  Are you ordering rivaroxaban 10 mg for the prevention of blood clots in an acutely ill medical patient?  Answer:  No    20 mg PO Daily With Dinner --                        Sj Jay MD  [facility]  07/24/23  10:24 EDT

## 2023-07-25 LAB
GLUCOSE BLDC GLUCOMTR-MCNC: 125 MG/DL (ref 70–130)
GLUCOSE BLDC GLUCOMTR-MCNC: 130 MG/DL (ref 70–130)
GLUCOSE BLDC GLUCOMTR-MCNC: 144 MG/DL (ref 70–130)

## 2023-07-25 PROCEDURE — 97116 GAIT TRAINING THERAPY: CPT

## 2023-07-25 PROCEDURE — 97530 THERAPEUTIC ACTIVITIES: CPT

## 2023-07-25 PROCEDURE — 97110 THERAPEUTIC EXERCISES: CPT | Performed by: OCCUPATIONAL THERAPIST

## 2023-07-25 PROCEDURE — 97112 NEUROMUSCULAR REEDUCATION: CPT | Performed by: OCCUPATIONAL THERAPIST

## 2023-07-25 PROCEDURE — 97535 SELF CARE MNGMENT TRAINING: CPT | Performed by: OCCUPATIONAL THERAPIST

## 2023-07-25 PROCEDURE — 82948 REAGENT STRIP/BLOOD GLUCOSE: CPT

## 2023-07-25 PROCEDURE — 97032 APPL MODALITY 1+ESTIM EA 15: CPT

## 2023-07-25 PROCEDURE — 97110 THERAPEUTIC EXERCISES: CPT

## 2023-07-25 RX ADMIN — RIVAROXABAN 20 MG: 20 TABLET, FILM COATED ORAL at 17:29

## 2023-07-25 RX ADMIN — MAGNESIUM GLUCONATE 500 MG ORAL TABLET 400 MG: 500 TABLET ORAL at 09:16

## 2023-07-25 RX ADMIN — METOPROLOL TARTRATE 25 MG: 25 TABLET, FILM COATED ORAL at 21:49

## 2023-07-25 RX ADMIN — TAMSULOSIN HYDROCHLORIDE 0.4 MG: 0.4 CAPSULE ORAL at 09:15

## 2023-07-25 RX ADMIN — METOPROLOL TARTRATE 25 MG: 25 TABLET, FILM COATED ORAL at 11:32

## 2023-07-25 RX ADMIN — NYSTATIN: 100000 POWDER TOPICAL at 21:51

## 2023-07-25 RX ADMIN — NYSTATIN 1 APPLICATION: 100000 POWDER TOPICAL at 09:17

## 2023-07-25 RX ADMIN — PANTOPRAZOLE SODIUM 40 MG: 40 TABLET, DELAYED RELEASE ORAL at 05:38

## 2023-07-25 RX ADMIN — ALLOPURINOL 300 MG: 300 TABLET ORAL at 09:15

## 2023-07-25 RX ADMIN — ATORVASTATIN CALCIUM 80 MG: 40 TABLET, FILM COATED ORAL at 09:15

## 2023-07-25 NOTE — THERAPY TREATMENT NOTE
Inpatient Rehabilitation - Physical Therapy Treatment Note        Ronnie     Patient Name: Antonio Canseco  : 1955  MRN: [id number]    Today's Date: 2023                    Admit Date: 2023      Visit Dx:   No diagnosis found.    Patient Active Problem List   Diagnosis    Detrusor instability    Low testosterone in male    Disorder of prostate    Corporo-venous occlusive erectile dysfunction    CVA (cerebral vascular accident)       Past Medical History:   Diagnosis Date    Diabetes mellitus     Hypertension     Stroke        Past Surgical History:   Procedure Laterality Date    US GUIDED LYMPH NODE BIOPSY  2023       PT ASSESSMENT (last 12 hours)       IR[facility] PT Evaluation and Treatment       Row Name 23 1127          PT Time and Intention    Document Type daily treatment  -LB     Mode of Treatment individual therapy;physical therapy  -LB       Row Name 23 112          General Information    Patient Profile Reviewed yes  -LB     Existing Precautions/Restrictions fall  L HP, <trunk support/balance, L side inattention  -LB       Row Name 23 112          Pain Scale: FACES Pre/Post-Treatment    Pain: FACES Scale, Pretreatment 0-->no hurt  -LB     Posttreatment Pain Rating 0-->no hurt  -LB       Row Name 23 1127          Cognition/Psychosocial    Affect/Mental Status (Cognition) WFL  L inattention  -LB     Follows Commands (Cognition) verbal cues/prompting required;physical/tactile prompts required  -LB     Personal Safety Interventions gait belt;nonskid shoes/slippers when out of bed;supervised activity  -LB     Safety Deficit (Cognition) impulsivity;awareness of need for assistance;insight into deficits/self-awareness  -LB       Row Name 23 1127          Bed Mobility    Supine-Sit-Supine [place] (Bed Mobility) maximum assist (25% patient effort);1 person assist;2 person assist;verbal cues;nonverbal cues (demo/gesture)  -LB       Row Name 23 1127           Transfer Assessment/Treatment    Comment, (Transfers) he pivots to his weak L side max A x1 and to the stronger R side max A x2  -LB       Row Name 07/25/23 1127          Bed-Chair Transfer    Bed-Chair [place] (Transfers) maximum assist (25% patient effort);1 person assist;2 person assist;verbal cues;nonverbal cues (demo/gesture)  -LB       Row Name 07/25/23 1127          Chair-Bed Transfer    Chair-Bed [place] (Transfers) maximum assist (25% patient effort);1 person assist;2 person assist;verbal cues;nonverbal cues (demo/gesture)  -LB       Row Name 07/25/23 1127          Sit-Stand Transfer    Sit-Stand [place] (Transfers) verbal cues;nonverbal cues (demo/gesture);moderate assist (50% patient effort)  -LB     Assistive Device (Sit-Stand Transfers) parallel bars  -LB       Row Name 07/25/23 1127          Stand-Sit Transfer    Stand-Sit [place] (Transfers) verbal cues;nonverbal cues (demo/gesture);moderate assist (50% patient effort)  -LB     Assistive Device (Stand-Sit Transfers) parallel bars  -LB       Row Name 07/25/23 1127          Gait/Stairs (Locomotion)    [place] Level (Gait) maximum assist (25% patient effort);verbal cues;nonverbal cues (demo/gesture)  -LB     Assistive Device (Gait) parallel bars  -LB     Distance in Feet (Gait) 4' x3  -LB     Pattern (Gait) --  dependent to advance LLE  -LB     Deviations/Abnormal Patterns (Gait) base of support, narrow;hebert decreased;gait speed decreased;stride length decreased;weight shifting decreased  -LB     Bilateral Gait Deviations forward flexed posture  -LB     Comment, (Gait/Stairs) repeated cues with each step to stand erect, weight shift to R, dep to advance LLE, cues to bring LUE forward on the PB.  -LB       Row Name 07/25/23 1127          Balance    Static Standing Balance --  mod A in PB-cues for posture and weight shifting  -LB       Row Name 07/25/23 1127          Motor Skills    Therapeutic Exercise --  sitting and supine  ex-RLE AROM, LLE P/AAROM. today he could slightly assist with L hip add and extending LLE with heelslide.  -LB       Row Name 07/25/23 1127          Modality Treatment    Treatment Location 1 (Modality) L ant tib  -LB     Treatment Location 2 (Modality) L quad  -LB     Modality Performed electrical stimulation  -LB     Comment, Modality Treatment 20 min  -LB       Row Name 07/25/23 1127          Positioning and Restraints    Pre-Treatment Position sitting in chair/recliner  -LB     In Bed call light within reach;encouraged to call for assist;RUE elevated;LUE elevated;heels elevated  -LB       Row Name 07/25/23 1127          Daily Progress Summary (PT)    Recommendations (PT) continue PT  -LB               User Key  (r) = Recorded By, (t) = Taken By, (c) = Cosigned By      Initials Name Provider Type    Joslyn Beckford, PT Physical Therapist                     Physical Therapy Education       Title: PT OT SLP Therapies (Done)       Topic: Physical Therapy (Done)       Point: Mobility training (Done)       Learning Progress Summary             Patient Acceptance, E, VU,NR by LB at 7/25/2023 1133    Acceptance, E, VU,NR by LB at 7/24/2023 1444    Acceptance, E, VU,NR by LB at 7/21/2023 1615    Acceptance, E,D, VU,NR by RG at 7/20/2023 1107    Acceptance, TB, NR by DL at 7/19/2023 2036    Acceptance, E,D, VU,NR by RG at 7/19/2023 1508    Acceptance, E,D, VU,NR by RG at 7/18/2023 1520    Acceptance, E,D, VU,NR by RG at 7/17/2023 1522    Acceptance, E,D, VU,NR by LL at 7/13/2023 1358    Acceptance, E, VU,NR by LB at 7/12/2023 1144    Acceptance, E, VU,NR by LB at 7/11/2023 1426    Acceptance, E, VU,NR by LB at 7/10/2023 1452    Acceptance, E,D, VU,NR by LL at 7/8/2023 1228    Acceptance, E,D, VU,NR by LB at 7/7/2023 1449                         Point: Home exercise program (Done)       Learning Progress Summary             Patient Acceptance, E, VU,NR by LB at 7/25/2023 1133    Acceptance, E, GOPI,NR by LB at  7/24/2023 1444    Acceptance, E, VU,NR by LB at 7/21/2023 1615    Acceptance, E,D, VU,NR by RG at 7/20/2023 1107    Acceptance, TB, NR by DL at 7/19/2023 2036    Acceptance, E,D, VU,NR by RG at 7/19/2023 1508    Acceptance, E,D, VU,NR by RG at 7/18/2023 1520    Acceptance, E,D, VU,NR by RG at 7/17/2023 1522    Acceptance, E,D, VU,NR by LL at 7/13/2023 1358    Acceptance, E, VU,NR by LB at 7/12/2023 1144    Acceptance, E, VU,NR by LB at 7/11/2023 1426    Acceptance, E, VU,NR by LB at 7/10/2023 1452    Acceptance, E,D, VU,NR by LL at 7/8/2023 1228    Acceptance, E,D, VU,NR by LB at 7/7/2023 1449                         Point: Body mechanics (Done)       Learning Progress Summary             Patient Acceptance, E, VU,NR by LB at 7/25/2023 1133    Acceptance, E, VU,NR by LB at 7/24/2023 1444    Acceptance, E, VU,NR by LB at 7/21/2023 1615    Acceptance, E,D, VU,NR by RG at 7/20/2023 1107    Acceptance, TB, NR by DL at 7/19/2023 2036    Acceptance, E,D, VU,NR by RG at 7/19/2023 1508    Acceptance, E,D, VU,NR by RG at 7/18/2023 1520    Acceptance, E,D, VU,NR by RG at 7/17/2023 1522    Acceptance, E,D, VU,NR by LL at 7/13/2023 1358    Acceptance, E, VU,NR by LB at 7/12/2023 1144    Acceptance, E, VU,NR by LB at 7/11/2023 1426    Acceptance, E, VU,NR by LB at 7/10/2023 1452    Acceptance, E,D, VU,NR by LL at 7/8/2023 1228    Acceptance, E,D, VU,NR by LB at 7/7/2023 1449                         Point: Precautions (Done)       Learning Progress Summary             Patient Acceptance, E, VU,NR by LB at 7/25/2023 1133    Acceptance, E, VU,NR by LB at 7/24/2023 1444    Acceptance, E, VU,NR by LB at 7/21/2023 1615    Acceptance, E,D, VU,NR by RG at 7/20/2023 1107    Acceptance, TB, NR by DL at 7/19/2023 2036    Acceptance, E,D, VU,NR by RG at 7/19/2023 1508    Acceptance, E,D, VU,NR by RG at 7/18/2023 1520    Acceptance, E,D, VU,NR by RG at 7/17/2023 1522    Acceptance, E,D, VU,NR by LL at 7/13/2023 1358    Acceptance, E, VU,NR  by LB at 7/12/2023 1144    Acceptance, E, VU,NR by LB at 7/11/2023 1426    Acceptance, E, VU,NR by LB at 7/10/2023 1452    Acceptance, E,D, VU,NR by LL at 7/8/2023 1228    Acceptance, E,D, VU,NR by LB at 7/7/2023 1449                                         User Key       Initials Effective Dates Name Provider Type Discipline    LB 06/16/21 -  Joslyn Garcia, PT Physical Therapist PT    LL 05/02/16 -  Connie Velasquez PTA Physical Therapist Assistant PT    RG 06/16/21 -  Vu Brown PTA Physical Therapist Assistant PT    DL 03/16/22 -  Nadja Velasquez, RN Registered Nurse Nurse                    PT Recommendation and Plan    Planned Therapy Interventions (PT): balance training, bed mobility training, gait training, motor coordination training, neuromuscular re-education, patient/family education, postural re-education, ROM (range of motion), strengthening, transfer training, wheelchair management/propulsion training  Frequency of Treatment (PT): 5 times per week  Anticipated Equipment Needs at Discharge (PT Eval):  (tbd)  Daily Progress Summary (PT)  Daily Progress Summary (PT): he had more L neglect and <balance due to pushing. he is not currently able to correct and requires supervision in w/c for safety.  Recommendations (PT): continue PT               Time Calculation:      PT Charges       Row Name 07/25/23 1133             Time Calculation    Start Time 1000  -LB      Stop Time 1130  -LB      Time Calculation (min) 90 min  -LB      PT Received On 07/25/23  -LB                User Key  (r) = Recorded By, (t) = Taken By, (c) = Cosigned By      Initials Name Provider Type    LB Joslyn Garcia, PT Physical Therapist                    Therapy Charges for Today       Code Description Service Date Service Provider Modifiers Qty    79140888686 HC GAIT TRAINING EA 15 MIN 7/24/2023 Joslyn Garcia, PT GP 1    08792205797 HC PT THER PROC EA 15 MIN 7/24/2023 Joslyn Garcia, PT GP 2     48925219132 HC PT NEUROMUSC RE EDUCATION EA 15 MIN 7/24/2023 Joslyn Garcia, PT GP 1    63867790514 HC PT THERAPEUTIC ACT EA 15 MIN 7/24/2023 Joslyn Garcia, PT GP 2    76207903872 HC GAIT TRAINING EA 15 MIN 7/25/2023 Joslyn Garcia, PT GP 1    01769645765 HC PT ELEC STIM EA-PER 15 MIN 7/25/2023 Joslyn Garcia, PT GP 1    07447924480 HC PT THER PROC EA 15 MIN 7/25/2023 Joslyn Garcia, PT GP 2    16796701192 HC PT THERAPEUTIC ACT EA 15 MIN 7/25/2023 Joslyn Garcia, PT GP 2                     Joslyn Garcia, PT  7/25/2023

## 2023-07-25 NOTE — PLAN OF CARE
Goal Outcome Evaluation:  Plan of Care Reviewed With: patient resting in bed, no complaints at present, continues to participate in therapy, will continue to monitor.

## 2023-07-25 NOTE — THERAPY TREATMENT NOTE
Inpatient Rehabilitation - Occupational Therapy Treatment Note     Ronnie     Patient Name: Antonio Canseco  : 1955  MRN: [id number]    Today's Date: 2023                 Admit Date: 2023       No diagnosis found.    Patient Active Problem List   Diagnosis    Detrusor instability    Low testosterone in male    Disorder of prostate    Corporo-venous occlusive erectile dysfunction    CVA (cerebral vascular accident)       Past Medical History:   Diagnosis Date    Diabetes mellitus     Hypertension     Stroke        Past Surgical History:   Procedure Laterality Date    US GUIDED LYMPH NODE BIOPSY  2023             IRF OT ASSESSMENT FLOWSHEET (last 12 hours)       IRF OT Evaluation and Treatment       Row Name 23 1252          OT Time and Intention    Document Type daily treatment  -BF     Mode of Treatment occupational therapy  -BF     Patient Effort good  -BF     Symptoms Noted During/After Treatment none  -BF       Row Name 23 1252          General Information    Existing Precautions/Restrictions fall  L HP, <trunk support/balance, L side inattention  -BF     Limitations/Impairments safety/cognitive  -BF       Row Name 23 1252          Cognition/Psychosocial    Orientation Status (Cognition) oriented x 3  -BF     Follows Commands (Cognition) verbal cues/prompting required;repetition of directions required;physical/tactile prompts required;increased processing time needed;delayed response/completion  -BF       Row Name 23 1252          Grooming    [place] Level (Grooming) minimum assist (75% patient effort);verbal cues;nonverbal cues (demo/gesture)  -BF     Position (Grooming) supported sitting  -BF       Row Name 23 1252          Bed-Chair Transfer    Bed-Chair [place] (Transfers) maximum assist (25% patient effort);2 person assist;verbal cues;nonverbal cues (demo/gesture)  -BF     Assistive Device (Bed-Chair Transfers) wheelchair  -BF       Row Name  07/25/23 1252          Motor Skills    Neuromuscular Function left;upper extremity;moderate impairment  -BF     Motor Control/Coordination Interventions fine motor manipulation/dexterity activities;gross motor coordination activities;therapeutic exercise/ROM;neuro-muscular re-education  LUE C/FMC theract, strengthening; BUE PRE 15 mins, BUE flexbar  -BF       Row Name 07/25/23 1252          Neuromuscular Re-education    Interventions (Neuromuscular Re-education) facilitation/inhibition  -BF     Positioning (Neuromuscular Re-education) sitting;supported  -BF       Row Name 07/25/23 1252          Positioning and Restraints    In Wheelchair sitting;with PT  -BF               User Key  (r) = Recorded By, (t) = Taken By, (c) = Cosigned By      Initials Name Effective Dates    BF Lisa Sanchez OT 07/11/23 -                      Occupational Therapy Education       Title: PT OT SLP Therapies (Done)       Topic: Occupational Therapy (Done)       Point: ADL training (Done)       Description:   Instruct learner(s) on proper safety adaptation and remediation techniques during self care or transfers.   Instruct in proper use of assistive devices.                  Learning Progress Summary             Patient Acceptance, E, VU,NR by BF at 7/25/2023 1251    Acceptance, E, VU,NR by HB at 7/24/2023 1355    Acceptance, E, NR,VU by BF at 7/21/2023 1248    Acceptance, E, NR by BF at 7/20/2023 1259    Acceptance, TB, NR by DL at 7/19/2023 2036    Acceptance, E, VU,NR by HB at 7/19/2023 1421    Acceptance, E, VU,NR by BF at 7/18/2023 1334    Acceptance, E, VU,NR by BF at 7/17/2023 1431    Acceptance, E, VU,NR by BF at 7/17/2023 1430    Acceptance, E, VU,NR by BF at 7/14/2023 1508    Acceptance, E, VU,NR by BF at 7/13/2023 1343    Acceptance, E,D, VU,NR by TM at 7/12/2023 1407    Acceptance, D,E, NR,VU by TM at 7/11/2023 1434    Acceptance, E, VU,NR by BF at 7/10/2023 1456    Acceptance, E, VU,NR by HB at 7/8/2023 1140     Acceptance, E, VU,NR by BF at 7/7/2023 1442                         Point: Precautions (Done)       Description:   Instruct learner(s) on prescribed precautions during self-care and functional transfers.                  Learning Progress Summary             Patient Acceptance, E, VU,NR by BF at 7/25/2023 1251    Acceptance, E, VU,NR by HB at 7/24/2023 1355    Acceptance, E, NR,VU by BF at 7/21/2023 1248    Acceptance, E, NR by BF at 7/20/2023 1259    Acceptance, TB, NR by DL at 7/19/2023 2036    Acceptance, E, VU,NR by HB at 7/19/2023 1421    Acceptance, E, VU,NR by BF at 7/18/2023 1334    Acceptance, E, VU,NR by BF at 7/17/2023 1431    Acceptance, E, VU,NR by BF at 7/17/2023 1430    Acceptance, E, VU,NR by BF at 7/14/2023 1508    Acceptance, E, VU,NR by BF at 7/13/2023 1343    Acceptance, E,D, VU,NR by TM at 7/12/2023 1407    Acceptance, D,E, NR,VU by TM at 7/11/2023 1434    Acceptance, E, VU,NR by BF at 7/10/2023 1456    Acceptance, E, VU,NR by HB at 7/8/2023 1140    Acceptance, E, VU,NR by BF at 7/7/2023 1442                                         User Key       Initials Effective Dates Name Provider Type Discipline    BF 05/31/23 - 07/10/23 Lisa Sanchez, OT Occupational Therapist OT    BF 07/11/23 -  Lisa Sanchez, OT Occupational Therapist OT    TM 06/16/21 -  Marielos Capone OT Occupational Therapist OT    HB 05/25/21 -  Veronica Robles OT Occupational Therapist OT    DL 03/16/22 -  Nadja Velasquez, RN Registered Nurse Nurse                        OT Recommendation and Plan    Planned Therapy Interventions (OT): activity tolerance training, adaptive equipment training, BADL retraining, neuromuscular control/coordination retraining, passive ROM/stretching, ROM/therapeutic exercise, strengthening exercise, transfer/mobility retraining                    Time Calculation:      Time Calculation- OT       Row Name 07/25/23 1255             Time Calculation- OT    OT Start Time 0820  -BF       OT Stop Time 1000  -BF      OT Time Calculation (min) 100 min  -BF      Total Timed Code Minutes-  minute(s)  -BF      OT Non-Billable Time (min) 10 min  -BF                User Key  (r) = Recorded By, (t) = Taken By, (c) = Cosigned By      Initials Name Provider Type     Lisa Sanchez OT Occupational Therapist                  Therapy Charges for Today       Code Description Service Date Service Provider Modifiers Qty    23029481489  OT NEUROMUSC RE EDUCATION EA 15 MIN 7/25/2023 Lisa Sanchez OT GO 3    [id number]  OT SELF CARE/MGMT/TRAIN EA 15 MIN 7/25/2023 Lisa Sanchez OT GO 2    [id number]  OT THER PROC EA 15 MIN 7/25/2023 Lisa Sanchez OT GO 2                     Lisa Sanchez OT  7/25/2023

## 2023-07-25 NOTE — SIGNIFICANT NOTE
07/25/23 1500   Plan   Plan Team conference held today.  Spoke to pt about how he is making progress in therapy, plans for discharge on 8-4-23, and family coming to rehab next week to observe therapy/receive education.  Pt wants to go home with cousin Alejandra assisting him at discharge with help from sister Mariah and daughter Karolyn.  Pt says family plan to meet with SS this week.   Will follow.   Patient/Family in Agreement with Plan yes

## 2023-07-25 NOTE — SIGNIFICANT NOTE
07/25/23 5075   Plan   Plan Spoke to sister Mariah about how pt is doing in therapy, plans for discharge on 8-4-23, and family coming to rehab next week on 8-1-23 or 8-2-23 to observe pt in therapy and receive education.  Sister says daughter Karolyn has discussed moving in with pt to assist with caregiving.  Sister will talk to family about coming next week to observe therapy/receive education and inform SS what day they can come.  Family will be able to decide if they can meet caregiving needs.  Discussed option of extending pt's rehab stay if needed but this will be determined based on progress in therapy and discharge plans.  Will follow.   Patient/Family in Agreement with Plan yes

## 2023-07-26 LAB
GLUCOSE BLDC GLUCOMTR-MCNC: 125 MG/DL (ref 70–130)
GLUCOSE BLDC GLUCOMTR-MCNC: 133 MG/DL (ref 70–130)

## 2023-07-26 PROCEDURE — 97535 SELF CARE MNGMENT TRAINING: CPT

## 2023-07-26 PROCEDURE — 97530 THERAPEUTIC ACTIVITIES: CPT

## 2023-07-26 PROCEDURE — 97116 GAIT TRAINING THERAPY: CPT

## 2023-07-26 PROCEDURE — 97110 THERAPEUTIC EXERCISES: CPT

## 2023-07-26 PROCEDURE — 82948 REAGENT STRIP/BLOOD GLUCOSE: CPT

## 2023-07-26 PROCEDURE — 97112 NEUROMUSCULAR REEDUCATION: CPT

## 2023-07-26 PROCEDURE — 99231 SBSQ HOSP IP/OBS SF/LOW 25: CPT | Performed by: FAMILY MEDICINE

## 2023-07-26 RX ADMIN — NYSTATIN: 100000 POWDER TOPICAL at 09:12

## 2023-07-26 RX ADMIN — METOPROLOL TARTRATE 25 MG: 25 TABLET, FILM COATED ORAL at 21:14

## 2023-07-26 RX ADMIN — ALLOPURINOL 300 MG: 300 TABLET ORAL at 09:11

## 2023-07-26 RX ADMIN — PANTOPRAZOLE SODIUM 40 MG: 40 TABLET, DELAYED RELEASE ORAL at 06:40

## 2023-07-26 RX ADMIN — TAMSULOSIN HYDROCHLORIDE 0.4 MG: 0.4 CAPSULE ORAL at 09:12

## 2023-07-26 RX ADMIN — RIVAROXABAN 20 MG: 20 TABLET, FILM COATED ORAL at 17:36

## 2023-07-26 RX ADMIN — METOPROLOL TARTRATE 25 MG: 25 TABLET, FILM COATED ORAL at 09:11

## 2023-07-26 RX ADMIN — MAGNESIUM GLUCONATE 500 MG ORAL TABLET 400 MG: 500 TABLET ORAL at 09:11

## 2023-07-26 RX ADMIN — HYDROXYZINE HYDROCHLORIDE 25 MG: 25 TABLET, FILM COATED ORAL at 22:19

## 2023-07-26 RX ADMIN — NYSTATIN: 100000 POWDER TOPICAL at 21:15

## 2023-07-26 RX ADMIN — ATORVASTATIN CALCIUM 80 MG: 40 TABLET, FILM COATED ORAL at 09:11

## 2023-07-26 NOTE — SIGNIFICANT NOTE
07/26/23 6242   Plan   Plan SS received call from daughter Karolyn who says she plans to move in with pt to assist with caregiving.  Daughter works in the evenings and says pt's friend Joey Ang will stay with pt while daughter works.  Son Antonio will help 2 x week.  Pt's ex-spouse Alondra Canseco, granddaughter Mary Multani, and cousin Alejandra will help some with caregiving.  Karolyn will inform pt's sister Mariah what day next week she can come to observe pt in therapy/receive education.  Will follow.   Patient/Family in Agreement with Plan yes

## 2023-07-26 NOTE — PLAN OF CARE
Goal Outcome Evaluation:  Plan of Care Reviewed With: patient resting in bed, no complaints at present. Patient continues to participate in therapy. Will continue to monitor.

## 2023-07-26 NOTE — PROGRESS NOTES
Occupational Therapy: Individual: 90 minutes.    Physical Therapy:    Speech Language Pathology:    Signed by: Elza Moya OT

## 2023-07-26 NOTE — THERAPY TREATMENT NOTE
Inpatient Rehabilitation - Physical Therapy Treatment Note        Ronnie     Patient Name: Antonio Canseco  : 1955  MRN: [id number]    Today's Date: 2023                    Admit Date: 2023      Visit Dx:   No diagnosis found.    Patient Active Problem List   Diagnosis    Detrusor instability    Low testosterone in male    Disorder of prostate    Corporo-venous occlusive erectile dysfunction    CVA (cerebral vascular accident)       Past Medical History:   Diagnosis Date    Diabetes mellitus     Hypertension     Stroke        Past Surgical History:   Procedure Laterality Date    US GUIDED LYMPH NODE BIOPSY  2023       PT ASSESSMENT (last 12 hours)       IRF PT Evaluation and Treatment       Row Name 23 1508          PT Time and Intention    Patient/Family/Caregiver Comments/Observations Pt and nursing in agreement for skilled PT on this date.  -RG       Row Name 23 1508          Pain Scale: FACES Pre/Post-Treatment    Pain: FACES Scale, Pretreatment 0-->no hurt  -RG     Posttreatment Pain Rating 0-->no hurt  -RG       Row Name 23 1508          Chair-Bed Transfer    Chair-Bed [place] (Transfers) maximum assist (25% patient effort);1 person assist;2 person assist;verbal cues;nonverbal cues (demo/gesture)  -RG       Row Name 23 1508          Sit-Stand Transfer    Sit-Stand [place] (Transfers) verbal cues;nonverbal cues (demo/gesture);moderate assist (50% patient effort)  -RG     Assistive Device (Sit-Stand Transfers) parallel bars  -RG       Row Name 23 1508          Stand-Sit Transfer    Stand-Sit [place] (Transfers) verbal cues;nonverbal cues (demo/gesture);moderate assist (50% patient effort)  -RG     Assistive Device (Stand-Sit Transfers) parallel bars  -RG       Row Name 23 1508          Gait/Stairs (Locomotion)    [place] Level (Gait) maximum assist (25% patient effort);verbal cues;nonverbal cues (demo/gesture)  -RG     Assistive  Device (Gait) parallel bars  -RG     Distance in Feet (Gait) 8' x 2  -RG     Pattern (Gait) --  dependent to advance LLE  -RG     Deviations/Abnormal Patterns (Gait) base of support, narrow;hebert decreased;gait speed decreased;stride length decreased;weight shifting decreased  -RG     Bilateral Gait Deviations forward flexed posture  -RG     Comment, (Gait/Stairs) Verbal and tactile cues required for pt to stand up straight and weight shift to the Right in order for therapist to advance Left LE while ambulating in // bars Max assist x 2.  -RG       Row Name 07/26/23 1508          Balance    Static Sitting Balance verbal cues;non-verbal cues (demo/gesture);minimal assist  -RG     Position, Sitting Balance sitting edge of bed  -RG     Comment, Balance Pt required assist to maintain sitting balance.  -RG       Row Name 07/26/23 1508          Hip (Therapeutic Exercise)    Hip Strengthening (Therapeutic Exercise) bilateral;flexion;aBduction;aDduction;external rotation;internal rotation;heel slides;marching while seated;sitting;supine;2 lb free weight;resistance band;green;10 repetitions;2 sets  AAROM L LE  -RG       Row Name 07/26/23 1508          Knee (Therapeutic Exercise)    Knee Strengthening (Therapeutic Exercise) bilateral;flexion;extension;heel slides;marching while seated;SAQ (short arc quad);LAQ (long arc quad);sitting;supine;2 lb free weight;resistance band;green;10 repetitions;2 sets  AAROM L LE  -RG       Row Name 07/26/23 1508          Ankle (Therapeutic Exercise)    Ankle Strengthening (Therapeutic Exercise) bilateral;dorsiflexion;plantarflexion;sitting;supine;10 repetitions;2 sets  AAROM L LE  -RG       Row Name 07/26/23 1508          Modality Treatment    Treatment Location 1 (Modality) L ant tib  -RG     Treatment Location 2 (Modality) L quad  -RG     Modality Performed electrical stimulation  -RG     Modality Treatment Results contraction observed, no redness noted before or after treatment  -RG        Row Name 07/26/23 1508          Positioning and Restraints    In Bed notified nsg;supine;call light within reach;encouraged to call for assist;legs elevated  -RG               User Key  (r) = Recorded By, (t) = Taken By, (c) = Cosigned By      Initials Name Provider Type    Vu Galloway PTA Physical Therapist Assistant                     Physical Therapy Education       Title: PT OT SLP Therapies (Done)       Topic: Physical Therapy (Done)       Point: Mobility training (Done)       Learning Progress Summary             Patient Acceptance, E,D, VU,NR by RG at 7/26/2023 1518    Acceptance, E, VU,NR by LB at 7/25/2023 1133    Acceptance, E, VU,NR by LB at 7/24/2023 1444    Acceptance, E, VU,NR by LB at 7/21/2023 1615    Acceptance, E,D, VU,NR by RG at 7/20/2023 1107    Acceptance, TB, NR by DL at 7/19/2023 2036    Acceptance, E,D, VU,NR by RG at 7/19/2023 1508    Acceptance, E,D, VU,NR by RG at 7/18/2023 1520    Acceptance, E,D, VU,NR by RG at 7/17/2023 1522    Acceptance, E,D, VU,NR by LL at 7/13/2023 1358    Acceptance, E, VU,NR by LB at 7/12/2023 1144    Acceptance, E, VU,NR by LB at 7/11/2023 1426    Acceptance, E, VU,NR by LB at 7/10/2023 1452    Acceptance, E,D, VU,NR by LL at 7/8/2023 1228    Acceptance, E,D, VU,NR by LB at 7/7/2023 1449                         Point: Home exercise program (Done)       Learning Progress Summary             Patient Acceptance, E,D, VU,NR by RG at 7/26/2023 1518    Acceptance, E, VU,NR by LB at 7/25/2023 1133    Acceptance, E, VU,NR by LB at 7/24/2023 1444    Acceptance, E, VU,NR by LB at 7/21/2023 1615    Acceptance, E,D, VU,NR by RG at 7/20/2023 1107    Acceptance, TB, NR by DL at 7/19/2023 2036    Acceptance, E,D, VU,NR by RG at 7/19/2023 1508    Acceptance, E,D, VU,NR by RG at 7/18/2023 1520    Acceptance, E,D, VU,NR by RG at 7/17/2023 1522    Acceptance, E,D, VU,NR by LL at 7/13/2023 1358    Acceptance, E, VU,NR by LB at 7/12/2023 1144    Acceptance, E, VU,NR by LB at  7/11/2023 1426    Acceptance, E, VU,NR by LB at 7/10/2023 1452    Acceptance, E,D, VU,NR by LL at 7/8/2023 1228    Acceptance, E,D, VU,NR by LB at 7/7/2023 1449                         Point: Body mechanics (Done)       Learning Progress Summary             Patient Acceptance, E,D, VU,NR by RG at 7/26/2023 1518    Acceptance, E, VU,NR by LB at 7/25/2023 1133    Acceptance, E, VU,NR by LB at 7/24/2023 1444    Acceptance, E, VU,NR by LB at 7/21/2023 1615    Acceptance, E,D, VU,NR by RG at 7/20/2023 1107    Acceptance, TB, NR by DL at 7/19/2023 2036    Acceptance, E,D, VU,NR by RG at 7/19/2023 1508    Acceptance, E,D, VU,NR by RG at 7/18/2023 1520    Acceptance, E,D, VU,NR by RG at 7/17/2023 1522    Acceptance, E,D, VU,NR by LL at 7/13/2023 1358    Acceptance, E, VU,NR by LB at 7/12/2023 1144    Acceptance, E, VU,NR by LB at 7/11/2023 1426    Acceptance, E, VU,NR by LB at 7/10/2023 1452    Acceptance, E,D, VU,NR by LL at 7/8/2023 1228    Acceptance, E,D, VU,NR by LB at 7/7/2023 1449                         Point: Precautions (Done)       Learning Progress Summary             Patient Acceptance, E,D, VU,NR by RG at 7/26/2023 1518    Acceptance, E, VU,NR by LB at 7/25/2023 1133    Acceptance, E, VU,NR by LB at 7/24/2023 1444    Acceptance, E, VU,NR by LB at 7/21/2023 1615    Acceptance, E,D, VU,NR by RG at 7/20/2023 1107    Acceptance, TB, NR by DL at 7/19/2023 2036    Acceptance, E,D, VU,NR by RG at 7/19/2023 1508    Acceptance, E,D, VU,NR by RG at 7/18/2023 1520    Acceptance, E,D, VU,NR by RG at 7/17/2023 1522    Acceptance, E,D, VU,NR by LL at 7/13/2023 1358    Acceptance, E, VU,NR by LB at 7/12/2023 1144    Acceptance, E, VU,NR by LB at 7/11/2023 1426    Acceptance, E, VU,NR by LB at 7/10/2023 1452    Acceptance, E,D, VU,NR by LL at 7/8/2023 1228    Acceptance, E,D, VU,NR by LB at 7/7/2023 1449                                         User Key       Initials Effective Dates Name Provider Type Discipline    LB  06/16/21 -  Joslyn Garcia, PT Physical Therapist PT    LL 05/02/16 -  Connie Velasquez PTA Physical Therapist Assistant PT    RG 06/16/21 -  Vu Brown PTA Physical Therapist Assistant PT    DL 03/16/22 -  Nadja Velasquez, RN Registered Nurse Nurse                    PT Recommendation and Plan                          Time Calculation:      PT Charges       Row Name 07/26/23 1518             Time Calculation    Start Time 1000  -RG      Stop Time 1130  -RG      Time Calculation (min) 90 min  -RG      PT Received On 07/26/23  -RG         Time Calculation- PT    Total Timed Code Minutes- PT 90 minute(s)  -RG                User Key  (r) = Recorded By, (t) = Taken By, (c) = Cosigned By      Initials Name Provider Type    RG Vu Brown PTA Physical Therapist Assistant                    Therapy Charges for Today       Code Description Service Date Service Provider Modifiers Qty    26988742598 HC GAIT TRAINING EA 15 MIN 7/26/2023 Vu Brown PTA GP, CQ 1    04605374428 HC PT THERAPEUTIC ACT EA 15 MIN 7/26/2023 Vu Brown PTA GP, CQ 2    03296523400 HC PT THER PROC EA 15 MIN 7/26/2023 Vu Brown PTA GP, CQ 2    67592450722 HC PT NEUROMUSC RE EDUCATION EA 15 MIN 7/26/2023 Vu Brown PTA GP, CQ 1                     Vu Brown PTA  7/26/2023

## 2023-07-26 NOTE — PLAN OF CARE
Goal Outcome Evaluation:       Problem: Rehabilitation (IRF) Plan of Care  Goal: Plan of Care Review  Outcome: Ongoing, Progressing  Flowsheets  Taken 7/25/2023 2149 by Nadja Velasquez RN  Plan of Care Reviewed With: patient  Taken 7/23/2023 0536 by Phuong Padron RN  Progress: no change  Goal: Patient-Specific Goal (Individualized)  Outcome: Ongoing, Progressing  Goal: Absence of New-Onset Illness or Injury  Outcome: Ongoing, Progressing  Intervention: Prevent Fall and Fall Injury  Recent Flowsheet Documentation  Taken 7/26/2023 1000 by Madie Maza RN  Safety Promotion/Fall Prevention:   assistive device/personal items within reach   safety round/check completed  Taken 7/26/2023 0800 by Madie Maza RN  Safety Promotion/Fall Prevention:   assistive device/personal items within reach   safety round/check completed  Intervention: Prevent Infection  Recent Flowsheet Documentation  Taken 7/26/2023 1000 by Madie Maza RN  Infection Prevention: hand hygiene promoted  Taken 7/26/2023 0800 by Madie Maza RN  Infection Prevention: hand hygiene promoted  Goal: Optimal Comfort and Wellbeing  Outcome: Ongoing, Progressing  Goal: Home and Community Transition Plan Established  Outcome: Ongoing, Progressing     Problem: Diabetes Comorbidity  Goal: Blood Glucose Level Within Targeted Range  Outcome: Ongoing, Progressing     Problem: Skin Injury Risk Increased  Goal: Skin Health and Integrity  Outcome: Ongoing, Progressing     Problem: Fall Injury Risk  Goal: Absence of Fall and Fall-Related Injury  Outcome: Ongoing, Progressing  Intervention: Identify and Manage Contributors  Recent Flowsheet Documentation  Taken 7/26/2023 1000 by Madie Maza RN  Medication Review/Management: medications reviewed  Taken 7/26/2023 0800 by Madie Maza RN  Medication Review/Management: medications reviewed  Intervention: Promote Injury-Free Environment  Recent Flowsheet Documentation  Taken  7/26/2023 1000 by Madie Maza, RN  Safety Promotion/Fall Prevention:   assistive device/personal items within reach   safety round/check completed  Taken 7/26/2023 0800 by Madie Maza RN  Safety Promotion/Fall Prevention:   assistive device/personal items within reach   safety round/check completed     Problem: Mobility Impairment  Goal: Optimal Mobility [place] and Safety  Outcome: Ongoing, Progressing

## 2023-07-26 NOTE — PROGRESS NOTES
[facility]  PROGRESS NOTE     Patient Identification:  Name:  Antonio Canseco  Age:  67 y.o.  Sex:  male  :  1955  MRN:  [id number]  Visit Number:  [id number]  ROOM: [facility]     Primary Care Provider:  Adriana Ledesma MD    Length of stay in inpatient status:  20    Subjective     Chief Compliant:  No chief complaint on file.      History of Presenting Illness: 67-year-old gentleman who is status post CVA with left-sided weakness which affects the left lower extremity much more so than the upper extremity.  Patient states that he seems to be making progress has no new complaints at this time    Objective     Current Hospital Meds:allopurinol, 300 mg, Oral, Daily  atorvastatin, 80 mg, Oral, Daily  magnesium oxide, 400 mg, Oral, Daily  metoprolol tartrate, 25 mg, Oral, Q12H  nystatin, , Topical, Q12H  pantoprazole, 40 mg, Oral, Q AM  rivaroxaban, 20 mg, Oral, Daily With Dinner  tamsulosin, 0.4 mg, Oral, Daily       ----------------------------------------------------------------------------------------------------------------------  Vital Signs:  Temp:  [97.4 øF (36.3 øC)-97.7 øF (36.5 øC)] 97.7 øF (36.5 øC)  Heart Rate:  [72-99] 72  Resp:  [16-18] 16  BP: (113-134)/(60-79) 113/70  SpO2:  [94 %-95 %] 95 %  on   ;   Device (Oxygen Therapy): room air  Body mass index is 40.57 kg/mý.    Wt Readings from Last 3 Encounters:   23 121 kg (266 lb 12.1 oz)   22 121 kg (267 lb)   07/15/19 121 kg (267 lb 1.6 oz)     Intake & Output (last 3 days)          07    P.O. 9577 599 5004 240    Total Intake(mL/kg) 1560 (12.9) 822 (6.8) 1080 (8.9) 240 (2)    Urine (mL/kg/hr) 200 (0.1)       Stool 0       Total Output 200       Net +1360 +822 +1080 +240            Urine Unmeasured Occurrence 4 x 6 x 6 x 1 x    Stool Unmeasured Occurrence 2 x 4 x 1 x           Diet: Cardiac Diets; Healthy Heart (2-3 Na+); Texture: Regular  Texture (IDDSI 7); Fluid Consistency: Thin (IDDSI 0)  ----------------------------------------------------------------------------------------------------------------------  Physical exam:  Constitutional: No acute distress  HEENT: Normocephalic atraumatic  Neck:   Supple  Cardiovascular: Regular rate and rhythm  Pulmonary/Chest: Clear to auscultation  Abdominal:  .  Positive bowel sounds soft  Musculoskeletal: No arthropathy  Neurological: Left upper extremity 3-4 out of 5 strength.  Left lower extremity 0-1 out of 5 strength  Skin: No rash  Peripheral vascular: No edema  Genitourinary:  ----------------------------------------------------------------------------------------------------------------------    Last echocardiogram:    ----------------------------------------------------------------------------------------------------------------------  Results from last 7 days   Lab Units 07/22/23  0111   WBC 10*3/mm3 6.68   HEMOGLOBIN g/dL 10.1*   HEMATOCRIT % 32.1*   MCV fL 84.9   MCHC g/dL 31.5   PLATELETS 10*3/mm3 232         Results from last 7 days   Lab Units 07/22/23  0111   SODIUM mmol/L 137   POTASSIUM mmol/L 3.8   CHLORIDE mmol/L 104   CO2 mmol/L 23.2   BUN mg/dL 16   CREATININE mg/dL 0.80   CALCIUM mg/dL 8.9   GLUCOSE mg/dL 109*   ALBUMIN g/dL 3.2*   BILIRUBIN mg/dL 0.5   ALK PHOS U/L 84   AST (SGOT) U/L 15   ALT (SGPT) U/L 16   Estimated Creatinine Clearance: 116.4 mL/min (by C-G formula based on SCr of 0.8 mg/dL).  No results found for: AMMONIA              Glucose   Date/Time Value Ref Range Status   07/26/2023 0614 125 70 - 130 mg/dL Final     Comment:     Meter: [id number] : [id number] Edwards Crystal   07/25/2023 1620 125 70 - 130 mg/dL Final     Comment:     Meter: [id number] : [id number] [place] Mariah   07/25/2023 1113 144 (H) 70 - 130 mg/dL Final     Comment:     Meter: [id number] : [id number] NAYLOR CHARLOTTE   07/25/2023 0625 130 70 - 130 mg/dL Final     Comment:     Meter: [id number]  : [id number] Quang Neal   07/24/2023 1621 125 70 - 130 mg/dL Final     Comment:     Meter: [id number] : [id number] CYNDY PORTER   07/24/2023 1103 170 (H) 70 - 130 mg/dL Final     Comment:     Meter: [id number] : [id number] CYNDY GALICIAA   07/24/2023 0630 140 (H) 70 - 130 mg/dL Final     Comment:     Meter: [id number] : [id number] ANSELMO LACY   07/24/2023 0120 103 70 - 130 mg/dL Final     Comment:     Meter: [id number] : [id number] ANSELMO LACY     No results found for: TSH, FREET4  No results found for: PREGTESTUR, PREGSERUM, HCG, HCGQUANT  Pain Management Panel           No data to display              Brief Urine Lab Results       None          No results found for: BLOODCX      No results found for: URINECX  No results found for: WOUNDCX  No results found for: STOOLCX        I have personally looked at the labs and they are summarized above.  ----------------------------------------------------------------------------------------------------------------------  Detailed radiology reports for the last 24 hours:    Imaging Results (Last 24 Hours)       ** No results found for the last 24 hours. **          Final impressions for the last 30 days of radiology reports:    CT Head Without Contrast    Result Date: 6/29/2023  Old and maturing subacute infarctions without gross hemorrhagic transformation. The possibility of new small interval infarctions in this case cannot be excluded. CRITICAL RESULT:   No. COMMUNICATION: Per this written report. Drafted by Neil Kearns on 6/29/2023 1:45 PM Final report signed by Neil Kearns on 6/29/2023 2:04 PM    FL Video Swallow Single Contrast    Result Date: 7/7/2023    Normal fluoroscopic video swallow exam.  Please see the speech pathologist's report for additional information.  This report was finalized on 7/7/2023 10:50 AM by Dr. Porfirio Montgomery MD.      PET/CT FDG Skull Base To Mid Thigh    Result Date: 6/26/2023  1. Innumerable  enlarged FDG avid cervical, mediastinal, axillary, mesenteric, retroperitoneal and inguinal lymph nodes are suspicious for lymphoma correlation with tissue biopsy is suggested. 2. FDG avid asymmetric mass involving the left oropharynx most likely consistent with lymphoma involvement correlation with tissue biopsy is suggested. 3. Status post ischemia involving left MCA and PCA. CRITICAL RESULT: No. COMMUNICATION: Per this written report. Drafted by Jj Johnson MD on 6/26/2023 5:21 PM Final report signed by Jj Johnson MD on 6/26/2023 8:12 PM   I have personally looked at the radiology images and read the final radiology report.    Assessment & Plan    Status post CVA with residual left hemiparesis--continue high-dose statin therapy and Xarelto.  Patient requiring minimum assistance for grooming; maximum assist for bed to chair transfer.  Continues to work on motor skills and neuromuscular reeducation.  Requiring maximum assist for bed to chair and chair to bed transfers.  Moderate assistance for sit to stand and stand to sit transfer.  Ambulated 4 feet x 3 in the parallel bars with maximum assist yesterday.    Atrial fibrillation rate is controlled continue Xarelto    History of laryngeal CA/non-Hodgkin's lymphoma recommend outpatient oncology follow-up    BPH continue Flomax.  Patient doing well    CHF preserved EF compensated.    VTE Prophylaxis:   Mechanical Order History:       None          Pharmalogical Order History:        Ordered     Dose Route Frequency Stop    07/06/23 2062  rivaroxaban (XARELTO) tablet 20 mg        Question:  Are you ordering rivaroxaban 10 mg for the prevention of blood clots in an acutely ill medical patient?  Answer:  No    20 mg PO Daily With Dinner --                        Sj Jay MD  [facility]  07/26/23  10:39 EDT

## 2023-07-26 NOTE — PROGRESS NOTES
Case Management  Inpatient Rehabilitation Team Conference    Conference Date/Time: 7/25/2023 6:31:54 AM    Team Conference Attendees:  MD Wendi James SW Jessica Bill, RN,   ALON Horowitz, PT  Lisa Sanchez OT    Demographics            Age: 67Y            Gender: Male    Admission Date: 7/6/2023 4:18:00 PM  Rehabilitation Diagnosis:  stroke  Comorbidities: C85.15 Unspecified B-cell lymphoma, lymph nodes of inguinal  region and lower limb  C32.9 Malignant neoplasm of larynx, unspecified  E11.9 Type 2 diabetes mellitus without complications  I10 Essential (primary) hypertension  K59.00 Constipation, unspecified  I48.91 Unspecified atrial fibrillation  M10.9 Gout, unspecified  Z51.89 Encounter for other specified aftercare  Z60.4 Social exclusion and rejection  Z91.81 History of falling      Plan of Care  Anticipated Discharge Date/Estimated Length of Stay: 8-4-23  Anticipated Discharge Destination: Community discharge with assistance  Discharge Plan : Pt plans to return home with cousin at discharge.  Pt says his  cousin, sister and daughter can assist with needs.  Medical Necessity Expected Level Rationale: fair  Intensity and Duration: an average of 3 hours/5 days per week  Medical Supervision and 24 Hour Rehab Nursing: x  Physical Therapy: x  PT Intensity/Duration: PT 1.5 hours per day/5 days per week  Occupational Therapy: x  OT Intensity/Duration: OT 1.5 hours per day/5 days per week  Social Work: x  Therapeutic Recreation: x  Updated (if changes indicated)    Anticipated Discharge Date/Estimated Length of Stay:   8-4-23      Discharge Plan of Care:    Based on the patient's medical and functional status, their prognosis and  expected level of functional improvement is: fair-good      Interdisciplinary Problem/Goals/Status  Body Function Structure    [RN] Skin Integrity(Active)  Current Status(07/06/2023): At Risk for Skin Breakdown  Weekly  Goal(08/04/2023): No Skin Breakdown  Discharge Goal: No Skin Breakdown        Safety    [RN] Potential for Injury(Active)  Current Status(07/06/2023): Potential for Falls  Weekly Goal(08/04/2023): No Falls  Discharge Goal: No Falls        Mobility    [PT] Bed/Chair/Wheelchair(Active)  Current Status(07/07/2023): Dep/Tye  Weekly Goal(07/14/2023): mod A  Discharge Goal: SBA    [PT] Walk(Active)  Current Status(07/07/2023): unable  Weekly Goal(07/14/2023): amb 20' mod A x2 hallrail  Discharge Goal: amb 150' HW SBA        Self Care    [OT] Dressing (Upper)(Active)  Current Status(07/17/2023): Max/Mod A  Weekly Goal(07/25/2023): Mod A  Discharge Goal: Min A    Comments: Pt plans to return home with cousin at discharge with assistance from  sister if they are able to meet caregiving needs.  Daughter Karolyn to assist  some too.  Family recommended to come to rehab next week to observe pt in  therapy and receive education.    Signed by: SUJATA Breen    Physician CoSigned By: Sj Jay 07/26/2023 09:33:29

## 2023-07-26 NOTE — THERAPY TREATMENT NOTE
Inpatient Rehabilitation - Occupational Therapy Treatment Note     Ronnie     Patient Name: Antonio Canseco  : 1955  MRN: [id number]    Today's Date: 2023                 Admit Date: 2023       No diagnosis found.    Patient Active Problem List   Diagnosis    Detrusor instability    Low testosterone in male    Disorder of prostate    Corporo-venous occlusive erectile dysfunction    CVA (cerebral vascular accident)       Past Medical History:   Diagnosis Date    Diabetes mellitus     Hypertension     Stroke        Past Surgical History:   Procedure Laterality Date    US GUIDED LYMPH NODE BIOPSY  2023             IRF OT ASSESSMENT FLOWSHEET (last 12 hours)       IRF OT Evaluation and Treatment       Row Name 23 1508          OT Time and Intention    Document Type daily treatment  -KP     Mode of Treatment individual therapy;occupational therapy  -KP     Total Minutes, Occupational Therapy 90  -KP     Patient Effort good  -KP     Symptoms Noted During/After Treatment none  -KP       Row Name 23 1508          General Information    Patient Profile Reviewed yes  -KP     General Observations of Patient Patient agreeable to therapy with no complaints.  -KP     Existing Precautions/Restrictions fall  -KP     Limitations/Impairments safety/cognitive  -KP       Row Name 23 1508          Pain Assessment    Pretreatment Pain Rating 0/10 - no pain  -KP     Posttreatment Pain Rating 0/10 - no pain  -KP       Row Name 23 1508          Cognition/Psychosocial    Affect/Mental Status (Cognition) WFL  -KP     Orientation Status (Cognition) oriented x 3  -KP     Follows Commands (Cognition) verbal cues/prompting required;repetition of directions required;physical/tactile prompts required;increased processing time needed;delayed response/completion  -KP     Personal Safety Interventions safety round/check completed;gait belt;nonskid shoes/slippers when out of bed  -KP     Cognitive Function  safety deficit  -     Executive Function Deficit (Cognition) organization/sequencing;problem-solving/reasoning  -     Safety Deficit (Cognition) impulsivity;awareness of need for assistance  -       Row Name 07/26/23 1508          Lower Body Dressing    [place] Level (Lower Body Dressing) maximum assist (25% patient effort);verbal cues;nonverbal cues (demo/gesture)  -     Position (Lower Body Dressing) supine  -       Row Name 07/26/23 1508          Bed Mobility    Supine-Sit-Supine [place] (Bed Mobility) maximum assist (25% patient effort);1 person assist;2 person assist;verbal cues;nonverbal cues (demo/gesture)  -     Bed Mobility, Safety Issues decreased use of arms for pushing/pulling;decreased use of legs for bridging/pushing  -       Row Name 07/26/23 1508          Bed-Chair Transfer    Bed-Chair [place] (Transfers) maximum assist (25% patient effort);2 person assist;verbal cues;nonverbal cues (demo/gesture)  -     Assistive Device (Bed-Chair Transfers) wheelchair  -       Row Name 07/26/23 1508          Motor Skills    Functional Endurance fair plus  -     Motor Control/Coordination Interventions fine motor manipulation/dexterity activities;gross motor coordination activities;therapeutic exercise/ROM  tabletop functional reach FMC/GMC activities addressing left side awareness,bimanual tasks, and ROM; generalized strengthening BUE PRE, BUE rickshaw 20 lbs  -       Row Name 07/26/23 1508          Positioning and Restraints    Pre-Treatment Position in bed  -KP     Post Treatment Position wheelchair  -     In Wheelchair sitting;with PT  -               User Key  (r) = Recorded By, (t) = Taken By, (c) = Cosigned By      Initials Name Effective Dates    Elza Briscoe OT 06/16/21 -                      Occupational Therapy Education       Title: PT OT SLP Therapies (Done)       Topic: Occupational Therapy (Done)       Point: ADL training (Done)       Description:    Instruct learner(s) on proper safety adaptation and remediation techniques during self care or transfers.   Instruct in proper use of assistive devices.                  Learning Progress Summary             Patient Acceptance, E, VU,NR by BF at 7/25/2023 1251    Acceptance, E, VU,NR by HB at 7/24/2023 1355    Acceptance, E, NR,VU by BF at 7/21/2023 1248    Acceptance, E, NR by BF at 7/20/2023 1259    Acceptance, TB, NR by DL at 7/19/2023 2036    Acceptance, E, VU,NR by HB at 7/19/2023 1421    Acceptance, E, VU,NR by BF at 7/18/2023 1334    Acceptance, E, VU,NR by BF at 7/17/2023 1431    Acceptance, E, VU,NR by BF at 7/17/2023 1430    Acceptance, E, VU,NR by BF at 7/14/2023 1508    Acceptance, E, VU,NR by BF at 7/13/2023 1343    Acceptance, E,D, VU,NR by TM at 7/12/2023 1407    Acceptance, D,E, NR,VU by TM at 7/11/2023 1434    Acceptance, E, VU,NR by BF at 7/10/2023 1456    Acceptance, E, VU,NR by HB at 7/8/2023 1140    Acceptance, E, VU,NR by BF at 7/7/2023 1442                         Point: Precautions (Done)       Description:   Instruct learner(s) on prescribed precautions during self-care and functional transfers.                  Learning Progress Summary             Patient Acceptance, E, VU,NR by BF at 7/25/2023 1251    Acceptance, E, VU,NR by HB at 7/24/2023 1355    Acceptance, E, NR,VU by BF at 7/21/2023 1248    Acceptance, E, NR by BF at 7/20/2023 1259    Acceptance, TB, NR by DL at 7/19/2023 2036    Acceptance, E, VU,NR by HB at 7/19/2023 1421    Acceptance, E, VU,NR by BF at 7/18/2023 1334    Acceptance, E, VU,NR by BF at 7/17/2023 1431    Acceptance, E, VU,NR by BF at 7/17/2023 1430    Acceptance, E, VU,NR by BF at 7/14/2023 1508    Acceptance, E, VU,NR by BF at 7/13/2023 1343    Acceptance, E,D, VU,NR by TM at 7/12/2023 1407    Acceptance, D,E, NR,VU by TM at 7/11/2023 1434    Acceptance, E, VU,NR by BF at 7/10/2023 1456    Acceptance, E, VU,NR by HB at 7/8/2023 1140    Acceptance, E, VU,NR by BF  at 7/7/2023 1442                                         User Key       Initials Effective Dates Name Provider Type Discipline    BF 05/31/23 - 07/10/23 Lisa Sanchez, OT Occupational Therapist OT    BF 07/11/23 -  Lisa Sanchez, OT Occupational Therapist OT    TM 06/16/21 -  Marielos Capone, OT Occupational Therapist OT    HB 05/25/21 -  Veronica Robles, OT Occupational Therapist OT    DL 03/16/22 -  Nadja Velasquez, RN Registered Nurse Nurse                        OT Recommendation and Plan                         Time Calculation:      Time Calculation- OT       Row Name 07/26/23 1512             Time Calculation- OT    OT Start Time 0830  -      OT Stop Time 1000  -      OT Time Calculation (min) 90 min  -      Total Timed Code Minutes- OT 90 minute(s)  -      OT Received On 07/26/23  -                User Key  (r) = Recorded By, (t) = Taken By, (c) = Cosigned By      Initials Name Provider Type     Elza Moya OT Occupational Therapist                  Therapy Charges for Today       Code Description Service Date Service Provider Modifiers Qty    94075621959 HC OT SELF CARE/MGMT/TRAIN EA 15 MIN 7/26/2023 Elza Moya OT GO 2    46859680614 HC OT THERAPEUTIC ACT EA 15 MIN 7/26/2023 Elza Moya OT GO 2    46852850698 HC OT THER PROC EA 15 MIN 7/26/2023 Elza Moya OT GO 2                     Elza Moya OT  7/26/2023

## 2023-07-26 NOTE — PROGRESS NOTES
Adult Nutrition  Assessment/PES    Patient Name:  Antonio Canseco  YOB: 1955  MRN: [id number]  Admit Date:  7/6/2023    Assessment Date:  7/26/2023    Comments:  no new recommendations.   Reason for Assessment       Row Name 07/26/23 1113          Reason for Assessment    Reason For Assessment follow-up protocol     Diagnosis neurologic conditions  CVA     Identified At Risk by Screening Criteria no indicators present                      Labs/Tests/Procedures/Meds       Row Name 07/26/23 1114          Labs/Procedures/Meds    Lab Results Reviewed reviewed        Medications    Pertinent Medications Reviewed reviewed                        Nutrition Prescription Ordered       Row Name 07/26/23 1115          Nutrition Prescription PO    Current PO Diet Regular     Common Modifiers Cardiac                    Evaluation of Received Nutrient/Fluid Intake       Row Name 07/26/23 1116          Intake Assessment    Energy/Calorie Requirement Assessment meeting needs        Fluid Intake Evaluation    Oral Fluid (mL) 1080        PO Evaluation    Number of Meals 9     % PO Intake 78                       Problem/Interventions:   Problem 1       Row Name 07/26/23 1118          Nutrition Diagnoses Problem 1    Problem 1 Nutrition Appropriate for Condition at this Time     Etiology (related to) Medical Diagnosis     Neurological CVA     Signs/Symptoms (evidenced by) Report/Observation     Reported/Observed By RN;MD                          Intervention Goal       Row Name 07/26/23 1118          Intervention Goal    General Maintain nutrition;Meet nutritional needs for age/condition     PO Maintain intake                    Nutrition Intervention       Row Name 07/26/23 1119          Nutrition Intervention    RD/Tech Action Follow Tx progress                      Education/Evaluation       Row Name 07/26/23 1120          Education    Education Will Instruct as appropriate        Monitor/Evaluation    Monitor Per  protocol;PO intake;Pertinent labs;Weight     Education Follow-up Other (comment)  will continue to follow                     Electronically signed by:  Arlene Villafuerte RD  07/26/23 11:21 EDT

## 2023-07-27 LAB
GLUCOSE BLDC GLUCOMTR-MCNC: 108 MG/DL (ref 70–130)
GLUCOSE BLDC GLUCOMTR-MCNC: 113 MG/DL (ref 70–130)
GLUCOSE BLDC GLUCOMTR-MCNC: 164 MG/DL (ref 70–130)

## 2023-07-27 PROCEDURE — 97110 THERAPEUTIC EXERCISES: CPT | Performed by: OCCUPATIONAL THERAPIST

## 2023-07-27 PROCEDURE — 97530 THERAPEUTIC ACTIVITIES: CPT

## 2023-07-27 PROCEDURE — 97530 THERAPEUTIC ACTIVITIES: CPT | Performed by: OCCUPATIONAL THERAPIST

## 2023-07-27 PROCEDURE — 97112 NEUROMUSCULAR REEDUCATION: CPT | Performed by: OCCUPATIONAL THERAPIST

## 2023-07-27 PROCEDURE — 97116 GAIT TRAINING THERAPY: CPT

## 2023-07-27 PROCEDURE — 97110 THERAPEUTIC EXERCISES: CPT

## 2023-07-27 PROCEDURE — 97535 SELF CARE MNGMENT TRAINING: CPT | Performed by: OCCUPATIONAL THERAPIST

## 2023-07-27 PROCEDURE — 82948 REAGENT STRIP/BLOOD GLUCOSE: CPT

## 2023-07-27 PROCEDURE — 99231 SBSQ HOSP IP/OBS SF/LOW 25: CPT | Performed by: FAMILY MEDICINE

## 2023-07-27 PROCEDURE — 97112 NEUROMUSCULAR REEDUCATION: CPT

## 2023-07-27 RX ADMIN — RIVAROXABAN 20 MG: 20 TABLET, FILM COATED ORAL at 17:54

## 2023-07-27 RX ADMIN — NYSTATIN: 100000 POWDER TOPICAL at 20:47

## 2023-07-27 RX ADMIN — ACETAMINOPHEN 650 MG: 325 TABLET ORAL at 20:46

## 2023-07-27 RX ADMIN — ALLOPURINOL 300 MG: 300 TABLET ORAL at 10:10

## 2023-07-27 RX ADMIN — TAMSULOSIN HYDROCHLORIDE 0.4 MG: 0.4 CAPSULE ORAL at 10:09

## 2023-07-27 RX ADMIN — MAGNESIUM GLUCONATE 500 MG ORAL TABLET 400 MG: 500 TABLET ORAL at 10:09

## 2023-07-27 RX ADMIN — NYSTATIN: 100000 POWDER TOPICAL at 10:19

## 2023-07-27 RX ADMIN — METOPROLOL TARTRATE 25 MG: 25 TABLET, FILM COATED ORAL at 10:08

## 2023-07-27 RX ADMIN — PANTOPRAZOLE SODIUM 40 MG: 40 TABLET, DELAYED RELEASE ORAL at 05:31

## 2023-07-27 RX ADMIN — ATORVASTATIN CALCIUM 80 MG: 40 TABLET, FILM COATED ORAL at 10:10

## 2023-07-27 NOTE — PLAN OF CARE
Goal Outcome Evaluation:  Plan of Care Reviewed With: patient        Progress: improving            Problem: Rehabilitation (IRF) Plan of Care  Goal: Plan of Care Review  Outcome: Ongoing, Progressing  Flowsheets (Taken 7/27/2023 1216)  Progress: improving  Plan of Care Reviewed With: patient  Goal: Patient-Specific Goal (Individualized)  Outcome: Ongoing, Progressing  Goal: Absence of New-Onset Illness or Injury  Outcome: Ongoing, Progressing  Goal: Optimal Comfort and Wellbeing  Outcome: Ongoing, Progressing  Goal: Home and Community Transition Plan Established  Outcome: Ongoing, Progressing     Problem: Diabetes Comorbidity  Goal: Blood Glucose Level Within Targeted Range  Outcome: Ongoing, Progressing     Problem: Skin Injury Risk Increased  Goal: Skin Health and Integrity  Outcome: Ongoing, Progressing     Problem: Fall Injury Risk  Goal: Absence of Fall and Fall-Related Injury  Outcome: Ongoing, Progressing     Problem: Mobility Impairment  Goal: Optimal Mobility [place] and Safety  Outcome: Ongoing, Progressing

## 2023-07-27 NOTE — PLAN OF CARE
Goal Outcome Evaluation:  Plan of Care Reviewed With: patient reports continues with weakness, progressing with therapy. Will continue to monitor.

## 2023-07-27 NOTE — PROGRESS NOTES
[facility]  PROGRESS NOTE     Patient Identification:  Name:  Antonio Canseco  Age:  67 y.o.  Sex:  male  :  1955  MRN:  [id number]  Visit Number:  [id number]  ROOM: [facility]     Primary Care Provider:  Adriana Ledesma MD    Length of stay in inpatient status:  21    Subjective     Chief Compliant:  No chief complaint on file.      History of Presenting Illness: 67-year-old gentleman who is status post CVA with left-sided weakness.  Patient states he feels some stronger today and states that he has had an easier time standing today than days past.  Has no new complaints otherwise    Objective     Current Hospital Meds:allopurinol, 300 mg, Oral, Daily  atorvastatin, 80 mg, Oral, Daily  magnesium oxide, 400 mg, Oral, Daily  metoprolol tartrate, 25 mg, Oral, Q12H  nystatin, , Topical, Q12H  pantoprazole, 40 mg, Oral, Q AM  rivaroxaban, 20 mg, Oral, Daily With Dinner  tamsulosin, 0.4 mg, Oral, Daily       ----------------------------------------------------------------------------------------------------------------------  Vital Signs:  Temp:  [98.4 øF (36.9 øC)-98.7 øF (37.1 øC)] 98.4 øF (36.9 øC)  Heart Rate:  [84-96] 96  Resp:  [16-18] 16  BP: (137-143)/(84-86) 143/86  SpO2:  [97 %-98 %] 98 %  on   ;   Device (Oxygen Therapy): room air  Body mass index is 40.57 kg/mý.    Wt Readings from Last 3 Encounters:   23 121 kg (266 lb 12.1 oz)   22 121 kg (267 lb)   07/15/19 121 kg (267 lb 1.6 oz)     Intake & Output (last 3 days)          07    P.O. 822 1080 1320 240    Total Intake(mL/kg) 822 (6.8) 1080 (8.9) 1320 (10.9) 240 (2)    Urine (mL/kg/hr)        Stool        Total Output        Net +822 +1080 +1320 +240            Urine Unmeasured Occurrence 6 x 6 x 7 x 1 x    Stool Unmeasured Occurrence 4 x 1 x            Diet: Cardiac Diets; Healthy Heart (2-3 Na+); Texture: Regular Texture (IDDSI 7); Fluid  Consistency: Thin (IDDSI 0)  ----------------------------------------------------------------------------------------------------------------------  Physical exam:  Constitutional: No acute distress  HEENT: Normocephalic atraumatic  Neck:   Supple  Cardiovascular: Regular rate and rhythm  Pulmonary/Chest: Clear to auscultation  Abdominal: Positive bowel sounds soft.   Musculoskeletal: No arthropathy  Neurological: Left-sided weakness 2 out of 5 strength in the left lower extremity 3-4 out of 5 strength in left upper extremity.  Skin: No rash  Peripheral vascular: No edema  Genitourinary:  ----------------------------------------------------------------------------------------------------------------------    Last echocardiogram:    ----------------------------------------------------------------------------------------------------------------------  Results from last 7 days   Lab Units 07/22/23  0111   WBC 10*3/mm3 6.68   HEMOGLOBIN g/dL 10.1*   HEMATOCRIT % 32.1*   MCV fL 84.9   MCHC g/dL 31.5   PLATELETS 10*3/mm3 232         Results from last 7 days   Lab Units 07/22/23  0111   SODIUM mmol/L 137   POTASSIUM mmol/L 3.8   CHLORIDE mmol/L 104   CO2 mmol/L 23.2   BUN mg/dL 16   CREATININE mg/dL 0.80   CALCIUM mg/dL 8.9   GLUCOSE mg/dL 109*   ALBUMIN g/dL 3.2*   BILIRUBIN mg/dL 0.5   ALK PHOS U/L 84   AST (SGOT) U/L 15   ALT (SGPT) U/L 16   Estimated Creatinine Clearance: 116.4 mL/min (by C-G formula based on SCr of 0.8 mg/dL).  No results found for: AMMONIA              Glucose   Date/Time Value Ref Range Status   07/27/2023 0617 113 70 - 130 mg/dL Final     Comment:     Meter: [id number] : [id number] [place]   07/26/2023 1632 133 (H) 70 - 130 mg/dL Final     Comment:     Meter: [id number] : [id number] emiliano bailey   07/26/2023 0614 125 70 - 130 mg/dL Final     Comment:     Meter: [id number] : [id number] Bhavna Crystal   07/25/2023 1620 125 70 - 130 mg/dL Final     Comment:     Meter: [id number]  : [id number] Vinny Lemus   07/25/2023 1113 144 (H) 70 - 130 mg/dL Final     Comment:     Meter: [id number] : [id number] NAYLORPATRICK GALICIAA   07/25/2023 0625 130 70 - 130 mg/dL Final     Comment:     Meter: [id number] : [id number] Quang Neal   07/24/2023 1621 125 70 - 130 mg/dL Final     Comment:     Meter: [id number] : [id number] NAYLOR [place]   07/24/2023 1103 170 (H) 70 - 130 mg/dL Final     Comment:     Meter: [id number] : [id number] NAYLORPATRICK GALICIAA     No results found for: TSH, FREET4  No results found for: PREGTESTUR, PREGSERUM, HCG, HCGQUANT  Pain Management Panel           No data to display              Brief Urine Lab Results       None          No results found for: BLOODCX      No results found for: URINECX  No results found for: WOUNDCX  No results found for: STOOLCX        I have personally looked at the labs and they are summarized above.  ----------------------------------------------------------------------------------------------------------------------  Detailed radiology reports for the last 24 hours:    Imaging Results (Last 24 Hours)       ** No results found for the last 24 hours. **          Final impressions for the last 30 days of radiology reports:    CT Head Without Contrast    Result Date: 6/29/2023  Old and maturing subacute infarctions without gross hemorrhagic transformation. The possibility of new small interval infarctions in this case cannot be excluded. CRITICAL RESULT:   No. COMMUNICATION: Per this written report. Drafted by Neil Kearns on 6/29/2023 1:45 PM Final report signed by Neil Kearns on 6/29/2023 2:04 PM    FL Video Swallow Single Contrast    Result Date: 7/7/2023    Normal fluoroscopic video swallow exam.  Please see the speech pathologist's report for additional information.  This report was finalized on 7/7/2023 10:50 AM by Dr. Porfirio Montgomery MD.      PET/CT FDG Skull Base To Mid Thigh    Result Date: 6/26/2023  1. Innumerable  enlarged FDG avid cervical, mediastinal, axillary, mesenteric, retroperitoneal and inguinal lymph nodes are suspicious for lymphoma correlation with tissue biopsy is suggested. 2. FDG avid asymmetric mass involving the left oropharynx most likely consistent with lymphoma involvement correlation with tissue biopsy is suggested. 3. Status post ischemia involving left MCA and PCA. CRITICAL RESULT: No. COMMUNICATION: Per this written report. Drafted by Jj Johnson MD on 6/26/2023 5:21 PM Final report signed by Jj Johnson MD on 6/26/2023 8:12 PM   I have personally looked at the radiology images and read the final radiology report.    Assessment & Plan    Status post CVA with residual left hemiparesis--continue high-dose statin therapy and Xarelto.  Patient requiring maximum assistance for chair to bed transfer.  Moderate assistance for sit to stand and stand to sit transfers and using the parallel bars.  Ambulated 8 feet x 2 yesterday with maximum assistance in the parallel bars.  Patient requiring maximum assist for lower body dressing.    Atrial fibrillation rate is well controlled continue Xarelto    History of laryngeal CA/non-Hodgkin's lymphoma recommend outpatient oncology follow-up.    BPH continue Flomax.  Patient asymptomatic at this time    CHF preserved EF compensated    VTE Prophylaxis:   Mechanical Order History:       None          Pharmalogical Order History:        Ordered     Dose Route Frequency Stop    07/06/23 6689  rivaroxaban (XARELTO) tablet 20 mg        Question:  Are you ordering rivaroxaban 10 mg for the prevention of blood clots in an acutely ill medical patient?  Answer:  No    20 mg PO Daily With Dinner --                        Sj Jay MD  [facility]  07/27/23  10:40 EDT

## 2023-07-27 NOTE — THERAPY TREATMENT NOTE
Inpatient Rehabilitation - Occupational Therapy Treatment Note     Ronnie     Patient Name: Antonio Canseco  : 1955  MRN: [id number]    Today's Date: 2023                 Admit Date: 2023       No diagnosis found.    Patient Active Problem List   Diagnosis    Detrusor instability    Low testosterone in male    Disorder of prostate    Corporo-venous occlusive erectile dysfunction    CVA (cerebral vascular accident)       Past Medical History:   Diagnosis Date    Diabetes mellitus     Hypertension     Stroke        Past Surgical History:   Procedure Laterality Date    US GUIDED LYMPH NODE BIOPSY  2023             IRF OT ASSESSMENT FLOWSHEET (last 12 hours)       IRF OT Evaluation and Treatment       Row Name 23 1445          OT Time and Intention    Document Type daily treatment  -BF     Mode of Treatment occupational therapy  -BF     Patient Effort good  -BF     Symptoms Noted During/After Treatment none  -BF       Row Name 23 1445          General Information    Existing Precautions/Restrictions fall  L HP, <trunk support/balance, L side inattention  -BF     Limitations/Impairments safety/cognitive  -BF       Row Name 23 1445          Cognition/Psychosocial    Orientation Status (Cognition) oriented x 3  -BF     Follows Commands (Cognition) verbal cues/prompting required;repetition of directions required;physical/tactile prompts required;increased processing time needed;delayed response/completion  -BF       Row Name 23 1445          Bed-Chair Transfer    Bed-Chair [place] (Transfers) maximum assist (25% patient effort);2 person assist;verbal cues;nonverbal cues (demo/gesture)  -BF     Assistive Device (Bed-Chair Transfers) wheelchair  -BF       Row Name 23 1445          Chair-Bed Transfer    Chair-Bed [place] (Transfers) maximum assist (25% patient effort);2 person assist;verbal cues;nonverbal cues (demo/gesture)  -BF     Assistive Device (Chair-Bed  Transfers) wheelchair  -BF       Row Name 07/27/23 1445          Motor Skills    Neuromuscular Function left;upper extremity;moderate impairment  -BF     Motor Control/Coordination Interventions fine motor manipulation/dexterity activities;gross motor coordination activities;therapeutic exercise/ROM;neuro-muscular re-education  DIOMEDES ESCALANTE/BLANCA, C/FMC theract, BUE strengthening; BUE rickshaw 25 lbs X20X4, BUE PRE 15 mins  -BF       Row Name 07/27/23 1445          Neuromuscular Re-education    Interventions (Neuromuscular Re-education) facilitation/inhibition;massage  -BF     Positioning (Neuromuscular Re-education) sitting  -BF       Row Name 07/27/23 1445          Positioning and Restraints    In Bed fowlers;call light within reach;encouraged to call for assist  after second session  -BF     In Wheelchair sitting;with PT  after first session  -BF               User Key  (r) = Recorded By, (t) = Taken By, (c) = Cosigned By      Initials Name Effective Dates    BF Lisa Sanchez, CORY 07/11/23 -                      Occupational Therapy Education       Title: PT OT SLP Therapies (Done)       Topic: Occupational Therapy (Done)       Point: ADL training (Done)       Description:   Instruct learner(s) on proper safety adaptation and remediation techniques during self care or transfers.   Instruct in proper use of assistive devices.                  Learning Progress Summary             Patient Acceptance, E, VU,NR by BF at 7/27/2023 1445    Acceptance, E, VU,NR by BF at 7/25/2023 1251    Acceptance, E, VU,NR by HB at 7/24/2023 1355    Acceptance, E, NR,VU by BF at 7/21/2023 1248    Acceptance, E, NR by BF at 7/20/2023 1259    Acceptance, TB, NR by DL at 7/19/2023 2036    Acceptance, E, VU,NR by HB at 7/19/2023 1421    Acceptance, E, VU,NR by BF at 7/18/2023 1334    Acceptance, E, VU,NR by BF at 7/17/2023 1431    Acceptance, E, VU,NR by BF at 7/17/2023 1430    Acceptance, E, VU,NR by BF at 7/14/2023 8338     Acceptance, E, VU,NR by BF at 7/13/2023 1343    Acceptance, E,D, VU,NR by TM at 7/12/2023 1407    Acceptance, D,E, NR,VU by TM at 7/11/2023 1434    Acceptance, E, VU,NR by BF at 7/10/2023 1456    Acceptance, E, VU,NR by HB at 7/8/2023 1140    Acceptance, E, VU,NR by BF at 7/7/2023 1442                         Point: Precautions (Done)       Description:   Instruct learner(s) on prescribed precautions during self-care and functional transfers.                  Learning Progress Summary             Patient Acceptance, E, VU,NR by BF at 7/27/2023 1445    Acceptance, E, VU,NR by BF at 7/25/2023 1251    Acceptance, E, VU,NR by HB at 7/24/2023 1355    Acceptance, E, NR,VU by BF at 7/21/2023 1248    Acceptance, E, NR by BF at 7/20/2023 1259    Acceptance, TB, NR by DL at 7/19/2023 2036    Acceptance, E, VU,NR by HB at 7/19/2023 1421    Acceptance, E, VU,NR by BF at 7/18/2023 1334    Acceptance, E, VU,NR by BF at 7/17/2023 1431    Acceptance, E, VU,NR by BF at 7/17/2023 1430    Acceptance, E, VU,NR by BF at 7/14/2023 1508    Acceptance, E, VU,NR by BF at 7/13/2023 1343    Acceptance, E,D, VU,NR by TM at 7/12/2023 1407    Acceptance, D,E, NR,VU by TM at 7/11/2023 1434    Acceptance, E, VU,NR by BF at 7/10/2023 1456    Acceptance, E, VU,NR by HB at 7/8/2023 1140    Acceptance, E, VU,NR by BF at 7/7/2023 1442                                         User Key       Initials Effective Dates Name Provider Type Discipline    BF 05/31/23 - 07/10/23 Lisa Sanchez, OT Occupational Therapist OT    BF 07/11/23 -  Lisa Sanchez, OT Occupational Therapist OT    TM 06/16/21 -  Marileos Capone, OT Occupational Therapist OT    HB 05/25/21 -  Veronica Robles OT Occupational Therapist OT    DL 03/16/22 -  Nadja Velasquez, RN Registered Nurse Nurse                        OT Recommendation and Plan    Planned Therapy Interventions (OT): activity tolerance training, adaptive equipment training, BADL retraining,  neuromuscular control/coordination retraining, passive ROM/stretching, ROM/therapeutic exercise, strengthening exercise, transfer/mobility retraining                    Time Calculation:      Time Calculation- OT       Row Name 07/27/23 1451 07/27/23 0800          Time Calculation- OT    OT Start Time 1045  -BF 0800  -BF     OT Stop Time 1130  -BF 0915  -BF     OT Time Calculation (min) 45 min  -BF 75 min  -BF     Total Timed Code Minutes- OT 45 minute(s)  -BF 75 minute(s)  -BF     OT Non-Billable Time (min) -- 10 min  -BF               User Key  (r) = Recorded By, (t) = Taken By, (c) = Cosigned By      Initials Name Provider Type    BF Lisa Sanchez OT Occupational Therapist                  Therapy Charges for Today       Code Description Service Date Service Provider Modifiers Qty    52636716768 HC OT SELF CARE/MGMT/TRAIN EA 15 MIN 7/27/2023 Lisa Sanchez OT GO 2    98318003582 HC OT THERAPEUTIC ACT EA 15 MIN 7/27/2023 Lisa Sanchez OT GO 1    80238135772 HC OT NEUROMUSC RE EDUCATION EA 15 MIN 7/27/2023 Lisa Sanchez OT GO 3    56070897339 HC OT THER PROC EA 15 MIN 7/27/2023 Lisa Sanchez OT GO 2                     Lisa Sanchez OT  7/27/2023

## 2023-07-27 NOTE — THERAPY TREATMENT NOTE
Inpatient Rehabilitation - Physical Therapy Treatment Note       THALIA Trujillo     Patient Name: Antonio Canseco  : 1955  MRN: [id number]    Today's Date: 2023                    Admit Date: 2023      Visit Dx:   No diagnosis found.    Patient Active Problem List   Diagnosis    Detrusor instability    Low testosterone in male    Disorder of prostate    Corporo-venous occlusive erectile dysfunction    CVA (cerebral vascular accident)       Past Medical History:   Diagnosis Date    Diabetes mellitus     Hypertension     Stroke        Past Surgical History:   Procedure Laterality Date    US GUIDED LYMPH NODE BIOPSY  2023       PT ASSESSMENT (last 12 hours)       IRF PT Evaluation and Treatment       Row Name 23 1355          PT Time and Intention    Document Type daily treatment  -KM     Mode of Treatment individual therapy;physical therapy  -KM       Row Name 23 1356          General Information    Patient Profile Reviewed yes  -KM     Existing Precautions/Restrictions fall  L HP, <trunk support/balance, L side inattention  -KM     Limitations/Impairments safety/cognitive  -KM       Row Name 23 1355          Cognition/Psychosocial    Affect/Mental Status (Cognition) WFL  -KM     Follows Commands (Cognition) verbal cues/prompting required;physical/tactile prompts required;follows one-step commands  -KM     Personal Safety Interventions fall prevention program maintained;gait belt;muscle strengthening facilitated;nonskid shoes/slippers when out of bed  -KM     Cognitive Function safety deficit  -KM       Row Name 23 6481          Bed Mobility    Bed Mobility supine-sit;sit-supine  -KM     Supine-Sit [place] (Bed Mobility) nonverbal cues (demo/gesture);verbal cues;maximum assist (25% patient effort)  -KM     Sit-Supine [place] (Bed Mobility) maximum assist (25% patient effort);verbal cues;nonverbal cues (demo/gesture)  -KM     Supine-Sit-Supine [place] (Bed  Mobility) maximum assist (25% patient effort);verbal cues;nonverbal cues (demo/gesture)  -KM     Bed Mobility, Safety Issues decreased use of arms for pushing/pulling;decreased use of legs for bridging/pushing  -       Row Name 07/27/23 [facility]5          Transfer Assessment/Treatment    Transfers bed-chair transfer;chair-bed transfer  -       Row Name 07/27/23 [facility]5          Bed-Chair Transfer    Bed-Chair [place] (Transfers) maximum assist (25% patient effort);verbal cues;nonverbal cues (demo/gesture)  -     Assistive Device (Bed-Chair Transfers) wheelchair  -       Row Name 07/27/23 [facility]5          Chair-Bed Transfer    Chair-Bed [place] (Transfers) maximum assist (25% patient effort);verbal cues;nonverbal cues (demo/gesture)  -       Row Name 07/27/23 [facility]5          Sit-Stand Transfer    Sit-Stand [place] (Transfers) verbal cues;nonverbal cues (demo/gesture);minimum assist (75% patient effort)  -     Assistive Device (Sit-Stand Transfers) parallel bars  -       Row Name 07/27/23 [facility]5          Stand-Sit Transfer    Stand-Sit [place] (Transfers) verbal cues;nonverbal cues (demo/gesture);minimum assist (75% patient effort)  -     Assistive Device (Stand-Sit Transfers) parallel bars  -       Row Name 07/27/23 [facility]5          Gait/Stairs (Locomotion)    [place] Level (Gait) maximum assist (25% patient effort);verbal cues;nonverbal cues (demo/gesture)  -     Assistive Device (Gait) parallel bars  -     Distance in Feet (Gait) 8' x3  -KM     Pattern (Gait) --  dependent to advance LLE  -KM     Deviations/Abnormal Patterns (Gait) base of support, narrow;hebert decreased;gait speed decreased;stride length decreased;weight shifting decreased  -KM     Bilateral Gait Deviations forward flexed posture  -       Row Name 07/27/23 1355          Safety Issues, Functional Mobility    Impairments Affecting Function (Mobility) balance;coordination;endurance/activity tolerance;grasp;motor  control;muscle tone abnormal;postural/trunk control;range of motion (ROM);strength  -KM       Row Name 07/27/23 1351          Motor Skills    Comments, Therapeutic Exercise seated ther-ex: RLE 2.5# kicks, marches, hip abd/add, ham curls. Supine ther-ex: LLE AAROM heel slides, hip abd/add  -KM       Row Name 07/27/23 1358          Neuromuscular Re-education    Interventions (Neuromuscular Re-education) facilitation/inhibition  -KM     Positioning (Neuromuscular Re-education) sitting  -KM       Row Name 07/27/23 1357          Modality Treatment    Treatment Location 1 (Modality) L quad  -KM     Modality Performed electrical stimulation  -KM       Row Name 07/27/23 0743          Positioning and Restraints    Pre-Treatment Position other (comment)  wheelchair  -KM     Post Treatment Position wheelchair  -KM     In Wheelchair sitting;with OT  -KM       Row Name 07/27/23 1858          Daily Progress Summary (PT)    Daily Progress Summary (PT) Pt. was able to demonstrate functional mobility skills w/ modA-maxA. He ambulated 8' in parallel bars x3. He continues to require LLE advancement dependently w/ ambulation. He tolerated session well w/ no complaints. Pt. would continue to benefit from skilled PT services.  -KM               User Key  (r) = Recorded By, (t) = Taken By, (c) = Cosigned By      Initials Name Provider Type    Pj Frank, PT Physical Therapist                     Physical Therapy Education       Title: PT OT SLP Therapies (Done)       Topic: Physical Therapy (Done)       Point: Mobility training (Done)       Learning Progress Summary             Patient Acceptance, E,D, VU,NR by RG at 7/26/2023 1518    Acceptance, E, VU,NR by LB at 7/25/2023 1133    Acceptance, E, VU,NR by LB at 7/24/2023 1444    Acceptance, E, VU,NR by LB at 7/21/2023 1615    Acceptance, E,D, VU,NR by RG at 7/20/2023 1107    Acceptance, TB, NR by DL at 7/19/2023 2036    Acceptance, E,D, VU,NR by RG at 7/19/2023 1508    Acceptance,  E,D, VU,NR by RG at 7/18/2023 1520    Acceptance, E,D, VU,NR by RG at 7/17/2023 1522    Acceptance, E,D, VU,NR by LL at 7/13/2023 1358    Acceptance, E, VU,NR by LB at 7/12/2023 1144    Acceptance, E, VU,NR by LB at 7/11/2023 1426    Acceptance, E, VU,NR by LB at 7/10/2023 1452    Acceptance, E,D, VU,NR by LL at 7/8/2023 1228    Acceptance, E,D, VU,NR by LB at 7/7/2023 1449                         Point: Home exercise program (Done)       Learning Progress Summary             Patient Acceptance, E,D, VU,NR by RG at 7/26/2023 1518    Acceptance, E, VU,NR by LB at 7/25/2023 1133    Acceptance, E, VU,NR by LB at 7/24/2023 1444    Acceptance, E, VU,NR by LB at 7/21/2023 1615    Acceptance, E,D, VU,NR by RG at 7/20/2023 1107    Acceptance, TB, NR by DL at 7/19/2023 2036    Acceptance, E,D, VU,NR by RG at 7/19/2023 1508    Acceptance, E,D, VU,NR by RG at 7/18/2023 1520    Acceptance, E,D, VU,NR by RG at 7/17/2023 1522    Acceptance, E,D, VU,NR by LL at 7/13/2023 1358    Acceptance, E, VU,NR by LB at 7/12/2023 1144    Acceptance, E, VU,NR by LB at 7/11/2023 1426    Acceptance, E, VU,NR by LB at 7/10/2023 1452    Acceptance, E,D, VU,NR by LL at 7/8/2023 1228    Acceptance, E,D, VU,NR by LB at 7/7/2023 1449                         Point: Body mechanics (Done)       Learning Progress Summary             Patient Acceptance, E,D, VU,NR by RG at 7/26/2023 1518    Acceptance, E, VU,NR by LB at 7/25/2023 1133    Acceptance, E, VU,NR by LB at 7/24/2023 1444    Acceptance, E, VU,NR by LB at 7/21/2023 1615    Acceptance, E,D, VU,NR by RG at 7/20/2023 1107    Acceptance, TB, NR by DL at 7/19/2023 2036    Acceptance, E,D, VU,NR by RG at 7/19/2023 1508    Acceptance, E,D, VU,NR by RG at 7/18/2023 1520    Acceptance, E,D, VU,NR by RG at 7/17/2023 1522    Acceptance, E,D, VU,NR by LL at 7/13/2023 1358    Acceptance, E, VU,NR by LB at 7/12/2023 1144    Acceptance, E, VU,NR by LB at 7/11/2023 1426    Acceptance, E, VU,NR by LB at 7/10/2023  1452    Acceptance, E,D, VU,NR by LL at 7/8/2023 1228    Acceptance, E,D, VU,NR by LB at 7/7/2023 1449                         Point: Precautions (Done)       Learning Progress Summary             Patient Acceptance, E,D, VU,NR by RG at 7/26/2023 1518    Acceptance, E, VU,NR by LB at 7/25/2023 1133    Acceptance, E, VU,NR by LB at 7/24/2023 1444    Acceptance, E, VU,NR by LB at 7/21/2023 1615    Acceptance, E,D, VU,NR by RG at 7/20/2023 1107    Acceptance, TB, NR by DL at 7/19/2023 2036    Acceptance, E,D, VU,NR by RG at 7/19/2023 1508    Acceptance, E,D, VU,NR by RG at 7/18/2023 1520    Acceptance, E,D, VU,NR by RG at 7/17/2023 1522    Acceptance, E,D, VU,NR by LL at 7/13/2023 1358    Acceptance, E, VU,NR by LB at 7/12/2023 1144    Acceptance, E, VU,NR by LB at 7/11/2023 1426    Acceptance, E, VU,NR by LB at 7/10/2023 1452    Acceptance, E,D, VU,NR by LL at 7/8/2023 1228    Acceptance, E,D, VU,NR by LB at 7/7/2023 1449                                         User Key       Initials Effective Dates Name Provider Type Discipline    LB 06/16/21 -  Joslyn Garcia, PT Physical Therapist PT    LL 05/02/16 -  Connie Velasquez, JILL Physical Therapist Assistant PT    RG 06/16/21 -  Vu Brown PTA Physical Therapist Assistant PT    DL 03/16/22 -  Nadja Velasquez, RN Registered Nurse Nurse                    PT Recommendation and Plan          Daily Progress Summary (PT)  Daily Progress Summary (PT): Pt. was able to demonstrate functional mobility skills w/ modA-maxA. He ambulated 8' in parallel bars x3. He continues to require LLE advancement dependently w/ ambulation. He tolerated session well w/ no complaints. Pt. would continue to benefit from skilled PT services.               Time Calculation:      PT Charges       Row Name 07/27/23 1353             Time Calculation    Start Time 0915  -KM      Stop Time 1045  -KM      Time Calculation (min) 90 min  -KM      PT Received On 07/27/23  -KM         Time  Calculation- PT    Total Timed Code Minutes- PT 90 minute(s)  -JAVIER                User Key  (r) = Recorded By, (t) = Taken By, (c) = Cosigned By      Initials Name Provider Type    Pj Frank, PT Physical Therapist                    Therapy Charges for Today       Code Description Service Date Service Provider Modifiers Qty    55066516998  GAIT TRAINING EA 15 MIN 7/27/2023 Pj Spear, PT GP 1    [id number]  PT THER PROC EA 15 MIN 7/27/2023 Pj Spear, PT GP 2    [id number]  PT THERAPEUTIC ACT EA 15 MIN 7/27/2023 Pj Spear, PT GP 2    [id number]  PT NEUROMUSC RE EDUCATION EA 15 MIN 7/27/2023 Pj Spear, PT GP 1                     Pj Spear PT  7/27/2023     ambulatory

## 2023-07-27 NOTE — PROGRESS NOTES
Occupational Therapy: Individual: 120 minutes.    Physical Therapy:    Speech Language Pathology:    Signed by: Lisa Sanchez OT

## 2023-07-28 LAB
GLUCOSE BLDC GLUCOMTR-MCNC: 108 MG/DL (ref 70–130)
GLUCOSE BLDC GLUCOMTR-MCNC: 129 MG/DL (ref 70–130)

## 2023-07-28 PROCEDURE — 97110 THERAPEUTIC EXERCISES: CPT | Performed by: OCCUPATIONAL THERAPIST

## 2023-07-28 PROCEDURE — 97116 GAIT TRAINING THERAPY: CPT

## 2023-07-28 PROCEDURE — 97112 NEUROMUSCULAR REEDUCATION: CPT | Performed by: OCCUPATIONAL THERAPIST

## 2023-07-28 PROCEDURE — 97110 THERAPEUTIC EXERCISES: CPT

## 2023-07-28 PROCEDURE — 82948 REAGENT STRIP/BLOOD GLUCOSE: CPT

## 2023-07-28 PROCEDURE — 99231 SBSQ HOSP IP/OBS SF/LOW 25: CPT | Performed by: FAMILY MEDICINE

## 2023-07-28 PROCEDURE — 97535 SELF CARE MNGMENT TRAINING: CPT | Performed by: OCCUPATIONAL THERAPIST

## 2023-07-28 PROCEDURE — 97112 NEUROMUSCULAR REEDUCATION: CPT

## 2023-07-28 RX ADMIN — RIVAROXABAN 20 MG: 20 TABLET, FILM COATED ORAL at 18:31

## 2023-07-28 RX ADMIN — METOPROLOL TARTRATE 25 MG: 25 TABLET, FILM COATED ORAL at 21:34

## 2023-07-28 RX ADMIN — METOPROLOL TARTRATE 25 MG: 25 TABLET, FILM COATED ORAL at 09:21

## 2023-07-28 RX ADMIN — NYSTATIN: 100000 POWDER TOPICAL at 09:28

## 2023-07-28 RX ADMIN — ACETAMINOPHEN 650 MG: 325 TABLET ORAL at 21:34

## 2023-07-28 RX ADMIN — MAGNESIUM GLUCONATE 500 MG ORAL TABLET 400 MG: 500 TABLET ORAL at 09:26

## 2023-07-28 RX ADMIN — NYSTATIN: 100000 POWDER TOPICAL at 21:34

## 2023-07-28 RX ADMIN — HYDROXYZINE HYDROCHLORIDE 25 MG: 25 TABLET, FILM COATED ORAL at 21:34

## 2023-07-28 RX ADMIN — ATORVASTATIN CALCIUM 80 MG: 40 TABLET, FILM COATED ORAL at 09:21

## 2023-07-28 RX ADMIN — TAMSULOSIN HYDROCHLORIDE 0.4 MG: 0.4 CAPSULE ORAL at 09:21

## 2023-07-28 RX ADMIN — ALLOPURINOL 300 MG: 300 TABLET ORAL at 09:21

## 2023-07-28 RX ADMIN — PANTOPRAZOLE SODIUM 40 MG: 40 TABLET, DELAYED RELEASE ORAL at 06:10

## 2023-07-28 NOTE — THERAPY PROGRESS REPORT/RE-CERT
Inpatient Rehabilitation - Physical Therapy Treatment Note        Ronnie     Patient Name: Antonio Canseco  : 1955  MRN: [id number]    Today's Date: 2023                    Admit Date: 2023      Visit Dx:   No diagnosis found.    Patient Active Problem List   Diagnosis    Detrusor instability    Low testosterone in male    Disorder of prostate    Corporo-venous occlusive erectile dysfunction    CVA (cerebral vascular accident)       Past Medical History:   Diagnosis Date    Diabetes mellitus     Hypertension     Stroke        Past Surgical History:   Procedure Laterality Date    US GUIDED LYMPH NODE BIOPSY  2023       PT ASSESSMENT (last 12 hours)       IRF PT Evaluation and Treatment       Row Name 23 1224          PT Time and Intention    Document Type daily treatment;progress note  -RM     Mode of Treatment individual therapy;physical therapy  -RM     Patient/Family/Caregiver Comments/Observations No significant c/o noted this date.  -RM       Row Name 23 1224          General Information    Patient Profile Reviewed yes  -RM     Existing Precautions/Restrictions fall  L HP, <trunk support/balance, L side inattention  -RM     Limitations/Impairments safety/cognitive  -RM       Row Name 23 1224          Cognition/Psychosocial    Affect/Mental Status (Cognition) WFL;other (see comments)  -RM     Follows Commands (Cognition) verbal cues/prompting required;physical/tactile prompts required;follows one-step commands  -RM     Personal Safety Interventions gait belt;nonskid shoes/slippers when out of bed;fall prevention program maintained  -RM     Cognitive Function safety deficit  -RM       Row Name 23 1224          Bed Mobility    Bed Mobility supine-sit;sit-supine  -RM     Supine-Sit [place] (Bed Mobility) nonverbal cues (demo/gesture);verbal cues;maximum assist (25% patient effort);2 person assist  -RM     Sit-Supine [place] (Bed Mobility) maximum assist (25%  patient effort);verbal cues;nonverbal cues (demo/gesture);2 person assist  -RM     Bed Mobility, Safety Issues decreased use of arms for pushing/pulling;decreased use of legs for bridging/pushing  -RM       Row Name 07/28/23 1224          Transfer Assessment/Treatment    Transfers bed-chair transfer;chair-bed transfer  -RM       Row Name 07/28/23 1224          Bed-Chair Transfer    Bed-Chair [place] (Transfers) maximum assist (25% patient effort);verbal cues;nonverbal cues (demo/gesture);2 person assist  -RM     Assistive Device (Bed-Chair Transfers) wheelchair  -RM       Row Name 07/28/23 1224          Chair-Bed Transfer    Chair-Bed [place] (Transfers) maximum assist (25% patient effort);verbal cues;nonverbal cues (demo/gesture);2 person assist  -RM       Row Name 07/28/23 1224          Sit-Stand Transfer    Sit-Stand [place] (Transfers) verbal cues;nonverbal cues (demo/gesture);minimum assist (75% patient effort)  -RM     Assistive Device (Sit-Stand Transfers) parallel bars  -RM       Row Name 07/28/23 1224          Stand-Sit Transfer    Stand-Sit [place] (Transfers) verbal cues;nonverbal cues (demo/gesture);minimum assist (75% patient effort)  -RM     Assistive Device (Stand-Sit Transfers) parallel bars  -RM       Row Name 07/28/23 1224          Gait/Stairs (Locomotion)    [place] Level (Gait) maximum assist (25% patient effort);verbal cues;nonverbal cues (demo/gesture);2 person assist  -RM     Assistive Device (Gait) parallel bars  -RM     Distance in Feet (Gait) 8'X3  -RM     Pattern (Gait) --  dependent to advance LLE  -RM     Deviations/Abnormal Patterns (Gait) base of support, narrow;hebert decreased;gait speed decreased;stride length decreased;weight shifting decreased  -RM     Bilateral Gait Deviations forward flexed posture  -RM     Comment, (Gait/Stairs) Pt cued for upright posture and W/S; mirror used for postural feedback. Pt takes short steps on RLE and is dependent for LLE  advancement.  -RM       Row Name 07/28/23 1224          Safety Issues, Functional Mobility    Impairments Affecting Function (Mobility) balance;coordination;endurance/activity tolerance;grasp;motor control;muscle tone abnormal;postural/trunk control;range of motion (ROM);strength  -RM       Row Name 07/28/23 1224          Hip (Therapeutic Exercise)    Hip Strengthening (Therapeutic Exercise) bilateral;flexion;extension;aBduction;aDduction;heel slides;marching while seated;sitting;2 lb free weight;resistance band;red;2 sets;10 repetitions;other (see comments)  #2 on RLE; AAROM/PROM required for LLE  -RM       Row Name 07/28/23 1224          Knee (Therapeutic Exercise)    Knee Strengthening (Therapeutic Exercise) bilateral;flexion;extension;heel slides;marching while seated;hamstring curls;LAQ (long arc quad);sitting;2 lb free weight;resistance band;red;2 sets;10 repetitions  #2 on RLE; AAROM/PROM required for LLE  -RM       Row Name 07/28/23 1224          Ankle (Therapeutic Exercise)    Ankle Strengthening (Therapeutic Exercise) bilateral;dorsiflexion;plantarflexion;sitting;2 lb free weight;2 sets;10 repetitions  PROM performed on LLE.  -RM       Row Name 07/28/23 1224          Turkmen Electrical Stimulation Treatment    Location 1 (Russian E-Stim Treatment) lower extremity treatment area;other (see comments)  L ant tib and L quad  -RM     Indications (Russian E-Stim Treatment) enhance muscle contraction  -RM     Treatment Details (Russian Electrical Stimulation Treatment) 15 mins, 10/20, 60 mA on ant tib, 40 mA on quad  -RM     Comment (Russian E-Stim Treatment) Contraction noted on ant tib; no visible contraction noted on quad. Pt unable to contract either muscle with on cycle. No skin changes noted.  -RM       Row Name 07/28/23 1224          Positioning and Restraints    Pre-Treatment Position sitting in chair/recliner  -RM     Post Treatment Position bed  -RM     In Bed supine;call light within reach;encouraged  to call for assist;exit alarm on  -RM       Row Name 07/28/23 1224          Daily Progress Summary (PT)    Daily Progress Summary (PT) Pt continues to require significant assistance for all functional mobility at this time. LLE strength and neuromuscular deficits remain. Continued skilled care required for further LE strengthening to ensure maximum safe function upon D/C.  -RM     Impairments Still Limiting Function (PT) balance impairment;coordination impairment;functional activity tolerance impairment;motor control impaired;pain;postural control impaired;strength deficit;sensory impairment  -RM     Recommendations (PT) Continue per current POC  -RM       Row Name 07/28/23 1224          Bed Mobility Goal 1 (PT-IRF)    Progress/Outcomes (Bed Mobility Goal 1, PT-IRF) goal ongoing  -RM       Row Name 07/28/23 1224          Bed Mobility Goal 2 (PT-IRF)    Progress/Outcomes (Bed Mobility Goal 2, PT-IRF) goal ongoing  -RM       Row Name 07/28/23 1224          Transfer Goal 1 (PT-IRF)    Progress/Outcomes (Transfer Goal 1, PT-IRF) goal partially met  Pt able to stand with [place] in parallel bars  -RM       Row Name 07/28/23 1224          Transfer Goal 2 (PT-IRF)    Progress/Outcomes (Transfer Goal 2, PT-IRF) goal ongoing  -RM       Row Name 07/28/23 1224          Gait/Walking Locomotion Goal 1 (PT-IRF)    Progress/Outcomes (Gait/Walking Locomotion Goal 1, PT-IRF) goal ongoing  -RM       Row Name 07/28/23 1224          Gait/Walking Locomotion Goal 2 (PT-IRF)    Progress/Outcomes (Gait/Walking Locomotion Goal 2, PT-IRF) goal ongoing  -RM               User Key  (r) = Recorded By, (t) = Taken By, (c) = Cosigned By      Initials Name Provider Type    Shelley Degroot PTA Physical Therapist Assistant                     Physical Therapy Education       Title: PT OT SLP Therapies (Done)       Topic: Physical Therapy (Done)       Point: Mobility training (Done)       Learning Progress Summary             Patient  Acceptance, E,TB, VU by RM at 7/28/2023 1230    Acceptance, E,D, VU,NR by RG at 7/26/2023 1518    Acceptance, E, VU,NR by LB at 7/25/2023 1133    Acceptance, E, VU,NR by LB at 7/24/2023 1444    Acceptance, E, VU,NR by LB at 7/21/2023 1615    Acceptance, E,D, VU,NR by RG at 7/20/2023 1107    Acceptance, TB, NR by DL at 7/19/2023 2036    Acceptance, E,D, VU,NR by RG at 7/19/2023 1508    Acceptance, E,D, VU,NR by RG at 7/18/2023 1520    Acceptance, E,D, VU,NR by RG at 7/17/2023 1522    Acceptance, E,D, VU,NR by LL at 7/13/2023 1358    Acceptance, E, VU,NR by LB at 7/12/2023 1144    Acceptance, E, VU,NR by LB at 7/11/2023 1426    Acceptance, E, VU,NR by LB at 7/10/2023 1452    Acceptance, E,D, VU,NR by LL at 7/8/2023 1228    Acceptance, E,D, VU,NR by LB at 7/7/2023 1449                         Point: Home exercise program (Done)       Learning Progress Summary             Patient Acceptance, E,TB, VU by RM at 7/28/2023 1230    Acceptance, E,D, VU,NR by RG at 7/26/2023 1518    Acceptance, E, VU,NR by LB at 7/25/2023 1133    Acceptance, E, VU,NR by LB at 7/24/2023 1444    Acceptance, E, VU,NR by LB at 7/21/2023 1615    Acceptance, E,D, VU,NR by RG at 7/20/2023 1107    Acceptance, TB, NR by DL at 7/19/2023 2036    Acceptance, E,D, VU,NR by RG at 7/19/2023 1508    Acceptance, E,D, VU,NR by RG at 7/18/2023 1520    Acceptance, E,D, VU,NR by RG at 7/17/2023 1522    Acceptance, E,D, VU,NR by LL at 7/13/2023 1358    Acceptance, E, VU,NR by LB at 7/12/2023 1144    Acceptance, E, VU,NR by LB at 7/11/2023 1426    Acceptance, E, VU,NR by LB at 7/10/2023 1452    Acceptance, E,D, VU,NR by LL at 7/8/2023 1228    Acceptance, E,D, VU,NR by LB at 7/7/2023 1449                         Point: Body mechanics (Done)       Learning Progress Summary             Patient Acceptance, E,TB, VU by RM at 7/28/2023 1230    Acceptance, E,D, VU,NR by RG at 7/26/2023 1518    Acceptance, E, VU,NR by LB at 7/25/2023 1133    Acceptance, E, VU,NR by LB at  7/24/2023 1444    Acceptance, E, VU,NR by LB at 7/21/2023 1615    Acceptance, E,D, VU,NR by RG at 7/20/2023 1107    Acceptance, TB, NR by DL at 7/19/2023 2036    Acceptance, E,D, VU,NR by RG at 7/19/2023 1508    Acceptance, E,D, VU,NR by RG at 7/18/2023 1520    Acceptance, E,D, VU,NR by RG at 7/17/2023 1522    Acceptance, E,D, VU,NR by LL at 7/13/2023 1358    Acceptance, E, VU,NR by LB at 7/12/2023 1144    Acceptance, E, VU,NR by LB at 7/11/2023 1426    Acceptance, E, VU,NR by LB at 7/10/2023 1452    Acceptance, E,D, VU,NR by LL at 7/8/2023 1228    Acceptance, E,D, VU,NR by LB at 7/7/2023 1449                         Point: Precautions (Done)       Learning Progress Summary             Patient Acceptance, E,TB, VU by RM at 7/28/2023 1230    Acceptance, E,D, VU,NR by RG at 7/26/2023 1518    Acceptance, E, VU,NR by LB at 7/25/2023 1133    Acceptance, E, VU,NR by LB at 7/24/2023 1444    Acceptance, E, VU,NR by LB at 7/21/2023 1615    Acceptance, E,D, VU,NR by RG at 7/20/2023 1107    Acceptance, TB, NR by DL at 7/19/2023 2036    Acceptance, E,D, VU,NR by RG at 7/19/2023 1508    Acceptance, E,D, VU,NR by RG at 7/18/2023 1520    Acceptance, E,D, VU,NR by RG at 7/17/2023 1522    Acceptance, E,D, VU,NR by LL at 7/13/2023 1358    Acceptance, E, VU,NR by LB at 7/12/2023 1144    Acceptance, E, VU,NR by LB at 7/11/2023 1426    Acceptance, E, VU,NR by LB at 7/10/2023 1452    Acceptance, E,D, VU,NR by LL at 7/8/2023 1228    Acceptance, E,D, VU,NR by LB at 7/7/2023 1449                                         User Key       Initials Effective Dates Name Provider Type Discipline    LB 06/16/21 -  Joslyn Garcia, PT Physical Therapist PT    LL 05/02/16 -  Connie Velasquez, PTA Physical Therapist Assistant PT    RM 02/17/23 -  Shelley Hanson, PTA Physical Therapist Assistant PT    RG 06/16/21 -  Vu Brown, PTA Physical Therapist Assistant PT    DL 03/16/22 -  Nadja Velasquez, RN Registered Nurse Nurse                     PT Recommendation and Plan          Daily Progress Summary (PT)  Daily Progress Summary (PT): Pt continues to require significant assistance for all functional mobility at this time. LLE strength and neuromuscular deficits remain. Continued skilled care required for further LE strengthening to ensure maximum safe function upon D/C.  Impairments Still Limiting Function (PT): balance impairment, coordination impairment, functional activity tolerance impairment, motor control impaired, pain, postural control impaired, strength deficit, sensory impairment  Recommendations (PT): Continue per current POC               Time Calculation:      PT Charges       Row Name 07/28/23 1231             Time Calculation    Start Time 0915  -RM      Stop Time 1045  -RM      Time Calculation (min) 90 min  -RM      PT Received On 07/28/23  -RM      PT - Next Appointment 07/31/23  -RM      PT Goal Re-Cert Due Date 08/04/23  -RM         Time Calculation- PT    Total Timed Code Minutes- PT 90 minute(s)  -RM                User Key  (r) = Recorded By, (t) = Taken By, (c) = Cosigned By      Initials Name Provider Type     Shelley Hanson PTA Physical Therapist Assistant                    Therapy Charges for Today       Code Description Service Date Service Provider Modifiers Qty    98969412542 HC GAIT TRAINING EA 15 MIN 7/28/2023 Shelley Hanson PTA GP, CQ 2    [id number] HC PT NEUROMUSC RE EDUCATION EA 15 MIN 7/28/2023 Shelley Hanson PTA GP, CQ 2    [id number] HC PT THER PROC EA 15 MIN 7/28/2023 Shelley Hanson PTA GP, CQ 2                     Shelley Hanson PTA  7/28/2023

## 2023-07-28 NOTE — PROGRESS NOTES
[facility]  PROGRESS NOTE     Patient Identification:  Name:  Antonio Canseco  Age:  67 y.o.  Sex:  male  :  1955  MRN:  [id number]  Visit Number:  [id number]  ROOM: [facility]     Primary Care Provider:  Adriana Ledesma MD    Length of stay in inpatient status:  22    Subjective     Chief Compliant:  No chief complaint on file.      History of Presenting Illness: 67-year-old gentleman who is status post CVA with left-sided weakness.  Patient states he is beginning to feel some stronger in the left leg and it appears that he has some strength more proximal musculature of the left lower extremity.  Has no new complaints    Objective     Current Hospital Meds:allopurinol, 300 mg, Oral, Daily  atorvastatin, 80 mg, Oral, Daily  magnesium oxide, 400 mg, Oral, Daily  metoprolol tartrate, 25 mg, Oral, Q12H  nystatin, , Topical, Q12H  pantoprazole, 40 mg, Oral, Q AM  rivaroxaban, 20 mg, Oral, Daily With Dinner  tamsulosin, 0.4 mg, Oral, Daily       ----------------------------------------------------------------------------------------------------------------------  Vital Signs:  Temp:  [98 øF (36.7 øC)] 98 øF (36.7 øC)  Heart Rate:  [] 100  Resp:  [18] 18  BP: (114-149)/(59-70) 149/70  SpO2:  [98 %] 98 %  on   ;      Body mass index is 40.57 kg/mý.    Wt Readings from Last 3 Encounters:   23 121 kg (266 lb 12.1 oz)   22 121 kg (267 lb)   07/15/19 121 kg (267 lb 1.6 oz)     Intake & Output (last 3 days)          0701  07 0700    P.O. 1080 1320 1200 240    Total Intake(mL/kg) 1080 (8.9) 1320 (10.9) 1200 (9.9) 240 (2)    Net +1080 +1320 +1200 +240            Urine Unmeasured Occurrence 6 x 7 x 7 x 1 x    Stool Unmeasured Occurrence 1 x             Diet: Cardiac Diets; Healthy Heart (2-3 Na+); Texture: Regular Texture (IDDSI 7); Fluid Consistency: Thin (IDDSI  0)  ----------------------------------------------------------------------------------------------------------------------  Physical exam:  Constitutional: No acute distress  HEENT: Normocephalic atraumatic  Neck:   Supple  Cardiovascular: Regular rate and rhythm  Pulmonary/Chest: Clear to auscultation  Abdominal: Positive bowel sounds soft.   Musculoskeletal: No arthropathy  Neurological: 3-4 out of 5 strength in the left upper extremity.  Patient does seem to have some proximal strength in the left lower extremity but still not able to move the left ankle.  Skin: No rash  Peripheral vascular: No edema  Genitourinary:  ----------------------------------------------------------------------------------------------------------------------    Last echocardiogram:    ----------------------------------------------------------------------------------------------------------------------  Results from last 7 days   Lab Units 07/22/23  0111   WBC 10*3/mm3 6.68   HEMOGLOBIN g/dL 10.1*   HEMATOCRIT % 32.1*   MCV fL 84.9   MCHC g/dL 31.5   PLATELETS 10*3/mm3 232         Results from last 7 days   Lab Units 07/22/23  0111   SODIUM mmol/L 137   POTASSIUM mmol/L 3.8   CHLORIDE mmol/L 104   CO2 mmol/L 23.2   BUN mg/dL 16   CREATININE mg/dL 0.80   CALCIUM mg/dL 8.9   GLUCOSE mg/dL 109*   ALBUMIN g/dL 3.2*   BILIRUBIN mg/dL 0.5   ALK PHOS U/L 84   AST (SGOT) U/L 15   ALT (SGPT) U/L 16   Estimated Creatinine Clearance: 116.4 mL/min (by C-G formula based on SCr of 0.8 mg/dL).  No results found for: AMMONIA              Glucose   Date/Time Value Ref Range Status   07/28/2023 0606 129 70 - 130 mg/dL Final     Comment:     Meter: [id number] : [id number] SILVANO STACY   07/27/2023 1610 108 70 - 130 mg/dL Final     Comment:     Meter: [id number] : [id number] emiliano leo   07/27/2023 1110 164 (H) 70 - 130 mg/dL Final     Comment:     Meter: [id number] : [id number] Cheek Chikis   07/27/2023 0617 113 70 - 130 mg/dL Final      Comment:     Meter: [id number] : [id number] Rosemont Crystal   07/26/2023 1632 133 (H) 70 - 130 mg/dL Final     Comment:     Meter: [id number] : [id number] emiliano bailey   07/26/2023 0614 125 70 - 130 mg/dL Final     Comment:     Meter: [id number] : [id number] Rosemont Crystal   07/25/2023 1620 125 70 - 130 mg/dL Final     Comment:     Meter: [id number] : [id number] Vinny Lemus   07/25/2023 1113 144 (H) 70 - 130 mg/dL Final     Comment:     Meter: [id number] : [id number] NAYLOR CHARLOTTE     No results found for: TSH, FREET4  No results found for: PREGTESTUR, PREGSERUM, HCG, HCGQUANT  Pain Management Panel           No data to display              Brief Urine Lab Results       None          No results found for: BLOODCX      No results found for: URINECX  No results found for: WOUNDCX  No results found for: STOOLCX        I have personally looked at the labs and they are summarized above.  ----------------------------------------------------------------------------------------------------------------------  Detailed radiology reports for the last 24 hours:    Imaging Results (Last 24 Hours)       ** No results found for the last 24 hours. **          Final impressions for the last 30 days of radiology reports:    CT Head Without Contrast    Result Date: 6/29/2023  Old and maturing subacute infarctions without gross hemorrhagic transformation. The possibility of new small interval infarctions in this case cannot be excluded. CRITICAL RESULT:   No. COMMUNICATION: Per this written report. Drafted by Neil Kearns on 6/29/2023 1:45 PM Final report signed by Neil Kearns on 6/29/2023 2:04 PM    FL Video Swallow Single Contrast    Result Date: 7/7/2023    Normal fluoroscopic video swallow exam.  Please see the speech pathologist's report for additional information.  This report was finalized on 7/7/2023 10:50 AM by Dr. Porfirio Montgomery MD.     I have personally looked at the radiology images and  read the final radiology report.    Assessment & Plan    Status post CVA with residual left hemiparesis--continue Xarelto and high-dose statin therapy.  Patient requiring maximum assistance for bed to chair and chair to bed transfers.  Continues to work to develop motor skills and neuromuscular reeducation exercises.  Patient requiring maximum assistance for bed mobility; maximum assist for bed to chair and chair to bed transfer; minimum assist for sit to stand and stand to sit transfers.  Ambulated 8 feet x 3 in the parallel bars with maximum assistance yesterday.    Atrial fibrillation rate is controlled.  We will continue Xarelto.    BPH continue Flomax    History of laryngeal CA/non-Hodgkin's lymphoma recommend outpatient oncology follow-up    CHF preserved EF compensated.      VTE Prophylaxis:   Mechanical Order History:       None          Pharmalogical Order History:        Ordered     Dose Route Frequency Stop    07/06/23 0359  rivaroxaban (XARELTO) tablet 20 mg        Question:  Are you ordering rivaroxaban 10 mg for the prevention of blood clots in an acutely ill medical patient?  Answer:  No    20 mg PO Daily With Dinner --                        Sj Jay MD  [facility]  07/28/23  10:04 EDT

## 2023-07-28 NOTE — THERAPY TREATMENT NOTE
Inpatient Rehabilitation - Occupational Therapy Progress Note and Treatment Note     Ronnie     Patient Name: Antonio Canseco  : 1955  MRN: [id number]    Today's Date: 2023                 Admit Date: 2023       No diagnosis found.    Patient Active Problem List   Diagnosis    Detrusor instability    Low testosterone in male    Disorder of prostate    Corporo-venous occlusive erectile dysfunction    CVA (cerebral vascular accident)       Past Medical History:   Diagnosis Date    Diabetes mellitus     Hypertension     Stroke        Past Surgical History:   Procedure Laterality Date    US GUIDED LYMPH NODE BIOPSY  2023             IRF OT ASSESSMENT FLOWSHEET (last 12 hours)       IRF OT Evaluation and Treatment       Row Name 23 1442          OT Time and Intention    Document Type daily treatment;progress note  -BF     Mode of Treatment occupational therapy  -BF     Patient Effort good  -BF     Symptoms Noted During/After Treatment none  -BF       Row Name 23 1442          General Information    Existing Precautions/Restrictions fall  L HP, <trunk support/balance, L side inattention  -BF     Limitations/Impairments safety/cognitive  -BF       Row Name 23 1442          Cognition/Psychosocial    Orientation Status (Cognition) oriented x 3  -BF     Follows Commands (Cognition) follows one-step commands;verbal cues/prompting required;repetition of directions required;physical/tactile prompts required;increased processing time needed;delayed response/completion  -BF       Row Name 23 1442          Bathing    Comment (Bathing) Mod/Max A  -BF       Row Name 23 1442          Upper Body Dressing    Quay Level (Upper Body Dressing) moderate assist (50-74% patient effort);verbal cues;nonverbal cues (demo/gesture)  -BF     Position (Upper Body Dressing) sitting up in bed  -BF     Comment (Upper Body Dressing) Mod A  -BF       Row Name 23 1442          Lower Body  Dressing    Maybee Level (Lower Body Dressing) maximum assist (25% patient effort);verbal cues  -BF     Position (Lower Body Dressing) supine  -BF     Comment (Lower Body Dressing) Max A  -BF       Row Name 07/28/23 1442          Grooming    Comment (Grooming) Min A  -BF       Row Name 07/28/23 1442          Toileting    Comment (Toileting) Total A  -BF       Row Name 07/28/23 1442          Self-Feeding    Comment (Self-Feeding) Set-up  -BF       Row Name 07/28/23 1442          Bed-Chair Transfer    Bed-Chair Maybee (Transfers) maximum assist (25% patient effort);2 person assist;verbal cues;nonverbal cues (demo/gesture)  -BF     Assistive Device (Bed-Chair Transfers) wheelchair  -BF       Row Name 07/28/23 1442          Motor Skills    Neuromuscular Function left;upper extremity;moderate impairment  -BF     Motor Control/Coordination Interventions fine motor manipulation/dexterity activities;gross motor coordination activities;therapeutic exercise/ROM;neuro-muscular re-education  DIOMEDES ESCALANTE/BLANCA, GMC/[facility] theract; BUE strengthening, rickshaw 25 lbs X20X4, BUE PRE 15 mins, theraband therex  -BF       Row Name 07/28/23 1442          Neuromuscular Re-education    Interventions (Neuromuscular Re-education) facilitation/inhibition;massage  -BF     Positioning (Neuromuscular Re-education) sitting  -BF       Row Name 07/28/23 1442          Positioning and Restraints    In Bed supine;call light within reach;encouraged to call for assist  In PM  -BF     In Wheelchair sitting;with PT  In AM  -BF               User Key  (r) = Recorded By, (t) = Taken By, (c) = Cosigned By      Initials Name Effective Dates    BF Lisa Sanchez, CORY 07/11/23 -                      Occupational Therapy Education       Title: PT OT SLP Therapies (Done)       Topic: Occupational Therapy (Done)       Point: ADL training (Done)       Description:   Instruct learner(s) on proper safety adaptation and remediation techniques during self  care or transfers.   Instruct in proper use of assistive devices.                  Learning Progress Summary             Patient Acceptance, E, VU,NR by BF at 7/28/2023 1442    Acceptance, E, VU,NR by BF at 7/27/2023 1445    Acceptance, E, VU,NR by BF at 7/25/2023 1251    Acceptance, E, VU,NR by HB at 7/24/2023 1355    Acceptance, E, NR,VU by BF at 7/21/2023 1248    Acceptance, E, NR by BF at 7/20/2023 1259    Acceptance, TB, NR by DL at 7/19/2023 2036    Acceptance, E, VU,NR by HB at 7/19/2023 1421    Acceptance, E, VU,NR by BF at 7/18/2023 1334    Acceptance, E, VU,NR by BF at 7/17/2023 1431    Acceptance, E, VU,NR by BF at 7/17/2023 1430    Acceptance, E, VU,NR by BF at 7/14/2023 1508    Acceptance, E, VU,NR by BF at 7/13/2023 1343    Acceptance, E,D, VU,NR by TM at 7/12/2023 1407    Acceptance, D,E, NR,VU by TM at 7/11/2023 1434    Acceptance, E, VU,NR by BF at 7/10/2023 1456    Acceptance, E, VU,NR by HB at 7/8/2023 1140    Acceptance, E, VU,NR by BF at 7/7/2023 1442                         Point: Precautions (Done)       Description:   Instruct learner(s) on prescribed precautions during self-care and functional transfers.                  Learning Progress Summary             Patient Acceptance, E, VU,NR by BF at 7/28/2023 1442    Acceptance, E, VU,NR by BF at 7/27/2023 1445    Acceptance, E, VU,NR by BF at 7/25/2023 1251    Acceptance, E, VU,NR by HB at 7/24/2023 1355    Acceptance, E, NR,VU by BF at 7/21/2023 1248    Acceptance, E, NR by BF at 7/20/2023 1259    Acceptance, TB, NR by DL at 7/19/2023 2036    Acceptance, E, VU,NR by HB at 7/19/2023 1421    Acceptance, E, VU,NR by BF at 7/18/2023 1334    Acceptance, E, VU,NR by BF at 7/17/2023 1431    Acceptance, E, VU,NR by BF at 7/17/2023 1430    Acceptance, E, VU,NR by BF at 7/14/2023 1508    Acceptance, E, VU,NR by BF at 7/13/2023 1343    Acceptance, E,D, VU,NR by TM at 7/12/2023 1407    Acceptance, D,E, NR,VU by TM at 7/11/2023 1434    Acceptance, E, VU,NR  by BF at 7/10/2023 1456    Acceptance, E, VU,NR by HB at 7/8/2023 1140    Acceptance, E, VU,NR by BF at 7/7/2023 1442                                         User Key       Initials Effective Dates Name Provider Type Discipline    BF 05/31/23 - 07/10/23 Daniel Lisalanie Lima, OT Occupational Therapist OT    BF 07/11/23 -  DanielLisa Janie, OT Occupational Therapist OT    TM 06/16/21 -  Marielos Capone, OT Occupational Therapist OT    HB 05/25/21 -  Veronica Robles, OT Occupational Therapist OT    DL 03/16/22 -  Nadja Velasquez RN Registered Nurse Nurse                        OT Recommendation and Plan    Planned Therapy Interventions (OT): activity tolerance training, adaptive equipment training, BADL retraining, neuromuscular control/coordination retraining, passive ROM/stretching, ROM/therapeutic exercise, strengthening exercise, transfer/mobility retraining                    Time Calculation:      Time Calculation- OT       Row Name 07/28/23 1453 07/28/23 0805          Time Calculation- OT    OT Start Time 1410  -BF 0805  -BF     OT Stop Time 1430  -BF 0915  -BF     OT Time Calculation (min) 20 min  -BF 70 min  -BF     Total Timed Code Minutes- OT 20 minute(s)  -BF 70 minute(s)  -BF     OT Non-Billable Time (min) -- 10 min  -BF               User Key  (r) = Recorded By, (t) = Taken By, (c) = Cosigned By      Initials Name Provider Type     Emelyn Sanchezlanie Lima, OT Occupational Therapist                  Therapy Charges for Today       Code Description Service Date Service Provider Modifiers Qty    55888118683 HC OT SELF CARE/MGMT/TRAIN EA 15 MIN 7/27/2023 DanielLisa OT GO 2    60419546864 HC OT THERAPEUTIC ACT EA 15 MIN 7/27/2023 SanchezLisa easley OT GO 1    62169800469 HC OT NEUROMUSC RE EDUCATION EA 15 MIN 7/27/2023 SanchezLisa easley OT GO 3    37136611527 HC OT THER PROC EA 15 MIN 7/27/2023 SanchezLisa easley OT GO 2    95667575408 HC OT NEUROMUSC RE EDUCATION EA  15 MIN 7/28/2023 Lisa Sanchez, OT GO 3    71452502930 HC OT SELF CARE/MGMT/TRAIN EA 15 MIN 7/28/2023 Lisa Sanchez OT GO 2    92634781496 HC OT THER PROC EA 15 MIN 7/28/2023 Lisa Sanchez OT GO 1                     Lisa Sanchez, OT  7/28/2023

## 2023-07-28 NOTE — PROGRESS NOTES
Occupational Therapy:    Physical Therapy: Individual: 90 minutes.    Speech Language Pathology:    Signed by: Shelley Hanson PTA

## 2023-07-28 NOTE — PLAN OF CARE
Problem: Rehabilitation (IRF) Plan of Care  Goal: Plan of Care Review  Outcome: Ongoing, Progressing  Flowsheets (Taken 7/28/2023 1012)  Progress: improving  Plan of Care Reviewed With: patient  Goal: Patient-Specific Goal (Individualized)  Outcome: Ongoing, Progressing  Goal: Absence of New-Onset Illness or Injury  Outcome: Ongoing, Progressing  Goal: Optimal Comfort and Wellbeing  Outcome: Ongoing, Progressing  Goal: Home and Community Transition Plan Established  Outcome: Ongoing, Progressing     Problem: Diabetes Comorbidity  Goal: Blood Glucose Level Within Targeted Range  Outcome: Ongoing, Progressing     Problem: Skin Injury Risk Increased  Goal: Skin Health and Integrity  Outcome: Ongoing, Progressing     Problem: Fall Injury Risk  Goal: Absence of Fall and Fall-Related Injury  Outcome: Ongoing, Progressing     Problem: Mobility Impairment  Goal: Optimal Mobility [place] and Safety  Outcome: Ongoing, Progressing   Goal Outcome Evaluation:  Plan of Care Reviewed With: patient        Progress: improving

## 2023-07-28 NOTE — PLAN OF CARE
Problem: Rehabilitation (IRF) Plan of Care  Goal: Plan of Care Review  Outcome: Ongoing, Progressing  Flowsheets (Taken 7/28/2023 0458)  Progress: no change  Plan of Care Reviewed With: patient  Outcome Evaluation:   Patient calm and cooperative   no acute distress, resting well throughout the night.  Goal: Patient-Specific Goal (Individualized)  Outcome: Ongoing, Progressing  Goal: Absence of New-Onset Illness or Injury  Outcome: Ongoing, Progressing  Intervention: Prevent Fall and Fall Injury  Recent Flowsheet Documentation  Taken 7/28/2023 0400 by Phuong Padron, RN  Safety Promotion/Fall Prevention: safety round/check completed  Taken 7/28/2023 0200 by Phuong Padron RN  Safety Promotion/Fall Prevention: safety round/check completed  Taken 7/28/2023 0000 by Phuong Padron RN  Safety Promotion/Fall Prevention: safety round/check completed  Taken 7/27/2023 2200 by Phuong Padron RN  Safety Promotion/Fall Prevention: safety round/check completed  Taken 7/27/2023 2000 by Phuong Padron RN  Safety Promotion/Fall Prevention:   assistive device/personal items within reach   nonskid shoes/slippers when out of bed   clutter free environment maintained   room organization consistent   fall prevention program maintained   safety round/check completed  Intervention: Prevent Infection  Recent Flowsheet Documentation  Taken 7/27/2023 2000 by Phuong Padron RN  Infection Prevention:   personal protective equipment utilized   hand hygiene promoted   rest/sleep promoted  Goal: Optimal Comfort and Wellbeing  Outcome: Ongoing, Progressing  Goal: Home and Community Transition Plan Established  Outcome: Ongoing, Progressing   Goal Outcome Evaluation:  Plan of Care Reviewed With: patient        Progress: no change  Outcome Evaluation: Patient calm and cooperative; no acute distress, resting well throughout the night.

## 2023-07-29 LAB
ANION GAP SERPL CALCULATED.3IONS-SCNC: 8.1 MMOL/L (ref 5–15)
BASOPHILS # BLD AUTO: 0.05 10*3/MM3 (ref 0–0.2)
BASOPHILS NFR BLD AUTO: 0.9 % (ref 0–1.5)
BUN SERPL-MCNC: 14 MG/DL (ref 8–23)
BUN/CREAT SERPL: 15.6 (ref 7–25)
CALCIUM SPEC-SCNC: 9.2 MG/DL (ref 8.6–10.5)
CHLORIDE SERPL-SCNC: 103 MMOL/L (ref 98–107)
CO2 SERPL-SCNC: 24.9 MMOL/L (ref 22–29)
CREAT SERPL-MCNC: 0.9 MG/DL (ref 0.76–1.27)
DEPRECATED RDW RBC AUTO: 38.5 FL (ref 37–54)
EGFRCR SERPLBLD CKD-EPI 2021: 94.8 ML/MIN/1.73
EOSINOPHIL # BLD AUTO: 0.18 10*3/MM3 (ref 0–0.4)
EOSINOPHIL NFR BLD AUTO: 3.1 % (ref 0.3–6.2)
ERYTHROCYTE [DISTWIDTH] IN BLOOD BY AUTOMATED COUNT: 12.6 % (ref 12.3–15.4)
GLUCOSE BLDC GLUCOMTR-MCNC: 106 MG/DL (ref 70–130)
GLUCOSE BLDC GLUCOMTR-MCNC: 157 MG/DL (ref 70–130)
GLUCOSE BLDC GLUCOMTR-MCNC: 203 MG/DL (ref 70–130)
GLUCOSE SERPL-MCNC: 101 MG/DL (ref 65–99)
HCT VFR BLD AUTO: 32.7 % (ref 37.5–51)
HGB BLD-MCNC: 10.3 G/DL (ref 13–17.7)
IMM GRANULOCYTES # BLD AUTO: 0.01 10*3/MM3 (ref 0–0.05)
IMM GRANULOCYTES NFR BLD AUTO: 0.2 % (ref 0–0.5)
LYMPHOCYTES # BLD AUTO: 1.24 10*3/MM3 (ref 0.7–3.1)
LYMPHOCYTES NFR BLD AUTO: 21.2 % (ref 19.6–45.3)
MCH RBC QN AUTO: 26.6 PG (ref 26.6–33)
MCHC RBC AUTO-ENTMCNC: 31.5 G/DL (ref 31.5–35.7)
MCV RBC AUTO: 84.5 FL (ref 79–97)
MONOCYTES # BLD AUTO: 0.81 10*3/MM3 (ref 0.1–0.9)
MONOCYTES NFR BLD AUTO: 13.9 % (ref 5–12)
NEUTROPHILS NFR BLD AUTO: 3.55 10*3/MM3 (ref 1.7–7)
NEUTROPHILS NFR BLD AUTO: 60.7 % (ref 42.7–76)
NRBC BLD AUTO-RTO: 0 /100 WBC (ref 0–0.2)
PLATELET # BLD AUTO: 279 10*3/MM3 (ref 140–450)
PMV BLD AUTO: 9 FL (ref 6–12)
POTASSIUM SERPL-SCNC: 3.9 MMOL/L (ref 3.5–5.2)
RBC # BLD AUTO: 3.87 10*6/MM3 (ref 4.14–5.8)
SODIUM SERPL-SCNC: 136 MMOL/L (ref 136–145)
WBC NRBC COR # BLD: 5.84 10*3/MM3 (ref 3.4–10.8)

## 2023-07-29 PROCEDURE — 80048 BASIC METABOLIC PNL TOTAL CA: CPT | Performed by: FAMILY MEDICINE

## 2023-07-29 PROCEDURE — 85025 COMPLETE CBC W/AUTO DIFF WBC: CPT | Performed by: FAMILY MEDICINE

## 2023-07-29 PROCEDURE — 99231 SBSQ HOSP IP/OBS SF/LOW 25: CPT | Performed by: FAMILY MEDICINE

## 2023-07-29 PROCEDURE — 82948 REAGENT STRIP/BLOOD GLUCOSE: CPT

## 2023-07-29 RX ADMIN — RIVAROXABAN 20 MG: 20 TABLET, FILM COATED ORAL at 18:23

## 2023-07-29 RX ADMIN — NYSTATIN: 100000 POWDER TOPICAL at 20:09

## 2023-07-29 RX ADMIN — ATORVASTATIN CALCIUM 80 MG: 40 TABLET, FILM COATED ORAL at 09:06

## 2023-07-29 RX ADMIN — METOPROLOL TARTRATE 25 MG: 25 TABLET, FILM COATED ORAL at 20:09

## 2023-07-29 RX ADMIN — ACETAMINOPHEN 650 MG: 325 TABLET ORAL at 10:24

## 2023-07-29 RX ADMIN — HYDROXYZINE HYDROCHLORIDE 25 MG: 25 TABLET, FILM COATED ORAL at 22:27

## 2023-07-29 RX ADMIN — MAGNESIUM GLUCONATE 500 MG ORAL TABLET 400 MG: 500 TABLET ORAL at 09:08

## 2023-07-29 RX ADMIN — PANTOPRAZOLE SODIUM 40 MG: 40 TABLET, DELAYED RELEASE ORAL at 05:14

## 2023-07-29 RX ADMIN — NYSTATIN: 100000 POWDER TOPICAL at 09:06

## 2023-07-29 RX ADMIN — TAMSULOSIN HYDROCHLORIDE 0.4 MG: 0.4 CAPSULE ORAL at 09:06

## 2023-07-29 RX ADMIN — ALLOPURINOL 300 MG: 300 TABLET ORAL at 09:06

## 2023-07-29 RX ADMIN — METOPROLOL TARTRATE 25 MG: 25 TABLET, FILM COATED ORAL at 09:06

## 2023-07-29 NOTE — PLAN OF CARE
Goal Outcome Evaluation:              Outcome Evaluation: Rested well so far . Medicated x 1 so far for c/o pain . V/s stable. Will monitor

## 2023-07-29 NOTE — PROGRESS NOTES
[facility]  PROGRESS NOTE     Patient Identification:  Name:  Antonio Canseco  Age:  67 y.o.  Sex:  male  :  1955  MRN:  [id number]  Visit Number:  [id number]  ROOM: [facility]     Primary Care Provider:  Adriana Ledesma MD    Length of stay in inpatient status:  23    Subjective     Chief Compliant:  No chief complaint on file.      History of Presenting Illness: 67-year-old gentleman who is status post CVA with left-sided weakness.  Patient states he is doing well and is making progress towards his therapy goals.    Objective     Current Hospital Meds:allopurinol, 300 mg, Oral, Daily  atorvastatin, 80 mg, Oral, Daily  magnesium oxide, 400 mg, Oral, Daily  metoprolol tartrate, 25 mg, Oral, Q12H  nystatin, , Topical, Q12H  pantoprazole, 40 mg, Oral, Q AM  rivaroxaban, 20 mg, Oral, Daily With Dinner  tamsulosin, 0.4 mg, Oral, Daily       ----------------------------------------------------------------------------------------------------------------------  Vital Signs:  Temp:  [97.4 øF (36.3 øC)-98 øF (36.7 øC)] 97.4 øF (36.3 øC)  Heart Rate:  [82-91] 91  Resp:  [18] 18  BP: (113-126)/(59-77) 126/77  SpO2:  [97 %-99 %] 97 %  on   ;   Device (Oxygen Therapy): room air  Body mass index is 40.57 kg/mý.    Wt Readings from Last 3 Encounters:   23 121 kg (266 lb 12.1 oz)   22 121 kg (267 lb)   07/15/19 121 kg (267 lb 1.6 oz)     Intake & Output (last 3 days)          0700    P.O. 1320 1200 960     Total Intake(mL/kg) 1320 (10.9) 1200 (9.9) 960 (7.9)     Net +1320 +1200 +960             Urine Unmeasured Occurrence 7 x 7 x 7 x     Stool Unmeasured Occurrence   4 x           Diet: Cardiac Diets; Healthy Heart (2-3 Na+); Texture: Regular Texture (IDDSI 7); Fluid Consistency: Thin (IDDSI  0)  ----------------------------------------------------------------------------------------------------------------------  Physical exam:  Constitutional: No acute distress  HEENT: Normocephalic atraumatic  Neck:   Supple  Cardiovascular: Irregularly irregular, rate controlled  Pulmonary/Chest: Clear to auscultation  Abdominal: Positive bowel sounds soft.   Musculoskeletal: No arthropathy  Neurological: Left hemiparesis  Skin: No rash  Peripheral vascular: No edema  Genitourinary:  ----------------------------------------------------------------------------------------------------------------------    Last echocardiogram:    ----------------------------------------------------------------------------------------------------------------------  Results from last 7 days   Lab Units 07/29/23  0157   WBC 10*3/mm3 5.84   HEMOGLOBIN g/dL 10.3*   HEMATOCRIT % 32.7*   MCV fL 84.5   MCHC g/dL 31.5   PLATELETS 10*3/mm3 279         Results from last 7 days   Lab Units 07/29/23  0157   SODIUM mmol/L 136   POTASSIUM mmol/L 3.9   CHLORIDE mmol/L 103   CO2 mmol/L 24.9   BUN mg/dL 14   CREATININE mg/dL 0.90   CALCIUM mg/dL 9.2   GLUCOSE mg/dL 101*   Estimated Creatinine Clearance: 103.5 mL/min (by C-G formula based on SCr of 0.9 mg/dL).  No results found for: AMMONIA              Glucose   Date/Time Value Ref Range Status   07/29/2023 0625 106 70 - 130 mg/dL Final     Comment:     Meter: [id number] : [id number] Andreina Wilson   07/28/2023 1602 108 70 - 130 mg/dL Final     Comment:     Meter: [id number] : [id number] emiliano bailey   07/28/2023 0606 129 70 - 130 mg/dL Final     Comment:     Meter: [id number] : [id number] SILVANO STACY   07/27/2023 1610 108 70 - 130 mg/dL Final     Comment:     Meter: [id number] : [id number] emiliano bailey   07/27/2023 1110 164 (H) 70 - 130 mg/dL Final     Comment:     Meter: [id number] : [id number] Cheek Chikis   07/27/2023 0617 113 70 - 130 mg/dL Final     Comment:     Meter:  [id number] : [id number] Bhavna Crystal   07/26/2023 1632 133 (H) 70 - 130 mg/dL Final     Comment:     Meter: [id number] : [id number] emiliano bailey     No results found for: TSH, FREET4  No results found for: PREGTESTUR, PREGSERUM, HCG, HCGQUANT  Pain Management Panel           No data to display              Brief Urine Lab Results       None          No results found for: BLOODCX      No results found for: URINECX  No results found for: WOUNDCX  No results found for: STOOLCX        I have personally looked at the labs and they are summarized above.  ----------------------------------------------------------------------------------------------------------------------  Detailed radiology reports for the last 24 hours:    Imaging Results (Last 24 Hours)       ** No results found for the last 24 hours. **          Final impressions for the last 30 days of radiology reports:    CT Head Without Contrast    Result Date: 6/29/2023  Old and maturing subacute infarctions without gross hemorrhagic transformation. The possibility of new small interval infarctions in this case cannot be excluded. CRITICAL RESULT:   No. COMMUNICATION: Per this written report. Drafted by Neil Kearns on 6/29/2023 1:45 PM Final report signed by Neil Kearns on 6/29/2023 2:04 PM    FL Video Swallow Single Contrast    Result Date: 7/7/2023    Normal fluoroscopic video swallow exam.  Please see the speech pathologist's report for additional information.  This report was finalized on 7/7/2023 10:50 AM by Dr. Porfirio Montgomery MD.     I have personally looked at the radiology images and read the final radiology report.    Assessment & Plan    Status post CVA with residual left hemiparesis--continue Xarelto and high-dose statin therapy.  Patient requiring moderate to maximum assist for bathing; moderate assist for upper body dressing; maximum assist for lower body dressing; minimum assist for grooming and total assist for toileting.   Requires maximum assistance for bed to chair and chair to bed transfers.  Minimum assist for sit to stand stand to sit transfers.  Ambulated 8 feet x 3 in the parallel bars with maximum two-person assistance yesterday    Atrial fibrillation--ventricular rate is controlled we will continue Xarelto.  Continue metoprolol    BPH symptoms are controlled.  Continue Flomax    History of laryngeal CA/non-Hodgkin's lymphoma recommend outpatient oncology follow-up    CHF preserved EF compensated at this time.    VTE Prophylaxis:   Mechanical Order History:       None          Pharmalogical Order History:        Ordered     Dose Route Frequency Stop    07/06/23 1209  rivaroxaban (XARELTO) tablet 20 mg        Question:  Are you ordering rivaroxaban 10 mg for the prevention of blood clots in an acutely ill medical patient?  Answer:  No    20 mg PO Daily With Dinner --                        Sj Jay MD  [facility]  07/29/23  09:59 EDT

## 2023-07-30 LAB
GLUCOSE BLDC GLUCOMTR-MCNC: 114 MG/DL (ref 70–130)
GLUCOSE BLDC GLUCOMTR-MCNC: 118 MG/DL (ref 70–130)

## 2023-07-30 PROCEDURE — 82948 REAGENT STRIP/BLOOD GLUCOSE: CPT

## 2023-07-30 RX ADMIN — NYSTATIN: 100000 POWDER TOPICAL at 09:28

## 2023-07-30 RX ADMIN — METOPROLOL TARTRATE 25 MG: 25 TABLET, FILM COATED ORAL at 09:27

## 2023-07-30 RX ADMIN — HYDROXYZINE HYDROCHLORIDE 25 MG: 25 TABLET, FILM COATED ORAL at 21:26

## 2023-07-30 RX ADMIN — METOPROLOL TARTRATE 25 MG: 25 TABLET, FILM COATED ORAL at 21:27

## 2023-07-30 RX ADMIN — ATORVASTATIN CALCIUM 80 MG: 40 TABLET, FILM COATED ORAL at 09:27

## 2023-07-30 RX ADMIN — ALLOPURINOL 300 MG: 300 TABLET ORAL at 09:27

## 2023-07-30 RX ADMIN — PANTOPRAZOLE SODIUM 40 MG: 40 TABLET, DELAYED RELEASE ORAL at 05:22

## 2023-07-30 RX ADMIN — MAGNESIUM GLUCONATE 500 MG ORAL TABLET 400 MG: 500 TABLET ORAL at 09:29

## 2023-07-30 RX ADMIN — TAMSULOSIN HYDROCHLORIDE 0.4 MG: 0.4 CAPSULE ORAL at 09:27

## 2023-07-30 RX ADMIN — NYSTATIN 1 APPLICATION: 100000 POWDER TOPICAL at 21:27

## 2023-07-30 RX ADMIN — RIVAROXABAN 20 MG: 20 TABLET, FILM COATED ORAL at 18:16

## 2023-07-30 NOTE — PROGRESS NOTES
Rehabilitation Nursing  Inpatient Rehabilitation Plan of Care Note    Plan of Care  Body Function Structure    Skin Integrity (Active)  Current Status (7/6/2023 4:18:00 PM): At Risk for Skin Breakdown  Weekly Goal: No Skin Breakdown  Discharge Goal: No Skin Breakdown    Safety    Potential for Injury (Active)  Current Status (7/6/2023 4:18:00 PM): Potential for Falls  Weekly Goal: No Falls  Discharge Goal: No Falls    Signed by: Karyn Hauser RN

## 2023-07-30 NOTE — PLAN OF CARE
Goal Outcome Evaluation:              Outcome Evaluation: No c/o or distress this shift so far. Continue POC

## 2023-07-30 NOTE — PLAN OF CARE
Problem: Rehabilitation (IRF) Plan of Care  Goal: Plan of Care Review  Outcome: Ongoing, Progressing  Flowsheets (Taken 7/30/2023 1036)  Progress: improving  Plan of Care Reviewed With: patient  Outcome Evaluation:   Patient resting well. No acute signs of distress noted. Call light and items within reach. Continue current plan of care   monitor.  Goal: Patient-Specific Goal (Individualized)  Outcome: Ongoing, Progressing  Goal: Absence of New-Onset Illness or Injury  Outcome: Ongoing, Progressing  Goal: Optimal Comfort and Wellbeing  Outcome: Ongoing, Progressing  Goal: Home and Community Transition Plan Established  Outcome: Ongoing, Progressing   Goal Outcome Evaluation:  Plan of Care Reviewed With: patient        Progress: improving  Outcome Evaluation: Patient resting well. No acute signs of distress noted. Call light and items within reach. Continue current plan of care; monitor.

## 2023-07-31 LAB
GLUCOSE BLDC GLUCOMTR-MCNC: 102 MG/DL (ref 70–130)
GLUCOSE BLDC GLUCOMTR-MCNC: 119 MG/DL (ref 70–130)

## 2023-07-31 PROCEDURE — 97535 SELF CARE MNGMENT TRAINING: CPT | Performed by: OCCUPATIONAL THERAPIST

## 2023-07-31 PROCEDURE — 97530 THERAPEUTIC ACTIVITIES: CPT

## 2023-07-31 PROCEDURE — 97110 THERAPEUTIC EXERCISES: CPT | Performed by: OCCUPATIONAL THERAPIST

## 2023-07-31 PROCEDURE — 97116 GAIT TRAINING THERAPY: CPT

## 2023-07-31 PROCEDURE — 97110 THERAPEUTIC EXERCISES: CPT

## 2023-07-31 PROCEDURE — 99231 SBSQ HOSP IP/OBS SF/LOW 25: CPT | Performed by: INTERNAL MEDICINE

## 2023-07-31 PROCEDURE — 82948 REAGENT STRIP/BLOOD GLUCOSE: CPT

## 2023-07-31 PROCEDURE — 97112 NEUROMUSCULAR REEDUCATION: CPT | Performed by: OCCUPATIONAL THERAPIST

## 2023-07-31 RX ADMIN — RIVAROXABAN 20 MG: 20 TABLET, FILM COATED ORAL at 18:04

## 2023-07-31 RX ADMIN — NYSTATIN 1 APPLICATION: 100000 POWDER TOPICAL at 20:45

## 2023-07-31 RX ADMIN — ATORVASTATIN CALCIUM 80 MG: 40 TABLET, FILM COATED ORAL at 10:06

## 2023-07-31 RX ADMIN — HYDROXYZINE HYDROCHLORIDE 25 MG: 25 TABLET, FILM COATED ORAL at 20:44

## 2023-07-31 RX ADMIN — NYSTATIN: 100000 POWDER TOPICAL at 10:09

## 2023-07-31 RX ADMIN — ALLOPURINOL 300 MG: 300 TABLET ORAL at 10:06

## 2023-07-31 RX ADMIN — PANTOPRAZOLE SODIUM 40 MG: 40 TABLET, DELAYED RELEASE ORAL at 05:19

## 2023-07-31 RX ADMIN — METOPROLOL TARTRATE 25 MG: 25 TABLET, FILM COATED ORAL at 20:45

## 2023-07-31 RX ADMIN — METOPROLOL TARTRATE 25 MG: 25 TABLET, FILM COATED ORAL at 10:06

## 2023-07-31 RX ADMIN — TAMSULOSIN HYDROCHLORIDE 0.4 MG: 0.4 CAPSULE ORAL at 10:06

## 2023-07-31 RX ADMIN — MAGNESIUM GLUCONATE 500 MG ORAL TABLET 400 MG: 500 TABLET ORAL at 10:11

## 2023-07-31 NOTE — PROGRESS NOTES
Inpatient Rehabilitation Functional Measures Assessment and Plan of Care    Plan of Care    Self Care    Dressing (Upper) (Active)  Current Status (7/31/2023 12:00:00 AM): Mod A  Weekly Goal: Min A  Discharge Goal: Min  OT Interventions    Self Care -  OT: ADL retraining, AE education, GMC/FMC theract, ROM, neuro re-ed,  strengthening, fxal mobility [OT]    Functional Measures  SOBIA Eating:  SOBIA Grooming:  SOBIA Bathing:  SOBIA Upper Body Dressing:  SOBIA Lower Body Dressing:  SOBIA Toileting:    SOBIA Bladder Management  Level of Assistance:  Frequency/Number of Accidents this Shift:    SOBIA Bowel Management  Level of Assistance:  Frequency/Number of Accidents this Shift:    SOBIA Bed/Chair/Wheelchair Transfer:  SOBIA Toilet Transfer:  SOBIA Tub/Shower Transfer:    Previously Documented Mode of Locomotion at Discharge:  [facility] Expected Mode of Locomotion at Discharge:  SOBIA Walk/Wheelchair:  SOBIA Stairs:    SOBIA Comprehension:  SOBIA Expression:  SOBIA Social Interaction:  SOBIA Problem Solving:  SOBIA Memory:    Therapy Mode Minutes  Occupational Therapy: Individual: 90 minutes.  Physical Therapy:  Speech Language Pathology:    Discharge Functional Goals:    Signed by: Lisa Sanchez OT

## 2023-07-31 NOTE — THERAPY TREATMENT NOTE
Inpatient Rehabilitation - Occupational Therapy Treatment Note     Ronnie     Patient Name: Antonio Canseco  : 1955  MRN: [id number]    Today's Date: 2023                 Admit Date: 2023       No diagnosis found.    Patient Active Problem List   Diagnosis    Detrusor instability    Low testosterone in male    Disorder of prostate    Corporo-venous occlusive erectile dysfunction    CVA (cerebral vascular accident)       Past Medical History:   Diagnosis Date    Diabetes mellitus     Hypertension     Stroke        Past Surgical History:   Procedure Laterality Date    US GUIDED LYMPH NODE BIOPSY  2023             IRF OT ASSESSMENT FLOWSHEET (last 12 hours)       IRF OT Evaluation and Treatment       Row Name 23 1409          OT Time and Intention    Document Type daily treatment  -BF     Mode of Treatment occupational therapy  -BF     Patient Effort good  -BF     Symptoms Noted During/After Treatment none  -BF       Row Name 23 1409          General Information    Existing Precautions/Restrictions fall  L HP, <trunk support/balance, L side inattention  -BF     Limitations/Impairments safety/cognitive  -BF       Row Name 23 1409          Cognition/Psychosocial    Orientation Status (Cognition) oriented x 3  -BF     Follows Commands (Cognition) verbal cues/prompting required;repetition of directions required;physical/tactile prompts required;increased processing time needed;delayed response/completion  -BF       Row Name 23 1409          Grooming    [place] Level (Grooming) minimum assist (75% patient effort);verbal cues;nonverbal cues (demo/gesture)  -BF     Position (Grooming) supported sitting  -BF     Comment (Grooming) Min A  -BF       Row Name 23 1409          Motor Skills    Neuromuscular Function left;upper extremity;moderate impairment  -BF     Motor Control/Coordination Interventions fine motor manipulation/dexterity activities;gross motor coordination  activities;therapeutic exercise/ROM;neuro-muscular re-education  LUE GMC/FMC theract, strengthening; BUE rickshaw 25 lbs X20X2, PRE 15 mins, pulleys; frequent verbal cues required to use L hand  -BF       Row Name 07/31/23 1409          Neuromuscular Re-education    Interventions (Neuromuscular Re-education) facilitation/inhibition;massage  LUE  -BF     Positioning (Neuromuscular Re-education) sitting  -BF       Row Name 07/31/23 1409          Positioning and Restraints    In Wheelchair sitting;with other staff  w/   -BF               User Key  (r) = Recorded By, (t) = Taken By, (c) = Cosigned By      Initials Name Effective Dates    BF Lisa Sanchez OT 07/11/23 -                      Occupational Therapy Education       Title: PT OT SLP Therapies (Done)       Topic: Occupational Therapy (Done)       Point: ADL training (Done)       Description:   Instruct learner(s) on proper safety adaptation and remediation techniques during self care or transfers.   Instruct in proper use of assistive devices.                  Learning Progress Summary             Patient Acceptance, E, VU,NR by BF at 7/31/2023 1409    Acceptance, E, VU,NR by BF at 7/28/2023 1442    Acceptance, E, VU,NR by BF at 7/27/2023 1445    Acceptance, E, VU,NR by BF at 7/25/2023 1251    Acceptance, E, VU,NR by HB at 7/24/2023 1355    Acceptance, E, NR,VU by BF at 7/21/2023 1248    Acceptance, E, NR by BF at 7/20/2023 1259    Acceptance, TB, NR by DL at 7/19/2023 2036    Acceptance, E, VU,NR by HB at 7/19/2023 1421    Acceptance, E, VU,NR by BF at 7/18/2023 1334    Acceptance, E, VU,NR by BF at 7/17/2023 1431    Acceptance, E, VU,NR by BF at 7/17/2023 1430    Acceptance, E, VU,NR by BF at 7/14/2023 1508    Acceptance, E, VU,NR by BF at 7/13/2023 1343    Acceptance, E,D, VU,NR by TM at 7/12/2023 1407    Acceptance, D,E, NR,VU by TM at 7/11/2023 1434    Acceptance, E, VU,NR by BF at 7/10/2023 1456    Acceptance, E, VU,NR by HB at  7/8/2023 1140    Acceptance, E, VU,NR by BF at 7/7/2023 1442                         Point: Precautions (Done)       Description:   Instruct learner(s) on prescribed precautions during self-care and functional transfers.                  Learning Progress Summary             Patient Acceptance, E, VU,NR by BF at 7/31/2023 1409    Acceptance, E, VU,NR by BF at 7/28/2023 1442    Acceptance, E, VU,NR by BF at 7/27/2023 1445    Acceptance, E, VU,NR by BF at 7/25/2023 1251    Acceptance, E, VU,NR by HB at 7/24/2023 1355    Acceptance, E, NR,VU by BF at 7/21/2023 1248    Acceptance, E, NR by BF at 7/20/2023 1259    Acceptance, TB, NR by DL at 7/19/2023 2036    Acceptance, E, VU,NR by HB at 7/19/2023 1421    Acceptance, E, VU,NR by BF at 7/18/2023 1334    Acceptance, E, VU,NR by BF at 7/17/2023 1431    Acceptance, E, VU,NR by BF at 7/17/2023 1430    Acceptance, E, VU,NR by BF at 7/14/2023 1508    Acceptance, E, VU,NR by BF at 7/13/2023 1343    Acceptance, E,D, VU,NR by TM at 7/12/2023 1407    Acceptance, D,E, NR,VU by TM at 7/11/2023 1434    Acceptance, E, VU,NR by BF at 7/10/2023 1456    Acceptance, E, VU,NR by HB at 7/8/2023 1140    Acceptance, E, VU,NR by BF at 7/7/2023 1442                                         User Key       Initials Effective Dates Name Provider Type Discipline    BF 05/31/23 - 07/10/23 Lisa Sanchez, OT Occupational Therapist OT    BF 07/11/23 -  Lisa Sanchez, OT Occupational Therapist OT    TM 06/16/21 -  Marielos Capone OT Occupational Therapist OT    HB 05/25/21 -  Veronica Robles OT Occupational Therapist OT    DL 03/16/22 -  Nadja Velasquez, RN Registered Nurse Nurse                        OT Recommendation and Plan    Planned Therapy Interventions (OT): activity tolerance training, adaptive equipment training, BADL retraining, neuromuscular control/coordination retraining, passive ROM/stretching, ROM/therapeutic exercise, strengthening exercise, transfer/mobility  retraining                    Time Calculation:      Time Calculation- OT       Row Name 07/31/23 1411             Time Calculation- OT    OT Start Time 0915  -BF      OT Stop Time 1045  -BF      OT Time Calculation (min) 90 min  -BF      Total Timed Code Minutes- OT 90 minute(s)  -BF      OT Non-Billable Time (min) 10 min  -BF                User Key  (r) = Recorded By, (t) = Taken By, (c) = Cosigned By      Initials Name Provider Type     Lisa Sanchez OT Occupational Therapist                  Therapy Charges for Today       Code Description Service Date Service Provider Modifiers Qty    37287424363 HC OT SELF CARE/MGMT/TRAIN EA 15 MIN 7/31/2023 Lisa Sanchez OT GO 1    81050884115 HC OT NEUROMUSC RE EDUCATION EA 15 MIN 7/31/2023 Lisa Sanchez OT GO 3    01980650457 HC OT THER PROC EA 15 MIN 7/31/2023 Lisa Sanchez OT GO 2                     Lisa Sanchez OT  7/31/2023

## 2023-07-31 NOTE — PLAN OF CARE
Problem: Rehabilitation (IRF) Plan of Care  Goal: Plan of Care Review  Outcome: Ongoing, Progressing  Flowsheets (Taken 7/31/2023 1926)  Progress: improving  Plan of Care Reviewed With: patient  Outcome Evaluation:   Alarms audible and in working order. Patient progressing with therapies. No acute distress noted. Call light and items within reach. Continue current plan of care   monitor.  Goal: Patient-Specific Goal (Individualized)  Outcome: Ongoing, Progressing  Goal: Absence of New-Onset Illness or Injury  Outcome: Ongoing, Progressing  Intervention: Prevent Fall and Fall Injury  Recent Flowsheet Documentation  Taken 7/31/2023 1800 by Karyn Hauser RN  Safety Promotion/Fall Prevention:   safety round/check completed   assistive device/personal items within reach   nonskid shoes/slippers when out of bed  Taken 7/31/2023 1600 by Karyn Hauser RN  Safety Promotion/Fall Prevention: safety round/check completed  Taken 7/31/2023 1400 by Karyn Hauser RN  Safety Promotion/Fall Prevention: safety round/check completed  Taken 7/31/2023 1200 by Karyn Hauser RN  Safety Promotion/Fall Prevention: safety round/check completed  Taken 7/31/2023 1005 by Karyn Hauser RN  Safety Promotion/Fall Prevention:   safety round/check completed   assistive device/personal items within reach   nonskid shoes/slippers when out of bed  Taken 7/31/2023 1000 by Karyn Hauser RN  Safety Promotion/Fall Prevention:   safety round/check completed   nonskid shoes/slippers when out of bed   assistive device/personal items within reach  Taken 7/31/2023 0800 by Karyn Hauser RN  Safety Promotion/Fall Prevention:   safety round/check completed   nonskid shoes/slippers when out of bed   assistive device/personal items within reach  Intervention: Prevent Infection  Recent Flowsheet Documentation  Taken 7/31/2023 1800 by Karyn Hauser RN  Infection Prevention:   hand hygiene promoted   rest/sleep promoted  Taken 7/31/2023  1600 by Karyn Hauser RN  Infection Prevention: hand hygiene promoted  Taken 7/31/2023 1400 by Karyn Hauser RN  Infection Prevention: hand hygiene promoted  Taken 7/31/2023 1200 by Karyn Hauser RN  Infection Prevention: hand hygiene promoted  Taken 7/31/2023 1005 by Karyn Hauser RN  Infection Prevention:   hand hygiene promoted   rest/sleep promoted  Taken 7/31/2023 1000 by Karyn Hauser RN  Infection Prevention: hand hygiene promoted  Taken 7/31/2023 0800 by Karyn Hauser RN  Infection Prevention: hand hygiene promoted  Intervention: Prevent VTE (Venous Thromboembolism)  Recent Flowsheet Documentation  Taken 7/31/2023 1005 by Karyn Hauser RN  VTE Prevention/Management: (xarelto) other (see comments)  Goal: Optimal Comfort and Wellbeing  Outcome: Ongoing, Progressing  Goal: Home and Community Transition Plan Established  Outcome: Ongoing, Progressing   Goal Outcome Evaluation:  Plan of Care Reviewed With: patient        Progress: improving  Outcome Evaluation: Alarms audible and in working order. Patient progressing with therapies. No acute distress noted. Call light and items within reach. Continue current plan of care; monitor.

## 2023-07-31 NOTE — PROGRESS NOTES
Assisted By: Vu HANEY    CC: Follow-up on CVA    Interview History/HPI: Patient states he is feeling good feels like he is progressing well, he is going to have plenty of help at home and seems to be wanting to go home towards a scheduled date at the end of this week.          Current Hospital Meds:  allopurinol, 300 mg, Oral, Daily  atorvastatin, 80 mg, Oral, Daily  magnesium oxide, 400 mg, Oral, Daily  metoprolol tartrate, 25 mg, Oral, Q12H  nystatin, , Topical, Q12H  pantoprazole, 40 mg, Oral, Q AM  rivaroxaban, 20 mg, Oral, Daily With Dinner  tamsulosin, 0.4 mg, Oral, Daily         Vitals:    07/31/23 0700   BP: 135/46   Pulse: 90   Resp: 20   Temp: 97.5 øF (36.4 øC)   SpO2: 94%         Intake/Output Summary (Last 24 hours) at 7/31/2023 1159  Last data filed at 7/31/2023 0900  Gross per 24 hour   Intake 600 ml   Output --   Net 600 ml       EXAM: Mood is good skin warm and dry no distress, he can still lift his left hand above his head, arm strength seems to be improving, still not a lot of movement out of his left leg, he may have some minimal proximal muscle movement.      Diet: Cardiac Diets; Healthy Heart (2-3 Na+); Texture: Regular Texture (IDDSI 7); Fluid Consistency: Thin (IDDSI 0)        LABS:     Lab Results (last 48 hours)       Procedure Component Value Units Date/Time    POC Glucose Once [[id number]]  (Normal) Collected: 07/31/23 0611    Specimen: Blood Updated: 07/31/23 0619     Glucose 102 mg/dL      Comment: Meter: [id number] : [id number] Quang Neal       POC Glucose Once [[id number]]  (Normal) Collected: 07/30/23 1557    Specimen: Blood Updated: 07/30/23 1603     Glucose 114 mg/dL      Comment: Meter: [id number] : [id number] CYNDY PORTER       POC Glucose Once [[id number]]  (Normal) Collected: 07/30/23 0609    Specimen: Blood Updated: 07/30/23 0619     Glucose 118 mg/dL      Comment: Meter: [id number] : [id number] ANSELMO CHIN       POC Glucose Once [[id number]]  (Abnormal)  Collected: 07/29/23 1606    Specimen: Blood Updated: 07/29/23 1612     Glucose 157 mg/dL      Comment: Meter: [id number] : [id number] CHAYA FIERRO                    Radiology:    Imaging Results (Last 72 Hours)       ** No results found for the last 72 hours. **                Assessment/Plan:   Status post CVA with left residual, patient has atrial fibrillation and rate is controlled, he is on Xarelto for further stroke prevention.  Patient is working with occupational physical therapy.  With OT, patient has mod assist with upper body dressing, mod to max with bathing, max with lower body dressing, min assist with grooming, total assist with toileting hygiene.  He is set up for self-feeding.  With PT, patient is max assist 2 person with chair to bed, sit to stand min assist, stand to sit min assist, patient was max assist 2 person in the parallel bars walked 8 feet x 3.  She was cued for upright posture and weight shifting.    Atrial fibrillation, rate controlled, on low-dose Lopressor.  He is on Xarelto for further stroke prevention.    Laryngeal cancer as well as non-Hodgkin's lymphoma, he will need outpatient follow-up.    HFpEF, not tolerant of ACE or ARB earlier secondary to lower blood pressure.  Compensated currently.    Hayder Mendoza MD

## 2023-07-31 NOTE — THERAPY TREATMENT NOTE
Inpatient Rehabilitation - Physical Therapy Treatment Note       THALIA Trujillo     Patient Name: Antonio Canseco  : 1955  MRN: [id number]    Today's Date: 2023                    Admit Date: 2023      Visit Dx:   No diagnosis found.    Patient Active Problem List   Diagnosis    Detrusor instability    Low testosterone in male    Disorder of prostate    Corporo-venous occlusive erectile dysfunction    CVA (cerebral vascular accident)       Past Medical History:   Diagnosis Date    Diabetes mellitus     Hypertension     Stroke        Past Surgical History:   Procedure Laterality Date    US GUIDED LYMPH NODE BIOPSY  2023       PT ASSESSMENT (last 12 hours)       IRF PT Evaluation and Treatment       Row Name 23 1404          PT Time and Intention    Document Type daily treatment  -RG     Mode of Treatment individual therapy;physical therapy  -RG     Patient/Family/Caregiver Comments/Observations No changes.  Pt and nursing in agreement for skilled PT.  -RG       Row Name 23 1404          General Information    Patient Profile Reviewed yes  -RG     Existing Precautions/Restrictions fall  L HP, <trunk support/balance, L side inattention  -RG     Limitations/Impairments safety/cognitive  -RG       Row Name 23 1404          Cognition/Psychosocial    Affect/Mental Status (Cognition) WFL;other (see comments)  -RG     Follows Commands (Cognition) verbal cues/prompting required;physical/tactile prompts required;follows one-step commands  -RG     Personal Safety Interventions gait belt;fall prevention program maintained;nonskid shoes/slippers when out of bed  -RG     Cognitive Function safety deficit  -RG       Row Name 23 1404          Bed Mobility    Bed Mobility supine-sit;sit-supine  -RG     Supine-Sit [place] (Bed Mobility) nonverbal cues (demo/gesture);verbal cues;maximum assist (25% patient effort);2 person assist  -RG     Sit-Supine [place] (Bed Mobility) maximum assist  (25% patient effort);verbal cues;nonverbal cues (demo/gesture);2 person assist  -RG     Bed Mobility, Safety Issues decreased use of arms for pushing/pulling;decreased use of legs for bridging/pushing  -RG       Row Name 07/31/23 1404          Transfer Assessment/Treatment    Transfers bed-chair transfer;chair-bed transfer  -RG       Row Name 07/31/23 1404          Bed-Chair Transfer    Bed-Chair [place] (Transfers) maximum assist (25% patient effort);verbal cues;nonverbal cues (demo/gesture);2 person assist  -RG     Assistive Device (Bed-Chair Transfers) wheelchair  -RG       Row Name 07/31/23 1404          Chair-Bed Transfer    Chair-Bed Kay (Transfers) maximum assist (25% patient effort);verbal cues;nonverbal cues (demo/gesture);2 person assist  -RG       Row Name 07/31/23 1404          Sit-Stand Transfer    Sit-Stand [place] (Transfers) verbal cues;nonverbal cues (demo/gesture);minimum assist (75% patient effort)  -RG     Assistive Device (Sit-Stand Transfers) parallel bars  -RG       Row Name 07/31/23 1404          Stand-Sit Transfer    Stand-Sit [place] (Transfers) verbal cues;nonverbal cues (demo/gesture);minimum assist (75% patient effort)  -RG     Assistive Device (Stand-Sit Transfers) parallel bars  -RG       Row Name 07/31/23 1404          Gait/Stairs (Locomotion)    Kay Level (Gait) maximum assist (25% patient effort);verbal cues;nonverbal cues (demo/gesture);2 person assist  -RG     Assistive Device (Gait) parallel bars  -RG     Distance in Feet (Gait) 8' x 2  -RG     Pattern (Gait) --  dependent to advance LLE  -RG     Deviations/Abnormal Patterns (Gait) base of support, narrow;hebert decreased;gait speed decreased;stride length decreased;weight shifting decreased  -RG     Bilateral Gait Deviations forward flexed posture  -RG     Comment, (Gait/Stairs) Verbal and tactile cues requried for upright posture and weight shifting.  Pt exhibits decreased step length,  difficulty weight shifting, and advancing L LE.  -RG       Row Name 07/31/23 1404          Safety Issues, Functional Mobility    Impairments Affecting Function (Mobility) balance;coordination;endurance/activity tolerance;grasp;motor control;muscle tone abnormal;postural/trunk control;range of motion (ROM);strength  -RG       Row Name 07/31/23 1404          Balance    Static Sitting Balance verbal cues;non-verbal cues (demo/gesture);minimal assist  -RG     Position, Sitting Balance sitting edge of bed  -RG     Static Standing Balance verbal cues;non-verbal cues (demo/gesture);minimal assist  -RG     Position/Device Used, Standing Balance parallel bars  -RG       Row Name 07/31/23 1404          Hip (Therapeutic Exercise)    Hip Strengthening (Therapeutic Exercise) bilateral;flexion;aBduction;aDduction;marching while seated;sitting;2 lb free weight;resistance band;green;10 repetitions;2 sets  -RG       Row Name 07/31/23 1404          Knee (Therapeutic Exercise)    Knee Strengthening (Therapeutic Exercise) bilateral;flexion;extension;marching while seated;LAQ (long arc quad);sitting;2 lb free weight;resistance band;green;10 repetitions;2 sets  -RG       Row Name 07/31/23 1404          Ankle (Therapeutic Exercise)    Ankle Strengthening (Therapeutic Exercise) bilateral;dorsiflexion;plantarflexion;sitting;2 lb free weight;resistance band;green;10 repetitions;2 sets  -RG       Row Name 07/31/23 1404          Modality Treatment    Treatment Location 1 (Modality) L ant tib  -RG     Modality Performed electrical stimulation  -RG     Modality Treatment Results contraction observed  -RG     Comment, Modality Treatment 20 minutes, [place], 54 mA  -RG       Row Name 07/31/23 1404          Positioning and Restraints    Pre-Treatment Position in bed  -RG     Post Treatment Position wheelchair  -RG     In Wheelchair notified nsg;sitting;with OT;legs elevated  -RG       Row Name 07/31/23 1404          Daily Progress Summary (PT)     Impairments Still Limiting Function (PT) balance impairment;coordination impairment;functional activity tolerance impairment;motor control impaired;pain;postural control impaired;strength deficit;sensory impairment  -       Row Name 07/31/23 1404          Bed Mobility Goal 1 (PT-IRF)    Progress/Outcomes (Bed Mobility Goal 1, PT-IRF) goal ongoing  -       Row Name 07/31/23 1404          Bed Mobility Goal 2 (PT-IRF)    Progress/Outcomes (Bed Mobility Goal 2, PT-IRF) goal ongoing  -       Row Name 07/31/23 1404          Transfer Goal 1 (PT-IRF)    Progress/Outcomes (Transfer Goal 1, PT-IRF) goal partially met  Pt able to stand with [place] in parallel bars  -       Row Name 07/31/23 1404          Transfer Goal 2 (PT-IRF)    Progress/Outcomes (Transfer Goal 2, PT-IRF) goal ongoing  -       Row Name 07/31/23 1404          Gait/Walking Locomotion Goal 1 (PT-IRF)    Progress/Outcomes (Gait/Walking Locomotion Goal 1, PT-IRF) goal ongoing  -       Row Name 07/31/23 1404          Gait/Walking Locomotion Goal 2 (PT-IRF)    Progress/Outcomes (Gait/Walking Locomotion Goal 2, PT-IRF) goal ongoing  -               User Key  (r) = Recorded By, (t) = Taken By, (c) = Cosigned By      Initials Name Provider Type    Vu Galloway PTA Physical Therapist Assistant                     Physical Therapy Education       Title: PT OT SLP Therapies (Done)       Topic: Physical Therapy (Done)       Point: Mobility training (Done)       Learning Progress Summary             Patient Acceptance, E,D, VU,NR by RG at 7/31/2023 1411    Acceptance, E,TB, VU by RM at 7/28/2023 1230    Acceptance, E,D, VU,NR by RG at 7/26/2023 1518    Acceptance, E, VU,NR by LB at 7/25/2023 1133    Acceptance, E, VU,NR by LB at 7/24/2023 1444    Acceptance, E, VU,NR by LB at 7/21/2023 1615    Acceptance, E,D, VU,NR by RG at 7/20/2023 1107    Acceptance, TB, NR by DL at 7/19/2023 2036    Acceptance, E,D, VU,NR by RG at 7/19/2023 1508    Acceptance, E,D,  VU,NR by RG at 7/18/2023 1520    Acceptance, E,D, VU,NR by RG at 7/17/2023 1522    Acceptance, E,D, VU,NR by LL at 7/13/2023 1358    Acceptance, E, VU,NR by LB at 7/12/2023 1144    Acceptance, E, VU,NR by LB at 7/11/2023 1426    Acceptance, E, VU,NR by LB at 7/10/2023 1452    Acceptance, E,D, VU,NR by LL at 7/8/2023 1228    Acceptance, E,D, VU,NR by LB at 7/7/2023 1449                         Point: Home exercise program (Done)       Learning Progress Summary             Patient Acceptance, E,D, VU,NR by RG at 7/31/2023 1411    Acceptance, E,TB, VU by RM at 7/28/2023 1230    Acceptance, E,D, VU,NR by RG at 7/26/2023 1518    Acceptance, E, VU,NR by LB at 7/25/2023 1133    Acceptance, E, VU,NR by LB at 7/24/2023 1444    Acceptance, E, VU,NR by LB at 7/21/2023 1615    Acceptance, E,D, VU,NR by RG at 7/20/2023 1107    Acceptance, TB, NR by DL at 7/19/2023 2036    Acceptance, E,D, VU,NR by RG at 7/19/2023 1508    Acceptance, E,D, VU,NR by RG at 7/18/2023 1520    Acceptance, E,D, VU,NR by RG at 7/17/2023 1522    Acceptance, E,D, VU,NR by LL at 7/13/2023 1358    Acceptance, E, VU,NR by LB at 7/12/2023 1144    Acceptance, E, VU,NR by LB at 7/11/2023 1426    Acceptance, E, VU,NR by LB at 7/10/2023 1452    Acceptance, E,D, VU,NR by LL at 7/8/2023 1228    Acceptance, E,D, VU,NR by LB at 7/7/2023 1449                         Point: Body mechanics (Done)       Learning Progress Summary             Patient Acceptance, E,D, VU,NR by RG at 7/31/2023 1411    Acceptance, E,TB, VU by RM at 7/28/2023 1230    Acceptance, E,D, VU,NR by RG at 7/26/2023 1518    Acceptance, E, VU,NR by LB at 7/25/2023 1133    Acceptance, E, VU,NR by LB at 7/24/2023 1444    Acceptance, E, VU,NR by LB at 7/21/2023 1615    Acceptance, E,D, VU,NR by RG at 7/20/2023 1107    Acceptance, TB, NR by DL at 7/19/2023 2036    Acceptance, E,D, VU,NR by RG at 7/19/2023 1508    Acceptance, E,D, VU,NR by RG at 7/18/2023 1520    Acceptance, E,D, VU,NR by RG at 7/17/2023  1522    Acceptance, E,D, VU,NR by LL at 7/13/2023 1358    Acceptance, E, VU,NR by LB at 7/12/2023 1144    Acceptance, E, VU,NR by LB at 7/11/2023 1426    Acceptance, E, VU,NR by LB at 7/10/2023 1452    Acceptance, E,D, VU,NR by LL at 7/8/2023 1228    Acceptance, E,D, VU,NR by LB at 7/7/2023 1449                         Point: Precautions (Done)       Learning Progress Summary             Patient Acceptance, E,D, VU,NR by RG at 7/31/2023 1411    Acceptance, E,TB, VU by RM at 7/28/2023 1230    Acceptance, E,D, VU,NR by RG at 7/26/2023 1518    Acceptance, E, VU,NR by LB at 7/25/2023 1133    Acceptance, E, VU,NR by LB at 7/24/2023 1444    Acceptance, E, VU,NR by LB at 7/21/2023 1615    Acceptance, E,D, VU,NR by RG at 7/20/2023 1107    Acceptance, TB, NR by DL at 7/19/2023 2036    Acceptance, E,D, VU,NR by RG at 7/19/2023 1508    Acceptance, E,D, VU,NR by RG at 7/18/2023 1520    Acceptance, E,D, VU,NR by RG at 7/17/2023 1522    Acceptance, E,D, VU,NR by LL at 7/13/2023 1358    Acceptance, E, VU,NR by LB at 7/12/2023 1144    Acceptance, E, VU,NR by LB at 7/11/2023 1426    Acceptance, E, VU,NR by LB at 7/10/2023 1452    Acceptance, E,D, VU,NR by LL at 7/8/2023 1228    Acceptance, E,D, VU,NR by LB at 7/7/2023 1449                                         User Key       Initials Effective Dates Name Provider Type Discipline    LB 06/16/21 -  Joslyn Garcia, PT Physical Therapist PT    LL 05/02/16 -  Connie Velasquez PTA Physical Therapist Assistant PT    RM 02/17/23 -  Shelley Hanson, PTA Physical Therapist Assistant PT    RG 06/16/21 -  Vu Brown PTA Physical Therapist Assistant PT    DL 03/16/22 -  Nadja Velasquez, RN Registered Nurse Nurse                    PT Recommendation and Plan          Daily Progress Summary (PT)  Impairments Still Limiting Function (PT): balance impairment, coordination impairment, functional activity tolerance impairment, motor control impaired, pain, postural control  impaired, strength deficit, sensory impairment               Time Calculation:      PT Charges       Row Name 07/31/23 1411             Time Calculation    Start Time 0745  -RG      Stop Time 0915  -RG      Time Calculation (min) 90 min  -RG      PT Received On 07/31/23  -RG         Time Calculation- PT    Total Timed Code Minutes- PT 90 minute(s)  -RG                User Key  (r) = Recorded By, (t) = Taken By, (c) = Cosigned By      Initials Name Provider Type     Vu Brown PTA Physical Therapist Assistant                    Therapy Charges for Today       Code Description Service Date Service Provider Modifiers Qty    42100503702 HC GAIT TRAINING EA 15 MIN 7/31/2023 Vu Brown PTA GP, CQ 1    00930046870 HC PT THERAPEUTIC ACT EA 15 MIN 7/31/2023 Vu Brown PTA GP, CQ 2    58813828793 HC PT THER PROC EA 15 MIN 7/31/2023 Vu Brown PTA GP, CQ 3                     Vu Brown PTA  7/31/2023

## 2023-07-31 NOTE — PAYOR COMM NOTE
"Carmen Doe, RN   Utilization Review Nurse for Inpatient Rehab   Phone: [phone]  Fax: [phone]  Email: [email or web address]  [facility]  Facility NPI: [id number]     CLINICAL REVIEW FOR CONTINUED REHAB STAY APPROVAL  Member:  Antonio Canseco  :  1955  ID# [id number]  Auth# [id number]  Admission Date:  23  Discharge Date:  23  *Requesting additional 2 days    Antonio Canseco (67 y.o. Male)       Date of Birth   1955    Social Security Number       Address   [address] [place] [address]    Home Phone   [phone]    MRN   [id number]       Hindu   None    Marital Status                               Admission Date   23    Admission Type   Elective    Admitting Provider   Hayder Mendoza MD    Attending Provider   Hayder Mendoza MD    Department, Room/Bed   [facility] REHABILITATION, 106/2S       Discharge Date       Discharge Disposition       Discharge Destination                                 Attending Provider: Hayder Mendoza MD    Allergies: No Known Allergies    Isolation: None   Infection: None   Code Status: CPR    Ht: 172.7 cm (67.99\")   Wt: 121 kg (266 lb 12.1 oz)    Admission Cmt: None   Principal Problem: CVA (cerebral vascular accident) [I63.9]                   Greer Conner MSW     Case Management     Significant Note      Signed     Date of Service: 23  Creation Time: 23     Signed              23 1115   Plan   Plan SS received call from daughter Karolyn who says she plans to move in with pt to assist with caregiving.  Daughter works in the evenings and says pt's friend Joey Ang will stay with pt while daughter works.  Son Antonio will help 2 x week.  Pt's ex-spouse Alondra Canseco, granddaughter Mary Multani, and cousin Alejandra will help some with caregiving.  Karolyn will inform pt's sister Mariah what day next week she can come to observe pt in " therapy/receive education.  Will follow.   Patient/Family in Agreement with Plan yes                       Greer Conner, [facility]     Case Management     Significant Note      Signed     Date of Service: 07/25/23 1632  Creation Time: 07/25/23 1632     Signed              07/25/23 1628   Plan   Plan Spoke to sister Mariah about how pt is doing in therapy, plans for discharge on 8-4-23, and family coming to rehab next week on 8-1-23 or 8-2-23 to observe pt in therapy and receive education.  Sister says daughter Karolyn has discussed moving in with pt to assist with caregiving.  Sister will talk to family about coming next week to observe therapy/receive education and inform SS what day they can come.  Family will be able to decide if they can meet caregiving needs.  Discussed option of extending pt's rehab stay if needed but this will be determined based on progress in therapy and discharge plans.  Will follow.   Patient/Family in Agreement with Plan yes                      Greer Conner, [facility]     Case Management     Significant Note      Signed     Date of Service: 07/25/23 1549  Creation Time: 07/25/23 1549     Signed              07/25/23 1500   Plan   Plan Team conference held today.  Spoke to pt about how he is making progress in therapy, plans for discharge on 8-4-23, and family coming to rehab next week to observe therapy/receive education.  Pt wants to go home with cousin Alejandra assisting him at discharge with help from sister Mariah and daughter Karolyn.  Pt says family plan to meet with SS this week.   Will follow.   Patient/Family in Agreement with Plan yes                  Sj Jay MD   Physician  Hospitalist     Progress Notes      Signed     Date of Service: 07/29/23 0959  Creation Time: 07/29/23 0959     Signed       Expand All Collapse All         [facility]  PROGRESS NOTE     Patient Identification:  Name:  Antonio Canseco  Age:  67 y.o.  Sex:   male  :  1955  MRN:  [id number]  Visit Number:  [id number]  ROOM: [facility]      Primary Care Provider:  Adriana Ledesma MD     Length of stay in inpatient status:  23     Subjective      Chief Compliant:  No chief complaint on file.        History of Presenting Illness: 67-year-old gentleman who is status post CVA with left-sided weakness.  Patient states he is doing well and is making progress towards his therapy goals.     Objective      Current Hospital Meds:allopurinol, 300 mg, Oral, Daily  atorvastatin, 80 mg, Oral, Daily  magnesium oxide, 400 mg, Oral, Daily  metoprolol tartrate, 25 mg, Oral, Q12H  nystatin, , Topical, Q12H  pantoprazole, 40 mg, Oral, Q AM  rivaroxaban, 20 mg, Oral, Daily With Dinner  tamsulosin, 0.4 mg, Oral, Daily     ----------------------------------------------------------------------------------------------------------------------  Vital Signs:  Temp:  [97.4 øF (36.3 øC)-98 øF (36.7 øC)] 97.4 øF (36.3 øC)  Heart Rate:  [82-91] 91  Resp:  [18] 18  BP: (113-126)/(59-77) 126/77  SpO2:  [97 %-99 %] 97 %  on   ;   Device (Oxygen Therapy): room air  Body mass index is 40.57 kg/mý.         Wt Readings from Last 3 Encounters:   23 121 kg (266 lb 12.1 oz)   22 121 kg (267 lb)   07/15/19 121 kg (267 lb 1.6 oz)      Intake & Output (last 3 days)            0700     P.O. 1320 1200 960       Total Intake(mL/kg) 1320 (10.9) 1200 (9.9) 960 (7.9)       Net +1320 +1200 +960                     Urine Unmeasured Occurrence 7 x 7 x 7 x       Stool Unmeasured Occurrence     4 x               Diet: Cardiac Diets; Healthy Heart (2-3 Na+); Texture: Regular Texture (IDDSI 7); Fluid Consistency: Thin (IDDSI 0)  ----------------------------------------------------------------------------------------------------------------------  Physical exam:  Constitutional: No acute distress  HEENT: Normocephalic  atraumatic  Neck:   Supple  Cardiovascular: Irregularly irregular, rate controlled  Pulmonary/Chest: Clear to auscultation  Abdominal: Positive bowel sounds soft.   Musculoskeletal: No arthropathy  Neurological: Left hemiparesis  Skin: No rash  Peripheral vascular: No edema  Genitourinary:  ----------------------------------------------------------------------------------------------------------------------     Last echocardiogram:     ----------------------------------------------------------------------------------------------------------------------       Results from last 7 days   Lab Units 07/29/23  0157   WBC 10*3/mm3 5.84   HEMOGLOBIN g/dL 10.3*   HEMATOCRIT % 32.7*   MCV fL 84.5   MCHC g/dL 31.5   PLATELETS 10*3/mm3 279               Results from last 7 days   Lab Units 07/29/23  0157   SODIUM mmol/L 136   POTASSIUM mmol/L 3.9   CHLORIDE mmol/L 103   CO2 mmol/L 24.9   BUN mg/dL 14   CREATININE mg/dL 0.90   CALCIUM mg/dL 9.2   GLUCOSE mg/dL 101*   Estimated Creatinine Clearance: 103.5 mL/min (by C-G formula based on SCr of 0.9 mg/dL).  No results found for: AMMONIA                      Glucose   Date/Time Value Ref Range Status   07/29/2023 0625 106 70 - 130 mg/dL Final       Comment:       Meter: [id number] : [id number] Andreina Crystal   07/28/2023 1602 108 70 - 130 mg/dL Final       Comment:       Meter: [id number] : [id number] emiliano bailey   07/28/2023 0606 129 70 - 130 mg/dL Final       Comment:       Meter: [id number] : [id number] SILVANO STACY   07/27/2023 1610 108 70 - 130 mg/dL Final       Comment:       Meter: [id number] : [id number] emiliano bailey   07/27/2023 1110 164 (H) 70 - 130 mg/dL Final       Comment:       Meter: [id number] : [id number] Cheek Chikis   07/27/2023 0617 113 70 - 130 mg/dL Final       Comment:       Meter: [id number] : [id number] Bhavna Crystal   07/26/2023 1632 133 (H) 70 - 130 mg/dL Final       Comment:       Meter: [id number] : [id number] emiliano  leo      No results found for: TSH, FREET4  No results found for: PREGTESTUR, PREGSERUM, HCG, HCGQUANT  Pain Management Panel                 No data to display                   Brief Urine Lab Results         None             No results found for: BLOODCX      No results found for: URINECX  No results found for: WOUNDCX  No results found for: STOOLCX         I have personally looked at the labs and they are summarized above.  ----------------------------------------------------------------------------------------------------------------------  Detailed radiology reports for the last 24 hours:     Imaging Results (Last 24 Hours)         ** No results found for the last 24 hours. **             Final impressions for the last 30 days of radiology reports:     CT Head Without Contrast     Result Date: 6/29/2023  Old and maturing subacute infarctions without gross hemorrhagic transformation. The possibility of new small interval infarctions in this case cannot be excluded. CRITICAL RESULT:   No. COMMUNICATION: Per this written report. Drafted by Neil Kearns on 6/29/2023 1:45 PM Final report signed by Neil Kearns on 6/29/2023 2:04 PM     FL Video Swallow Single Contrast     Result Date: 7/7/2023    Normal fluoroscopic video swallow exam.  Please see the speech pathologist's report for additional information.  This report was finalized on 7/7/2023 10:50 AM by Dr. Porfirio Montgomery MD.     I have personally looked at the radiology images and read the final radiology report.     Assessment & Plan    Status post CVA with residual left hemiparesis--continue Xarelto and high-dose statin therapy.  Patient requiring moderate to maximum assist for bathing; moderate assist for upper body dressing; maximum assist for lower body dressing; minimum assist for grooming and total assist for toileting.  Requires maximum assistance for bed to chair and chair to bed transfers.  Minimum assist for sit to stand stand to sit  transfers.  Ambulated 8 feet x 3 in the parallel bars with maximum two-person assistance yesterday     Atrial fibrillation--ventricular rate is controlled we will continue Xarelto.  Continue metoprolol     BPH symptoms are controlled.  Continue Flomax     History of laryngeal CA/non-Hodgkin's lymphoma recommend outpatient oncology follow-up     CHF preserved EF compensated at this time.     VTE Prophylaxis:   Mechanical Order History:         None             Pharmalogical Order History:           Ordered     Dose Route Frequency Stop     23 1649   rivaroxaban (XARELTO) tablet 20 mg        Question:  Are you ordering rivaroxaban 10 mg for the prevention of blood clots in an acutely ill medical patient?  Answer:  No    20 mg PO Daily With Dinner --                                Sj Jay MD  [facility]  23  09:59 Shelley Milian, JILL   Physical Therapist Assistant  Physical Therapy     Therapy Progress Report/Re-Cert      Signed     Date of Service: 23 1233  Creation Time: 23     Signed       Expand All Collapse All    Inpatient Rehabilitation - Physical Therapy Treatment Note                                                              [facility]     Patient Name: Antonio Canseco                  : 1955                        MRN: [id number]     Today's Date: 2023                                                                                            Admit Date: 2023                 Visit Dx:   Visit Diagnosis   No diagnosis found.        Problem List       Patient Active Problem List   Diagnosis    Detrusor instability    Low testosterone in male    Disorder of prostate    Corporo-venous occlusive erectile dysfunction    CVA (cerebral vascular accident)            Medical History        Past Medical History:   Diagnosis Date    Diabetes mellitus      Hypertension      Stroke              Surgical History          Past Surgical History:   Procedure Laterality Date    US GUIDED LYMPH NODE BIOPSY   6/23/2023            PT ASSESSMENT (last 12 hours)            IRF PT Evaluation and Treatment         Row Name 07/28/23 1224                 PT Time and Intention     Document Type daily treatment;progress note  -RM       Mode of Treatment individual therapy;physical therapy  -RM       Patient/Family/Caregiver Comments/Observations No significant c/o noted this date.  -RM          Row Name 07/28/23 1224                 General Information     Patient Profile Reviewed yes  -RM       Existing Precautions/Restrictions fall  L HP, <trunk support/balance, L side inattention  -RM       Limitations/Impairments safety/cognitive  -RM          Row Name 07/28/23 1224                 Cognition/Psychosocial     Affect/Mental Status (Cognition) WFL;other (see comments)  -RM       Follows Commands (Cognition) verbal cues/prompting required;physical/tactile prompts required;follows one-step commands  -RM       Personal Safety Interventions gait belt;nonskid shoes/slippers when out of bed;fall prevention program maintained  -RM       Cognitive Function safety deficit  -RM          Row Name 07/28/23 1224                 Bed Mobility     Bed Mobility supine-sit;sit-supine  -RM       Supine-Sit [place] (Bed Mobility) nonverbal cues (demo/gesture);verbal cues;maximum assist (25% patient effort);2 person assist  -RM       Sit-Supine [place] (Bed Mobility) maximum assist (25% patient effort);verbal cues;nonverbal cues (demo/gesture);2 person assist  -RM       Bed Mobility, Safety Issues decreased use of arms for pushing/pulling;decreased use of legs for bridging/pushing  -RM          Row Name 07/28/23 1224                 Transfer Assessment/Treatment     Transfers bed-chair transfer;chair-bed transfer  -          Row Name 07/28/23 1224                 Bed-Chair Transfer     Bed-Chair [place] (Transfers) maximum assist (25% patient  effort);verbal cues;nonverbal cues (demo/gesture);2 person assist  -RM       Assistive Device (Bed-Chair Transfers) wheelchair  -RM          Row Name 07/28/23 1224                 Chair-Bed Transfer     Chair-Bed [place] (Transfers) maximum assist (25% patient effort);verbal cues;nonverbal cues (demo/gesture);2 person assist  -RM          Row Name 07/28/23 1224                 Sit-Stand Transfer     Sit-Stand [place] (Transfers) verbal cues;nonverbal cues (demo/gesture);minimum assist (75% patient effort)  -RM       Assistive Device (Sit-Stand Transfers) parallel bars  -RM          Row Name 07/28/23 1224                 Stand-Sit Transfer     Stand-Sit [place] (Transfers) verbal cues;nonverbal cues (demo/gesture);minimum assist (75% patient effort)  -RM       Assistive Device (Stand-Sit Transfers) parallel bars  -RM          Row Name 07/28/23 1224                 Gait/Stairs (Locomotion)     [place] Level (Gait) maximum assist (25% patient effort);verbal cues;nonverbal cues (demo/gesture);2 person assist  -RM       Assistive Device (Gait) parallel bars  -RM       Distance in Feet (Gait) 8'X3  -RM       Pattern (Gait) --  dependent to advance LLE  -RM       Deviations/Abnormal Patterns (Gait) base of support, narrow;hebert decreased;gait speed decreased;stride length decreased;weight shifting decreased  -RM       Bilateral Gait Deviations forward flexed posture  -RM       Comment, (Gait/Stairs) Pt cued for upright posture and W/S; mirror used for postural feedback. Pt takes short steps on RLE and is dependent for LLE advancement.  -RM          Row Name 07/28/23 1224                 Safety Issues, Functional Mobility     Impairments Affecting Function (Mobility) balance;coordination;endurance/activity tolerance;grasp;motor control;muscle tone abnormal;postural/trunk control;range of motion (ROM);strength  -RM          Row Name 07/28/23 1224                 Hip (Therapeutic Exercise)     Hip  Strengthening (Therapeutic Exercise) bilateral;flexion;extension;aBduction;aDduction;heel slides;marching while seated;sitting;2 lb free weight;resistance band;red;2 sets;10 repetitions;other (see comments)  #2 on RLE; AAROM/PROM required for LLE  -RM          Row Name 07/28/23 1224                 Knee (Therapeutic Exercise)     Knee Strengthening (Therapeutic Exercise) bilateral;flexion;extension;heel slides;marching while seated;hamstring curls;LAQ (long arc quad);sitting;2 lb free weight;resistance band;red;2 sets;10 repetitions  #2 on RLE; AAROM/PROM required for LLE  -RM          Row Name 07/28/23 1224                 Ankle (Therapeutic Exercise)     Ankle Strengthening (Therapeutic Exercise) bilateral;dorsiflexion;plantarflexion;sitting;2 lb free weight;2 sets;10 repetitions  PROM performed on LLE.  -RM          Row Name 07/28/23 1224                 [place] Electrical Stimulation Treatment     Location 1 (Russian E-Stim Treatment) lower extremity treatment area;other (see comments)  L ant tib and L quad  -RM       Indications (Russian E-Stim Treatment) enhance muscle contraction  -RM       Treatment Details (Russian Electrical Stimulation Treatment) 15 mins, 10/20, 60 mA on ant tib, 40 mA on quad  -RM       Comment (Russian E-Stim Treatment) Contraction noted on ant tib; no visible contraction noted on quad. Pt unable to contract either muscle with on cycle. No skin changes noted.  -RM          Row Name 07/28/23 1224                 Positioning and Restraints     Pre-Treatment Position sitting in chair/recliner  -RM       Post Treatment Position bed  -RM       In Bed supine;call light within reach;encouraged to call for assist;exit alarm on  -RM          Row Name 07/28/23 1224                 Daily Progress Summary (PT)     Daily Progress Summary (PT) Pt continues to require significant assistance for all functional mobility at this time. LLE strength and neuromuscular deficits remain. Continued skilled care  required for further LE strengthening to ensure maximum safe function upon D/C.  -RM       Impairments Still Limiting Function (PT) balance impairment;coordination impairment;functional activity tolerance impairment;motor control impaired;pain;postural control impaired;strength deficit;sensory impairment  -RM       Recommendations (PT) Continue per current POC  -RM          Row Name 07/28/23 1224                 Bed Mobility Goal 1 (PT-IRF)     Progress/Outcomes (Bed Mobility Goal 1, PT-IRF) goal ongoing  -RM          Row Name 07/28/23 1224                 Bed Mobility Goal 2 (PT-IRF)     Progress/Outcomes (Bed Mobility Goal 2, PT-IRF) goal ongoing  -RM          Row Name 07/28/23 1224                 Transfer Goal 1 (PT-IRF)     Progress/Outcomes (Transfer Goal 1, PT-IRF) goal partially met  Pt able to stand with [place] in parallel bars  -RM          Row Name 07/28/23 1224                 Transfer Goal 2 (PT-IRF)     Progress/Outcomes (Transfer Goal 2, PT-IRF) goal ongoing  -RM          Row Name 07/28/23 1224                 Gait/Walking Locomotion Goal 1 (PT-IRF)     Progress/Outcomes (Gait/Walking Locomotion Goal 1, PT-IRF) goal ongoing  -RM          Row Name 07/28/23 1224                 Gait/Walking Locomotion Goal 2 (PT-IRF)     Progress/Outcomes (Gait/Walking Locomotion Goal 2, PT-IRF) goal ongoing  -RM                    User Key  (r) = Recorded By, (t) = Taken By, (c) = Cosigned By        Initials Name Provider Type     Shelley Degroot, PTA Physical Therapist Assistant                               PT Recommendation and Plan     Daily Progress Summary (PT)  Daily Progress Summary (PT): Pt continues to require significant assistance for all functional mobility at this time. LLE strength and neuromuscular deficits remain. Continued skilled care required for further LE strengthening to ensure maximum safe function upon D/C.  Impairments Still Limiting Function (PT): balance impairment, coordination  impairment, functional activity tolerance impairment, motor control impaired, pain, postural control impaired, strength deficit, sensory impairment  Recommendations (PT): Continue per current POC              Time Calculation:        PT Charges         Row Name 23 1231                       Time Calculation     Start Time 0915  -RM         Stop Time 1045  -RM         Time Calculation (min) 90 min  -RM         PT Received On 23  -RM         PT - Next Appointment 23  -RM         PT Goal Re-Cert Due Date 23  -RM                 Time Calculation- PT     Total Timed Code Minutes- PT 90 minute(s)  -RM                        Lisa Sanchez, OT   Occupational Therapist  Occupational Therapy     Therapy Treatment Note      Signed     Date of Service: 23  Creation Time: 23     Signed       Expand All Collapse All    Inpatient Rehabilitation - Occupational Therapy Progress Note and Treatment Note     [facility]     Patient Name: Antonio Canseco                  : 1955                        MRN: [id number]     Today's Date: 2023                                                                             Admit Date: 2023        Visit Diagnosis   No diagnosis found.        Problem List       Patient Active Problem List   Diagnosis    Detrusor instability    Low testosterone in male    Disorder of prostate    Corporo-venous occlusive erectile dysfunction    CVA (cerebral vascular accident)            Medical History        Past Medical History:   Diagnosis Date    Diabetes mellitus      Hypertension      Stroke              Surgical History         Past Surgical History:   Procedure Laterality Date    US GUIDED LYMPH NODE BIOPSY   2023                           IRF OT ASSESSMENT FLOWSHEET (last 12 hours)            IRF OT Evaluation and Treatment         Row Name 23 1442                 OT Time and Intention     Document Type daily treatment;progress  note  -BF       Mode of Treatment occupational therapy  -BF       Patient Effort good  -BF       Symptoms Noted During/After Treatment none  -BF          Row Name 07/28/23 1442                 General Information     Existing Precautions/Restrictions fall  L HP, <trunk support/balance, L side inattention  -BF       Limitations/Impairments safety/cognitive  -BF          Row Name 07/28/23 1442                 Cognition/Psychosocial     Orientation Status (Cognition) oriented x 3  -BF       Follows Commands (Cognition) follows one-step commands;verbal cues/prompting required;repetition of directions required;physical/tactile prompts required;increased processing time needed;delayed response/completion  -BF          Row Name 07/28/23 1442                 Bathing     Comment (Bathing) Mod/Max A  -BF          Row Name 07/28/23 1442                 Upper Body Dressing     [place] Level (Upper Body Dressing) moderate assist (50-74% patient effort);verbal cues;nonverbal cues (demo/gesture)  -BF       Position (Upper Body Dressing) sitting up in bed  -BF       Comment (Upper Body Dressing) Mod A  -BF          Row Name 07/28/23 1442                 Lower Body Dressing     [place] Level (Lower Body Dressing) maximum assist (25% patient effort);verbal cues  -BF       Position (Lower Body Dressing) supine  -BF       Comment (Lower Body Dressing) Max A  -BF          Row Name 07/28/23 1442                 Grooming     Comment (Grooming) Min A  -BF          Row Name 07/28/23 1442                 Toileting     Comment (Toileting) Total A  -BF          Row Name 07/28/23 1442                 Self-Feeding     Comment (Self-Feeding) Set-up  -BF          Row Name 07/28/23 1442                 Bed-Chair Transfer     Bed-Chair [place] (Transfers) maximum assist (25% patient effort);2 person assist;verbal cues;nonverbal cues (demo/gesture)  -BF       Assistive Device (Bed-Chair Transfers) wheelchair  -BF          Row Name  07/28/23 1442                 Motor Skills     Neuromuscular Function left;upper extremity;moderate impairment  -BF       Motor Control/Coordination Interventions fine motor manipulation/dexterity activities;gross motor coordination activities;therapeutic exercise/ROM;neuro-muscular re-education  DIOMEDES ESCALANTE[place] [facility] [place]; BUE strengthening, rickshaw 25 lbs X20X4, BUE PRE 15 mins, theraband therex  -BF          Row Name 07/28/23 1442                 Neuromuscular Re-education     Interventions (Neuromuscular Re-education) facilitation/inhibition;massage  -BF       Positioning (Neuromuscular Re-education) sitting  -BF          Row Name 07/28/23 1442                 Positioning and Restraints     In Bed supine;call light within reach;encouraged to call for assist  In PM  -BF       In Wheelchair sitting;with PT  In AM  -BF                    User Key  (r) = Recorded By, (t) = Taken By, (c) = Cosigned By        Initials Name Effective Dates     BF Lisa Sanchez, OT 07/11/23 -                                   OT Recommendation and Plan     Planned Therapy Interventions (OT): activity tolerance training, adaptive equipment training, BADL retraining, neuromuscular control/coordination retraining, passive ROM/stretching, ROM/therapeutic exercise, strengthening exercise, transfer/mobility retraining              Time Calculation:        Time Calculation- OT         Row Name 07/28/23 1453 07/28/23 0805                  Time Calculation- OT     OT Start Time 1410  -BF 0805  -BF       OT Stop Time 1430  -BF 0915  -BF       OT Time Calculation (min) 20 min  -BF 70 min  -BF       Total Timed Code Minutes- OT 20 minute(s)  -BF 70 minute(s)  -BF       OT Non-Billable Time (min) -- 10 min  -BF

## 2023-08-01 LAB
GLUCOSE BLDC GLUCOMTR-MCNC: 111 MG/DL (ref 70–130)
GLUCOSE BLDC GLUCOMTR-MCNC: 130 MG/DL (ref 70–130)

## 2023-08-01 PROCEDURE — 97110 THERAPEUTIC EXERCISES: CPT | Performed by: OCCUPATIONAL THERAPIST

## 2023-08-01 PROCEDURE — 99231 SBSQ HOSP IP/OBS SF/LOW 25: CPT | Performed by: INTERNAL MEDICINE

## 2023-08-01 PROCEDURE — 82948 REAGENT STRIP/BLOOD GLUCOSE: CPT

## 2023-08-01 PROCEDURE — 97112 NEUROMUSCULAR REEDUCATION: CPT

## 2023-08-01 PROCEDURE — 97116 GAIT TRAINING THERAPY: CPT

## 2023-08-01 PROCEDURE — 97110 THERAPEUTIC EXERCISES: CPT

## 2023-08-01 PROCEDURE — 97530 THERAPEUTIC ACTIVITIES: CPT

## 2023-08-01 PROCEDURE — 97112 NEUROMUSCULAR REEDUCATION: CPT | Performed by: OCCUPATIONAL THERAPIST

## 2023-08-01 PROCEDURE — 97535 SELF CARE MNGMENT TRAINING: CPT | Performed by: OCCUPATIONAL THERAPIST

## 2023-08-01 RX ADMIN — METOPROLOL TARTRATE 25 MG: 25 TABLET, FILM COATED ORAL at 09:41

## 2023-08-01 RX ADMIN — NYSTATIN: 100000 POWDER TOPICAL at 09:57

## 2023-08-01 RX ADMIN — METOPROLOL TARTRATE 25 MG: 25 TABLET, FILM COATED ORAL at 21:21

## 2023-08-01 RX ADMIN — ALLOPURINOL 300 MG: 300 TABLET ORAL at 09:41

## 2023-08-01 RX ADMIN — MAGNESIUM GLUCONATE 500 MG ORAL TABLET 400 MG: 500 TABLET ORAL at 09:57

## 2023-08-01 RX ADMIN — RIVAROXABAN 20 MG: 20 TABLET, FILM COATED ORAL at 18:18

## 2023-08-01 RX ADMIN — TAMSULOSIN HYDROCHLORIDE 0.4 MG: 0.4 CAPSULE ORAL at 09:42

## 2023-08-01 RX ADMIN — ATORVASTATIN CALCIUM 80 MG: 40 TABLET, FILM COATED ORAL at 09:41

## 2023-08-01 RX ADMIN — NYSTATIN: 100000 POWDER TOPICAL at 21:26

## 2023-08-01 RX ADMIN — HYDROXYZINE HYDROCHLORIDE 25 MG: 25 TABLET, FILM COATED ORAL at 21:21

## 2023-08-01 RX ADMIN — PANTOPRAZOLE SODIUM 40 MG: 40 TABLET, DELAYED RELEASE ORAL at 05:11

## 2023-08-01 NOTE — PROGRESS NOTES
Body Function Structure    Skin Integrity (Active)  Current Status (7/6/2023 4:18:00 PM): At Risk for Skin Breakdown  Weekly Goal: No Skin Breakdown  Discharge Goal: No Skin Breakdown    Safety    Potential for Injury (Active)  Current Status (7/6/2023 4:18:00 PM): Potential for Falls  Weekly Goal: No Falls  Discharge Goal: No FallsRehabilitation Nursing  Inpatient Rehabilitation Plan of Care Note    Plan of Care  Copy from POC    Signed by: Nadja Velasquez RN

## 2023-08-01 NOTE — PROGRESS NOTES
Assisted By: Occupational therapy, patient's sister was also present.    CC: Follow-up on CVA    Interview History/HPI: Patient states he is doing well, patient was discussed in case conference, he states that if he needs more time he is certainly willing to stay and we as a team felt like we could continue to help him progress from his stroke symptoms.  Patient states his bowels are moving, he is tolerating his diet,          Current Hospital Meds:  allopurinol, 300 mg, Oral, Daily  atorvastatin, 80 mg, Oral, Daily  magnesium oxide, 400 mg, Oral, Daily  metoprolol tartrate, 25 mg, Oral, Q12H  nystatin, , Topical, Q12H  pantoprazole, 40 mg, Oral, Q AM  rivaroxaban, 20 mg, Oral, Daily With Dinner  tamsulosin, 0.4 mg, Oral, Daily         Vitals:    08/01/23 0941   BP: 109/67   Pulse: 89   Resp: 18   Temp: 98 øF (36.7 øC)   SpO2: 96%         Intake/Output Summary (Last 24 hours) at 8/1/2023 1101  Last data filed at 8/1/2023 0941  Gross per 24 hour   Intake 960 ml   Output --   Net 960 ml       EXAM: Patient still has good movement in his left arm and hand but weaker in the left than the right, minimal tremor on the left, I watched him doing the pegboard with the left hand.  He still has some neglect.  Lungs are clear heart is an irregular irregular controlled rhythm.  No edema minimal movement left leg.      Diet: Cardiac Diets; Healthy Heart (2-3 Na+); Texture: Regular Texture (IDDSI 7); Fluid Consistency: Thin (IDDSI 0)        LABS:     Lab Results (last 48 hours)       Procedure Component Value Units Date/Time    POC Glucose Once [[id number]]  (Normal) Collected: 08/01/23 0609    Specimen: Blood Updated: 08/01/23 0618     Glucose 111 mg/dL      Comment: Meter: [id number] : [id number] Quang Neal       POC Glucose Once [[id number]]  (Normal) Collected: 07/31/23 1639    Specimen: Blood Updated: 07/31/23 1652     Glucose 119 mg/dL      Comment: Meter: [id number] : [id number] Vinny Lemus       POC  Glucose Once [[id number]]  (Normal) Collected: 07/31/23 0611    Specimen: Blood Updated: 07/31/23 0619     Glucose 102 mg/dL      Comment: Meter: [id number] : [id number] Quang Neal       POC Glucose Once [[id number]]  (Normal) Collected: 07/30/23 1557    Specimen: Blood Updated: 07/30/23 1603     Glucose 114 mg/dL      Comment: Meter: [id number] : [id number] CYNDY PORTER                    Radiology:    Imaging Results (Last 72 Hours)       ** No results found for the last 72 hours. **                Assessment/Plan:   Status post CVA with left residual.  Patient is progressing well with PT and OT.  He was discussed in case conference today.  Patient is max assist of 1-2 for bed mobility and transfers patient required mod assist upper body max lower body, still has still some left neglect he has been able to walk 5 feet in the parallel bars max assist of 2    Atrial fibrillation, probably the source of his CVA, on low-dose Lopressor, continues on Xarelto for further stroke prevention.    Laryngeal cancer as well as non-Hodgkin's lymphoma, patient will be following up with Dr. Gregory on discharge in [place].  Confirmed this with the patient's sister however the patient does not have an appointment as of yet to see him.    HFpEF, euvolemic at the current time.  Patient was intolerant of his ARB secondary to hypotension.    Labs acceptable on the 29th with stable hemoglobin    Hayder Mendoza MD

## 2023-08-01 NOTE — PROGRESS NOTES
Case Management  Inpatient Rehabilitation Team Conference    Conference Date/Time: 8/1/2023 7:49:10 AM    Team Conference Attendees:  MD Wendi Funes SW Jessica Bill, ALON,   ALON Wade, PT  Lisa Sanchez OT    Demographics            Age: 67Y            Gender: Male    Admission Date: 7/6/2023 4:18:00 PM  Rehabilitation Diagnosis:  stroke  Comorbidities: C85.15 Unspecified B-cell lymphoma, lymph nodes of inguinal  region and lower limb  C32.9 Malignant neoplasm of larynx, unspecified  E11.9 Type 2 diabetes mellitus without complications  I10 Essential (primary) hypertension  K59.00 Constipation, unspecified  I48.91 Unspecified atrial fibrillation  M10.9 Gout, unspecified  Z51.89 Encounter for other specified aftercare  Z60.4 Social exclusion and rejection  Z91.81 History of falling      Plan of Care  Anticipated Discharge Date/Estimated Length of Stay: 8-4-23  Anticipated Discharge Destination: Community discharge with assistance  Discharge Plan : Pt plans to return home with cousin at discharge.  Pt says his  cousin, sister and daughter can assist with needs.  Medical Necessity Expected Level Rationale: fair-good  Intensity and Duration: an average of 3 hours/5 days per week  Medical Supervision and 24 Hour Rehab Nursing: x  Physical Therapy: x  PT Intensity/Duration: PT 1.5 hours per day/5 days per week  Occupational Therapy: x  OT Intensity/Duration: OT 1.5 hours per day/5 days per week  Social Work: x  Therapeutic Recreation: x  Updated (if changes indicated)    Anticipated Discharge Date/Estimated Length of Stay:   8-11-23      Discharge Plan of Care: Home Health Services Physical Therapy strengthening,  gait training, transfer training, balance, therapeutic exercise, bed mobility,  neuro re-education, home safety, coordination 3 times per week for 4 weeks  Occupational Therapy ADL re-training, home safety, functional mobility,  strengthening, ROM,  coordination, neuro re-education 3 times per week for 4  weeks    Based on the patient's medical and functional status, their prognosis and  expected level of functional improvement is: fair-good      Interdisciplinary Problem/Goals/Status  Body Function Structure    [RN] Skin Integrity(Active)  Current Status(07/06/2023): At Risk for Skin Breakdown  Weekly Goal(08/04/2023): No Skin Breakdown  Discharge Goal: No Skin Breakdown        Safety    [RN] Potential for Injury(Active)  Current Status(07/06/2023): Potential for Falls  Weekly Goal(08/04/2023): No Falls  Discharge Goal: No Falls        Mobility    [PT] Bed/Chair/Wheelchair(Active)  Current Status(07/07/2023): Dep/Tye  Weekly Goal(07/14/2023): mod A  Discharge Goal: SBA    [PT] Walk(Active)  Current Status(07/07/2023): unable  Weekly Goal(07/14/2023): amb 20' mod A x2 hallrail  Discharge Goal: amb 150' HW SBA        Self Care    [OT] Dressing (Upper)(Active)  Current Status(07/31/2023): Mod A  Weekly Goal(08/08/2023): Min A  Discharge Goal: Min A    Comments: Pt is recommended to extend discharge date another week to allow more  progress in therapy.  Pt plans to return to his home at discharge.  Significant  other lives in the home and daughter Karolyn plans to move in to provide  assistance.  Pt has friend to assist when daughter works and other family are  suppose to assist.  Significant other and sister present today to observe  therapy and receive education.  Significant other left early and plans to help  with meals and housekeeping.    Signed by: SUJATA Breen    Physician CoSigned By: Hayder Mendoza 08/01/2023 18:45:01

## 2023-08-01 NOTE — PLAN OF CARE
Goal Outcome Evaluation:  Plan of Care Reviewed With: patient        Progress: improving  Outcome Evaluation: BED ALARM NOTED; CONTINUE PLAN OF CARE; MONITOR          Wound improving.  Continue debridement to maintain active phase wound healing.  I do expect prolonged healing wound closure due to the radiation.

## 2023-08-01 NOTE — SIGNIFICANT NOTE
08/01/23 1530   Plan   Plan SS spoke to sister Mariah [phone] who says she is concerned family is not going to be able to take care of pt at home and wants SS to discuss SNF placement with him.  Educated her about pt's Medicare replacement insurance plan and prior-authorization requirement before pt could be admitted to a SNF.  Spoke to pt about concerns voiced by sister about family being able to meet caregiving needs.  Pt says his daughter Karolyn and son Antonio plan to move in the home with him and they will be strong enough to assist him at home.  Discussed option of going to SNF and he declines this option.  Pt wants to continue rehab stay and return home at discharge.

## 2023-08-01 NOTE — SIGNIFICANT NOTE
08/01/23 1909   Plan   Plan Notified sister Mariah [phone] about pt declining SNF placement and saying son and daughter will be moving in with him to assist with care.  Spoke to daughter Karolyn [phone] about extending pt's discharge date to 8-11-23, how he is doing in therapy and pt declining SNF placement.  Daughter confirmed she is moving in with pt and will assist with care along with his friend Joey.  Informed pt says tiny Alvarez is moving in too and will assist.  Daughter to call next week to schedule a day she can come to rehab for education with therapy.  Attempted to contact tiny Alvarez [phone] without success.  Will follow.   Patient/Family in Agreement with Plan yes

## 2023-08-01 NOTE — SIGNIFICANT NOTE
08/01/23 0915   Plan   Plan Team conference held today.  Pt is recommended to extend discharge date to 8-11-23.  Spoke to pt, sister Mariah and Alejandra (significant other) about this recommendation.  Sister and Alejandra came to observe pt in therapy and receive education.  Alejandra is leaving early and sister is staying for more therapy sessions.  Pt is agreeable to continue rehab stay.  Pt plans to return home at discharge with Alejandra who says she will help with meals and housekeeping.  Pt's daughter Karolyn plans to move in with pt to provide assistance.  Pt's friend Joey is suppose to stay with pt in the evening when daughter works.  Sister and other family are suppose to assist pt at home.  Discussed home health services, outpatient therapy and [facility] for transport home.  Educated about cost of [facility].  Pt is recommended for home health PT and OT.  Pt has several DME items at home.  Pt has a rollator, therefore, he may need a different walking device to use at home if he is able progress with ambulation before going home.  Will follow.   Patient/Family in Agreement with Plan yes

## 2023-08-01 NOTE — THERAPY TREATMENT NOTE
Inpatient Rehabilitation - Physical Therapy Treatment Note        Ronnie     Patient Name: Antonio Canseco  : 1955  MRN: [id number]    Today's Date: 2023                    Admit Date: 2023      Visit Dx:   No diagnosis found.    Patient Active Problem List   Diagnosis    Detrusor instability    Low testosterone in male    Disorder of prostate    Corporo-venous occlusive erectile dysfunction    CVA (cerebral vascular accident)       Past Medical History:   Diagnosis Date    Diabetes mellitus     Hypertension     Stroke        Past Surgical History:   Procedure Laterality Date    US GUIDED LYMPH NODE BIOPSY  2023       PT ASSESSMENT (last 12 hours)       IRF PT Evaluation and Treatment       Row Name 23 1435          PT Time and Intention    Document Type daily treatment  -RG     Mode of Treatment individual therapy;physical therapy  -RG     Patient/Family/Caregiver Comments/Observations Pts family present for teaching on TF, GT, Therex.  -RG       Row Name 23 1435          General Information    Patient Profile Reviewed yes  -RG     Existing Precautions/Restrictions fall  L HP, <trunk support/balance, L side inattention  -RG     Limitations/Impairments safety/cognitive  -RG       Row Name 23 1435          Cognition/Psychosocial    Affect/Mental Status (Cognition) WFL;other (see comments)  -RG     Follows Commands (Cognition) verbal cues/prompting required;physical/tactile prompts required;follows one-step commands  -RG     Personal Safety Interventions gait belt;fall prevention program maintained;nonskid shoes/slippers when out of bed  -RG     Cognitive Function safety deficit  -RG       Row Name 23 1437          Bed Mobility    Bed Mobility supine-sit;sit-supine  -RG     Supine-Sit [place] (Bed Mobility) nonverbal cues (demo/gesture);verbal cues;maximum assist (25% patient effort);2 person assist  -RG     Sit-Supine [place] (Bed Mobility) maximum assist (25%  patient effort);verbal cues;nonverbal cues (demo/gesture);2 person assist  -RG     Bed Mobility, Safety Issues decreased use of arms for pushing/pulling;decreased use of legs for bridging/pushing  -RG       Row Name 08/01/23 1435          Transfer Assessment/Treatment    Transfers bed-chair transfer;chair-bed transfer  -RG       Row Name 08/01/23 1435          Bed-Chair Transfer    Bed-Chair [place] (Transfers) maximum assist (25% patient effort);verbal cues;nonverbal cues (demo/gesture);2 person assist  -RG     Assistive Device (Bed-Chair Transfers) wheelchair  -RG       Row Name 08/01/23 1435          Sit-Stand Transfer    Sit-Stand [place] (Transfers) verbal cues;nonverbal cues (demo/gesture);minimum assist (75% patient effort)  -RG     Assistive Device (Sit-Stand Transfers) parallel bars  -RG       Row Name 08/01/23 1435          Stand-Sit Transfer    Stand-Sit [place] (Transfers) verbal cues;nonverbal cues (demo/gesture);minimum assist (75% patient effort)  -RG     Assistive Device (Stand-Sit Transfers) parallel bars  -RG       Row Name 08/01/23 1435          Gait/Stairs (Locomotion)    [place] Level (Gait) maximum assist (25% patient effort);verbal cues;nonverbal cues (demo/gesture);2 person assist  -RG     Assistive Device (Gait) parallel bars  -RG     Distance in Feet (Gait) 8' x 3  -RG     Pattern (Gait) --  dependent to advance LLE  -RG     Deviations/Abnormal Patterns (Gait) base of support, narrow;hebert decreased;gait speed decreased;stride length decreased;weight shifting decreased  -RG     Bilateral Gait Deviations forward flexed posture  -RG     Comment, (Gait/Stairs) Verbal and tactile cues required to maintain posture and to weight shift.  -RG       Row Name 08/01/23 1435          Safety Issues, Functional Mobility    Impairments Affecting Function (Mobility) balance;coordination;endurance/activity tolerance;grasp;motor control;muscle tone abnormal;postural/trunk control;range  of motion (ROM);strength  -       Row Name 08/01/23 1435          Balance    Static Sitting Balance verbal cues;non-verbal cues (demo/gesture);minimal assist  -RG     Position, Sitting Balance sitting edge of bed;sitting edge of mat  -RG     Comment, Balance Required cues to maintain sitting balance, demonstrated posterior lean at times.  -RG       Row Name 08/01/23 1435          Hip (Therapeutic Exercise)    Hip Strengthening (Therapeutic Exercise) bilateral;flexion;aBduction;aDduction;heel slides;internal rotation;external rotation;marching while seated;sitting;supine;2 lb free weight;resistance band;green;10 repetitions;2 sets  AAROM on L LE  -RG       Row Name 08/01/23 1435          Knee (Therapeutic Exercise)    Knee Strengthening (Therapeutic Exercise) bilateral;flexion;extension;marching while seated;LAQ (long arc quad);sitting;supine;2 lb free weight;resistance band;green;10 repetitions;2 sets  AAROM L LE  -RG       Row Name 08/01/23 1435          Ankle (Therapeutic Exercise)    Ankle Strengthening (Therapeutic Exercise) bilateral;dorsiflexion;plantarflexion;sitting;supine;10 repetitions;2 sets  AAROM L LE  -RG       Row Name 08/01/23 1435          Positioning and Restraints    Pre-Treatment Position in bed  -RG     Post Treatment Position wheelchair  -RG     In Wheelchair notified nsg;sitting;with OT;legs elevated  -       Row Name 08/01/23 1435          Daily Progress Summary (PT)    Impairments Still Limiting Function (PT) balance impairment;coordination impairment;functional activity tolerance impairment;motor control impaired;pain;postural control impaired;strength deficit;sensory impairment  -       Row Name 08/01/23 1435          Bed Mobility Goal 1 (PT-IRF)    Progress/Outcomes (Bed Mobility Goal 1, PT-IRF) goal ongoing  -       Row Name 08/01/23 1435          Bed Mobility Goal 2 (PT-IRF)    Progress/Outcomes (Bed Mobility Goal 2, PT-IRF) goal ongoing  -       Row Name 08/01/23 1435           Transfer Goal 1 (PT-IRF)    Progress/Outcomes (Transfer Goal 1, PT-IRF) goal partially met  Pt able to stand with [place]ie in parallel bars  -RG       Row Name 08/01/23 1435          Transfer Goal 2 (PT-IRF)    Progress/Outcomes (Transfer Goal 2, PT-IRF) goal ongoing  -RG       Row Name 08/01/23 1435          Gait/Walking Locomotion Goal 1 (PT-IRF)    Progress/Outcomes (Gait/Walking Locomotion Goal 1, PT-IRF) goal ongoing  -RG       Row Name 08/01/23 1435          Gait/Walking Locomotion Goal 2 (PT-IRF)    Progress/Outcomes (Gait/Walking Locomotion Goal 2, PT-IRF) goal ongoing  -RG               User Key  (r) = Recorded By, (t) = Taken By, (c) = Cosigned By      Initials Name Provider Type    Vu Galloway PTA Physical Therapist Assistant                     Physical Therapy Education       Title: PT OT SLP Therapies (Done)       Topic: Physical Therapy (Done)       Point: Mobility training (Done)       Learning Progress Summary             Patient Acceptance, E,D, VU,NR by RG at 8/1/2023 1446    Acceptance, E,D, VU,NR by RG at 7/31/2023 1411    Acceptance, E,TB, VU by RM at 7/28/2023 1230    Acceptance, E,D, VU,NR by RG at 7/26/2023 1518    Acceptance, E, VU,NR by LB at 7/25/2023 1133    Acceptance, E, VU,NR by LB at 7/24/2023 1444    Acceptance, E, VU,NR by LB at 7/21/2023 1615    Acceptance, E,D, VU,NR by RG at 7/20/2023 1107    Acceptance, TB, NR by DL at 7/19/2023 2036    Acceptance, E,D, VU,NR by RG at 7/19/2023 1508    Acceptance, E,D, VU,NR by RG at 7/18/2023 1520    Acceptance, E,D, VU,NR by RG at 7/17/2023 1522    Acceptance, E,D, VU,NR by LL at 7/13/2023 1358    Acceptance, E, VU,NR by LB at 7/12/2023 1144    Acceptance, E, VU,NR by LB at 7/11/2023 1426    Acceptance, E, VU,NR by LB at 7/10/2023 1452    Acceptance, E,D, VU,NR by LL at 7/8/2023 1228    Acceptance, E,D, VU,NR by LB at 7/7/2023 1449                         Point: Home exercise program (Done)       Learning Progress Summary              Patient Acceptance, E,D, VU,NR by RG at 8/1/2023 1446    Acceptance, E,D, VU,NR by RG at 7/31/2023 1411    Acceptance, E,TB, VU by RM at 7/28/2023 1230    Acceptance, E,D, VU,NR by RG at 7/26/2023 1518    Acceptance, E, VU,NR by LB at 7/25/2023 1133    Acceptance, E, VU,NR by LB at 7/24/2023 1444    Acceptance, E, VU,NR by LB at 7/21/2023 1615    Acceptance, E,D, VU,NR by RG at 7/20/2023 1107    Acceptance, TB, NR by DL at 7/19/2023 2036    Acceptance, E,D, VU,NR by RG at 7/19/2023 1508    Acceptance, E,D, VU,NR by RG at 7/18/2023 1520    Acceptance, E,D, VU,NR by RG at 7/17/2023 1522    Acceptance, E,D, VU,NR by LL at 7/13/2023 1358    Acceptance, E, VU,NR by LB at 7/12/2023 1144    Acceptance, E, VU,NR by LB at 7/11/2023 1426    Acceptance, E, VU,NR by LB at 7/10/2023 1452    Acceptance, E,D, VU,NR by LL at 7/8/2023 1228    Acceptance, E,D, VU,NR by LB at 7/7/2023 1449                         Point: Body mechanics (Done)       Learning Progress Summary             Patient Acceptance, E,D, VU,NR by RG at 8/1/2023 1446    Acceptance, E,D, VU,NR by RG at 7/31/2023 1411    Acceptance, E,TB, VU by RM at 7/28/2023 1230    Acceptance, E,D, VU,NR by RG at 7/26/2023 1518    Acceptance, E, VU,NR by LB at 7/25/2023 1133    Acceptance, E, VU,NR by LB at 7/24/2023 1444    Acceptance, E, VU,NR by LB at 7/21/2023 1615    Acceptance, E,D, VU,NR by RG at 7/20/2023 1107    Acceptance, TB, NR by DL at 7/19/2023 2036    Acceptance, E,D, VU,NR by RG at 7/19/2023 1508    Acceptance, E,D, VU,NR by RG at 7/18/2023 1520    Acceptance, E,D, VU,NR by RG at 7/17/2023 1522    Acceptance, E,D, VU,NR by LL at 7/13/2023 1358    Acceptance, E, VU,NR by LB at 7/12/2023 1144    Acceptance, E, VU,NR by LB at 7/11/2023 1426    Acceptance, E, VU,NR by LB at 7/10/2023 1452    Acceptance, E,D, VU,NR by LL at 7/8/2023 1228    Acceptance, E,D, VU,NR by LB at 7/7/2023 1449                         Point: Precautions (Done)       Learning Progress Summary              Patient Acceptance, E,D, VU,NR by RG at 8/1/2023 1446    Acceptance, E,D, VU,NR by RG at 7/31/2023 1411    Acceptance, E,TB, VU by RM at 7/28/2023 1230    Acceptance, E,D, VU,NR by RG at 7/26/2023 1518    Acceptance, E, VU,NR by LB at 7/25/2023 1133    Acceptance, E, VU,NR by LB at 7/24/2023 1444    Acceptance, E, VU,NR by LB at 7/21/2023 1615    Acceptance, E,D, VU,NR by RG at 7/20/2023 1107    Acceptance, TB, NR by DL at 7/19/2023 2036    Acceptance, E,D, VU,NR by RG at 7/19/2023 1508    Acceptance, E,D, VU,NR by RG at 7/18/2023 1520    Acceptance, E,D, VU,NR by RG at 7/17/2023 1522    Acceptance, E,D, VU,NR by LL at 7/13/2023 1358    Acceptance, E, VU,NR by LB at 7/12/2023 1144    Acceptance, E, VU,NR by LB at 7/11/2023 1426    Acceptance, E, VU,NR by LB at 7/10/2023 1452    Acceptance, E,D, VU,NR by LL at 7/8/2023 1228    Acceptance, E,D, VU,NR by LB at 7/7/2023 1449                                         User Key       Initials Effective Dates Name Provider Type Discipline    LB 06/16/21 -  Joslyn Garcia, PT Physical Therapist PT    LL 05/02/16 -  Connie Velasquez, JILL Physical Therapist Assistant PT    RM 02/17/23 -  Shelley Hanson, PTA Physical Therapist Assistant PT    RG 06/16/21 -  Vu Brown PTA Physical Therapist Assistant PT    DL 03/16/22 -  Nadja Velasquez, RN Registered Nurse Nurse                    PT Recommendation and Plan          Daily Progress Summary (PT)  Impairments Still Limiting Function (PT): balance impairment, coordination impairment, functional activity tolerance impairment, motor control impaired, pain, postural control impaired, strength deficit, sensory impairment               Time Calculation:      PT Charges       Row Name 08/01/23 1447             Time Calculation    Start Time 0745  -RG      Stop Time 0915  -RG      Time Calculation (min) 90 min  -RG      PT Received On 08/01/23  -RG         Time Calculation- PT    Total Timed Code Minutes- PT  90 minute(s)  -PIERO                User Key  (r) = Recorded By, (t) = Taken By, (c) = Cosigned By      Initials Name Provider Type    Vu Galloway PTA Physical Therapist Assistant                    Therapy Charges for Today       Code Description Service Date Service Provider Modifiers Qty    73992365126 HC GAIT TRAINING EA 15 MIN 7/31/2023 Vu Brown, PTA GP, CQ 1    57820071608 HC PT THERAPEUTIC ACT EA 15 MIN 7/31/2023 Vu Brown, PTA GP, CQ 2    96418798487 HC PT THER PROC EA 15 MIN 7/31/2023 Vu Brown, PTA GP, CQ 3    72794559993 HC GAIT TRAINING EA 15 MIN 8/1/2023 Vu Brown, PTA GP, CQ 1    10939852592 HC PT THERAPEUTIC ACT EA 15 MIN 8/1/2023 Vu Brown, PTA GP, CQ 1    48779685412 HC PT THER PROC EA 15 MIN 8/1/2023 Vu Brown, JILL GP, CQ 3    13151759776 HC PT NEUROMUSC RE EDUCATION EA 15 MIN 8/1/2023 Vu Brown PTA GP, CQ 1                     Vu Brown PTA  8/1/2023

## 2023-08-01 NOTE — THERAPY TREATMENT NOTE
Inpatient Rehabilitation - Occupational Therapy Treatment Note     Ronnie     Patient Name: Antonio Canseco  : 1955  MRN: [id number]    Today's Date: 2023                 Admit Date: 2023       No diagnosis found.    Patient Active Problem List   Diagnosis    Detrusor instability    Low testosterone in male    Disorder of prostate    Corporo-venous occlusive erectile dysfunction    CVA (cerebral vascular accident)       Past Medical History:   Diagnosis Date    Diabetes mellitus     Hypertension     Stroke        Past Surgical History:   Procedure Laterality Date    US GUIDED LYMPH NODE BIOPSY  2023             IRF OT ASSESSMENT FLOWSHEET (last 12 hours)       IRF OT Evaluation and Treatment       Row Name 23 1427          OT Time and Intention    Document Type daily treatment  -BF     Mode of Treatment occupational therapy  -BF     Patient Effort good  -BF     Symptoms Noted During/After Treatment none  -BF       Row Name 23 142          General Information    Patient/Family/Caregiver Comments/Observations Pt's sister present for OT session, educated in assistance needed for self-care.  -BF     Existing Precautions/Restrictions fall  L HP, <trunk support/balance, L side inattention  -BF     Limitations/Impairments safety/cognitive  -BF       Row Name 23 1427          Cognition/Psychosocial    Orientation Status (Cognition) oriented x 3  -BF     Follows Commands (Cognition) verbal cues/prompting required;repetition of directions required;physical/tactile prompts required;increased processing time needed;delayed response/completion  -BF       Row Name 23 1427          Upper Body Dressing    [place] Level (Upper Body Dressing) minimum assist (75% or more patient effort);verbal cues;nonverbal cues (demo/gesture)  -BF     Position (Upper Body Dressing) supported sitting  -BF     Comment (Upper Body Dressing) Min A  -BF       Row Name 23 1427          Lower Body  Dressing    [place] Level (Lower Body Dressing) maximum assist (25% patient effort)  -BF     Position (Lower Body Dressing) supine  -BF     Comment (Lower Body Dressing) Max A  -BF       Row Name 08/01/23 1427          Chair-Bed Transfer    Chair-Bed [place] (Transfers) maximum assist (25% patient effort);verbal cues;nonverbal cues (demo/gesture);2 person assist  -BF     Assistive Device (Chair-Bed Transfers) wheelchair  -BF       Row Name 08/01/23 1427          Motor Skills    Motor Control/Coordination Interventions fine motor manipulation/dexterity activities;gross motor coordination activities;therapeutic exercise/ROM;neuro-muscular re-education  LUE NDT, GMC/FMC theract, strengthening; BUE rickshaw 25 lbs X20X3, PRE 10 mins  -BF       Row Name 08/01/23 1427          Neuromuscular Re-education    Interventions (Neuromuscular Re-education) facilitation/inhibition  LUE  -BF     Positioning (Neuromuscular Re-education) sitting  -BF       Row Name 08/01/23 1427          Positioning and Restraints    In Bed supine;call light within reach;encouraged to call for assist;with family/caregiver  -BF               User Key  (r) = Recorded By, (t) = Taken By, (c) = Cosigned By      Initials Name Effective Dates    BF Lisa Sanchez OT 07/11/23 -                      Occupational Therapy Education       Title: PT OT SLP Therapies (Done)       Topic: Occupational Therapy (Done)       Point: ADL training (Done)       Description:   Instruct learner(s) on proper safety adaptation and remediation techniques during self care or transfers.   Instruct in proper use of assistive devices.                  Learning Progress Summary             Patient Acceptance, E, VU,NR by BF at 8/1/2023 1427    Acceptance, E, VU,NR by BF at 7/31/2023 1409    Acceptance, E, VU,NR by BF at 7/28/2023 1442    Acceptance, E, VU,NR by BF at 7/27/2023 1445    Acceptance, E, VU,NR by BF at 7/25/2023 1251    Acceptance, E, VU,NR by HB at  7/24/2023 1355    Acceptance, E, NR,VU by BF at 7/21/2023 1248    Acceptance, E, NR by BF at 7/20/2023 1259    Acceptance, TB, NR by DL at 7/19/2023 2036    Acceptance, E, VU,NR by HB at 7/19/2023 1421    Acceptance, E, VU,NR by BF at 7/18/2023 1334    Acceptance, E, VU,NR by BF at 7/17/2023 1431    Acceptance, E, VU,NR by BF at 7/17/2023 1430    Acceptance, E, VU,NR by BF at 7/14/2023 1508    Acceptance, E, VU,NR by BF at 7/13/2023 1343    Acceptance, E,D, VU,NR by TM at 7/12/2023 1407    Acceptance, D,E, NR,VU by TM at 7/11/2023 1434    Acceptance, E, VU,NR by BF at 7/10/2023 1456    Acceptance, E, VU,NR by HB at 7/8/2023 1140    Acceptance, E, VU,NR by BF at 7/7/2023 1442   Family Acceptance, E, VU,NR by BF at 8/1/2023 1427                         Point: Precautions (Done)       Description:   Instruct learner(s) on prescribed precautions during self-care and functional transfers.                  Learning Progress Summary             Patient Acceptance, E, VU,NR by BF at 8/1/2023 1427    Acceptance, E, VU,NR by BF at 7/31/2023 1409    Acceptance, E, VU,NR by BF at 7/28/2023 1442    Acceptance, E, VU,NR by BF at 7/27/2023 1445    Acceptance, E, VU,NR by BF at 7/25/2023 1251    Acceptance, E, VU,NR by HB at 7/24/2023 1355    Acceptance, E, NR,VU by BF at 7/21/2023 1248    Acceptance, E, NR by BF at 7/20/2023 1259    Acceptance, TB, NR by DL at 7/19/2023 2036    Acceptance, E, VU,NR by HB at 7/19/2023 1421    Acceptance, E, VU,NR by BF at 7/18/2023 1334    Acceptance, E, VU,NR by BF at 7/17/2023 1431    Acceptance, E, VU,NR by BF at 7/17/2023 1430    Acceptance, E, VU,NR by BF at 7/14/2023 1508    Acceptance, E, VU,NR by BF at 7/13/2023 1343    Acceptance, E,D, VU,NR by TM at 7/12/2023 1407    Acceptance, D,E, NR,VU by TM at 7/11/2023 1434    Acceptance, E, VU,NR by BF at 7/10/2023 1456    Acceptance, E, VU,NR by HB at 7/8/2023 1140    Acceptance, E, VU,NR by BF at 7/7/2023 1442   Family Acceptance, E, VU,NR by BF  at 8/1/2023 1427                                         User Key       Initials Effective Dates Name Provider Type Discipline    BF 05/31/23 - 07/10/23 Lisa Sanchez, OT Occupational Therapist OT    BF 07/11/23 -  Lisa Sanchez, OT Occupational Therapist OT    TM 06/16/21 -  Marielos Capone, OT Occupational Therapist OT    HB 05/25/21 -  Veronica Robles, OT Occupational Therapist OT    DL 03/16/22 -  Nadja Velasquez, RN Registered Nurse Nurse                        OT Recommendation and Plan    Planned Therapy Interventions (OT): activity tolerance training, adaptive equipment training, BADL retraining, neuromuscular control/coordination retraining, passive ROM/stretching, ROM/therapeutic exercise, strengthening exercise, transfer/mobility retraining                    Time Calculation:      Time Calculation- OT       Row Name 08/01/23 1435             Time Calculation- OT    OT Start Time 0915  -BF      OT Stop Time 1045  -BF      OT Time Calculation (min) 90 min  -BF      Total Timed Code Minutes- OT 90 minute(s)  -BF      OT Non-Billable Time (min) 10 min  -BF                User Key  (r) = Recorded By, (t) = Taken By, (c) = Cosigned By      Initials Name Provider Type    BF Daniel Lisa Lima, OT Occupational Therapist                  Therapy Charges for Today       Code Description Service Date Service Provider Modifiers Qty    36475477746 HC OT SELF CARE/MGMT/TRAIN EA 15 MIN 7/31/2023 SanchezLisa easley OT GO 1    91306929237 HC OT NEUROMUSC RE EDUCATION EA 15 MIN 7/31/2023 SanchezLisa easley OT GO 3    43778565819 HC OT THER PROC EA 15 MIN 7/31/2023 SanchezLisa easley, OT GO 2    04336440984 HC OT SELF CARE/MGMT/TRAIN EA 15 MIN 8/1/2023 SanchezLisa easley OT GO 2    23912182399 HC OT THER PROC EA 15 MIN 8/1/2023 SanchezLisa easley OT GO 2    35135507321 HC OT NEUROMUSC RE EDUCATION EA 15 MIN 8/1/2023 SanchezLisa easley, OT GO 2                      Lisa Sanchez, OT  8/1/2023

## 2023-08-01 NOTE — SIGNIFICANT NOTE
08/01/23 1530   Plan   Plan SS spoke to sister Mariah [phone] who says she is concerned family is not going to be able to take care of pt at home and wants SS to discuss SNF placement with him.  Educated her about pt's Medicare replacement insurance plan and prior-authorization requirement before pt could be admitted to a SNF.  Spoke to pt about concerns voiced by sister about family being able to meet caregiving needs.  Pt says his daughter Karolyn and son Antonio plan to move in the home with him and they will be strong enough to assist him at home.  Discussed option of going to SNF and he declines this option.  Pt wants to continue rehab stay and return home at discharge.  Pt prefers [facility] at discharge.

## 2023-08-01 NOTE — PLAN OF CARE
Goal Outcome Evaluation:  Plan of Care Reviewed With: patient        Progress: improving            Problem: Rehabilitation (IRF) Plan of Care  Goal: Plan of Care Review  Outcome: Ongoing, Progressing  Flowsheets (Taken 8/1/2023 1259)  Progress: improving  Plan of Care Reviewed With: patient  Goal: Patient-Specific Goal (Individualized)  Outcome: Ongoing, Progressing  Goal: Absence of New-Onset Illness or Injury  Outcome: Ongoing, Progressing  Intervention: Prevent Fall and Fall Injury  Recent Flowsheet Documentation  Taken 8/1/2023 1802 by Marco Lo, RN  Safety Promotion/Fall Prevention: safety round/check completed  Taken 8/1/2023 1603 by Marco Lo, RN  Safety Promotion/Fall Prevention: safety round/check completed  Taken 8/1/2023 1405 by Marco Lo, RN  Safety Promotion/Fall Prevention: safety round/check completed  Taken 8/1/2023 1203 by Marco Lo, RN  Safety Promotion/Fall Prevention: safety round/check completed  Taken 8/1/2023 1002 by Marco Lo, RN  Safety Promotion/Fall Prevention: safety round/check completed  Taken 8/1/2023 0730 by Marco oL, RN  Safety Promotion/Fall Prevention:   safety round/check completed   gait belt   fall prevention program maintained   clutter free environment maintained   activity supervised  Intervention: Prevent VTE (Venous Thromboembolism)  Recent Flowsheet Documentation  Taken 8/1/2023 0730 by Marco Lo, RN  VTE Prevention/Management: (Xarelto) other (see comments)  Goal: Optimal Comfort and Wellbeing  Outcome: Ongoing, Progressing  Goal: Home and Community Transition Plan Established  Outcome: Ongoing, Progressing     Problem: Diabetes Comorbidity  Goal: Blood Glucose Level Within Targeted Range  Outcome: Ongoing, Progressing     Problem: Skin Injury Risk Increased  Goal: Skin Health and Integrity  Outcome: Ongoing, Progressing  Intervention: Optimize Skin Protection  Recent Flowsheet Documentation  Taken 8/1/2023 0730 by  Marco Lo, RN  Pressure Reduction Techniques:   pressure points protected   heels elevated off bed   frequent weight shift encouraged  Pressure Reduction Devices:   pressure-redistributing mattress utilized   heel offloading device utilized  Skin Protection: incontinence pads utilized     Problem: Fall Injury Risk  Goal: Absence of Fall and Fall-Related Injury  Outcome: Ongoing, Progressing  Intervention: Identify and Manage Contributors  Recent Flowsheet Documentation  Taken 8/1/2023 0730 by Marco Lo, RN  Medication Review/Management: medications reviewed  Intervention: Promote Injury-Free Environment  Recent Flowsheet Documentation  Taken 8/1/2023 1802 by Marco Lo, RN  Safety Promotion/Fall Prevention: safety round/check completed  Taken 8/1/2023 1603 by Marco Lo, RN  Safety Promotion/Fall Prevention: safety round/check completed  Taken 8/1/2023 1405 by Marco Lo, RN  Safety Promotion/Fall Prevention: safety round/check completed  Taken 8/1/2023 1203 by Marco Lo, RN  Safety Promotion/Fall Prevention: safety round/check completed  Taken 8/1/2023 1002 by Marco Lo, RN  Safety Promotion/Fall Prevention: safety round/check completed  Taken 8/1/2023 0730 by Marco Lo, RN  Safety Promotion/Fall Prevention:   safety round/check completed   gait belt   fall prevention program maintained   clutter free environment maintained   activity supervised     Problem: Mobility Impairment  Goal: Optimal Mobility Vance and Safety  Outcome: Ongoing, Progressing

## 2023-08-02 LAB
GLUCOSE BLDC GLUCOMTR-MCNC: 119 MG/DL (ref 70–130)
GLUCOSE BLDC GLUCOMTR-MCNC: 123 MG/DL (ref 70–130)
GLUCOSE BLDC GLUCOMTR-MCNC: 146 MG/DL (ref 70–130)
GLUCOSE BLDC GLUCOMTR-MCNC: 303 MG/DL (ref 70–130)

## 2023-08-02 PROCEDURE — 97530 THERAPEUTIC ACTIVITIES: CPT

## 2023-08-02 PROCEDURE — 97112 NEUROMUSCULAR REEDUCATION: CPT

## 2023-08-02 PROCEDURE — 82948 REAGENT STRIP/BLOOD GLUCOSE: CPT

## 2023-08-02 PROCEDURE — 97110 THERAPEUTIC EXERCISES: CPT

## 2023-08-02 PROCEDURE — 97116 GAIT TRAINING THERAPY: CPT

## 2023-08-02 RX ORDER — POLYETHYLENE GLYCOL 3350 17 G/17G
17 POWDER, FOR SOLUTION ORAL DAILY
Status: DISCONTINUED | OUTPATIENT
Start: 2023-08-02 | End: 2023-08-23 | Stop reason: HOSPADM

## 2023-08-02 RX ADMIN — METOPROLOL TARTRATE 25 MG: 25 TABLET, FILM COATED ORAL at 09:32

## 2023-08-02 RX ADMIN — METOPROLOL TARTRATE 25 MG: 25 TABLET, FILM COATED ORAL at 20:44

## 2023-08-02 RX ADMIN — TAMSULOSIN HYDROCHLORIDE 0.4 MG: 0.4 CAPSULE ORAL at 09:32

## 2023-08-02 RX ADMIN — POLYETHYLENE GLYCOL (3350) 17 G: 17 POWDER, FOR SOLUTION ORAL at 15:58

## 2023-08-02 RX ADMIN — ALLOPURINOL 300 MG: 300 TABLET ORAL at 09:32

## 2023-08-02 RX ADMIN — PANTOPRAZOLE SODIUM 40 MG: 40 TABLET, DELAYED RELEASE ORAL at 06:27

## 2023-08-02 RX ADMIN — RIVAROXABAN 20 MG: 20 TABLET, FILM COATED ORAL at 17:16

## 2023-08-02 RX ADMIN — HYDROXYZINE HYDROCHLORIDE 25 MG: 25 TABLET, FILM COATED ORAL at 21:36

## 2023-08-02 RX ADMIN — ATORVASTATIN CALCIUM 80 MG: 40 TABLET, FILM COATED ORAL at 09:32

## 2023-08-02 RX ADMIN — NYSTATIN: 100000 POWDER TOPICAL at 20:45

## 2023-08-02 RX ADMIN — MAGNESIUM GLUCONATE 500 MG ORAL TABLET 400 MG: 500 TABLET ORAL at 09:38

## 2023-08-02 RX ADMIN — NYSTATIN: 100000 POWDER TOPICAL at 09:44

## 2023-08-02 NOTE — PROGRESS NOTES
Patient continues to do well, apparently family had discussed with Wendi  about the possibility of SNF, patient became mildly tearful over discussing this and states he wants to go home, he is willing to stay here another week to keep trying to improve.  I did observe the patient in the parallel bars with physical therapy.  Patient is beginning to take some steps, he done the parallel bars 3 times, requiring less assistance to move his left leg, he is beginning to get more movement in the left leg.  Patient had not had a bowel movement in the last 48 hours, I have added MiraLAX and will follow.  Patient's heart rate is well controlled, on Xarelto for further stroke prevention.  Labs to be rechecked in AM.

## 2023-08-02 NOTE — PROGRESS NOTES
Occupational Therapy: Individual: 90 minutes.    Physical Therapy:    Speech Language Pathology:    Signed by: Yuliya Quinonez OT

## 2023-08-02 NOTE — PLAN OF CARE
Goal Outcome Evaluation:  Plan of Care Reviewed With: patient with reports continues In therapy. Will continue to monitor.

## 2023-08-02 NOTE — THERAPY TREATMENT NOTE
Inpatient Rehabilitation - Physical Therapy Treatment Note        Ronnie     Patient Name: Antonio Canseco  : 1955  MRN: [id number]    Today's Date: 2023                    Admit Date: 2023      Visit Dx:   No diagnosis found.    Patient Active Problem List   Diagnosis    Detrusor instability    Low testosterone in male    Disorder of prostate    Corporo-venous occlusive erectile dysfunction    CVA (cerebral vascular accident)       Past Medical History:   Diagnosis Date    Diabetes mellitus     Hypertension     Stroke        Past Surgical History:   Procedure Laterality Date    US GUIDED LYMPH NODE BIOPSY  2023       PT ASSESSMENT (last 12 hours)       IRF PT Evaluation and Treatment       Row Name 23 1519          PT Time and Intention    Document Type daily treatment  -RG     Mode of Treatment individual therapy;physical therapy  -RG     Patient/Family/Caregiver Comments/Observations Pt and nursing in agreement for skilled PT on this date.  -RG       Row Name 23 1519          General Information    Patient Profile Reviewed yes  -RG     Existing Precautions/Restrictions fall  L HP, <trunk support/balance, L side inattention  -RG     Limitations/Impairments safety/cognitive  -RG       Row Name 23 1519          Cognition/Psychosocial    Affect/Mental Status (Cognition) WFL;other (see comments)  -RG     Follows Commands (Cognition) verbal cues/prompting required;physical/tactile prompts required;follows one-step commands  -RG     Personal Safety Interventions gait belt;fall prevention program maintained;nonskid shoes/slippers when out of bed  -RG     Cognitive Function safety deficit  -RG       Row Name 23 1519          Bed Mobility    Bed Mobility supine-sit;sit-supine  -RG     Supine-Sit [place] (Bed Mobility) nonverbal cues (demo/gesture);verbal cues;maximum assist (25% patient effort);2 person assist  -RG     Sit-Supine [place] (Bed Mobility) maximum assist  (25% patient effort);verbal cues;nonverbal cues (demo/gesture);2 person assist  -RG     Bed Mobility, Safety Issues decreased use of arms for pushing/pulling;decreased use of legs for bridging/pushing  -RG       Row Name 08/02/23 1519          Transfer Assessment/Treatment    Transfers bed-chair transfer;chair-bed transfer  -RG       Row Name 08/02/23 1519          Bed-Chair Transfer    Bed-Chair [place] (Transfers) maximum assist (25% patient effort);verbal cues;nonverbal cues (demo/gesture);2 person assist  -RG     Assistive Device (Bed-Chair Transfers) wheelchair  -RG       Row Name 08/02/23 1519          Sit-Stand Transfer    Sit-Stand [place] (Transfers) verbal cues;nonverbal cues (demo/gesture);minimum assist (75% patient effort)  -RG     Assistive Device (Sit-Stand Transfers) parallel bars  -RG       Row Name 08/02/23 1519          Stand-Sit Transfer    Stand-Sit [place] (Transfers) verbal cues;nonverbal cues (demo/gesture);minimum assist (75% patient effort)  -RG     Assistive Device (Stand-Sit Transfers) parallel bars  -RG       Row Name 08/02/23 1519          Gait/Stairs (Locomotion)    [place] Level (Gait) maximum assist (25% patient effort);verbal cues;nonverbal cues (demo/gesture);2 person assist  -RG     Assistive Device (Gait) parallel bars  -RG     Distance in Feet (Gait) 8' x 3  -RG     Pattern (Gait) --  dependent to advance LLE  -RG     Deviations/Abnormal Patterns (Gait) base of support, narrow;hebert decreased;gait speed decreased;stride length decreased;weight shifting decreased  -RG     Bilateral Gait Deviations forward flexed posture  -RG     Comment, (Gait/Stairs) Cues to ws in order for therapist to advance L LE.  -RG       Row Name 08/02/23 1519          Safety Issues, Functional Mobility    Impairments Affecting Function (Mobility) balance;coordination;endurance/activity tolerance;grasp;motor control;muscle tone abnormal;postural/trunk control;range of motion  (ROM);strength  -RG       Row Name 08/02/23 1519          Balance    Static Sitting Balance verbal cues;non-verbal cues (demo/gesture);minimal assist  -RG     Position, Sitting Balance sitting edge of bed  -RG       Row Name 08/02/23 1519          Motor Skills    Therapeutic Exercise aerobic  -RG       Row Name 08/02/23 1519          Hip (Therapeutic Exercise)    Hip Strengthening (Therapeutic Exercise) bilateral;flexion;aBduction;aDduction;marching while seated;sitting;resistance band;green;10 repetitions;2 sets  AAROM L LE  -RG       Row Name 08/02/23 1519          Knee (Therapeutic Exercise)    Knee Strengthening (Therapeutic Exercise) bilateral;flexion;extension;marching while seated;LAQ (long arc quad);sitting;2 lb free weight;resistance band;green;10 repetitions;2 sets  AAROM L LE  -RG       Row Name 08/02/23 1519          Ankle (Therapeutic Exercise)    Ankle Strengthening (Therapeutic Exercise) right;dorsiflexion;plantarflexion;sitting;10 repetitions;2 sets  -RG       Row Name 08/02/23 1519          Aerobic Exercise    Type (Aerobic Exercise) recumbent stationary bike  -RG     Time Performed (Aerobic Exercise) 16  -RG     Comment, Aerobic Exercise (Therapeutic Exercise) L 1 while in wc  -RG       Row Name 08/02/23 1519          Positioning and Restraints    Pre-Treatment Position in bed  -RG     Post Treatment Position wheelchair  -RG     In Bed notified nsg;sitting;with OT;legs elevated  -RG       Row Name 08/02/23 1519          Daily Progress Summary (PT)    Impairments Still Limiting Function (PT) balance impairment;coordination impairment;functional activity tolerance impairment;motor control impaired;pain;postural control impaired;strength deficit;sensory impairment  -RG       Row Name 08/02/23 1519          Bed Mobility Goal 1 (PT-IRF)    Progress/Outcomes (Bed Mobility Goal 1, PT-IRF) goal ongoing  -RG       Row Name 08/02/23 1519          Bed Mobility Goal 2 (PT-IRF)    Progress/Outcomes (Bed  Mobility Goal 2, PT-IRF) goal ongoing  -RG       Row Name 08/02/23 1519          Transfer Goal 1 (PT-IRF)    Progress/Outcomes (Transfer Goal 1, PT-IRF) goal partially met  Pt able to stand with [place] in parallel bars  -RG       Row Name 08/02/23 1519          Transfer Goal 2 (PT-IRF)    Progress/Outcomes (Transfer Goal 2, PT-IRF) goal ongoing  -RG       Row Name 08/02/23 1519          Gait/Walking Locomotion Goal 1 (PT-IRF)    Progress/Outcomes (Gait/Walking Locomotion Goal 1, PT-IRF) goal ongoing  -RG       Row Name 08/02/23 1519          Gait/Walking Locomotion Goal 2 (PT-IRF)    Progress/Outcomes (Gait/Walking Locomotion Goal 2, PT-IRF) goal ongoing  -RG               User Key  (r) = Recorded By, (t) = Taken By, (c) = Cosigned By      Initials Name Provider Type    Vu Galloway PTA Physical Therapist Assistant                     Physical Therapy Education       Title: PT OT SLP Therapies (Done)       Topic: Physical Therapy (Done)       Point: Mobility training (Done)       Learning Progress Summary             Patient Acceptance, E,D, VU,NR by RG at 8/2/2023 1524    Acceptance, E,D, VU,NR by RG at 8/1/2023 1446    Acceptance, E,D, VU,NR by RG at 7/31/2023 1411    Acceptance, E,TB, VU by RM at 7/28/2023 1230    Acceptance, E,D, VU,NR by RG at 7/26/2023 1518    Acceptance, E, VU,NR by LB at 7/25/2023 1133    Acceptance, E, VU,NR by LB at 7/24/2023 1444    Acceptance, E, VU,NR by LB at 7/21/2023 1615    Acceptance, E,D, VU,NR by RG at 7/20/2023 1107    Acceptance, TB, NR by DL at 7/19/2023 2036    Acceptance, E,D, VU,NR by RG at 7/19/2023 1508    Acceptance, E,D, VU,NR by RG at 7/18/2023 1520    Acceptance, E,D, VU,NR by RG at 7/17/2023 1522    Acceptance, E,D, VU,NR by LL at 7/13/2023 1358    Acceptance, E, VU,NR by LB at 7/12/2023 1144    Acceptance, E, VU,NR by LB at 7/11/2023 1426    Acceptance, E, VU,NR by LB at 7/10/2023 1452    Acceptance, E,D, VU,NR by LL at 7/8/2023 1228    Acceptance, E,D, VU,NR by  LB at 7/7/2023 1449                         Point: Home exercise program (Done)       Learning Progress Summary             Patient Acceptance, E,D, VU,NR by RG at 8/2/2023 1524    Acceptance, E,D, VU,NR by RG at 8/1/2023 1446    Acceptance, E,D, VU,NR by RG at 7/31/2023 1411    Acceptance, E,TB, VU by RM at 7/28/2023 1230    Acceptance, E,D, VU,NR by RG at 7/26/2023 1518    Acceptance, E, VU,NR by LB at 7/25/2023 1133    Acceptance, E, VU,NR by LB at 7/24/2023 1444    Acceptance, E, VU,NR by LB at 7/21/2023 1615    Acceptance, E,D, VU,NR by RG at 7/20/2023 1107    Acceptance, TB, NR by DL at 7/19/2023 2036    Acceptance, E,D, VU,NR by RG at 7/19/2023 1508    Acceptance, E,D, VU,NR by RG at 7/18/2023 1520    Acceptance, E,D, VU,NR by RG at 7/17/2023 1522    Acceptance, E,D, VU,NR by LL at 7/13/2023 1358    Acceptance, E, VU,NR by LB at 7/12/2023 1144    Acceptance, E, VU,NR by LB at 7/11/2023 1426    Acceptance, E, VU,NR by LB at 7/10/2023 1452    Acceptance, E,D, VU,NR by LL at 7/8/2023 1228    Acceptance, E,D, VU,NR by LB at 7/7/2023 1449                         Point: Body mechanics (Done)       Learning Progress Summary             Patient Acceptance, E,D, VU,NR by RG at 8/2/2023 1524    Acceptance, E,D, VU,NR by RG at 8/1/2023 1446    Acceptance, E,D, VU,NR by RG at 7/31/2023 1411    Acceptance, E,TB, VU by RM at 7/28/2023 1230    Acceptance, E,D, VU,NR by RG at 7/26/2023 1518    Acceptance, E, VU,NR by LB at 7/25/2023 1133    Acceptance, E, VU,NR by LB at 7/24/2023 1444    Acceptance, E, VU,NR by LB at 7/21/2023 1615    Acceptance, E,D, VU,NR by RG at 7/20/2023 1107    Acceptance, TB, NR by DL at 7/19/2023 2036    Acceptance, E,D, VU,NR by RG at 7/19/2023 1508    Acceptance, E,D, VU,NR by RG at 7/18/2023 1520    Acceptance, E,D, VU,NR by RG at 7/17/2023 1522    Acceptance, E,D, VU,NR by LL at 7/13/2023 1358    Acceptance, E, VU,NR by LB at 7/12/2023 1144    Acceptance, E, VU,NR by LB at 7/11/2023 1426     Acceptance, E, VU,NR by LB at 7/10/2023 1452    Acceptance, E,D, VU,NR by LL at 7/8/2023 1228    Acceptance, E,D, VU,NR by LB at 7/7/2023 1449                         Point: Precautions (Done)       Learning Progress Summary             Patient Acceptance, E,D, VU,NR by RG at 8/2/2023 1524    Acceptance, E,D, VU,NR by RG at 8/1/2023 1446    Acceptance, E,D, VU,NR by RG at 7/31/2023 1411    Acceptance, E,TB, VU by RM at 7/28/2023 1230    Acceptance, E,D, VU,NR by RG at 7/26/2023 1518    Acceptance, E, VU,NR by LB at 7/25/2023 1133    Acceptance, E, VU,NR by LB at 7/24/2023 1444    Acceptance, E, VU,NR by LB at 7/21/2023 1615    Acceptance, E,D, VU,NR by RG at 7/20/2023 1107    Acceptance, TB, NR by DL at 7/19/2023 2036    Acceptance, E,D, VU,NR by RG at 7/19/2023 1508    Acceptance, E,D, VU,NR by RG at 7/18/2023 1520    Acceptance, E,D, VU,NR by RG at 7/17/2023 1522    Acceptance, E,D, VU,NR by LL at 7/13/2023 1358    Acceptance, E, VU,NR by LB at 7/12/2023 1144    Acceptance, E, VU,NR by LB at 7/11/2023 1426    Acceptance, E, VU,NR by LB at 7/10/2023 1452    Acceptance, E,D, VU,NR by LL at 7/8/2023 1228    Acceptance, E,D, VU,NR by LB at 7/7/2023 1449                                         User Key       Initials Effective Dates Name Provider Type Discipline    LB 06/16/21 -  Joslyn Garcia, PT Physical Therapist PT    LL 05/02/16 -  Connie Velasquez, PTA Physical Therapist Assistant PT    RM 02/17/23 -  Shelley Hanson, PTA Physical Therapist Assistant PT    RG 06/16/21 -  Vu Brown PTA Physical Therapist Assistant PT    DL 03/16/22 -  Nadja Velasquez, RN Registered Nurse Nurse                    PT Recommendation and Plan          Daily Progress Summary (PT)  Impairments Still Limiting Function (PT): balance impairment, coordination impairment, functional activity tolerance impairment, motor control impaired, pain, postural control impaired, strength deficit, sensory impairment                Time Calculation:      PT Charges       Row Name 08/02/23 1525             Time Calculation    Start Time 0745  -RG      Stop Time 0915  -RG      Time Calculation (min) 90 min  -RG      PT Received On 08/02/23  -RG         Time Calculation- PT    Total Timed Code Minutes- PT 90 minute(s)  -RG                User Key  (r) = Recorded By, (t) = Taken By, (c) = Cosigned By      Initials Name Provider Type    Vu Galloway PTA Physical Therapist Assistant                    Therapy Charges for Today       Code Description Service Date Service Provider Modifiers Qty    51364826660 HC GAIT TRAINING EA 15 MIN 8/1/2023 Vu Brown, PTA GP, CQ 1    02193194007 HC PT THERAPEUTIC ACT EA 15 MIN 8/1/2023 Vu rBown, PTA GP, CQ 1    14923672903 HC PT THER PROC EA 15 MIN 8/1/2023 Vu Brown, PTA GP, CQ 3    49096737913 HC PT NEUROMUSC RE EDUCATION EA 15 MIN 8/1/2023 Vu Brown, PTA GP, CQ 1    85754606557 HC GAIT TRAINING EA 15 MIN 8/2/2023 Vu Brown, PTA GP, CQ 1    70773301754 HC PT NEUROMUSC RE EDUCATION EA 15 MIN 8/2/2023 Vu Brown, PTA GP, CQ 1    18570326928 HC PT THERAPEUTIC ACT EA 15 MIN 8/2/2023 Vu Brown PTA GP, CQ 2    83922331579 HC PT THER PROC EA 15 MIN 8/2/2023 Vu Brown PTA GP, CQ 2                     Vu Brown PTA  8/2/2023

## 2023-08-02 NOTE — THERAPY TREATMENT NOTE
Inpatient Rehabilitation - Occupational Therapy Treatment Note     Ronnie     Patient Name: Antonio Canseco  : 1955  MRN: [id number]    Today's Date: 2023                 Admit Date: 2023       No diagnosis found.    Patient Active Problem List   Diagnosis    Detrusor instability    Low testosterone in male    Disorder of prostate    Corporo-venous occlusive erectile dysfunction    CVA (cerebral vascular accident)       Past Medical History:   Diagnosis Date    Diabetes mellitus     Hypertension     Stroke        Past Surgical History:   Procedure Laterality Date    US GUIDED LYMPH NODE BIOPSY  2023             IRF OT ASSESSMENT FLOWSHEET (last 12 hours)       IRF OT Evaluation and Treatment       Row Name 23 1433          OT Time and Intention    Document Type daily treatment  -LA     Mode of Treatment occupational therapy  -LA     Patient Effort good  -LA     Symptoms Noted During/After Treatment none  -LA       Row Name 23          General Information    Patient Profile Reviewed yes  -LA     General Observations of Patient Patient agreeable to therapy. Patient pleasant and cooperative with good motivation to improve level of independence.  -LA     Existing Precautions/Restrictions fall  -LA       Row Name 23          Cognition/Psychosocial    Affect/Mental Status (Cognition) WFL  -LA     Orientation Status (Cognition) oriented x 3  -LA     Follows Commands (Cognition) follows one-step commands;repetition of directions required;verbal cues/prompting required  -LA       Row Name 23          Bed Mobility    Sit-Supine [place] (Bed Mobility) moderate assist (50% patient effort);verbal cues  -LA       Row Name 23          Chair-Bed Transfer    Chair-Bed [place] (Transfers) maximum assist (25% patient effort)  -LA       Row Name 23          Sit-Stand Transfer    Sit-Stand [place] (Transfers) minimum assist (75% patient  effort)  -LA       Row Name 08/02/23 1433          Motor Skills    Motor Skills coordination;functional endurance;neuro-muscular function  -LA     Coordination fine motor deficit;gross motor deficit;left;upper extremity  -LA     Functional Endurance fair+  -LA     Neuromuscular Function left;upper extremity  -LA     Motor Control/Coordination Interventions fine motor manipulation/dexterity activities;gross motor coordination activities;neuro-muscular re-education;occupation/activity based treatment;therapeutic exercise/ROM  -LA     Therapeutic Exercise shoulder;elbow/forearm;wrist;hand  -LA       Row Name 08/02/23 1433          Neuromuscular Re-education    Interventions (Neuromuscular Re-education) facilitation/inhibition;weight bearing  -LA       Row Name 08/02/23 1433          Balance    Dynamic Sitting Balance minimal assist  -LA       Row Name 08/02/23 1433          Positioning and Restraints    Pre-Treatment Position sitting in chair/recliner  -LA     Post Treatment Position bed  -LA     In Bed call light within reach;encouraged to call for assist;exit alarm on;fowlers  -LA               User Key  (r) = Recorded By, (t) = Taken By, (c) = Cosigned By      Initials Name Effective Dates    Yuliya Beach, CORY 02/14/22 -                      Occupational Therapy Education       Title: PT OT SLP Therapies (Done)       Topic: Occupational Therapy (Done)       Point: ADL training (Done)       Description:   Instruct learner(s) on proper safety adaptation and remediation techniques during self care or transfers.   Instruct in proper use of assistive devices.                  Learning Progress Summary             Patient Acceptance, E, VU,NR by BF at 8/1/2023 1427    Acceptance, E, VU,NR by BF at 7/31/2023 1409    Acceptance, E, VU,NR by BF at 7/28/2023 1442    Acceptance, E, VU,NR by BF at 7/27/2023 1445    Acceptance, E, VU,NR by BF at 7/25/2023 1251    Acceptance, E, VU,NR by HB at 7/24/2023 1355    Acceptance, E,  NR,VU by BF at 7/21/2023 1248    Acceptance, E, NR by BF at 7/20/2023 1259    Acceptance, TB, NR by DL at 7/19/2023 2036    Acceptance, E, VU,NR by HB at 7/19/2023 1421    Acceptance, E, VU,NR by BF at 7/18/2023 1334    Acceptance, E, VU,NR by BF at 7/17/2023 1431    Acceptance, E, VU,NR by BF at 7/17/2023 1430    Acceptance, E, VU,NR by BF at 7/14/2023 1508    Acceptance, E, VU,NR by BF at 7/13/2023 1343    Acceptance, E,D, VU,NR by TM at 7/12/2023 1407    Acceptance, D,E, NR,VU by TM at 7/11/2023 1434    Acceptance, E, VU,NR by BF at 7/10/2023 1456    Acceptance, E, VU,NR by HB at 7/8/2023 1140    Acceptance, E, VU,NR by BF at 7/7/2023 1442   Family Acceptance, E, VU,NR by BF at 8/1/2023 1427                         Point: Precautions (Done)       Description:   Instruct learner(s) on prescribed precautions during self-care and functional transfers.                  Learning Progress Summary             Patient Acceptance, E, VU,NR by BF at 8/1/2023 1427    Acceptance, E, VU,NR by BF at 7/31/2023 1409    Acceptance, E, VU,NR by BF at 7/28/2023 1442    Acceptance, E, VU,NR by BF at 7/27/2023 1445    Acceptance, E, VU,NR by BF at 7/25/2023 1251    Acceptance, E, VU,NR by HB at 7/24/2023 1355    Acceptance, E, NR,VU by BF at 7/21/2023 1248    Acceptance, E, NR by BF at 7/20/2023 1259    Acceptance, TB, NR by DL at 7/19/2023 2036    Acceptance, E, VU,NR by HB at 7/19/2023 1421    Acceptance, E, VU,NR by BF at 7/18/2023 1334    Acceptance, E, VU,NR by BF at 7/17/2023 1431    Acceptance, E, VU,NR by BF at 7/17/2023 1430    Acceptance, E, VU,NR by BF at 7/14/2023 1508    Acceptance, E, VU,NR by BF at 7/13/2023 1343    Acceptance, E,D, VU,NR by TM at 7/12/2023 1407    Acceptance, D,E, NR,VU by TM at 7/11/2023 1434    Acceptance, E, VU,NR by BF at 7/10/2023 1456    Acceptance, E, VU,NR by HB at 7/8/2023 1140    Acceptance, E, VU,NR by BF at 7/7/2023 1442   Family Acceptance, E, VU,NR by BF at 8/1/2023 1427                                          User Key       Initials Effective Dates Name Provider Type Discipline    BF 05/31/23 - 07/10/23 Lisa Sanchez, OT Occupational Therapist OT    BF 07/11/23 -  Lisa Sanchez, OT Occupational Therapist OT    TM 06/16/21 -  Marielos Capone, OT Occupational Therapist OT    HB 05/25/21 -  Veronica Robles, OT Occupational Therapist OT    DL 03/16/22 -  Nadja Velasquez RN Registered Nurse Nurse                        OT Recommendation and Plan                         Time Calculation:      Time Calculation- OT       Row Name 08/02/23 1438             Time Calculation- OT    OT Start Time 0915  -LA      OT Stop Time 1045  -LA      OT Time Calculation (min) 90 min  -LA      Total Timed Code Minutes- OT 90 minute(s)  -LA                User Key  (r) = Recorded By, (t) = Taken By, (c) = Cosigned By      Initials Name Provider Type    LA Yuliya Quinonez OT Occupational Therapist                  Therapy Charges for Today       Code Description Service Date Service Provider Modifiers Qty    97044153972 HC OT NEUROMUSC RE EDUCATION EA 15 MIN 8/2/2023 Yuliya Quinonez OT GO 2    90432816601 HC OT THER PROC EA 15 MIN 8/2/2023 Yuliya Quinonez OT GO 2    56527475233 HC OT THERAPEUTIC ACT EA 15 MIN 8/2/2023 Yuliya Quinonez OT GO 2                     Yuliya Quinonez OT  8/2/2023

## 2023-08-02 NOTE — PROGRESS NOTES
Adult Nutrition  Assessment/PES    Patient Name:  Antonio Canseco  YOB: 1955  MRN: [id number]  Admit Date:  7/6/2023    Assessment Date:  8/2/2023    Comments:  no new recommendations     Reason for Assessment       Row Name 08/02/23 1221          Reason for Assessment    Reason For Assessment follow-up protocol     Diagnosis neurologic conditions     Identified At Risk by Screening Criteria no indicators present                      Labs/Tests/Procedures/Meds       Row Name 08/02/23 1222          Labs/Procedures/Meds    Lab Results Reviewed reviewed        Medications    Pertinent Medications Reviewed reviewed                          Evaluation of Received Nutrient/Fluid Intake       Row Name 08/02/23 1222          Intake Assessment    Energy/Calorie Requirement Assessment meeting needs        Fluid Intake Evaluation    Oral Fluid (mL) 1200        PO Evaluation    Number of Meals 9     % PO Intake 83                       Problem/Interventions:   Problem 1       Row Name 08/02/23 1223          Nutrition Diagnoses Problem 1    Problem 1 Nutrition Appropriate for Condition at this Time     Etiology (related to) Medical Diagnosis     Neurological CVA     Signs/Symptoms (evidenced by) Report/Observation     Reported/Observed By RN;MD                          Intervention Goal       Row Name 08/02/23 1223          Intervention Goal    General Maintain nutrition;Meet nutritional needs for age/condition     PO Maintain intake;Continue positive trend                    Nutrition Intervention       Row Name 08/02/23 1223          Nutrition Intervention    RD/Tech Action Follow Tx progress                      Education/Evaluation       Row Name 08/02/23 1224          Education    Education Will Instruct as appropriate        Monitor/Evaluation    Monitor Per protocol;PO intake;Pertinent labs                     Electronically signed by:  Arlene Villafuerte RD  08/02/23 12:24 EDT

## 2023-08-03 LAB
ALBUMIN SERPL-MCNC: 3.3 G/DL (ref 3.5–5.2)
ALBUMIN/GLOB SERPL: 1.2 G/DL
ALP SERPL-CCNC: 90 U/L (ref 39–117)
ALT SERPL W P-5'-P-CCNC: 14 U/L (ref 1–41)
ANION GAP SERPL CALCULATED.3IONS-SCNC: 10.7 MMOL/L (ref 5–15)
AST SERPL-CCNC: 16 U/L (ref 1–40)
BASOPHILS # BLD AUTO: 0.06 10*3/MM3 (ref 0–0.2)
BASOPHILS NFR BLD AUTO: 0.9 % (ref 0–1.5)
BILIRUB SERPL-MCNC: 0.4 MG/DL (ref 0–1.2)
BUN SERPL-MCNC: 11 MG/DL (ref 8–23)
BUN/CREAT SERPL: 13.3 (ref 7–25)
CALCIUM SPEC-SCNC: 9.1 MG/DL (ref 8.6–10.5)
CHLORIDE SERPL-SCNC: 102 MMOL/L (ref 98–107)
CO2 SERPL-SCNC: 24.3 MMOL/L (ref 22–29)
CREAT SERPL-MCNC: 0.83 MG/DL (ref 0.76–1.27)
DEPRECATED RDW RBC AUTO: 40.4 FL (ref 37–54)
EGFRCR SERPLBLD CKD-EPI 2021: 97.1 ML/MIN/1.73
EOSINOPHIL # BLD AUTO: 0.19 10*3/MM3 (ref 0–0.4)
EOSINOPHIL NFR BLD AUTO: 2.8 % (ref 0.3–6.2)
ERYTHROCYTE [DISTWIDTH] IN BLOOD BY AUTOMATED COUNT: 12.5 % (ref 12.3–15.4)
GLOBULIN UR ELPH-MCNC: 2.8 GM/DL
GLUCOSE BLDC GLUCOMTR-MCNC: 108 MG/DL (ref 70–130)
GLUCOSE BLDC GLUCOMTR-MCNC: 126 MG/DL (ref 70–130)
GLUCOSE SERPL-MCNC: 112 MG/DL (ref 65–99)
HCT VFR BLD AUTO: 35 % (ref 37.5–51)
HGB BLD-MCNC: 10.5 G/DL (ref 13–17.7)
IMM GRANULOCYTES # BLD AUTO: 0.02 10*3/MM3 (ref 0–0.05)
IMM GRANULOCYTES NFR BLD AUTO: 0.3 % (ref 0–0.5)
LYMPHOCYTES # BLD AUTO: 1.35 10*3/MM3 (ref 0.7–3.1)
LYMPHOCYTES NFR BLD AUTO: 19.9 % (ref 19.6–45.3)
MAGNESIUM SERPL-MCNC: 1.9 MG/DL (ref 1.6–2.4)
MCH RBC QN AUTO: 26.1 PG (ref 26.6–33)
MCHC RBC AUTO-ENTMCNC: 30 G/DL (ref 31.5–35.7)
MCV RBC AUTO: 87.1 FL (ref 79–97)
MONOCYTES # BLD AUTO: 1.03 10*3/MM3 (ref 0.1–0.9)
MONOCYTES NFR BLD AUTO: 15.1 % (ref 5–12)
NEUTROPHILS NFR BLD AUTO: 4.15 10*3/MM3 (ref 1.7–7)
NEUTROPHILS NFR BLD AUTO: 61 % (ref 42.7–76)
NRBC BLD AUTO-RTO: 0 /100 WBC (ref 0–0.2)
PLATELET # BLD AUTO: 274 10*3/MM3 (ref 140–450)
PMV BLD AUTO: 8.9 FL (ref 6–12)
POTASSIUM SERPL-SCNC: 4.4 MMOL/L (ref 3.5–5.2)
PROT SERPL-MCNC: 6.1 G/DL (ref 6–8.5)
RBC # BLD AUTO: 4.02 10*6/MM3 (ref 4.14–5.8)
SODIUM SERPL-SCNC: 137 MMOL/L (ref 136–145)
WBC NRBC COR # BLD: 6.8 10*3/MM3 (ref 3.4–10.8)

## 2023-08-03 PROCEDURE — 97112 NEUROMUSCULAR REEDUCATION: CPT | Performed by: OCCUPATIONAL THERAPIST

## 2023-08-03 PROCEDURE — 85025 COMPLETE CBC W/AUTO DIFF WBC: CPT | Performed by: INTERNAL MEDICINE

## 2023-08-03 PROCEDURE — 97530 THERAPEUTIC ACTIVITIES: CPT

## 2023-08-03 PROCEDURE — 97535 SELF CARE MNGMENT TRAINING: CPT | Performed by: OCCUPATIONAL THERAPIST

## 2023-08-03 PROCEDURE — 80053 COMPREHEN METABOLIC PANEL: CPT | Performed by: INTERNAL MEDICINE

## 2023-08-03 PROCEDURE — 82948 REAGENT STRIP/BLOOD GLUCOSE: CPT

## 2023-08-03 PROCEDURE — 83735 ASSAY OF MAGNESIUM: CPT | Performed by: INTERNAL MEDICINE

## 2023-08-03 PROCEDURE — 97116 GAIT TRAINING THERAPY: CPT

## 2023-08-03 PROCEDURE — 97110 THERAPEUTIC EXERCISES: CPT | Performed by: OCCUPATIONAL THERAPIST

## 2023-08-03 PROCEDURE — 97110 THERAPEUTIC EXERCISES: CPT

## 2023-08-03 PROCEDURE — 99232 SBSQ HOSP IP/OBS MODERATE 35: CPT | Performed by: INTERNAL MEDICINE

## 2023-08-03 RX ADMIN — TAMSULOSIN HYDROCHLORIDE 0.4 MG: 0.4 CAPSULE ORAL at 08:43

## 2023-08-03 RX ADMIN — NYSTATIN: 100000 POWDER TOPICAL at 20:27

## 2023-08-03 RX ADMIN — ATORVASTATIN CALCIUM 80 MG: 40 TABLET, FILM COATED ORAL at 08:42

## 2023-08-03 RX ADMIN — METOPROLOL TARTRATE 25 MG: 25 TABLET, FILM COATED ORAL at 08:43

## 2023-08-03 RX ADMIN — METOPROLOL TARTRATE 25 MG: 25 TABLET, FILM COATED ORAL at 20:27

## 2023-08-03 RX ADMIN — POLYETHYLENE GLYCOL (3350) 17 G: 17 POWDER, FOR SOLUTION ORAL at 08:46

## 2023-08-03 RX ADMIN — PANTOPRAZOLE SODIUM 40 MG: 40 TABLET, DELAYED RELEASE ORAL at 06:37

## 2023-08-03 RX ADMIN — HYDROXYZINE HYDROCHLORIDE 25 MG: 25 TABLET, FILM COATED ORAL at 21:21

## 2023-08-03 RX ADMIN — MAGNESIUM GLUCONATE 500 MG ORAL TABLET 400 MG: 500 TABLET ORAL at 08:42

## 2023-08-03 RX ADMIN — NYSTATIN: 100000 POWDER TOPICAL at 08:43

## 2023-08-03 RX ADMIN — ALLOPURINOL 300 MG: 300 TABLET ORAL at 08:42

## 2023-08-03 RX ADMIN — RIVAROXABAN 20 MG: 20 TABLET, FILM COATED ORAL at 16:54

## 2023-08-03 NOTE — THERAPY TREATMENT NOTE
Inpatient Rehabilitation - Physical Therapy Treatment Note        Ronnie     Patient Name: Antonio Canseco  : 1955  MRN: [id number]    Today's Date: 8/3/2023                    Admit Date: 2023      Visit Dx:   No diagnosis found.    Patient Active Problem List   Diagnosis    Detrusor instability    Low testosterone in male    Disorder of prostate    Corporo-venous occlusive erectile dysfunction    CVA (cerebral vascular accident)       Past Medical History:   Diagnosis Date    Diabetes mellitus     Hypertension     Stroke        Past Surgical History:   Procedure Laterality Date    US GUIDED LYMPH NODE BIOPSY  2023       PT ASSESSMENT (last 12 hours)       IRF PT Evaluation and Treatment       Row Name 23 1508          PT Time and Intention    Document Type daily treatment  -RG     Mode of Treatment individual therapy;physical therapy  -RG     Patient/Family/Caregiver Comments/Observations Pt and nursing in agreement for skilled PT on this date.  -RG       Row Name 23 1508          General Information    Patient Profile Reviewed yes  -RG     Existing Precautions/Restrictions fall  L HP, <trunk support/balance, L side inattention  -RG     Limitations/Impairments safety/cognitive  -RG       Row Name 23 1508          Cognition/Psychosocial    Affect/Mental Status (Cognition) WFL;other (see comments)  -RG     Follows Commands (Cognition) verbal cues/prompting required;physical/tactile prompts required;follows one-step commands  -RG     Personal Safety Interventions gait belt;fall prevention program maintained;nonskid shoes/slippers when out of bed  -RG     Cognitive Function safety deficit  -RG       Row Name 23 1508          Bed Mobility    Bed Mobility supine-sit;sit-supine  -RG     Supine-Sit [place] (Bed Mobility) nonverbal cues (demo/gesture);verbal cues;maximum assist (25% patient effort);2 person assist  -RG     Sit-Supine [place] (Bed Mobility) maximum assist  (25% patient effort);verbal cues;nonverbal cues (demo/gesture);2 person assist  -RG     Bed Mobility, Safety Issues decreased use of arms for pushing/pulling;decreased use of legs for bridging/pushing  -RG       Row Name 08/03/23 1508          Transfer Assessment/Treatment    Transfers bed-chair transfer;chair-bed transfer  -RG       Row Name 08/03/23 1508          Bed-Chair Transfer    Bed-Chair [place] (Transfers) maximum assist (25% patient effort);verbal cues;nonverbal cues (demo/gesture);2 person assist  -RG     Assistive Device (Bed-Chair Transfers) wheelchair  -RG       Row Name 08/03/23 1508          Sit-Stand Transfer    Sit-Stand [place] (Transfers) verbal cues;nonverbal cues (demo/gesture);minimum assist (75% patient effort)  -RG     Assistive Device (Sit-Stand Transfers) parallel bars  -RG       Row Name 08/03/23 1508          Stand-Sit Transfer    Stand-Sit [place] (Transfers) verbal cues;nonverbal cues (demo/gesture);minimum assist (75% patient effort)  -RG     Assistive Device (Stand-Sit Transfers) parallel bars  -RG       Row Name 08/03/23 1508          Gait/Stairs (Locomotion)    [place] Level (Gait) maximum assist (25% patient effort);verbal cues;nonverbal cues (demo/gesture);2 person assist  -RG     Assistive Device (Gait) parallel bars  -RG     Distance in Feet (Gait) 8' x 4  -RG     Pattern (Gait) --  dependent to advance LLE  -RG     Deviations/Abnormal Patterns (Gait) base of support, narrow;hebert decreased;gait speed decreased;stride length decreased;weight shifting decreased  -RG     Bilateral Gait Deviations forward flexed posture  -RG     Comment, (Gait/Stairs) Cont. to require cues to weight shift to the R in order for therapist to advance L LE.  Verbal and tactile cues to advance L UE on // bars as well.  -RG       Row Name 08/03/23 1508          Safety Issues, Functional Mobility    Impairments Affecting Function (Mobility) balance;coordination;endurance/activity  tolerance;grasp;motor control;muscle tone abnormal;postural/trunk control;range of motion (ROM);strength  -RG       Row Name 08/03/23 1508          Hip (Therapeutic Exercise)    Hip Strengthening (Therapeutic Exercise) bilateral;flexion;aBduction;aDduction;marching while seated;sitting;2 lb free weight;resistance band;green;10 repetitions;2 sets  AAROM L LE  -RG       Row Name 08/03/23 1508          Knee (Therapeutic Exercise)    Knee Strengthening (Therapeutic Exercise) bilateral;flexion;extension;marching while seated;LAQ (long arc quad);sitting;2 lb free weight;resistance band;green;10 repetitions;2 sets  AAROM L LE  -RG       Row Name 08/03/23 1508          Ankle (Therapeutic Exercise)    Ankle Strengthening (Therapeutic Exercise) dorsiflexion;right;plantarflexion;sitting;10 repetitions;2 sets  AAROM L LE  -RG       Row Name 08/03/23 1508          Aerobic Exercise    Type (Aerobic Exercise) recumbent stationary bike  -RG     Time Performed (Aerobic Exercise) 20  -RG     Comment, Aerobic Exercise (Therapeutic Exercise) L 1 in wc  -RG       Row Name 08/03/23 1508          Positioning and Restraints    Pre-Treatment Position in bed  -RG     Post Treatment Position wheelchair  -RG     In Bed notified nsg;sitting;with OT;legs elevated  -RG       Row Name 08/03/23 1508          Daily Progress Summary (PT)    Impairments Still Limiting Function (PT) balance impairment;coordination impairment;functional activity tolerance impairment;motor control impaired;pain;postural control impaired;strength deficit;sensory impairment  -RG       Row Name 08/03/23 1508          Bed Mobility Goal 1 (PT-IRF)    Progress/Outcomes (Bed Mobility Goal 1, PT-IRF) goal ongoing  -RG       Row Name 08/03/23 1508          Bed Mobility Goal 2 (PT-IRF)    Progress/Outcomes (Bed Mobility Goal 2, PT-IRF) goal ongoing  -RG       Row Name 08/03/23 1508          Transfer Goal 1 (PT-IRF)    Progress/Outcomes (Transfer Goal 1, PT-IRF) goal partially met   Pt able to stand with [place] in parallel bars  -       Row Name 08/03/23 1508          Transfer Goal 2 (PT-IRF)    Progress/Outcomes (Transfer Goal 2, PT-IRF) goal ongoing  -       Row Name 08/03/23 1508          Gait/Walking Locomotion Goal 1 (PT-IRF)    Progress/Outcomes (Gait/Walking Locomotion Goal 1, PT-IRF) goal ongoing  -       Row Name 08/03/23 1508          Gait/Walking Locomotion Goal 2 (PT-IRF)    Progress/Outcomes (Gait/Walking Locomotion Goal 2, PT-IRF) goal ongoing  -               User Key  (r) = Recorded By, (t) = Taken By, (c) = Cosigned By      Initials Name Provider Type    Vu Galloway PTA Physical Therapist Assistant                     Physical Therapy Education       Title: PT OT SLP Therapies (Done)       Topic: Physical Therapy (Done)       Point: Mobility training (Done)       Learning Progress Summary             Patient Acceptance, E,D, VU,NR by RG at 8/3/2023 1514    Acceptance, E,D, VU,NR by RG at 8/2/2023 1524    Acceptance, E,D, VU,NR by RG at 8/1/2023 1446    Acceptance, E,D, VU,NR by RG at 7/31/2023 1411    Acceptance, E,TB, VU by RM at 7/28/2023 1230    Acceptance, E,D, VU,NR by RG at 7/26/2023 1518    Acceptance, E, VU,NR by LB at 7/25/2023 1133    Acceptance, E, VU,NR by LB at 7/24/2023 1444    Acceptance, E, VU,NR by LB at 7/21/2023 1615    Acceptance, E,D, VU,NR by RG at 7/20/2023 1107    Acceptance, TB, NR by DL at 7/19/2023 2036    Acceptance, E,D, VU,NR by RG at 7/19/2023 1508    Acceptance, E,D, VU,NR by RG at 7/18/2023 1520    Acceptance, E,D, VU,NR by RG at 7/17/2023 1522    Acceptance, E,D, VU,NR by LL at 7/13/2023 1358    Acceptance, E, VU,NR by LB at 7/12/2023 1144    Acceptance, E, VU,NR by LB at 7/11/2023 1426    Acceptance, E, VU,NR by LB at 7/10/2023 1452    Acceptance, E,D, VU,NR by LL at 7/8/2023 1228    Acceptance, E,D, VU,NR by LB at 7/7/2023 1449                         Point: Home exercise program (Done)       Learning Progress Summary              Patient Acceptance, E,D, VU,NR by RG at 8/3/2023 1514    Acceptance, E,D, VU,NR by RG at 8/2/2023 1524    Acceptance, E,D, VU,NR by RG at 8/1/2023 1446    Acceptance, E,D, VU,NR by RG at 7/31/2023 1411    Acceptance, E,TB, VU by RM at 7/28/2023 1230    Acceptance, E,D, VU,NR by RG at 7/26/2023 1518    Acceptance, E, VU,NR by LB at 7/25/2023 1133    Acceptance, E, VU,NR by LB at 7/24/2023 1444    Acceptance, E, VU,NR by LB at 7/21/2023 1615    Acceptance, E,D, VU,NR by RG at 7/20/2023 1107    Acceptance, TB, NR by DL at 7/19/2023 2036    Acceptance, E,D, VU,NR by RG at 7/19/2023 1508    Acceptance, E,D, VU,NR by RG at 7/18/2023 1520    Acceptance, E,D, VU,NR by RG at 7/17/2023 1522    Acceptance, E,D, VU,NR by LL at 7/13/2023 1358    Acceptance, E, VU,NR by LB at 7/12/2023 1144    Acceptance, E, VU,NR by LB at 7/11/2023 1426    Acceptance, E, VU,NR by LB at 7/10/2023 1452    Acceptance, E,D, VU,NR by LL at 7/8/2023 1228    Acceptance, E,D, VU,NR by LB at 7/7/2023 1449                         Point: Body mechanics (Done)       Learning Progress Summary             Patient Acceptance, E,D, VU,NR by RG at 8/3/2023 1514    Acceptance, E,D, VU,NR by RG at 8/2/2023 1524    Acceptance, E,D, VU,NR by RG at 8/1/2023 1446    Acceptance, E,D, VU,NR by RG at 7/31/2023 1411    Acceptance, E,TB, VU by RM at 7/28/2023 1230    Acceptance, E,D, VU,NR by RG at 7/26/2023 1518    Acceptance, E, VU,NR by LB at 7/25/2023 1133    Acceptance, E, VU,NR by LB at 7/24/2023 1444    Acceptance, E, VU,NR by LB at 7/21/2023 1615    Acceptance, E,D, VU,NR by RG at 7/20/2023 1107    Acceptance, TB, NR by DL at 7/19/2023 2036    Acceptance, E,D, VU,NR by RG at 7/19/2023 1508    Acceptance, E,D, VU,NR by RG at 7/18/2023 1520    Acceptance, E,D, VU,NR by RG at 7/17/2023 1522    Acceptance, E,D, VU,NR by LL at 7/13/2023 1358    Acceptance, E, VU,NR by LB at 7/12/2023 1144    Acceptance, E, VU,NR by LB at 7/11/2023 1426    Acceptance, E, VU,NR by LB at  7/10/2023 1452    Acceptance, E,D, VU,NR by LL at 7/8/2023 1228    Acceptance, E,D, VU,NR by LB at 7/7/2023 1449                         Point: Precautions (Done)       Learning Progress Summary             Patient Acceptance, E,D, VU,NR by RG at 8/3/2023 1514    Acceptance, E,D, VU,NR by RG at 8/2/2023 1524    Acceptance, E,D, VU,NR by RG at 8/1/2023 1446    Acceptance, E,D, VU,NR by RG at 7/31/2023 1411    Acceptance, E,TB, VU by RM at 7/28/2023 1230    Acceptance, E,D, VU,NR by RG at 7/26/2023 1518    Acceptance, E, VU,NR by LB at 7/25/2023 1133    Acceptance, E, VU,NR by LB at 7/24/2023 1444    Acceptance, E, VU,NR by LB at 7/21/2023 1615    Acceptance, E,D, VU,NR by RG at 7/20/2023 1107    Acceptance, TB, NR by DL at 7/19/2023 2036    Acceptance, E,D, VU,NR by RG at 7/19/2023 1508    Acceptance, E,D, VU,NR by RG at 7/18/2023 1520    Acceptance, E,D, VU,NR by RG at 7/17/2023 1522    Acceptance, E,D, VU,NR by LL at 7/13/2023 1358    Acceptance, E, VU,NR by LB at 7/12/2023 1144    Acceptance, E, VU,NR by LB at 7/11/2023 1426    Acceptance, E, VU,NR by LB at 7/10/2023 1452    Acceptance, E,D, VU,NR by LL at 7/8/2023 1228    Acceptance, E,D, VU,NR by LB at 7/7/2023 1449                                         User Key       Initials Effective Dates Name Provider Type Discipline    LB 06/16/21 -  Joslyn Garcia, PT Physical Therapist PT    LL 05/02/16 -  Connie Vleasquez, PTA Physical Therapist Assistant PT    RM 02/17/23 -  Shelley Hanson, PTA Physical Therapist Assistant PT    RG 06/16/21 -  Vu Brown PTA Physical Therapist Assistant PT    DL 03/16/22 -  Nadja Velasquez, RN Registered Nurse Nurse                    PT Recommendation and Plan          Daily Progress Summary (PT)  Impairments Still Limiting Function (PT): balance impairment, coordination impairment, functional activity tolerance impairment, motor control impaired, pain, postural control impaired, strength deficit, sensory  impairment               Time Calculation:      PT Charges       Row Name 08/03/23 1515             Time Calculation    Start Time 0745  -RG      Stop Time 0915  -RG      Time Calculation (min) 90 min  -RG      PT Received On 08/03/23  -RG         Time Calculation- PT    Total Timed Code Minutes- PT 90 minute(s)  -RG                User Key  (r) = Recorded By, (t) = Taken By, (c) = Cosigned By      Initials Name Provider Type    Vu Galloway PTA Physical Therapist Assistant                    Therapy Charges for Today       Code Description Service Date Service Provider Modifiers Qty    08252319673 HC GAIT TRAINING EA 15 MIN 8/2/2023 Vu Brown PTA GP, CQ 1    49745145250 HC PT NEUROMUSC RE EDUCATION EA 15 MIN 8/2/2023 Vu Brown, JILL GP, CQ 1    42905474828 HC PT THERAPEUTIC ACT EA 15 MIN 8/2/2023 Vu Brown PTA GP, CQ 2    67024047564 HC PT THER PROC EA 15 MIN 8/2/2023 Vu Brown PTA GP, CQ 2    20039794974 HC GAIT TRAINING EA 15 MIN 8/3/2023 Vu Brown PTA GP, CQ 1    94487540976 HC PT THERAPEUTIC ACT EA 15 MIN 8/3/2023 Vu Brown PTA GP, CQ 2    40094132676 HC PT THER PROC EA 15 MIN 8/3/2023 Vu Brown PTA GP, CQ 3                     Vu Brown PTA  8/3/2023

## 2023-08-03 NOTE — PROGRESS NOTES
Occupational Therapy:    Physical Therapy: Individual: 90 minutes.    Speech Language Pathology:    Signed by: Vu Brown PTA     Yes

## 2023-08-03 NOTE — THERAPY TREATMENT NOTE
Inpatient Rehabilitation - Occupational Therapy Treatment Note     Ronnie     Patient Name: Antonio Canseco  : 1955  MRN: [id number]    Today's Date: 8/3/2023                 Admit Date: 2023       No diagnosis found.    Patient Active Problem List   Diagnosis    Detrusor instability    Low testosterone in male    Disorder of prostate    Corporo-venous occlusive erectile dysfunction    CVA (cerebral vascular accident)       Past Medical History:   Diagnosis Date    Diabetes mellitus     Hypertension     Stroke        Past Surgical History:   Procedure Laterality Date    US GUIDED LYMPH NODE BIOPSY  2023             IRF OT ASSESSMENT FLOWSHEET (last 12 hours)       IRF OT Evaluation and Treatment       Row Name 23 1218          OT Time and Intention    Document Type daily treatment  -BF     Mode of Treatment occupational therapy  -BF     Patient Effort good  -BF     Symptoms Noted During/After Treatment fatigue  -BF       Row Name 23 1218          General Information    Patient/Family/Caregiver Comments/Observations Pt did bettter this date with attending to L side, required min cues to use L hand for theract.  -BF     Existing Precautions/Restrictions fall  L HP, <trunk support/balance, L side inattention  -BF     Limitations/Impairments safety/cognitive  -BF       Row Name 23 1218          Cognition/Psychosocial    Orientation Status (Cognition) oriented x 3  -BF     Follows Commands (Cognition) verbal cues/prompting required;repetition of directions required;physical/tactile prompts required;increased processing time needed;delayed response/completion  -BF       Row Name 23 1218          Chair-Bed Transfer    Chair-Bed [place] (Transfers) maximum assist (25% patient effort);2 person assist;verbal cues;nonverbal cues (demo/gesture)  -BF     Assistive Device (Chair-Bed Transfers) wheelchair  -BF       Row Name 23 1218          Motor Skills    Motor  Control/Coordination Interventions fine motor manipulation/dexterity activities;gross motor coordination activities;therapeutic exercise/ROM  LUE [facility] theract, strengthening, theraband therex; BUE pulleys  -BF       Row Name 08/03/23 1218          Positioning and Restraints    In Bed supine;call light within reach;encouraged to call for assist  -BF               User Key  (r) = Recorded By, (t) = Taken By, (c) = Cosigned By      Initials Name Effective Dates    BF Lisa Sanchez OT 07/11/23 -                      Occupational Therapy Education       Title: PT OT SLP Therapies (Done)       Topic: Occupational Therapy (Done)       Point: ADL training (Done)       Description:   Instruct learner(s) on proper safety adaptation and remediation techniques during self care or transfers.   Instruct in proper use of assistive devices.                  Learning Progress Summary             Patient Acceptance, E, VU,NR by BF at 8/3/2023 1217    Acceptance, E, VU,NR by BF at 8/1/2023 1427    Acceptance, E, VU,NR by BF at 7/31/2023 1409    Acceptance, E, VU,NR by BF at 7/28/2023 1442    Acceptance, E, VU,NR by BF at 7/27/2023 1445    Acceptance, E, VU,NR by BF at 7/25/2023 1251    Acceptance, E, VU,NR by HB at 7/24/2023 1355    Acceptance, E, NR,VU by BF at 7/21/2023 1248    Acceptance, E, NR by BF at 7/20/2023 1259    Acceptance, TB, NR by DL at 7/19/2023 2036    Acceptance, E, VU,NR by HB at 7/19/2023 1421    Acceptance, E, VU,NR by BF at 7/18/2023 1334    Acceptance, E, VU,NR by BF at 7/17/2023 1431    Acceptance, E, VU,NR by BF at 7/17/2023 1430    Acceptance, E, VU,NR by BF at 7/14/2023 1508    Acceptance, E, VU,NR by BF at 7/13/2023 1343    Acceptance, E,D, VU,NR by TM at 7/12/2023 1407    Acceptance, D,E, NR,VU by TM at 7/11/2023 1434    Acceptance, E, VU,NR by BF at 7/10/2023 1456    Acceptance, E, VU,NR by HB at 7/8/2023 1140    Acceptance, E, VU,NR by BF at 7/7/2023 1442   Family Acceptance, E, VU,NR by BF  at 8/1/2023 1427                         Point: Precautions (Done)       Description:   Instruct learner(s) on prescribed precautions during self-care and functional transfers.                  Learning Progress Summary             Patient Acceptance, E, VU,NR by BF at 8/3/2023 1217    Acceptance, E, VU,NR by BF at 8/1/2023 1427    Acceptance, E, VU,NR by BF at 7/31/2023 1409    Acceptance, E, VU,NR by BF at 7/28/2023 1442    Acceptance, E, VU,NR by BF at 7/27/2023 1445    Acceptance, E, VU,NR by BF at 7/25/2023 1251    Acceptance, E, VU,NR by HB at 7/24/2023 1355    Acceptance, E, NR,VU by BF at 7/21/2023 1248    Acceptance, E, NR by BF at 7/20/2023 1259    Acceptance, TB, NR by DL at 7/19/2023 2036    Acceptance, E, VU,NR by HB at 7/19/2023 1421    Acceptance, E, VU,NR by BF at 7/18/2023 1334    Acceptance, E, VU,NR by BF at 7/17/2023 1431    Acceptance, E, VU,NR by BF at 7/17/2023 1430    Acceptance, E, VU,NR by BF at 7/14/2023 1508    Acceptance, E, VU,NR by BF at 7/13/2023 1343    Acceptance, E,D, VU,NR by TM at 7/12/2023 1407    Acceptance, D,E, NR,VU by TM at 7/11/2023 1434    Acceptance, E, VU,NR by BF at 7/10/2023 1456    Acceptance, E, VU,NR by HB at 7/8/2023 1140    Acceptance, E, VU,NR by BF at 7/7/2023 1442   Family Acceptance, E, VU,NR by BF at 8/1/2023 1427                                         User Key       Initials Effective Dates Name Provider Type Discipline    BF 05/31/23 - 07/10/23 Lisa Sanchez, OT Occupational Therapist OT    BF 07/11/23 -  Lisa Sanchez, OT Occupational Therapist OT    TM 06/16/21 -  Liborio, Marielos Idalia, OT Occupational Therapist OT    HB 05/25/21 -  Veronica Robles OT Occupational Therapist OT    DL 03/16/22 -  Nadja Velasquez, RN Registered Nurse Nurse                        OT Recommendation and Plan    Planned Therapy Interventions (OT): activity tolerance training, adaptive equipment training, BADL retraining, neuromuscular control/coordination  retraining, passive ROM/stretching, ROM/therapeutic exercise, strengthening exercise, transfer/mobility retraining                    Time Calculation:      Time Calculation- OT       Row Name 08/03/23 1220             Time Calculation- OT    OT Start Time 0915  -BF      OT Stop Time 1045  -BF      OT Time Calculation (min) 90 min  -BF      Total Timed Code Minutes- OT 90 minute(s)  -BF      OT Non-Billable Time (min) 10 min  -BF                User Key  (r) = Recorded By, (t) = Taken By, (c) = Cosigned By      Initials Name Provider Type     Lisa Sanchez OT Occupational Therapist                  Therapy Charges for Today       Code Description Service Date Service Provider Modifiers Qty    74688006385 HC OT SELF CARE/MGMT/TRAIN EA 15 MIN 8/3/2023 Lisa Sanchez OT GO 1    11853029776 HC OT NEUROMUSC RE EDUCATION EA 15 MIN 8/3/2023 Lisa Sanchez OT GO 3    13818621930 HC OT THER PROC EA 15 MIN 8/3/2023 Lisa Sanchez OT GO 2                     Lisa Sanchez OT  8/3/2023

## 2023-08-03 NOTE — PLAN OF CARE
Goal Outcome Evaluation:  Plan of Care Reviewed With: patient resting in bed. Continues to participate in therapy will continue to monitor.

## 2023-08-03 NOTE — PROGRESS NOTES
Assisted By: Vu HANEY    CC: Follow-up on CVA    Interview History/HPI: No complaints, states she slept well, eating well no chest pain shortness of breath          Current Hospital Meds:  allopurinol, 300 mg, Oral, Daily  atorvastatin, 80 mg, Oral, Daily  magnesium oxide, 400 mg, Oral, Daily  metoprolol tartrate, 25 mg, Oral, Q12H  nystatin, , Topical, Q12H  pantoprazole, 40 mg, Oral, Q AM  polyethylene glycol, 17 g, Oral, Daily  rivaroxaban, 20 mg, Oral, Daily With Dinner  tamsulosin, 0.4 mg, Oral, Daily         Vitals:    08/03/23 0700   BP: 135/83   Pulse: 89   Resp: 18   Temp: 98.6 øF (37 øC)   SpO2: 97%         Intake/Output Summary (Last 24 hours) at 8/3/2023 1233  Last data filed at 8/3/2023 0900  Gross per 24 hour   Intake 1728 ml   Output --   Net 1728 ml       EXAM: Patient still is having some proximal movement of his left leg, left arm still moves the same.  Mood is good, he has not had a bowel movement in several days, no abdominal pain, lungs clear heart is an irregular irregular rhythm controlled rate, abdomen soft benign bowel sounds are active.      Diet: Cardiac Diets; Healthy Heart (2-3 Na+); Texture: Regular Texture (IDDSI 7); Fluid Consistency: Thin (IDDSI 0)        LABS:     Lab Results (last 48 hours)       Procedure Component Value Units Date/Time    POC Glucose Once [[id number]]  (Normal) Collected: 08/03/23 0557    Specimen: Blood Updated: 08/03/23 0608     Glucose 108 mg/dL      Comment: Meter: [id number] : [id number] Bhavna Crystal       Magnesium [[id number]]  (Normal) Collected: 08/03/23 0159    Specimen: Blood Updated: 08/03/23 0238     Magnesium 1.9 mg/dL     Comprehensive Metabolic Panel [[id number]]  (Abnormal) Collected: 08/03/23 0159    Specimen: Blood Updated: 08/03/23 0238     Glucose 112 mg/dL      BUN 11 mg/dL      Creatinine 0.83 mg/dL      Sodium 137 mmol/L      Potassium 4.4 mmol/L      Chloride 102 mmol/L      CO2 24.3 mmol/L      Calcium 9.1 mg/dL      Total Protein 6.1  g/dL      Albumin 3.3 g/dL      ALT (SGPT) 14 U/L      AST (SGOT) 16 U/L      Alkaline Phosphatase 90 U/L      Total Bilirubin 0.4 mg/dL      Globulin 2.8 gm/dL      A/G Ratio 1.2 g/dL      BUN/Creatinine Ratio 13.3     Anion Gap 10.7 mmol/L      eGFR 97.1 mL/min/1.73     Narrative:      GFR Normal >60  Chronic Kidney Disease <60  Kidney Failure <15      CBC & Differential [[id number]]  (Abnormal) Collected: 08/03/23 0159    Specimen: Blood Updated: 08/03/23 0213    Narrative:      The following orders were created for panel order CBC & Differential.  Procedure                               Abnormality         Status                     ---------                               -----------         ------                     CBC Auto Differential[[id number]]        Abnormal            Final result                 Please view results for these tests on the individual orders.    CBC Auto Differential [[id number]]  (Abnormal) Collected: 08/03/23 0159    Specimen: Blood Updated: 08/03/23 0213     WBC 6.80 10*3/mm3      RBC 4.02 10*6/mm3      Hemoglobin 10.5 g/dL      Hematocrit 35.0 %      MCV 87.1 fL      MCH 26.1 pg      MCHC 30.0 g/dL      RDW 12.5 %      RDW-SD 40.4 fl      MPV 8.9 fL      Platelets 274 10*3/mm3      Neutrophil % 61.0 %      Lymphocyte % 19.9 %      Monocyte % 15.1 %      Eosinophil % 2.8 %      Basophil % 0.9 %      Immature Grans % 0.3 %      Neutrophils, Absolute 4.15 10*3/mm3      Lymphocytes, Absolute 1.35 10*3/mm3      Monocytes, Absolute 1.03 10*3/mm3      Eosinophils, Absolute 0.19 10*3/mm3      Basophils, Absolute 0.06 10*3/mm3      Immature Grans, Absolute 0.02 10*3/mm3      nRBC 0.0 /100 WBC     POC Glucose Once [[id number]]  (Normal) Collected: 08/02/23 1643    Specimen: Blood Updated: 08/02/23 1649     Glucose 123 mg/dL      Comment: Meter: [id number] : [id number] Cheek Chikis       POC Glucose Once [[id number]]  (Abnormal) Collected: 08/02/23 1619    Specimen: Blood Updated: 08/02/23 1632      Glucose 303 mg/dL      Comment: Meter: [id number] : [id number] HUY WALTON       POC Glucose Once [[id number]]  (Abnormal) Collected: 08/02/23 1109    Specimen: Blood Updated: 08/02/23 1137     Glucose 146 mg/dL      Comment: Meter: [id number] : [id number] Vinny Lemus       POC Glucose Once [[id number]]  (Normal) Collected: 08/02/23 0611    Specimen: Blood Updated: 08/02/23 0627     Glucose 119 mg/dL      Comment: Meter: [id number] : [id number] Quang Neal       POC Glucose Once [[id number]]  (Normal) Collected: 08/01/23 1604    Specimen: Blood Updated: 08/01/23 1626     Glucose 130 mg/dL      Comment: Meter: [id number] : [id number] Vinny Lemus                    Radiology:    Imaging Results (Last 72 Hours)       ** No results found for the last 72 hours. **                Assessment/Plan:   Status post CVA, left residual, patient swallowing is intact.  He is making progress with PT and OT towards his goals.  With OT this a.m., patient max assist 2 person chair to bed, OT continues to work on motor skills, range of motion endurance strengthening and ADLs.  With PT, patient is max assist 2 person for bed mobility and transfers out of bed, sit to stand stand to sit patient was min assist, yesterday patient walked 8 feet x 3 in the parallel bars maximum two-person assist.  Reportedly today he did walk 4 times in the parallel bars.  Patient is on Xarelto for further stroke prevention as he is in A-fib.    Constipation, patient is on MiraLAX started yesterday, he is on magnesium oxide.  We will follow-up today, if no BM today will consider suppository which she already has as needed.    Atrial fibrillation, as above, on Xarelto, Lopressor for rate control.  Rate is controlled.    HFpEF, euvolemic at this time.    CMP reviewed, unremarkable, magnesium acceptable at 1.9, white count of 6.8 hemoglobin 10.5, platelet count 274.    Lymphoma and head and neck cancer, he will be following up with  oncology as an outpatient.    Impaired fasting glucose with A1c of 6.0, glucose is controlled  Hayder Mendoza MD

## 2023-08-03 NOTE — PROGRESS NOTES
Rehabilitation Nursing  Inpatient Rehabilitation Plan of Care Note    Plan of Care  Copy from POCBody Function Structure    Skin Integrity (Active)  Current Status (7/6/2023 4:18:00 PM): At Risk for Skin Breakdown  Weekly Goal: No Skin Breakdown  Discharge Goal: No Skin Breakdown    Safety    Potential for Injury (Active)  Current Status (7/6/2023 4:18:00 PM): Potential for Falls  Weekly Goal: No Falls  Discharge Goal: No Falls    Signed by: Nadja Velasquez RN

## 2023-08-03 NOTE — PLAN OF CARE
Goal Outcome Evaluation:  Plan of Care Reviewed With: patient        Progress: improving       Problem: Rehabilitation (IRF) Plan of Care  Goal: Plan of Care Review  Outcome: Ongoing, Progressing  Flowsheets (Taken 8/3/2023 0947)  Progress: improving  Plan of Care Reviewed With: patient  Goal: Patient-Specific Goal (Individualized)  Outcome: Ongoing, Progressing  Goal: Absence of New-Onset Illness or Injury  Outcome: Ongoing, Progressing  Intervention: Prevent Fall and Fall Injury  Recent Flowsheet Documentation  Taken 8/3/2023 0800 by Maurisio Estrella RN  Safety Promotion/Fall Prevention: safety round/check completed  Intervention: Prevent Infection  Recent Flowsheet Documentation  Taken 8/3/2023 0800 by Maurisio Estrella RN  Infection Prevention:   hand hygiene promoted   rest/sleep promoted  Intervention: Prevent VTE (Venous Thromboembolism)  Recent Flowsheet Documentation  Taken 8/3/2023 0800 by Maurisio Estrella RN  VTE Prevention/Management: (xarelto) other (see comments)  Goal: Optimal Comfort and Wellbeing  Outcome: Ongoing, Progressing  Goal: Home and Community Transition Plan Established  Outcome: Ongoing, Progressing     Problem: Diabetes Comorbidity  Goal: Blood Glucose Level Within Targeted Range  Outcome: Ongoing, Progressing  Intervention: Monitor and Manage Glycemia  Recent Flowsheet Documentation  Taken 8/3/2023 0800 by Maurisio Estrella RN  Glycemic Management: blood glucose monitored     Problem: Skin Injury Risk Increased  Goal: Skin Health and Integrity  Outcome: Ongoing, Progressing  Intervention: Promote and Optimize Oral Intake  Recent Flowsheet Documentation  Taken 8/3/2023 0800 by Maurisio Estrella RN  Oral Nutrition Promotion: physical activity promoted  Intervention: Optimize Skin Protection  Recent Flowsheet Documentation  Taken 8/3/2023 0800 by Maurisio Estrella RN  Pressure Reduction Techniques:   frequent weight shift encouraged   heels elevated off bed   weight shift assistance provided  Pressure  Reduction Devices:   pressure-redistributing mattress utilized   heel offloading device utilized   positioning supports utilized  Skin Protection:   adhesive use limited   incontinence pads utilized     Problem: Fall Injury Risk  Goal: Absence of Fall and Fall-Related Injury  Outcome: Ongoing, Progressing  Intervention: Identify and Manage Contributors  Recent Flowsheet Documentation  Taken 8/3/2023 0800 by Maurisio Estrella RN  Medication Review/Management: medications reviewed  Intervention: Promote Injury-Free Environment  Recent Flowsheet Documentation  Taken 8/3/2023 0800 by Maurisio Estrella RN  Safety Promotion/Fall Prevention: safety round/check completed     Problem: Mobility Impairment  Goal: Optimal Mobility [place] and Safety  Outcome: Ongoing, Progressing

## 2023-08-04 LAB
GLUCOSE BLDC GLUCOMTR-MCNC: 136 MG/DL (ref 70–130)
GLUCOSE BLDC GLUCOMTR-MCNC: 138 MG/DL (ref 70–130)
GLUCOSE BLDC GLUCOMTR-MCNC: 175 MG/DL (ref 70–130)

## 2023-08-04 PROCEDURE — 97110 THERAPEUTIC EXERCISES: CPT

## 2023-08-04 PROCEDURE — 97530 THERAPEUTIC ACTIVITIES: CPT

## 2023-08-04 PROCEDURE — 97116 GAIT TRAINING THERAPY: CPT

## 2023-08-04 PROCEDURE — 97032 APPL MODALITY 1+ESTIM EA 15: CPT

## 2023-08-04 PROCEDURE — 97110 THERAPEUTIC EXERCISES: CPT | Performed by: OCCUPATIONAL THERAPIST

## 2023-08-04 PROCEDURE — 97535 SELF CARE MNGMENT TRAINING: CPT | Performed by: OCCUPATIONAL THERAPIST

## 2023-08-04 PROCEDURE — 97112 NEUROMUSCULAR REEDUCATION: CPT | Performed by: OCCUPATIONAL THERAPIST

## 2023-08-04 PROCEDURE — 82948 REAGENT STRIP/BLOOD GLUCOSE: CPT

## 2023-08-04 RX ORDER — BISACODYL 10 MG
10 SUPPOSITORY, RECTAL RECTAL ONCE
Status: COMPLETED | OUTPATIENT
Start: 2023-08-04 | End: 2023-08-04

## 2023-08-04 RX ADMIN — NYSTATIN: 100000 POWDER TOPICAL at 21:42

## 2023-08-04 RX ADMIN — ATORVASTATIN CALCIUM 80 MG: 40 TABLET, FILM COATED ORAL at 08:12

## 2023-08-04 RX ADMIN — NYSTATIN: 100000 POWDER TOPICAL at 08:13

## 2023-08-04 RX ADMIN — TAMSULOSIN HYDROCHLORIDE 0.4 MG: 0.4 CAPSULE ORAL at 08:12

## 2023-08-04 RX ADMIN — RIVAROXABAN 20 MG: 20 TABLET, FILM COATED ORAL at 16:37

## 2023-08-04 RX ADMIN — MAGNESIUM GLUCONATE 500 MG ORAL TABLET 400 MG: 500 TABLET ORAL at 08:12

## 2023-08-04 RX ADMIN — METOPROLOL TARTRATE 25 MG: 25 TABLET, FILM COATED ORAL at 21:42

## 2023-08-04 RX ADMIN — ALLOPURINOL 300 MG: 300 TABLET ORAL at 08:12

## 2023-08-04 RX ADMIN — BISACODYL 10 MG: 10 SUPPOSITORY RECTAL at 12:53

## 2023-08-04 RX ADMIN — PANTOPRAZOLE SODIUM 40 MG: 40 TABLET, DELAYED RELEASE ORAL at 05:30

## 2023-08-04 RX ADMIN — METOPROLOL TARTRATE 25 MG: 25 TABLET, FILM COATED ORAL at 08:12

## 2023-08-04 RX ADMIN — POLYETHYLENE GLYCOL (3350) 17 G: 17 POWDER, FOR SOLUTION ORAL at 08:12

## 2023-08-04 NOTE — PROGRESS NOTES
Patient without new complaints, he had not a bowel movement as of yet however he is tolerating his diet no abdominal pain still voiding well.  Vital signs are good.  Glucoses are controlled.  Patient has not had a bowel movement today.

## 2023-08-04 NOTE — THERAPY TREATMENT NOTE
Inpatient Rehabilitation - Occupational Therapy Progress Note and Treatment Note     Ronnie     Patient Name: Antonio Canseco  : 1955  MRN: [id number]    Today's Date: 2023                 Admit Date: 2023       No diagnosis found.    Patient Active Problem List   Diagnosis    Detrusor instability    Low testosterone in male    Disorder of prostate    Corporo-venous occlusive erectile dysfunction    CVA (cerebral vascular accident)       Past Medical History:   Diagnosis Date    Diabetes mellitus     Hypertension     Stroke        Past Surgical History:   Procedure Laterality Date    US GUIDED LYMPH NODE BIOPSY  2023             IRF OT ASSESSMENT FLOWSHEET (last 12 hours)       IRF OT Evaluation and Treatment       Row Name 23 1221          OT Time and Intention    Document Type daily treatment;progress note  -BF     Mode of Treatment occupational therapy  -BF     Patient Effort good  -BF     Symptoms Noted During/After Treatment fatigue  Pt c/o fatigue this date, vitals WNL  -BF       Row Name 23 1221          General Information    Existing Precautions/Restrictions fall  L HP, <trunk support/balance, L side inattention  -BF     Limitations/Impairments safety/cognitive  -BF       Row Name 23 1221          Cognition/Psychosocial    Orientation Status (Cognition) oriented x 3  -BF     Follows Commands (Cognition) follows one-step commands;verbal cues/prompting required;repetition of directions required;physical/tactile prompts required;delayed response/completion;increased processing time needed  -BF       Row Name 23 1221          Bathing    Comment (Bathing) Mod/Max A  -BF       Row Name 23 1221          Upper Body Dressing    Comment (Upper Body Dressing) Min A  -BF       Row Name 23 1221          Lower Body Dressing    Comment (Lower Body Dressing) Max A  -BF       Row Name 23 1221          Grooming    [place] Level (Grooming) minimum assist  (75% patient effort)  -BF     Position (Grooming) supported sitting  -BF     Comment (Grooming) Min A  -BF       Row Name 08/04/23 1221          Toileting    Comment (Toileting) Total A  -BF       Row Name 08/04/23 1221          Self-Feeding    Comment (Self-Feeding) Set-up  -BF       Row Name 08/04/23 1221          Motor Skills    Motor Control/Coordination Interventions fine motor manipulation/dexterity activities;gross motor coordination activities;therapeutic exercise/ROM  LUE GMC/FMC theract, strengthening; clif Victor 25 lbs X10X3  -BF       Row Name 08/04/23 1221          Vital Signs    Intra Systolic BP Rehab 101  -BF     Intra Treatment Diastolic BP 70  -BF               User Key  (r) = Recorded By, (t) = Taken By, (c) = Cosigned By      Initials Name Effective Dates    BF Lisa Sanchez OT 07/11/23 -                      Occupational Therapy Education       Title: PT OT SLP Therapies (Done)       Topic: Occupational Therapy (Done)       Point: ADL training (Done)       Description:   Instruct learner(s) on proper safety adaptation and remediation techniques during self care or transfers.   Instruct in proper use of assistive devices.                  Learning Progress Summary             Patient Acceptance, E, VU,NR by BF at 8/4/2023 1221    Acceptance, E,TB, VU,NR by MB at 8/3/2023 2239    Acceptance, E, VU,NR by BF at 8/3/2023 1217    Acceptance, E, VU,NR by BF at 8/1/2023 1427    Acceptance, E, VU,NR by BF at 7/31/2023 1409    Acceptance, E, VU,NR by BF at 7/28/2023 1442    Acceptance, E, VU,NR by BF at 7/27/2023 1445    Acceptance, E, VU,NR by BF at 7/25/2023 1251    Acceptance, E, VU,NR by HB at 7/24/2023 1355    Acceptance, E, NR,VU by BF at 7/21/2023 1248    Acceptance, E, NR by BF at 7/20/2023 1259    Acceptance, TB, NR by DL at 7/19/2023 2036    Acceptance, E, VU,NR by HB at 7/19/2023 1421    Acceptance, E, VU,NR by BF at 7/18/2023 1334    Acceptance, E, VU,NR by BF at 7/17/2023  1431    Acceptance, E, VU,NR by BF at 7/17/2023 1430    Acceptance, E, VU,NR by BF at 7/14/2023 1508    Acceptance, E, VU,NR by BF at 7/13/2023 1343    Acceptance, E,D, VU,NR by TM at 7/12/2023 1407    Acceptance, D,E, NR,VU by TM at 7/11/2023 1434    Acceptance, E, VU,NR by BF at 7/10/2023 1456    Acceptance, E, VU,NR by HB at 7/8/2023 1140    Acceptance, E, VU,NR by BF at 7/7/2023 1442   Family Acceptance, E, VU,NR by BF at 8/1/2023 1427                         Point: Precautions (Done)       Description:   Instruct learner(s) on prescribed precautions during self-care and functional transfers.                  Learning Progress Summary             Patient Acceptance, E, VU,NR by BF at 8/4/2023 1221    Acceptance, E,TB, VU,NR by MB at 8/3/2023 2239    Acceptance, E, VU,NR by BF at 8/3/2023 1217    Acceptance, E, VU,NR by BF at 8/1/2023 1427    Acceptance, E, VU,NR by BF at 7/31/2023 1409    Acceptance, E, VU,NR by BF at 7/28/2023 1442    Acceptance, E, VU,NR by BF at 7/27/2023 1445    Acceptance, E, VU,NR by BF at 7/25/2023 1251    Acceptance, E, VU,NR by HB at 7/24/2023 1355    Acceptance, E, NR,VU by BF at 7/21/2023 1248    Acceptance, E, NR by BF at 7/20/2023 1259    Acceptance, TB, NR by DL at 7/19/2023 2036    Acceptance, E, VU,NR by HB at 7/19/2023 1421    Acceptance, E, VU,NR by BF at 7/18/2023 1334    Acceptance, E, VU,NR by BF at 7/17/2023 1431    Acceptance, E, VU,NR by BF at 7/17/2023 1430    Acceptance, E, VU,NR by BF at 7/14/2023 1508    Acceptance, E, VU,NR by BF at 7/13/2023 1343    Acceptance, E,D, VU,NR by TM at 7/12/2023 1407    Acceptance, D,E, NR,VU by TM at 7/11/2023 1434    Acceptance, E, VU,NR by BF at 7/10/2023 1456    Acceptance, E, VU,NR by HB at 7/8/2023 1140    Acceptance, E, VU,NR by BF at 7/7/2023 1442   Family Acceptance, E, VU,NR by BF at 8/1/2023 1427                                         User Key       Initials Effective Dates Name Provider Type Discipline     05/31/23 -  07/10/23 Daniel Lisalanie Lima, OT Occupational Therapist OT    BF 07/11/23 -  Emelyn Sancheztney Janie, OT Occupational Therapist OT    TM 06/16/21 -  Marielos Capone, OT Occupational Therapist OT    HB 05/25/21 -  Veronica Robles, OT Occupational Therapist OT    DL 03/16/22 -  Nadja Velasquez, RN Registered Nurse Nurse    MB 06/26/23 -  Vinay Fritz, RN Registered Nurse Nurse                        OT Recommendation and Plan    Planned Therapy Interventions (OT): activity tolerance training, adaptive equipment training, BADL retraining, neuromuscular control/coordination retraining, passive ROM/stretching, ROM/therapeutic exercise, strengthening exercise, transfer/mobility retraining                    Time Calculation:      Time Calculation- OT       Row Name 08/04/23 1226             Time Calculation- OT    OT Start Time 1000  -BF      OT Stop Time 1130  -BF      OT Time Calculation (min) 90 min  -BF      Total Timed Code Minutes- OT 90 minute(s)  -BF      OT Non-Billable Time (min) 10 min  -BF                User Key  (r) = Recorded By, (t) = Taken By, (c) = Cosigned By      Initials Name Provider Type    BF DanielLisa, OT Occupational Therapist                  Therapy Charges for Today       Code Description Service Date Service Provider Modifiers Qty    45859104542 HC OT SELF CARE/MGMT/TRAIN EA 15 MIN 8/3/2023 Emelyn Sancheztney Janie, OT GO 1    68556704989 HC OT NEUROMUSC RE EDUCATION EA 15 MIN 8/3/2023 DanielLisa Janie, OT GO 3    21642298172 HC OT THER PROC EA 15 MIN 8/3/2023 DanielLisa Janie, OT GO 2    20298819883 HC OT SELF CARE/MGMT/TRAIN EA 15 MIN 8/4/2023 DanielLisa, OT GO 1    02337887088 HC OT NEUROMUSC RE EDUCATION EA 15 MIN 8/4/2023 SanchezLisa easley, OT GO 3    10128448844 HC OT THER PROC EA 15 MIN 8/4/2023 SanchezLisa easley, OT GO 2                     Lisa Lima Sanchez, OT  8/4/2023

## 2023-08-04 NOTE — PROGRESS NOTES
Rehabilitation Nursing  Inpatient Rehabilitation Plan of Care Note    Plan of Care  Body Function Structure    Skin Integrity (Active)  Current Status (7/6/2023 4:18:00 PM): At Risk for Skin Breakdown  Weekly Goal: No Skin Breakdown  Discharge Goal: No Skin Breakdown    Safety    Potential for Injury (Active)  Current Status (7/6/2023 4:18:00 PM): Potential for Falls  Weekly Goal: No Falls  Discharge Goal: No Falls    Signed by: Maurisio Estrella RN

## 2023-08-04 NOTE — PLAN OF CARE
Goal Outcome Evaluation:    Patient currently sleeping in bed. No complaints of pain or discomfort. Will continue to monitor.       Problem: Rehabilitation (IRF) Plan of Care  Goal: Absence of New-Onset Illness or Injury  Intervention: Prevent Fall and Fall Injury  Recent Flowsheet Documentation  Taken 8/4/2023 0147 by Vinay Fritz RN  Safety Promotion/Fall Prevention:   safety round/check completed   nonskid shoes/slippers when out of bed   activity supervised   assistive device/personal items within reach   clutter free environment maintained  Taken 8/3/2023 2347 by Vinay Fritz RN  Safety Promotion/Fall Prevention:   safety round/check completed   nonskid shoes/slippers when out of bed   activity supervised   assistive device/personal items within reach   clutter free environment maintained  Taken 8/3/2023 2200 by Vinay Fritz RN  Safety Promotion/Fall Prevention:   safety round/check completed   nonskid shoes/slippers when out of bed   activity supervised   assistive device/personal items within reach   clutter free environment maintained  Taken 8/3/2023 2027 by Vinay Fritz RN  Safety Promotion/Fall Prevention:   safety round/check completed   activity supervised   assistive device/personal items within reach   clutter free environment maintained   nonskid shoes/slippers when out of bed  Taken 8/3/2023 2000 by Vinay Fritz RN  Safety Promotion/Fall Prevention:   safety round/check completed   activity supervised   assistive device/personal items within reach   clutter free environment maintained   nonskid shoes/slippers when out of bed  Intervention: Prevent VTE (Venous Thromboembolism)  Recent Flowsheet Documentation  Taken 8/3/2023 2027 by Vinay Fritz RN  VTE Prevention/Management: (see MAR) other (see comments)     Problem: Diabetes Comorbidity  Goal: Blood Glucose Level Within Targeted Range  Intervention: Monitor and Manage Glycemia  Recent Flowsheet Documentation  Taken  8/3/2023 2027 by Vinay Fritz RN  Glycemic Management: blood glucose monitored     Problem: Skin Injury Risk Increased  Goal: Skin Health and Integrity  Intervention: Promote and Optimize Oral Intake  Recent Flowsheet Documentation  Taken 8/3/2023 2027 by Vinay Fritz RN  Oral Nutrition Promotion: physical activity promoted  Intervention: Optimize Skin Protection  Recent Flowsheet Documentation  Taken 8/3/2023 2027 by Vinay Fritz RN  Pressure Reduction Techniques:   frequent weight shift encouraged   heels elevated off bed  Head of Bed (HOB) Positioning: HOB elevated  Pressure Reduction Devices: pressure-redistributing mattress utilized  Skin Protection: adhesive use limited     Problem: Fall Injury Risk  Goal: Absence of Fall and Fall-Related Injury  Intervention: Identify and Manage Contributors  Recent Flowsheet Documentation  Taken 8/3/2023 2027 by Vinay Frizt RN  Medication Review/Management: medications reviewed  Self-Care Promotion: independence encouraged  Intervention: Promote Injury-Free Environment  Recent Flowsheet Documentation  Taken 8/4/2023 0147 by Vinay Fritz RN  Safety Promotion/Fall Prevention:   safety round/check completed   nonskid shoes/slippers when out of bed   activity supervised   assistive device/personal items within reach   clutter free environment maintained  Taken 8/3/2023 2347 by Vinay Fritz RN  Safety Promotion/Fall Prevention:   safety round/check completed   nonskid shoes/slippers when out of bed   activity supervised   assistive device/personal items within reach   clutter free environment maintained  Taken 8/3/2023 2200 by Vinay Fritz RN  Safety Promotion/Fall Prevention:   safety round/check completed   nonskid shoes/slippers when out of bed   activity supervised   assistive device/personal items within reach   clutter free environment maintained  Taken 8/3/2023 2027 by Vinay Fritz RN  Safety Promotion/Fall Prevention:   safety  round/check completed   activity supervised   assistive device/personal items within reach   clutter free environment maintained   nonskid shoes/slippers when out of bed  Taken 8/3/2023 2000 by Vinay Fritz, RN  Safety Promotion/Fall Prevention:   safety round/check completed   activity supervised   assistive device/personal items within reach   clutter free environment maintained   nonskid shoes/slippers when out of bed

## 2023-08-04 NOTE — THERAPY RE-EVALUATION
Inpatient Rehabilitation - Physical Therapy Re-Evaluation        Ronnie     Patient Name: Antonio Cansceo  : 1955  MRN: [id number]    Today's Date: 2023                    Admit Date: 2023      Visit Dx:   No diagnosis found.    Patient Active Problem List   Diagnosis    Detrusor instability    Low testosterone in male    Disorder of prostate    Corporo-venous occlusive erectile dysfunction    CVA (cerebral vascular accident)       Past Medical History:   Diagnosis Date    Diabetes mellitus     Hypertension     Stroke        Past Surgical History:   Procedure Laterality Date    US GUIDED LYMPH NODE BIOPSY  2023       PT ASSESSMENT (last 12 hours)       IRF PT Evaluation and Treatment       Row Name 23 1508          PT Time and Intention    Document Type re-evaluation;daily treatment  -LB     Mode of Treatment individual therapy;physical therapy  -LB       Row Name 23 1508          General Information    Existing Precautions/Restrictions fall  L HP, <trunk support/balance, L side inattention  -LB       Row Name 23 1508          Pain Scale: FACES Pre/Post-Treatment    Pain: FACES Scale, Pretreatment 0-->no hurt  -LB     Posttreatment Pain Rating 0-->no hurt  -LB       Row Name 23 1508          Cognition/Psychosocial    Affect/Mental Status (Cognition) WFL  -LB     Follows Commands (Cognition) verbal cues/prompting required;physical/tactile prompts required  -LB     Personal Safety Interventions gait belt;nonskid shoes/slippers when out of bed;supervised activity  -LB       Row Name 23 1508          Bed Mobility    Supine-Sit [place] (Bed Mobility) maximum assist (25% patient effort);verbal cues;nonverbal cues (demo/gesture)  -LB       Row Name 23 1508          Bed-Chair Transfer    Bed-Chair [place] (Transfers) maximum assist (25% patient effort);2 person assist;verbal cues;nonverbal cues (demo/gesture)  -LB       Row Name 23 1501           Sit-Stand Transfer    Sit-Stand [place] (Transfers) verbal cues;nonverbal cues (demo/gesture);minimum assist (75% patient effort)  -LB     Assistive Device (Sit-Stand Transfers) parallel bars  -LB       Row Name 08/04/23 1508          Stand-Sit Transfer    Stand-Sit [place] (Transfers) verbal cues;nonverbal cues (demo/gesture);minimum assist (75% patient effort)  -LB     Assistive Device (Stand-Sit Transfers) parallel bars  -LB       Row Name 08/04/23 1508          Gait/Stairs (Locomotion)    [place] Level (Gait) maximum assist (25% patient effort);2 person assist;verbal cues;nonverbal cues (demo/gesture)  -LB     Assistive Device (Gait) parallel bars  -LB     Distance in Feet (Gait) 2' x2  -LB     Pattern (Gait) --  dependent to move LLE forward and position  -LB     Deviations/Abnormal Patterns (Gait) --  repeated cues for upright posture, weight shifting, hand placement on PB, and gait pattern. he is not a functional ambulator.  -LB       Row Name 08/04/23 1508          Safety Issues, Functional Mobility    Safety Issues Affecting Function (Mobility) awareness of need for assistance;insight into deficits/self-awareness;safety precautions follow-through/compliance  he continues to have L inattention and needs repeated cues to protect or move L extremities  -LB     Impairments Affecting Function (Mobility) balance;coordination;endurance/activity tolerance;grasp;motor control;muscle tone abnormal;postural/trunk control;range of motion (ROM);strength  -LB       Row Name 08/04/23 1508          Balance    Static Sitting Balance minimal assist  he leans to L side  -LB     Static Standing Balance --  min A in PB, needs repeated cues for upright posture and weight shifting  -LB     Comment, Balance sitting in w/c for bag toss and  with reacher using RUE  -LB       Row Name 08/04/23 1508          Motor Skills    Therapeutic Exercise --  sitting ex-RLE AORM, LLE PROM  -LB       Row Name 08/04/23 1503           Aerobic Exercise    Type (Aerobic Exercise) recumbent stationary bike  -LB     Time Performed (Aerobic Exercise) level 1.0, 17 min. strap needed for L foot  -LB       Row Name 08/04/23 1508          [place] Electrical Stimulation Treatment    Location 1 (Russian E-Stim Treatment) --  L quad  -LB     Location 2 (Russian E-Stim Treatment) --  L ant tib  -LB     Indications (Russian E-Stim Treatment) enhance muscle contraction  -LB     Treatment Details (Russian Electrical Stimulation Treatment) 20 min, 10 sec on/20 sec off, small contraction noted at L ant tib  -LB     Response to Treatment (Russian E-Stim Treatment) muscle contraction enhanced  -LB       Row Name 08/04/23 1508          Positioning and Restraints    Pre-Treatment Position in bed  -LB     In Wheelchair with OT  -LB       Row Name 08/04/23 1505          Weekly Progress Summary (PT)    Functional Goal Overall Progress (PT) progressing toward functional goals slower than expected  -LB     Weekly Progress Summary (PT) he has maintained progress since last re-eval but has not improved significantly since. on occasion he can take a few steps in the PB but he requires max A x2 and would not currently be a functional ambulator at home. he continues to have L inattention, LLE is basically flaccid, and he needs repeated cues to correct balance and posture so carryover is decreased.  -LB     Impairments Still Limiting Function (PT) balance impairment;coordination impairment;functional activity tolerance impairment;motor control impaired;muscle tone abnormality;postural control impaired;sensory impairment;strength deficit  -LB     Recommendations (PT) continue PT  -LB     Plan for Continued Service After Discharge (PT) plan for d/c next week. family has observed him in therapy. if they cannot meet his needs he may need SNF.  -LB       Row Name 08/04/23 1508          Bed Mobility Goal 1 (PT-IRF)    Progress/Outcomes (Bed Mobility Goal 1, PT-IRF) goal  ongoing  -LB       Row Name 08/04/23 1508          Bed Mobility Goal 2 (PT-IRF)    Progress/Outcomes (Bed Mobility Goal 2, PT-IRF) goal ongoing  -LB       Row Name 08/04/23 1508          Transfer Goal 1 (PT-IRF)    Progress/Outcomes (Transfer Goal 1, PT-IRF) goal ongoing  -LB       Row Name 08/04/23 1508          Transfer Goal 2 (PT-IRF)    Progress/Outcomes (Transfer Goal 2, PT-IRF) goal ongoing  -LB       Row Name 08/04/23 1508          Gait/Walking Locomotion Goal 1 (PT-IRF)    Progress/Outcomes (Gait/Walking Locomotion Goal 1, PT-IRF) goal ongoing  -LB       Row Name 08/04/23 1508          Gait/Walking Locomotion Goal 2 (PT-IRF)    Progress/Outcomes (Gait/Walking Locomotion Goal 2, PT-IRF) goal ongoing  -LB               User Key  (r) = Recorded By, (t) = Taken By, (c) = Cosigned By      Initials Name Provider Type    Joslyn Beckford, LYNDON Physical Therapist                     Physical Therapy Education       Title: PT OT SLP Therapies (Done)       Topic: Physical Therapy (Done)       Point: Mobility training (Done)       Learning Progress Summary             Patient Acceptance, E, VU,NR by LB at 8/4/2023 1521    Acceptance, E,TB, VU,NR by MB at 8/3/2023 2239    Acceptance, E,D, VU,NR by RG at 8/3/2023 1514    Acceptance, E,D, VU,NR by RG at 8/2/2023 1524    Acceptance, E,D, VU,NR by RG at 8/1/2023 1446    Acceptance, E,D, VU,NR by RG at 7/31/2023 1411    Acceptance, E,TB, VU by RM at 7/28/2023 1230    Acceptance, E,D, VU,NR by RG at 7/26/2023 1518    Acceptance, E, VU,NR by LB at 7/25/2023 1133    Acceptance, E, VU,NR by LB at 7/24/2023 1444    Acceptance, E, VU,NR by LB at 7/21/2023 1615    Acceptance, E,D, VU,NR by RG at 7/20/2023 1107    Acceptance, TB, NR by DL at 7/19/2023 2036    Acceptance, E,D, VU,NR by RG at 7/19/2023 1508    Acceptance, E,D, VU,NR by RG at 7/18/2023 1520    Acceptance, E,D, VU,NR by RG at 7/17/2023 1522    Acceptance, E,D, VU,NR by LL at 7/13/2023 1358    Acceptance, E, VU,NR  by LB at 7/12/2023 1144    Acceptance, E, VU,NR by LB at 7/11/2023 1426    Acceptance, E, VU,NR by LB at 7/10/2023 1452    Acceptance, E,D, VU,NR by LL at 7/8/2023 1228    Acceptance, E,D, VU,NR by LB at 7/7/2023 1449                         Point: Home exercise program (Done)       Learning Progress Summary             Patient Acceptance, E, VU,NR by LB at 8/4/2023 1521    Acceptance, E,TB, VU,NR by MB at 8/3/2023 2239    Acceptance, E,D, VU,NR by RG at 8/3/2023 1514    Acceptance, E,D, VU,NR by RG at 8/2/2023 1524    Acceptance, E,D, VU,NR by RG at 8/1/2023 1446    Acceptance, E,D, VU,NR by RG at 7/31/2023 1411    Acceptance, E,TB, VU by RM at 7/28/2023 1230    Acceptance, E,D, VU,NR by RG at 7/26/2023 1518    Acceptance, E, VU,NR by LB at 7/25/2023 1133    Acceptance, E, VU,NR by LB at 7/24/2023 1444    Acceptance, E, VU,NR by LB at 7/21/2023 1615    Acceptance, E,D, VU,NR by RG at 7/20/2023 1107    Acceptance, TB, NR by DL at 7/19/2023 2036    Acceptance, E,D, VU,NR by RG at 7/19/2023 1508    Acceptance, E,D, VU,NR by RG at 7/18/2023 1520    Acceptance, E,D, VU,NR by RG at 7/17/2023 1522    Acceptance, E,D, VU,NR by LL at 7/13/2023 1358    Acceptance, E, VU,NR by LB at 7/12/2023 1144    Acceptance, E, VU,NR by LB at 7/11/2023 1426    Acceptance, E, VU,NR by LB at 7/10/2023 1452    Acceptance, E,D, VU,NR by LL at 7/8/2023 1228    Acceptance, E,D, VU,NR by LB at 7/7/2023 1449                         Point: Body mechanics (Done)       Learning Progress Summary             Patient Acceptance, E, VU,NR by LB at 8/4/2023 1521    Acceptance, E,TB, VU,NR by MB at 8/3/2023 2239    Acceptance, E,D, VU,NR by RG at 8/3/2023 1514    Acceptance, E,D, VU,NR by RG at 8/2/2023 1524    Acceptance, E,D, VU,NR by RG at 8/1/2023 1446    Acceptance, E,D, VU,NR by RG at 7/31/2023 1411    Acceptance, E,TB, VU by RM at 7/28/2023 1230    Acceptance, E,D, VU,NR by RG at 7/26/2023 1518    Acceptance, E, VU,NR by LB at 7/25/2023 1133     Acceptance, E, VU,NR by LB at 7/24/2023 1444    Acceptance, E, VU,NR by LB at 7/21/2023 1615    Acceptance, E,D, VU,NR by RG at 7/20/2023 1107    Acceptance, TB, NR by DL at 7/19/2023 2036    Acceptance, E,D, VU,NR by RG at 7/19/2023 1508    Acceptance, E,D, VU,NR by RG at 7/18/2023 1520    Acceptance, E,D, VU,NR by RG at 7/17/2023 1522    Acceptance, E,D, VU,NR by LL at 7/13/2023 1358    Acceptance, E, VU,NR by LB at 7/12/2023 1144    Acceptance, E, VU,NR by LB at 7/11/2023 1426    Acceptance, E, VU,NR by LB at 7/10/2023 1452    Acceptance, E,D, VU,NR by LL at 7/8/2023 1228    Acceptance, E,D, VU,NR by LB at 7/7/2023 1449                         Point: Precautions (Done)       Learning Progress Summary             Patient Acceptance, E, VU,NR by LB at 8/4/2023 1521    Acceptance, E,TB, VU,NR by MB at 8/3/2023 2239    Acceptance, E,D, VU,NR by RG at 8/3/2023 1514    Acceptance, E,D, VU,NR by RG at 8/2/2023 1524    Acceptance, E,D, VU,NR by RG at 8/1/2023 1446    Acceptance, E,D, VU,NR by RG at 7/31/2023 1411    Acceptance, E,TB, VU by RM at 7/28/2023 1230    Acceptance, E,D, VU,NR by RG at 7/26/2023 1518    Acceptance, E, VU,NR by LB at 7/25/2023 1133    Acceptance, E, VU,NR by LB at 7/24/2023 1444    Acceptance, E, VU,NR by LB at 7/21/2023 1615    Acceptance, E,D, VU,NR by RG at 7/20/2023 1107    Acceptance, TB, NR by DL at 7/19/2023 2036    Acceptance, E,D, VU,NR by RG at 7/19/2023 1508    Acceptance, E,D, VU,NR by RG at 7/18/2023 1520    Acceptance, E,D, VU,NR by RG at 7/17/2023 1522    Acceptance, E,D, VU,NR by LL at 7/13/2023 1358    Acceptance, E, VU,NR by LB at 7/12/2023 1144    Acceptance, E, VU,NR by LB at 7/11/2023 1426    Acceptance, E, VU,NR by LB at 7/10/2023 1452    Acceptance, E,D, VU,NR by LL at 7/8/2023 1228    Acceptance, E,D, VU,NR by LB at 7/7/2023 1449                                         User Key       Initials Effective Dates Name Provider Type Discipline     06/16/21 -  Joslyn Garcia  Link, PT Physical Therapist PT    LL 05/02/16 -  Connie Velasquez PTA Physical Therapist Assistant PT    RM 02/17/23 -  Shelley Hanson, PTA Physical Therapist Assistant PT    RG 06/16/21 -  Vu Brown PTA Physical Therapist Assistant PT    DL 03/16/22 -  Nadja Velasquez, RN Registered Nurse Nurse    MB 06/26/23 -  Vinay Fritz, RN Registered Nurse Nurse                    PT Recommendation and Plan    Planned Therapy Interventions (PT): balance training, bed mobility training, gait training, motor coordination training, neuromuscular re-education, patient/family education, postural re-education, ROM (range of motion), strengthening, transfer training, wheelchair management/propulsion training  Frequency of Treatment (PT): 5 times per week  Anticipated Equipment Needs at Discharge (PT Eval):  (tbd)  Daily Progress Summary (PT)  Daily Progress Summary (PT): he had more L neglect and <balance due to pushing. he is not currently able to correct and requires supervision in w/c for safety.  Recommendations (PT): continue PT               Time Calculation:      PT Charges       Row Name 08/04/23 1521             Time Calculation    Start Time 0830  -LB      Stop Time 1000  -LB      Time Calculation (min) 90 min  -LB      PT Received On 08/04/23  -LB      PT Goal Re-Cert Due Date 08/11/23  -LB                User Key  (r) = Recorded By, (t) = Taken By, (c) = Cosigned By      Initials Name Provider Type    LB Joslyn Garcia, PT Physical Therapist                    Therapy Charges for Today       Code Description Service Date Service Provider Modifiers Qty    26006815788 HC GAIT TRAINING EA 15 MIN 8/4/2023 Joslyn Garcia, PT GP 1    44008444291 HC PT THER PROC EA 15 MIN 8/4/2023 Joslyn Garcia, PT GP 2    47418849358 HC PT THERAPEUTIC ACT EA 15 MIN 8/4/2023 Joslyn Garcia, PT GP 2    72342410422 HC PT ELEC STIM EA-PER 15 MIN 8/4/2023 Joslyn Garcia, PT GP 1                      Joslyn Garica, PT  8/4/2023

## 2023-08-04 NOTE — PLAN OF CARE
Goal Outcome Evaluation:  Plan of Care Reviewed With: patient        Progress: improving       Problem: Rehabilitation (IRF) Plan of Care  Goal: Plan of Care Review  Outcome: Ongoing, Progressing  Flowsheets (Taken 8/4/2023 0949)  Progress: improving  Plan of Care Reviewed With: patient  Goal: Patient-Specific Goal (Individualized)  Outcome: Ongoing, Progressing  Goal: Absence of New-Onset Illness or Injury  Outcome: Ongoing, Progressing  Intervention: Prevent Fall and Fall Injury  Recent Flowsheet Documentation  Taken 8/4/2023 0800 by Maurisio Estrella RN  Safety Promotion/Fall Prevention: safety round/check completed  Intervention: Prevent Infection  Recent Flowsheet Documentation  Taken 8/4/2023 0800 by Maurisio Estrella RN  Infection Prevention:   hand hygiene promoted   rest/sleep promoted  Intervention: Prevent VTE (Venous Thromboembolism)  Recent Flowsheet Documentation  Taken 8/4/2023 0800 by Maurisio Estrella RN  VTE Prevention/Management: (xarelto) other (see comments)  Goal: Optimal Comfort and Wellbeing  Outcome: Ongoing, Progressing  Goal: Home and Community Transition Plan Established  Outcome: Ongoing, Progressing     Problem: Diabetes Comorbidity  Goal: Blood Glucose Level Within Targeted Range  Outcome: Ongoing, Progressing  Intervention: Monitor and Manage Glycemia  Recent Flowsheet Documentation  Taken 8/4/2023 0800 by Maurisio Estrella RN  Glycemic Management: blood glucose monitored     Problem: Skin Injury Risk Increased  Goal: Skin Health and Integrity  Outcome: Ongoing, Progressing  Intervention: Promote and Optimize Oral Intake  Recent Flowsheet Documentation  Taken 8/4/2023 0800 by Maurisio Estrella RN  Oral Nutrition Promotion: physical activity promoted  Intervention: Optimize Skin Protection  Recent Flowsheet Documentation  Taken 8/4/2023 0800 by Maurisio Estrella RN  Pressure Reduction Techniques:   frequent weight shift encouraged   heels elevated off bed   weight shift assistance provided  Pressure  Reduction Devices:   pressure-redistributing mattress utilized   heel offloading device utilized   positioning supports utilized  Skin Protection:   adhesive use limited   incontinence pads utilized     Problem: Fall Injury Risk  Goal: Absence of Fall and Fall-Related Injury  Outcome: Ongoing, Progressing  Intervention: Identify and Manage Contributors  Recent Flowsheet Documentation  Taken 8/4/2023 0800 by Maurisio Estrella RN  Medication Review/Management: medications reviewed  Intervention: Promote Injury-Free Environment  Recent Flowsheet Documentation  Taken 8/4/2023 0800 by Maurisio Estrella RN  Safety Promotion/Fall Prevention: safety round/check completed     Problem: Mobility Impairment  Goal: Optimal Mobility [place] and Safety  Outcome: Ongoing, Progressing

## 2023-08-05 LAB
GLUCOSE BLDC GLUCOMTR-MCNC: 136 MG/DL (ref 70–130)
GLUCOSE BLDC GLUCOMTR-MCNC: 165 MG/DL (ref 70–130)

## 2023-08-05 PROCEDURE — 99231 SBSQ HOSP IP/OBS SF/LOW 25: CPT | Performed by: INTERNAL MEDICINE

## 2023-08-05 PROCEDURE — 82948 REAGENT STRIP/BLOOD GLUCOSE: CPT

## 2023-08-05 RX ADMIN — ATORVASTATIN CALCIUM 80 MG: 40 TABLET, FILM COATED ORAL at 08:46

## 2023-08-05 RX ADMIN — HYDROXYZINE HYDROCHLORIDE 25 MG: 25 TABLET, FILM COATED ORAL at 20:47

## 2023-08-05 RX ADMIN — METOPROLOL TARTRATE 25 MG: 25 TABLET, FILM COATED ORAL at 08:46

## 2023-08-05 RX ADMIN — TAMSULOSIN HYDROCHLORIDE 0.4 MG: 0.4 CAPSULE ORAL at 08:46

## 2023-08-05 RX ADMIN — NYSTATIN: 100000 POWDER TOPICAL at 20:48

## 2023-08-05 RX ADMIN — POLYETHYLENE GLYCOL (3350) 17 G: 17 POWDER, FOR SOLUTION ORAL at 08:47

## 2023-08-05 RX ADMIN — MAGNESIUM GLUCONATE 500 MG ORAL TABLET 400 MG: 500 TABLET ORAL at 08:46

## 2023-08-05 RX ADMIN — PANTOPRAZOLE SODIUM 40 MG: 40 TABLET, DELAYED RELEASE ORAL at 05:53

## 2023-08-05 RX ADMIN — ALLOPURINOL 300 MG: 300 TABLET ORAL at 08:47

## 2023-08-05 RX ADMIN — METOPROLOL TARTRATE 25 MG: 25 TABLET, FILM COATED ORAL at 20:47

## 2023-08-05 RX ADMIN — NYSTATIN: 100000 POWDER TOPICAL at 08:47

## 2023-08-05 RX ADMIN — RIVAROXABAN 20 MG: 20 TABLET, FILM COATED ORAL at 16:50

## 2023-08-05 NOTE — PLAN OF CARE
Goal Outcome Evaluation:         Pt is progressing medically and physically with all therapies, continue current plan of care.

## 2023-08-05 NOTE — PROGRESS NOTES
5 Rehabilitation Nursing  Inpatient Rehabilitation Plan of Care Note    Plan of Care  Copy from POC    Body Function Structure    Skin Integrity (Active)  Current Status (7/6/2023 4:18:00 PM): At Risk for Skin Breakdown  Weekly Goal: No Skin Breakdown  Discharge Goal: No Skin Breakdown    Safety    Potential for Injury (Active)  Current Status (7/6/2023 4:18:00 PM): Potential for Falls  Weekly Goal: No Falls  Discharge Goal: No Falls    Signed by: Darby Fritz Nurse

## 2023-08-05 NOTE — PROGRESS NOTES
"Assisted By: Maurisio CHI    CC: Follow-up on CVA    Interview History/HPI: Patient is without new complaints except occasionally he says his left leg \"jerks\".  He states this woke him up last night and lasted about an hour.  He states this is not the first time this is happened.  Apparently he did alert the nursing staff.          Current Hospital Meds:  allopurinol, 300 mg, Oral, Daily  atorvastatin, 80 mg, Oral, Daily  magnesium oxide, 400 mg, Oral, Daily  metoprolol tartrate, 25 mg, Oral, Q12H  nystatin, , Topical, Q12H  pantoprazole, 40 mg, Oral, Q AM  polyethylene glycol, 17 g, Oral, Daily  rivaroxaban, 20 mg, Oral, Daily With Dinner  tamsulosin, 0.4 mg, Oral, Daily         Vitals:    08/05/23 0700   BP: 109/65   Pulse: 85   Resp: 20   Temp: 98.2 øF (36.8 øC)   SpO2: 95%         Intake/Output Summary (Last 24 hours) at 8/5/2023 1114  Last data filed at 8/5/2023 0900  Gross per 24 hour   Intake 720 ml   Output --   Net 720 ml       EXAM: Left arm strength is the same weaker than the right, still overall flaccid with minimal hip movement left lower extremity.  Mood is good skin warm and dry heart is an irregular irregular controlled rhythm, lungs clear no edema in the left lower extremity, adequate pulses no clubbing or cyanosis      Diet: Cardiac Diets; Healthy Heart (2-3 Na+); Texture: Regular Texture (IDDSI 7); Fluid Consistency: Thin (IDDSI 0)        LABS:     Lab Results (last 48 hours)       Procedure Component Value Units Date/Time    POC Glucose Once [[id number]]  (Abnormal) Collected: 08/05/23 0554    Specimen: Blood Updated: 08/05/23 0600     Glucose 136 mg/dL      Comment: Meter: [id number] : [id number] Andreina Crystal       POC Glucose Once [[id number]]  (Abnormal) Collected: 08/04/23 1551    Specimen: Blood Updated: 08/04/23 1558     Glucose 175 mg/dL      Comment: Meter: [id number] : [id number] emiliano leo       POC Glucose Once [[id number]]  (Abnormal) Collected: 08/04/23 1126    Specimen: " Blood Updated: 08/04/23 1132     Glucose 138 mg/dL      Comment: Meter: [id number] : [id number] CHAYA FIERRO       POC Glucose Once [[id number]]  (Abnormal) Collected: 08/04/23 0620    Specimen: Blood Updated: 08/04/23 0626     Glucose 136 mg/dL      Comment: Meter: [id number] : [id number] SILVANO STACY       POC Glucose Once [[id number]]  (Normal) Collected: 08/03/23 1619    Specimen: Blood Updated: 08/03/23 1632     Glucose 126 mg/dL      Comment: Meter: [id number] : [id number] emiliano bailey                    Radiology:    Imaging Results (Last 72 Hours)       ** No results found for the last 72 hours. **                Assessment/Plan:   Status post CVA with dense left residual.  Patient is on Xarelto for further stroke prevention.  Patient is working with physical and Occupational Therapy.  Patient requires maximal assistance for transfers and bed mobility.  In the parallel bars however he is min assist for sit to stand and stand to sit.  He walked 2 feet x 2 in the parallel bars but was dependent to move left lower extremity.  As far as safety issues from a physical therapy standpoint patient continues to have left inattention and needs repeated cues to protect or move the left extremities.  It was felt that he did not progress to a lot from the last weekly progress.  He can take a few steps in the parallel bars but requires max assist x2 and would not currently be a functionally ambulatory at home.  With his left inattention and left lower extremity basically flaccid he needs repeated cues to correct balance and posterior.  As far as discharge goes if family cannot take care of him may have to consider SNF.  Patient is really not wanting to do this.  With OT, he is mod to max bathing, min upper body dressing, max lower body dressing, min assist for grooming, total assist toileting hygiene and set up for self-feeding.    Atrial fibrillation, rate controlled, on Xarelto for further stroke  "prevention.    Left leg \"jerks\".  Exact etiology is uncertain, these may be muscle spasms but this did wake him up, cannot rule out the possibility of a partial seizure considering his history of CVA.  EEG ordered    Constipation, patient did have a BM yesterday.    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    HFpEF, controlled.  Euvolemic currently.    DVT prophylaxis, Xarelto will serve for this    Hayder Mendoza MD     "

## 2023-08-05 NOTE — PLAN OF CARE
Goal Outcome Evaluation:  Plan of Care Reviewed With: patient        Progress: improving       Problem: Rehabilitation ([facility]) Plan of Care  Goal: Plan of Care Review  Outcome: Ongoing, Progressing  Flowsheets (Taken 8/5/2023 1003)  Progress: improving  Plan of Care Reviewed With: patient  Goal: Patient-Specific Goal (Individualized)  Outcome: Ongoing, Progressing  Goal: Absence of New-Onset Illness or Injury  Outcome: Ongoing, Progressing  Intervention: Prevent Fall and Fall Injury  Recent Flowsheet Documentation  Taken 8/5/2023 0800 by Maurisio Estrella RN  Safety Promotion/Fall Prevention: safety round/check completed  Intervention: Prevent Infection  Recent Flowsheet Documentation  Taken 8/5/2023 0800 by Maurisio Estrella RN  Infection Prevention:   hand hygiene promoted   rest/sleep promoted  Goal: Optimal Comfort and Wellbeing  Outcome: Ongoing, Progressing  Goal: Home and Community Transition Plan Established  Outcome: Ongoing, Progressing     Problem: Diabetes Comorbidity  Goal: Blood Glucose Level Within Targeted Range  Outcome: Ongoing, Progressing  Intervention: Monitor and Manage Glycemia  Recent Flowsheet Documentation  Taken 8/5/2023 0800 by Maurisio Estrella RN  Glycemic Management: blood glucose monitored     Problem: Skin Injury Risk Increased  Goal: Skin Health and Integrity  Outcome: Ongoing, Progressing  Intervention: Promote and Optimize Oral Intake  Recent Flowsheet Documentation  Taken 8/5/2023 0800 by Maurisio Estrella RN  Oral Nutrition Promotion: physical activity promoted  Intervention: Optimize Skin Protection  Recent Flowsheet Documentation  Taken 8/5/2023 0800 by Maurisio Estrella RN  Pressure Reduction Techniques:   frequent weight shift encouraged   heels elevated off bed   weight shift assistance provided  Pressure Reduction Devices:   pressure-redistributing mattress utilized   heel offloading device utilized   positioning supports utilized  Skin Protection:   adhesive use limited   incontinence  pads utilized     Problem: Fall Injury Risk  Goal: Absence of Fall and Fall-Related Injury  Outcome: Ongoing, Progressing  Intervention: Identify and Manage Contributors  Recent Flowsheet Documentation  Taken 8/5/2023 0800 by Maurisio Estrella RN  Medication Review/Management: medications reviewed  Intervention: Promote Injury-Free Environment  Recent Flowsheet Documentation  Taken 8/5/2023 0800 by Maurisio Estrella RN  Safety Promotion/Fall Prevention: safety round/check completed     Problem: Mobility Impairment  Goal: Optimal Mobility [place] and Safety  Outcome: Ongoing, Progressing

## 2023-08-06 LAB
GLUCOSE BLDC GLUCOMTR-MCNC: 136 MG/DL (ref 70–130)
GLUCOSE BLDC GLUCOMTR-MCNC: 148 MG/DL (ref 70–130)

## 2023-08-06 PROCEDURE — 82948 REAGENT STRIP/BLOOD GLUCOSE: CPT

## 2023-08-06 RX ADMIN — MAGNESIUM GLUCONATE 500 MG ORAL TABLET 400 MG: 500 TABLET ORAL at 09:04

## 2023-08-06 RX ADMIN — TAMSULOSIN HYDROCHLORIDE 0.4 MG: 0.4 CAPSULE ORAL at 09:03

## 2023-08-06 RX ADMIN — ATORVASTATIN CALCIUM 80 MG: 40 TABLET, FILM COATED ORAL at 09:04

## 2023-08-06 RX ADMIN — HYDROXYZINE HYDROCHLORIDE 25 MG: 25 TABLET, FILM COATED ORAL at 20:44

## 2023-08-06 RX ADMIN — NYSTATIN: 100000 POWDER TOPICAL at 09:04

## 2023-08-06 RX ADMIN — METOPROLOL TARTRATE 25 MG: 25 TABLET, FILM COATED ORAL at 09:04

## 2023-08-06 RX ADMIN — RIVAROXABAN 20 MG: 20 TABLET, FILM COATED ORAL at 17:15

## 2023-08-06 RX ADMIN — NYSTATIN: 100000 POWDER TOPICAL at 20:44

## 2023-08-06 RX ADMIN — ALLOPURINOL 300 MG: 300 TABLET ORAL at 09:04

## 2023-08-06 RX ADMIN — PANTOPRAZOLE SODIUM 40 MG: 40 TABLET, DELAYED RELEASE ORAL at 05:54

## 2023-08-06 RX ADMIN — METOPROLOL TARTRATE 25 MG: 25 TABLET, FILM COATED ORAL at 20:44

## 2023-08-06 NOTE — PROGRESS NOTES
"Discussed with the patient about the possibility of nursing home placement and although he was resistant at first, he has actually thought about this and discussed with his family and physical therapist and he has decided that if he needs short-term nursing on placement he would be willing to do this.  Otherwise no new complaints although he did still have some left leg \"jerking\" last night, EEG is pending, he states this lasted about 30 minutes.  No abnormality noted today.  Still fairly flaccid left leg.  "

## 2023-08-06 NOTE — PLAN OF CARE
Goal Outcome Evaluation:  Plan of Care Reviewed With: patient        Progress: improving  Outcome Evaluation: Patient VSS. Patient resting in bed at this time. Patient has no requests or discomfort at this time. Will continue with plan of care.

## 2023-08-07 ENCOUNTER — APPOINTMENT (OUTPATIENT)
Dept: RESPIRATORY THERAPY | Facility: HOSPITAL | Age: 68
DRG: 056 | End: 2023-08-07
Payer: MEDICARE

## 2023-08-07 LAB
GLUCOSE BLDC GLUCOMTR-MCNC: 110 MG/DL (ref 70–130)
GLUCOSE BLDC GLUCOMTR-MCNC: 122 MG/DL (ref 70–130)

## 2023-08-07 PROCEDURE — 97535 SELF CARE MNGMENT TRAINING: CPT | Performed by: OCCUPATIONAL THERAPIST

## 2023-08-07 PROCEDURE — 99231 SBSQ HOSP IP/OBS SF/LOW 25: CPT | Performed by: FAMILY MEDICINE

## 2023-08-07 PROCEDURE — 97530 THERAPEUTIC ACTIVITIES: CPT

## 2023-08-07 PROCEDURE — 97110 THERAPEUTIC EXERCISES: CPT

## 2023-08-07 PROCEDURE — 95819 EEG AWAKE AND ASLEEP: CPT | Performed by: PSYCHIATRY & NEUROLOGY

## 2023-08-07 PROCEDURE — 82948 REAGENT STRIP/BLOOD GLUCOSE: CPT

## 2023-08-07 PROCEDURE — 97112 NEUROMUSCULAR REEDUCATION: CPT | Performed by: OCCUPATIONAL THERAPIST

## 2023-08-07 PROCEDURE — 97110 THERAPEUTIC EXERCISES: CPT | Performed by: OCCUPATIONAL THERAPIST

## 2023-08-07 PROCEDURE — 97116 GAIT TRAINING THERAPY: CPT

## 2023-08-07 PROCEDURE — 95819 EEG AWAKE AND ASLEEP: CPT

## 2023-08-07 RX ADMIN — ALLOPURINOL 300 MG: 300 TABLET ORAL at 09:31

## 2023-08-07 RX ADMIN — NYSTATIN: 100000 POWDER TOPICAL at 09:35

## 2023-08-07 RX ADMIN — METOPROLOL TARTRATE 25 MG: 25 TABLET, FILM COATED ORAL at 09:31

## 2023-08-07 RX ADMIN — TAMSULOSIN HYDROCHLORIDE 0.4 MG: 0.4 CAPSULE ORAL at 09:31

## 2023-08-07 RX ADMIN — MAGNESIUM GLUCONATE 500 MG ORAL TABLET 400 MG: 500 TABLET ORAL at 09:30

## 2023-08-07 RX ADMIN — PANTOPRAZOLE SODIUM 40 MG: 40 TABLET, DELAYED RELEASE ORAL at 05:12

## 2023-08-07 RX ADMIN — RIVAROXABAN 20 MG: 20 TABLET, FILM COATED ORAL at 17:52

## 2023-08-07 RX ADMIN — NYSTATIN: 100000 POWDER TOPICAL at 20:43

## 2023-08-07 RX ADMIN — ATORVASTATIN CALCIUM 80 MG: 40 TABLET, FILM COATED ORAL at 09:31

## 2023-08-07 RX ADMIN — HYDROXYZINE HYDROCHLORIDE 25 MG: 25 TABLET, FILM COATED ORAL at 20:43

## 2023-08-07 RX ADMIN — METOPROLOL TARTRATE 25 MG: 25 TABLET, FILM COATED ORAL at 20:43

## 2023-08-07 NOTE — SIGNIFICANT NOTE
08/07/23 7694   Plan   Plan Spoke to Makayla at [facility] [place] who does not have a bed available.  Spoke to Idalia at [facility] who is will to look at the referral.

## 2023-08-07 NOTE — PAYOR COMM NOTE
"Carmen Doe RN   Utilization Review Nurse for Inpatient Rehab   Phone: [phone]  Fax: [phone]  Email: [email or web address]  [facility] NPI: [id number]     CLINICAL REVIEW FOR CONTINUED REHAB STAY APPROVAL  Member:  Antonio Canseco  :  1955  ID# [id number]  Auth# [id number]  Admission Date:  23  Discharge Date:  23  *Requesting additional 2 days    Antonio Canseco (67 y.o. Male)       Date of Birth   1955    Social Security Number       Address   [address] [place] [address]    Home Phone   [phone]    MRN   [id number]       Nondenominational   None    Marital Status                               Admission Date   23    Admission Type   Elective    Admitting Provider   Hayder Mendoza MD    Attending Provider   Hayder Mendoza MD    Department, Room/Bed   [facility] REHABILITATION, 106/2S       Discharge Date       Discharge Disposition       Discharge Destination                                 Attending Provider: Hayder Mendoza MD    Allergies: No Known Allergies    Isolation: None   Infection: None   Code Status: CPR    Ht: 172.7 cm (67.99\")   Wt: 121 kg (266 lb 12.1 oz)    Admission Cmt: None   Principal Problem: CVA (cerebral vascular accident) [I63.9]                     Hayder Mendoza MD   Physician  Medicine     Progress Notes      Addendum     Date of Service: 23  Creation Time: 23       Assisted By: Maurisio CHI     CC: Follow-up on CVA     Interview History/HPI: Patient is without new complaints except occasionally he says his left leg \"jerks\".  He states this woke him up last night and lasted about an hour.  He states this is not the first time this is happened.  Apparently he did alert the nursing staff.              Current Hospital Meds:  allopurinol, 300 mg, Oral, Daily  atorvastatin, 80 mg, Oral, Daily  magnesium oxide, 400 mg, Oral, Daily  metoprolol tartrate, 25 mg, Oral, Q12H  nystatin, , " Topical, Q12H  pantoprazole, 40 mg, Oral, Q AM  polyethylene glycol, 17 g, Oral, Daily  rivaroxaban, 20 mg, Oral, Daily With Dinner  tamsulosin, 0.4 mg, Oral, Daily            Vitals:     08/05/23 0700   BP: 109/65   Pulse: 85   Resp: 20   Temp: 98.2 øF (36.8 øC)   SpO2: 95%            Intake/Output Summary (Last 24 hours) at 8/5/2023 1114  Last data filed at 8/5/2023 0900      Gross per 24 hour   Intake 720 ml   Output --   Net 720 ml         EXAM: Left arm strength is the same weaker than the right, still overall flaccid with minimal hip movement left lower extremity.  Mood is good skin warm and dry heart is an irregular irregular controlled rhythm, lungs clear no edema in the left lower extremity, adequate pulses no clubbing or cyanosis        Diet: Cardiac Diets; Healthy Heart (2-3 Na+); Texture: Regular Texture (IDDSI 7); Fluid Consistency: Thin (IDDSI 0)           LABS:      Lab Results (last 48 hours)         Procedure Component Value Units Date/Time     POC Glucose Once [[id number]]  (Abnormal) Collected: 08/05/23 0554     Specimen: Blood Updated: 08/05/23 0600       Glucose 136 mg/dL         Comment: Meter: [id number] : [id number] Andreina Crystal        POC Glucose Once [[id number]]  (Abnormal) Collected: 08/04/23 1551     Specimen: Blood Updated: 08/04/23 1558       Glucose 175 mg/dL         Comment: Meter: [id number] : [id number] emiliano bailey        POC Glucose Once [[id number]]  (Abnormal) Collected: 08/04/23 1126     Specimen: Blood Updated: 08/04/23 1132       Glucose 138 mg/dL         Comment: Meter: [id number] : [id number] CHAYA FIERRO        POC Glucose Once [[id number]]  (Abnormal) Collected: 08/04/23 0620     Specimen: Blood Updated: 08/04/23 0626       Glucose 136 mg/dL         Comment: Meter: [id number] : [id number] SILVANO STACY        POC Glucose Once [[id number]]  (Normal) Collected: 08/03/23 1619     Specimen: Blood Updated: 08/03/23 1632       Glucose 126 mg/dL          "Comment: Meter: [id number] : [id number] emiliano bailey                         Radiology:     Imaging Results (Last 72 Hours)         ** No results found for the last 72 hours. **                      Assessment/Plan:   Status post CVA with dense left residual.  Patient is on Xarelto for further stroke prevention.  Patient is working with physical and Occupational Therapy.  Patient requires maximal assistance for transfers and bed mobility.  In the parallel bars however he is min assist for sit to stand and stand to sit.  He walked 2 feet x 2 in the parallel bars but was dependent to move left lower extremity.  As far as safety issues from a physical therapy standpoint patient continues to have left inattention and needs repeated cues to protect or move the left extremities.  It was felt that he did not progress to a lot from the last weekly progress.  He can take a few steps in the parallel bars but requires max assist x2 and would not currently be a functionally ambulatory at home.  With his left inattention and left lower extremity basically flaccid he needs repeated cues to correct balance and posterior.  As far as discharge goes if family cannot take care of him may have to consider SNF.  Patient is really not wanting to do this.  With OT, he is mod to max bathing, min upper body dressing, max lower body dressing, min assist for grooming, total assist toileting hygiene and set up for self-feeding.     Atrial fibrillation, rate controlled, on Xarelto for further stroke prevention.     Left leg \"jerks\".  Exact etiology is uncertain, these may be muscle spasms but this did wake him up, cannot rule out the possibility of a partial seizure considering his history of CVA.  EEG ordered     Constipation, patient did have a BM yesterday.     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     HFpEF, controlled.  Euvolemic " "currently.     DVT prophylaxis, Xarelto will serve for this     MD Kelly Haney Karl F, MD   Physician  Medicine     Progress Notes      Addendum     Date of Service: 08/05/23 1114  Creation Time: 08/05/23 1114       Assisted By: Maurisio CHI     CC: Follow-up on CVA     Interview History/HPI: Patient is without new complaints except occasionally he says his left leg \"jerks\".  He states this woke him up last night and lasted about an hour.  He states this is not the first time this is happened.  Apparently he did alert the nursing staff.              Current Hospital Meds:  allopurinol, 300 mg, Oral, Daily  atorvastatin, 80 mg, Oral, Daily  magnesium oxide, 400 mg, Oral, Daily  metoprolol tartrate, 25 mg, Oral, Q12H  nystatin, , Topical, Q12H  pantoprazole, 40 mg, Oral, Q AM  polyethylene glycol, 17 g, Oral, Daily  rivaroxaban, 20 mg, Oral, Daily With Dinner  tamsulosin, 0.4 mg, Oral, Daily            Vitals:     08/05/23 0700   BP: 109/65   Pulse: 85   Resp: 20   Temp: 98.2 øF (36.8 øC)   SpO2: 95%            Intake/Output Summary (Last 24 hours) at 8/5/2023 1114  Last data filed at 8/5/2023 0900      Gross per 24 hour   Intake 720 ml   Output --   Net 720 ml         EXAM: Left arm strength is the same weaker than the right, still overall flaccid with minimal hip movement left lower extremity.  Mood is good skin warm and dry heart is an irregular irregular controlled rhythm, lungs clear no edema in the left lower extremity, adequate pulses no clubbing or cyanosis        Diet: Cardiac Diets; Healthy Heart (2-3 Na+); Texture: Regular Texture (IDDSI 7); Fluid Consistency: Thin (IDDSI 0)           LABS:      Lab Results (last 48 hours)         Procedure Component Value Units Date/Time     POC Glucose Once [[id number]]  (Abnormal) Collected: 08/05/23 0554     Specimen: Blood Updated: 08/05/23 0600       Glucose 136 mg/dL         Comment: Meter: [id number] : [id number] Andreina Crystal        POC " Glucose Once [[id number]]  (Abnormal) Collected: 08/04/23 1551     Specimen: Blood Updated: 08/04/23 1558       Glucose 175 mg/dL         Comment: Meter: [id number] : [id number] emiliano leo        POC Glucose Once [[id number]]  (Abnormal) Collected: 08/04/23 1126     Specimen: Blood Updated: 08/04/23 1132       Glucose 138 mg/dL         Comment: Meter: [id number] : [id number] CHAYA FIERRO        POC Glucose Once [[id number]]  (Abnormal) Collected: 08/04/23 0620     Specimen: Blood Updated: 08/04/23 0626       Glucose 136 mg/dL         Comment: Meter: [id number] : [id number] SILVANO STACY        POC Glucose Once [[id number]]  (Normal) Collected: 08/03/23 1619     Specimen: Blood Updated: 08/03/23 1632       Glucose 126 mg/dL         Comment: Meter: [id number] : [id number] emiliano bailey                         Radiology:     Imaging Results (Last 72 Hours)         ** No results found for the last 72 hours. **                      Assessment/Plan:   Status post CVA with dense left residual.  Patient is on Xarelto for further stroke prevention.  Patient is working with physical and Occupational Therapy.  Patient requires maximal assistance for transfers and bed mobility.  In the parallel bars however he is min assist for sit to stand and stand to sit.  He walked 2 feet x 2 in the parallel bars but was dependent to move left lower extremity.  As far as safety issues from a physical therapy standpoint patient continues to have left inattention and needs repeated cues to protect or move the left extremities.  It was felt that he did not progress to a lot from the last weekly progress.  He can take a few steps in the parallel bars but requires max assist x2 and would not currently be a functionally ambulatory at home.  With his left inattention and left lower extremity basically flaccid he needs repeated cues to correct balance and posterior.  As far as discharge goes if family cannot take care of him  "may have to consider SNF.  Patient is really not wanting to do this.  With OT, he is mod to max bathing, min upper body dressing, max lower body dressing, min assist for grooming, total assist toileting hygiene and set up for self-feeding.     Atrial fibrillation, rate controlled, on Xarelto for further stroke prevention.     Left leg \"jerks\".  Exact etiology is uncertain, these may be muscle spasms but this did wake him up, cannot rule out the possibility of a partial seizure considering his history of CVA.  EEG ordered     Constipation, patient did have a BM yesterday.     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     HFpEF, controlled.  Euvolemic currently.     DVT prophylaxis, Xarelto will serve for this     MD Jose Haney, Joslyn Maza, PT   Physical Therapist  Physical Therapy     Therapy Re-Evaluation      Signed     Date of Service: 23  Creation Time: 23     Signed       Expand All Collapse All    Inpatient Rehabilitation - Physical Therapy Re-Evaluation                                                               Ronnie     Patient Name: Antonio Canseco                  : 1955                        MRN: [id number]     Today's Date: 2023                                                                                              Admit Date: 2023                 Visit Dx:   Visit Diagnosis   No diagnosis found.        Problem List       Patient Active Problem List   Diagnosis    Detrusor instability    Low testosterone in male    Disorder of prostate    Corporo-venous occlusive erectile dysfunction    CVA (cerebral vascular accident)            Medical History        Past Medical History:   Diagnosis Date    Diabetes mellitus      Hypertension      Stroke              Surgical History         Past Surgical History:   Procedure Laterality Date    " US GUIDED LYMPH NODE BIOPSY   6/23/2023            PT ASSESSMENT (last 12 hours)            IRF PT Evaluation and Treatment         Row Name 08/04/23 1508                 PT Time and Intention     Document Type re-evaluation;daily treatment  -       Mode of Treatment individual therapy;physical therapy  -          Row Name 08/04/23 1508                 General Information     Existing Precautions/Restrictions fall  L HP, <trunk support/balance, L side inattention  -LB          Row Name 08/04/23 1508                 Pain Scale: FACES Pre/Post-Treatment     Pain: FACES Scale, Pretreatment 0-->no hurt  -LB       Posttreatment Pain Rating 0-->no hurt  -LB          Row Name 08/04/23 1508                 Cognition/Psychosocial     Affect/Mental Status (Cognition) WFL  -LB       Follows Commands (Cognition) verbal cues/prompting required;physical/tactile prompts required  -LB       Personal Safety Interventions gait belt;nonskid shoes/slippers when out of bed;supervised activity  -          Row Name 08/04/23 1508                 Bed Mobility     Supine-Sit [place]ison (Bed Mobility) maximum assist (25% patient effort);verbal cues;nonverbal cues (demo/gesture)  -          Row Name 08/04/23 1508                 Bed-Chair Transfer     Bed-Chair [place] (Transfers) maximum assist (25% patient effort);2 person assist;verbal cues;nonverbal cues (demo/gesture)  -[facility] Name 08/04/23 1508                 Sit-Stand Transfer     Sit-Stand [place]ison (Transfers) verbal cues;nonverbal cues (demo/gesture);minimum assist (75% patient effort)  -LB       Assistive Device (Sit-Stand Transfers) parallel bars  -          Row Name 08/04/23 1508                 Stand-Sit Transfer     Stand-Sit [place]ison (Transfers) verbal cues;nonverbal cues (demo/gesture);minimum assist (75% patient effort)  -LB       Assistive Device (Stand-Sit Transfers) parallel bars  -          Row Name 08/04/23 1503                  Gait/Stairs (Locomotion)     [place] Level (Gait) maximum assist (25% patient effort);2 person assist;verbal cues;nonverbal cues (demo/gesture)  -LB       Assistive Device (Gait) parallel bars  -LB       Distance in Feet (Gait) 2' x2  -LB       Pattern (Gait) --  dependent to move LLE forward and position  -LB       Deviations/Abnormal Patterns (Gait) --  repeated cues for upright posture, weight shifting, hand placement on PB, and gait pattern. he is not a functional ambulator.  -LB          Row Name 08/04/23 1508                 Safety Issues, Functional Mobility     Safety Issues Affecting Function (Mobility) awareness of need for assistance;insight into deficits/self-awareness;safety precautions follow-through/compliance  he continues to have L inattention and needs repeated cues to protect or move L extremities  -LB       Impairments Affecting Function (Mobility) balance;coordination;endurance/activity tolerance;grasp;motor control;muscle tone abnormal;postural/trunk control;range of motion (ROM);strength  -LB          Row Name 08/04/23 1508                 Balance     Static Sitting Balance minimal assist  he leans to L side  -LB       Static Standing Balance --  min A in PB, needs repeated cues for upright posture and weight shifting  -LB       Comment, Balance sitting in w/c for bag toss and  with reacher using RUE  -LB          Row Name 08/04/23 1508                 Motor Skills     Therapeutic Exercise --  sitting ex-RLE AORM, LLE PROM  -LB          Row Name 08/04/23 1508                 Aerobic Exercise     Type (Aerobic Exercise) recumbent stationary bike  -LB       Time Performed (Aerobic Exercise) level 1.0, 17 min. strap needed for L foot  -LB          Row Name 08/04/23 1508                 Malian Electrical Stimulation Treatment     Location 1 (Russian E-Stim Treatment) --  L quad  -LB       Location 2 (Russian E-Stim Treatment) --  L ant tib  -LB       Indications (Malian E-Stim  Treatment) enhance muscle contraction  -LB       Treatment Details (Russian Electrical Stimulation Treatment) 20 min, 10 sec on/20 sec off, small contraction noted at L ant tib  -LB       Response to Treatment (Russian E-Stim Treatment) muscle contraction enhanced  -LB          Row Name 08/04/23 1508                 Positioning and Restraints     Pre-Treatment Position in bed  -LB       In Wheelchair with OT  -LB          Row Name 08/04/23 1508                 Weekly Progress Summary (PT)     Functional Goal Overall Progress (PT) progressing toward functional goals slower than expected  -LB       Weekly Progress Summary (PT) he has maintained progress since last re-eval but has not improved significantly since. on occasion he can take a few steps in the PB but he requires max A x2 and would not currently be a functional ambulator at home. he continues to have L inattention, LLE is basically flaccid, and he needs repeated cues to correct balance and posture so carryover is decreased.  -LB       Impairments Still Limiting Function (PT) balance impairment;coordination impairment;functional activity tolerance impairment;motor control impaired;muscle tone abnormality;postural control impaired;sensory impairment;strength deficit  -LB       Recommendations (PT) continue PT  -LB       Plan for Continued Service After Discharge (PT) plan for d/c next week. family has observed him in therapy. if they cannot meet his needs he may need SNF.  -LB          Row Name 08/04/23 1508                 Bed Mobility Goal 1 (PT-IRF)     Progress/Outcomes (Bed Mobility Goal 1, PT-IRF) goal ongoing  -LB          Row Name 08/04/23 1508                 Bed Mobility Goal 2 (PT-IRF)     Progress/Outcomes (Bed Mobility Goal 2, PT-IRF) goal ongoing  -LB          Row Name 08/04/23 1508                 Transfer Goal 1 (PT-IRF)     Progress/Outcomes (Transfer Goal 1, PT-IRF) goal ongoing  -LB          Row Name 08/04/23 1508                 Transfer  Goal 2 (PT-IRF)     Progress/Outcomes (Transfer Goal 2, PT-IRF) goal ongoing  -LB          Row Name 08/04/23 1508                 Gait/Walking Locomotion Goal 1 (PT-IRF)     Progress/Outcomes (Gait/Walking Locomotion Goal 1, PT-IRF) goal ongoing  -LB          Row Name 08/04/23 1508                 Gait/Walking Locomotion Goal 2 (PT-IRF)     Progress/Outcomes (Gait/Walking Locomotion Goal 2, PT-IRF) goal ongoing  -LB                    User Key  (r) = Recorded By, (t) = Taken By, (c) = Cosigned By        Initials Name Provider Type     Joslyn Beckford, PT Physical Therapist                               PT Recommendation and Plan     Planned Therapy Interventions (PT): balance training, bed mobility training, gait training, motor coordination training, neuromuscular re-education, patient/family education, postural re-education, ROM (range of motion), strengthening, transfer training, wheelchair management/propulsion training  Frequency of Treatment (PT): 5 times per week  Anticipated Equipment Needs at Discharge (PT Eval):  (tbd)  Daily Progress Summary (PT)  Daily Progress Summary (PT): he had more L neglect and <balance due to pushing. he is not currently able to correct and requires supervision in w/c for safety.  Recommendations (PT): continue PT           Time Calculation:        PT Charges         Row Name 08/04/23 1521                       Time Calculation     Start Time 0830  -LB         Stop Time 1000  -LB         Time Calculation (min) 90 min  -LB         PT Received On 08/04/23  -LB         PT Goal Re-Cert Due Date 08/11/23  -LB                        Lisa Sanchez, CORY   Occupational Therapist  Occupational Therapy     Therapy Treatment Note      Signed     Date of Service: 08/04/23 1228  Creation Time: 08/04/23 1228     Signed       Expand All Collapse All    Inpatient Rehabilitation - Occupational Therapy Progress Note and Treatment Note     THALIA Trujillo     Patient Name: Antonio GLEZ Ajith                   : 1955                        MRN: [id number]     Today's Date: 2023                                                                               Admit Date: 2023        Visit Diagnosis   No diagnosis found.        Problem List       Patient Active Problem List   Diagnosis    Detrusor instability    Low testosterone in male    Disorder of prostate    Corporo-venous occlusive erectile dysfunction    CVA (cerebral vascular accident)            Medical History        Past Medical History:   Diagnosis Date    Diabetes mellitus      Hypertension      Stroke              Surgical History         Past Surgical History:   Procedure Laterality Date    US GUIDED LYMPH NODE BIOPSY   2023                           IRF OT ASSESSMENT FLOWSHEET (last 12 hours)            IRF OT Evaluation and Treatment         Row Name 23 1221                 OT Time and Intention     Document Type daily treatment;progress note  -BF       Mode of Treatment occupational therapy  -BF       Patient Effort good  -BF       Symptoms Noted During/After Treatment fatigue  Pt c/o fatigue this date, vitals WNL  -BF          Row Name 23 1221                 General Information     Existing Precautions/Restrictions fall  L HP, <trunk support/balance, L side inattention  -BF       Limitations/Impairments safety/cognitive  -BF          Row Name 23 1221                 Cognition/Psychosocial     Orientation Status (Cognition) oriented x 3  -BF       Follows Commands (Cognition) follows one-step commands;verbal cues/prompting required;repetition of directions required;physical/tactile prompts required;delayed response/completion;increased processing time needed  -BF          Row Name 23 1221                 Bathing     Comment (Bathing) Mod/Max A  -BF          Row Name 23 1221                 Upper Body Dressing     Comment (Upper Body Dressing) Min A  -BF          Row Name 23 1221                  Lower Body Dressing     Comment (Lower Body Dressing) Max A  -BF          Row Name 08/04/23 1221                 Grooming     [place] Level (Grooming) minimum assist (75% patient effort)  -BF       Position (Grooming) supported sitting  -BF       Comment (Grooming) Min A  -BF          Row Name 08/04/23 1221                 Toileting     Comment (Toileting) Total A  -BF          Row Name 08/04/23 1221                 Self-Feeding     Comment (Self-Feeding) Set-up  -BF          Row Name 08/04/23 1221                 Motor Skills     Motor Control/Coordination Interventions fine motor manipulation/dexterity activities;gross motor coordination activities;therapeutic exercise/ROM  LUE GMC/FMC theract, strengthening; BUE pulleys, rickshaw 25 lbs X10X3  -BF          Row Name 08/04/23 1221                 Vital Signs     Intra Systolic BP Rehab 101  -BF       Intra Treatment Diastolic BP 70  -BF                    User Key  (r) = Recorded By, (t) = Taken By, (c) = Cosigned By        Initials Name Effective Dates     BF Lisa Sanchez, OT 07/11/23 -                                   OT Recommendation and Plan     Planned Therapy Interventions (OT): activity tolerance training, adaptive equipment training, BADL retraining, neuromuscular control/coordination retraining, passive ROM/stretching, ROM/therapeutic exercise, strengthening exercise, transfer/mobility retraining     Time Calculation:        Time Calculation- OT         Row Name 08/04/23 1226                       Time Calculation- OT     OT Start Time 1000  -BF         OT Stop Time 1130  -BF         OT Time Calculation (min) 90 min  -BF         Total Timed Code Minutes- OT 90 minute(s)  -BF         OT Non-Billable Time (min) 10 min  -BF

## 2023-08-07 NOTE — PLAN OF CARE
Goal Outcome Evaluation:  Plan of Care Reviewed With: patient        Progress: improving  Outcome Evaluation: patient up with therapies this am. resting in bed at this time. continue plan of care.

## 2023-08-07 NOTE — SIGNIFICANT NOTE
08/07/23 6585   Plan   Plan Pt spoke to Dr. Jay about SNF placement and preference for [facility].  Left message for Cher at [facility] about bed availability [phone].  Will follow.

## 2023-08-07 NOTE — SIGNIFICANT NOTE
08/07/23 1410   Plan   Plan Faxed SNF referral to [facility] [phone].

## 2023-08-07 NOTE — SIGNIFICANT NOTE
08/07/23 1353   Plan   Plan Call received from Cher at [facility] who states not accepting [employer] Medicare insurance.

## 2023-08-07 NOTE — THERAPY TREATMENT NOTE
Inpatient Rehabilitation - Occupational Therapy Treatment Note     Ronnie     Patient Name: Antonio Canseco  : 1955  MRN: [id number]    Today's Date: 2023                 Admit Date: 2023       No diagnosis found.    Patient Active Problem List   Diagnosis    Detrusor instability    Low testosterone in male    Disorder of prostate    Corporo-venous occlusive erectile dysfunction    CVA (cerebral vascular accident)       Past Medical History:   Diagnosis Date    Diabetes mellitus     Hypertension     Stroke        Past Surgical History:   Procedure Laterality Date    US GUIDED LYMPH NODE BIOPSY  2023             IRF OT ASSESSMENT FLOWSHEET (last 12 hours)       IRF OT Evaluation and Treatment       Row Name 23 1526          OT Time and Intention    Document Type daily treatment  -BF     Mode of Treatment occupational therapy  -BF     Patient Effort good  -BF     Symptoms Noted During/After Treatment fatigue  -BF       Row Name 23 1526          General Information    Existing Precautions/Restrictions fall  L HP, <trunk support/balance, L side inattention  -BF     Limitations/Impairments safety/cognitive  -BF       Row Name 23 1526          Cognition/Psychosocial    Orientation Status (Cognition) oriented x 3  -BF     Follows Commands (Cognition) follows one-step commands;verbal cues/prompting required;repetition of directions required;physical/tactile prompts required;increased processing time needed;delayed response/completion  -BF       Row Name 23 1526          Lower Body Dressing    [place] Level (Lower Body Dressing) maximum assist (25% patient effort);verbal cues;nonverbal cues (demo/gesture)  -BF     Position (Lower Body Dressing) supine  -BF       Row Name 23 1526          Bed-Chair Transfer    Bed-Chair [place] (Transfers) maximum assist (25% patient effort);2 person assist;verbal cues;nonverbal cues (demo/gesture)  -BF     Assistive Device  (Bed-Chair Transfers) wheelchair  -BF       Row Name 08/07/23 1526          Chair-Bed Transfer    Chair-Bed [place] (Transfers) maximum assist (25% patient effort);2 person assist;verbal cues;nonverbal cues (demo/gesture)  -BF     Assistive Device (Chair-Bed Transfers) wheelchair  -BF       Row Name 08/07/23 1526          Motor Skills    Motor Control/Coordination Interventions fine motor manipulation/dexterity activities;gross motor coordination activities;neuro-muscular re-education;therapeutic exercise/ROM  LUE NDT, FMC/GMC therex, strengthening; BUE rickshaw 25 lbs X15X3, theraband therex  -BF       Row Name 08/07/23 1526          Positioning and Restraints    In Bed supine;call light within reach;encouraged to call for assist;LUE elevated  after both sessions  -BF               User Key  (r) = Recorded By, (t) = Taken By, (c) = Cosigned By      Initials Name Effective Dates    BF Lisa Sanchez, OT 07/11/23 -                      Occupational Therapy Education       Title: PT OT SLP Therapies (Done)       Topic: Occupational Therapy (Done)       Point: ADL training (Done)       Description:   Instruct learner(s) on proper safety adaptation and remediation techniques during self care or transfers.   Instruct in proper use of assistive devices.                  Learning Progress Summary             Patient Acceptance, E, VU,NR by BF at 8/7/2023 1526    Acceptance, E, VU,NR by BF at 8/4/2023 1221    Acceptance, E,TB, VU,NR by MB at 8/3/2023 2239    Acceptance, E, VU,NR by BF at 8/3/2023 1217    Acceptance, E, VU,NR by BF at 8/1/2023 1427    Acceptance, E, VU,NR by BF at 7/31/2023 1409    Acceptance, E, VU,NR by BF at 7/28/2023 1442    Acceptance, E, VU,NR by BF at 7/27/2023 1445    Acceptance, E, VU,NR by BF at 7/25/2023 1251    Acceptance, E, VU,NR by HB at 7/24/2023 1355    Acceptance, E, NR,VU by BF at 7/21/2023 1248    Acceptance, E, NR by BF at 7/20/2023 1259    Acceptance, TB, NR by DL at  7/19/2023 2036    Acceptance, E, VU,NR by HB at 7/19/2023 1421    Acceptance, E, VU,NR by BF at 7/18/2023 1334    Acceptance, E, VU,NR by BF at 7/17/2023 1431    Acceptance, E, VU,NR by BF at 7/17/2023 1430    Acceptance, E, VU,NR by BF at 7/14/2023 1508    Acceptance, E, VU,NR by BF at 7/13/2023 1343    Acceptance, E,D, VU,NR by TM at 7/12/2023 1407    Acceptance, D,E, NR,VU by TM at 7/11/2023 1434    Acceptance, E, VU,NR by BF at 7/10/2023 1456    Acceptance, E, VU,NR by HB at 7/8/2023 1140    Acceptance, E, VU,NR by BF at 7/7/2023 1442   Family Acceptance, E, VU,NR by BF at 8/1/2023 1427                         Point: Precautions (Done)       Description:   Instruct learner(s) on prescribed precautions during self-care and functional transfers.                  Learning Progress Summary             Patient Acceptance, E, VU,NR by BF at 8/7/2023 1526    Acceptance, E, VU,NR by BF at 8/4/2023 1221    Acceptance, E,TB, VU,NR by MB at 8/3/2023 2239    Acceptance, E, VU,NR by BF at 8/3/2023 1217    Acceptance, E, VU,NR by BF at 8/1/2023 1427    Acceptance, E, VU,NR by BF at 7/31/2023 1409    Acceptance, E, VU,NR by BF at 7/28/2023 1442    Acceptance, E, VU,NR by BF at 7/27/2023 1445    Acceptance, E, VU,NR by BF at 7/25/2023 1251    Acceptance, E, VU,NR by HB at 7/24/2023 1355    Acceptance, E, NR,VU by BF at 7/21/2023 1248    Acceptance, E, NR by BF at 7/20/2023 1259    Acceptance, TB, NR by DL at 7/19/2023 2036    Acceptance, E, VU,NR by HB at 7/19/2023 1421    Acceptance, E, VU,NR by BF at 7/18/2023 1334    Acceptance, E, VU,NR by BF at 7/17/2023 1431    Acceptance, E, VU,NR by BF at 7/17/2023 1430    Acceptance, E, VU,NR by BF at 7/14/2023 1508    Acceptance, E, VU,NR by BF at 7/13/2023 1343    Acceptance, E,D, VU,NR by TM at 7/12/2023 1407    Acceptance, D,E, NR,VU by TM at 7/11/2023 1434    Acceptance, E, VU,NR by BF at 7/10/2023 1456    Acceptance, E, VU,NR by HB at 7/8/2023 1140    Acceptance, E, VU,NR by BF  at 7/7/2023 1442   Family Acceptance, E, VU,NR by BF at 8/1/2023 1427                                         User Key       Initials Effective Dates Name Provider Type Discipline    BF 05/31/23 - 07/10/23 Lisa Sanchez, OT Occupational Therapist OT    BF 07/11/23 -  Lisa Sanchez, OT Occupational Therapist OT    TM 06/16/21 -  Marielos Capone OT Occupational Therapist OT    HB 05/25/21 -  Veronica Robles OT Occupational Therapist OT    DL 03/16/22 -  Nadja Velasquez, RN Registered Nurse Nurse    MB 06/26/23 -  Vinay Fritz, RN Registered Nurse Nurse                        OT Recommendation and Plan    Planned Therapy Interventions (OT): activity tolerance training, adaptive equipment training, BADL retraining, neuromuscular control/coordination retraining, passive ROM/stretching, ROM/therapeutic exercise, strengthening exercise, transfer/mobility retraining                    Time Calculation:      Time Calculation- OT       Row Name 08/07/23 1530 08/07/23 0915          Time Calculation- OT    OT Start Time 1330  -BF 0915  -BF     OT Stop Time 1430  -BF 0945  -BF     OT Time Calculation (min) 60 min  -BF 30 min  -BF     Total Timed Code Minutes- OT 60 minute(s)  -BF 30 minute(s)  -BF     OT Non-Billable Time (min) -- 10 min  -BF               User Key  (r) = Recorded By, (t) = Taken By, (c) = Cosigned By      Initials Name Provider Type    BF Emelyn Sanchezlanie Lima, OT Occupational Therapist                  Therapy Charges for Today       Code Description Service Date Service Provider Modifiers Qty    04097939562 HC OT SELF CARE/MGMT/TRAIN EA 15 MIN 8/7/2023 Lisa Sanchez OT GO 2    55278522461 HC OT NEUROMUSC RE EDUCATION EA 15 MIN 8/7/2023 Lisa Sanchez OT GO 2    19784709299 HC OT THER PROC EA 15 MIN 8/7/2023 Lisa Sanchez OT GO 2                     Lisa Sanchez, OT  8/7/2023

## 2023-08-07 NOTE — PROGRESS NOTES
[facility]  PROGRESS NOTE     Patient Identification:  Name:  Antonio Canseco  Age:  67 y.o.  Sex:  male  :  1955  MRN:  [id number]  Visit Number:  [id number]  ROOM: [facility]     Primary Care Provider:  Adriana Ledesma MD    Length of stay in inpatient status:  32    Subjective     Chief Compliant:  No chief complaint on file.      History of Presenting Illness: 67-year-old woman who is status post CVA with dense left residual hemiparesis.  Patient has a history of atrial fibrillation and has been having some myoclonic type jerks involving the left lower extremity.  Patient has history of lymphoma, constipation, CHF preserved EF as well.  Patient states he is doing reasonably well and apparently has some increased tone in the left lower extremity.  He does relate that if he wants to go to SNF he preferred to go to the nursing home near  His family    Objective     Current Hospital Meds:allopurinol, 300 mg, Oral, Daily  atorvastatin, 80 mg, Oral, Daily  magnesium oxide, 400 mg, Oral, Daily  metoprolol tartrate, 25 mg, Oral, Q12H  nystatin, , Topical, Q12H  pantoprazole, 40 mg, Oral, Q AM  polyethylene glycol, 17 g, Oral, Daily  rivaroxaban, 20 mg, Oral, Daily With Dinner  tamsulosin, 0.4 mg, Oral, Daily       ----------------------------------------------------------------------------------------------------------------------  Vital Signs:  Temp:  [97.5 øF (36.4 øC)-98.3 øF (36.8 øC)] 97.5 øF (36.4 øC)  Heart Rate:  [69-99] 99  Resp:  [20] 20  BP: (115-120)/(64-80) 120/80  SpO2:  [94 %-97 %] 94 %  on   ;   Device (Oxygen Therapy): room air  Body mass index is 40.57 kg/mý.    Wt Readings from Last 3 Encounters:   23 121 kg (266 lb 12.1 oz)   22 121 kg (267 lb)   07/15/19 121 kg (267 lb 1.6 oz)     Intake & Output (last 3 days)          0701  08 0700  0701   07 0701   07 0701   0700    P.O.  480    Total Intake(mL/kg) 422 (3) 232  (7.9) 1164 (9.6) 480 (4)    Net +720 +960 +1164 +480            Urine Unmeasured Occurrence 5 x 5 x 5 x 1 x    Stool Unmeasured Occurrence 1 x             Diet: Cardiac Diets; Healthy Heart (2-3 Na+); Texture: Regular Texture (IDDSI 7); Fluid Consistency: Thin (IDDSI 0)  ----------------------------------------------------------------------------------------------------------------------  Physical exam:  Constitutional: No acute distress  HEENT: Normocephalic atraumatic  Neck:   Supple  Cardiovascular: Irregular irregular, ventricular rate controlled  Pulmonary/Chest: Clear to auscultation  Abdominal: Positive bowel sounds soft.   Musculoskeletal: No obvious arthropathy  Neurological: 4 out of 5 strength in the left upper extremity; 2 out of 5 strength in the left lower extremity  Skin: No rash  Peripheral vascular: No edema  Genitourinary:  ----------------------------------------------------------------------------------------------------------------------    Last echocardiogram:    ----------------------------------------------------------------------------------------------------------------------  Results from last 7 days   Lab Units 08/03/23  0159   WBC 10*3/mm3 6.80   HEMOGLOBIN g/dL 10.5*   HEMATOCRIT % 35.0*   MCV fL 87.1   MCHC g/dL 30.0*   PLATELETS 10*3/mm3 274         Results from last 7 days   Lab Units 08/03/23  0159   SODIUM mmol/L 137   POTASSIUM mmol/L 4.4   MAGNESIUM mg/dL 1.9   CHLORIDE mmol/L 102   CO2 mmol/L 24.3   BUN mg/dL 11   CREATININE mg/dL 0.83   CALCIUM mg/dL 9.1   GLUCOSE mg/dL 112*   ALBUMIN g/dL 3.3*   BILIRUBIN mg/dL 0.4   ALK PHOS U/L 90   AST (SGOT) U/L 16   ALT (SGPT) U/L 14   Estimated Creatinine Clearance: 112.2 mL/min (by C-G formula based on SCr of 0.83 mg/dL).  No results found for: AMMONIA              Glucose   Date/Time Value Ref Range Status   08/07/2023 0607 122 70 - 130 mg/dL Final     Comment:     Meter: [id number] : [id number] Quang Neal   08/06/2023 1622  136 (H) 70 - 130 mg/dL Final     Comment:     Meter: [id number] : [id number] Vinny Lemus   08/06/2023 0625 148 (H) 70 - 130 mg/dL Final     Comment:     Meter: [id number] : [id number] Andreina Crystal   08/05/2023 1616 165 (H) 70 - 130 mg/dL Final     Comment:     Meter: [id number] : [id number] emiliano braunin   08/05/2023 0554 136 (H) 70 - 130 mg/dL Final     Comment:     Meter: [id number] : [id number] Andreina Crystal   08/04/2023 1551 175 (H) 70 - 130 mg/dL Final     Comment:     Meter: [id number] : [id number] emiliano leo   08/04/2023 1126 138 (H) 70 - 130 mg/dL Final     Comment:     Meter: [id number] : [id number] CHAYADANILO MANCINIFIERRO     No results found for: TSH, FREET4  No results found for: PREGTESTUR, PREGSERUM, HCG, HCGQUANT  Pain Management Panel           No data to display              Brief Urine Lab Results       None          No results found for: BLOODCX      No results found for: URINECX  No results found for: WOUNDCX  No results found for: STOOLCX        I have personally looked at the labs and they are summarized above.  ----------------------------------------------------------------------------------------------------------------------  Detailed radiology reports for the last 24 hours:    Imaging Results (Last 24 Hours)       ** No results found for the last 24 hours. **          Final impressions for the last 30 days of radiology reports:    No radiology results for the last 30 days.  I have personally looked at the radiology images and read the final radiology report.    Assessment & Plan    Status post CVA with dense left residual hemiparesis.  Patient currently on Xarelto for stroke prevention.   may require SNF placement for continued rehab efforts in the near future.  Patient requiring maximum assist for bed mobility; maximum assistance for bed to chair transfer.  Minimum assist for sit to stand stand to sit transfers.  Last week ambulated 2 feet x 2 for lumbars  with maximum two-person assistance.  Requires maximum assistance for lower body dressing; moderate to maximal assistance for bathing; minimum assist for upright dressing; minimum assist for grooming; total assist for toileting.    Myoclonic jerks of the left lower extremity--patient is scheduled for EEG today.    Atrial fibrillation continue Xarelto for stroke prevention.  Rate is controlled    Lymphoma recommend follow-up with oncology.    CHF preserved EF, compensated    VTE Prophylaxis:   Mechanical Order History:       None          Pharmalogical Order History:        Ordered     Dose Route Frequency Stop    07/06/23 6646  rivaroxaban (XARELTO) tablet 20 mg        Question:  Are you ordering rivaroxaban 10 mg for the prevention of blood clots in an acutely ill medical patient?  Answer:  No    20 mg PO Daily With Dinner --                        Sj Jay MD  [facility]  08/07/23  11:05 EDT

## 2023-08-07 NOTE — PLAN OF CARE
Goal Outcome Evaluation:  Plan of Care Reviewed With: patient        Progress: no change  Outcome Evaluation: Pt has rested in bed overnight. VSS. Denies current needs or issues. Will continue POC.

## 2023-08-07 NOTE — THERAPY TREATMENT NOTE
Inpatient Rehabilitation - Physical Therapy Treatment Note        Ronnie     Patient Name: Antonio Canseco  : 1955  MRN: [id number]    Today's Date: 2023                    Admit Date: 2023      Visit Dx:   No diagnosis found.    Patient Active Problem List   Diagnosis    Detrusor instability    Low testosterone in male    Disorder of prostate    Corporo-venous occlusive erectile dysfunction    CVA (cerebral vascular accident)       Past Medical History:   Diagnosis Date    Diabetes mellitus     Hypertension     Stroke        Past Surgical History:   Procedure Laterality Date    US GUIDED LYMPH NODE BIOPSY  2023       PT ASSESSMENT (last 12 hours)       [facility] PT Evaluation and Treatment       Row Name 23 1459          PT Time and Intention    Document Type daily treatment  -RG     Mode of Treatment individual therapy;physical therapy  -RG     Patient/Family/Caregiver Comments/Observations Pt and nursing in agreement for skilled PT on this date.  -RG       Row Name 23 1459          General Information    Existing Precautions/Restrictions fall  L HP, <trunk support/balance, L side inattention  -RG       Row Name 23 1459          Pain Scale: FACES Pre/Post-Treatment    Pain: FACES Scale, Pretreatment 0-->no hurt  -RG     Posttreatment Pain Rating 0-->no hurt  -RG       Row Name 23 1459          Cognition/Psychosocial    Affect/Mental Status (Cognition) WFL  -RG     Follows Commands (Cognition) verbal cues/prompting required;physical/tactile prompts required  -RG     Personal Safety Interventions gait belt;fall prevention program maintained;nonskid shoes/slippers when out of bed  -RG       Row Name 23 1459          Bed Mobility    Supine-Sit [place] (Bed Mobility) maximum assist (25% patient effort);verbal cues;nonverbal cues (demo/gesture)  -       Row Name 23 1459          Bed-Chair Transfer    Bed-Chair [place] (Transfers) maximum assist (25% patient  effort);2 person assist;verbal cues;nonverbal cues (demo/gesture)  -RG     Assistive Device (Bed-Chair Transfers) wheelchair  -RG       Row Name 08/07/23 1459          Sit-Stand Transfer    Sit-Stand [place] (Transfers) verbal cues;nonverbal cues (demo/gesture);minimum assist (75% patient effort)  -RG     Assistive Device (Sit-Stand Transfers) parallel bars  -RG       Row Name 08/07/23 1459          Stand-Sit Transfer    Stand-Sit [place] (Transfers) verbal cues;nonverbal cues (demo/gesture);minimum assist (75% patient effort)  -RG     Assistive Device (Stand-Sit Transfers) parallel bars  -RG       Row Name 08/07/23 1459          Gait/Stairs (Locomotion)    [place] Level (Gait) maximum assist (25% patient effort);2 person assist;verbal cues;nonverbal cues (demo/gesture)  -RG     Assistive Device (Gait) parallel bars  -RG     Distance in Feet (Gait) 8' x 4  -RG     Pattern (Gait) --  dependent to move LLE forward and position  -RG     Deviations/Abnormal Patterns (Gait) --  repeated cues for upright posture, weight shifting, hand placement on PB, and gait pattern. he is not a functional ambulator.  -RG     Comment, (Gait/Stairs) Cues required for weight shifting to R LE in order to advance L LE in // bars  -RG       Row Name 08/07/23 1459          Safety Issues, Functional Mobility    Impairments Affecting Function (Mobility) balance;coordination;endurance/activity tolerance;grasp;motor control;muscle tone abnormal;postural/trunk control;range of motion (ROM);strength  -RG       Row Name 08/07/23 1459          Hip (Therapeutic Exercise)    Hip Strengthening (Therapeutic Exercise) bilateral;flexion;aBduction;aDduction;marching while seated;sitting;2 lb free weight;resistance band;green;10 repetitions;2 sets  AAROM L LE  -RG       Row Name 08/07/23 1459          Knee (Therapeutic Exercise)    Knee Strengthening (Therapeutic Exercise) bilateral;flexion;extension;marching while seated;marching while  standing;LAQ (long arc quad);sitting;2 lb free weight;resistance band;green;10 repetitions;2 sets  AAROM L LE  -RG       Row Name 08/07/23 1459          Ankle (Therapeutic Exercise)    Ankle Strengthening (Therapeutic Exercise) bilateral;dorsiflexion;plantarflexion;sitting;2 lb free weight;resistance band;green;10 repetitions;2 sets  AAROM L LE  -RG       Row Name 08/07/23 1459          Positioning and Restraints    Pre-Treatment Position in bed  -RG     Post Treatment Position wheelchair  -RG     In Wheelchair notified nsg;sitting;with OT;legs elevated  -RG       Row Name 08/07/23 1459          Bed Mobility Goal 1 (PT-IRF)    Progress/Outcomes (Bed Mobility Goal 1, PT-IRF) goal ongoing  -RG       Row Name 08/07/23 1459          Bed Mobility Goal 2 (PT-IRF)    Progress/Outcomes (Bed Mobility Goal 2, PT-IRF) goal ongoing  -RG       Row Name 08/07/23 1459          Transfer Goal 1 (PT-IRF)    Progress/Outcomes (Transfer Goal 1, PT-IRF) goal ongoing  -RG       Row Name 08/07/23 1459          Transfer Goal 2 (PT-IRF)    Progress/Outcomes (Transfer Goal 2, PT-IRF) goal ongoing  -RG       Row Name 08/07/23 1459          Gait/Walking Locomotion Goal 1 (PT-IRF)    Progress/Outcomes (Gait/Walking Locomotion Goal 1, PT-IRF) goal ongoing  -RG       Row Name 08/07/23 1459          Gait/Walking Locomotion Goal 2 (PT-IRF)    Progress/Outcomes (Gait/Walking Locomotion Goal 2, PT-IRF) goal ongoing  -               User Key  (r) = Recorded By, (t) = Taken By, (c) = Cosigned By      Initials Name Provider Type    Vu Galloway PTA Physical Therapist Assistant                     Physical Therapy Education       Title: PT OT SLP Therapies (Done)       Topic: Physical Therapy (Done)       Point: Mobility training (Done)       Learning Progress Summary             Patient Acceptance, E,D, VU,NR by RG at 8/7/2023 1507    Acceptance, E, VU,NR by JOYA at 8/4/2023 1521    Acceptance, E,TB, VU,NR by MB at 8/3/2023 2239    Acceptance,  E,D, VU,NR by RG at 8/3/2023 1514    Acceptance, E,D, VU,NR by RG at 8/2/2023 1524    Acceptance, E,D, VU,NR by RG at 8/1/2023 1446    Acceptance, E,D, VU,NR by RG at 7/31/2023 1411    Acceptance, E,TB, VU by RM at 7/28/2023 1230    Acceptance, E,D, VU,NR by RG at 7/26/2023 1518    Acceptance, E, VU,NR by LB at 7/25/2023 1133    Acceptance, E, VU,NR by LB at 7/24/2023 1444    Acceptance, E, VU,NR by LB at 7/21/2023 1615    Acceptance, E,D, VU,NR by RG at 7/20/2023 1107    Acceptance, TB, NR by DL at 7/19/2023 2036    Acceptance, E,D, VU,NR by RG at 7/19/2023 1508    Acceptance, E,D, VU,NR by RG at 7/18/2023 1520    Acceptance, E,D, VU,NR by RG at 7/17/2023 1522    Acceptance, E,D, VU,NR by LL at 7/13/2023 1358    Acceptance, E, VU,NR by LB at 7/12/2023 1144    Acceptance, E, VU,NR by LB at 7/11/2023 1426    Acceptance, E, VU,NR by LB at 7/10/2023 1452    Acceptance, E,D, VU,NR by LL at 7/8/2023 1228    Acceptance, E,D, VU,NR by LB at 7/7/2023 1449                         Point: Home exercise program (Done)       Learning Progress Summary             Patient Acceptance, E,D, VU,NR by RG at 8/7/2023 1507    Acceptance, E, VU,NR by LB at 8/4/2023 1521    Acceptance, E,TB, VU,NR by MB at 8/3/2023 2239    Acceptance, E,D, VU,NR by RG at 8/3/2023 1514    Acceptance, E,D, VU,NR by RG at 8/2/2023 1524    Acceptance, E,D, VU,NR by RG at 8/1/2023 1446    Acceptance, E,D, VU,NR by RG at 7/31/2023 1411    Acceptance, E,TB, VU by RM at 7/28/2023 1230    Acceptance, E,D, VU,NR by RG at 7/26/2023 1518    Acceptance, E, VU,NR by LB at 7/25/2023 1133    Acceptance, E, VU,NR by LB at 7/24/2023 1444    Acceptance, E, VU,NR by LB at 7/21/2023 1615    Acceptance, E,D, VU,NR by RG at 7/20/2023 1107    Acceptance, TB, NR by DL at 7/19/2023 2036    Acceptance, E,D, VU,NR by RG at 7/19/2023 1508    Acceptance, E,D, VU,NR by RG at 7/18/2023 1520    Acceptance, E,D, VU,NR by RG at 7/17/2023 1522    Acceptance, E,D, VU,NR by LL at 7/13/2023  1358    Acceptance, E, VU,NR by LB at 7/12/2023 1144    Acceptance, E, VU,NR by LB at 7/11/2023 1426    Acceptance, E, VU,NR by LB at 7/10/2023 1452    Acceptance, E,D, VU,NR by LL at 7/8/2023 1228    Acceptance, E,D, VU,NR by LB at 7/7/2023 1449                         Point: Body mechanics (Done)       Learning Progress Summary             Patient Acceptance, E,D, VU,NR by RG at 8/7/2023 1507    Acceptance, E, VU,NR by LB at 8/4/2023 1521    Acceptance, E,TB, VU,NR by MB at 8/3/2023 2239    Acceptance, E,D, VU,NR by RG at 8/3/2023 1514    Acceptance, E,D, VU,NR by RG at 8/2/2023 1524    Acceptance, E,D, VU,NR by RG at 8/1/2023 1446    Acceptance, E,D, VU,NR by RG at 7/31/2023 1411    Acceptance, E,TB, VU by RM at 7/28/2023 1230    Acceptance, E,D, VU,NR by RG at 7/26/2023 1518    Acceptance, E, VU,NR by LB at 7/25/2023 1133    Acceptance, E, VU,NR by LB at 7/24/2023 1444    Acceptance, E, VU,NR by LB at 7/21/2023 1615    Acceptance, E,D, VU,NR by RG at 7/20/2023 1107    Acceptance, TB, NR by DL at 7/19/2023 2036    Acceptance, E,D, VU,NR by RG at 7/19/2023 1508    Acceptance, E,D, VU,NR by RG at 7/18/2023 1520    Acceptance, E,D, VU,NR by RG at 7/17/2023 1522    Acceptance, E,D, VU,NR by LL at 7/13/2023 1358    Acceptance, E, VU,NR by LB at 7/12/2023 1144    Acceptance, E, VU,NR by LB at 7/11/2023 1426    Acceptance, E, VU,NR by LB at 7/10/2023 1452    Acceptance, E,D, VU,NR by LL at 7/8/2023 1228    Acceptance, E,D, VU,NR by LB at 7/7/2023 1449                         Point: Precautions (Done)       Learning Progress Summary             Patient Acceptance, E,D, VU,NR by RG at 8/7/2023 1507    Acceptance, E, VU,NR by LB at 8/4/2023 1521    Acceptance, E,TB, VU,NR by MB at 8/3/2023 2239    Acceptance, E,D, VU,NR by RG at 8/3/2023 1514    Acceptance, E,D, VU,NR by RG at 8/2/2023 1524    Acceptance, E,D, VU,NR by RG at 8/1/2023 1446    Acceptance, E,D, VU,NR by RG at 7/31/2023 1411    Acceptance, E,TB, VU by RM at  7/28/2023 1230    Acceptance, E,D, VU,NR by RG at 7/26/2023 1518    Acceptance, E, VU,NR by LB at 7/25/2023 1133    Acceptance, E, VU,NR by LB at 7/24/2023 1444    Acceptance, E, VU,NR by LB at 7/21/2023 1615    Acceptance, E,D, VU,NR by RG at 7/20/2023 1107    Acceptance, TB, NR by DL at 7/19/2023 2036    Acceptance, E,D, VU,NR by RG at 7/19/2023 1508    Acceptance, E,D, VU,NR by RG at 7/18/2023 1520    Acceptance, E,D, VU,NR by RG at 7/17/2023 1522    Acceptance, E,D, VU,NR by LL at 7/13/2023 1358    Acceptance, E, VU,NR by LB at 7/12/2023 1144    Acceptance, E, VU,NR by LB at 7/11/2023 1426    Acceptance, E, VU,NR by LB at 7/10/2023 1452    Acceptance, E,D, VU,NR by LL at 7/8/2023 1228    Acceptance, E,D, VU,NR by LB at 7/7/2023 1449                                         User Key       Initials Effective Dates Name Provider Type Discipline    LB 06/16/21 -  Joslyn Garcia, PT Physical Therapist PT    LL 05/02/16 -  Connie Velasquez, PTA Physical Therapist Assistant PT    RM 02/17/23 -  Shelley Hanson, PTA Physical Therapist Assistant PT    RG 06/16/21 -  Vu Brown, JILL Physical Therapist Assistant PT    DL 03/16/22 -  Nadja Velasquez, RN Registered Nurse Nurse    MB 06/26/23 -  Vinay Fritz, RN Registered Nurse Nurse                    PT Recommendation and Plan          Daily Progress Summary (PT)  Impairments Still Limiting Function (PT): balance impairment, coordination impairment, functional activity tolerance impairment, motor control impaired, pain, postural control impaired, strength deficit, sensory impairment               Time Calculation:      PT Charges       Row Name 08/07/23 1508             Time Calculation    Start Time 0745  -RG      Stop Time 0915  -RG      Time Calculation (min) 90 min  -RG      PT Received On 08/07/23  -RG         Time Calculation- PT    Total Timed Code Minutes- PT 90 minute(s)  -RG                User Key  (r) = Recorded By, (t) = Taken By, (c) =  Cosigned By      Initials Name Provider Type     Vu Brown PTA Physical Therapist Assistant                    Therapy Charges for Today       Code Description Service Date Service Provider Modifiers Qty    70542864505 HC GAIT TRAINING EA 15 MIN 8/7/2023 Vu Brown PTA GP, CQ 1    69218197111 HC PT THERAPEUTIC ACT EA 15 MIN 8/7/2023 Vu Brown PTA GP, CQ 2    30369526084 HC PT THER PROC EA 15 MIN 8/7/2023 Vu Brown PTA GP, CQ 3              PT G-Codes  AM-PAC 6 Clicks Score (PT): 12      Vu Brown PTA  8/7/2023

## 2023-08-08 LAB
GLUCOSE BLDC GLUCOMTR-MCNC: 118 MG/DL (ref 70–130)
GLUCOSE BLDC GLUCOMTR-MCNC: 118 MG/DL (ref 70–130)

## 2023-08-08 PROCEDURE — 99231 SBSQ HOSP IP/OBS SF/LOW 25: CPT | Performed by: FAMILY MEDICINE

## 2023-08-08 PROCEDURE — 97530 THERAPEUTIC ACTIVITIES: CPT

## 2023-08-08 PROCEDURE — 97110 THERAPEUTIC EXERCISES: CPT

## 2023-08-08 PROCEDURE — 97535 SELF CARE MNGMENT TRAINING: CPT | Performed by: OCCUPATIONAL THERAPIST

## 2023-08-08 PROCEDURE — 97530 THERAPEUTIC ACTIVITIES: CPT | Performed by: OCCUPATIONAL THERAPIST

## 2023-08-08 PROCEDURE — 82948 REAGENT STRIP/BLOOD GLUCOSE: CPT

## 2023-08-08 PROCEDURE — 97112 NEUROMUSCULAR REEDUCATION: CPT | Performed by: OCCUPATIONAL THERAPIST

## 2023-08-08 PROCEDURE — 97116 GAIT TRAINING THERAPY: CPT

## 2023-08-08 PROCEDURE — 97110 THERAPEUTIC EXERCISES: CPT | Performed by: OCCUPATIONAL THERAPIST

## 2023-08-08 RX ADMIN — METOPROLOL TARTRATE 25 MG: 25 TABLET, FILM COATED ORAL at 09:37

## 2023-08-08 RX ADMIN — BISACODYL 10 MG: 5 TABLET, COATED ORAL at 17:36

## 2023-08-08 RX ADMIN — NYSTATIN: 100000 POWDER TOPICAL at 09:41

## 2023-08-08 RX ADMIN — METOPROLOL TARTRATE 25 MG: 25 TABLET, FILM COATED ORAL at 20:14

## 2023-08-08 RX ADMIN — RIVAROXABAN 20 MG: 20 TABLET, FILM COATED ORAL at 17:36

## 2023-08-08 RX ADMIN — ALLOPURINOL 300 MG: 300 TABLET ORAL at 09:37

## 2023-08-08 RX ADMIN — ATORVASTATIN CALCIUM 80 MG: 40 TABLET, FILM COATED ORAL at 09:37

## 2023-08-08 RX ADMIN — NYSTATIN: 100000 POWDER TOPICAL at 21:00

## 2023-08-08 RX ADMIN — PANTOPRAZOLE SODIUM 40 MG: 40 TABLET, DELAYED RELEASE ORAL at 06:06

## 2023-08-08 RX ADMIN — POLYETHYLENE GLYCOL (3350) 17 G: 17 POWDER, FOR SOLUTION ORAL at 09:37

## 2023-08-08 RX ADMIN — TAMSULOSIN HYDROCHLORIDE 0.4 MG: 0.4 CAPSULE ORAL at 09:37

## 2023-08-08 RX ADMIN — HYDROXYZINE HYDROCHLORIDE 25 MG: 25 TABLET, FILM COATED ORAL at 21:20

## 2023-08-08 RX ADMIN — MAGNESIUM GLUCONATE 500 MG ORAL TABLET 400 MG: 500 TABLET ORAL at 09:37

## 2023-08-08 NOTE — PLAN OF CARE
Goal Outcome Evaluation:              Outcome Evaluation: Pt has been resting in bed thorugh the night. No complaints from the pt tonight, no acute changes or concerns at this time.

## 2023-08-08 NOTE — SIGNIFICANT NOTE
08/08/23 1450   Plan   Plan Spoke to Idalia at [facility] about SNF referral and the reviewer has question about plan for laryngeal CA.  Notified her that he has an appt to see Dr. Gregory in [place] as an outpatient on 08/30 @ 1:30pm.

## 2023-08-08 NOTE — THERAPY TREATMENT NOTE
Inpatient Rehabilitation - Physical Therapy Treatment Note        Ronnie     Patient Name: Antonio Canseco  : 1955  MRN: [id number]    Today's Date: 2023                    Admit Date: 2023      Visit Dx:   No diagnosis found.    Patient Active Problem List   Diagnosis    Detrusor instability    Low testosterone in male    Disorder of prostate    Corporo-venous occlusive erectile dysfunction    CVA (cerebral vascular accident)       Past Medical History:   Diagnosis Date    Diabetes mellitus     Hypertension     Stroke        Past Surgical History:   Procedure Laterality Date    US GUIDED LYMPH NODE BIOPSY  2023       PT ASSESSMENT (last 12 hours)       IRF PT Evaluation and Treatment       Row Name 23 140          PT Time and Intention    Document Type daily treatment  -RG     Mode of Treatment individual therapy;physical therapy  -RG     Patient/Family/Caregiver Comments/Observations Pt and nursing in agreement for skilled PT on this date.  -RG       Row Name 23 140          General Information    Existing Precautions/Restrictions fall  L HP, <trunk support/balance, L side inattention  -RG       Row Name 23 140          Pain Scale: FACES Pre/Post-Treatment    Pain: FACES Scale, Pretreatment 0-->no hurt  -RG     Posttreatment Pain Rating 0-->no hurt  -RG       Row Name 23 1401          Cognition/Psychosocial    Affect/Mental Status (Cognition) WFL  -RG     Follows Commands (Cognition) verbal cues/prompting required;physical/tactile prompts required  -RG     Personal Safety Interventions gait belt;fall prevention program maintained;nonskid shoes/slippers when out of bed  -RG       Row Name 23 140          Bed Mobility    Supine-Sit [place] (Bed Mobility) maximum assist (25% patient effort);verbal cues;nonverbal cues (demo/gesture)  -RG       Row Name 23 140          Bed-Chair Transfer    Bed-Chair [place] (Transfers) maximum assist (25% patient  effort);2 person assist;verbal cues;nonverbal cues (demo/gesture)  -RG     Assistive Device (Bed-Chair Transfers) wheelchair  -RG       Row Name 08/08/23 1401          Sit-Stand Transfer    Sit-Stand [place] (Transfers) verbal cues;nonverbal cues (demo/gesture);minimum assist (75% patient effort)  -RG     Assistive Device (Sit-Stand Transfers) parallel bars  -RG     Comment, (Sit-Stand Transfer) 5 reps x 3  -RG       Row Name 08/08/23 1401          Stand-Sit Transfer    Stand-Sit [place] (Transfers) verbal cues;nonverbal cues (demo/gesture);minimum assist (75% patient effort)  -RG     Assistive Device (Stand-Sit Transfers) parallel bars  -RG       Row Name 08/08/23 1401          Gait/Stairs (Locomotion)    [place] Level (Gait) maximum assist (25% patient effort);verbal cues;nonverbal cues (demo/gesture)  x 3 assist.  2 to ambulate 1 to advance L LE.  -RG     Assistive Device (Gait) walker, rolling platform  -RG     Pattern (Gait) --  dependent to move LLE forward and position  -RG     Deviations/Abnormal Patterns (Gait) --  cues for weight shifting. he is not a functional ambulator.  -RG       Row Name 08/08/23 1401          Safety Issues, Functional Mobility    Impairments Affecting Function (Mobility) balance;coordination;endurance/activity tolerance;grasp;motor control;muscle tone abnormal;postural/trunk control;range of motion (ROM);strength  -RG       Row Name 08/08/23 1401          Hip (Therapeutic Exercise)    Hip Strengthening (Therapeutic Exercise) bilateral;flexion;aBduction;aDduction;marching while seated;sitting;2 lb free weight;resistance band;green;10 repetitions;2 sets  AAROM L LE  -RG       Row Name 08/08/23 1401          Knee (Therapeutic Exercise)    Knee Strengthening (Therapeutic Exercise) bilateral;flexion;extension;marching while seated;LAQ (long arc quad);sitting;2 lb free weight;resistance band;green;10 repetitions;2 sets  AAROM L LE  -RG       Row Name 08/08/23 1401           Ankle (Therapeutic Exercise)    Ankle Strengthening (Therapeutic Exercise) bilateral;dorsiflexion;plantarflexion;sitting;10 repetitions;2 sets  AAROM L LE  -RG       Row Name 08/08/23 1401          Positioning and Restraints    Pre-Treatment Position in bed  -RG     Post Treatment Position wheelchair  -RG     In Wheelchair notified nsg;sitting;with OT;legs elevated  -RG       Row Name 08/08/23 1401          Bed Mobility Goal 1 (PT-IRF)    Progress/Outcomes (Bed Mobility Goal 1, PT-IRF) goal ongoing  -RG       Row Name 08/08/23 1401          Bed Mobility Goal 2 (PT-IRF)    Progress/Outcomes (Bed Mobility Goal 2, PT-IRF) goal ongoing  -RG       Row Name 08/08/23 1401          Transfer Goal 1 (PT-IRF)    Progress/Outcomes (Transfer Goal 1, PT-IRF) goal ongoing  -RG       Row Name 08/08/23 1401          Transfer Goal 2 (PT-IRF)    Progress/Outcomes (Transfer Goal 2, PT-IRF) goal ongoing  -RG       Row Name 08/08/23 1401          Gait/Walking Locomotion Goal 1 (PT-IRF)    Progress/Outcomes (Gait/Walking Locomotion Goal 1, PT-IRF) goal ongoing  -RG       Row Name 08/08/23 1401          Gait/Walking Locomotion Goal 2 (PT-IRF)    Progress/Outcomes (Gait/Walking Locomotion Goal 2, PT-IRF) goal ongoing  -               User Key  (r) = Recorded By, (t) = Taken By, (c) = Cosigned By      Initials Name Provider Type    Vu Galloway PTA Physical Therapist Assistant                     Physical Therapy Education       Title: PT OT SLP Therapies (Done)       Topic: Physical Therapy (Done)       Point: Mobility training (Done)       Learning Progress Summary             Patient Acceptance, E,D, VU,NR by RG at 8/8/2023 1407    Acceptance, E,D, VU,NR by RG at 8/7/2023 1507    Acceptance, E, VU,NR by LB at 8/4/2023 1521    Acceptance, E,TB, VU,NR by MB at 8/3/2023 2239    Acceptance, E,D, VU,NR by RG at 8/3/2023 1514    Acceptance, E,D, VU,NR by RG at 8/2/2023 1524    Acceptance, E,D, VU,NR by RG at 8/1/2023 1446    Acceptance,  E,D, VU,NR by RG at 7/31/2023 1411    Acceptance, E,TB, VU by RM at 7/28/2023 1230    Acceptance, E,D, VU,NR by RG at 7/26/2023 1518    Acceptance, E, VU,NR by LB at 7/25/2023 1133    Acceptance, E, VU,NR by LB at 7/24/2023 1444    Acceptance, E, VU,NR by LB at 7/21/2023 1615    Acceptance, E,D, VU,NR by RG at 7/20/2023 1107    Acceptance, TB, NR by DL at 7/19/2023 2036    Acceptance, E,D, VU,NR by RG at 7/19/2023 1508    Acceptance, E,D, VU,NR by RG at 7/18/2023 1520    Acceptance, E,D, VU,NR by RG at 7/17/2023 1522    Acceptance, E,D, VU,NR by LL at 7/13/2023 1358    Acceptance, E, VU,NR by LB at 7/12/2023 1144    Acceptance, E, VU,NR by LB at 7/11/2023 1426    Acceptance, E, VU,NR by LB at 7/10/2023 1452    Acceptance, E,D, VU,NR by LL at 7/8/2023 1228    Acceptance, E,D, VU,NR by LB at 7/7/2023 1449                         Point: Home exercise program (Done)       Learning Progress Summary             Patient Acceptance, E,D, VU,NR by RG at 8/8/2023 1407    Acceptance, E,D, VU,NR by RG at 8/7/2023 1507    Acceptance, E, VU,NR by LB at 8/4/2023 1521    Acceptance, E,TB, VU,NR by MB at 8/3/2023 2239    Acceptance, E,D, VU,NR by RG at 8/3/2023 1514    Acceptance, E,D, VU,NR by RG at 8/2/2023 1524    Acceptance, E,D, VU,NR by RG at 8/1/2023 1446    Acceptance, E,D, VU,NR by RG at 7/31/2023 1411    Acceptance, E,TB, VU by RM at 7/28/2023 1230    Acceptance, E,D, VU,NR by RG at 7/26/2023 1518    Acceptance, E, VU,NR by LB at 7/25/2023 1133    Acceptance, E, VU,NR by LB at 7/24/2023 1444    Acceptance, E, VU,NR by LB at 7/21/2023 1615    Acceptance, E,D, VU,NR by RG at 7/20/2023 1107    Acceptance, TB, NR by DL at 7/19/2023 2036    Acceptance, E,D, VU,NR by RG at 7/19/2023 1508    Acceptance, E,D, VU,NR by RG at 7/18/2023 1520    Acceptance, E,D, VU,NR by RG at 7/17/2023 1522    Acceptance, E,D, VU,NR by LL at 7/13/2023 1358    Acceptance, E, VU,NR by LB at 7/12/2023 1144    Acceptance, E, VU,NR by LB at 7/11/2023 1426     Acceptance, E, VU,NR by LB at 7/10/2023 1452    Acceptance, E,D, VU,NR by LL at 7/8/2023 1228    Acceptance, E,D, VU,NR by LB at 7/7/2023 1449                         Point: Body mechanics (Done)       Learning Progress Summary             Patient Acceptance, E,D, VU,NR by RG at 8/8/2023 1407    Acceptance, E,D, VU,NR by RG at 8/7/2023 1507    Acceptance, E, VU,NR by LB at 8/4/2023 1521    Acceptance, E,TB, VU,NR by MB at 8/3/2023 2239    Acceptance, E,D, VU,NR by RG at 8/3/2023 1514    Acceptance, E,D, VU,NR by RG at 8/2/2023 1524    Acceptance, E,D, VU,NR by RG at 8/1/2023 1446    Acceptance, E,D, VU,NR by RG at 7/31/2023 1411    Acceptance, E,TB, VU by RM at 7/28/2023 1230    Acceptance, E,D, VU,NR by RG at 7/26/2023 1518    Acceptance, E, VU,NR by LB at 7/25/2023 1133    Acceptance, E, VU,NR by LB at 7/24/2023 1444    Acceptance, E, VU,NR by LB at 7/21/2023 1615    Acceptance, E,D, VU,NR by RG at 7/20/2023 1107    Acceptance, TB, NR by DL at 7/19/2023 2036    Acceptance, E,D, VU,NR by RG at 7/19/2023 1508    Acceptance, E,D, VU,NR by RG at 7/18/2023 1520    Acceptance, E,D, VU,NR by RG at 7/17/2023 1522    Acceptance, E,D, VU,NR by LL at 7/13/2023 1358    Acceptance, E, VU,NR by LB at 7/12/2023 1144    Acceptance, E, VU,NR by LB at 7/11/2023 1426    Acceptance, E, VU,NR by LB at 7/10/2023 1452    Acceptance, E,D, VU,NR by LL at 7/8/2023 1228    Acceptance, E,D, VU,NR by LB at 7/7/2023 1449                         Point: Precautions (Done)       Learning Progress Summary             Patient Acceptance, E,D, VU,NR by RG at 8/8/2023 1407    Acceptance, E,D, VU,NR by RG at 8/7/2023 1507    Acceptance, E, VU,NR by LB at 8/4/2023 1521    Acceptance, E,TB, VU,NR by MB at 8/3/2023 2239    Acceptance, E,D, VU,NR by RG at 8/3/2023 1514    Acceptance, E,D, VU,NR by RG at 8/2/2023 1524    Acceptance, E,D, VU,NR by RG at 8/1/2023 1446    Acceptance, E,D, VU,NR by RG at 7/31/2023 1411    Acceptance, E,TB, VU by RM at  7/28/2023 1230    Acceptance, E,D, VU,NR by RG at 7/26/2023 1518    Acceptance, E, VU,NR by LB at 7/25/2023 1133    Acceptance, E, VU,NR by LB at 7/24/2023 1444    Acceptance, E, VU,NR by LB at 7/21/2023 1615    Acceptance, E,D, VU,NR by RG at 7/20/2023 1107    Acceptance, TB, NR by DL at 7/19/2023 2036    Acceptance, E,D, VU,NR by RG at 7/19/2023 1508    Acceptance, E,D, VU,NR by RG at 7/18/2023 1520    Acceptance, E,D, VU,NR by RG at 7/17/2023 1522    Acceptance, E,D, VU,NR by LL at 7/13/2023 1358    Acceptance, E, VU,NR by LB at 7/12/2023 1144    Acceptance, E, VU,NR by LB at 7/11/2023 1426    Acceptance, E, VU,NR by LB at 7/10/2023 1452    Acceptance, E,D, VU,NR by LL at 7/8/2023 1228    Acceptance, E,D, VU,NR by LB at 7/7/2023 1449                                         User Key       Initials Effective Dates Name Provider Type Discipline    LB 06/16/21 -  Joslyn Garcia, PT Physical Therapist PT    LL 05/02/16 -  Connie Velasquez, PTA Physical Therapist Assistant PT    RM 02/17/23 -  Shelley Hanson, PTA Physical Therapist Assistant PT    RG 06/16/21 -  Vu Brown, JILL Physical Therapist Assistant PT    DL 03/16/22 -  Nadja Velasquez, RN Registered Nurse Nurse    MB 06/26/23 -  Vinay Fritz, RN Registered Nurse Nurse                    PT Recommendation and Plan          Daily Progress Summary (PT)  Impairments Still Limiting Function (PT): balance impairment, coordination impairment, functional activity tolerance impairment, motor control impaired, pain, postural control impaired, strength deficit, sensory impairment               Time Calculation:      PT Charges       Row Name 08/08/23 1407             Time Calculation    Start Time 0745  -RG      Stop Time 0915  -RG      Time Calculation (min) 90 min  -RG      PT Received On 08/08/23  -RG         Time Calculation- PT    Total Timed Code Minutes- PT 90 minute(s)  -RG                User Key  (r) = Recorded By, (t) = Taken By, (c) =  Cosigned By      Initials Name Provider Type    Vu Galloway PTA Physical Therapist Assistant                    Therapy Charges for Today       Code Description Service Date Service Provider Modifiers Qty    41270504312 HC GAIT TRAINING EA 15 MIN 8/7/2023 Vu Brown, JILL GP, CQ 1    99512724633 HC PT THERAPEUTIC ACT EA 15 MIN 8/7/2023 Vu Brown PTA GP, CQ 2    21760614955 HC PT THER PROC EA 15 MIN 8/7/2023 Vu Brown, JILL GP, CQ 3    72044463911 HC GAIT TRAINING EA 15 MIN 8/8/2023 Vu Brown PTA GP, CQ 1    10271332453 HC PT THERAPEUTIC ACT EA 15 MIN 8/8/2023 Vu Brown, JILL GP, CQ 2    45987847397 HC PT THER PROC EA 15 MIN 8/8/2023 Vu Brown, JILL GP, CQ 3              PT G-Codes  AM-PAC 6 Clicks Score (PT): 12      Vu Brown PTA  8/8/2023

## 2023-08-08 NOTE — THERAPY TREATMENT NOTE
Inpatient Rehabilitation - Occupational Therapy Treatment Note     Ronnie     Patient Name: Antonio Canseco  : 1955  MRN: [id number]    Today's Date: 2023                 Admit Date: 2023       No diagnosis found.    Patient Active Problem List   Diagnosis    Detrusor instability    Low testosterone in male    Disorder of prostate    Corporo-venous occlusive erectile dysfunction    CVA (cerebral vascular accident)       Past Medical History:   Diagnosis Date    Diabetes mellitus     Hypertension     Stroke        Past Surgical History:   Procedure Laterality Date    US GUIDED LYMPH NODE BIOPSY  2023             IRF OT ASSESSMENT FLOWSHEET (last 12 hours)       IRF OT Evaluation and Treatment       Row Name 23 1440          OT Time and Intention    Document Type daily treatment  -BF     Mode of Treatment occupational therapy  -BF     Patient Effort good  -BF     Symptoms Noted During/After Treatment fatigue  -BF       Row Name 23 1440          General Information    Existing Precautions/Restrictions fall  L HP, <trunk support/balance, L side inattention  -BF       Row Name 23 1440          Cognition/Psychosocial    Orientation Status (Cognition) oriented x 3  -BF     Follows Commands (Cognition) verbal cues/prompting required;repetition of directions required;physical/tactile prompts required;increased processing time needed;delayed response/completion  -BF       Row Name 23 1440          Upper Body Dressing    [place] Level (Upper Body Dressing) minimum assist (75% or more patient effort);verbal cues;nonverbal cues (demo/gesture)  -BF     Position (Upper Body Dressing) supported sitting  -BF     Comment (Upper Body Dressing) Min A  -BF       Row Name 23 1440          Bed-Chair Transfer    Bed-Chair [place] (Transfers) maximum assist (25% patient effort);2 person assist;verbal cues;nonverbal cues (demo/gesture)  -BF     Assistive Device (Bed-Chair  Transfers) wheelchair  -BF       Row Name 08/08/23 1440          Motor Skills    Motor Control/Coordination Interventions fine motor manipulation/dexterity activities;gross motor coordination activities;therapeutic exercise/ROM;neuro-muscular re-education  DIOMEDES MARTINT, [facility] theract, GLADYSE strengthening; OMAR menezes 25 lbs X15X3, PRE 10 mins, theraputty therex  -BF       Row Name 08/08/23 1440          Positioning and Restraints    In Bed supine;call light within reach;encouraged to call for assist;DIOMEDES elevated  -BF               User Key  (r) = Recorded By, (t) = Taken By, (c) = Cosigned By      Initials Name Effective Dates    BF Lisa Sanchez, OT 07/11/23 -                      Occupational Therapy Education       Title: PT OT SLP Therapies (Done)       Topic: Occupational Therapy (Done)       Point: ADL training (Done)       Description:   Instruct learner(s) on proper safety adaptation and remediation techniques during self care or transfers.   Instruct in proper use of assistive devices.                  Learning Progress Summary             Patient Acceptance, E, VU,NR by BF at 8/8/2023 1440    Acceptance, E, VU,NR by BF at 8/7/2023 1526    Acceptance, E, VU,NR by BF at 8/4/2023 1221    Acceptance, E,TB, VU,NR by MB at 8/3/2023 2239    Acceptance, E, VU,NR by BF at 8/3/2023 1217    Acceptance, E, VU,NR by BF at 8/1/2023 1427    Acceptance, E, VU,NR by BF at 7/31/2023 1409    Acceptance, E, VU,NR by BF at 7/28/2023 1442    Acceptance, E, VU,NR by BF at 7/27/2023 1445    Acceptance, E, VU,NR by BF at 7/25/2023 1251    Acceptance, E, VU,NR by HB at 7/24/2023 1355    Acceptance, E, NR,VU by BF at 7/21/2023 1248    Acceptance, E, NR by BF at 7/20/2023 1259    Acceptance, TB, NR by DL at 7/19/2023 2036    Acceptance, E, VU,NR by HB at 7/19/2023 1421    Acceptance, E, VU,NR by BF at 7/18/2023 1334    Acceptance, E, VU,NR by BF at 7/17/2023 1431    Acceptance, E, VU,NR by BF at 7/17/2023 1430    Acceptance, E,  VU,NR by BF at 7/14/2023 1508    Acceptance, E, VU,NR by BF at 7/13/2023 1343    Acceptance, E,D, VU,NR by TM at 7/12/2023 1407    Acceptance, D,E, NR,VU by TM at 7/11/2023 1434    Acceptance, E, VU,NR by BF at 7/10/2023 1456    Acceptance, E, VU,NR by HB at 7/8/2023 1140    Acceptance, E, VU,NR by BF at 7/7/2023 1442   Family Acceptance, E, VU,NR by BF at 8/1/2023 1427                         Point: Precautions (Done)       Description:   Instruct learner(s) on prescribed precautions during self-care and functional transfers.                  Learning Progress Summary             Patient Acceptance, E, VU,NR by BF at 8/8/2023 1440    Acceptance, E, VU,NR by BF at 8/7/2023 1526    Acceptance, E, VU,NR by BF at 8/4/2023 1221    Acceptance, E,TB, VU,NR by MB at 8/3/2023 2239    Acceptance, E, VU,NR by BF at 8/3/2023 1217    Acceptance, E, VU,NR by BF at 8/1/2023 1427    Acceptance, E, VU,NR by BF at 7/31/2023 1409    Acceptance, E, VU,NR by BF at 7/28/2023 1442    Acceptance, E, VU,NR by BF at 7/27/2023 1445    Acceptance, E, VU,NR by BF at 7/25/2023 1251    Acceptance, E, VU,NR by HB at 7/24/2023 1355    Acceptance, E, NR,VU by BF at 7/21/2023 1248    Acceptance, E, NR by BF at 7/20/2023 1259    Acceptance, TB, NR by DL at 7/19/2023 2036    Acceptance, E, VU,NR by HB at 7/19/2023 1421    Acceptance, E, VU,NR by BF at 7/18/2023 1334    Acceptance, E, VU,NR by BF at 7/17/2023 1431    Acceptance, E, VU,NR by BF at 7/17/2023 1430    Acceptance, E, VU,NR by BF at 7/14/2023 1508    Acceptance, E, VU,NR by BF at 7/13/2023 1343    Acceptance, E,D, VU,NR by TM at 7/12/2023 1407    Acceptance, D,E, NR,VU by TM at 7/11/2023 1434    Acceptance, E, VU,NR by BF at 7/10/2023 1456    Acceptance, E, VU,NR by HB at 7/8/2023 1140    Acceptance, E, VU,NR by BF at 7/7/2023 1442   Family Acceptance, E, VU,NR by BF at 8/1/2023 1427                                         User Key       Initials Effective Dates Name Provider Type Discipline     BF 05/31/23 - 07/10/23 SanchezLisa easley, OT Occupational Therapist OT    BF 07/11/23 -  SanchezLisa easley, OT Occupational Therapist OT    TM 06/16/21 -  Marielos Capone, OT Occupational Therapist OT    HB 05/25/21 -  Veronica Robles OT Occupational Therapist OT    DL 03/16/22 -  Nadja Velasquez, RN Registered Nurse Nurse    MB 06/26/23 -  Vinay Fritz RN Registered Nurse Nurse                        OT Recommendation and Plan    Planned Therapy Interventions (OT): activity tolerance training, adaptive equipment training, BADL retraining, neuromuscular control/coordination retraining, passive ROM/stretching, ROM/therapeutic exercise, strengthening exercise, transfer/mobility retraining                    Time Calculation:      Time Calculation- OT       Row Name 08/08/23 1443             Time Calculation- OT    OT Start Time 0915  -BF      OT Stop Time 1055  -BF      OT Time Calculation (min) 100 min  -BF      Total Timed Code Minutes-  minute(s)  -BF      OT Non-Billable Time (min) 10 min  -BF                User Key  (r) = Recorded By, (t) = Taken By, (c) = Cosigned By      Initials Name Provider Type    BF SanchezLisa easley, OT Occupational Therapist                  Therapy Charges for Today       Code Description Service Date Service Provider Modifiers Qty    87997343072 HC OT SELF CARE/MGMT/TRAIN EA 15 MIN 8/7/2023 DanielLisa, OT GO 2    20853372238 HC OT NEUROMUSC RE EDUCATION EA 15 MIN 8/7/2023 SanchezLisa easley OT GO 2    22467041273 HC OT THER PROC EA 15 MIN 8/7/2023 SanchezLisa easley, OT GO 2    31365861534 HC OT SELF CARE/MGMT/TRAIN EA 15 MIN 8/8/2023 SanchezLisa easley, OT GO 1    16669451935 HC OT THERAPEUTIC ACT EA 15 MIN 8/8/2023 SanchezLisa easley OT GO 1    51683336217 HC OT NEUROMUSC RE EDUCATION EA 15 MIN 8/8/2023 SanchezLisa easley, OT GO 3    70206263703 HC OT THER PROC EA 15 MIN 8/8/2023 Lisa Sanchez, OT GO 2                      Lisa Sanchez, OT  8/8/2023

## 2023-08-08 NOTE — SIGNIFICANT NOTE
08/08/23 1437   Plan   Plan Team conference was  held this morning and recommendations were made for SNF placement for continued Rehab.  Pt plans to go to SNF for short-term Rehab, and wants to return home eventually with family's assistance.  Referral for SNF was sent to [facility] on 08/09.  Will follow up with Idalia at [facility] about referral.

## 2023-08-08 NOTE — SIGNIFICANT NOTE
08/08/23 1450   Plan   Plan Spoke to Idalia at [facility] about SNF referral and the reviewer has question about plan for laryngeal CA.  Notified her that he has an appt to see Dr. Gregory in [place] as an outpatient on 08/30 @ 1:30pm.  [facility] will not accept him.

## 2023-08-08 NOTE — PROGRESS NOTES
[facility]  PROGRESS NOTE     Patient Identification:  Name:  Antonio Canseco  Age:  67 y.o.  Sex:  male  :  1955  MRN:  [id number]  Visit Number:  [id number]  ROOM: [facility]     Primary Care Provider:  Adriana Ledesma MD    Length of stay in inpatient status:  33    Subjective     Chief Compliant:  No chief complaint on file.      History of Presenting Illness: 67-year-old gentleman who is status post CVA with dense left residual hemiparesis.  Has history of atrial fibrillation as well, lymphoma, CHF with preserved EF.  Patient states he is doing well and is agreeable to going to short-term nursing home for continued rehab therapy.  No other acute complaints    Objective     Current Hospital Meds:allopurinol, 300 mg, Oral, Daily  atorvastatin, 80 mg, Oral, Daily  magnesium oxide, 400 mg, Oral, Daily  metoprolol tartrate, 25 mg, Oral, Q12H  nystatin, , Topical, Q12H  pantoprazole, 40 mg, Oral, Q AM  polyethylene glycol, 17 g, Oral, Daily  rivaroxaban, 20 mg, Oral, Daily With Dinner  tamsulosin, 0.4 mg, Oral, Daily       ----------------------------------------------------------------------------------------------------------------------  Vital Signs:  Temp:  [98.2 øF (36.8 øC)-98.6 øF (37 øC)] 98.2 øF (36.8 øC)  Heart Rate:  [] 84  Resp:  [18-20] 18  BP: (108-157)/(61-64) 108/64  SpO2:  [98 %] 98 %  on   ;   Device (Oxygen Therapy): room air  Body mass index is 40.57 kg/mý.    Wt Readings from Last 3 Encounters:   23 121 kg (266 lb 12.1 oz)   22 121 kg (267 lb)   07/15/19 121 kg (267 lb 1.6 oz)     Intake & Output (last 3 days)          07 07 07 07    P.O. 960 1164 1302     Total Intake(mL/kg) 960 (7.9) 1164 (9.6) 1302 (10.8)     Net +960 +1164 +1302             Urine Unmeasured Occurrence 5 x 5 x 5 x           Diet: Cardiac Diets; Healthy Heart (2-3 Na+); Texture: Regular Texture (IDDSI 7); Fluid  Consistency: Thin (IDDSI 0)  ----------------------------------------------------------------------------------------------------------------------  Physical exam:  Constitutional: No acute distress  HEENT: Normocephalic atraumatic  Neck: Supple  Cardiovascular: Irregular irregular, ventricular rate controlled  Pulmonary/Chest: Clear to auscultation  Abdominal: Positive bowel sounds soft.   Musculoskeletal: No arthropathy  Neurological: Left hemiparesis  Skin: No rash  Peripheral vascular: No edema  Genitourinary:  ----------------------------------------------------------------------------------------------------------------------    Last echocardiogram:    ----------------------------------------------------------------------------------------------------------------------  Results from last 7 days   Lab Units 08/03/23  0159   WBC 10*3/mm3 6.80   HEMOGLOBIN g/dL 10.5*   HEMATOCRIT % 35.0*   MCV fL 87.1   MCHC g/dL 30.0*   PLATELETS 10*3/mm3 274         Results from last 7 days   Lab Units 08/03/23  0159   SODIUM mmol/L 137   POTASSIUM mmol/L 4.4   MAGNESIUM mg/dL 1.9   CHLORIDE mmol/L 102   CO2 mmol/L 24.3   BUN mg/dL 11   CREATININE mg/dL 0.83   CALCIUM mg/dL 9.1   GLUCOSE mg/dL 112*   ALBUMIN g/dL 3.3*   BILIRUBIN mg/dL 0.4   ALK PHOS U/L 90   AST (SGOT) U/L 16   ALT (SGPT) U/L 14   Estimated Creatinine Clearance: 112.2 mL/min (by C-G formula based on SCr of 0.83 mg/dL).  No results found for: AMMONIA              Glucose   Date/Time Value Ref Range Status   08/08/2023 0615 118 70 - 130 mg/dL Final     Comment:     Meter: [id number] : [id number] Bhavna Crystal   08/07/2023 1650 110 70 - 130 mg/dL Final     Comment:     Meter: [id number] : [id number] SULMA MONZON   08/07/2023 0607 122 70 - 130 mg/dL Final     Comment:     Meter: [id number] : [id number] Quang Neal   08/06/2023 1623 136 (H) 70 - 130 mg/dL Final     Comment:     Meter: [id number] : [id number] Vinny Lemus   08/06/2023 0625  148 (H) 70 - 130 mg/dL Final     Comment:     Meter: [id number] : [id number] Andreina Wilson   08/05/2023 1616 165 (H) 70 - 130 mg/dL Final     Comment:     Meter: [id number] : [id number] emiliano bailey     No results found for: TSH, FREET4  No results found for: PREGTESTUR, PREGSERUM, HCG, HCGQUANT  Pain Management Panel           No data to display              Brief Urine Lab Results       None          No results found for: BLOODCX      No results found for: URINECX  No results found for: WOUNDCX  No results found for: STOOLCX        I have personally looked at the labs and they are summarized above.  ----------------------------------------------------------------------------------------------------------------------  Detailed radiology reports for the last 24 hours:    Imaging Results (Last 24 Hours)       ** No results found for the last 24 hours. **          Final impressions for the last 30 days of radiology reports:    EEG    Result Date: 8/7/2023  Focal cerebral dysfunction impacting the right hemisphere.  This is consistent with a known prior right hemispheric stroke No evidence for epilepsy is seen on the study This report is transcribed using the Dragon dictation system.     I have personally looked at the radiology images and read the final radiology report.    Assessment & Plan    Status post CVA with dense left residual hemiparesis.  Continue Xarelto for stroke prevention.  Will arrange to try to get SNF arranged locally for continued rehab efforts.  Patient currently requiring maximum assistance for help with bed mobility and transfers.  Was able to ambulate 8 feet in the parallel bars with maximum assist of 2.  Requires minimum assistance for upper body dressing and ADLs and maximum assistance for lower body ADLs.    Myoclonic jerks of left lower extremity--EEG was performed yesterday and showed no evidence of epileptic changes.    Atrial fibrillation well-controlled continue  Xarelto    Lymphoma continue follow-up with oncology as an outpatient    CHF preserved EF compensated    VTE Prophylaxis:   Mechanical Order History:       None          Pharmalogical Order History:        Ordered     Dose Route Frequency Stop    07/06/23 9885  rivaroxaban (XARELTO) tablet 20 mg        Question:  Are you ordering rivaroxaban 10 mg for the prevention of blood clots in an acutely ill medical patient?  Answer:  No    20 mg PO Daily With Dinner --                        Sj Jay MD  [facility]  08/08/23  11:37 EDT

## 2023-08-09 LAB
GLUCOSE BLDC GLUCOMTR-MCNC: 109 MG/DL (ref 70–130)
GLUCOSE BLDC GLUCOMTR-MCNC: 184 MG/DL (ref 70–130)

## 2023-08-09 PROCEDURE — 97110 THERAPEUTIC EXERCISES: CPT

## 2023-08-09 PROCEDURE — 97112 NEUROMUSCULAR REEDUCATION: CPT

## 2023-08-09 PROCEDURE — 99231 SBSQ HOSP IP/OBS SF/LOW 25: CPT | Performed by: FAMILY MEDICINE

## 2023-08-09 PROCEDURE — 97535 SELF CARE MNGMENT TRAINING: CPT

## 2023-08-09 PROCEDURE — 82948 REAGENT STRIP/BLOOD GLUCOSE: CPT

## 2023-08-09 PROCEDURE — 97530 THERAPEUTIC ACTIVITIES: CPT

## 2023-08-09 PROCEDURE — 97032 APPL MODALITY 1+ESTIM EA 15: CPT

## 2023-08-09 RX ADMIN — TAMSULOSIN HYDROCHLORIDE 0.4 MG: 0.4 CAPSULE ORAL at 08:47

## 2023-08-09 RX ADMIN — ALLOPURINOL 300 MG: 300 TABLET ORAL at 08:46

## 2023-08-09 RX ADMIN — RIVAROXABAN 20 MG: 20 TABLET, FILM COATED ORAL at 16:33

## 2023-08-09 RX ADMIN — HYDROXYZINE HYDROCHLORIDE 25 MG: 25 TABLET, FILM COATED ORAL at 21:19

## 2023-08-09 RX ADMIN — METOPROLOL TARTRATE 25 MG: 25 TABLET, FILM COATED ORAL at 21:19

## 2023-08-09 RX ADMIN — PANTOPRAZOLE SODIUM 40 MG: 40 TABLET, DELAYED RELEASE ORAL at 05:20

## 2023-08-09 RX ADMIN — NYSTATIN: 100000 POWDER TOPICAL at 08:47

## 2023-08-09 RX ADMIN — MAGNESIUM GLUCONATE 500 MG ORAL TABLET 400 MG: 500 TABLET ORAL at 08:46

## 2023-08-09 RX ADMIN — NYSTATIN: 100000 POWDER TOPICAL at 21:19

## 2023-08-09 RX ADMIN — METOPROLOL TARTRATE 25 MG: 25 TABLET, FILM COATED ORAL at 08:47

## 2023-08-09 RX ADMIN — ATORVASTATIN CALCIUM 80 MG: 40 TABLET, FILM COATED ORAL at 08:46

## 2023-08-09 NOTE — PLAN OF CARE
Goal Outcome Evaluation:  Plan of Care Reviewed With: patient        Progress: improving       Problem: Rehabilitation (IRF) Plan of Care  Goal: Plan of Care Review  Outcome: Ongoing, Progressing  Flowsheets (Taken 8/9/2023 1001)  Progress: improving  Plan of Care Reviewed With: patient  Goal: Patient-Specific Goal (Individualized)  Outcome: Ongoing, Progressing  Goal: Absence of New-Onset Illness or Injury  Outcome: Ongoing, Progressing  Intervention: Prevent Fall and Fall Injury  Recent Flowsheet Documentation  Taken 8/9/2023 0800 by Maurisio Estrella RN  Safety Promotion/Fall Prevention: safety round/check completed  Intervention: Prevent Infection  Recent Flowsheet Documentation  Taken 8/9/2023 0800 by Maurisio Estrella RN  Infection Prevention:   hand hygiene promoted   rest/sleep promoted  Intervention: Prevent VTE (Venous Thromboembolism)  Recent Flowsheet Documentation  Taken 8/9/2023 0800 by Maurisio Estrella RN  VTE Prevention/Management: (xarelto) other (see comments)  Goal: Optimal Comfort and Wellbeing  Outcome: Ongoing, Progressing  Goal: Home and Community Transition Plan Established  Outcome: Ongoing, Progressing     Problem: Diabetes Comorbidity  Goal: Blood Glucose Level Within Targeted Range  Outcome: Ongoing, Progressing  Intervention: Monitor and Manage Glycemia  Recent Flowsheet Documentation  Taken 8/9/2023 0800 by Maurisio Estrella RN  Glycemic Management: blood glucose monitored     Problem: Skin Injury Risk Increased  Goal: Skin Health and Integrity  Outcome: Ongoing, Progressing  Intervention: Promote and Optimize Oral Intake  Recent Flowsheet Documentation  Taken 8/9/2023 0800 by Maurisio Estrella RN  Oral Nutrition Promotion: physical activity promoted  Intervention: Optimize Skin Protection  Recent Flowsheet Documentation  Taken 8/9/2023 0800 by Maurisio Estrella RN  Pressure Reduction Techniques:   frequent weight shift encouraged   heels elevated off bed   weight shift assistance provided  Pressure  Reduction Devices:   heel offloading device utilized   positioning supports utilized   pressure-redistributing mattress utilized  Skin Protection:   adhesive use limited   incontinence pads utilized     Problem: Fall Injury Risk  Goal: Absence of Fall and Fall-Related Injury  Outcome: Ongoing, Progressing  Intervention: Identify and Manage Contributors  Recent Flowsheet Documentation  Taken 8/9/2023 0800 by Maurisio Estrella RN  Medication Review/Management: medications reviewed  Intervention: Promote Injury-Free Environment  Recent Flowsheet Documentation  Taken 8/9/2023 0800 by Maurisio Estrella RN  Safety Promotion/Fall Prevention: safety round/check completed     Problem: Mobility Impairment  Goal: Optimal Mobility [place] and Safety  Outcome: Ongoing, Progressing

## 2023-08-09 NOTE — THERAPY TREATMENT NOTE
Inpatient Rehabilitation - Physical Therapy Treatment Note       THALIA Trujillo     Patient Name: Antonio Canseco  : 1955  MRN: [id number]    Today's Date: 2023                    Admit Date: 2023      Visit Dx:   No diagnosis found.    Patient Active Problem List   Diagnosis    Detrusor instability    Low testosterone in male    Disorder of prostate    Corporo-venous occlusive erectile dysfunction    CVA (cerebral vascular accident)       Past Medical History:   Diagnosis Date    Diabetes mellitus     Hypertension     Stroke        Past Surgical History:   Procedure Laterality Date    US GUIDED LYMPH NODE BIOPSY  2023       PT ASSESSMENT (last 12 hours)       IRF PT Evaluation and Treatment       Row Name 23 1306          PT Time and Intention    Document Type daily treatment  -LB     Mode of Treatment individual therapy;physical therapy  -LB       Row Name 23 1306          General Information    Existing Precautions/Restrictions fall  L HP, <trunk support/balance, L side inattention  -LB       Row Name 23 1306          Pain Scale: FACES Pre/Post-Treatment    Pain: FACES Scale, Pretreatment 0-->no hurt  -LB     Posttreatment Pain Rating 0-->no hurt  -LB       Row Name 23 1306          Cognition/Psychosocial    Affect/Mental Status (Cognition) WFL  -LB     Follows Commands (Cognition) verbal cues/prompting required;physical/tactile prompts required  -LB     Personal Safety Interventions gait belt;nonskid shoes/slippers when out of bed;supervised activity  -LB       Row Name 23 1306          Bed Mobility    Supine-Sit-Supine [place] (Bed Mobility) maximum assist (25% patient effort);verbal cues;nonverbal cues (demo/gesture)  -LB       Row Name 23 1306          Transfer Assessment/Treatment    Comment, (Transfers) he can  PB but if he stand outside them to transfer or reposition he needs max A. has <processing and difficulty pivoting or scooting.  -LB        Row Name 08/09/23 1306          Bed-Chair Transfer    Bed-Chair [place] (Transfers) maximum assist (25% patient effort);verbal cues;nonverbal cues (demo/gesture)  -LB       Row Name 08/09/23 1306          Chair-Bed Transfer    Chair-Bed [place] (Transfers) maximum assist (25% patient effort);verbal cues;nonverbal cues (demo/gesture)  -LB       Row Name 08/09/23 1306          Sit-Stand Transfer    Sit-Stand [place] (Transfers) verbal cues;nonverbal cues (demo/gesture);minimum assist (75% patient effort)  -LB     Assistive Device (Sit-Stand Transfers) parallel bars  -LB       Row Name 08/09/23 1306          Stand-Sit Transfer    Stand-Sit [place] (Transfers) verbal cues;nonverbal cues (demo/gesture);minimum assist (75% patient effort)  -LB     Assistive Device (Stand-Sit Transfers) parallel bars  -LB       Row Name 08/09/23 1306          Balance    Static Sitting Balance --  Sup at edge of mat  -LB     Static Standing Balance --  min A in PB, cues for posture and weight shifting. he stood 1 min, then 30 sec, then 1 min x3. he needed sitting rest breaks between each set.  -LB     Comment, Balance sitting bag toss and  with reacher 2x  -LB       Row Name 08/09/23 1306          Motor Skills    Therapeutic Exercise --  sitting and supine ex, RLE AROM, LLE P/AAROM  -LB       Row Name 08/09/23 1306          [place] Electrical Stimulation Treatment    Location 1 (Russian E-Stim Treatment) --  L quad  -LB     Location 2 (Russian E-Stim Treatment) --  L ant. tib.  -LB     Indications (Russian E-Stim Treatment) enhance muscle contraction  -LB     Treatment Details (Russian Electrical Stimulation Treatment) 15 min, encouraged attempted contractions with e-stim.  -LB     Response to Treatment (Russian E-Stim Treatment) muscle contraction enhanced  -LB       Row Name 08/09/23 1306          Positioning and Restraints    Pre-Treatment Position sitting in chair/recliner  -LB     In Bed call light  within reach;encouraged to call for assist;heels elevated  -LB       Row Name 08/09/23 1306          Daily Progress Summary (PT)    Recommendations (PT) continue PT  -LB       Row Name 08/09/23 1306          Bed Mobility Goal 1 (PT-IRF)    Progress/Outcomes (Bed Mobility Goal 1, PT-IRF) goal ongoing  -LB       Row Name 08/09/23 1306          Bed Mobility Goal 2 (PT-IRF)    Progress/Outcomes (Bed Mobility Goal 2, PT-IRF) goal ongoing  -LB       Row Name 08/09/23 1306          Transfer Goal 1 (PT-IRF)    Progress/Outcomes (Transfer Goal 1, PT-IRF) goal ongoing  -LB       Row Name 08/09/23 1306          Transfer Goal 2 (PT-IRF)    Progress/Outcomes (Transfer Goal 2, PT-IRF) goal ongoing  -LB       Row Name 08/09/23 1306          Gait/Walking Locomotion Goal 1 (PT-IRF)    Progress/Outcomes (Gait/Walking Locomotion Goal 1, PT-IRF) goal ongoing  -LB       Row Name 08/09/23 1306          Gait/Walking Locomotion Goal 2 (PT-IRF)    Progress/Outcomes (Gait/Walking Locomotion Goal 2, PT-IRF) goal ongoing  -LB               User Key  (r) = Recorded By, (t) = Taken By, (c) = Cosigned By      Initials Name Provider Type    Joslyn Beckford, PT Physical Therapist                     Physical Therapy Education       Title: PT OT SLP Therapies (In Progress)       Topic: Physical Therapy (Done)       Point: Mobility training (Done)       Learning Progress Summary             Patient Acceptance, E, VU,NR by JOYA at 8/9/2023 1314    Acceptance, TB, NR by MEGAN at 8/8/2023 2014    Acceptance, E,D, VU,NR by RG at 8/8/2023 1407    Acceptance, E,D, VU,NR by RG at 8/7/2023 1507    Acceptance, E, VU,NR by JOYA at 8/4/2023 1521    Acceptance, E,TB, VU,NR by MB at 8/3/2023 2239    Acceptance, E,D, VU,NR by RG at 8/3/2023 1514    Acceptance, E,D, VU,NR by RG at 8/2/2023 1524    Acceptance, E,D, VU,NR by RG at 8/1/2023 1446    Acceptance, E,D, VU,NR by RG at 7/31/2023 1411    Acceptance, E,TB, VU by RM at 7/28/2023 1230    Acceptance, E,D,  VU,NR by RG at 7/26/2023 1518    Acceptance, E, VU,NR by LB at 7/25/2023 1133    Acceptance, E, VU,NR by LB at 7/24/2023 1444    Acceptance, E, VU,NR by LB at 7/21/2023 1615    Acceptance, E,D, VU,NR by RG at 7/20/2023 1107    Acceptance, TB, NR by DL at 7/19/2023 2036    Acceptance, E,D, VU,NR by RG at 7/19/2023 1508    Acceptance, E,D, VU,NR by RG at 7/18/2023 1520    Acceptance, E,D, VU,NR by RG at 7/17/2023 1522    Acceptance, E,D, VU,NR by LL at 7/13/2023 1358    Acceptance, E, VU,NR by LB at 7/12/2023 1144    Acceptance, E, VU,NR by LB at 7/11/2023 1426    Acceptance, E, VU,NR by LB at 7/10/2023 1452    Acceptance, E,D, VU,NR by LL at 7/8/2023 1228    Acceptance, E,D, VU,NR by LB at 7/7/2023 1449                         Point: Home exercise program (Done)       Learning Progress Summary             Patient Acceptance, E, VU,NR by LB at 8/9/2023 1314    Acceptance, TB, NR by DL at 8/8/2023 2014    Acceptance, E,D, VU,NR by RG at 8/8/2023 1407    Acceptance, E,D, VU,NR by RG at 8/7/2023 1507    Acceptance, E, VU,NR by LB at 8/4/2023 1521    Acceptance, E,TB, VU,NR by MB at 8/3/2023 2239    Acceptance, E,D, VU,NR by RG at 8/3/2023 1514    Acceptance, E,D, VU,NR by RG at 8/2/2023 1524    Acceptance, E,D, VU,NR by RG at 8/1/2023 1446    Acceptance, E,D, VU,NR by RG at 7/31/2023 1411    Acceptance, E,TB, VU by RM at 7/28/2023 1230    Acceptance, E,D, VU,NR by RG at 7/26/2023 1518    Acceptance, E, VU,NR by LB at 7/25/2023 1133    Acceptance, E, VU,NR by LB at 7/24/2023 1444    Acceptance, E, VU,NR by LB at 7/21/2023 1615    Acceptance, E,D, VU,NR by RG at 7/20/2023 1107    Acceptance, TB, NR by DL at 7/19/2023 2036    Acceptance, E,D, VU,NR by RG at 7/19/2023 1508    Acceptance, E,D, VU,NR by RG at 7/18/2023 1520    Acceptance, E,D, VU,NR by RG at 7/17/2023 1522    Acceptance, E,D, VU,NR by LL at 7/13/2023 1358    Acceptance, E, VU,NR by LB at 7/12/2023 1144    Acceptance, E, VU,NR by LB at 7/11/2023 1426     Acceptance, E, VU,NR by LB at 7/10/2023 1452    Acceptance, E,D, VU,NR by LL at 7/8/2023 1228    Acceptance, E,D, VU,NR by LB at 7/7/2023 1449                         Point: Body mechanics (Done)       Learning Progress Summary             Patient Acceptance, E, VU,NR by LB at 8/9/2023 1314    Acceptance, TB, NR by DL at 8/8/2023 2014    Acceptance, E,D, VU,NR by RG at 8/8/2023 1407    Acceptance, E,D, VU,NR by RG at 8/7/2023 1507    Acceptance, E, VU,NR by LB at 8/4/2023 1521    Acceptance, E,TB, VU,NR by MB at 8/3/2023 2239    Acceptance, E,D, VU,NR by RG at 8/3/2023 1514    Acceptance, E,D, VU,NR by RG at 8/2/2023 1524    Acceptance, E,D, VU,NR by RG at 8/1/2023 1446    Acceptance, E,D, VU,NR by RG at 7/31/2023 1411    Acceptance, E,TB, VU by RM at 7/28/2023 1230    Acceptance, E,D, VU,NR by RG at 7/26/2023 1518    Acceptance, E, VU,NR by LB at 7/25/2023 1133    Acceptance, E, VU,NR by LB at 7/24/2023 1444    Acceptance, E, VU,NR by LB at 7/21/2023 1615    Acceptance, E,D, VU,NR by RG at 7/20/2023 1107    Acceptance, TB, NR by DL at 7/19/2023 2036    Acceptance, E,D, VU,NR by RG at 7/19/2023 1508    Acceptance, E,D, VU,NR by RG at 7/18/2023 1520    Acceptance, E,D, VU,NR by RG at 7/17/2023 1522    Acceptance, E,D, VU,NR by LL at 7/13/2023 1358    Acceptance, E, VU,NR by LB at 7/12/2023 1144    Acceptance, E, VU,NR by LB at 7/11/2023 1426    Acceptance, E, VU,NR by LB at 7/10/2023 1452    Acceptance, E,D, VU,NR by LL at 7/8/2023 1228    Acceptance, E,D, VU,NR by LB at 7/7/2023 1449                         Point: Precautions (Done)       Learning Progress Summary             Patient Acceptance, E, VU,NR by LB at 8/9/2023 1314    Acceptance, TB, NR by DL at 8/8/2023 2014    Acceptance, E,D, VU,NR by RG at 8/8/2023 1407    Acceptance, E,D, VU,NR by RG at 8/7/2023 1507    Acceptance, E, VU,NR by LB at 8/4/2023 1521    Acceptance, E,TB, VU,NR by MB at 8/3/2023 2239    Acceptance, E,D, VU,NR by RG at 8/3/2023 1514     Acceptance, E,D, VU,NR by RG at 8/2/2023 1524    Acceptance, E,D, VU,NR by RG at 8/1/2023 1446    Acceptance, E,D, VU,NR by RG at 7/31/2023 1411    Acceptance, E,TB, VU by RM at 7/28/2023 1230    Acceptance, E,D, VU,NR by RG at 7/26/2023 1518    Acceptance, E, VU,NR by LB at 7/25/2023 1133    Acceptance, E, VU,NR by LB at 7/24/2023 1444    Acceptance, E, VU,NR by LB at 7/21/2023 1615    Acceptance, E,D, VU,NR by RG at 7/20/2023 1107    Acceptance, TB, NR by DL at 7/19/2023 2036    Acceptance, E,D, VU,NR by RG at 7/19/2023 1508    Acceptance, E,D, VU,NR by RG at 7/18/2023 1520    Acceptance, E,D, VU,NR by RG at 7/17/2023 1522    Acceptance, E,D, VU,NR by LL at 7/13/2023 1358    Acceptance, E, VU,NR by LB at 7/12/2023 1144    Acceptance, E, VU,NR by LB at 7/11/2023 1426    Acceptance, E, VU,NR by LB at 7/10/2023 1452    Acceptance, E,D, VU,NR by LL at 7/8/2023 1228    Acceptance, E,D, VU,NR by LB at 7/7/2023 1449                                         User Key       Initials Effective Dates Name Provider Type Discipline    LB 06/16/21 -  Joslyn Garcia, PT Physical Therapist PT    LL 05/02/16 -  Connie Velasquez, PTA Physical Therapist Assistant PT    RM 02/17/23 -  Shelley Hanson, PTA Physical Therapist Assistant PT    RG 06/16/21 -  Vu Brown, JILL Physical Therapist Assistant PT    DL 03/16/22 -  Nadja Velasquez, RN Registered Nurse Nurse    MB 06/26/23 -  Vinay Fritz, RN Registered Nurse Nurse                    PT Recommendation and Plan    Planned Therapy Interventions (PT): balance training, bed mobility training, gait training, motor coordination training, neuromuscular re-education, patient/family education, postural re-education, ROM (range of motion), strengthening, transfer training, wheelchair management/propulsion training  Frequency of Treatment (PT): 5 times per week  Anticipated Equipment Needs at Discharge (PT Eval):  (tbd)  Daily Progress Summary (PT)  Daily Progress Summary  (PT): he had more L neglect and <balance due to pushing. he is not currently able to correct and requires supervision in w/c for safety.  Recommendations (PT): continue PT               Time Calculation:      PT Charges       Row Name 08/09/23 1314             Time Calculation    Start Time 1000  -LB      Stop Time 1130  -LB      Time Calculation (min) 90 min  -LB      PT Received On 08/09/23  -LB                User Key  (r) = Recorded By, (t) = Taken By, (c) = Cosigned By      Initials Name Provider Type    Joslyn Beckford, PT Physical Therapist                    Therapy Charges for Today       Code Description Service Date Service Provider Modifiers Qty    48689280936 HC PT THER PROC EA 15 MIN 8/9/2023 Joslyn Garcia, PT GP 2    65818058813 HC PT NEUROMUSC RE EDUCATION EA 15 MIN 8/9/2023 Joslyn Garcia, PT GP 1    34521224688 HC PT THERAPEUTIC ACT EA 15 MIN 8/9/2023 Joslyn Garcia, PT GP 2    92284634985 HC PT ELEC STIM EA-PER 15 MIN 8/9/2023 Joslyn Garcia, PT GP 1              PT G-Codes  AM-PAC 6 Clicks Score (PT): 12      Joslyn Garcia PT  8/9/2023

## 2023-08-09 NOTE — PROGRESS NOTES
[facility]  PROGRESS NOTE     Patient Identification:  Name:  Antonio Canseco  Age:  67 y.o.  Sex:  male  :  1955  MRN:  [id number]  Visit Number:  [id number]  ROOM: [facility]     Primary Care Provider:  Adriana Ledesma MD    Length of stay in inpatient status:  34    Subjective     Chief Compliant:  No chief complaint on file.      History of Presenting Illness: 67-year-old gentleman status post CVA has left residual hemiparesis.  Patient has no new complaints at this time.  Patient continues to work diligently with therapy.  In the process of getting patient approved for SNF for 21 days further rehab    Objective     Current Hospital Meds:allopurinol, 300 mg, Oral, Daily  atorvastatin, 80 mg, Oral, Daily  magnesium oxide, 400 mg, Oral, Daily  metoprolol tartrate, 25 mg, Oral, Q12H  nystatin, , Topical, Q12H  pantoprazole, 40 mg, Oral, Q AM  polyethylene glycol, 17 g, Oral, Daily  rivaroxaban, 20 mg, Oral, Daily With Dinner  tamsulosin, 0.4 mg, Oral, Daily       ----------------------------------------------------------------------------------------------------------------------  Vital Signs:  Temp:  [97.5 øF (36.4 øC)-98 øF (36.7 øC)] 98 øF (36.7 øC)  Heart Rate:  [82-94] 94  Resp:  [18-20] 20  BP: (106-110)/(63-74) 110/74  SpO2:  [98 %-99 %] 99 %  on   ;   Device (Oxygen Therapy): room air  Body mass index is 40.57 kg/mý.    Wt Readings from Last 3 Encounters:   23 121 kg (266 lb 12.1 oz)   22 121 kg (267 lb)   07/15/19 121 kg (267 lb 1.6 oz)     Intake & Output (last 3 days)          0701  08/10 07    P.O. 1164 1422 720     Total Intake(mL/kg) 1164 (9.6) 1422 (11.8) 720 (6)     Net +1164 +1422 +720             Urine Unmeasured Occurrence 5 x 5 x 7 x     Stool Unmeasured Occurrence   5 x           Diet: Cardiac Diets; Healthy Heart (2-3 Na+); Texture: Regular Texture (IDDSI 7); Fluid Consistency: Thin (IDDSI  0)  ----------------------------------------------------------------------------------------------------------------------  Physical exam:  Constitutional: No acute distress  HEENT: Normocephalic atraumatic  Neck: Supple  Cardiovascular: Irregularly irregular  Pulmonary/Chest: Clear to auscultation  Abdominal: Positive bowel sounds soft.   Musculoskeletal: No arthropathy  Neurological: Left hemiparesis  Skin: No rash  Peripheral vascular:  Genitourinary:  ----------------------------------------------------------------------------------------------------------------------    Last echocardiogram:    ----------------------------------------------------------------------------------------------------------------------  Results from last 7 days   Lab Units 08/03/23  0159   WBC 10*3/mm3 6.80   HEMOGLOBIN g/dL 10.5*   HEMATOCRIT % 35.0*   MCV fL 87.1   MCHC g/dL 30.0*   PLATELETS 10*3/mm3 274         Results from last 7 days   Lab Units 08/03/23  0159   SODIUM mmol/L 137   POTASSIUM mmol/L 4.4   MAGNESIUM mg/dL 1.9   CHLORIDE mmol/L 102   CO2 mmol/L 24.3   BUN mg/dL 11   CREATININE mg/dL 0.83   CALCIUM mg/dL 9.1   GLUCOSE mg/dL 112*   ALBUMIN g/dL 3.3*   BILIRUBIN mg/dL 0.4   ALK PHOS U/L 90   AST (SGOT) U/L 16   ALT (SGPT) U/L 14   Estimated Creatinine Clearance: 112.2 mL/min (by C-G formula based on SCr of 0.83 mg/dL).  No results found for: AMMONIA              Glucose   Date/Time Value Ref Range Status   08/09/2023 0555 109 70 - 130 mg/dL Final     Comment:     Meter: [id number] : [id number] Quang Neal   08/08/2023 1622 118 70 - 130 mg/dL Final     Comment:     Meter: [id number] : [id number] CYDNY PORTER   08/08/2023 0615 118 70 - 130 mg/dL Final     Comment:     Meter: [id number] : [id number] Bhavna Crystal   08/07/2023 1650 110 70 - 130 mg/dL Final     Comment:     Meter: [id number] : [id number] SULMA MONZON   08/07/2023 0607 122 70 - 130 mg/dL Final     Comment:     Meter: [id number]  : [id number] Quang Dick Val   08/06/2023 1623 136 (H) 70 - 130 mg/dL Final     Comment:     Meter: [id number] : [id number] Vinny Lemus     No results found for: TSH, FREET4  No results found for: PREGTESTUR, PREGSERUM, HCG, HCGQUANT  Pain Management Panel           No data to display              Brief Urine Lab Results       None          No results found for: BLOODCX      No results found for: URINECX  No results found for: WOUNDCX  No results found for: STOOLCX        I have personally looked at the labs and they are summarized above.  ----------------------------------------------------------------------------------------------------------------------  Detailed radiology reports for the last 24 hours:    Imaging Results (Last 24 Hours)       ** No results found for the last 24 hours. **          Final impressions for the last 30 days of radiology reports:    EEG    Result Date: 8/7/2023  Focal cerebral dysfunction impacting the right hemisphere.  This is consistent with a known prior right hemispheric stroke No evidence for epilepsy is seen on the study This report is transcribed using the Dragon dictation system.     I have personally looked at the radiology images and read the final radiology report.    Assessment & Plan    Status post CVA with dense left residual hemiparesis patient is on Xarelto for stroke prevention history of A-fib.  Requires minimum assist for right dressing please work on motor skills to improve ADLs and functional mobility.  Patient requires maximum assist for bed to chair transfers.  Patient working on ambulation with rolling platform walker requiring maximum assistance.    Atrial fibrillation ventricular rate controlled continue Xarelto    Lymphoma will follow-up with oncology after rehab is complete    CHF preserved EF compensated    VTE Prophylaxis:   Mechanical Order History:       None          Pharmalogical Order History:        Ordered     Dose Route Frequency  Stop    07/06/23 1649  rivaroxaban (XARELTO) tablet 20 mg        Question:  Are you ordering rivaroxaban 10 mg for the prevention of blood clots in an acutely ill medical patient?  Answer:  No    20 mg PO Daily With Dinner --                        Sj Jay MD  [facility]  08/09/23  11:42 EDT

## 2023-08-09 NOTE — PROGRESS NOTES
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  Pt says his  cousin, sister and daughter can assist with needs.  Medical Necessity Expected Level Rationale: fair-good  Intensity and Duration: an average of 3 hours/5 days per week  Medical Supervision and 24 Hour Rehab Nursing: x  Physical Therapy: x  PT Intensity/Duration: PT 1.5 hours per day/5 days per week  Occupational Therapy: x  OT Intensity/Duration: OT 1.5 hours per day/5 days per week  Social Work: x  Therapeutic Recreation: x  Updated (if changes indicated)    Anticipated Discharge Date/Estimated Length of Stay:   Pending SNF      Discharge Plan of Care:    Based on the patient's medical and functional status, their prognosis and  expected level of functional improvement is: fair-good      Interdisciplinary Problem/Goals/Status  Body Function Structure    [RN] Skin Integrity(Active)  Current Status(07/06/2023): At Risk for Skin Breakdown  Weekly Goal(08/11/2023): No Skin  Breakdown  Discharge Goal: No Skin Breakdown        Safety    [RN] Potential for Injury(Active)  Current Status(07/06/2023): Potential for Falls  Weekly Goal(08/11/2023): No Falls  Discharge Goal: No Falls        Mobility    [PT] Bed/Chair/Wheelchair(Active)  Current Status(07/07/2023): Dep/Tye  Weekly Goal(07/14/2023): mod A  Discharge Goal: SBA    [PT] Walk(Active)  Current Status(07/07/2023): unable  Weekly Goal(07/14/2023): amb 20' mod A x2 hallrail  Discharge Goal: amb 150' HW SBA        Self Care    [OT] Dressing (Upper)(Active)  Current Status(08/07/2023): Min A  Weekly Goal(08/15/2023): CGA  Discharge Goal: CGA    Comments: Rehab team recommends SNF placement for continued Rehab.  SNF referral  has been submitted to [facility].    Signed by: Carmen Doe, Supervisor    Physician CoSigned By: Sj Jay 08/09/2023 07:13:28

## 2023-08-09 NOTE — SIGNIFICANT NOTE
08/09/23 0905   Plan   Plan Spoke to pt who is agreeable to send SNF referral to [facility].  Spoke to Juany at [facility] who has a male bed available.  Faxed SNF referral to [facility] [phone].

## 2023-08-09 NOTE — THERAPY TREATMENT NOTE
Inpatient Rehabilitation - Occupational Therapy Treatment Note     Ronnie     Patient Name: Antonio Canseco  : 1955  MRN: [id number]    Today's Date: 2023                 Admit Date: 2023       No diagnosis found.    Patient Active Problem List   Diagnosis    Detrusor instability    Low testosterone in male    Disorder of prostate    Corporo-venous occlusive erectile dysfunction    CVA (cerebral vascular accident)       Past Medical History:   Diagnosis Date    Diabetes mellitus     Hypertension     Stroke        Past Surgical History:   Procedure Laterality Date    US GUIDED LYMPH NODE BIOPSY  2023             IRF OT ASSESSMENT FLOWSHEET (last 12 hours)       IRF OT Evaluation and Treatment       Row Name 23 1330          OT Time and Intention    Document Type daily treatment  -     Mode of Treatment individual therapy;occupational therapy  -KP     Total Minutes, Occupational Therapy 90  -KP     Patient Effort good  -       Row Name 23 1333          General Information    Patient Profile Reviewed yes  -KP     General Observations of Patient Patient agreeable to therapy with no complaints.  -     Existing Precautions/Restrictions fall  -     Limitations/Impairments safety/cognitive  -       Row Name 23 1339          Pain Assessment    Pretreatment Pain Rating 0/10 - no pain  -KP     Posttreatment Pain Rating 0/10 - no pain  -KP       Row Name 23 1339          Cognition/Psychosocial    Affect/Mental Status (Cognition) WFL  -     Orientation Status (Cognition) oriented x 3  -KP     Personal Safety Interventions gait belt;nonskid shoes/slippers when out of bed  -     Cognitive Function safety deficit  -     Safety Deficit (Cognition) impulsivity  -       Row Name 23 1334          Grooming    [place] Level (Grooming) grooming skills;set up  -     Position (Grooming) supported sitting  -     Comment (Grooming) extra time  -       Row Name  08/09/23 1335          Bed-Chair Transfer    Bed-Chair [place] (Transfers) maximum assist (25% patient effort);verbal cues;nonverbal cues (demo/gesture)  -     Assistive Device (Bed-Chair Transfers) wheelchair  -KP       Row Name 08/09/23 1335          Motor Skills    Functional Endurance fair plus  -     Motor Control/Coordination Interventions fine motor manipulation/dexterity activities;gross motor coordination activities;therapeutic exercise/ROM  BUE PRE X10 minutes, rickshaw BUE 25 lbs; functional reach FMC on tabletop  -       Row Name 08/09/23 1335          Positioning and Restraints    Pre-Treatment Position sitting in chair/recliner  -     Post Treatment Position wheelchair  -KP     In Wheelchair with PT  -KP       Row Name 08/09/23 1335          Daily Progress Summary (OT)    Overall Progress Toward Functional Goals (OT) progressing toward functional goals as expected  -               User Key  (r) = Recorded By, (t) = Taken By, (c) = Cosigned By      Initials Name Effective Dates     Elza Moya, OT 06/16/21 -                      Occupational Therapy Education       Title: PT OT SLP Therapies (In Progress)       Topic: Occupational Therapy (In Progress)       Point: ADL training (In Progress)       Description:   Instruct learner(s) on proper safety adaptation and remediation techniques during self care or transfers.   Instruct in proper use of assistive devices.                  Learning Progress Summary             Patient Acceptance, TB, NR by DL at 8/8/2023 2014    Acceptance, E, VU,NR by BF at 8/8/2023 1440    Acceptance, E, VU,NR by BF at 8/7/2023 1526    Acceptance, E, VU,NR by BF at 8/4/2023 1221    Acceptance, E,TB, VU,NR by MB at 8/3/2023 2239    Acceptance, E, VU,NR by BF at 8/3/2023 1217    Acceptance, E, VU,NR by BF at 8/1/2023 1427    Acceptance, E, VU,NR by BF at 7/31/2023 1409    Acceptance, E, VU,NR by BF at 7/28/2023 1442    Acceptance, E, VU,NR by BF at 7/27/2023  1445    Acceptance, E, VU,NR by BF at 7/25/2023 1251    Acceptance, E, VU,NR by HB at 7/24/2023 1355    Acceptance, E, NR,VU by BF at 7/21/2023 1248    Acceptance, E, NR by BF at 7/20/2023 1259    Acceptance, TB, NR by DL at 7/19/2023 2036    Acceptance, E, VU,NR by HB at 7/19/2023 1421    Acceptance, E, VU,NR by BF at 7/18/2023 1334    Acceptance, E, VU,NR by BF at 7/17/2023 1431    Acceptance, E, VU,NR by BF at 7/17/2023 1430    Acceptance, E, VU,NR by BF at 7/14/2023 1508    Acceptance, E, VU,NR by BF at 7/13/2023 1343    Acceptance, E,D, VU,NR by TM at 7/12/2023 1407    Acceptance, D,E, NR,VU by TM at 7/11/2023 1434    Acceptance, E, VU,NR by BF at 7/10/2023 1456    Acceptance, E, VU,NR by HB at 7/8/2023 1140    Acceptance, E, VU,NR by BF at 7/7/2023 1442   Family Acceptance, E, VU,NR by BF at 8/1/2023 1427                         Point: Precautions (In Progress)       Description:   Instruct learner(s) on prescribed precautions during self-care and functional transfers.                  Learning Progress Summary             Patient Acceptance, TB, NR by DL at 8/8/2023 2014    Acceptance, E, VU,NR by BF at 8/8/2023 1440    Acceptance, E, VU,NR by BF at 8/7/2023 1526    Acceptance, E, VU,NR by BF at 8/4/2023 1221    Acceptance, E,TB, VU,NR by MB at 8/3/2023 2239    Acceptance, E, VU,NR by BF at 8/3/2023 1217    Acceptance, E, VU,NR by BF at 8/1/2023 1427    Acceptance, E, VU,NR by BF at 7/31/2023 1409    Acceptance, E, VU,NR by BF at 7/28/2023 1442    Acceptance, E, VU,NR by BF at 7/27/2023 1445    Acceptance, E, VU,NR by BF at 7/25/2023 1251    Acceptance, E, VU,NR by HB at 7/24/2023 1355    Acceptance, E, NR,VU by BF at 7/21/2023 1248    Acceptance, E, NR by BF at 7/20/2023 1259    Acceptance, TB, NR by DL at 7/19/2023 2036    Acceptance, E, VU,NR by HB at 7/19/2023 1421    Acceptance, E, VU,NR by BF at 7/18/2023 1334    Acceptance, E, VU,NR by BF at 7/17/2023 1431    Acceptance, E, VU,NR by BF at 7/17/2023  1430    Acceptance, E, VU,NR by BF at 7/14/2023 1508    Acceptance, E, VU,NR by BF at 7/13/2023 1343    Acceptance, E,D, VU,NR by TM at 7/12/2023 1407    Acceptance, D,E, NR,VU by TM at 7/11/2023 1434    Acceptance, E, VU,NR by BF at 7/10/2023 1456    Acceptance, E, VU,NR by HB at 7/8/2023 1140    Acceptance, E, VU,NR by BF at 7/7/2023 1442   Family Acceptance, E, VU,NR by BF at 8/1/2023 1427                                         User Key       Initials Effective Dates Name Provider Type Discipline    BF 05/31/23 - 07/10/23 Lisa Sanchez, OT Occupational Therapist OT    BF 07/11/23 -  Lisa Sanchez, OT Occupational Therapist OT    TM 06/16/21 -  Marielos Capone OT Occupational Therapist OT    HB 05/25/21 -  Veronica Robles OT Occupational Therapist OT    DL 03/16/22 -  Nadja Velasquez, RN Registered Nurse Nurse    MB 06/26/23 -  Vinay Fritz, RN Registered Nurse Nurse                        OT Recommendation and Plan            Daily Progress Summary (OT)  Overall Progress Toward Functional Goals (OT): progressing toward functional goals as expected            Time Calculation:      Time Calculation- OT       Row Name 08/09/23 1339             Time Calculation- OT    OT Start Time 0830  -      OT Stop Time 1000  -      OT Time Calculation (min) 90 min  -      Total Timed Code Minutes- OT 90 minute(s)  -      OT Received On 08/09/23  -                User Key  (r) = Recorded By, (t) = Taken By, (c) = Cosigned By      Initials Name Provider Type     Elza Moya OT Occupational Therapist                  Therapy Charges for Today       Code Description Service Date Service Provider Modifiers Qty    82326129216 HC OT SELF CARE/MGMT/TRAIN EA 15 MIN 8/9/2023 Elza Moya OT GO 1    78785143363 HC OT NEUROMUSC RE EDUCATION EA 15 MIN 8/9/2023 Elza Moya OT GO 2    67621282303 HC OT THERAPEUTIC ACT EA 15 MIN 8/9/2023 Elza Moya OT GO 1    61158115947 HC OT THER PROC  EA 15 MIN 8/9/2023 Elza Moya, OT GO 2                     Elza Moya, OT  8/9/2023

## 2023-08-09 NOTE — PROGRESS NOTES
Nutrition Services    Patient Name:  Antonio Canseco  YOB: 1955  MRN: [id number]  Admit Date:  7/6/2023    No new recommendations at this time, continue to encourage intakes of meals.    Electronically signed by:  Arlene Villafuerte RD  08/09/23 14:37 EDT

## 2023-08-09 NOTE — PLAN OF CARE
Goal Outcome Evaluation:  Plan of Care Reviewed With: patient resting in bed no complaints at present. Will continue to monitor.

## 2023-08-10 LAB
ANION GAP SERPL CALCULATED.3IONS-SCNC: 12.4 MMOL/L (ref 5–15)
BASOPHILS # BLD AUTO: 0.06 10*3/MM3 (ref 0–0.2)
BASOPHILS NFR BLD AUTO: 0.9 % (ref 0–1.5)
BUN SERPL-MCNC: 17 MG/DL (ref 8–23)
BUN/CREAT SERPL: 15.7 (ref 7–25)
CALCIUM SPEC-SCNC: 9.4 MG/DL (ref 8.6–10.5)
CHLORIDE SERPL-SCNC: 101 MMOL/L (ref 98–107)
CO2 SERPL-SCNC: 24.6 MMOL/L (ref 22–29)
CREAT SERPL-MCNC: 1.08 MG/DL (ref 0.76–1.27)
DEPRECATED RDW RBC AUTO: 39.1 FL (ref 37–54)
EGFRCR SERPLBLD CKD-EPI 2021: 76.2 ML/MIN/1.73
EOSINOPHIL # BLD AUTO: 0.14 10*3/MM3 (ref 0–0.4)
EOSINOPHIL NFR BLD AUTO: 2.1 % (ref 0.3–6.2)
ERYTHROCYTE [DISTWIDTH] IN BLOOD BY AUTOMATED COUNT: 12.7 % (ref 12.3–15.4)
GLUCOSE BLDC GLUCOMTR-MCNC: 115 MG/DL (ref 70–130)
GLUCOSE BLDC GLUCOMTR-MCNC: 139 MG/DL (ref 70–130)
GLUCOSE SERPL-MCNC: 111 MG/DL (ref 65–99)
HCT VFR BLD AUTO: 38.2 % (ref 37.5–51)
HGB BLD-MCNC: 11.6 G/DL (ref 13–17.7)
IMM GRANULOCYTES # BLD AUTO: 0.03 10*3/MM3 (ref 0–0.05)
IMM GRANULOCYTES NFR BLD AUTO: 0.5 % (ref 0–0.5)
LYMPHOCYTES # BLD AUTO: 1.17 10*3/MM3 (ref 0.7–3.1)
LYMPHOCYTES NFR BLD AUTO: 17.8 % (ref 19.6–45.3)
MCH RBC QN AUTO: 25.7 PG (ref 26.6–33)
MCHC RBC AUTO-ENTMCNC: 30.4 G/DL (ref 31.5–35.7)
MCV RBC AUTO: 84.7 FL (ref 79–97)
MONOCYTES # BLD AUTO: 0.81 10*3/MM3 (ref 0.1–0.9)
MONOCYTES NFR BLD AUTO: 12.3 % (ref 5–12)
NEUTROPHILS NFR BLD AUTO: 4.38 10*3/MM3 (ref 1.7–7)
NEUTROPHILS NFR BLD AUTO: 66.4 % (ref 42.7–76)
NRBC BLD AUTO-RTO: 0 /100 WBC (ref 0–0.2)
PLATELET # BLD AUTO: 295 10*3/MM3 (ref 140–450)
PMV BLD AUTO: 9 FL (ref 6–12)
POTASSIUM SERPL-SCNC: 4.2 MMOL/L (ref 3.5–5.2)
RBC # BLD AUTO: 4.51 10*6/MM3 (ref 4.14–5.8)
SODIUM SERPL-SCNC: 138 MMOL/L (ref 136–145)
WBC NRBC COR # BLD: 6.59 10*3/MM3 (ref 3.4–10.8)

## 2023-08-10 PROCEDURE — 97112 NEUROMUSCULAR REEDUCATION: CPT | Performed by: OCCUPATIONAL THERAPIST

## 2023-08-10 PROCEDURE — 97110 THERAPEUTIC EXERCISES: CPT

## 2023-08-10 PROCEDURE — 97116 GAIT TRAINING THERAPY: CPT

## 2023-08-10 PROCEDURE — 97530 THERAPEUTIC ACTIVITIES: CPT

## 2023-08-10 PROCEDURE — 85025 COMPLETE CBC W/AUTO DIFF WBC: CPT | Performed by: FAMILY MEDICINE

## 2023-08-10 PROCEDURE — 97535 SELF CARE MNGMENT TRAINING: CPT | Performed by: OCCUPATIONAL THERAPIST

## 2023-08-10 PROCEDURE — 97110 THERAPEUTIC EXERCISES: CPT | Performed by: OCCUPATIONAL THERAPIST

## 2023-08-10 PROCEDURE — 82948 REAGENT STRIP/BLOOD GLUCOSE: CPT

## 2023-08-10 PROCEDURE — 80048 BASIC METABOLIC PNL TOTAL CA: CPT | Performed by: FAMILY MEDICINE

## 2023-08-10 RX ADMIN — ALLOPURINOL 300 MG: 300 TABLET ORAL at 09:23

## 2023-08-10 RX ADMIN — TAMSULOSIN HYDROCHLORIDE 0.4 MG: 0.4 CAPSULE ORAL at 09:24

## 2023-08-10 RX ADMIN — MAGNESIUM GLUCONATE 500 MG ORAL TABLET 400 MG: 500 TABLET ORAL at 09:23

## 2023-08-10 RX ADMIN — HYDROXYZINE HYDROCHLORIDE 25 MG: 25 TABLET, FILM COATED ORAL at 20:27

## 2023-08-10 RX ADMIN — ATORVASTATIN CALCIUM 80 MG: 40 TABLET, FILM COATED ORAL at 09:23

## 2023-08-10 RX ADMIN — NYSTATIN: 100000 POWDER TOPICAL at 20:27

## 2023-08-10 RX ADMIN — ACETAMINOPHEN 650 MG: 325 TABLET ORAL at 20:27

## 2023-08-10 RX ADMIN — METOPROLOL TARTRATE 25 MG: 25 TABLET, FILM COATED ORAL at 20:27

## 2023-08-10 RX ADMIN — NYSTATIN: 100000 POWDER TOPICAL at 09:24

## 2023-08-10 RX ADMIN — PANTOPRAZOLE SODIUM 40 MG: 40 TABLET, DELAYED RELEASE ORAL at 05:24

## 2023-08-10 RX ADMIN — RIVAROXABAN 20 MG: 20 TABLET, FILM COATED ORAL at 18:32

## 2023-08-10 RX ADMIN — METOPROLOL TARTRATE 25 MG: 25 TABLET, FILM COATED ORAL at 09:24

## 2023-08-10 NOTE — SIGNIFICANT NOTE
08/10/23 0934   Plan   Plan [phone] left message for Juany.

## 2023-08-10 NOTE — PLAN OF CARE
Goal Outcome Evaluation:       Problem: Rehabilitation (IRF) Plan of Care  Goal: Plan of Care Review  Outcome: Ongoing, Progressing  Flowsheets (Taken 8/9/2023 1001 by Maurisio Estrella RN)  Progress: improving  Plan of Care Reviewed With: patient  Goal: Patient-Specific Goal (Individualized)  Outcome: Ongoing, Progressing  Goal: Absence of New-Onset Illness or Injury  Outcome: Ongoing, Progressing  Intervention: Prevent Fall and Fall Injury  Recent Flowsheet Documentation  Taken 8/10/2023 1200 by Madie Maza RN  Safety Promotion/Fall Prevention: safety round/check completed  Taken 8/10/2023 1000 by Madie Maza RN  Safety Promotion/Fall Prevention: safety round/check completed  Taken 8/10/2023 0800 by Madie Maza RN  Safety Promotion/Fall Prevention: safety round/check completed  Intervention: Prevent Infection  Recent Flowsheet Documentation  Taken 8/10/2023 1200 by Madie Maza RN  Infection Prevention: hand hygiene promoted  Taken 8/10/2023 1000 by Madie Maza RN  Infection Prevention: hand hygiene promoted  Taken 8/10/2023 0800 by Madie Maza RN  Infection Prevention: hand hygiene promoted  Goal: Optimal Comfort and Wellbeing  Outcome: Ongoing, Progressing  Goal: Home and Community Transition Plan Established  Outcome: Ongoing, Progressing

## 2023-08-10 NOTE — SIGNIFICANT NOTE
08/10/23 7660   Plan   Plan SS spoke to pt and sister Mariah about SNF placement, in-network providers in [facility] and outside of [facility].  Explained [facility] is not in-network with Medicare replacement insurance but they can verify benefits and sometimes in-network and out-of-network providers are the same but most of the time out-of-network benefits will cost the pt more money out of pocket.  Pt does not want to pay more out of pocket for an out-of-network provider.  Pt wants to go to [facility] and says he does not plan to receive any chemotherapy or radiation tx's if recommended until he completes rehab and goes home.  Pt declines [facility].  Contacted [facility] beds at the [facility] [phone] to speak to Admissions and no one was available at the time; SS will call back later.  Contacted [place] [facility] [place] [facility] [phone] per Annita who states being in-network with pt's insurance and having a male bed; however, they are reviewing several referrals.  Annita says they are willing to review pt for admission.  Spoke to Jose Carlos with [facility] [phone] who says [facility] is in-network with pt's insurance and they may have a bed.

## 2023-08-10 NOTE — THERAPY TREATMENT NOTE
Inpatient Rehabilitation - Physical Therapy Treatment Note        [facility]     Patient Name: Antonio Canseco  : 1955  MRN: [id number]    Today's Date: 8/10/2023                    Admit Date: 2023      Visit Dx:   No diagnosis found.    Patient Active Problem List   Diagnosis    Detrusor instability    Low testosterone in male    Disorder of prostate    Corporo-venous occlusive erectile dysfunction    CVA (cerebral vascular accident)       Past Medical History:   Diagnosis Date    Diabetes mellitus     Hypertension     Stroke        Past Surgical History:   Procedure Laterality Date    US GUIDED LYMPH NODE BIOPSY  2023       PT ASSESSMENT (last 12 hours)       IRF PT Evaluation and Treatment       Row Name 08/10/23 1459          PT Time and Intention    Document Type daily treatment  -RG     Mode of Treatment individual therapy;physical therapy  -RG     Patient/Family/Caregiver Comments/Observations Pt and nursing in agreement for skilled PT on this date.  Family present for teaching.  -RG       Row Name 08/10/23 1459          General Information    Existing Precautions/Restrictions fall  L HP, <trunk support/balance, L side inattention  -RG       Row Name 08/10/23 1459          Pain Scale: FACES Pre/Post-Treatment    Pain: FACES Scale, Pretreatment 0-->no hurt  -RG     Posttreatment Pain Rating 0-->no hurt  -RG       Row Name 08/10/23 1459          Cognition/Psychosocial    Affect/Mental Status (Cognition) WFL  -RG     Follows Commands (Cognition) verbal cues/prompting required;physical/tactile prompts required  -RG     Personal Safety Interventions nonskid shoes/slippers when out of bed;fall prevention program maintained;gait belt  -RG       Row Name 08/10/23 1459          Bed Mobility    Supine-Sit-Supine [place] (Bed Mobility) maximum assist (25% patient effort);verbal cues;nonverbal cues (demo/gesture)  -RG       Row Name 08/10/23 1459          Bed-Chair Transfer    Bed-Chair [place]  (Transfers) maximum assist (25% patient effort);verbal cues;nonverbal cues (demo/gesture)  -RG       Row Name 08/10/23 1459          Chair-Bed Transfer    Chair-Bed [place] (Transfers) maximum assist (25% patient effort);verbal cues;nonverbal cues (demo/gesture)  -RG       Row Name 08/10/23 1459          Sit-Stand Transfer    Sit-Stand [place] (Transfers) verbal cues;nonverbal cues (demo/gesture);minimum assist (75% patient effort)  -RG     Assistive Device (Sit-Stand Transfers) parallel bars  -RG       Row Name 08/10/23 1459          Stand-Sit Transfer    Stand-Sit [place] (Transfers) verbal cues;nonverbal cues (demo/gesture);minimum assist (75% patient effort)  -RG     Assistive Device (Stand-Sit Transfers) parallel bars  -RG       Row Name 08/10/23 1459          Gait/Stairs (Locomotion)    [place] Level (Gait) maximum assist (25% patient effort);verbal cues;nonverbal cues (demo/gesture);2 person assist  -RG     Assistive Device (Gait) walker, rolling platform  -RG     Distance in Feet (Gait) 20' x 2  -RG     Comment, (Gait/Stairs) 2 assist and 1 to push wc  -RG       Row Name 08/10/23 1459          Hip (Therapeutic Exercise)    Hip Strengthening (Therapeutic Exercise) bilateral;flexion;aBduction;aDduction;marching while seated;sitting;2 lb free weight;resistance band;green;10 repetitions;2 sets  AAROM L LE  -RG       Row Name 08/10/23 1459          Knee (Therapeutic Exercise)    Knee Strengthening (Therapeutic Exercise) bilateral;flexion;extension;marching while seated;LAQ (long arc quad);sitting;2 lb free weight;resistance band;green;10 repetitions;2 sets  AAROM L LE  -RG       Row Name 08/10/23 1459          Ankle (Therapeutic Exercise)    Ankle Strengthening (Therapeutic Exercise) bilateral;dorsiflexion;plantarflexion;sitting;10 repetitions;2 sets  AAROM L LE  -RG       Row Name 08/10/23 1459          [place] Electrical Stimulation Treatment    Location 1 (Russian E-Stim Treatment) --  Ant.  tib  -RG     Treatment Details (Russian Electrical Stimulation Treatment) 15 Min. 57 mA  -RG     Response to Treatment (Russian E-Stim Treatment) muscle contraction enhanced  -RG       Row Name 08/10/23 1459          Positioning and Restraints    Pre-Treatment Position sitting in chair/recliner  -RG     Post Treatment Position wheelchair  -RG     In Wheelchair notified nsg;call light within reach;sitting;encouraged to call for assist;with family/caregiver;legs elevated  -RG       Row Name 08/10/23 1459          Bed Mobility Goal 1 (PT-IRF)    Progress/Outcomes (Bed Mobility Goal 1, PT-IRF) goal ongoing  -RG       Row Name 08/10/23 1459          Bed Mobility Goal 2 (PT-IRF)    Progress/Outcomes (Bed Mobility Goal 2, PT-IRF) goal ongoing  -RG       Row Name 08/10/23 1459          Transfer Goal 1 (PT-IRF)    Progress/Outcomes (Transfer Goal 1, PT-IRF) goal ongoing  -RG       Row Name 08/10/23 1459          Transfer Goal 2 (PT-IRF)    Progress/Outcomes (Transfer Goal 2, PT-IRF) goal ongoing  -RG       Row Name 08/10/23 1459          Gait/Walking Locomotion Goal 1 (PT-IRF)    Progress/Outcomes (Gait/Walking Locomotion Goal 1, PT-IRF) goal ongoing  -RG       Row Name 08/10/23 1459          Gait/Walking Locomotion Goal 2 (PT-IRF)    Progress/Outcomes (Gait/Walking Locomotion Goal 2, PT-IRF) goal ongoing  -RG               User Key  (r) = Recorded By, (t) = Taken By, (c) = Cosigned By      Initials Name Provider Type    Vu Galloway PTA Physical Therapist Assistant                     Physical Therapy Education       Title: PT OT SLP Therapies (In Progress)       Topic: Physical Therapy (Done)       Point: Mobility training (Done)       Learning Progress Summary             Patient Acceptance, E,D, VU,NR by RG at 8/10/2023 1510    Acceptance, E, VU,NR by JOYA at 8/9/2023 1314    Acceptance, TB, NR by DL at 8/8/2023 2014    Acceptance, E,D, VU,NR by RG at 8/8/2023 1407    Acceptance, E,D, VU,NR by RG at 8/7/2023 1507     Acceptance, E, VU,NR by LB at 8/4/2023 1521    Acceptance, E,TB, VU,NR by MB at 8/3/2023 2239    Acceptance, E,D, VU,NR by RG at 8/3/2023 1514    Acceptance, E,D, VU,NR by RG at 8/2/2023 1524    Acceptance, E,D, VU,NR by RG at 8/1/2023 1446    Acceptance, E,D, VU,NR by RG at 7/31/2023 1411    Acceptance, E,TB, VU by RM at 7/28/2023 1230    Acceptance, E,D, VU,NR by RG at 7/26/2023 1518    Acceptance, E, VU,NR by LB at 7/25/2023 1133    Acceptance, E, VU,NR by LB at 7/24/2023 1444    Acceptance, E, VU,NR by LB at 7/21/2023 1615    Acceptance, E,D, VU,NR by RG at 7/20/2023 1107    Acceptance, TB, NR by DL at 7/19/2023 2036    Acceptance, E,D, VU,NR by RG at 7/19/2023 1508    Acceptance, E,D, VU,NR by RG at 7/18/2023 1520    Acceptance, E,D, VU,NR by RG at 7/17/2023 1522    Acceptance, E,D, VU,NR by LL at 7/13/2023 1358    Acceptance, E, VU,NR by LB at 7/12/2023 1144    Acceptance, E, VU,NR by LB at 7/11/2023 1426    Acceptance, E, VU,NR by LB at 7/10/2023 1452    Acceptance, E,D, VU,NR by LL at 7/8/2023 1228    Acceptance, E,D, VU,NR by LB at 7/7/2023 1449                         Point: Home exercise program (Done)       Learning Progress Summary             Patient Acceptance, E,D, VU,NR by RG at 8/10/2023 1510    Acceptance, E, VU,NR by LB at 8/9/2023 1314    Acceptance, TB, NR by DL at 8/8/2023 2014    Acceptance, E,D, VU,NR by RG at 8/8/2023 1407    Acceptance, E,D, VU,NR by RG at 8/7/2023 1507    Acceptance, E, VU,NR by LB at 8/4/2023 1521    Acceptance, E,TB, VU,NR by MB at 8/3/2023 2239    Acceptance, E,D, VU,NR by RG at 8/3/2023 1514    Acceptance, E,D, VU,NR by RG at 8/2/2023 1524    Acceptance, E,D, VU,NR by RG at 8/1/2023 1446    Acceptance, E,D, VU,NR by RG at 7/31/2023 1411    Acceptance, E,TB, VU by RM at 7/28/2023 1230    Acceptance, E,D, VU,NR by RG at 7/26/2023 1518    Acceptance, E, VU,NR by LB at 7/25/2023 1133    Acceptance, E, VU,NR by LB at 7/24/2023 1444    Acceptance, E, VU,NR by LB at  7/21/2023 1615    Acceptance, E,D, VU,NR by RG at 7/20/2023 1107    Acceptance, TB, NR by DL at 7/19/2023 2036    Acceptance, E,D, VU,NR by RG at 7/19/2023 1508    Acceptance, E,D, VU,NR by RG at 7/18/2023 1520    Acceptance, E,D, VU,NR by RG at 7/17/2023 1522    Acceptance, E,D, VU,NR by LL at 7/13/2023 1358    Acceptance, E, VU,NR by LB at 7/12/2023 1144    Acceptance, E, VU,NR by LB at 7/11/2023 1426    Acceptance, E, VU,NR by LB at 7/10/2023 1452    Acceptance, E,D, VU,NR by LL at 7/8/2023 1228    Acceptance, E,D, VU,NR by LB at 7/7/2023 1449                         Point: Body mechanics (Done)       Learning Progress Summary             Patient Acceptance, E,D, VU,NR by RG at 8/10/2023 1510    Acceptance, E, VU,NR by LB at 8/9/2023 1314    Acceptance, TB, NR by DL at 8/8/2023 2014    Acceptance, E,D, VU,NR by RG at 8/8/2023 1407    Acceptance, E,D, VU,NR by RG at 8/7/2023 1507    Acceptance, E, VU,NR by LB at 8/4/2023 1521    Acceptance, E,TB, VU,NR by MB at 8/3/2023 2239    Acceptance, E,D, VU,NR by RG at 8/3/2023 1514    Acceptance, E,D, VU,NR by RG at 8/2/2023 1524    Acceptance, E,D, VU,NR by RG at 8/1/2023 1446    Acceptance, E,D, VU,NR by RG at 7/31/2023 1411    Acceptance, E,TB, VU by RM at 7/28/2023 1230    Acceptance, E,D, VU,NR by RG at 7/26/2023 1518    Acceptance, E, VU,NR by LB at 7/25/2023 1133    Acceptance, E, VU,NR by LB at 7/24/2023 1444    Acceptance, E, VU,NR by LB at 7/21/2023 1615    Acceptance, E,D, VU,NR by RG at 7/20/2023 1107    Acceptance, TB, NR by DL at 7/19/2023 2036    Acceptance, E,D, VU,NR by RG at 7/19/2023 1508    Acceptance, E,D, VU,NR by RG at 7/18/2023 1520    Acceptance, E,D, VU,NR by RG at 7/17/2023 1522    Acceptance, E,D, VU,NR by LL at 7/13/2023 1358    Acceptance, E, VU,NR by LB at 7/12/2023 1144    Acceptance, E, VU,NR by LB at 7/11/2023 1426    Acceptance, E, VU,NR by LB at 7/10/2023 1452    Acceptance, E,D, VU,NR by LL at 7/8/2023 1228    Acceptance, E,D, VU,NR by  LB at 7/7/2023 1449                         Point: Precautions (Done)       Learning Progress Summary             Patient Acceptance, E,D, VU,NR by RG at 8/10/2023 1510    Acceptance, E, VU,NR by LB at 8/9/2023 1314    Acceptance, TB, NR by DL at 8/8/2023 2014    Acceptance, E,D, VU,NR by RG at 8/8/2023 1407    Acceptance, E,D, VU,NR by RG at 8/7/2023 1507    Acceptance, E, VU,NR by LB at 8/4/2023 1521    Acceptance, E,TB, VU,NR by MB at 8/3/2023 2239    Acceptance, E,D, VU,NR by RG at 8/3/2023 1514    Acceptance, E,D, VU,NR by RG at 8/2/2023 1524    Acceptance, E,D, VU,NR by RG at 8/1/2023 1446    Acceptance, E,D, VU,NR by RG at 7/31/2023 1411    Acceptance, E,TB, VU by RM at 7/28/2023 1230    Acceptance, E,D, VU,NR by RG at 7/26/2023 1518    Acceptance, E, VU,NR by LB at 7/25/2023 1133    Acceptance, E, VU,NR by LB at 7/24/2023 1444    Acceptance, E, VU,NR by LB at 7/21/2023 1615    Acceptance, E,D, VU,NR by RG at 7/20/2023 1107    Acceptance, TB, NR by DL at 7/19/2023 2036    Acceptance, E,D, VU,NR by RG at 7/19/2023 1508    Acceptance, E,D, VU,NR by RG at 7/18/2023 1520    Acceptance, E,D, VU,NR by RG at 7/17/2023 1522    Acceptance, E,D, VU,NR by LL at 7/13/2023 1358    Acceptance, E, VU,NR by LB at 7/12/2023 1144    Acceptance, E, VU,NR by LB at 7/11/2023 1426    Acceptance, E, VU,NR by LB at 7/10/2023 1452    Acceptance, E,D, VU,NR by LL at 7/8/2023 1228    Acceptance, E,D, VU,NR by LB at 7/7/2023 1449                                         User Key       Initials Effective Dates Name Provider Type Discipline    LB 06/16/21 -  Joslyn Garcia, PT Physical Therapist PT    LL 05/02/16 -  Connie Velasquez, PTA Physical Therapist Assistant PT    RM 02/17/23 -  Shelley Hanson, PTA Physical Therapist Assistant PT    RG 06/16/21 -  Vu Brown, PTA Physical Therapist Assistant PT    DL 03/16/22 -  Nadja Velasquze, RN Registered Nurse Nurse    MB 06/26/23 -  Vinay Fritz, RN Registered Nurse  Nurse                    PT Recommendation and Plan          Daily Progress Summary (PT)  Impairments Still Limiting Function (PT): balance impairment, coordination impairment, functional activity tolerance impairment, motor control impaired, pain, postural control impaired, strength deficit, sensory impairment               Time Calculation:      PT Charges       Row Name 08/10/23 1510             Time Calculation    Start Time 0915  -RG      Stop Time 1045  -RG      Time Calculation (min) 90 min  -RG      PT Received On 08/10/23  -RG         Time Calculation- PT    Total Timed Code Minutes- PT 90 minute(s)  -RG                User Key  (r) = Recorded By, (t) = Taken By, (c) = Cosigned By      Initials Name Provider Type    Vu Galloway PTA Physical Therapist Assistant                    Therapy Charges for Today       Code Description Service Date Service Provider Modifiers Qty    26787062262 HC GAIT TRAINING EA 15 MIN 8/10/2023 Vu Brown PTA GP, CQ 1    66751704553 HC PT THERAPEUTIC ACT EA 15 MIN 8/10/2023 Vu Brown PTA GP, CQ 2    40072618425 HC PT THER PROC EA 15 MIN 8/10/2023 Vu Brown PTA GP, CQ 3              PT G-Codes  AM-PAC 6 Clicks Score (PT): 12      Vu Brown PTA  8/10/2023

## 2023-08-10 NOTE — SIGNIFICANT NOTE
08/10/23 4380   Plan   Plan Spoke to Chikis with [phone] who states bed is not available at this time and she is unsure when bed will be available again.  Informed her pt does not plan to have chemotherapy or radiation tx's if recommended for laryngeal cancer until he completes rehab and goes home.  Chikis says they can re-evaluate pt for admission if bed becomes available and she will notify [facility] if they get a bed.

## 2023-08-10 NOTE — SIGNIFICANT NOTE
08/10/23 1200   Plan   Plan Spoke to Gilda, , with [facility] [phone] who states insurance will not pay them due to being out-of-network.  Spoke to Wes with [facility] [phone] about referral and pt needing outpatient follow-up for laryngeal cancer on 8-30-23, pt planning not to do any chemotherapy or radiation tx's if recommended until he completes rehab.  Wes says they may have a bed and to send referral via epic.  Faxed face sheet, H&P, MD progress notes, PT/OT notes, PT re-eval, medication list/active orders, and diet order to [facility] via [employer] in-basket.  SS reviewed this information with pt and sister.  Explained [facility] does not have a bed but they are willing to re-evaluate him for admission if bed becomes available.   Pt and sister do not want placement at [facility].  Pt agreeable to SS sending referral to [facility].  Contacted [facility] [phone] per Chikis about referral who says to fax the referral for review.  Faxed face sheet, H&P, PT/OT notes, PT re-eval, MD progress note and medication list with active orders to [phone].  [facility] to follow-up with [facility] about admission.

## 2023-08-10 NOTE — THERAPY TREATMENT NOTE
Inpatient Rehabilitation - Occupational Therapy Treatment Note     [facility]     Patient Name: Antonio Canseco  : 1955  MRN: [id number]    Today's Date: 8/10/2023                 Admit Date: 2023       No diagnosis found.    Patient Active Problem List   Diagnosis    Detrusor instability    Low testosterone in male    Disorder of prostate    Corporo-venous occlusive erectile dysfunction    CVA (cerebral vascular accident)       Past Medical History:   Diagnosis Date    Diabetes mellitus     Hypertension     Stroke        Past Surgical History:   Procedure Laterality Date    US GUIDED LYMPH NODE BIOPSY  2023             IRF OT ASSESSMENT FLOWSHEET (last 12 hours)       IRF OT Evaluation and Treatment       Row Name 08/10/23 1524          OT Time and Intention    Document Type daily treatment  -BF     Mode of Treatment occupational therapy  -BF     Patient Effort good  -BF     Symptoms Noted During/After Treatment none  -BF       Row Name 08/10/23 1524          General Information    Patient/Family/Caregiver Comments/Observations Pt and family educated in self-care (dressing and transfers) and HEP this date.  -BF     Existing Precautions/Restrictions fall  L HP, <trunk support/balance, L side inattention  -BF       Row Name 08/10/23 1524          Cognition/Psychosocial    Orientation Status (Cognition) oriented x 3  -BF     Follows Commands (Cognition) follows one-step commands;verbal cues/prompting required;repetition of directions required;physical/tactile prompts required  -BF       Row Name 08/10/23 1524          Upper Body Dressing    [place] Level (Upper Body Dressing) minimum assist (75% or more patient effort);verbal cues;nonverbal cues (demo/gesture)  -BF     Position (Upper Body Dressing) supported sitting  -BF     Comment (Upper Body Dressing) Min A  -BF       Row Name 08/10/23 1524          Lower Body Dressing    [place] Level (Lower Body Dressing) maximum assist (25% patient  effort);verbal cues  -BF     Position (Lower Body Dressing) supine  -BF     Comment (Lower Body Dressing) Max A  -BF       Row Name 08/10/23 1524          Bed-Chair Transfer    Bed-Chair [place] (Transfers) maximum assist (25% patient effort);2 person assist;verbal cues;nonverbal cues (demo/gesture)  -BF     Assistive Device (Bed-Chair Transfers) wheelchair  -BF       Row Name 08/10/23 1524          Motor Skills    Motor Control/Coordination Interventions fine motor manipulation/dexterity activities;gross motor coordination activities;therapeutic exercise/ROM  LUE GMC/FMC therac, theraband therext; BUE strengthneing, BUE PRE 10 mins, rickshaw 25 lbs X15X3  -BF       Row Name 08/10/23 1524          Positioning and Restraints    In Bed supine;call light within reach;encouraged to call for assist;LUE elevated  In PM  -BF     In Wheelchair sitting;with PT  In AM  -BF               User Key  (r) = Recorded By, (t) = Taken By, (c) = Cosigned By      Initials Name Effective Dates    BF Lisa Sanchez OT 07/11/23 -                      Occupational Therapy Education       Title: PT OT SLP Therapies (Done)       Topic: Occupational Therapy (Done)       Point: ADL training (Done)       Description:   Instruct learner(s) on proper safety adaptation and remediation techniques during self care or transfers.   Instruct in proper use of assistive devices.                  Learning Progress Summary             Patient Acceptance, E,D, VU,DU by BF at 8/10/2023 1524    Acceptance, TB, NR by DL at 8/8/2023 2014    Acceptance, E, VU,NR by BF at 8/8/2023 1440    Acceptance, E, VU,NR by BF at 8/7/2023 1526    Acceptance, E, VU,NR by BF at 8/4/2023 1221    Acceptance, E,TB, VU,NR by MB at 8/3/2023 2239    Acceptance, E, VU,NR by BF at 8/3/2023 1217    Acceptance, E, VU,NR by BF at 8/1/2023 1427    Acceptance, E, VU,NR by BF at 7/31/2023 1409    Acceptance, E, VU,NR by BF at 7/28/2023 1442    Acceptance, E, VU,NR by BF at  7/27/2023 1445    Acceptance, E, VU,NR by BF at 7/25/2023 1251    Acceptance, E, VU,NR by HB at 7/24/2023 1355    Acceptance, E, NR,VU by BF at 7/21/2023 1248    Acceptance, E, NR by BF at 7/20/2023 1259    Acceptance, TB, NR by DL at 7/19/2023 2036    Acceptance, E, VU,NR by HB at 7/19/2023 1421    Acceptance, E, VU,NR by BF at 7/18/2023 1334    Acceptance, E, VU,NR by BF at 7/17/2023 1431    Acceptance, E, VU,NR by BF at 7/17/2023 1430    Acceptance, E, VU,NR by BF at 7/14/2023 1508    Acceptance, E, VU,NR by BF at 7/13/2023 1343    Acceptance, E,D, VU,NR by TM at 7/12/2023 1407    Acceptance, D,E, NR,VU by TM at 7/11/2023 1434    Acceptance, E, VU,NR by BF at 7/10/2023 1456    Acceptance, E, VU,NR by HB at 7/8/2023 1140    Acceptance, E, VU,NR by BF at 7/7/2023 1442   Family Acceptance, E,D, VU,DU by BF at 8/10/2023 1524    Acceptance, E, VU,NR by BF at 8/1/2023 1427                         Point: Precautions (Done)       Description:   Instruct learner(s) on prescribed precautions during self-care and functional transfers.                  Learning Progress Summary             Patient Acceptance, E,D, VU,DU by BF at 8/10/2023 1524    Acceptance, TB, NR by DL at 8/8/2023 2014    Acceptance, E, VU,NR by BF at 8/8/2023 1440    Acceptance, E, VU,NR by BF at 8/7/2023 1526    Acceptance, E, VU,NR by BF at 8/4/2023 1221    Acceptance, E,TB, VU,NR by MB at 8/3/2023 2239    Acceptance, E, VU,NR by BF at 8/3/2023 1217    Acceptance, E, VU,NR by BF at 8/1/2023 1427    Acceptance, E, VU,NR by BF at 7/31/2023 1409    Acceptance, E, VU,NR by BF at 7/28/2023 1442    Acceptance, E, VU,NR by BF at 7/27/2023 1445    Acceptance, E, VU,NR by BF at 7/25/2023 1251    Acceptance, E, VU,NR by HB at 7/24/2023 1355    Acceptance, E, NR,VU by BF at 7/21/2023 1248    Acceptance, E, NR by BF at 7/20/2023 1259    Acceptance, TB, NR by DL at 7/19/2023 2036    Acceptance, E, VU,NR by HB at 7/19/2023 1421    Acceptance, E, VU,NR by BF at  7/18/2023 1334    Acceptance, E, VU,NR by BF at 7/17/2023 1431    Acceptance, E, VU,NR by BF at 7/17/2023 1430    Acceptance, E, VU,NR by BF at 7/14/2023 1508    Acceptance, E, VU,NR by BF at 7/13/2023 1343    Acceptance, E,D, VU,NR by TM at 7/12/2023 1407    Acceptance, D,E, NR,VU by TM at 7/11/2023 1434    Acceptance, E, VU,NR by BF at 7/10/2023 1456    Acceptance, E, VU,NR by HB at 7/8/2023 1140    Acceptance, E, VU,NR by BF at 7/7/2023 1442   Family Acceptance, E,D, VU,DU by BF at 8/10/2023 1524    Acceptance, E, VU,NR by BF at 8/1/2023 1427                                         User Key       Initials Effective Dates Name Provider Type Discipline    BF 05/31/23 - 07/10/23 Lisa Sanchez, OT Occupational Therapist OT    BF 07/11/23 -  Lisa Sanchez, OT Occupational Therapist OT    TM 06/16/21 -  Marielos Capone, OT Occupational Therapist OT    HB 05/25/21 -  Veronica Robles OT Occupational Therapist OT    DL 03/16/22 -  Nadja Velasquez, RN Registered Nurse Nurse    MB 06/26/23 -  Vinay Fritz RN Registered Nurse Nurse                        OT Recommendation and Plan    Planned Therapy Interventions (OT): activity tolerance training, adaptive equipment training, BADL retraining, neuromuscular control/coordination retraining, passive ROM/stretching, ROM/therapeutic exercise, strengthening exercise, transfer/mobility retraining                    Time Calculation:      Time Calculation- OT       Row Name 08/10/23 1535 08/10/23 0805          Time Calculation- OT    OT Start Time 1500  -BF 0805  -BF     OT Stop Time 1520  -BF 0915  -BF     OT Time Calculation (min) 20 min  -BF 70 min  -BF     Total Timed Code Minutes- OT 20 minute(s)  -BF 70 minute(s)  -BF     OT Non-Billable Time (min) -- 10 min  -BF               User Key  (r) = Recorded By, (t) = Taken By, (c) = Cosigned By      Initials Name Provider Type    BF Lisa Sanchez, OT Occupational Therapist                   Therapy Charges for Today       Code Description Service Date Service Provider Modifiers Qty    64755114734 HC OT SELF CARE/MGMT/TRAIN EA 15 MIN 8/10/2023 Lisa Sanchez OT GO 2    55508774141 HC OT NEUROMUSC RE EDUCATION EA 15 MIN 8/10/2023 Lisa Sanchez OT GO 2    83836138077 HC OT THER PROC EA 15 MIN 8/10/2023 Lisa Sanchez OT GO 2                     Lisa Sanchez OT  8/10/2023

## 2023-08-10 NOTE — PLAN OF CARE
Goal Outcome Evaluation:              Outcome Evaluation: Can push my hand with left foot when doing neurological assessment . Also babinski reflex is present. Rested well so far . Will continue POC

## 2023-08-11 LAB
GLUCOSE BLDC GLUCOMTR-MCNC: 108 MG/DL (ref 70–130)
GLUCOSE BLDC GLUCOMTR-MCNC: 142 MG/DL (ref 70–130)

## 2023-08-11 PROCEDURE — 97530 THERAPEUTIC ACTIVITIES: CPT

## 2023-08-11 PROCEDURE — 82948 REAGENT STRIP/BLOOD GLUCOSE: CPT

## 2023-08-11 PROCEDURE — 97110 THERAPEUTIC EXERCISES: CPT

## 2023-08-11 PROCEDURE — 99231 SBSQ HOSP IP/OBS SF/LOW 25: CPT | Performed by: FAMILY MEDICINE

## 2023-08-11 PROCEDURE — 97535 SELF CARE MNGMENT TRAINING: CPT | Performed by: OCCUPATIONAL THERAPIST

## 2023-08-11 PROCEDURE — 97116 GAIT TRAINING THERAPY: CPT

## 2023-08-11 PROCEDURE — 97110 THERAPEUTIC EXERCISES: CPT | Performed by: OCCUPATIONAL THERAPIST

## 2023-08-11 PROCEDURE — 97112 NEUROMUSCULAR REEDUCATION: CPT | Performed by: OCCUPATIONAL THERAPIST

## 2023-08-11 RX ADMIN — ACETAMINOPHEN 650 MG: 325 TABLET ORAL at 21:28

## 2023-08-11 RX ADMIN — MAGNESIUM GLUCONATE 500 MG ORAL TABLET 400 MG: 500 TABLET ORAL at 09:07

## 2023-08-11 RX ADMIN — ATORVASTATIN CALCIUM 80 MG: 40 TABLET, FILM COATED ORAL at 09:07

## 2023-08-11 RX ADMIN — RIVAROXABAN 20 MG: 20 TABLET, FILM COATED ORAL at 16:41

## 2023-08-11 RX ADMIN — METOPROLOL TARTRATE 25 MG: 25 TABLET, FILM COATED ORAL at 09:07

## 2023-08-11 RX ADMIN — PANTOPRAZOLE SODIUM 40 MG: 40 TABLET, DELAYED RELEASE ORAL at 05:34

## 2023-08-11 RX ADMIN — NYSTATIN: 100000 POWDER TOPICAL at 21:28

## 2023-08-11 RX ADMIN — METOPROLOL TARTRATE 25 MG: 25 TABLET, FILM COATED ORAL at 21:28

## 2023-08-11 RX ADMIN — TAMSULOSIN HYDROCHLORIDE 0.4 MG: 0.4 CAPSULE ORAL at 09:07

## 2023-08-11 RX ADMIN — ALLOPURINOL 300 MG: 300 TABLET ORAL at 09:07

## 2023-08-11 NOTE — PLAN OF CARE
Problem: Rehabilitation (IRF) Plan of Care  Goal: Plan of Care Review  Outcome: Ongoing, Progressing  Flowsheets (Taken 8/11/2023 0310)  Progress: no change  Plan of Care Reviewed With: patient  Outcome Evaluation:   Patient calm and cooperative   resting well throughout the night   no acute distress.  Goal: Patient-Specific Goal (Individualized)  Outcome: Ongoing, Progressing  Goal: Absence of New-Onset Illness or Injury  Outcome: Ongoing, Progressing  Intervention: Prevent Fall and Fall Injury  Recent Flowsheet Documentation  Taken 8/11/2023 0200 by Phuong Padron, RN  Safety Promotion/Fall Prevention: safety round/check completed  Taken 8/11/2023 0000 by Phuong Padron, RN  Safety Promotion/Fall Prevention: safety round/check completed  Taken 8/10/2023 2200 by Phuong Padron, RN  Safety Promotion/Fall Prevention: safety round/check completed  Taken 8/10/2023 2000 by Phuong Padron, RN  Safety Promotion/Fall Prevention:   safety round/check completed   room organization consistent   nonskid shoes/slippers when out of bed   fall prevention program maintained   clutter free environment maintained   assistive device/personal items within reach  Intervention: Prevent Infection  Recent Flowsheet Documentation  Taken 8/10/2023 2000 by Phuong Padron, RN  Infection Prevention:   equipment surfaces disinfected   hand hygiene promoted   personal protective equipment utilized   rest/sleep promoted  Goal: Optimal Comfort and Wellbeing  Outcome: Ongoing, Progressing  Goal: Home and Community Transition Plan Established  Outcome: Ongoing, Progressing   Goal Outcome Evaluation:  Plan of Care Reviewed With: patient        Progress: no change  Outcome Evaluation: Patient calm and cooperative; resting well throughout the night; no acute distress.

## 2023-08-11 NOTE — SIGNIFICANT NOTE
08/11/23 3591   Plan   Plan Contacted [facility] [phone] per Tita to follow-up with referral.  DON is gone for the day and she will inform Joselo, , to contact SS about referral.  SS may not be contacted until 8-14-23.  Contacted [place] [facility] [place] [phone] per Annita who states she will follow-up with SS after nsg makes a decision.

## 2023-08-11 NOTE — PLAN OF CARE
Problem: Rehabilitation (IRF) Plan of Care  Goal: Plan of Care Review  Outcome: Ongoing, Progressing  Flowsheets (Taken 8/11/2023 1119)  Progress: improving  Plan of Care Reviewed With: patient  Goal: Patient-Specific Goal (Individualized)  Outcome: Ongoing, Progressing  Goal: Absence of New-Onset Illness or Injury  Outcome: Ongoing, Progressing  Goal: Optimal Comfort and Wellbeing  Outcome: Ongoing, Progressing  Goal: Home and Community Transition Plan Established  Outcome: Ongoing, Progressing     Problem: Diabetes Comorbidity  Goal: Blood Glucose Level Within Targeted Range  Outcome: Ongoing, Progressing     Problem: Skin Injury Risk Increased  Goal: Skin Health and Integrity  Outcome: Ongoing, Progressing     Problem: Fall Injury Risk  Goal: Absence of Fall and Fall-Related Injury  Outcome: Ongoing, Progressing     Problem: Mobility Impairment  Goal: Optimal Mobility [place] and Safety  Outcome: Ongoing, Progressing   Goal Outcome Evaluation:  Plan of Care Reviewed With: patient        Progress: improving

## 2023-08-11 NOTE — PROGRESS NOTES
[facility]  PROGRESS NOTE     Patient Identification:  Name:  Antonio Canseco  Age:  67 y.o.  Sex:  male  :  1955  MRN:  [id number]  Visit Number:  [id number]  ROOM: [facility]     Primary Care Provider:  Adriana Ledesma MD    Length of stay in inpatient status:  36    Subjective     Chief Compliant:  No chief complaint on file.      History of Presenting Illness: 67-year-old gentleman with history of CVA with left-sided hemiparesis.  Patient has no new complaints at this time and is making progress in his therapy goals.    Objective     Current Hospital Meds:allopurinol, 300 mg, Oral, Daily  atorvastatin, 80 mg, Oral, Daily  magnesium oxide, 400 mg, Oral, Daily  metoprolol tartrate, 25 mg, Oral, Q12H  nystatin, , Topical, Q12H  pantoprazole, 40 mg, Oral, Q AM  polyethylene glycol, 17 g, Oral, Daily  rivaroxaban, 20 mg, Oral, Daily With Dinner  tamsulosin, 0.4 mg, Oral, Daily       ----------------------------------------------------------------------------------------------------------------------  Vital Signs:  Temp:  [97.6 øF (36.4 øC)-98.5 øF (36.9 øC)] 97.6 øF (36.4 øC)  Heart Rate:  [94-96] 94  Resp:  [16-20] 16  BP: (116-134)/(68-69) 134/68  SpO2:  [95 %-98 %] 95 %  on   ;   Device (Oxygen Therapy): room air  Body mass index is 40.57 kg/mý.    Wt Readings from Last 3 Encounters:   23 121 kg (266 lb 12.1 oz)   22 121 kg (267 lb)   07/15/19 121 kg (267 lb 1.6 oz)     Intake & Output (last 3 days)          07 07 0701  08/10 0700 08/10 07 07 07 0700    P.O. 720 1080 1200 360    Total Intake(mL/kg) 720 (6) 1080 (8.9) 1200 (9.9) 360 (3)    Net +720 +1080 +1200 +360            Urine Unmeasured Occurrence 7 x 5 x 3 x     Stool Unmeasured Occurrence 5 x             Diet: Cardiac Diets; Healthy Heart (2-3 Na+); Texture: Regular Texture (IDDSI 7); Fluid Consistency: Thin (IDDSI  0)  ----------------------------------------------------------------------------------------------------------------------  Physical exam:  Constitutional: No acute distress  HEENT: Normocephalic atraumatic  Neck:   Supple  Cardiovascular: Regular rate and rhythm  Pulmonary/Chest: Clear to auscultation.    Abdominal  .  Positive bowel sounds soft  Musculoskeletal: No arthropathy  Neurological: Left-sided hemiparesis  Skin: No rash  Peripheral vascular: No edema  Genitourinary:  ----------------------------------------------------------------------------------------------------------------------    Last echocardiogram:    ----------------------------------------------------------------------------------------------------------------------  Results from last 7 days   Lab Units 08/10/23  0048   WBC 10*3/mm3 6.59   HEMOGLOBIN g/dL 11.6*   HEMATOCRIT % 38.2   MCV fL 84.7   MCHC g/dL 30.4*   PLATELETS 10*3/mm3 295         Results from last 7 days   Lab Units 08/10/23  0048   SODIUM mmol/L 138   POTASSIUM mmol/L 4.2   CHLORIDE mmol/L 101   CO2 mmol/L 24.6   BUN mg/dL 17   CREATININE mg/dL 1.08   CALCIUM mg/dL 9.4   GLUCOSE mg/dL 111*   Estimated Creatinine Clearance: 86.2 mL/min (by C-G formula based on SCr of 1.08 mg/dL).  No results found for: AMMONIA              Glucose   Date/Time Value Ref Range Status   08/11/2023 0547 108 70 - 130 mg/dL Final   08/10/2023 1624 115 70 - 130 mg/dL Final   08/10/2023 0558 139 (H) 70 - 130 mg/dL Final   08/09/2023 1607 184 (H) 70 - 130 mg/dL Final   08/09/2023 0555 109 70 - 130 mg/dL Final     Comment:     Meter: [id number] : [id number] Quang Neal   08/08/2023 1622 118 70 - 130 mg/dL Final     Comment:     Meter: [id number] : [id number] CYNDY PORTER     No results found for: TSH, FREET4  No results found for: PREGTESTUR, PREGSERUM, HCG, HCGQUANT  Pain Management Panel           No data to display              Brief Urine Lab Results       None          No results found  for: BLOODCX      No results found for: URINECX  No results found for: WOUNDCX  No results found for: STOOLCX        I have personally looked at the labs and they are summarized above.  ----------------------------------------------------------------------------------------------------------------------  Detailed radiology reports for the last 24 hours:    Imaging Results (Last 24 Hours)       ** No results found for the last 24 hours. **          Final impressions for the last 30 days of radiology reports:    EEG    Result Date: 8/7/2023  Focal cerebral dysfunction impacting the right hemisphere.  This is consistent with a known prior right hemispheric stroke No evidence for epilepsy is seen on the study This report is transcribed using the Dragon dictation system.     I have personally looked at the radiology images and read the final radiology report.    Assessment & Plan    Status post CVA with dense left residual hemiparesis.  Patient has been continued on Xarelto as he does have history of atrial fibrillation.  Patient requiring minimum assist for upper body dressing; maximum assist for lower body dressing; maximum assist for bed to chair transfer and continues to work on motor skills to improve ADLs and functional mobility.  Requires minimum assist for sit to stand and stand to sit transfers.  Ambulated 20 feet x 2 with two-person assistance using rolling platform walker (maximum assist)    Atrial fibrillation rate is well-controlled.  We have continued patient on Xarelto    History of lymphoma recommend follow-up with oncology after rehab complete    CHF preserved EF compensated    VTE Prophylaxis:   Mechanical Order History:       None          Pharmalogical Order History:        Ordered     Dose Route Frequency Stop    07/06/23 6559  rivaroxaban (XARELTO) tablet 20 mg        Question:  Are you ordering rivaroxaban 10 mg for the prevention of blood clots in an acutely ill medical patient?  Answer:  No    20  mg PO Daily With Dinner --                        Sj Jay MD  [facility]  08/11/23  10:32 EDT

## 2023-08-11 NOTE — THERAPY PROGRESS REPORT/RE-CERT
Inpatient Rehabilitation - Physical Therapy Progress Note        [facility]     Patient Name: Antonio Canseco  : 1955  MRN: [id number]    Today's Date: 2023                    Admit Date: 2023      Visit Dx:   No diagnosis found.    Patient Active Problem List   Diagnosis    Detrusor instability    Low testosterone in male    Disorder of prostate    Corporo-venous occlusive erectile dysfunction    CVA (cerebral vascular accident)       Past Medical History:   Diagnosis Date    Diabetes mellitus     Hypertension     Stroke        Past Surgical History:   Procedure Laterality Date    US GUIDED LYMPH NODE BIOPSY  2023       PT ASSESSMENT (last 12 hours)       IRF PT Evaluation and Treatment       Row Name 23 1456          PT Time and Intention    Document Type daily treatment;progress note  -RG     Mode of Treatment individual therapy;physical therapy  -RG     Patient/Family/Caregiver Comments/Observations Pt and nursing in agreement for skilled PT on this date. Pt increased ambulation distance to 40' Max assist x 2 using PRW.  -       Row Name 23 1456          General Information    Existing Precautions/Restrictions fall  L HP, <trunk support/balance, L side inattention  -RG       Row Name 23 1456          Pain Scale: FACES Pre/Post-Treatment    Pain: FACES Scale, Pretreatment 0-->no hurt  -RG     Posttreatment Pain Rating 0-->no hurt  -RG       Row Name 23 1456          Cognition/Psychosocial    Affect/Mental Status (Cognition) WFL  -RG     Follows Commands (Cognition) verbal cues/prompting required;physical/tactile prompts required  -RG     Personal Safety Interventions fall prevention program maintained;gait belt;nonskid shoes/slippers when out of bed  -RG       Row Name 23 1456          Bed Mobility    Supine-Sit-Supine [place] (Bed Mobility) maximum assist (25% patient effort);verbal cues;nonverbal cues (demo/gesture)  -       Row Name 23 1456           Bed-Chair Transfer    Bed-Chair [place] (Transfers) maximum assist (25% patient effort);verbal cues;nonverbal cues (demo/gesture)  -RG       Row Name 08/11/23 1456          Chair-Bed Transfer    Chair-Bed [place] (Transfers) maximum assist (25% patient effort);verbal cues;nonverbal cues (demo/gesture)  -RG       Row Name 08/11/23 1456          Sit-Stand Transfer    Sit-Stand [place] (Transfers) verbal cues;nonverbal cues (demo/gesture);minimum assist (75% patient effort)  -RG     Assistive Device (Sit-Stand Transfers) parallel bars  -RG       Row Name 08/11/23 1456          Stand-Sit Transfer    Stand-Sit [place] (Transfers) verbal cues;nonverbal cues (demo/gesture);minimum assist (75% patient effort)  -RG     Assistive Device (Stand-Sit Transfers) parallel bars  -RG       Row Name 08/11/23 1456          Gait/Stairs (Locomotion)    [place] Level (Gait) maximum assist (25% patient effort);verbal cues;nonverbal cues (demo/gesture);2 person assist  -     Assistive Device (Gait) walker, rolling platform  -     Distance in Feet (Gait) 40'  -RG     Comment, (Gait/Stairs) 2 assist and 1 person to push wc  -RG       Row Name 08/11/23 1456          Hip (Therapeutic Exercise)    Hip Strengthening (Therapeutic Exercise) bilateral;flexion;extension;marching while seated;sitting;2 lb free weight;resistance band;green;10 repetitions;2 sets  AAROM on L LE  -RG       Row Name 08/11/23 1456          Knee (Therapeutic Exercise)    Knee Strengthening (Therapeutic Exercise) bilateral;flexion;extension;marching while seated;LAQ (long arc quad);sitting;2 lb free weight;resistance band;green;10 repetitions;2 sets  AAROM on L LE  -RG       Row Name 08/11/23 1456          Ankle (Therapeutic Exercise)    Ankle Strengthening (Therapeutic Exercise) bilateral;dorsiflexion;plantarflexion;sitting;10 repetitions;2 sets  AAROM L LE  -RG       Row Name 08/11/23 1456          Aerobic Exercise    Type (Aerobic Exercise)  recumbent stationary bike  -RG     Time Performed (Aerobic Exercise) 15 min  -RG     Comment, Aerobic Exercise (Therapeutic Exercise) L1 in wc  -RG       Row Name 08/11/23 1456          Positioning and Restraints    Pre-Treatment Position in bed  -RG     Post Treatment Position wheelchair  -RG     In Wheelchair notified nsg;sitting;with OT;legs elevated  -RG       Row Name 08/11/23 1456          Bed Mobility Goal 1 (PT-IRF)    Progress/Outcomes (Bed Mobility Goal 1, PT-IRF) goal ongoing  -RG       Row Name 08/11/23 1456          Bed Mobility Goal 2 (PT-IRF)    Progress/Outcomes (Bed Mobility Goal 2, PT-IRF) goal ongoing  -RG       Row Name 08/11/23 1456          Transfer Goal 1 (PT-IRF)    Progress/Outcomes (Transfer Goal 1, PT-IRF) goal ongoing  -RG       Row Name 08/11/23 1456          Transfer Goal 2 (PT-IRF)    Progress/Outcomes (Transfer Goal 2, PT-IRF) goal ongoing  -RG       Row Name 08/11/23 1456          Gait/Walking Locomotion Goal 1 (PT-IRF)    Progress/Outcomes (Gait/Walking Locomotion Goal 1, PT-IRF) goal ongoing;goal partially met  -RG       Row Name 08/11/23 1456          Gait/Walking Locomotion Goal 2 (PT-IRF)    Progress/Outcomes (Gait/Walking Locomotion Goal 2, PT-IRF) goal ongoing  -RG               User Key  (r) = Recorded By, (t) = Taken By, (c) = Cosigned By      Initials Name Provider Type    Vu Galloway PTA Physical Therapist Assistant                     Physical Therapy Education       Title: PT OT SLP Therapies (Done)       Topic: Physical Therapy (Done)       Point: Mobility training (Done)       Learning Progress Summary             Patient Acceptance, E,D, VU,NR by RG at 8/11/2023 1503    Acceptance, E,D, VU,NR by RG at 8/10/2023 1510    Acceptance, E, VU,NR by LB at 8/9/2023 1314    Acceptance, TB, NR by DL at 8/8/2023 2014    Acceptance, E,D, VU,NR by RG at 8/8/2023 1407    Acceptance, E,D, VU,NR by RG at 8/7/2023 1507    Acceptance, E, VU,NR by LB at 8/4/2023 1521     Acceptance, E,TB, VU,NR by MB at 8/3/2023 2239    Acceptance, E,D, VU,NR by RG at 8/3/2023 1514    Acceptance, E,D, VU,NR by RG at 8/2/2023 1524    Acceptance, E,D, VU,NR by RG at 8/1/2023 1446    Acceptance, E,D, VU,NR by RG at 7/31/2023 1411    Acceptance, E,TB, VU by RM at 7/28/2023 1230    Acceptance, E,D, VU,NR by RG at 7/26/2023 1518    Acceptance, E, VU,NR by LB at 7/25/2023 1133    Acceptance, E, VU,NR by LB at 7/24/2023 1444    Acceptance, E, VU,NR by LB at 7/21/2023 1615    Acceptance, E,D, VU,NR by RG at 7/20/2023 1107    Acceptance, TB, NR by DL at 7/19/2023 2036    Acceptance, E,D, VU,NR by RG at 7/19/2023 1508    Acceptance, E,D, VU,NR by RG at 7/18/2023 1520    Acceptance, E,D, VU,NR by RG at 7/17/2023 1522    Acceptance, E,D, VU,NR by LL at 7/13/2023 1358    Acceptance, E, VU,NR by LB at 7/12/2023 1144    Acceptance, E, VU,NR by LB at 7/11/2023 1426    Acceptance, E, VU,NR by LB at 7/10/2023 1452    Acceptance, E,D, VU,NR by LL at 7/8/2023 1228    Acceptance, E,D, VU,NR by LB at 7/7/2023 1449                         Point: Home exercise program (Done)       Learning Progress Summary             Patient Acceptance, E,D, VU,NR by RG at 8/11/2023 1503    Acceptance, E,D, VU,NR by RG at 8/10/2023 1510    Acceptance, E, VU,NR by LB at 8/9/2023 1314    Acceptance, TB, NR by DL at 8/8/2023 2014    Acceptance, E,D, VU,NR by RG at 8/8/2023 1407    Acceptance, E,D, VU,NR by RG at 8/7/2023 1507    Acceptance, E, VU,NR by LB at 8/4/2023 1521    Acceptance, E,TB, VU,NR by MB at 8/3/2023 2239    Acceptance, E,D, VU,NR by RG at 8/3/2023 1514    Acceptance, E,D, VU,NR by RG at 8/2/2023 1524    Acceptance, E,D, VU,NR by RG at 8/1/2023 1446    Acceptance, E,D, VU,NR by RG at 7/31/2023 1411    Acceptance, E,TB, VU by RM at 7/28/2023 1230    Acceptance, E,D, VU,NR by RG at 7/26/2023 1518    Acceptance, E, VU,NR by LB at 7/25/2023 1133    Acceptance, E, VU,NR by LB at 7/24/2023 1444    Acceptance, E, VU,NR by LB at  7/21/2023 1615    Acceptance, E,D, VU,NR by RG at 7/20/2023 1107    Acceptance, TB, NR by DL at 7/19/2023 2036    Acceptance, E,D, VU,NR by RG at 7/19/2023 1508    Acceptance, E,D, VU,NR by RG at 7/18/2023 1520    Acceptance, E,D, VU,NR by RG at 7/17/2023 1522    Acceptance, E,D, VU,NR by LL at 7/13/2023 1358    Acceptance, E, VU,NR by LB at 7/12/2023 1144    Acceptance, E, VU,NR by LB at 7/11/2023 1426    Acceptance, E, VU,NR by LB at 7/10/2023 1452    Acceptance, E,D, VU,NR by LL at 7/8/2023 1228    Acceptance, E,D, VU,NR by LB at 7/7/2023 1449                         Point: Body mechanics (Done)       Learning Progress Summary             Patient Acceptance, E,D, VU,NR by RG at 8/11/2023 1503    Acceptance, E,D, VU,NR by RG at 8/10/2023 1510    Acceptance, E, VU,NR by LB at 8/9/2023 1314    Acceptance, TB, NR by DL at 8/8/2023 2014    Acceptance, E,D, VU,NR by RG at 8/8/2023 1407    Acceptance, E,D, VU,NR by RG at 8/7/2023 1507    Acceptance, E, VU,NR by LB at 8/4/2023 1521    Acceptance, E,TB, VU,NR by MB at 8/3/2023 2239    Acceptance, E,D, VU,NR by RG at 8/3/2023 1514    Acceptance, E,D, VU,NR by RG at 8/2/2023 1524    Acceptance, E,D, VU,NR by RG at 8/1/2023 1446    Acceptance, E,D, VU,NR by RG at 7/31/2023 1411    Acceptance, E,TB, VU by RM at 7/28/2023 1230    Acceptance, E,D, VU,NR by RG at 7/26/2023 1518    Acceptance, E, VU,NR by LB at 7/25/2023 1133    Acceptance, E, VU,NR by LB at 7/24/2023 1444    Acceptance, E, VU,NR by LB at 7/21/2023 1615    Acceptance, E,D, VU,NR by RG at 7/20/2023 1107    Acceptance, TB, NR by DL at 7/19/2023 2036    Acceptance, E,D, VU,NR by RG at 7/19/2023 1508    Acceptance, E,D, VU,NR by RG at 7/18/2023 1520    Acceptance, E,D, VU,NR by RG at 7/17/2023 1522    Acceptance, E,D, VU,NR by LL at 7/13/2023 1358    Acceptance, E, VU,NR by LB at 7/12/2023 1144    Acceptance, E, VU,NR by LB at 7/11/2023 1426    Acceptance, E, VU,NR by LB at 7/10/2023 1452    Acceptance, E,D, VU,NR by  LL at 7/8/2023 1228    Acceptance, E,D, VU,NR by LB at 7/7/2023 1449                         Point: Precautions (Done)       Learning Progress Summary             Patient Acceptance, E,D, VU,NR by RG at 8/11/2023 1503    Acceptance, E,D, VU,NR by RG at 8/10/2023 1510    Acceptance, E, VU,NR by LB at 8/9/2023 1314    Acceptance, TB, NR by DL at 8/8/2023 2014    Acceptance, E,D, VU,NR by RG at 8/8/2023 1407    Acceptance, E,D, VU,NR by RG at 8/7/2023 1507    Acceptance, E, VU,NR by LB at 8/4/2023 1521    Acceptance, E,TB, VU,NR by MB at 8/3/2023 2239    Acceptance, E,D, VU,NR by RG at 8/3/2023 1514    Acceptance, E,D, VU,NR by RG at 8/2/2023 1524    Acceptance, E,D, VU,NR by RG at 8/1/2023 1446    Acceptance, E,D, VU,NR by RG at 7/31/2023 1411    Acceptance, E,TB, VU by RM at 7/28/2023 1230    Acceptance, E,D, VU,NR by RG at 7/26/2023 1518    Acceptance, E, VU,NR by LB at 7/25/2023 1133    Acceptance, E, VU,NR by LB at 7/24/2023 1444    Acceptance, E, VU,NR by LB at 7/21/2023 1615    Acceptance, E,D, VU,NR by RG at 7/20/2023 1107    Acceptance, TB, NR by DL at 7/19/2023 2036    Acceptance, E,D, VU,NR by RG at 7/19/2023 1508    Acceptance, E,D, VU,NR by RG at 7/18/2023 1520    Acceptance, E,D, VU,NR by RG at 7/17/2023 1522    Acceptance, E,D, VU,NR by LL at 7/13/2023 1358    Acceptance, E, VU,NR by LB at 7/12/2023 1144    Acceptance, E, VU,NR by LB at 7/11/2023 1426    Acceptance, E, VU,NR by LB at 7/10/2023 1452    Acceptance, E,D, VU,NR by LL at 7/8/2023 1228    Acceptance, E,D, VU,NR by LB at 7/7/2023 1449                                         User Key       Initials Effective Dates Name Provider Type Discipline    LB 06/16/21 -  Joslyn Garcia, PT Physical Therapist PT    LL 05/02/16 -  Connie Velasquez, PTA Physical Therapist Assistant PT    RM 02/17/23 -  Shelley Hanson, PTA Physical Therapist Assistant PT    RG 06/16/21 -  Vu Brown, PTA Physical Therapist Assistant PT    DL 03/16/22 -   Nadja Velasquez, RN Registered Nurse Nurse    MB 06/26/23 -  Vinay Fritz, RN Registered Nurse Nurse                    PT Recommendation and Plan          Daily Progress Summary (PT)  Impairments Still Limiting Function (PT): balance impairment, coordination impairment, functional activity tolerance impairment, motor control impaired, pain, postural control impaired, strength deficit, sensory impairment               Time Calculation:      PT Charges       Row Name 08/11/23 1503             Time Calculation    Start Time 0745  -RG      Stop Time 0915  -RG      Time Calculation (min) 90 min  -RG      PT Received On 08/11/23  -RG      PT Goal Re-Cert Due Date 08/18/23  -RG         Time Calculation- PT    Total Timed Code Minutes- PT 90 minute(s)  -RG                User Key  (r) = Recorded By, (t) = Taken By, (c) = Cosigned By      Initials Name Provider Type    Vu Galloway PTA Physical Therapist Assistant                    Therapy Charges for Today       Code Description Service Date Service Provider Modifiers Qty    54367526946 HC GAIT TRAINING EA 15 MIN 8/10/2023 Vu Brown PTA GP, CQ 1    14763120017 HC PT THERAPEUTIC ACT EA 15 MIN 8/10/2023 Vu Brown PTA GP, CQ 2    93501367414 HC PT THER PROC EA 15 MIN 8/10/2023 Vu Brown PTA GP, CQ 3    60984247502 HC GAIT TRAINING EA 15 MIN 8/11/2023 Vu Brown PTA GP, CQ 1    48015187586 HC PT THERAPEUTIC ACT EA 15 MIN 8/11/2023 Vu Brown PTA GP, CQ 2    26825951076 HC PT THER PROC EA 15 MIN 8/11/2023 Vu Brown PTA GP, CQ 3              PT G-Codes  AM-PAC 6 Clicks Score (PT): 12      Vu Brown PTA  8/11/2023

## 2023-08-11 NOTE — THERAPY TREATMENT NOTE
Inpatient Rehabilitation - Occupational Therapy Progress Note and Treatment Note     Ronnie     Patient Name: Antonio Canseco  : 1955  MRN: [id number]    Today's Date: 2023                 Admit Date: 2023       No diagnosis found.    Patient Active Problem List   Diagnosis    Detrusor instability    Low testosterone in male    Disorder of prostate    Corporo-venous occlusive erectile dysfunction    CVA (cerebral vascular accident)       Past Medical History:   Diagnosis Date    Diabetes mellitus     Hypertension     Stroke        Past Surgical History:   Procedure Laterality Date    US GUIDED LYMPH NODE BIOPSY  2023             IRF OT ASSESSMENT FLOWSHEET (last 12 hours)       IRF OT Evaluation and Treatment       Row Name 23 1205          OT Time and Intention    Document Type daily treatment;progress note  -BF     Mode of Treatment occupational therapy  -BF     Patient Effort good  -BF     Symptoms Noted During/After Treatment fatigue  -BF       Row Name 23 1205          General Information    Existing Precautions/Restrictions fall  L HP, <trunk support/balance, L side inattention  -BF       Row Name 23 1205          Cognition/Psychosocial    Orientation Status (Cognition) oriented x 3  -BF     Follows Commands (Cognition) follows one-step commands;verbal cues/prompting required;repetition of directions required;physical/tactile prompts required  -BF       Row Name 23 1205          Bathing    Comment (Bathing) Mod/Max A  -BF       Row Name 23 1205          Upper Body Dressing    Comment (Upper Body Dressing) Min A  -BF       Row Name 23 1205          Lower Body Dressing    Comment (Lower Body Dressing) Max/Mod A  -BF       Row Name 23 1205          Grooming    Comment (Grooming) Min A  -BF       Row Name 23 1205          Toileting    Comment (Toileting) Total A  -BF       Row Name 23 1205          Self-Feeding    Comment (Self-Feeding)  Set-up  -BF       Row Name 08/11/23 1205          Chair-Bed Transfer    Chair-Bed [place] (Transfers) maximum assist (25% patient effort);2 person assist;verbal cues;nonverbal cues (demo/gesture)  -BF     Assistive Device (Chair-Bed Transfers) wheelchair  -BF       Row Name 08/11/23 1205          Motor Skills    Motor Control/Coordination Interventions fine motor manipulation/dexterity activities;gross motor coordination activities;neuro-muscular re-education;therapeutic exercise/ROM  LUE GMC/FMC theract, strengthening; OMAR garzaaw 25 lbs X10X5, PRE 10 mins  -BF       Row Name 08/11/23 1205          Positioning and Restraints    In Bed supine;call light within reach;encouraged to call for assist;CHRISE elevated  -BF               User Key  (r) = Recorded By, (t) = Taken By, (c) = Cosigned By      Initials Name Effective Dates    BF iLsa Sanchez OT 07/11/23 -                      Occupational Therapy Education       Title: PT OT SLP Therapies (Done)       Topic: Occupational Therapy (Done)       Point: ADL training (Done)       Description:   Instruct learner(s) on proper safety adaptation and remediation techniques during self care or transfers.   Instruct in proper use of assistive devices.                  Learning Progress Summary             Patient Acceptance, E, VU,NR by BF at 8/11/2023 1204    Acceptance, E,D, VU,DU by BF at 8/10/2023 1524    Acceptance, TB, NR by DL at 8/8/2023 2014    Acceptance, E, VU,NR by BF at 8/8/2023 1440    Acceptance, E, VU,NR by BF at 8/7/2023 1526    Acceptance, E, VU,NR by BF at 8/4/2023 1221    Acceptance, E,TB, VU,NR by MB at 8/3/2023 2239    Acceptance, E, VU,NR by BF at 8/3/2023 1217    Acceptance, E, VU,NR by BF at 8/1/2023 1427    Acceptance, E, VU,NR by BF at 7/31/2023 1409    Acceptance, E, VU,NR by BF at 7/28/2023 1442    Acceptance, E, VU,NR by BF at 7/27/2023 1445    Acceptance, E, VU,NR by BF at 7/25/2023 1251    Acceptance, E, VU,NR by  at 7/24/2023  1355    Acceptance, E, NR,VU by BF at 7/21/2023 1248    Acceptance, E, NR by BF at 7/20/2023 1259    Acceptance, TB, NR by DL at 7/19/2023 2036    Acceptance, E, VU,NR by HB at 7/19/2023 1421    Acceptance, E, VU,NR by BF at 7/18/2023 1334    Acceptance, E, VU,NR by BF at 7/17/2023 1431    Acceptance, E, VU,NR by BF at 7/17/2023 1430    Acceptance, E, VU,NR by BF at 7/14/2023 1508    Acceptance, E, VU,NR by BF at 7/13/2023 1343    Acceptance, E,D, VU,NR by TM at 7/12/2023 1407    Acceptance, D,E, NR,VU by TM at 7/11/2023 1434    Acceptance, E, VU,NR by BF at 7/10/2023 1456    Acceptance, E, VU,NR by HB at 7/8/2023 1140    Acceptance, E, VU,NR by BF at 7/7/2023 1442   Family Acceptance, E,D, VU,DU by BF at 8/10/2023 1524    Acceptance, E, VU,NR by BF at 8/1/2023 1427                         Point: Precautions (Done)       Description:   Instruct learner(s) on prescribed precautions during self-care and functional transfers.                  Learning Progress Summary             Patient Acceptance, E, VU,NR by BF at 8/11/2023 1204    Acceptance, E,D, VU,DU by BF at 8/10/2023 1524    Acceptance, TB, NR by DL at 8/8/2023 2014    Acceptance, E, VU,NR by BF at 8/8/2023 1440    Acceptance, E, VU,NR by BF at 8/7/2023 1526    Acceptance, E, VU,NR by BF at 8/4/2023 1221    Acceptance, E,TB, VU,NR by MB at 8/3/2023 2239    Acceptance, E, VU,NR by BF at 8/3/2023 1217    Acceptance, E, VU,NR by BF at 8/1/2023 1427    Acceptance, E, VU,NR by BF at 7/31/2023 1409    Acceptance, E, VU,NR by BF at 7/28/2023 1442    Acceptance, E, VU,NR by BF at 7/27/2023 1445    Acceptance, E, VU,NR by BF at 7/25/2023 1251    Acceptance, E, VU,NR by HB at 7/24/2023 1355    Acceptance, E, NR,VU by BF at 7/21/2023 1248    Acceptance, E, NR by BF at 7/20/2023 1259    Acceptance, TB, NR by DL at 7/19/2023 2036    Acceptance, E, VU,NR by HB at 7/19/2023 1421    Acceptance, E, VU,NR by BF at 7/18/2023 1334    Acceptance, E, VU,NR by BF at 7/17/2023 1431     Acceptance, E, VU,NR by BF at 7/17/2023 1430    Acceptance, E, VU,NR by BF at 7/14/2023 1508    Acceptance, E, VU,NR by BF at 7/13/2023 1343    Acceptance, E,D, VU,NR by TM at 7/12/2023 1407    Acceptance, D,E, NR,VU by TM at 7/11/2023 1434    Acceptance, E, VU,NR by BF at 7/10/2023 1456    Acceptance, E, VU,NR by HB at 7/8/2023 1140    Acceptance, E, VU,NR by BF at 7/7/2023 1442   Family Acceptance, E,D, VU,DU by BF at 8/10/2023 1524    Acceptance, E, VU,NR by BF at 8/1/2023 1427                                         User Key       Initials Effective Dates Name Provider Type Discipline    BF 05/31/23 - 07/10/23 Lisa Sanchez, OT Occupational Therapist OT    BF 07/11/23 -  Lisa Sanchez, OT Occupational Therapist OT    TM 06/16/21 -  Marielos Capone, OT Occupational Therapist OT    HB 05/25/21 -  Veronica Robles OT Occupational Therapist OT    DL 03/16/22 -  Nadja Velasquez, RN Registered Nurse Nurse    MB 06/26/23 -  Vinay Fritz, RN Registered Nurse Nurse                        OT Recommendation and Plan    Planned Therapy Interventions (OT): activity tolerance training, adaptive equipment training, BADL retraining, neuromuscular control/coordination retraining, passive ROM/stretching, ROM/therapeutic exercise, strengthening exercise, transfer/mobility retraining                    Time Calculation:      Time Calculation- OT       Row Name 08/11/23 1208             Time Calculation- OT    OT Start Time 0915  -BF      OT Stop Time 1045  -BF      OT Time Calculation (min) 90 min  -BF      Total Timed Code Minutes- OT 90 minute(s)  -BF      OT Non-Billable Time (min) 10 min  -BF                User Key  (r) = Recorded By, (t) = Taken By, (c) = Cosigned By      Initials Name Provider Type    BF Lisa Sanchez, OT Occupational Therapist                  Therapy Charges for Today       Code Description Service Date Service Provider Modifiers Qty    71765570565 HC OT SELF  CARE/MGMT/TRAIN EA 15 MIN 8/10/2023 SanchezLisa, OT GO 2    24329691332 HC OT NEUROMUSC RE EDUCATION EA 15 MIN 8/10/2023 Lisa Sanchez, OT GO 2    26195965184 HC OT THER PROC EA 15 MIN 8/10/2023 SanchezLisa easley, OT GO 2    67562130004 HC OT SELF CARE/MGMT/TRAIN EA 15 MIN 8/11/2023 Lisa Sanchez, OT GO 1    60250628786 HC OT NEUROMUSC RE EDUCATION EA 15 MIN 8/11/2023 Lisa Sanchez, OT GO 3    70864967329 HC OT THER PROC EA 15 MIN 8/11/2023 Lisa Sanchez, OT GO 2                     Lisa Sanchez, OT  8/11/2023

## 2023-08-12 LAB
GLUCOSE BLDC GLUCOMTR-MCNC: 123 MG/DL (ref 70–130)
GLUCOSE BLDC GLUCOMTR-MCNC: 141 MG/DL (ref 70–130)

## 2023-08-12 PROCEDURE — 99231 SBSQ HOSP IP/OBS SF/LOW 25: CPT | Performed by: FAMILY MEDICINE

## 2023-08-12 PROCEDURE — 82948 REAGENT STRIP/BLOOD GLUCOSE: CPT

## 2023-08-12 RX ADMIN — RIVAROXABAN 20 MG: 20 TABLET, FILM COATED ORAL at 17:32

## 2023-08-12 RX ADMIN — TAMSULOSIN HYDROCHLORIDE 0.4 MG: 0.4 CAPSULE ORAL at 10:15

## 2023-08-12 RX ADMIN — HYDROXYZINE HYDROCHLORIDE 25 MG: 25 TABLET, FILM COATED ORAL at 21:27

## 2023-08-12 RX ADMIN — ALLOPURINOL 300 MG: 300 TABLET ORAL at 10:15

## 2023-08-12 RX ADMIN — METOPROLOL TARTRATE 25 MG: 25 TABLET, FILM COATED ORAL at 21:27

## 2023-08-12 RX ADMIN — ATORVASTATIN CALCIUM 80 MG: 40 TABLET, FILM COATED ORAL at 10:15

## 2023-08-12 RX ADMIN — POLYETHYLENE GLYCOL (3350) 17 G: 17 POWDER, FOR SOLUTION ORAL at 10:16

## 2023-08-12 RX ADMIN — MAGNESIUM GLUCONATE 500 MG ORAL TABLET 400 MG: 500 TABLET ORAL at 10:15

## 2023-08-12 RX ADMIN — METOPROLOL TARTRATE 25 MG: 25 TABLET, FILM COATED ORAL at 10:15

## 2023-08-12 RX ADMIN — NYSTATIN: 100000 POWDER TOPICAL at 10:25

## 2023-08-12 RX ADMIN — PANTOPRAZOLE SODIUM 40 MG: 40 TABLET, DELAYED RELEASE ORAL at 06:06

## 2023-08-12 NOTE — PLAN OF CARE
Problem: Rehabilitation (IRF) Plan of Care  Goal: Plan of Care Review  Outcome: Ongoing, Progressing  Flowsheets (Taken 8/12/2023 1253)  Progress: improving  Plan of Care Reviewed With: patient  Goal: Patient-Specific Goal (Individualized)  Outcome: Ongoing, Progressing  Goal: Absence of New-Onset Illness or Injury  Outcome: Ongoing, Progressing  Intervention: Prevent Fall and Fall Injury  Recent Flowsheet Documentation  Taken 8/12/2023 1002 by Marco Lo, RN  Safety Promotion/Fall Prevention: safety round/check completed  Taken 8/12/2023 0730 by Marco Lo, RN  Safety Promotion/Fall Prevention:   safety round/check completed   gait belt   fall prevention program maintained   clutter free environment maintained   assistive device/personal items within reach  Intervention: Prevent VTE (Venous Thromboembolism)  Recent Flowsheet Documentation  Taken 8/12/2023 0730 by Marco Lo, RN  VTE Prevention/Management: (Xarelto) other (see comments)  Goal: Optimal Comfort and Wellbeing  Outcome: Ongoing, Progressing  Goal: Home and Community Transition Plan Established  Outcome: Ongoing, Progressing     Problem: Diabetes Comorbidity  Goal: Blood Glucose Level Within Targeted Range  Outcome: Ongoing, Progressing  Intervention: Monitor and Manage Glycemia  Recent Flowsheet Documentation  Taken 8/12/2023 0730 by Marco Lo, RN  Glycemic Management: blood glucose monitored     Problem: Skin Injury Risk Increased  Goal: Skin Health and Integrity  Outcome: Ongoing, Progressing  Intervention: Promote and Optimize Oral Intake  Recent Flowsheet Documentation  Taken 8/12/2023 0730 by Marco Lo, RN  Oral Nutrition Promotion: physical activity promoted  Intervention: Optimize Skin Protection  Recent Flowsheet Documentation  Taken 8/12/2023 0730 by Marco Lo, RN  Pressure Reduction Techniques:   pressure points protected   heels elevated off bed   frequent weight shift encouraged  Pressure Reduction  Devices:   pressure-redistributing mattress utilized   heel offloading device utilized  Skin Protection: incontinence pads utilized     Problem: Fall Injury Risk  Goal: Absence of Fall and Fall-Related Injury  Outcome: Ongoing, Progressing  Intervention: Identify and Manage Contributors  Recent Flowsheet Documentation  Taken 8/12/2023 0730 by Marco Lo, RN  Medication Review/Management: medications reviewed  Intervention: Promote Injury-Free Environment  Recent Flowsheet Documentation  Taken 8/12/2023 1002 by Marco Lo, RN  Safety Promotion/Fall Prevention: safety round/check completed  Taken 8/12/2023 0730 by Marco oL, RN  Safety Promotion/Fall Prevention:   safety round/check completed   gait belt   fall prevention program maintained   clutter free environment maintained   assistive device/personal items within reach     Problem: Mobility Impairment  Goal: Optimal Mobility [place] and Safety  Outcome: Ongoing, Progressing   Goal Outcome Evaluation:  Plan of Care Reviewed With: patient        Progress: improving

## 2023-08-12 NOTE — PROGRESS NOTES
[facility]  PROGRESS NOTE     Patient Identification:  Name:  Antonio Canseco  Age:  67 y.o.  Sex:  male  :  1955  MRN:  [id number]  Visit Number:  [id number]  ROOM: [facility]     Primary Care Provider:  Adriana Ledesma MD    Length of stay in inpatient status:  37    Subjective     Chief Compliant:  No chief complaint on file.      History of Presenting Illness: 67-year-old gentleman with history of CVA with left-sided hemiparesis.  Patient states he is doing well at this time has no complaints.    Objective     Current Hospital Meds:allopurinol, 300 mg, Oral, Daily  atorvastatin, 80 mg, Oral, Daily  magnesium oxide, 400 mg, Oral, Daily  metoprolol tartrate, 25 mg, Oral, Q12H  nystatin, , Topical, Q12H  pantoprazole, 40 mg, Oral, Q AM  polyethylene glycol, 17 g, Oral, Daily  rivaroxaban, 20 mg, Oral, Daily With Dinner  tamsulosin, 0.4 mg, Oral, Daily       ----------------------------------------------------------------------------------------------------------------------  Vital Signs:  Temp:  [97.2 øF (36.2 øC)-98 øF (36.7 øC)] 98 øF (36.7 øC)  Heart Rate:  [79-90] 79  Resp:  [18-20] 18  BP: (111-136)/(63-82) 111/63  SpO2:  [95 %-97 %] 97 %  on   ;   Device (Oxygen Therapy): room air  Body mass index is 40.57 kg/mý.    Wt Readings from Last 3 Encounters:   23 121 kg (266 lb 12.1 oz)   22 121 kg (267 lb)   07/15/19 121 kg (267 lb 1.6 oz)     Intake & Output (last 3 days)          0701  08/10 0700 08/10 0701   07 07 07 0701   0700    P.O. 1080 1200 1920     Total Intake(mL/kg) 1080 (8.9) 1200 (9.9) 1920 (15.9)     Net +1080 +1200 +1920             Urine Unmeasured Occurrence 5 x 3 x 5 x     Stool Unmeasured Occurrence   1 x           Diet: Cardiac Diets; Healthy Heart (2-3 Na+); Texture: Regular Texture (IDDSI 7); Fluid Consistency: Thin (IDDSI  0)  ----------------------------------------------------------------------------------------------------------------------  Physical exam:  Constitutional: No acute distress  HEENT: Normocephalic atraumatic  Neck: Supple  Cardiovascular: Regular rate and rhythm  Pulmonary/Chest: Clear to auscultation  Abdominal: Positive bowel sounds soft  Musculoskeletal: No arthropathy  Neurological: Left-sided hemiparesis, more pronounced in the lower extremity  Skin: No rash  Peripheral vascular: No edema  Genitourinary:  ----------------------------------------------------------------------------------------------------------------------    Last echocardiogram:    ----------------------------------------------------------------------------------------------------------------------  Results from last 7 days   Lab Units 08/10/23  0048   WBC 10*3/mm3 6.59   HEMOGLOBIN g/dL 11.6*   HEMATOCRIT % 38.2   MCV fL 84.7   MCHC g/dL 30.4*   PLATELETS 10*3/mm3 295         Results from last 7 days   Lab Units 08/10/23  0048   SODIUM mmol/L 138   POTASSIUM mmol/L 4.2   CHLORIDE mmol/L 101   CO2 mmol/L 24.6   BUN mg/dL 17   CREATININE mg/dL 1.08   CALCIUM mg/dL 9.4   GLUCOSE mg/dL 111*   Estimated Creatinine Clearance: 86.2 mL/min (by C-G formula based on SCr of 1.08 mg/dL).  No results found for: AMMONIA              Glucose   Date/Time Value Ref Range Status   08/12/2023 0635 123 70 - 130 mg/dL Final   08/11/2023 1604 142 (H) 70 - 130 mg/dL Final   08/11/2023 0547 108 70 - 130 mg/dL Final   08/10/2023 1624 115 70 - 130 mg/dL Final   08/10/2023 0558 139 (H) 70 - 130 mg/dL Final   08/09/2023 1607 184 (H) 70 - 130 mg/dL Final     No results found for: TSH, FREET4  No results found for: PREGTESTUR, PREGSERUM, HCG, HCGQUANT  Pain Management Panel           No data to display              Brief Urine Lab Results       None          No results found for: BLOODCX      No results found for: URINECX  No results found for: WOUNDCX  No results found  for: STOOLCX        I have personally looked at the labs and they are summarized above.  ----------------------------------------------------------------------------------------------------------------------  Detailed radiology reports for the last 24 hours:    Imaging Results (Last 24 Hours)       ** No results found for the last 24 hours. **          Final impressions for the last 30 days of radiology reports:    EEG    Result Date: 8/7/2023  Focal cerebral dysfunction impacting the right hemisphere.  This is consistent with a known prior right hemispheric stroke No evidence for epilepsy is seen on the study This report is transcribed using the Dragon dictation system.     I have personally looked at the radiology images and read the final radiology report.    Assessment & Plan    Status post CVA with fairly dense left-sided hemiparesis although should state that the left upper extremity patient probably has  4 out of 5 strength.  Left lower extremity 3 out of 5.  Continue patient on Xarelto.  Requiring maximum assistance for bed mobility; maximum assist for bed to chair and chair to bed transfer; minimum assist for sit to stand and stand to sit transfer in the parallel bars.  Ambulated 40 feet with two-person assistance and 1 person to push wheelchair (maximum assist).  Patient requiring moderate to maximal assistance for bathing; minimum assist for upper body dressing; maximum to moderate assistance for lower body dressing and total assistance for toileting.  Investigating potential short-term SNF for continued rehab prior to going home    Atrial fibrillation rate is controlled continue Xarelto    History of lymphoma follow-up with oncology after rehab is complete    CHF preserved EF compensated    VTE Prophylaxis:   Mechanical Order History:       None          Pharmalogical Order History:        Ordered     Dose Route Frequency Stop    07/06/23 3036  rivaroxaban (XARELTO) tablet 20 mg        Question:  Are you  ordering rivaroxaban 10 mg for the prevention of blood clots in an acutely ill medical patient?  Answer:  No    20 mg PO Daily With Dinner --                        Sj Jay MD  [facility]  08/12/23  10:05 EDT

## 2023-08-12 NOTE — PLAN OF CARE
Problem: Rehabilitation (IRF) Plan of Care  Goal: Plan of Care Review  Outcome: Ongoing, Progressing  Flowsheets (Taken 8/12/2023 0458)  Progress: no change  Plan of Care Reviewed With: patient  Outcome Evaluation:   Patient calm and cooperative   resting well throughout the night   no acute distress.  Goal: Patient-Specific Goal (Individualized)  Outcome: Ongoing, Progressing  Goal: Absence of New-Onset Illness or Injury  Outcome: Ongoing, Progressing  Intervention: Prevent Fall and Fall Injury  Recent Flowsheet Documentation  Taken 8/12/2023 0400 by Phuong Padron, RN  Safety Promotion/Fall Prevention: safety round/check completed  Taken 8/12/2023 0203 by Phuong Padron RN  Safety Promotion/Fall Prevention: safety round/check completed  Taken 8/12/2023 0000 by Phuong Padron RN  Safety Promotion/Fall Prevention: safety round/check completed  Taken 8/11/2023 2200 by Phuong Padron RN  Safety Promotion/Fall Prevention: safety round/check completed  Taken 8/11/2023 2000 by Phuong Padron RN  Safety Promotion/Fall Prevention:   safety round/check completed   fall prevention program maintained   room organization consistent   nonskid shoes/slippers when out of bed   clutter free environment maintained   assistive device/personal items within reach  Intervention: Prevent Infection  Recent Flowsheet Documentation  Taken 8/11/2023 2000 by Phuong Padron, RN  Infection Prevention:   personal protective equipment utilized   hand hygiene promoted   rest/sleep promoted  Goal: Optimal Comfort and Wellbeing  Outcome: Ongoing, Progressing  Goal: Home and Community Transition Plan Established  Outcome: Ongoing, Progressing   Goal Outcome Evaluation:  Plan of Care Reviewed With: patient        Progress: no change  Outcome Evaluation: Patient calm and cooperative; resting well throughout the night; no acute distress.

## 2023-08-13 LAB
GLUCOSE BLDC GLUCOMTR-MCNC: 115 MG/DL (ref 70–130)
GLUCOSE BLDC GLUCOMTR-MCNC: 172 MG/DL (ref 70–130)

## 2023-08-13 PROCEDURE — 82948 REAGENT STRIP/BLOOD GLUCOSE: CPT

## 2023-08-13 RX ADMIN — NYSTATIN: 100000 POWDER TOPICAL at 20:30

## 2023-08-13 RX ADMIN — POLYETHYLENE GLYCOL (3350) 17 G: 17 POWDER, FOR SOLUTION ORAL at 08:48

## 2023-08-13 RX ADMIN — MAGNESIUM GLUCONATE 500 MG ORAL TABLET 400 MG: 500 TABLET ORAL at 08:47

## 2023-08-13 RX ADMIN — HYDROXYZINE HYDROCHLORIDE 25 MG: 25 TABLET, FILM COATED ORAL at 20:30

## 2023-08-13 RX ADMIN — TAMSULOSIN HYDROCHLORIDE 0.4 MG: 0.4 CAPSULE ORAL at 08:48

## 2023-08-13 RX ADMIN — NYSTATIN 1 APPLICATION: 100000 POWDER TOPICAL at 08:49

## 2023-08-13 RX ADMIN — METOPROLOL TARTRATE 25 MG: 25 TABLET, FILM COATED ORAL at 08:48

## 2023-08-13 RX ADMIN — METOPROLOL TARTRATE 25 MG: 25 TABLET, FILM COATED ORAL at 20:30

## 2023-08-13 RX ADMIN — ALLOPURINOL 300 MG: 300 TABLET ORAL at 08:48

## 2023-08-13 RX ADMIN — ATORVASTATIN CALCIUM 80 MG: 40 TABLET, FILM COATED ORAL at 08:47

## 2023-08-13 RX ADMIN — PANTOPRAZOLE SODIUM 40 MG: 40 TABLET, DELAYED RELEASE ORAL at 05:27

## 2023-08-13 RX ADMIN — RIVAROXABAN 20 MG: 20 TABLET, FILM COATED ORAL at 17:22

## 2023-08-13 NOTE — PLAN OF CARE
Goal Outcome Evaluation:  Plan of Care Reviewed With: patient        Progress: no change  Outcome Evaluation: BED ALARM NOTED; CONTINUE PLAN OF CARE; MONITOR

## 2023-08-14 LAB
GLUCOSE BLDC GLUCOMTR-MCNC: 113 MG/DL (ref 70–130)
GLUCOSE BLDC GLUCOMTR-MCNC: 116 MG/DL (ref 70–130)

## 2023-08-14 PROCEDURE — 97535 SELF CARE MNGMENT TRAINING: CPT | Performed by: OCCUPATIONAL THERAPIST

## 2023-08-14 PROCEDURE — 97530 THERAPEUTIC ACTIVITIES: CPT | Performed by: OCCUPATIONAL THERAPIST

## 2023-08-14 PROCEDURE — 97110 THERAPEUTIC EXERCISES: CPT

## 2023-08-14 PROCEDURE — 97112 NEUROMUSCULAR REEDUCATION: CPT

## 2023-08-14 PROCEDURE — 97116 GAIT TRAINING THERAPY: CPT

## 2023-08-14 PROCEDURE — 99231 SBSQ HOSP IP/OBS SF/LOW 25: CPT | Performed by: INTERNAL MEDICINE

## 2023-08-14 PROCEDURE — 82948 REAGENT STRIP/BLOOD GLUCOSE: CPT

## 2023-08-14 PROCEDURE — 97112 NEUROMUSCULAR REEDUCATION: CPT | Performed by: OCCUPATIONAL THERAPIST

## 2023-08-14 PROCEDURE — 97530 THERAPEUTIC ACTIVITIES: CPT

## 2023-08-14 RX ADMIN — TAMSULOSIN HYDROCHLORIDE 0.4 MG: 0.4 CAPSULE ORAL at 08:17

## 2023-08-14 RX ADMIN — HYDROXYZINE HYDROCHLORIDE 25 MG: 25 TABLET, FILM COATED ORAL at 22:00

## 2023-08-14 RX ADMIN — NYSTATIN: 100000 POWDER TOPICAL at 20:33

## 2023-08-14 RX ADMIN — RIVAROXABAN 20 MG: 20 TABLET, FILM COATED ORAL at 18:08

## 2023-08-14 RX ADMIN — MAGNESIUM GLUCONATE 500 MG ORAL TABLET 400 MG: 500 TABLET ORAL at 08:20

## 2023-08-14 RX ADMIN — ALLOPURINOL 300 MG: 300 TABLET ORAL at 08:17

## 2023-08-14 RX ADMIN — NYSTATIN: 100000 POWDER TOPICAL at 08:17

## 2023-08-14 RX ADMIN — METOPROLOL TARTRATE 25 MG: 25 TABLET, FILM COATED ORAL at 20:33

## 2023-08-14 RX ADMIN — METOPROLOL TARTRATE 25 MG: 25 TABLET, FILM COATED ORAL at 08:17

## 2023-08-14 RX ADMIN — ATORVASTATIN CALCIUM 80 MG: 40 TABLET, FILM COATED ORAL at 08:17

## 2023-08-14 RX ADMIN — PANTOPRAZOLE SODIUM 40 MG: 40 TABLET, DELAYED RELEASE ORAL at 05:24

## 2023-08-14 NOTE — PAYOR COMM NOTE
"Carmen Doe, RN   Utilization Review Nurse for Inpatient Rehab   Phone: [phone]  Fax: [phone]  Email: [email or web address]  [facility]  Facility NPI: [id number]     CLINICAL REVIEW FOR CONTINUED REHAB STAY APPROVAL  Member:  Antonio Canseco  :  1955  ID# [id number]  Auth# [id number]  Admission Date:  23  Discharge date is pending due to search for SNF facility.  *Requesting additional 7 days    Antonio Canseco (67 y.o. Male)       Date of Birth   1955    Social Security Number       Address   [address] [place] [address]    Home Phone   [phone]    MRN   [id number]       [place]   None    Marital Status                               Admission Date   23    Admission Type   Elective    Admitting Provider   Hayder Mendoza MD    Attending Provider   Hayder Mendoza MD    Department, Room/Bed   [facility] REHABILITATION, 106/2S       Discharge Date       Discharge Disposition       Discharge Destination                                 Attending Provider: Hayder Mendoza MD    Allergies: No Known Allergies    Isolation: None   Infection: None   Code Status: CPR    Ht: 172.7 cm (67.99\")   Wt: 121 kg (266 lb 12.1 oz)    Admission Cmt: None   Principal Problem: CVA (cerebral vascular accident) [I63.9]                   Greer Conner MSW     Case Management  Significant Note      Signed  Date of Service:  23  Creation Time:  23     Signed                           23   Plan   Plan SS spoke to Joselo, , at [facility] who says after their morning meeting they plan to review referrals.    Informed Joselo pt does not plan to receive chemotherapy or radiation tx if recommended for laryngeal cancer (he has a follow-up on 23) until he completes rehab.  Joselo says to follow-up with him later today.                   Greer Conner MSW     Case " Management  Significant Note      Signed  Date of Service:  08/11/23 1628  Creation Time:  08/11/23 1628     Signed                           08/11/23 1626   Plan   Plan Contacted [facility] [phone] per Tita to follow-up with referral.  SOY is gone for the day and she will inform Joselo, , to contact SS about referral.  SS may not be contacted until 8-14-23.  Contacted [facility] [phone] per Annita who states she will follow-up with SS after nsg makes a decision.                       Greer Conner, [facility]     Case Management  Significant Note      Signed  Date of Service:  08/10/23 1352  Creation Time:  08/10/23 1352     Signed                           08/10/23 1105   Plan   Plan Spoke to Chikis with [facility] [phone] who states bed is not available at this time and she is unsure when bed will be available again.  Informed her pt does not plan to have chemotherapy or radiation tx's if recommended for laryngeal cancer until he completes rehab and goes home.  Chikis says they can re-evaluate pt for admission if bed becomes available and she will notify SS if they get a bed.                       Greer Conner, MSW     Case Management  Significant Note      Signed  Date of Service:  08/10/23 1350  Creation Time:  08/10/23 1350     Signed                           08/10/23 1200   Plan   Plan Spoke to Gilda, , with [facility] [phone] who states insurance will not pay them due to being out-of-network.  Spoke to Wes with [facility] [phone] about referral and pt needing outpatient follow-up for laryngeal cancer on 8-30-23, pt planning not to do any chemotherapy or radiation tx's if recommended until he completes rehab.  Wes says they may have a bed and to send referral via epic.  Faxed face sheet, H&P, MD progress notes, PT/OT notes, PT re-eval, medication list/active orders, and diet order to [facility] via [employer] in-basket.  SS  reviewed this information with pt and sister.  Explained [facility] does not have a bed but they are willing to re-evaluate him for admission if bed becomes available.   Pt and sister do not want placement at [facility].  Pt agreeable to SS sending referral to [facility].  [facility]ed [facility] [phone] per Chikis about referral who says to fax the referral for review.  Faxed face sheet, H&P, PT/OT notes, PT jonathan-MD kenny progress note and medication list with active orders to [phone].  NH to follow-up with SS about admission.                    Greer Conner MSW     Case Management  Significant Note      Signed  Date of Service:  08/10/23 1341  Creation Time:  08/10/23 1341     Signed                           08/10/23 1054   Plan   Plan SS spoke to pt and sister Mariah about SNF placement, in-network providers [facility] and [facility].  Explained [facility] is not in-network with Medicare replacement insurance but they can verify benefits and sometimes in-network and out-of-network providers are the same but most of the time out-of-network benefits will cost the pt more money out of pocket.  Pt does not want to pay more out of pocket for an out-of-network provider.  Pt wants to go to [facility] and says he does not plan to receive any chemotherapy or radiation tx's if recommended until he completes rehab and goes home.  Pt declines [facility].  Contacted [facility] [phone] to speak to Admissions and no one was available at the time; SS will call back later.  [facility]ed [facility] [phone] per Annita who states being in-network with pt's insurance and having a male bed; however, they are reviewing several referrals.  Annita says they are willing to review pt for admission.  Spoke to Jose Carlos with [facility] [phone] who says [facility] is in-network with pt's insurance and they may have a  bed.                       Greer Conner MSW     Case Management  Significant Note      Signed  Date of Service:  08/10/23 0935  Creation Time:  08/10/23 0935     Signed                           08/10/23 0934   Plan   Plan SS [phone] and left message for Juany.                   Carmen Doe RN  Registered Nurse  Nursing  Significant Note      Signed  Date of Service:  08/09/23 0918  Creation Time:  08/09/23 0918     Signed                           08/09/23 0905   Plan   Plan Spoke to pt who is agreeable to send SNF referral to [facility].  Spoke to Juany at [facility] who has a male bed available.  Faxed SNF referral to [facility] [phone].                       Carmen Doe, RN  Registered Nurse  Nursing  Significant Note      Signed  Date of Service:  08/08/23 1505  Creation Time:  08/08/23 1505     Signed                           08/08/23 1450   Plan   Plan Spoke to Idalia at [facility] about SNF referral and the reviewer has question about plan for laryngeal CA.  Notified her that he has an appt to see Dr. Gregory in [place] as an outpatient on 08/30 @ 1:30pm.  [facility] will not accept him.                    Carmen Doe RN  Registered Nurse  Nursing  Significant Note      Signed  Date of Service:  08/08/23 1452  Creation Time:  08/08/23 1452     Signed                           08/08/23 1450   Plan   Plan Spoke to Idalia at [facility] about SNF referral and the reviewer has question about plan for laryngeal CA.  Notified her that he has an appt to see Dr. Gregory in [place] as an outpatient on 08/30 @ 1:30pm.                    Carmen Doe RN  Registered Nurse  Nursing  Significant Note      Signed  Date of Service:  08/08/23 1440  Creation Time:  08/08/23 1440     Signed                           08/08/23 1437   Plan   Plan Team conference was  held this morning and recommendations were made for SNF  placement for continued Rehab.  Pt plans to go to SNF for short-term Rehab, and wants to return home eventually with family's assistance.  Referral for SNF was sent to [facility] on .  Will follow up with Idalia at [facility] about referral.                      Sj Jay MD   Physician  Hospitalist     Progress Notes      Signed     Date of Service: 23  Creation Time: 23     Signed       [facility]  PROGRESS NOTE     Patient Identification:  Name:  Antonio aCnseco  Age:  67 y.o.  Sex:  male  :  1955  MRN:  [id number]  Visit Number:  [id number]  ROOM: 106/      Primary Care Provider:  Adriana Ledesma MD     Length of stay in inpatient status:  37     Subjective      Chief Compliant:  No chief complaint on file.        History of Presenting Illness: 67-year-old gentleman with history of CVA with left-sided hemiparesis.  Patient states he is doing well at this time has no complaints.     Objective      Current Hospital Meds:allopurinol, 300 mg, Oral, Daily  atorvastatin, 80 mg, Oral, Daily  magnesium oxide, 400 mg, Oral, Daily  metoprolol tartrate, 25 mg, Oral, Q12H  nystatin, , Topical, Q12H  pantoprazole, 40 mg, Oral, Q AM  polyethylene glycol, 17 g, Oral, Daily  rivaroxaban, 20 mg, Oral, Daily With Dinner  tamsulosin, 0.4 mg, Oral, Daily     ----------------------------------------------------------------------------------------------------------------------  Vital Signs:  Temp:  [97.2 øF (36.2 øC)-98 øF (36.7 øC)] 98 øF (36.7 øC)  Heart Rate:  [79-90] 79  Resp:  [18-20] 18  BP: (111-136)/(63-82) 111/63  SpO2:  [95 %-97 %] 97 %  on   ;   Device (Oxygen Therapy): room air  Body mass index is 40.57 kg/mý.         Wt Readings from Last 3 Encounters:   23 121 kg (266 lb 12.1 oz)   22 121 kg (267 lb)   07/15/19 121 kg (267 lb 1.6 oz)      Intake & Output (last 3 days)            0701  08/10 0700 08/10 07 07  08/11 0701 08/12 0700 08/12 0701 08/13 0700     P.O. 1080 1200 1920       Total Intake(mL/kg) 1080 (8.9) 1200 (9.9) 1920 (15.9)       Net +1080 +1200 +1920                     Urine Unmeasured Occurrence 5 x 3 x 5 x       Stool Unmeasured Occurrence     1 x               Diet: Cardiac Diets; Healthy Heart (2-3 Na+); Texture: Regular Texture (IDDSI 7); Fluid Consistency: Thin (IDDSI 0)  ----------------------------------------------------------------------------------------------------------------------  Physical exam:  Constitutional: No acute distress  HEENT: Normocephalic atraumatic  Neck: Supple  Cardiovascular: Regular rate and rhythm  Pulmonary/Chest: Clear to auscultation  Abdominal: Positive bowel sounds soft  Musculoskeletal: No arthropathy  Neurological: Left-sided hemiparesis, more pronounced in the lower extremity  Skin: No rash  Peripheral vascular: No edema  Genitourinary:  ----------------------------------------------------------------------------------------------------------------------     Last echocardiogram:     ----------------------------------------------------------------------------------------------------------------------       Results from last 7 days   Lab Units 08/10/23  0048   WBC 10*3/mm3 6.59   HEMOGLOBIN g/dL 11.6*   HEMATOCRIT % 38.2   MCV fL 84.7   MCHC g/dL 30.4*   PLATELETS 10*3/mm3 295               Results from last 7 days   Lab Units 08/10/23  0048   SODIUM mmol/L 138   POTASSIUM mmol/L 4.2   CHLORIDE mmol/L 101   CO2 mmol/L 24.6   BUN mg/dL 17   CREATININE mg/dL 1.08   CALCIUM mg/dL 9.4   GLUCOSE mg/dL 111*   Estimated Creatinine Clearance: 86.2 mL/min (by C-G formula based on SCr of 1.08 mg/dL).  No results found for: AMMONIA                    Glucose   Date/Time Value Ref Range Status   08/12/2023 0635 123 70 - 130 mg/dL Final   08/11/2023 1604 142 (H) 70 - 130 mg/dL Final   08/11/2023 0547 108 70 - 130 mg/dL Final   08/10/2023 1624 115 70 - 130 mg/dL Final   08/10/2023  0558 139 (H) 70 - 130 mg/dL Final   08/09/2023 1607 184 (H) 70 - 130 mg/dL Final      No results found for: TSH, FREET4  No results found for: PREGTESTUR, PREGSERUM, HCG, HCGQUANT  Pain Management Panel                 No data to display                   Brief Urine Lab Results         None             No results found for: BLOODCX      No results found for: URINECX  No results found for: WOUNDCX  No results found for: STOOLCX         I have personally looked at the labs and they are summarized above.  ----------------------------------------------------------------------------------------------------------------------  Detailed radiology reports for the last 24 hours:     Imaging Results (Last 24 Hours)         ** No results found for the last 24 hours. **             Final impressions for the last 30 days of radiology reports:     EEG     Result Date: 8/7/2023  Focal cerebral dysfunction impacting the right hemisphere.  This is consistent with a known prior right hemispheric stroke No evidence for epilepsy is seen on the study This report is transcribed using the Dragon dictation system.     I have personally looked at the radiology images and read the final radiology report.     Assessment & Plan    Status post CVA with fairly dense left-sided hemiparesis although should state that the left upper extremity patient probably has  4 out of 5 strength.  Left lower extremity 3 out of 5.  Continue patient on Xarelto.  Requiring maximum assistance for bed mobility; maximum assist for bed to chair and chair to bed transfer; minimum assist for sit to stand and stand to sit transfer in the parallel bars.  Ambulated 40 feet with two-person assistance and 1 person to push wheelchair (maximum assist).  Patient requiring moderate to maximal assistance for bathing; minimum assist for upper body dressing; maximum to moderate assistance for lower body dressing and total assistance for toileting.  Investigating potential  short-term SNF for continued rehab prior to going home     Atrial fibrillation rate is controlled continue Xarelto     History of lymphoma follow-up with oncology after rehab is complete     CHF preserved EF compensated     VTE Prophylaxis:   Mechanical Order History:         None             Pharmalogical Order History:           Ordered     Dose Route Frequency Stop     23 1649   rivaroxaban (XARELTO) tablet 20 mg        Question:  Are you ordering rivaroxaban 10 mg for the prevention of blood clots in an acutely ill medical patient?  Answer:  No    20 mg PO Daily With Dinner --                                Sj Jay MD  [facility] Hospitalist  23  10:05 Vu Valdovinos PTA   Physical Therapist Assistant  Physical Therapy     Therapy Progress Report/Re-Cert      Signed     Date of Service: 23  Creation Time: 23     Signed       Expand All Collapse All    Inpatient Rehabilitation - Physical Therapy Progress Note                                                               [place]     Patient Name: Antonio Canseco                  : 1955                        MRN: [id number]     Today's Date: 2023                                                                                            Admit Date: 2023                 Visit Dx:   Visit Diagnosis   No diagnosis found.        Problem List       Patient Active Problem List   Diagnosis    Detrusor instability    Low testosterone in male    Disorder of prostate    Corporo-venous occlusive erectile dysfunction    CVA (cerebral vascular accident)            Medical History        Past Medical History:   Diagnosis Date    Diabetes mellitus      Hypertension      Stroke              Surgical History         Past Surgical History:   Procedure Laterality Date    US GUIDED LYMPH NODE BIOPSY   2023            PT ASSESSMENT (last 12 hours)            IRF PT Evaluation and Treatment          Row Name 08/11/23 1456                 PT Time and Intention     Document Type daily treatment;progress note  -RG       Mode of Treatment individual therapy;physical therapy  -       Patient/Family/Caregiver Comments/Observations Pt and nursing in agreement for skilled PT on this date. Pt increased ambulation distance to 40' Max assist x 2 using PRW.  -          Row Name 08/11/23 1456                 General Information     Existing Precautions/Restrictions fall  L HP, <trunk support/balance, L side inattention  -          Row Name 08/11/23 1456                 Pain Scale: FACES Pre/Post-Treatment     Pain: FACES Scale, Pretreatment 0-->no hurt  -RG       Posttreatment Pain Rating 0-->no hurt  -          Row Name 08/11/23 1456                 Cognition/Psychosocial     Affect/Mental Status (Cognition) WFL  -RG       Follows Commands (Cognition) verbal cues/prompting required;physical/tactile prompts required  -       Personal Safety Interventions fall prevention program maintained;gait belt;nonskid shoes/slippers when out of bed  -          Row Name 08/11/23 1456                 Bed Mobility     Supine-Sit-Supine [place] (Bed Mobility) maximum assist (25% patient effort);verbal cues;nonverbal cues (demo/gesture)  -          Row Name 08/11/23 1456                 Bed-Chair Transfer     Bed-Chair [place] (Transfers) maximum assist (25% patient effort);verbal cues;nonverbal cues (demo/gesture)  -          Row Name 08/11/23 1456                 Chair-Bed Transfer     Chair-Bed [place] (Transfers) maximum assist (25% patient effort);verbal cues;nonverbal cues (demo/gesture)  -          Row Name 08/11/23 1456                 Sit-Stand Transfer     Sit-Stand [place] (Transfers) verbal cues;nonverbal cues (demo/gesture);minimum assist (75% patient effort)  -       Assistive Device (Sit-Stand Transfers) parallel bars  -          Row Name 08/11/23 1456                 Stand-Sit  Transfer     Stand-Sit [place] (Transfers) verbal cues;nonverbal cues (demo/gesture);minimum assist (75% patient effort)  -RG       Assistive Device (Stand-Sit Transfers) parallel bars  -RG          Row Name 08/11/23 1456                 Gait/Stairs (Locomotion)     [place] Level (Gait) maximum assist (25% patient effort);verbal cues;nonverbal cues (demo/gesture);2 person assist  -RG       Assistive Device (Gait) walker, rolling platform  -RG       Distance in Feet (Gait) 40'  -RG       Comment, (Gait/Stairs) 2 assist and 1 person to push wc  -RG          Row Name 08/11/23 1456                 Hip (Therapeutic Exercise)     Hip Strengthening (Therapeutic Exercise) bilateral;flexion;extension;marching while seated;sitting;2 lb free weight;resistance band;green;10 repetitions;2 sets  AAROM on L LE  -RG          Row Name 08/11/23 1456                 Knee (Therapeutic Exercise)     Knee Strengthening (Therapeutic Exercise) bilateral;flexion;extension;marching while seated;LAQ (long arc quad);sitting;2 lb free weight;resistance band;green;10 repetitions;2 sets  AAROM on L LE  -RG          Row Name 08/11/23 1456                 Ankle (Therapeutic Exercise)     Ankle Strengthening (Therapeutic Exercise) bilateral;dorsiflexion;plantarflexion;sitting;10 repetitions;2 sets  AAROM L LE  -RG          Row Name 08/11/23 1456                 Aerobic Exercise     Type (Aerobic Exercise) recumbent stationary bike  -RG       Time Performed (Aerobic Exercise) 15 min  -RG       Comment, Aerobic Exercise (Therapeutic Exercise) L1 in wc  -RG          Row Name 08/11/23 1456                 Positioning and Restraints     Pre-Treatment Position in bed  -RG       Post Treatment Position wheelchair  -RG       In Wheelchair notified nsg;sitting;with OT;legs elevated  -RG          Row Name 08/11/23 1456                 Bed Mobility Goal 1 (PT-IRF)     Progress/Outcomes (Bed Mobility Goal 1, PT-IRF) goal ongoing  -RG          Row Name  08/11/23 1456                 Bed Mobility Goal 2 (PT-IRF)     Progress/Outcomes (Bed Mobility Goal 2, PT-IRF) goal ongoing  -RG          Row Name 08/11/23 1456                 Transfer Goal 1 (PT-IRF)     Progress/Outcomes (Transfer Goal 1, PT-IRF) goal ongoing  -RG          Row Name 08/11/23 1456                 Transfer Goal 2 (PT-IRF)     Progress/Outcomes (Transfer Goal 2, PT-IRF) goal ongoing  -RG          Row Name 08/11/23 1456                 Gait/Walking Locomotion Goal 1 (PT-IRF)     Progress/Outcomes (Gait/Walking Locomotion Goal 1, PT-IRF) goal ongoing;goal partially met  -RG          Row Name 08/11/23 1456                 Gait/Walking Locomotion Goal 2 (PT-IRF)     Progress/Outcomes (Gait/Walking Locomotion Goal 2, PT-IRF) goal ongoing  -RG                    User Key  (r) = Recorded By, (t) = Taken By, (c) = Cosigned By        Initials Name Provider Type     Vu Galloway PTA Physical Therapist Assistant                               PT Recommendation and Plan     Daily Progress Summary (PT)  Impairments Still Limiting Function (PT): balance impairment, coordination impairment, functional activity tolerance impairment, motor control impaired, pain, postural control impaired, strength deficit, sensory impairment         Time Calculation:        PT Charges         Row Name 08/11/23 1503                       Time Calculation     Start Time 0745  -RG         Stop Time 0915  -         Time Calculation (min) 90 min  -RG         PT Received On 08/11/23  -         PT Goal Re-Cert Due Date 08/18/23  -                 Time Calculation- PT     Total Timed Code Minutes- PT 90 minute(s)  -Lisa Louie, OT   Occupational Therapist  Occupational Therapy     Therapy Treatment Note      Signed     Date of Service: 08/11/23 1208  Creation Time: 08/11/23 1208     Signed       Expand All Collapse All    Inpatient Rehabilitation - Occupational Therapy Progress Note and  Treatment Note     THALIA Trujillo     Patient Name: Antonio Canseco                  : 1955                        MRN: [id number]     Today's Date: 2023                                                                             Admit Date: 2023        Visit Diagnosis   No diagnosis found.        Problem List       Patient Active Problem List   Diagnosis    Detrusor instability    Low testosterone in male    Disorder of prostate    Corporo-venous occlusive erectile dysfunction    CVA (cerebral vascular accident)            Medical History        Past Medical History:   Diagnosis Date    Diabetes mellitus      Hypertension      Stroke              Surgical History         Past Surgical History:   Procedure Laterality Date    US GUIDED LYMPH NODE BIOPSY   2023                           IRF OT ASSESSMENT FLOWSHEET (last 12 hours)            IRF OT Evaluation and Treatment         Row Name 23 1205                 OT Time and Intention     Document Type daily treatment;progress note  -BF       Mode of Treatment occupational therapy  -BF       Patient Effort good  -BF       Symptoms Noted During/After Treatment fatigue  -BF          Row Name 23 1205                 General Information     Existing Precautions/Restrictions fall  L HP, <trunk support/balance, L side inattention  -BF          Row Name 23 1205                 Cognition/Psychosocial     Orientation Status (Cognition) oriented x 3  -BF       Follows Commands (Cognition) follows one-step commands;verbal cues/prompting required;repetition of directions required;physical/tactile prompts required  -BF          Row Name 23 1205                 Bathing     Comment (Bathing) Mod/Max A  -BF          Row Name 23 1205                 Upper Body Dressing     Comment (Upper Body Dressing) Min A  -BF          Row Name 23 1205                 Lower Body Dressing     Comment (Lower Body Dressing) Max/Mod A  -BF          Row  Name 08/11/23 1205                 Grooming     Comment (Grooming) Min A  -BF          Row Name 08/11/23 1205                 Toileting     Comment (Toileting) Total A  -BF          Row Name 08/11/23 1205                 Self-Feeding     Comment (Self-Feeding) Set-up  -BF          Row Name 08/11/23 1205                 Chair-Bed Transfer     Chair-Bed [place] (Transfers) maximum assist (25% patient effort);2 person assist;verbal cues;nonverbal cues (demo/gesture)  -BF       Assistive Device (Chair-Bed Transfers) wheelchair  -BF          Row Name 08/11/23 1205                 Motor Skills     Motor Control/Coordination Interventions fine motor manipulation/dexterity activities;gross motor coordination activities;neuro-muscular re-education;therapeutic exercise/ROM  LUE GMC/FMC theract, strengthening; OMAR [place] 25 lbs X10X5, PRE 10 mins  -BF          Row Name 08/11/23 1205                 Positioning and Restraints     In Bed supine;call light within reach;encouraged to call for assist;LUE elevated  -BF                    User Key  (r) = Recorded By, (t) = Taken By, (c) = Cosigned By        Initials Name Effective Dates     BF Lisa Sanchez, OT 07/11/23 -                                OT Recommendation and Plan     Planned Therapy Interventions (OT): activity tolerance training, adaptive equipment training, BADL retraining, neuromuscular control/coordination retraining, passive ROM/stretching, ROM/therapeutic exercise, strengthening exercise, transfer/mobility retraining           Time Calculation:        Time Calculation- OT         Row Name 08/11/23 1208                       Time Calculation- OT     OT Start Time 0915  -BF         OT Stop Time 1045  -BF         OT Time Calculation (min) 90 min  -BF         Total Timed Code Minutes- OT 90 minute(s)  -BF         OT Non-Billable Time (min) 10 min  -BF

## 2023-08-14 NOTE — PROGRESS NOTES
Assisted By: Karyn CHI    CC: Follow-up on CVA    Interview History/HPI: Patient states he is doing well, he has no new complaints his bowels are moving tolerating his diet          Current Hospital Meds:  allopurinol, 300 mg, Oral, Daily  atorvastatin, 80 mg, Oral, Daily  magnesium oxide, 400 mg, Oral, Daily  metoprolol tartrate, 25 mg, Oral, Q12H  nystatin, , Topical, Q12H  pantoprazole, 40 mg, Oral, Q AM  polyethylene glycol, 17 g, Oral, Daily  rivaroxaban, 20 mg, Oral, Daily With Dinner  tamsulosin, 0.4 mg, Oral, Daily         Vitals:    08/14/23 0743   BP: 119/74   Pulse: 90   Resp: 16   Temp: 98.3 øF (36.8 øC)   SpO2: 98%         Intake/Output Summary (Last 24 hours) at 8/14/2023 1229  Last data filed at 8/14/2023 0900  Gross per 24 hour   Intake 960 ml   Output 250 ml   Net 710 ml       EXAM: Mood is good skin warm dry lungs clear still has adequate left upper extremity strength, still some clumsiness of the hand.  He still has some extension of the hip of the left leg but I could not get him to move his foot, no edema.  Lungs clear, heart is irregularly irregular controlled rhythm.      Diet: Cardiac Diets; Healthy Heart (2-3 Na+); Texture: Regular Texture (IDDSI 7); Fluid Consistency: Thin (IDDSI 0)        LABS:     Lab Results (last 48 hours)       Procedure Component Value Units Date/Time    POC Glucose Once [[id number]]  (Normal) Collected: 08/14/23 0558    Specimen: Blood Updated: 08/14/23 0604     Glucose 113 mg/dL     POC Glucose Once [[id number]]  (Abnormal) Collected: 08/13/23 1616    Specimen: Blood Updated: 08/13/23 1621     Glucose 172 mg/dL     POC Glucose Once [[id number]]  (Normal) Collected: 08/13/23 0622    Specimen: Blood Updated: 08/13/23 0629     Glucose 115 mg/dL     POC Glucose Once [[id number]]  (Abnormal) Collected: 08/12/23 1557    Specimen: Blood Updated: 08/12/23 1608     Glucose 141 mg/dL                  Radiology:    Imaging Results (Last 72 Hours)       ** No results found for the  last 72 hours. **                Assessment/Plan:   Status post CVA with left residual.  This was thought secondary to atrial fibrillation, embolic, on Xarelto for further stroke prevention.  Patient continues to work with occupational physical therapy.  His last PT session he was able to walk 40 feet max assist of 2.  He is min assist in the parallel bars sit to stand and stand to sit.  He is max assist for bed mobility and bed to chair/chair to bed.  With OT, patient is mod to max with bathing, min assist upper body dressing, max to mod lower body dressing, min assist grooming, total assist toileting, set up for self-feeding.  Patient services is working on short-term nursing home placement.  Patient is progressing slowly towards goals.  Patient is also on statin.    Atrial fibrillation, rate controlled, on Xarelto for further stroke prevention.    Patient states he was able to use a urinal, he is doing well from a BPH standpoint.    Labs unremarkable on the 10th    Hayder Mendoza MD

## 2023-08-14 NOTE — THERAPY TREATMENT NOTE
Inpatient Rehabilitation - Occupational Therapy Treatment Note     [facility]     Patient Name: Antonio Canseco  : 1955  MRN: [id number]    Today's Date: 2023                 Admit Date: 2023       No diagnosis found.    Patient Active Problem List   Diagnosis    Detrusor instability    Low testosterone in male    Disorder of prostate    Corporo-venous occlusive erectile dysfunction    CVA (cerebral vascular accident)       Past Medical History:   Diagnosis Date    Diabetes mellitus     Hypertension     Stroke        Past Surgical History:   Procedure Laterality Date    US GUIDED LYMPH NODE BIOPSY  2023             IRF OT ASSESSMENT FLOWSHEET (last 12 hours)       IRF OT Evaluation and Treatment       Row Name 23 1528          OT Time and Intention    Document Type daily treatment  -BF     Mode of Treatment occupational therapy  -BF     Patient Effort good  -BF     Symptoms Noted During/After Treatment fatigue  -BF       Row Name 23 1528          General Information    General Observations of Patient Pt expressed/demonstrated increased fatigue this date, completing remainder of OT treatment in PM.  -BF     Existing Precautions/Restrictions fall  L HP, <trunk support/balance, L side inattention  -BF     Limitations/Impairments safety/cognitive  -BF       Row Name 23 1528          Cognition/Psychosocial    Orientation Status (Cognition) oriented x 3  -BF     Follows Commands (Cognition) verbal cues/prompting required;repetition of directions required;physical/tactile prompts required  -BF       Row Name 23 1528          Bed-Chair Transfer    Bed-Chair [place] (Transfers) maximum assist (25% patient effort);2 person assist;verbal cues;nonverbal cues (demo/gesture)  -BF     Assistive Device (Bed-Chair Transfers) wheelchair  -BF       Row Name 23 1528          Motor Skills    Motor Control/Coordination Interventions fine motor manipulation/dexterity activities;gross  motor coordination activities;therapeutic exercise/ROM  LUE GMC/FMC theract, strengthening w/ theraband; mod cues to use L hand for FMC theract; BUE PRE 10 mins  -BF       Row Name 08/14/23 1528          Positioning and Restraints    In Bed supine;call light within reach;encouraged to call for assist;LUE elevated;notified nsg  -BF               User Key  (r) = Recorded By, (t) = Taken By, (c) = Cosigned By      Initials Name Effective Dates    BF Lisa Sanchez OT 07/11/23 -                      Occupational Therapy Education       Title: PT OT SLP Therapies (Done)       Topic: Occupational Therapy (Done)       Point: ADL training (Done)       Description:   Instruct learner(s) on proper safety adaptation and remediation techniques during self care or transfers.   Instruct in proper use of assistive devices.                  Learning Progress Summary             Patient Acceptance, E, VU,NR by BF at 8/14/2023 1527    Acceptance, E, VU,NR by BF at 8/11/2023 1204    Acceptance, E,D, VU,DU by BF at 8/10/2023 1524    Acceptance, TB, NR by DL at 8/8/2023 2014    Acceptance, E, VU,NR by BF at 8/8/2023 1440    Acceptance, E, VU,NR by BF at 8/7/2023 1526    Acceptance, E, VU,NR by BF at 8/4/2023 1221    Acceptance, E,TB, VU,NR by MB at 8/3/2023 2239    Acceptance, E, VU,NR by BF at 8/3/2023 1217    Acceptance, E, VU,NR by BF at 8/1/2023 1427    Acceptance, E, VU,NR by BF at 7/31/2023 1409    Acceptance, E, VU,NR by BF at 7/28/2023 1442    Acceptance, E, VU,NR by BF at 7/27/2023 1445    Acceptance, E, VU,NR by BF at 7/25/2023 1251    Acceptance, E, VU,NR by HB at 7/24/2023 1355    Acceptance, E, NR,VU by BF at 7/21/2023 1248    Acceptance, E, NR by BF at 7/20/2023 1259    Acceptance, TB, NR by DL at 7/19/2023 2036    Acceptance, E, VU,NR by HB at 7/19/2023 1421    Acceptance, E, VU,NR by BF at 7/18/2023 1334    Acceptance, E, VU,NR by BF at 7/17/2023 1431    Acceptance, E, VU,NR by BF at 7/17/2023 1430     Acceptance, E, VU,NR by BF at 7/14/2023 1508    Acceptance, E, VU,NR by BF at 7/13/2023 1343    Acceptance, E,D, VU,NR by TM at 7/12/2023 1407    Acceptance, D,E, NR,VU by TM at 7/11/2023 1434    Acceptance, E, VU,NR by BF at 7/10/2023 1456    Acceptance, E, VU,NR by HB at 7/8/2023 1140    Acceptance, E, VU,NR by BF at 7/7/2023 1442   Family Acceptance, E,D, VU,DU by BF at 8/10/2023 1524    Acceptance, E, VU,NR by BF at 8/1/2023 1427                         Point: Precautions (Done)       Description:   Instruct learner(s) on prescribed precautions during self-care and functional transfers.                  Learning Progress Summary             Patient Acceptance, E, VU,NR by BF at 8/14/2023 1527    Acceptance, E, VU,NR by BF at 8/11/2023 1204    Acceptance, E,D, VU,DU by BF at 8/10/2023 1524    Acceptance, TB, NR by DL at 8/8/2023 2014    Acceptance, E, VU,NR by BF at 8/8/2023 1440    Acceptance, E, VU,NR by BF at 8/7/2023 1526    Acceptance, E, VU,NR by BF at 8/4/2023 1221    Acceptance, E,TB, VU,NR by MB at 8/3/2023 2239    Acceptance, E, VU,NR by BF at 8/3/2023 1217    Acceptance, E, VU,NR by BF at 8/1/2023 1427    Acceptance, E, VU,NR by BF at 7/31/2023 1409    Acceptance, E, VU,NR by BF at 7/28/2023 1442    Acceptance, E, VU,NR by BF at 7/27/2023 1445    Acceptance, E, VU,NR by BF at 7/25/2023 1251    Acceptance, E, VU,NR by HB at 7/24/2023 1355    Acceptance, E, NR,VU by BF at 7/21/2023 1248    Acceptance, E, NR by BF at 7/20/2023 1259    Acceptance, TB, NR by DL at 7/19/2023 2036    Acceptance, E, VU,NR by HB at 7/19/2023 1421    Acceptance, E, VU,NR by BF at 7/18/2023 1334    Acceptance, E, VU,NR by BF at 7/17/2023 1431    Acceptance, E, VU,NR by BF at 7/17/2023 1430    Acceptance, E, VU,NR by BF at 7/14/2023 1508    Acceptance, E, VU,NR by BF at 7/13/2023 1343    Acceptance, E,D, VU,NR by TM at 7/12/2023 1407    Acceptance, D,E, NR,VU by TM at 7/11/2023 1434    Acceptance, E, VU,NR by BF at 7/10/2023 1456     Acceptance, E, VU,NR by HB at 7/8/2023 1140    Acceptance, E, VU,NR by BF at 7/7/2023 1442   Family Acceptance, E,D, VU,DU by BF at 8/10/2023 1524    Acceptance, E, VU,NR by BF at 8/1/2023 1427                                         User Key       Initials Effective Dates Name Provider Type Discipline    BF 05/31/23 - 07/10/23 Lisa Sanchez, OT Occupational Therapist OT    BF 07/11/23 -  Lisa Sanchez, OT Occupational Therapist OT    TM 06/16/21 -  Marielos Capone, OT Occupational Therapist OT    HB 05/25/21 -  Veronica Robles OT Occupational Therapist OT    DL 03/16/22 -  Nadja Velasquez, RN Registered Nurse Nurse    MB 06/26/23 -  Vinay Fritz RN Registered Nurse Nurse                        OT Recommendation and Plan    Planned Therapy Interventions (OT): activity tolerance training, adaptive equipment training, BADL retraining, neuromuscular control/coordination retraining, passive ROM/stretching, ROM/therapeutic exercise, strengthening exercise, transfer/mobility retraining                    Time Calculation:      Time Calculation- OT       Row Name 08/14/23 1532 08/14/23 1531          Time Calculation- OT    OT Start Time 1445  -BF 0915  -BF     OT Stop Time 1515  -BF 1015  -BF     OT Time Calculation (min) 30 min  -BF 60 min  -BF     Total Timed Code Minutes- OT 30 minute(s)  -BF 60 minute(s)  -BF     OT Non-Billable Time (min) -- 10 min  -BF               User Key  (r) = Recorded By, (t) = Taken By, (c) = Cosigned By      Initials Name Provider Type     Daniel Lisa Lima, OT Occupational Therapist                  Therapy Charges for Today       Code Description Service Date Service Provider Modifiers Qty    77567400097 HC OT NEUROMUSC RE EDUCATION EA 15 MIN 8/14/2023 Lisa Sanchez OT GO 3    68858169808 HC OT SELF CARE/MGMT/TRAIN EA 15 MIN 8/14/2023 Lisa Sanchez OT GO 1    35073200481 HC OT THERAPEUTIC ACT EA 15 MIN 8/14/2023 Lisa Sanchez  Janie, OT GO 2                     Lisa Sanchez, OT  8/14/2023

## 2023-08-14 NOTE — PROGRESS NOTES
Inpatient Rehabilitation Functional Measures Assessment and Plan of Care    Plan of Care  Self Care    [OT] Dressing (Upper)(Active)  Current Status(08/14/2023): Min A  Weekly Goal(08/22/2023): CGA  Discharge Goal: CGA    Functional Measures  SOBIA Eating:  SOBIA Grooming:  SOBIA Bathing:  SOBIA Upper Body Dressing:  SOBIA Lower Body Dressing:  SOBIA Toileting:    SOBIA Bladder Management  Level of Assistance:  Frequency/Number of Accidents this Shift:    SOBIA Bowel Management  Level of Assistance:  Frequency/Number of Accidents this Shift:    SOBIA Bed/Chair/Wheelchair Transfer:  SOBIA Toilet Transfer:  SOBIA Tub/Shower Transfer:    Previously Documented Mode of Locomotion at Discharge:  SOBIA Expected Mode of Locomotion at Discharge:  SOBIA Walk/Wheelchair:  SOBIA Stairs:    SOBIA Comprehension:  SOBIA Expression:  SOBIA Social Interaction:  SOBIA Problem Solving:  SOBIA Memory:    Therapy Mode Minutes  Occupational Therapy: Individual: 90 minutes.  Physical Therapy:  Speech Language Pathology:    Discharge Functional Goals:    Signed by: Lisa Sanchez OT

## 2023-08-14 NOTE — SIGNIFICANT NOTE
08/14/23 0852   Plan   Plan SS [facility] who says after their morning meeting they plan to review referrals.    Informed Joselo pt does not plan to receive chemotherapy or radiation tx if recommended for laryngeal cancer (he has a follow-up on 8-30-23) until he completes rehab.  Joselo says to follow-up with him later today.

## 2023-08-14 NOTE — SIGNIFICANT NOTE
08/14/23 5600   Plan   Plan SS received message from Joselo, , at [facility] that pt will be reviewed.  NH to follow-up with SS about admission.

## 2023-08-15 LAB — GLUCOSE BLDC GLUCOMTR-MCNC: 109 MG/DL (ref 70–130)

## 2023-08-15 PROCEDURE — 99231 SBSQ HOSP IP/OBS SF/LOW 25: CPT | Performed by: INTERNAL MEDICINE

## 2023-08-15 PROCEDURE — 97535 SELF CARE MNGMENT TRAINING: CPT | Performed by: OCCUPATIONAL THERAPIST

## 2023-08-15 PROCEDURE — 97110 THERAPEUTIC EXERCISES: CPT

## 2023-08-15 PROCEDURE — 97116 GAIT TRAINING THERAPY: CPT

## 2023-08-15 PROCEDURE — 97112 NEUROMUSCULAR REEDUCATION: CPT | Performed by: OCCUPATIONAL THERAPIST

## 2023-08-15 PROCEDURE — 82948 REAGENT STRIP/BLOOD GLUCOSE: CPT

## 2023-08-15 PROCEDURE — 97110 THERAPEUTIC EXERCISES: CPT | Performed by: OCCUPATIONAL THERAPIST

## 2023-08-15 PROCEDURE — 97530 THERAPEUTIC ACTIVITIES: CPT

## 2023-08-15 RX ADMIN — NYSTATIN 1 APPLICATION: 100000 POWDER TOPICAL at 08:34

## 2023-08-15 RX ADMIN — PANTOPRAZOLE SODIUM 40 MG: 40 TABLET, DELAYED RELEASE ORAL at 06:00

## 2023-08-15 RX ADMIN — MAGNESIUM GLUCONATE 500 MG ORAL TABLET 400 MG: 500 TABLET ORAL at 08:33

## 2023-08-15 RX ADMIN — METOPROLOL TARTRATE 25 MG: 25 TABLET, FILM COATED ORAL at 21:16

## 2023-08-15 RX ADMIN — HYDROXYZINE HYDROCHLORIDE 25 MG: 25 TABLET, FILM COATED ORAL at 21:16

## 2023-08-15 RX ADMIN — METOPROLOL TARTRATE 25 MG: 25 TABLET, FILM COATED ORAL at 08:33

## 2023-08-15 RX ADMIN — NYSTATIN: 100000 POWDER TOPICAL at 21:17

## 2023-08-15 RX ADMIN — POLYETHYLENE GLYCOL (3350) 17 G: 17 POWDER, FOR SOLUTION ORAL at 08:33

## 2023-08-15 RX ADMIN — ATORVASTATIN CALCIUM 80 MG: 40 TABLET, FILM COATED ORAL at 08:33

## 2023-08-15 RX ADMIN — RIVAROXABAN 20 MG: 20 TABLET, FILM COATED ORAL at 17:32

## 2023-08-15 RX ADMIN — ALLOPURINOL 300 MG: 300 TABLET ORAL at 08:33

## 2023-08-15 RX ADMIN — TAMSULOSIN HYDROCHLORIDE 0.4 MG: 0.4 CAPSULE ORAL at 08:33

## 2023-08-15 NOTE — SIGNIFICANT NOTE
08/15/23 0915   Plan   Plan Contacted [facility] &R [phone] per Zuleyma who says Joselo, , is in a meeting.  SS sent message to Joselo to follow-up with SS about a decision regarding admission.

## 2023-08-15 NOTE — THERAPY TREATMENT NOTE
Inpatient Rehabilitation - Occupational Therapy Treatment Note    THALIA Trujillo     Patient Name: Antonio Canseco  : 1955  MRN: [id number]    Today's Date: 8/15/2023                 Admit Date: 2023       No diagnosis found.    Patient Active Problem List   Diagnosis    Detrusor instability    Low testosterone in male    Disorder of prostate    Corporo-venous occlusive erectile dysfunction    CVA (cerebral vascular accident)       Past Medical History:   Diagnosis Date    Diabetes mellitus     Hypertension     Stroke        Past Surgical History:   Procedure Laterality Date    US GUIDED LYMPH NODE BIOPSY  2023             IRF OT ASSESSMENT FLOWSHEET (last 12 hours)       IRF OT Evaluation and Treatment       Row Name 08/15/23 1222          OT Time and Intention    Document Type daily treatment  -BF     Mode of Treatment occupational therapy  -BF     Patient Effort good  -BF     Symptoms Noted During/After Treatment fatigue  -BF       Row Name 08/15/23 1222          General Information    Existing Precautions/Restrictions fall  L HP, <trunk support/balance, L side inattention  -BF     Limitations/Impairments safety/cognitive  -BF       Row Name 08/15/23 1222          Cognition/Psychosocial    Orientation Status (Cognition) oriented x 3  -BF     Follows Commands (Cognition) verbal cues/prompting required;repetition of directions required;physical/tactile prompts required  -BF       Row Name 08/15/23 1222          Grooming    [place] (Grooming) minimum assist (75% patient effort);verbal cues;nonverbal cues (demo/gesture)  -BF     Position (Grooming) supported sitting  -BF       Row Name 08/15/23 1222          Motor Skills    Motor Control/Coordination Interventions fine motor manipulation/dexterity activities;gross motor coordination activities;therapeutic exercise/ROM  BUE GMC/FMC theract, strengthening; OMAR [place] 25 lbs X20X3, PRE 15 mins, flexbar therex, handgripper therex  -BF       Row  Name 08/15/23 1222          Positioning and Restraints    In Wheelchair sitting;with other staff  w/   -               User Key  (r) = Recorded By, (t) = Taken By, (c) = Cosigned By      Initials Name Effective Dates    BF Lisa Sanchez OT 07/11/23 -                      Occupational Therapy Education       Title: PT OT SLP Therapies (Done)       Topic: Occupational Therapy (Done)       Point: ADL training (Done)       Description:   Instruct learner(s) on proper safety adaptation and remediation techniques during self care or transfers.   Instruct in proper use of assistive devices.                  Learning Progress Summary             Patient Acceptance, E, VU,NR by BF at 8/15/2023 1222    Acceptance, E, VU,NR by BF at 8/14/2023 1527    Acceptance, E, VU,NR by BF at 8/11/2023 1204    Acceptance, E,D, VU,DU by BF at 8/10/2023 1524    Acceptance, TB, NR by DL at 8/8/2023 2014    Acceptance, E, VU,NR by BF at 8/8/2023 1440    Acceptance, E, VU,NR by BF at 8/7/2023 1526    Acceptance, E, VU,NR by BF at 8/4/2023 1221    Acceptance, E,TB, VU,NR by MB at 8/3/2023 2239    Acceptance, E, VU,NR by BF at 8/3/2023 1217    Acceptance, E, VU,NR by BF at 8/1/2023 1427    Acceptance, E, VU,NR by BF at 7/31/2023 1409    Acceptance, E, VU,NR by BF at 7/28/2023 1442    Acceptance, E, VU,NR by BF at 7/27/2023 1445    Acceptance, E, VU,NR by BF at 7/25/2023 1251    Acceptance, E, VU,NR by HB at 7/24/2023 1355    Acceptance, E, NR,VU by BF at 7/21/2023 1248    Acceptance, E, NR by BF at 7/20/2023 1259    Acceptance, TB, NR by DL at 7/19/2023 2036    Acceptance, E, VU,NR by HB at 7/19/2023 1421    Acceptance, E, VU,NR by BF at 7/18/2023 1334    Acceptance, E, VU,NR by BF at 7/17/2023 1431    Acceptance, E, VU,NR by BF at 7/17/2023 1430    Acceptance, E, VU,NR by BF at 7/14/2023 1508    Acceptance, E, VU,NR by BF at 7/13/2023 1343    Acceptance, E,D, VU,NR by TM at 7/12/2023 1407    Acceptance, D,E, NR,VU by  TM at 7/11/2023 1434    Acceptance, E, VU,NR by BF at 7/10/2023 1456    Acceptance, E, VU,NR by HB at 7/8/2023 1140    Acceptance, E, VU,NR by BF at 7/7/2023 1442   Family Acceptance, E,D, VU,DU by BF at 8/10/2023 1524    Acceptance, E, VU,NR by BF at 8/1/2023 1427                         Point: Precautions (Done)       Description:   Instruct learner(s) on prescribed precautions during self-care and functional transfers.                  Learning Progress Summary             Patient Acceptance, E, VU,NR by BF at 8/15/2023 1222    Acceptance, E, VU,NR by BF at 8/14/2023 1527    Acceptance, E, VU,NR by BF at 8/11/2023 1204    Acceptance, E,D, VU,DU by BF at 8/10/2023 1524    Acceptance, TB, NR by DL at 8/8/2023 2014    Acceptance, E, VU,NR by BF at 8/8/2023 1440    Acceptance, E, VU,NR by BF at 8/7/2023 1526    Acceptance, E, VU,NR by BF at 8/4/2023 1221    Acceptance, E,TB, VU,NR by MB at 8/3/2023 2239    Acceptance, E, VU,NR by BF at 8/3/2023 1217    Acceptance, E, VU,NR by BF at 8/1/2023 1427    Acceptance, E, VU,NR by BF at 7/31/2023 1409    Acceptance, E, VU,NR by BF at 7/28/2023 1442    Acceptance, E, VU,NR by BF at 7/27/2023 1445    Acceptance, E, VU,NR by BF at 7/25/2023 1251    Acceptance, E, VU,NR by HB at 7/24/2023 1355    Acceptance, E, NR,VU by BF at 7/21/2023 1248    Acceptance, E, NR by BF at 7/20/2023 1259    Acceptance, TB, NR by DL at 7/19/2023 2036    Acceptance, E, VU,NR by HB at 7/19/2023 1421    Acceptance, E, VU,NR by BF at 7/18/2023 1334    Acceptance, E, VU,NR by BF at 7/17/2023 1431    Acceptance, E, VU,NR by BF at 7/17/2023 1430    Acceptance, E, VU,NR by BF at 7/14/2023 1508    Acceptance, E, VU,NR by BF at 7/13/2023 1343    Acceptance, E,D, VU,NR by TM at 7/12/2023 1407    Acceptance, D,E, NR,VU by TM at 7/11/2023 1434    Acceptance, E, VU,NR by BF at 7/10/2023 1456    Acceptance, E, VU,NR by HB at 7/8/2023 1140    Acceptance, E, VU,NR by BF at 7/7/2023 1442   Family Acceptance, E,D,  VU,DU by BF at 8/10/2023 1524    Acceptance, E, VU,NR by  at 8/1/2023 1427                                         User Key       Initials Effective Dates Name Provider Type Discipline    BF 05/31/23 - 07/10/23 Lisa Sanchez, OT Occupational Therapist OT    BF 07/11/23 -  Lisa Sanchez, OT Occupational Therapist OT    TM 06/16/21 -  Marielos Capone OT Occupational Therapist OT    HB 05/25/21 -  Veronica Robles OT Occupational Therapist OT    DL 03/16/22 -  Nadja Velasquez, RN Registered Nurse Nurse    MB 06/26/23 -  Vinay Fritz, RN Registered Nurse Nurse                        OT Recommendation and Plan    Planned Therapy Interventions (OT): activity tolerance training, adaptive equipment training, BADL retraining, neuromuscular control/coordination retraining, passive ROM/stretching, ROM/therapeutic exercise, strengthening exercise, transfer/mobility retraining                    Time Calculation:      Time Calculation- OT       Row Name 08/15/23 1225             Time Calculation- OT    OT Start Time 0915  -BF      OT Stop Time 1045  -BF      OT Time Calculation (min) 90 min  -BF      Total Timed Code Minutes- OT 90 minute(s)  -BF      OT Non-Billable Time (min) 10 min  -BF                User Key  (r) = Recorded By, (t) = Taken By, (c) = Cosigned By      Initials Name Provider Type    BF Daniel Lisa Lima, OT Occupational Therapist                  Therapy Charges for Today       Code Description Service Date Service Provider Modifiers Qty    89260174736 HC OT NEUROMUSC RE EDUCATION EA 15 MIN 8/14/2023 Lisa Sanchez OT GO 3    42548961397 HC OT SELF CARE/MGMT/TRAIN EA 15 MIN 8/14/2023 Lisa Sanchez OT GO 1    75317336195 HC OT THERAPEUTIC ACT EA 15 MIN 8/14/2023 Lisa Sanchez OT GO 2    26075533402 HC OT SELF CARE/MGMT/TRAIN EA 15 MIN 8/15/2023 Lisa Sanchez OT GO 1    81381105833 HC OT NEUROMUSC RE EDUCATION EA 15 MIN 8/15/2023 Daniel  Lisa Lima, OT GO 3    [id number]  OT THER PROC EA 15 MIN 8/15/2023 Lisa Sanchez OT GO 2                     Lisa Sanchez, OT  8/15/2023

## 2023-08-15 NOTE — PLAN OF CARE
Goal Outcome Evaluation:  Plan of Care Reviewed With: patient resting in bed no changes at present, continues to participate in therapy, will continue to monitor.

## 2023-08-15 NOTE — PROGRESS NOTES
Rehabilitation Nursing  Inpatient Rehabilitation Plan of Care Note    Plan of Care  Copy from POCBody Function Structure    Skin Integrity (Active)  Current Status (7/6/2023 4:18:00 PM): At Risk for Skin Breakdown  Weekly Goal: No Skin Breakdown  Discharge Goal: No Skin Breakdown    Safety    Potential for Injury (Active)  Current Status (7/6/2023 4:18:00 PM): Potential for Falls  Weekly Goal: No Falls  Discharge Goal: No Falls    Signed by: Katie Crandall RN

## 2023-08-15 NOTE — PROGRESS NOTES
Assisted By: PT    CC: Follow-up on CVA    Interview History/HPI: Patient's mood is good and he is without complaints.  He is starting to move his leg through better with ambulation and he continues to work hard with both occupational and physical therapy.          Current Hospital Meds:  allopurinol, 300 mg, Oral, Daily  atorvastatin, 80 mg, Oral, Daily  magnesium oxide, 400 mg, Oral, Daily  metoprolol tartrate, 25 mg, Oral, Q12H  nystatin, , Topical, Q12H  pantoprazole, 40 mg, Oral, Q AM  polyethylene glycol, 17 g, Oral, Daily  rivaroxaban, 20 mg, Oral, Daily With Dinner  tamsulosin, 0.4 mg, Oral, Daily         Vitals:    08/15/23 0833   BP: 120/80   Pulse: 82   Resp: 18   Temp: 97.8 øF (36.6 øC)   SpO2: 96%         Intake/Output Summary (Last 24 hours) at 8/15/2023 1110  Last data filed at 8/15/2023 0900  Gross per 24 hour   Intake 822 ml   Output 425 ml   Net 397 ml       EXAM: Mood is good, heart is irregularly irregular rhythm without murmur lungs are clear, he was doing grasping exercises with a grasping tool in his left hand and seems to be improving with this.      Diet: Cardiac Diets; Healthy Heart (2-3 Na+); Texture: Regular Texture (IDDSI 7); Fluid Consistency: Thin (IDDSI 0)        LABS:     Lab Results (last 48 hours)       Procedure Component Value Units Date/Time    POC Glucose Once [[id number]]  (Normal) Collected: 08/15/23 0626    Specimen: Blood Updated: 08/15/23 0633     Glucose 109 mg/dL     POC Glucose Once [[id number]]  (Normal) Collected: 08/14/23 1616    Specimen: Blood Updated: 08/14/23 1639     Glucose 116 mg/dL     POC Glucose Once [[id number]]  (Normal) Collected: 08/14/23 0558    Specimen: Blood Updated: 08/14/23 0604     Glucose 113 mg/dL     POC Glucose Once [[id number]]  (Abnormal) Collected: 08/13/23 1616    Specimen: Blood Updated: 08/13/23 1621     Glucose 172 mg/dL                  Radiology:    Imaging Results (Last 72 Hours)       ** No results found for the last 72 hours. **                 Assessment/Plan:   Status post right CVA with left residual.  Patient is working with occupational and physical therapy.  With Occupational Therapy, he is max assist lower body dressing, min assist upper body dressing, maximum assist with transfers and bed mobility, he is walking up to 40 feet with a rolling walker max assist of 2.  Did discuss with the physical therapist and he is swinging his leg through better on his own.  He was discussed in case conference.  Since he is making progress, we do feel he could benefit from additional acute inpatient rehab stay.  We were thinking about nursing home placement but with this additional stay we are hopeful that he will be able actually to go directly home.   has been discussing with the patient's family about this as well.  Patient is certainly very willing to stay and continue to work.    Atrial fibrillation, rate controlled, on Xarelto for stroke prevention.    Patient continues to void well on Flomax, he is using his own urinal.    Glucoses have been essentially normal, have stopped his Accu-Cheks.    Constipation, continue MiraLAX and follow, possibly additional stool softener tomorrow if no BM.    Hayder Mendoza MD

## 2023-08-15 NOTE — THERAPY TREATMENT NOTE
Inpatient Rehabilitation - Physical Therapy Treatment Note        Ronnie     Patient Name: Antonio Canseco  : 1955  MRN: [id number]    Today's Date: 8/15/2023                    Admit Date: 2023      Visit Dx:   No diagnosis found.    Patient Active Problem List   Diagnosis    Detrusor instability    Low testosterone in male    Disorder of prostate    Corporo-venous occlusive erectile dysfunction    CVA (cerebral vascular accident)       Past Medical History:   Diagnosis Date    Diabetes mellitus     Hypertension     Stroke        Past Surgical History:   Procedure Laterality Date    US GUIDED LYMPH NODE BIOPSY  2023       PT ASSESSMENT (last 12 hours)       [facility] PT Evaluation and Treatment       Row Name 08/15/23 3671          PT Time and Intention    Document Type daily treatment;progress note  -HC     Mode of Treatment individual therapy;physical therapy  -HC     Patient/Family/Caregiver Comments/Observations Pt and RN in agreement for PT. Pt walked 40' with assistance from PTA to advance L LE and 20' advancing L LE on his own, Pt required PRW and mod A x 2. Sitting exercises with added Wt and resistance on R LE, PROM on L LE. Sitting stevens bag toss and  with reacher with L LE. LE bike x 15' on L.V 2.0.  -       Row Name 08/15/23 7914          General Information    Existing Precautions/Restrictions fall  L HP, <trunk support/balance, L side inattention  -       Row Name 08/15/23 7661          Pain Scale: FACES Pre/Post-Treatment    Pain: FACES Scale, Pretreatment 0-->no hurt  -HC     Posttreatment Pain Rating 0-->no hurt  -       Row Name 08/15/23 9114          Cognition/Psychosocial    Affect/Mental Status (Cognition) WFL  -HC     Follows Commands (Cognition) verbal cues/prompting required;physical/tactile prompts required  -     Personal Safety Interventions fall prevention program maintained;gait belt;nonskid shoes/slippers when out of bed;supervised activity  -       Row Name  08/15/23 [facility]          Bed Mobility    Supine-Sit [place] (Bed Mobility) maximum assist (25% patient effort);verbal cues;nonverbal cues (demo/gesture)  -       Row Name 08/15/23 1354          Bed-Chair Transfer    Bed-Chair [place] (Transfers) maximum assist (25% patient effort);verbal cues;nonverbal cues (demo/gesture);moderate assist (50% patient effort);2 person assist  -       Row Name 08/15/23 1354          Sit-Stand Transfer    Sit-Stand [place] (Transfers) verbal cues;nonverbal cues (demo/gesture);minimum assist (75% patient effort)  -     Assistive Device (Sit-Stand Transfers) walker, platform  -       Row Name 08/15/23 1354          Stand-Sit Transfer    Stand-Sit [place] (Transfers) verbal cues;nonverbal cues (demo/gesture);minimum assist (75% patient effort)  -     Assistive Device (Stand-Sit Transfers) walker, platform  -       Row Name 08/15/23 [facility]          Gait/Stairs (Locomotion)    [place]ajo Level (Gait) verbal cues;nonverbal cues (demo/gesture);2 person assist;moderate assist (50% patient effort)  -     Assistive Device (Gait) walker, rolling platform  -     Distance in Feet (Gait) 40', 20'  -HC     Pattern (Gait) step-to  -HC     Deviations/Abnormal Patterns (Gait) base of support, narrow;hebert decreased;gait speed decreased;stride length decreased;weight shifting decreased;ataxic  -     Bilateral Gait Deviations forward flexed posture  -       Row Name 08/15/23 1354          Balance    Comment, Balance Sitting 2.5# R: AP, LAQ, March, Ball sq. BTB: Hip abd, HS curls. L PROM. Mckenzie bad toss and  with reacher. LE bike x 15'.  -       Row Name 08/15/23 1354          Hip (Therapeutic Exercise)    Hip Strengthening (Therapeutic Exercise) right;flexion;aBduction;extension;aDduction;marching while seated;sitting;resistance band;blue  2.5#  -       Row Name 08/15/23 1354          Knee (Therapeutic Exercise)    Knee Strengthening (Therapeutic Exercise)  right;flexion;extension;marching while seated;LAQ (long arc quad);hamstring curls;sitting;resistance band;blue  2.5#  -       Row Name 08/15/23 1354          Ankle (Therapeutic Exercise)    Ankle Strengthening (Therapeutic Exercise) right;dorsiflexion;plantarflexion;sitting  2.5#  -       Row Name 08/15/23 1354          Positioning and Restraints    Pre-Treatment Position in bed  -HC     Post Treatment Position wheelchair  -HC     In Wheelchair with OT  -HC       Row Name 08/15/23 1354          Bed Mobility Goal 1 (PT-IRF)    Progress/Outcomes (Bed Mobility Goal 1, PT-IRF) goal ongoing  -       Row Name 08/15/23 1354          Bed Mobility Goal 2 (PT-IRF)    Progress/Outcomes (Bed Mobility Goal 2, PT-IRF) goal ongoing  -       Row Name 08/15/23 1354          Transfer Goal 1 (PT-IRF)    Progress/Outcomes (Transfer Goal 1, PT-IRF) goal ongoing  -       Row Name 08/15/23 1354          Transfer Goal 2 (PT-IRF)    Progress/Outcomes (Transfer Goal 2, PT-IRF) goal ongoing  -       Row Name 08/15/23 1354          Gait/Walking Locomotion Goal 1 (PT-IRF)    Progress/Outcomes (Gait/Walking Locomotion Goal 1, PT-IRF) goal ongoing;goal partially met  -       Row Name 08/15/23 1354          Gait/Walking Locomotion Goal 2 (PT-IRF)    Progress/Outcomes (Gait/Walking Locomotion Goal 2, PT-IRF) goal ongoing  -               User Key  (r) = Recorded By, (t) = Taken By, (c) = Cosigned By      Initials Name Provider Type    Kirti Wright, PTA Physical Therapist Assistant                     Physical Therapy Education       Title: PT OT SLP Therapies (Done)       Topic: Physical Therapy (Done)       Point: Mobility training (Done)       Learning Progress Summary             Patient Acceptance, E,TB, VU,DU by HC at 8/15/2023 1402    Acceptance, E,D, VU,NR by RG at 8/11/2023 1503    Acceptance, E,D, VU,NR by RG at 8/10/2023 1510    Acceptance, E, VU,NR by LB at 8/9/2023 1314    Acceptance, TB, NR by DL at 8/8/2023  2014    Acceptance, E,D, VU,NR by RG at 8/8/2023 1407    Acceptance, E,D, VU,NR by RG at 8/7/2023 1507    Acceptance, E, VU,NR by LB at 8/4/2023 1521    Acceptance, E,TB, VU,NR by MB at 8/3/2023 2239    Acceptance, E,D, VU,NR by RG at 8/3/2023 1514    Acceptance, E,D, VU,NR by RG at 8/2/2023 1524    Acceptance, E,D, VU,NR by RG at 8/1/2023 1446    Acceptance, E,D, VU,NR by RG at 7/31/2023 1411    Acceptance, E,TB, VU by RM at 7/28/2023 1230    Acceptance, E,D, VU,NR by RG at 7/26/2023 1518    Acceptance, E, VU,NR by LB at 7/25/2023 1133    Acceptance, E, VU,NR by LB at 7/24/2023 1444    Acceptance, E, VU,NR by LB at 7/21/2023 1615    Acceptance, E,D, VU,NR by RG at 7/20/2023 1107    Acceptance, TB, NR by DL at 7/19/2023 2036    Acceptance, E,D, VU,NR by RG at 7/19/2023 1508    Acceptance, E,D, VU,NR by RG at 7/18/2023 1520    Acceptance, E,D, VU,NR by RG at 7/17/2023 1522    Acceptance, E,D, VU,NR by LL at 7/13/2023 1358    Acceptance, E, VU,NR by LB at 7/12/2023 1144    Acceptance, E, VU,NR by LB at 7/11/2023 1426    Acceptance, E, VU,NR by LB at 7/10/2023 1452    Acceptance, E,D, VU,NR by LL at 7/8/2023 1228    Acceptance, E,D, VU,NR by LB at 7/7/2023 1449                         Point: Home exercise program (Done)       Learning Progress Summary             Patient Acceptance, E,TB, VU,DU by HC at 8/15/2023 1402    Acceptance, E,D, VU,NR by RG at 8/11/2023 1503    Acceptance, E,D, VU,NR by RG at 8/10/2023 1510    Acceptance, E, VU,NR by LB at 8/9/2023 1314    Acceptance, TB, NR by DL at 8/8/2023 2014    Acceptance, E,D, VU,NR by RG at 8/8/2023 1407    Acceptance, E,D, VU,NR by RG at 8/7/2023 1507    Acceptance, E, VU,NR by LB at 8/4/2023 1521    Acceptance, E,TB, VU,NR by MB at 8/3/2023 2239    Acceptance, E,D, VU,NR by RG at 8/3/2023 1514    Acceptance, E,D, VU,NR by RG at 8/2/2023 1524    Acceptance, E,D, VU,NR by RG at 8/1/2023 1446    Acceptance, E,D, VU,NR by RG at 7/31/2023 1411    Acceptance, E,TB, VU by  RM at 7/28/2023 1230    Acceptance, E,D, VU,NR by RG at 7/26/2023 1518    Acceptance, E, VU,NR by LB at 7/25/2023 1133    Acceptance, E, VU,NR by LB at 7/24/2023 1444    Acceptance, E, VU,NR by LB at 7/21/2023 1615    Acceptance, E,D, VU,NR by RG at 7/20/2023 1107    Acceptance, TB, NR by DL at 7/19/2023 2036    Acceptance, E,D, VU,NR by RG at 7/19/2023 1508    Acceptance, E,D, VU,NR by RG at 7/18/2023 1520    Acceptance, E,D, VU,NR by RG at 7/17/2023 1522    Acceptance, E,D, VU,NR by LL at 7/13/2023 1358    Acceptance, E, VU,NR by LB at 7/12/2023 1144    Acceptance, E, VU,NR by LB at 7/11/2023 1426    Acceptance, E, VU,NR by LB at 7/10/2023 1452    Acceptance, E,D, VU,NR by LL at 7/8/2023 1228    Acceptance, E,D, VU,NR by LB at 7/7/2023 1449                         Point: Body mechanics (Done)       Learning Progress Summary             Patient Acceptance, E,TB, VU,DU by HC at 8/15/2023 1402    Acceptance, E,D, VU,NR by RG at 8/11/2023 1503    Acceptance, E,D, VU,NR by RG at 8/10/2023 1510    Acceptance, E, VU,NR by LB at 8/9/2023 1314    Acceptance, TB, NR by DL at 8/8/2023 2014    Acceptance, E,D, VU,NR by RG at 8/8/2023 1407    Acceptance, E,D, VU,NR by RG at 8/7/2023 1507    Acceptance, E, VU,NR by LB at 8/4/2023 1521    Acceptance, E,TB, VU,NR by MB at 8/3/2023 2239    Acceptance, E,D, VU,NR by RG at 8/3/2023 1514    Acceptance, E,D, VU,NR by RG at 8/2/2023 1524    Acceptance, E,D, VU,NR by RG at 8/1/2023 1446    Acceptance, E,D, VU,NR by RG at 7/31/2023 1411    Acceptance, E,TB, VU by RM at 7/28/2023 1230    Acceptance, E,D, VU,NR by RG at 7/26/2023 1518    Acceptance, E, VU,NR by LB at 7/25/2023 1133    Acceptance, E, VU,NR by LB at 7/24/2023 1444    Acceptance, E, VU,NR by LB at 7/21/2023 1615    Acceptance, E,D, VU,NR by RG at 7/20/2023 1107    Acceptance, TB, NR by DL at 7/19/2023 2036    Acceptance, E,D, VU,NR by RG at 7/19/2023 1508    Acceptance, E,D, VU,NR by RG at 7/18/2023 1520    Acceptance, E,D,  VU,NR by RG at 7/17/2023 1522    Acceptance, E,D, VU,NR by LL at 7/13/2023 1358    Acceptance, E, VU,NR by LB at 7/12/2023 1144    Acceptance, E, VU,NR by LB at 7/11/2023 1426    Acceptance, E, VU,NR by LB at 7/10/2023 1452    Acceptance, E,D, VU,NR by LL at 7/8/2023 1228    Acceptance, E,D, VU,NR by LB at 7/7/2023 1449                         Point: Precautions (Done)       Learning Progress Summary             Patient Acceptance, E,TB, VU,DU by HC at 8/15/2023 1402    Acceptance, E,D, VU,NR by RG at 8/11/2023 1503    Acceptance, E,D, VU,NR by RG at 8/10/2023 1510    Acceptance, E, VU,NR by LB at 8/9/2023 1314    Acceptance, TB, NR by DL at 8/8/2023 2014    Acceptance, E,D, VU,NR by RG at 8/8/2023 1407    Acceptance, E,D, VU,NR by RG at 8/7/2023 1507    Acceptance, E, VU,NR by LB at 8/4/2023 1521    Acceptance, E,TB, VU,NR by MB at 8/3/2023 2239    Acceptance, E,D, VU,NR by RG at 8/3/2023 1514    Acceptance, E,D, VU,NR by RG at 8/2/2023 1524    Acceptance, E,D, VU,NR by RG at 8/1/2023 1446    Acceptance, E,D, VU,NR by RG at 7/31/2023 1411    Acceptance, E,TB, VU by RM at 7/28/2023 1230    Acceptance, E,D, VU,NR by RG at 7/26/2023 1518    Acceptance, E, VU,NR by LB at 7/25/2023 1133    Acceptance, E, VU,NR by LB at 7/24/2023 1444    Acceptance, E, VU,NR by LB at 7/21/2023 1615    Acceptance, E,D, VU,NR by RG at 7/20/2023 1107    Acceptance, TB, NR by DL at 7/19/2023 2036    Acceptance, E,D, VU,NR by RG at 7/19/2023 1508    Acceptance, E,D, VU,NR by RG at 7/18/2023 1520    Acceptance, E,D, VU,NR by RG at 7/17/2023 1522    Acceptance, E,D, VU,NR by LL at 7/13/2023 1358    Acceptance, E, VU,NR by LB at 7/12/2023 1144    Acceptance, E, VU,NR by LB at 7/11/2023 1426    Acceptance, E, VU,NR by LB at 7/10/2023 1452    Acceptance, E,D, VU,NR by LL at 7/8/2023 1228    Acceptance, E,D, VU,NR by LB at 7/7/2023 1449                                         User Key       Initials Effective Dates Name Provider Type Discipline     LB 06/16/21 -  Joslyn Garcia, PT Physical Therapist PT    LL 05/02/16 -  Connie Velasquez, JILL Physical Therapist Assistant PT    RM 02/17/23 -  Shelley Hanson, PTA Physical Therapist Assistant PT    RG 06/16/21 -  Vu Brown PTA Physical Therapist Assistant PT    HC 01/20/23 -  Kirti Arroyo PTA Physical Therapist Assistant PT    DL 03/16/22 -  Nadja Velasquez, RN Registered Nurse Nurse    MB 06/26/23 -  Vinay Fritz, RN Registered Nurse Nurse                    PT Recommendation and Plan                          Time Calculation:      PT Charges       Row Name 08/15/23 1403             Time Calculation    Start Time 0735  -HC      Stop Time 0915  -HC      Time Calculation (min) 100 min  -HC      PT Received On 08/15/23  -HC         Time Calculation- PT    Total Timed Code Minutes-  minute(s)  -HC                User Key  (r) = Recorded By, (t) = Taken By, (c) = Cosigned By      Initials Name Provider Type     Kirti Arroyo, JILL Physical Therapist Assistant                    Therapy Charges for Today       Code Description Service Date Service Provider Modifiers Qty    81781847189 HC GAIT TRAINING EA 15 MIN 8/15/2023 Kirti Arroyo, JILL GP, CQ 2    20758581666 HC PT THER PROC EA 15 MIN 8/15/2023 Kirti Arroyo PTA GP, CQ 2    45970457584 HC PT THERAPEUTIC ACT EA 15 MIN 8/15/2023 Kirti Arroyo, JILL GP, CQ 3              PT G-Codes  AM-PAC 6 Clicks Score (PT): 12      Kirti Arroyo PTA  8/15/2023

## 2023-08-16 PROCEDURE — 97530 THERAPEUTIC ACTIVITIES: CPT

## 2023-08-16 PROCEDURE — 97112 NEUROMUSCULAR REEDUCATION: CPT

## 2023-08-16 PROCEDURE — 97110 THERAPEUTIC EXERCISES: CPT

## 2023-08-16 PROCEDURE — 97535 SELF CARE MNGMENT TRAINING: CPT

## 2023-08-16 PROCEDURE — 97116 GAIT TRAINING THERAPY: CPT

## 2023-08-16 RX ORDER — BISACODYL 10 MG
10 SUPPOSITORY, RECTAL RECTAL ONCE
Status: COMPLETED | OUTPATIENT
Start: 2023-08-16 | End: 2023-08-16

## 2023-08-16 RX ADMIN — RIVAROXABAN 20 MG: 20 TABLET, FILM COATED ORAL at 17:36

## 2023-08-16 RX ADMIN — POLYETHYLENE GLYCOL (3350) 17 G: 17 POWDER, FOR SOLUTION ORAL at 08:41

## 2023-08-16 RX ADMIN — NYSTATIN: 100000 POWDER TOPICAL at 21:13

## 2023-08-16 RX ADMIN — PANTOPRAZOLE SODIUM 40 MG: 40 TABLET, DELAYED RELEASE ORAL at 05:44

## 2023-08-16 RX ADMIN — BISACODYL 10 MG: 10 SUPPOSITORY RECTAL at 17:36

## 2023-08-16 RX ADMIN — ATORVASTATIN CALCIUM 80 MG: 40 TABLET, FILM COATED ORAL at 08:40

## 2023-08-16 RX ADMIN — TAMSULOSIN HYDROCHLORIDE 0.4 MG: 0.4 CAPSULE ORAL at 08:40

## 2023-08-16 RX ADMIN — METOPROLOL TARTRATE 25 MG: 25 TABLET, FILM COATED ORAL at 08:40

## 2023-08-16 RX ADMIN — MAGNESIUM GLUCONATE 500 MG ORAL TABLET 400 MG: 500 TABLET ORAL at 08:40

## 2023-08-16 RX ADMIN — ALLOPURINOL 300 MG: 300 TABLET ORAL at 08:40

## 2023-08-16 RX ADMIN — METOPROLOL TARTRATE 25 MG: 25 TABLET, FILM COATED ORAL at 21:14

## 2023-08-16 RX ADMIN — NYSTATIN: 100000 POWDER TOPICAL at 08:40

## 2023-08-16 RX ADMIN — HYDROXYZINE HYDROCHLORIDE 25 MG: 25 TABLET, FILM COATED ORAL at 21:14

## 2023-08-16 NOTE — PROGRESS NOTES
Nutrition Services    Patient Name:  Antonio Canseco  YOB: 1955  MRN: [id number]  Admit Date:  7/6/2023    Patient eating good at this time, ~82% x past 7 meals.  Weight stable.  No recommendations at this time.  Thank you.    Electronically signed by:  Arlene Villafuerte RD  08/16/23 11:44 EDT

## 2023-08-16 NOTE — THERAPY TREATMENT NOTE
Inpatient Rehabilitation - Physical Therapy Treatment Note       THALIA Trujillo     Patient Name: Antonio Canseco  : 1955  MRN: [id number]    Today's Date: 2023                    Admit Date: 2023      Visit Dx:   No diagnosis found.    Patient Active Problem List   Diagnosis    Detrusor instability    Low testosterone in male    Disorder of prostate    Corporo-venous occlusive erectile dysfunction    CVA (cerebral vascular accident)       Past Medical History:   Diagnosis Date    Diabetes mellitus     Hypertension     Stroke        Past Surgical History:   Procedure Laterality Date    US GUIDED LYMPH NODE BIOPSY  2023       PT ASSESSMENT (last 12 hours)       IRF PT Evaluation and Treatment    No documentation.                    Physical Therapy Education       Title: PT OT SLP Therapies (Done)       Topic: Physical Therapy (Done)       Point: Mobility training (Done)       Learning Progress Summary             Patient Acceptance, E,D, VU,NR by RG at 2023 0723    Acceptance, E,TB, VU,DU by HC at 8/15/2023 1402    Acceptance, E,D, VU,NR by RG at 2023 1503    Acceptance, E,D, VU,NR by RG at 8/10/2023 1510    Acceptance, E, VU,NR by LB at 2023 1314    Acceptance, TB, NR by DL at 2023 2014    Acceptance, E,D, VU,NR by RG at 2023 1407    Acceptance, E,D, VU,NR by RG at 2023 1507    Acceptance, E, VU,NR by LB at 2023 1521    Acceptance, E,TB, VU,NR by MB at 8/3/2023 2239    Acceptance, E,D, VU,NR by RG at 8/3/2023 1514    Acceptance, E,D, VU,NR by RG at 2023 1524    Acceptance, E,D, VU,NR by RG at 2023 1446    Acceptance, E,D, VU,NR by RG at 2023 1411    Acceptance, E,TB, VU by RM at 2023 1230    Acceptance, E,D, VU,NR by RG at 2023 1518    Acceptance, E, VU,NR by LB at 2023 1133    Acceptance, E, VU,NR by LB at 2023 1444    Acceptance, E, VU,NR by LB at 2023 1615    Acceptance, E,D, VU,NR by RG at 2023 1107    Acceptance, TB, NR by  DL at 7/19/2023 2036    Acceptance, E,D, VU,NR by RG at 7/19/2023 1508    Acceptance, E,D, VU,NR by RG at 7/18/2023 1520    Acceptance, E,D, VU,NR by RG at 7/17/2023 1522    Acceptance, E,D, VU,NR by LL at 7/13/2023 1358    Acceptance, E, VU,NR by LB at 7/12/2023 1144    Acceptance, E, VU,NR by LB at 7/11/2023 1426    Acceptance, E, VU,NR by LB at 7/10/2023 1452    Acceptance, E,D, VU,NR by LL at 7/8/2023 1228    Acceptance, E,D, VU,NR by LB at 7/7/2023 1449                         Point: Home exercise program (Done)       Learning Progress Summary             Patient Acceptance, E,D, VU,NR by RG at 8/16/2023 0723    Acceptance, E,TB, VU,DU by HC at 8/15/2023 1402    Acceptance, E,D, VU,NR by RG at 8/11/2023 1503    Acceptance, E,D, VU,NR by RG at 8/10/2023 1510    Acceptance, E, VU,NR by LB at 8/9/2023 1314    Acceptance, TB, NR by DL at 8/8/2023 2014    Acceptance, E,D, VU,NR by RG at 8/8/2023 1407    Acceptance, E,D, VU,NR by RG at 8/7/2023 1507    Acceptance, E, VU,NR by LB at 8/4/2023 1521    Acceptance, E,TB, VU,NR by MB at 8/3/2023 2239    Acceptance, E,D, VU,NR by RG at 8/3/2023 1514    Acceptance, E,D, VU,NR by RG at 8/2/2023 1524    Acceptance, E,D, VU,NR by RG at 8/1/2023 1446    Acceptance, E,D, VU,NR by RG at 7/31/2023 1411    Acceptance, E,TB, VU by RM at 7/28/2023 1230    Acceptance, E,D, VU,NR by RG at 7/26/2023 1518    Acceptance, E, VU,NR by LB at 7/25/2023 1133    Acceptance, E, VU,NR by LB at 7/24/2023 1444    Acceptance, E, VU,NR by LB at 7/21/2023 1615    Acceptance, E,D, VU,NR by RG at 7/20/2023 1107    Acceptance, TB, NR by DL at 7/19/2023 2036    Acceptance, E,D, VU,NR by RG at 7/19/2023 1508    Acceptance, E,D, VU,NR by RG at 7/18/2023 1520    Acceptance, E,D, VU,NR by RG at 7/17/2023 1522    Acceptance, E,D, VU,NR by LL at 7/13/2023 1358    Acceptance, E, VU,NR by LB at 7/12/2023 1144    Acceptance, E, VU,NR by LB at 7/11/2023 1426    Acceptance, E, VU,NR by LB at 7/10/2023 1452     Acceptance, E,D, VU,NR by LL at 7/8/2023 1228    Acceptance, E,D, VU,NR by LB at 7/7/2023 1449                         Point: Body mechanics (Done)       Learning Progress Summary             Patient Acceptance, E,D, VU,NR by RG at 8/16/2023 0723    Acceptance, E,TB, VU,DU by HC at 8/15/2023 1402    Acceptance, E,D, VU,NR by RG at 8/11/2023 1503    Acceptance, E,D, VU,NR by RG at 8/10/2023 1510    Acceptance, E, VU,NR by LB at 8/9/2023 1314    Acceptance, TB, NR by DL at 8/8/2023 2014    Acceptance, E,D, VU,NR by RG at 8/8/2023 1407    Acceptance, E,D, VU,NR by RG at 8/7/2023 1507    Acceptance, E, VU,NR by LB at 8/4/2023 1521    Acceptance, E,TB, VU,NR by MB at 8/3/2023 2239    Acceptance, E,D, VU,NR by RG at 8/3/2023 1514    Acceptance, E,D, VU,NR by RG at 8/2/2023 1524    Acceptance, E,D, VU,NR by RG at 8/1/2023 1446    Acceptance, E,D, VU,NR by RG at 7/31/2023 1411    Acceptance, E,TB, VU by RM at 7/28/2023 1230    Acceptance, E,D, VU,NR by RG at 7/26/2023 1518    Acceptance, E, VU,NR by LB at 7/25/2023 1133    Acceptance, E, VU,NR by LB at 7/24/2023 1444    Acceptance, E, VU,NR by LB at 7/21/2023 1615    Acceptance, E,D, VU,NR by RG at 7/20/2023 1107    Acceptance, TB, NR by DL at 7/19/2023 2036    Acceptance, E,D, VU,NR by RG at 7/19/2023 1508    Acceptance, E,D, VU,NR by RG at 7/18/2023 1520    Acceptance, E,D, VU,NR by RG at 7/17/2023 1522    Acceptance, E,D, VU,NR by LL at 7/13/2023 1358    Acceptance, E, VU,NR by LB at 7/12/2023 1144    Acceptance, E, VU,NR by LB at 7/11/2023 1426    Acceptance, E, VU,NR by LB at 7/10/2023 1452    Acceptance, E,D, VU,NR by LL at 7/8/2023 1228    Acceptance, E,D, VU,NR by LB at 7/7/2023 1449                         Point: Precautions (Done)       Learning Progress Summary             Patient Acceptance, E,D, VU,NR by RG at 8/16/2023 0723    Acceptance, E,TB, VU,DU by HC at 8/15/2023 1402    Acceptance, E,D, VU,NR by RG at 8/11/2023 1503    Acceptance, E,D, VU,NR by RG at  8/10/2023 1510    Acceptance, E, VU,NR by LB at 8/9/2023 1314    Acceptance, TB, NR by DL at 8/8/2023 2014    Acceptance, E,D, VU,NR by RG at 8/8/2023 1407    Acceptance, E,D, VU,NR by RG at 8/7/2023 1507    Acceptance, E, VU,NR by LB at 8/4/2023 1521    Acceptance, E,TB, VU,NR by MB at 8/3/2023 2239    Acceptance, E,D, VU,NR by RG at 8/3/2023 1514    Acceptance, E,D, VU,NR by RG at 8/2/2023 1524    Acceptance, E,D, VU,NR by RG at 8/1/2023 1446    Acceptance, E,D, VU,NR by RG at 7/31/2023 1411    Acceptance, E,TB, VU by RM at 7/28/2023 1230    Acceptance, E,D, VU,NR by RG at 7/26/2023 1518    Acceptance, E, VU,NR by LB at 7/25/2023 1133    Acceptance, E, VU,NR by LB at 7/24/2023 1444    Acceptance, E, VU,NR by LB at 7/21/2023 1615    Acceptance, E,D, VU,NR by RG at 7/20/2023 1107    Acceptance, TB, NR by DL at 7/19/2023 2036    Acceptance, E,D, VU,NR by RG at 7/19/2023 1508    Acceptance, E,D, VU,NR by RG at 7/18/2023 1520    Acceptance, E,D, VU,NR by RG at 7/17/2023 1522    Acceptance, E,D, VU,NR by LL at 7/13/2023 1358    Acceptance, E, VU,NR by LB at 7/12/2023 1144    Acceptance, E, VU,NR by LB at 7/11/2023 1426    Acceptance, E, VU,NR by LB at 7/10/2023 1452    Acceptance, E,D, VU,NR by LL at 7/8/2023 1228    Acceptance, E,D, VU,NR by LB at 7/7/2023 1449                                         User Key       Initials Effective Dates Name Provider Type Discipline    LB 06/16/21 -  Joslyn Garcia, PT Physical Therapist PT    LL 05/02/16 -  Connie Velasquez, PTA Physical Therapist Assistant PT    RM 02/17/23 -  Shelley Hanson, PTA Physical Therapist Assistant PT    RG 06/16/21 -  Vu Brown, JILL Physical Therapist Assistant PT    HC 01/20/23 -  Kirti Arroyo, PTA Physical Therapist Assistant PT    DL 03/16/22 -  Nadja Velasquez, RN Registered Nurse Nurse    MB 06/26/23 -  Vinay Fritz, RN Registered Nurse Nurse                    PT Recommendation and Plan          Daily Progress  Summary (PT)  Impairments Still Limiting Function (PT): balance impairment, coordination impairment, functional activity tolerance impairment, motor control impaired, pain, postural control impaired, strength deficit, sensory impairment               Time Calculation:      PT Charges       Row Name 08/16/23 0724             Time Calculation    Start Time 0745  -RG      Stop Time 0915  -RG      Time Calculation (min) 90 min  -RG      PT Received On 08/14/23  -RG         Time Calculation- PT    Total Timed Code Minutes- PT 90 minute(s)  -RG                User Key  (r) = Recorded By, (t) = Taken By, (c) = Cosigned By      Initials Name Provider Type    Vu Galloway PTA Physical Therapist Assistant                          PT G-Codes  AM-PAC 6 Clicks Score (PT): 12      Vu Brown PTA  8/16/2023

## 2023-08-16 NOTE — PLAN OF CARE
Problem: Rehabilitation (IRF) Plan of Care  Goal: Absence of New-Onset Illness or Injury  Outcome: Ongoing, Progressing  Intervention: Prevent Fall and Fall Injury  Recent Flowsheet Documentation  Taken 8/16/2023 1000 by Samantha Hayes RN  Safety Promotion/Fall Prevention:   fall prevention program maintained   nonskid shoes/slippers when out of bed   safety round/check completed  Intervention: Prevent VTE (Venous Thromboembolism)  Recent Flowsheet Documentation  Taken 8/16/2023 0800 by Samantha Hayes, RN  VTE Prevention/Management: (See Mar) --   Goal Outcome Evaluation:

## 2023-08-16 NOTE — THERAPY TREATMENT NOTE
Inpatient Rehabilitation - Occupational Therapy Treatment Note     Ronnie     Patient Name: Antonio Canseco  : 1955  MRN: [id number]    Today's Date: 2023                 Admit Date: 2023       No diagnosis found.    Patient Active Problem List   Diagnosis    Detrusor instability    Low testosterone in male    Disorder of prostate    Corporo-venous occlusive erectile dysfunction    CVA (cerebral vascular accident)       Past Medical History:   Diagnosis Date    Diabetes mellitus     Hypertension     Stroke        Past Surgical History:   Procedure Laterality Date    US GUIDED LYMPH NODE BIOPSY  2023             IRF OT ASSESSMENT FLOWSHEET (last 12 hours)       IRF OT Evaluation and Treatment       Row Name 23 1051          OT Time and Intention    Document Type daily treatment  -HB     Mode of Treatment individual therapy;occupational therapy  -HB     Total Minutes, Occupational Therapy 90  -HB     Patient Effort good  -HB     Symptoms Noted During/After Treatment none  -HB       Row Name 23 1051          General Information    Patient Profile Reviewed yes  -HB     Patient/Family/Caregiver Comments/Observations Patient and RN okd OT this date.  -HB     General Observations of Patient Patient tolerated OT well with no adverse reactions.  -HB     Existing Precautions/Restrictions fall  left side inattention;  -HB     Limitations/Impairments safety/cognitive  -HB       Row Name 23 1051          Pain Assessment    Pretreatment Pain Rating 0/10 - no pain  -HB     Posttreatment Pain Rating 0/10 - no pain  -HB       Row Name 23 1051          Cognition/Psychosocial    Affect/Mental Status (Cognition) WFL  -HB     Orientation Status (Cognition) oriented x 3  -HB     Follows Commands (Cognition) verbal cues/prompting required;physical/tactile prompts required  -HB       Row Name 23 1051          Grooming    [place] Level (Grooming) minimum assist (75% patient  effort);verbal cues;nonverbal cues (demo/gesture)  -     Position (Grooming) supported sitting  -       Row Name 08/16/23 1051          Self-Feeding    [place] Level (Self-Feeding) feeding skills;set up  -     Position (Self-Feeding) supported sitting  -       Row Name 08/16/23 1051          Bed Mobility    Sit-Supine [place] (Bed Mobility) maximum assist (25% patient effort);verbal cues;nonverbal cues (demo/gesture);2 person assist  -       Row Name 08/16/23 1051          Chair-Bed Transfer    Chair-Bed [place] (Transfers) 2 person assist;maximum assist (25% patient effort);nonverbal cues (demo/gesture);verbal cues  -     Assistive Device (Chair-Bed Transfers) wheelchair  -       Row Name 08/16/23 1051          Sit-Stand Transfer    Sit-Stand [place] (Transfers) moderate assist (50% patient effort);2 person assist;nonverbal cues (demo/gesture);verbal cues  -     Assistive Device (Sit-Stand Transfers) wheelchair  -       Row Name 08/16/23 1051          Stand-Sit Transfer    Stand-Sit [place] (Transfers) moderate assist (50% patient effort);nonverbal cues (demo/gesture);verbal cues;2 person assist  -       Row Name 08/16/23 1051          Motor Skills    Motor Skills coordination;functional endurance;neuro-muscular function;motor control/coordination interventions  -     Motor Control/Coordination Interventions fine motor manipulation/dexterity activities;therapeutic exercise/ROM;gross motor coordination activities;neuro-muscular re-education  -     Therapeutic Exercise shoulder;elbow/forearm;wrist;hand  -     Comments, Therapeutic Exercise --  PRE 15 min, clif for 4 sets 20 reps at 25 lbs; BUE TA to increase coordination/ADL status/endurance; rest between tasks  -       Row Name 08/16/23 1051          Positioning and Restraints    Pre-Treatment Position sitting in chair/recliner  -     Post Treatment Position bed  -HB     In Bed encouraged to call for  assist;call light within reach  -               User Key  (r) = Recorded By, (t) = Taken By, (c) = Cosigned By      Initials Name Effective Dates    Veronica Erwin, OT 05/25/21 -                      Occupational Therapy Education       Title: PT OT SLP Therapies (Done)       Topic: Occupational Therapy (Done)       Point: ADL training (Done)       Description:   Instruct learner(s) on proper safety adaptation and remediation techniques during self care or transfers.   Instruct in proper use of assistive devices.                  Learning Progress Summary             Patient Acceptance, E, VU,NR by HB at 8/16/2023 1059    Acceptance, E, VU,NR by BF at 8/15/2023 1222    Acceptance, E, VU,NR by BF at 8/14/2023 1527    Acceptance, E, VU,NR by BF at 8/11/2023 1204    Acceptance, E,D, VU,DU by BF at 8/10/2023 1524    Acceptance, TB, NR by DL at 8/8/2023 2014    Acceptance, E, VU,NR by BF at 8/8/2023 1440    Acceptance, E, VU,NR by BF at 8/7/2023 1526    Acceptance, E, VU,NR by BF at 8/4/2023 1221    Acceptance, E,TB, VU,NR by MB at 8/3/2023 2239    Acceptance, E, VU,NR by BF at 8/3/2023 1217    Acceptance, E, VU,NR by BF at 8/1/2023 1427    Acceptance, E, VU,NR by BF at 7/31/2023 1409    Acceptance, E, VU,NR by BF at 7/28/2023 1442    Acceptance, E, VU,NR by BF at 7/27/2023 1445    Acceptance, E, VU,NR by BF at 7/25/2023 1251    Acceptance, E, VU,NR by HB at 7/24/2023 1355    Acceptance, E, NR,VU by BF at 7/21/2023 1248    Acceptance, E, NR by BF at 7/20/2023 1259    Acceptance, TB, NR by DL at 7/19/2023 2036    Acceptance, E, VU,NR by HB at 7/19/2023 1421    Acceptance, E, VU,NR by BF at 7/18/2023 1334    Acceptance, E, VU,NR by BF at 7/17/2023 1431    Acceptance, E, VU,NR by BF at 7/17/2023 1430    Acceptance, E, VU,NR by BF at 7/14/2023 1508    Acceptance, E, VU,NR by BF at 7/13/2023 1343    Acceptance, E,D, VU,NR by TM at 7/12/2023 1407    Acceptance, D,E, NR,VU by TM at 7/11/2023 1434    Acceptance, E, VU,NR by  BF at 7/10/2023 1456    Acceptance, E, VU,NR by HB at 7/8/2023 1140    Acceptance, E, VU,NR by BF at 7/7/2023 1442   Family Acceptance, E,D, VU,DU by BF at 8/10/2023 1524    Acceptance, E, VU,NR by BF at 8/1/2023 1427                         Point: Precautions (Done)       Description:   Instruct learner(s) on prescribed precautions during self-care and functional transfers.                  Learning Progress Summary             Patient Acceptance, E, VU,NR by HB at 8/16/2023 1059    Acceptance, E, VU,NR by BF at 8/15/2023 1222    Acceptance, E, VU,NR by BF at 8/14/2023 1527    Acceptance, E, VU,NR by BF at 8/11/2023 1204    Acceptance, E,D, VU,DU by BF at 8/10/2023 1524    Acceptance, TB, NR by DL at 8/8/2023 2014    Acceptance, E, VU,NR by BF at 8/8/2023 1440    Acceptance, E, VU,NR by BF at 8/7/2023 1526    Acceptance, E, VU,NR by BF at 8/4/2023 1221    Acceptance, E,TB, VU,NR by MB at 8/3/2023 2239    Acceptance, E, VU,NR by BF at 8/3/2023 1217    Acceptance, E, VU,NR by BF at 8/1/2023 1427    Acceptance, E, VU,NR by BF at 7/31/2023 1409    Acceptance, E, VU,NR by BF at 7/28/2023 1442    Acceptance, E, VU,NR by BF at 7/27/2023 1445    Acceptance, E, VU,NR by BF at 7/25/2023 1251    Acceptance, E, VU,NR by HB at 7/24/2023 1355    Acceptance, E, NR,VU by BF at 7/21/2023 1248    Acceptance, E, NR by BF at 7/20/2023 1259    Acceptance, TB, NR by DL at 7/19/2023 2036    Acceptance, E, VU,NR by HB at 7/19/2023 1421    Acceptance, E, VU,NR by BF at 7/18/2023 1334    Acceptance, E, VU,NR by BF at 7/17/2023 1431    Acceptance, E, VU,NR by BF at 7/17/2023 1430    Acceptance, E, VU,NR by BF at 7/14/2023 1508    Acceptance, E, VU,NR by BF at 7/13/2023 1343    Acceptance, E,D, VU,NR by TM at 7/12/2023 1407    Acceptance, D,E, NR,VU by TM at 7/11/2023 1434    Acceptance, E, VU,NR by BF at 7/10/2023 1456    Acceptance, E, VU,NR by HB at 7/8/2023 1140    Acceptance, E, VU,NR by BF at 7/7/2023 1442   Family Acceptance, E,D,  VU,DU by  at 8/10/2023 1524    Acceptance, E, VU,NR by  at 8/1/2023 1427                                         User Key       Initials Effective Dates Name Provider Type Discipline    BF 05/31/23 - 07/10/23 Lisa Sanchez, OT Occupational Therapist OT    BF 07/11/23 -  Lisa Sanchez, OT Occupational Therapist OT    TM 06/16/21 -  Marielos Capone OT Occupational Therapist OT    HB 05/25/21 -  Veronica Robles OT Occupational Therapist OT    DL 03/16/22 -  Nadja Velasquez, RN Registered Nurse Nurse    MB 06/26/23 -  Vinay Fritz, RN Registered Nurse Nurse                        OT Recommendation and Plan                         Time Calculation:      Time Calculation- OT       Row Name 08/16/23 1059             Time Calculation- OT    OT Start Time 0915  -HB      OT Stop Time 1045  -HB      OT Time Calculation (min) 90 min  -HB      Total Timed Code Minutes- OT 90 minute(s)  -HB                User Key  (r) = Recorded By, (t) = Taken By, (c) = Cosigned By      Initials Name Provider Type     Veronica Robles, OT Occupational Therapist                  Therapy Charges for Today       Code Description Service Date Service Provider Modifiers Qty    59935334274 HC OT SELF CARE/MGMT/TRAIN EA 15 MIN 8/16/2023 Veronica Robles OT GO 1    27794885324 HC OT THER PROC EA 15 MIN 8/16/2023 Veronica Robles OT GO 1    59100437935 HC OT THERAPEUTIC ACT EA 15 MIN 8/16/2023 Veronica Robles OT GO 2    15155357872 HC OT NEUROMUSC RE EDUCATION EA 15 MIN 8/16/2023 Veronica Robles OT GO 2                     Veronica Robles OT  8/16/2023

## 2023-08-16 NOTE — PROGRESS NOTES
Patient was seen, he is ambulating better with better weight shifting in the halls.  With OT he is also doing well, his motor movements appear to be improving with his left hand.  He has not had a bowel movement in several days and will use a suppository today.  Labs unremarkable 8/10, will repeat in a.m. patient is on Xarelto for stroke prevention from his A-fib.  No new complaints

## 2023-08-16 NOTE — PLAN OF CARE
Goal Outcome Evaluation:  Plan of Care Reviewed With: patient resting in bed, continues to participate in therapy, will continue to monitor.

## 2023-08-16 NOTE — PLAN OF CARE
Goal Outcome Evaluation:  Plan of Care Reviewed With: patient        Progress: no change  Outcome Evaluation: CONTINUE PLAN OF CARE; BED ALARM NOTED; MONITOR

## 2023-08-16 NOTE — PROGRESS NOTES
Rehabilitation Nursing  Inpatient Rehabilitation Plan of Care Note    Plan of Care  Copy from POC  Body Function Structure    Skin Integrity (Active)  Current Status (7/6/2023 4:18:00 PM): At Risk for Skin Breakdown  Weekly Goal: No Skin Breakdown  Discharge Goal: No Skin Breakdown    Safety    Potential for Injury (Active)  Current Status (7/6/2023 4:18:00 PM): Potential for Falls  Weekly Goal: No Falls  Discharge Goal: No Falls    Signed by: Kimmy Montgomery Nurse

## 2023-08-16 NOTE — THERAPY TREATMENT NOTE
Inpatient Rehabilitation - Physical Therapy Treatment Note        Ronnie     Patient Name: Antonio Canseco  : 1955  MRN: [id number]    Today's Date: 2023                    Admit Date: 2023      Visit Dx:   No diagnosis found.    Patient Active Problem List   Diagnosis    Detrusor instability    Low testosterone in male    Disorder of prostate    Corporo-venous occlusive erectile dysfunction    CVA (cerebral vascular accident)       Past Medical History:   Diagnosis Date    Diabetes mellitus     Hypertension     Stroke        Past Surgical History:   Procedure Laterality Date    US GUIDED LYMPH NODE BIOPSY  2023       PT ASSESSMENT (last 12 hours)       IRF PT Evaluation and Treatment       Row Name 23 1345          PT Time and Intention    Document Type daily treatment  -RG     Mode of Treatment individual therapy;physical therapy  -RG     Patient/Family/Caregiver Comments/Observations Pt and nursing in agreement for skilled PT on this date. Pt was able to advance L LE while ambulating.  -RG       Row Name 23 1345          General Information    Existing Precautions/Restrictions fall  L HP, <trunk support/balance, L side inattention  -RG       Row Name 23 1345          Pain Scale: FACES Pre/Post-Treatment    Pain: FACES Scale, Pretreatment 0-->no hurt  -RG     Posttreatment Pain Rating 0-->no hurt  -RG       Row Name 23 1345          Cognition/Psychosocial    Affect/Mental Status (Cognition) WFL  -RG     Follows Commands (Cognition) verbal cues/prompting required;physical/tactile prompts required  -RG       Row Name 23 1345          Bed Mobility    Supine-Sit [place] (Bed Mobility) maximum assist (25% patient effort);verbal cues;nonverbal cues (demo/gesture)  -RG       Row Name 23 1345          Bed-Chair Transfer    Bed-Chair [place] (Transfers) maximum assist (25% patient effort);verbal cues;nonverbal cues (demo/gesture);moderate assist (50%  patient effort);2 person assist  -RG       Row Name 08/16/23 1345          Sit-Stand Transfer    Sit-Stand [place] (Transfers) verbal cues;nonverbal cues (demo/gesture);minimum assist (75% patient effort)  -RG     Assistive Device (Sit-Stand Transfers) walker, platform  -RG       Row Name 08/16/23 1345          Stand-Sit Transfer    Stand-Sit [place] (Transfers) verbal cues;nonverbal cues (demo/gesture);minimum assist (75% patient effort)  -RG     Assistive Device (Stand-Sit Transfers) walker, platform  -RG       Row Name 08/16/23 1345          Gait/Stairs (Locomotion)    Traverse Level (Gait) verbal cues;nonverbal cues (demo/gesture);2 person assist;moderate assist (50% patient effort)  -RG     Assistive Device (Gait) walker, rolling platform  -RG     Distance in Feet (Gait) 40', 20;'  -RG     Pattern (Gait) step-to  -RG     Deviations/Abnormal Patterns (Gait) base of support, narrow;hebert decreased;gait speed decreased;stride length decreased;weight shifting decreased;ataxic  -RG     Bilateral Gait Deviations forward flexed posture  -RG     Comment, (Gait/Stairs) He was able to advance L LE while ambulating.  -RG       Row Name 08/16/23 1345          Hip (Therapeutic Exercise)    Hip Strengthening (Therapeutic Exercise) bilateral;flexion;aBduction;aDduction;marching while seated;sitting;2 lb free weight;resistance band;green;10 repetitions;2 sets  AAROM L LE  -RG       Row Name 08/16/23 1345          Knee (Therapeutic Exercise)    Knee Strengthening (Therapeutic Exercise) bilateral;flexion;extension;marching while seated;LAQ (long arc quad);sitting;2 lb free weight;resistance band;green;10 repetitions;2 sets  AAROM L LE  -RG       Row Name 08/16/23 1345          Ankle (Therapeutic Exercise)    Ankle Strengthening (Therapeutic Exercise) bilateral;dorsiflexion;plantarflexion;sitting;10 repetitions;2 sets  -RG       Row Name 08/16/23 1345          Modality Treatment    Treatment Location 1 (Modality)  L ant tib  -RG     Modality Performed electrical stimulation  -RG     Modality Treatment Results contraction observed  -RG     Comment, Modality Treatment 15 minutes 53 mA  -RG       Row Name 08/16/23 1345          Positioning and Restraints    Pre-Treatment Position in bed  -RG     Post Treatment Position wheelchair  -RG     In Wheelchair notified nsg;sitting;with OT;legs elevated  -RG       Row Name 08/16/23 1345          Bed Mobility Goal 1 (PT-IRF)    Progress/Outcomes (Bed Mobility Goal 1, PT-IRF) goal ongoing  -RG       Row Name 08/16/23 1345          Bed Mobility Goal 2 (PT-IRF)    Progress/Outcomes (Bed Mobility Goal 2, PT-IRF) goal ongoing  -RG       Row Name 08/16/23 1345          Transfer Goal 1 (PT-IRF)    Progress/Outcomes (Transfer Goal 1, PT-IRF) goal ongoing  -RG       Row Name 08/16/23 1345          Transfer Goal 2 (PT-IRF)    Progress/Outcomes (Transfer Goal 2, PT-IRF) goal ongoing  -RG       Row Name 08/16/23 1345          Gait/Walking Locomotion Goal 1 (PT-IRF)    Progress/Outcomes (Gait/Walking Locomotion Goal 1, PT-IRF) goal ongoing;goal partially met  -RG       Row Name 08/16/23 1345          Gait/Walking Locomotion Goal 2 (PT-IRF)    Progress/Outcomes (Gait/Walking Locomotion Goal 2, PT-IRF) goal ongoing  -RG               User Key  (r) = Recorded By, (t) = Taken By, (c) = Cosigned By      Initials Name Provider Type    RG Vu Brown PTA Physical Therapist Assistant                     Physical Therapy Education       Title: PT OT SLP Therapies (Done)       Topic: Physical Therapy (Done)       Point: Mobility training (Done)       Learning Progress Summary             Patient Acceptance, E,D, VU,NR by RG at 8/16/2023 1354    Acceptance, E,D, VU,NR by RG at 8/16/2023 0723    Acceptance, E,TB, VU,DU by  at 8/15/2023 1402    Acceptance, E,D, VU,NR by RG at 8/11/2023 1503    Acceptance, E,D, VU,NR by RG at 8/10/2023 1510    Acceptance, E, VU,NR by  at 8/9/2023 1314    Acceptance, TB, NR  by DL at 8/8/2023 2014    Acceptance, E,D, VU,NR by RG at 8/8/2023 1407    Acceptance, E,D, VU,NR by RG at 8/7/2023 1507    Acceptance, E, VU,NR by LB at 8/4/2023 1521    Acceptance, E,TB, VU,NR by MB at 8/3/2023 2239    Acceptance, E,D, VU,NR by RG at 8/3/2023 1514    Acceptance, E,D, VU,NR by RG at 8/2/2023 1524    Acceptance, E,D, VU,NR by RG at 8/1/2023 1446    Acceptance, E,D, VU,NR by RG at 7/31/2023 1411    Acceptance, E,TB, VU by RM at 7/28/2023 1230    Acceptance, E,D, VU,NR by RG at 7/26/2023 1518    Acceptance, E, VU,NR by LB at 7/25/2023 1133    Acceptance, E, VU,NR by LB at 7/24/2023 1444    Acceptance, E, VU,NR by LB at 7/21/2023 1615    Acceptance, E,D, VU,NR by RG at 7/20/2023 1107    Acceptance, TB, NR by DL at 7/19/2023 2036    Acceptance, E,D, VU,NR by RG at 7/19/2023 1508    Acceptance, E,D, VU,NR by RG at 7/18/2023 1520    Acceptance, E,D, VU,NR by RG at 7/17/2023 1522    Acceptance, E,D, VU,NR by LL at 7/13/2023 1358    Acceptance, E, VU,NR by LB at 7/12/2023 1144    Acceptance, E, VU,NR by LB at 7/11/2023 1426    Acceptance, E, VU,NR by LB at 7/10/2023 1452    Acceptance, E,D, VU,NR by LL at 7/8/2023 1228    Acceptance, E,D, VU,NR by LB at 7/7/2023 1449                         Point: Home exercise program (Done)       Learning Progress Summary             Patient Acceptance, E,D, VU,NR by RG at 8/16/2023 1354    Acceptance, E,D, VU,NR by RG at 8/16/2023 0723    Acceptance, E,TB, VU,DU by HC at 8/15/2023 1402    Acceptance, E,D, VU,NR by RG at 8/11/2023 1503    Acceptance, E,D, VU,NR by RG at 8/10/2023 1510    Acceptance, E, VU,NR by LB at 8/9/2023 1314    Acceptance, TB, NR by DL at 8/8/2023 2014    Acceptance, E,D, VU,NR by RG at 8/8/2023 1407    Acceptance, E,D, VU,NR by RG at 8/7/2023 1507    Acceptance, E, VU,NR by LB at 8/4/2023 1521    Acceptance, E,TB, VU,NR by MB at 8/3/2023 2239    Acceptance, E,D, VU,NR by RG at 8/3/2023 1514    Acceptance, E,D, VU,NR by RG at 8/2/2023 1524     Acceptance, E,D, VU,NR by RG at 8/1/2023 1446    Acceptance, E,D, VU,NR by RG at 7/31/2023 1411    Acceptance, E,TB, VU by RM at 7/28/2023 1230    Acceptance, E,D, VU,NR by RG at 7/26/2023 1518    Acceptance, E, VU,NR by LB at 7/25/2023 1133    Acceptance, E, VU,NR by LB at 7/24/2023 1444    Acceptance, E, VU,NR by LB at 7/21/2023 1615    Acceptance, E,D, VU,NR by RG at 7/20/2023 1107    Acceptance, TB, NR by DL at 7/19/2023 2036    Acceptance, E,D, VU,NR by RG at 7/19/2023 1508    Acceptance, E,D, VU,NR by RG at 7/18/2023 1520    Acceptance, E,D, VU,NR by RG at 7/17/2023 1522    Acceptance, E,D, VU,NR by LL at 7/13/2023 1358    Acceptance, E, VU,NR by LB at 7/12/2023 1144    Acceptance, E, VU,NR by LB at 7/11/2023 1426    Acceptance, E, VU,NR by LB at 7/10/2023 1452    Acceptance, E,D, VU,NR by LL at 7/8/2023 1228    Acceptance, E,D, VU,NR by LB at 7/7/2023 1449                         Point: Body mechanics (Done)       Learning Progress Summary             Patient Acceptance, E,D, VU,NR by RG at 8/16/2023 1354    Acceptance, E,D, VU,NR by RG at 8/16/2023 0723    Acceptance, E,TB, VU,DU by HC at 8/15/2023 1402    Acceptance, E,D, VU,NR by RG at 8/11/2023 1503    Acceptance, E,D, VU,NR by RG at 8/10/2023 1510    Acceptance, E, VU,NR by LB at 8/9/2023 1314    Acceptance, TB, NR by DL at 8/8/2023 2014    Acceptance, E,D, VU,NR by RG at 8/8/2023 1407    Acceptance, E,D, VU,NR by RG at 8/7/2023 1507    Acceptance, E, VU,NR by LB at 8/4/2023 1521    Acceptance, E,TB, VU,NR by MB at 8/3/2023 2239    Acceptance, E,D, VU,NR by RG at 8/3/2023 1514    Acceptance, E,D, VU,NR by RG at 8/2/2023 1524    Acceptance, E,D, VU,NR by RG at 8/1/2023 1446    Acceptance, E,D, VU,NR by RG at 7/31/2023 1411    Acceptance, E,TB, VU by RM at 7/28/2023 1230    Acceptance, E,D, VU,NR by RG at 7/26/2023 1518    Acceptance, E, VU,NR by LB at 7/25/2023 1133    Acceptance, E, VU,NR by LB at 7/24/2023 1444    Acceptance, E, VU,NR by LB at 7/21/2023  1615    Acceptance, E,D, VU,NR by RG at 7/20/2023 1107    Acceptance, TB, NR by DL at 7/19/2023 2036    Acceptance, E,D, VU,NR by RG at 7/19/2023 1508    Acceptance, E,D, VU,NR by RG at 7/18/2023 1520    Acceptance, E,D, VU,NR by RG at 7/17/2023 1522    Acceptance, E,D, VU,NR by LL at 7/13/2023 1358    Acceptance, E, VU,NR by LB at 7/12/2023 1144    Acceptance, E, VU,NR by LB at 7/11/2023 1426    Acceptance, E, VU,NR by LB at 7/10/2023 1452    Acceptance, E,D, VU,NR by LL at 7/8/2023 1228    Acceptance, E,D, VU,NR by LB at 7/7/2023 1449                         Point: Precautions (Done)       Learning Progress Summary             Patient Acceptance, E,D, VU,NR by RG at 8/16/2023 1354    Acceptance, E,D, VU,NR by RG at 8/16/2023 0723    Acceptance, E,TB, VU,DU by HC at 8/15/2023 1402    Acceptance, E,D, VU,NR by RG at 8/11/2023 1503    Acceptance, E,D, VU,NR by RG at 8/10/2023 1510    Acceptance, E, VU,NR by LB at 8/9/2023 1314    Acceptance, TB, NR by DL at 8/8/2023 2014    Acceptance, E,D, VU,NR by RG at 8/8/2023 1407    Acceptance, E,D, VU,NR by RG at 8/7/2023 1507    Acceptance, E, VU,NR by LB at 8/4/2023 1521    Acceptance, E,TB, VU,NR by MB at 8/3/2023 2239    Acceptance, E,D, VU,NR by RG at 8/3/2023 1514    Acceptance, E,D, VU,NR by RG at 8/2/2023 1524    Acceptance, E,D, VU,NR by RG at 8/1/2023 1446    Acceptance, E,D, VU,NR by RG at 7/31/2023 1411    Acceptance, E,TB, VU by RM at 7/28/2023 1230    Acceptance, E,D, VU,NR by RG at 7/26/2023 1518    Acceptance, E, VU,NR by LB at 7/25/2023 1133    Acceptance, E, VU,NR by LB at 7/24/2023 1444    Acceptance, E, VU,NR by LB at 7/21/2023 1615    Acceptance, E,D, VU,NR by RG at 7/20/2023 1107    Acceptance, TB, NR by DL at 7/19/2023 2036    Acceptance, E,D, VU,NR by RG at 7/19/2023 1508    Acceptance, E,D, VU,NR by RG at 7/18/2023 1520    Acceptance, E,D, VU,NR by RG at 7/17/2023 1522    Acceptance, E,D, VU,NR by LL at 7/13/2023 1358    Acceptance, E, VU,NR by LB at  7/12/2023 1144    Acceptance, E, VU,NR by LB at 7/11/2023 1426    Acceptance, E, VU,NR by LB at 7/10/2023 1452    Acceptance, E,D, VU,NR by LL at 7/8/2023 1228    Acceptance, E,D, VU,NR by LB at 7/7/2023 1449                                         User Key       Initials Effective Dates Name Provider Type Discipline    LB 06/16/21 -  Joslyn Garcia, PT Physical Therapist PT    LL 05/02/16 -  Connie Velasquez, JILL Physical Therapist Assistant PT    RM 02/17/23 -  Shelley Hanson, PTA Physical Therapist Assistant PT    RG 06/16/21 -  Vu Brown PTA Physical Therapist Assistant PT    HC 01/20/23 -  Kirti Arroyo, PTA Physical Therapist Assistant PT    DL 03/16/22 -  Nadja Velasquez, RN Registered Nurse Nurse    MB 06/26/23 -  Vinay Fritz RN Registered Nurse Nurse                    PT Recommendation and Plan          Daily Progress Summary (PT)  Impairments Still Limiting Function (PT): balance impairment, coordination impairment, functional activity tolerance impairment, motor control impaired, pain, postural control impaired, strength deficit, sensory impairment               Time Calculation:      PT Charges       Row Name 08/16/23 1355 08/16/23 0724          Time Calculation    Start Time 0745  -RG 0745  -RG     Stop Time 0915  -RG 0915  -RG     Time Calculation (min) 90 min  -RG 90 min  -RG     PT Received On 08/16/23  -RG 08/14/23  -RG        Time Calculation- PT    Total Timed Code Minutes- PT 90 minute(s)  -RG 90 minute(s)  -RG               User Key  (r) = Recorded By, (t) = Taken By, (c) = Cosigned By      Initials Name Provider Type    RG Vu Brown PTA Physical Therapist Assistant                    Therapy Charges for Today       Code Description Service Date Service Provider Modifiers Qty    34281718981 HC GAIT TRAINING EA 15 MIN 8/16/2023 Vu Brown PTA GP, CQ 1    51851166156 HC PT THERAPEUTIC ACT EA 15 MIN 8/16/2023 Vu Brown PTA GP, CQ 2    95048590903  HC PT NEUROMUSC RE EDUCATION EA 15 MIN 8/16/2023 Vu Brown, JILL GP, CQ 1    07898992402 HC PT THER PROC EA 15 MIN 8/16/2023 Vu Brown, JILL GP, CQ 2              PT G-Codes  AM-PAC 6 Clicks Score (PT): 12      Vu Brown PTA  8/16/2023

## 2023-08-17 LAB
ALBUMIN SERPL-MCNC: 3.3 G/DL (ref 3.5–5.2)
ALBUMIN/GLOB SERPL: 1.2 G/DL
ALP SERPL-CCNC: 85 U/L (ref 39–117)
ALT SERPL W P-5'-P-CCNC: 8 U/L (ref 1–41)
ANION GAP SERPL CALCULATED.3IONS-SCNC: 12 MMOL/L (ref 5–15)
AST SERPL-CCNC: 13 U/L (ref 1–40)
BASOPHILS # BLD AUTO: 0.06 10*3/MM3 (ref 0–0.2)
BASOPHILS NFR BLD AUTO: 0.7 % (ref 0–1.5)
BILIRUB SERPL-MCNC: 0.4 MG/DL (ref 0–1.2)
BUN SERPL-MCNC: 17 MG/DL (ref 8–23)
BUN/CREAT SERPL: 17.2 (ref 7–25)
CALCIUM SPEC-SCNC: 8.9 MG/DL (ref 8.6–10.5)
CHLORIDE SERPL-SCNC: 101 MMOL/L (ref 98–107)
CO2 SERPL-SCNC: 24 MMOL/L (ref 22–29)
CREAT SERPL-MCNC: 0.99 MG/DL (ref 0.76–1.27)
DEPRECATED RDW RBC AUTO: 39.1 FL (ref 37–54)
EGFRCR SERPLBLD CKD-EPI 2021: 84.5 ML/MIN/1.73
EOSINOPHIL # BLD AUTO: 0.17 10*3/MM3 (ref 0–0.4)
EOSINOPHIL NFR BLD AUTO: 2 % (ref 0.3–6.2)
ERYTHROCYTE [DISTWIDTH] IN BLOOD BY AUTOMATED COUNT: 12.8 % (ref 12.3–15.4)
GLOBULIN UR ELPH-MCNC: 2.7 GM/DL
GLUCOSE SERPL-MCNC: 105 MG/DL (ref 65–99)
HCT VFR BLD AUTO: 32.4 % (ref 37.5–51)
HGB BLD-MCNC: 10.1 G/DL (ref 13–17.7)
IMM GRANULOCYTES # BLD AUTO: 0.03 10*3/MM3 (ref 0–0.05)
IMM GRANULOCYTES NFR BLD AUTO: 0.3 % (ref 0–0.5)
LYMPHOCYTES # BLD AUTO: 1.32 10*3/MM3 (ref 0.7–3.1)
LYMPHOCYTES NFR BLD AUTO: 15.2 % (ref 19.6–45.3)
MAGNESIUM SERPL-MCNC: 2 MG/DL (ref 1.6–2.4)
MCH RBC QN AUTO: 26.1 PG (ref 26.6–33)
MCHC RBC AUTO-ENTMCNC: 31.2 G/DL (ref 31.5–35.7)
MCV RBC AUTO: 83.7 FL (ref 79–97)
MONOCYTES # BLD AUTO: 1.03 10*3/MM3 (ref 0.1–0.9)
MONOCYTES NFR BLD AUTO: 11.9 % (ref 5–12)
NEUTROPHILS NFR BLD AUTO: 6.08 10*3/MM3 (ref 1.7–7)
NEUTROPHILS NFR BLD AUTO: 69.9 % (ref 42.7–76)
NRBC BLD AUTO-RTO: 0 /100 WBC (ref 0–0.2)
PLATELET # BLD AUTO: 289 10*3/MM3 (ref 140–450)
PMV BLD AUTO: 8.9 FL (ref 6–12)
POTASSIUM SERPL-SCNC: 4.2 MMOL/L (ref 3.5–5.2)
PROT SERPL-MCNC: 6 G/DL (ref 6–8.5)
RBC # BLD AUTO: 3.87 10*6/MM3 (ref 4.14–5.8)
SODIUM SERPL-SCNC: 137 MMOL/L (ref 136–145)
WBC NRBC COR # BLD: 8.69 10*3/MM3 (ref 3.4–10.8)

## 2023-08-17 PROCEDURE — 97112 NEUROMUSCULAR REEDUCATION: CPT | Performed by: OCCUPATIONAL THERAPIST

## 2023-08-17 PROCEDURE — 97530 THERAPEUTIC ACTIVITIES: CPT | Performed by: OCCUPATIONAL THERAPIST

## 2023-08-17 PROCEDURE — 83735 ASSAY OF MAGNESIUM: CPT | Performed by: INTERNAL MEDICINE

## 2023-08-17 PROCEDURE — 99231 SBSQ HOSP IP/OBS SF/LOW 25: CPT | Performed by: INTERNAL MEDICINE

## 2023-08-17 PROCEDURE — 97535 SELF CARE MNGMENT TRAINING: CPT

## 2023-08-17 PROCEDURE — 97116 GAIT TRAINING THERAPY: CPT

## 2023-08-17 PROCEDURE — 97530 THERAPEUTIC ACTIVITIES: CPT

## 2023-08-17 PROCEDURE — 80053 COMPREHEN METABOLIC PANEL: CPT | Performed by: INTERNAL MEDICINE

## 2023-08-17 PROCEDURE — 97110 THERAPEUTIC EXERCISES: CPT

## 2023-08-17 PROCEDURE — 97535 SELF CARE MNGMENT TRAINING: CPT | Performed by: OCCUPATIONAL THERAPIST

## 2023-08-17 PROCEDURE — 85025 COMPLETE CBC W/AUTO DIFF WBC: CPT | Performed by: INTERNAL MEDICINE

## 2023-08-17 RX ADMIN — RIVAROXABAN 20 MG: 20 TABLET, FILM COATED ORAL at 17:42

## 2023-08-17 RX ADMIN — POLYETHYLENE GLYCOL (3350) 17 G: 17 POWDER, FOR SOLUTION ORAL at 08:41

## 2023-08-17 RX ADMIN — PANTOPRAZOLE SODIUM 40 MG: 40 TABLET, DELAYED RELEASE ORAL at 05:28

## 2023-08-17 RX ADMIN — TAMSULOSIN HYDROCHLORIDE 0.4 MG: 0.4 CAPSULE ORAL at 08:40

## 2023-08-17 RX ADMIN — NYSTATIN: 100000 POWDER TOPICAL at 08:41

## 2023-08-17 RX ADMIN — ATORVASTATIN CALCIUM 80 MG: 40 TABLET, FILM COATED ORAL at 08:40

## 2023-08-17 RX ADMIN — MAGNESIUM GLUCONATE 500 MG ORAL TABLET 400 MG: 500 TABLET ORAL at 08:40

## 2023-08-17 RX ADMIN — HYDROXYZINE HYDROCHLORIDE 25 MG: 25 TABLET, FILM COATED ORAL at 20:39

## 2023-08-17 RX ADMIN — METOPROLOL TARTRATE 25 MG: 25 TABLET, FILM COATED ORAL at 20:38

## 2023-08-17 RX ADMIN — ALLOPURINOL 300 MG: 300 TABLET ORAL at 08:40

## 2023-08-17 RX ADMIN — METOPROLOL TARTRATE 25 MG: 25 TABLET, FILM COATED ORAL at 08:40

## 2023-08-17 RX ADMIN — NYSTATIN: 100000 POWDER TOPICAL at 20:39

## 2023-08-17 NOTE — THERAPY TREATMENT NOTE
Inpatient Rehabilitation - Occupational Therapy Treatment Note     Ronnie     Patient Name: Antonio Canseco  : 1955  MRN: [id number]    Today's Date: 2023                 Admit Date: 2023       No diagnosis found.    Patient Active Problem List   Diagnosis    Detrusor instability    Low testosterone in male    Disorder of prostate    Corporo-venous occlusive erectile dysfunction    CVA (cerebral vascular accident)       Past Medical History:   Diagnosis Date    Diabetes mellitus     Hypertension     Stroke        Past Surgical History:   Procedure Laterality Date    US GUIDED LYMPH NODE BIOPSY  2023             IRF OT ASSESSMENT FLOWSHEET (last 12 hours)       [facility] OT Evaluation and Treatment       Row Name 23 1345          OT Time and Intention    Document Type daily treatment  -     Mode of Treatment individual therapy;occupational therapy  -     Patient Effort good  -       Row Name 23 1346          General Information    Patient/Family/Caregiver Comments/Observations patient agreeable to therapy. patient continues to improve with left UE coordination and strength  -     Existing Precautions/Restrictions fall  -       Row Name 23 1345          Cognition/Psychosocial    Affect/Mental Status (Cognition) WFL  -       Row Name 23 1345          Chair-Bed Transfer    Chair-Bed [place] (Transfers) moderate assist (50% patient effort);maximum assist (25% patient effort);2 person assist  -       Row Name 23 1343          Motor Skills    Motor Skills coordination;functional endurance;neuro-muscular function  -     Neuromuscular Function left;upper extremity  ROM, gmc, strengthening  -     Therapeutic Exercise shoulder;elbow/forearm;wrist;hand  ROM,  strength  -       Row Name 23 134          Positioning and Restraints    Post Treatment Position bed  -     In Bed supine;call light within reach;encouraged to call for assist  -                User Key  (r) = Recorded By, (t) = Taken By, (c) = Cosigned By      Initials Name Effective Dates    Val Nixon, OT 07/11/23 -                      Occupational Therapy Education       Title: PT OT SLP Therapies (Done)       Topic: Occupational Therapy (Done)       Point: ADL training (Done)       Description:   Instruct learner(s) on proper safety adaptation and remediation techniques during self care or transfers.   Instruct in proper use of assistive devices.                  Learning Progress Summary             Patient Acceptance, E, VU,NR by HB at 8/16/2023 1059    Acceptance, E, VU,NR by BF at 8/15/2023 1222    Acceptance, E, VU,NR by BF at 8/14/2023 1527    Acceptance, E, VU,NR by BF at 8/11/2023 1204    Acceptance, E,D, VU,DU by BF at 8/10/2023 1524    Acceptance, TB, NR by DL at 8/8/2023 2014    Acceptance, E, VU,NR by BF at 8/8/2023 1440    Acceptance, E, VU,NR by BF at 8/7/2023 1526    Acceptance, E, VU,NR by BF at 8/4/2023 1221    Acceptance, E,TB, VU,NR by MB at 8/3/2023 2239    Acceptance, E, VU,NR by BF at 8/3/2023 1217    Acceptance, E, VU,NR by BF at 8/1/2023 1427    Acceptance, E, VU,NR by BF at 7/31/2023 1409    Acceptance, E, VU,NR by BF at 7/28/2023 1442    Acceptance, E, VU,NR by BF at 7/27/2023 1445    Acceptance, E, VU,NR by BF at 7/25/2023 1251    Acceptance, E, VU,NR by HB at 7/24/2023 1355    Acceptance, E, NR,VU by BF at 7/21/2023 1248    Acceptance, E, NR by BF at 7/20/2023 1259    Acceptance, TB, NR by DL at 7/19/2023 2036    Acceptance, E, VU,NR by HB at 7/19/2023 1421    Acceptance, E, VU,NR by BF at 7/18/2023 1334    Acceptance, E, VU,NR by BF at 7/17/2023 1431    Acceptance, E, VU,NR by BF at 7/17/2023 1430    Acceptance, E, VU,NR by BF at 7/14/2023 1508    Acceptance, E, VU,NR by BF at 7/13/2023 1343    Acceptance, E,D, VU,NR by TM at 7/12/2023 1407    Acceptance, D,E, NR,VU by TM at 7/11/2023 1434    Acceptance, E, VU,NR by BF at 7/10/2023 1456     Acceptance, E, VU,NR by HB at 7/8/2023 1140    Acceptance, E, VU,NR by BF at 7/7/2023 1442   Family Acceptance, E,D, VU,DU by BF at 8/10/2023 1524    Acceptance, E, VU,NR by BF at 8/1/2023 1427                         Point: Precautions (Done)       Description:   Instruct learner(s) on prescribed precautions during self-care and functional transfers.                  Learning Progress Summary             Patient Acceptance, E, VU,NR by HB at 8/16/2023 1059    Acceptance, E, VU,NR by BF at 8/15/2023 1222    Acceptance, E, VU,NR by BF at 8/14/2023 1527    Acceptance, E, VU,NR by BF at 8/11/2023 1204    Acceptance, E,D, VU,DU by BF at 8/10/2023 1524    Acceptance, TB, NR by DL at 8/8/2023 2014    Acceptance, E, VU,NR by BF at 8/8/2023 1440    Acceptance, E, VU,NR by BF at 8/7/2023 1526    Acceptance, E, VU,NR by BF at 8/4/2023 1221    Acceptance, E,TB, VU,NR by MB at 8/3/2023 2239    Acceptance, E, VU,NR by BF at 8/3/2023 1217    Acceptance, E, VU,NR by BF at 8/1/2023 1427    Acceptance, E, VU,NR by BF at 7/31/2023 1409    Acceptance, E, VU,NR by BF at 7/28/2023 1442    Acceptance, E, VU,NR by BF at 7/27/2023 1445    Acceptance, E, VU,NR by BF at 7/25/2023 1251    Acceptance, E, VU,NR by HB at 7/24/2023 1355    Acceptance, E, NR,VU by BF at 7/21/2023 1248    Acceptance, E, NR by BF at 7/20/2023 1259    Acceptance, TB, NR by DL at 7/19/2023 2036    Acceptance, E, VU,NR by HB at 7/19/2023 1421    Acceptance, E, VU,NR by BF at 7/18/2023 1334    Acceptance, E, VU,NR by BF at 7/17/2023 1431    Acceptance, E, VU,NR by BF at 7/17/2023 1430    Acceptance, E, VU,NR by BF at 7/14/2023 1508    Acceptance, E, VU,NR by BF at 7/13/2023 1343    Acceptance, E,D, VU,NR by TM at 7/12/2023 1407    Acceptance, D,E, NR,VU by TM at 7/11/2023 1434    Acceptance, E, VU,NR by BF at 7/10/2023 1456    Acceptance, E, VU,NR by HB at 7/8/2023 1140    Acceptance, E, VU,NR by BF at 7/7/2023 1442   Family Acceptance, E,D, VU,DU by BF at 8/10/2023  1524    Acceptance, E, VU,NR by  at 8/1/2023 1427                                         User Key       Initials Effective Dates Name Provider Type Discipline    BF 05/31/23 - 07/10/23 Lisa Sanchez, OT Occupational Therapist OT    BF 07/11/23 -  Lisa Sanchez, OT Occupational Therapist OT    TM 06/16/21 -  Marielos Capone OT Occupational Therapist OT    HB 05/25/21 -  Veronica Robles OT Occupational Therapist OT    DL 03/16/22 -  Nadja Velasquez, RN Registered Nurse Nurse    MB 06/26/23 -  Vinay Fritz RN Registered Nurse Nurse                        OT Recommendation and Plan                         Time Calculation:      Time Calculation- OT       Row Name 08/17/23 1347             Time Calculation-     OT Start Time 1000  -      OT Stop Time 1045  -      OT Time Calculation (min) 45 min  -                User Key  (r) = Recorded By, (t) = Taken By, (c) = Cosigned By      Initials Name Provider Type     Val Pierce, OT Occupational Therapist                  Therapy Charges for Today       Code Description Service Date Service Provider Modifiers Qty    61254637549 HC OT NEUROMUSC RE EDUCATION EA 15 MIN 8/17/2023 Val Pierce, OT GO 1    25833053382 HC OT THERAPEUTIC ACT EA 15 MIN 8/17/2023 Val Pierce OT GO 1    63319839837 HC OT SELF CARE/MGMT/TRAIN EA 15 MIN 8/17/2023 Val Pierce OT GO 1                     Val Pierce OT  8/17/2023

## 2023-08-17 NOTE — PROGRESS NOTES
Occupational Therapy: Individual: 90 minutes.    Physical Therapy:    Speech Language Pathology:    Signed by: Jessica Pierce, Occupational Therapist

## 2023-08-17 NOTE — SIGNIFICANT NOTE
08/17/23 1010   Plan   Plan SS received call from significant other Alejandra [phone] who says she will help with pt's care at home including changing adult diapers.  Informed her pt's discharge is scheduled for 8-23-23 to return to his home with assistance from her, daughter Karolyn, sister Mariah, cousin Wilson, and friend Joey.  Alejandra would like to observe pt in therapy and receive education on 8-18-23 at 8:00 am.  Will follow.

## 2023-08-17 NOTE — THERAPY TREATMENT NOTE
Inpatient Rehabilitation - Occupational Therapy Treatment Note     [place]     Patient Name: Antonio Canseco  : 1955  MRN: [id number]    Today's Date: 2023                 Admit Date: 2023       No diagnosis found.    Patient Active Problem List   Diagnosis    Detrusor instability    Low testosterone in male    Disorder of prostate    Corporo-venous occlusive erectile dysfunction    CVA (cerebral vascular accident)       Past Medical History:   Diagnosis Date    Diabetes mellitus     Hypertension     Stroke        Past Surgical History:   Procedure Laterality Date    US GUIDED LYMPH NODE BIOPSY  2023             IRF OT ASSESSMENT FLOWSHEET (last 12 hours)       [facility] OT Evaluation and Treatment       Row Name 23 1414 23 1345       OT Time and Intention    Document Type daily treatment  - daily treatment  -    Mode of Treatment occupational therapy  - individual therapy;occupational therapy  -    Patient Effort -- good  -    Symptoms Noted During/After Treatment none  - --      Row Name 23 1414 23 1345       General Information    Patient/Family/Caregiver Comments/Observations agreeable to therapy  - patient agreeable to therapy. patient continues to improve with left UE coordination and strength  -    Existing Precautions/Restrictions fall  - fall  -      Row Name 23 1414 23 1345       Cognition/Psychosocial    Affect/Mental Status (Cognition) -- WFL  -    Follows Commands (Cognition) verbal cues/prompting required;physical/tactile prompts required  - --      Row Name 23 1414          Grooming    [place] Level (Grooming) minimum assist (75% patient effort);verbal cues  -       Row Name 23 1345          Chair-Bed Transfer    Chair-Bed [place] (Transfers) moderate assist (50% patient effort);maximum assist (25% patient effort);2 person assist  -       Row Name 23 1414 23 1345       Motor Skills     Motor Skills functional endurance;coordination;motor control/coordination interventions  - coordination;functional endurance;neuro-muscular function  -    Neuromuscular Function -- left;upper extremity  ROM, gmc, strengthening  -    Motor Control/Coordination Interventions therapeutic exercise/ROM;gross motor coordination activities;fine motor manipulation/dexterity activities  LUE ther ex/act, AROM, GMC/FMC  - --    Therapeutic Exercise --  UBE, hand exerciser  - shoulder;elbow/forearm;wrist;hand  ROM,  strength  -      Row Name 08/17/23 1414 08/17/23 1345       Positioning and Restraints    Post Treatment Position wheelchair  - bed  -    In Bed -- supine;call light within reach;encouraged to call for assist  -    In Wheelchair with OT  - --              User Key  (r) = Recorded By, (t) = Taken By, (c) = Cosigned By      Initials Name Effective Dates     Nohelia Novaivana ASHTON, OT 06/16/21 -      Val Pierce, OT 07/11/23 -                      Occupational Therapy Education       Title: PT OT SLP Therapies (Done)       Topic: Occupational Therapy (Done)       Point: ADL training (Done)       Description:   Instruct learner(s) on proper safety adaptation and remediation techniques during self care or transfers.   Instruct in proper use of assistive devices.                  Learning Progress Summary             Patient Acceptance, E,D, VU,NR by  at 8/17/2023 1419    Acceptance, E, VU,NR by HB at 8/16/2023 1059    Acceptance, E, VU,NR by BF at 8/15/2023 1222    Acceptance, E, VU,NR by BF at 8/14/2023 1527    Acceptance, E, VU,NR by BF at 8/11/2023 1204    Acceptance, E,D, VU,DU by BF at 8/10/2023 1524    Acceptance, TB, NR by DL at 8/8/2023 2014    Acceptance, E, VU,NR by BF at 8/8/2023 1440    Acceptance, E, VU,NR by BF at 8/7/2023 1526    Acceptance, E, VU,NR by BF at 8/4/2023 1221    Acceptance, E,TB, VU,NR by MB at 8/3/2023 2239    Acceptance, E, VU,NR by BF at 8/3/2023 1217     Acceptance, E, VU,NR by BF at 8/1/2023 1427    Acceptance, E, VU,NR by BF at 7/31/2023 1409    Acceptance, E, VU,NR by BF at 7/28/2023 1442    Acceptance, E, VU,NR by BF at 7/27/2023 1445    Acceptance, E, VU,NR by BF at 7/25/2023 1251    Acceptance, E, VU,NR by HB at 7/24/2023 1355    Acceptance, E, NR,VU by BF at 7/21/2023 1248    Acceptance, E, NR by BF at 7/20/2023 1259    Acceptance, TB, NR by DL at 7/19/2023 2036    Acceptance, E, VU,NR by HB at 7/19/2023 1421    Acceptance, E, VU,NR by BF at 7/18/2023 1334    Acceptance, E, VU,NR by BF at 7/17/2023 1431    Acceptance, E, VU,NR by BF at 7/17/2023 1430    Acceptance, E, VU,NR by BF at 7/14/2023 1508    Acceptance, E, VU,NR by BF at 7/13/2023 1343    Acceptance, E,D, VU,NR by TM at 7/12/2023 1407    Acceptance, D,E, NR,VU by TM at 7/11/2023 1434    Acceptance, E, VU,NR by BF at 7/10/2023 1456    Acceptance, E, VU,NR by HB at 7/8/2023 1140    Acceptance, E, VU,NR by BF at 7/7/2023 1442   Family Acceptance, E,D, VU,DU by BF at 8/10/2023 1524    Acceptance, E, VU,NR by BF at 8/1/2023 1427                         Point: Precautions (Done)       Description:   Instruct learner(s) on prescribed precautions during self-care and functional transfers.                  Learning Progress Summary             Patient Acceptance, E,D, VU,NR by CJ at 8/17/2023 1419    Acceptance, E, VU,NR by HB at 8/16/2023 1059    Acceptance, E, VU,NR by BF at 8/15/2023 1222    Acceptance, E, VU,NR by BF at 8/14/2023 1527    Acceptance, E, VU,NR by BF at 8/11/2023 1204    Acceptance, E,D, VU,DU by BF at 8/10/2023 1524    Acceptance, TB, NR by DL at 8/8/2023 2014    Acceptance, E, VU,NR by BF at 8/8/2023 1440    Acceptance, E, VU,NR by BF at 8/7/2023 1526    Acceptance, E, VU,NR by BF at 8/4/2023 1221    Acceptance, E,TB, VU,NR by MB at 8/3/2023 2239    Acceptance, E, VU,NR by BF at 8/3/2023 1217    Acceptance, E, VU,NR by BF at 8/1/2023 1427    Acceptance, E, VU,NR by BF at 7/31/2023 1409     Acceptance, E, VU,NR by BF at 7/28/2023 1442    Acceptance, E, VU,NR by BF at 7/27/2023 1445    Acceptance, E, VU,NR by BF at 7/25/2023 1251    Acceptance, E, VU,NR by HB at 7/24/2023 1355    Acceptance, E, NR,VU by BF at 7/21/2023 1248    Acceptance, E, NR by BF at 7/20/2023 1259    Acceptance, TB, NR by DL at 7/19/2023 2036    Acceptance, E, VU,NR by HB at 7/19/2023 1421    Acceptance, E, VU,NR by BF at 7/18/2023 1334    Acceptance, E, VU,NR by BF at 7/17/2023 1431    Acceptance, E, VU,NR by BF at 7/17/2023 1430    Acceptance, E, VU,NR by BF at 7/14/2023 1508    Acceptance, E, VU,NR by BF at 7/13/2023 1343    Acceptance, E,D, VU,NR by TM at 7/12/2023 1407    Acceptance, D,E, NR,VU by TM at 7/11/2023 1434    Acceptance, E, VU,NR by BF at 7/10/2023 1456    Acceptance, E, VU,NR by HB at 7/8/2023 1140    Acceptance, E, VU,NR by BF at 7/7/2023 1442   Family Acceptance, E,D, VU,DU by BF at 8/10/2023 1524    Acceptance, E, VU,NR by BF at 8/1/2023 1427                                         User Key       Initials Effective Dates Name Provider Type Discipline    BF 05/31/23 - 07/10/23 Lisa Sanchez, OT Occupational Therapist OT    BF 07/11/23 -  Lisa Sanchez, OT Occupational Therapist OT    CJ 06/16/21 -  Ashanti Nova OT Occupational Therapist OT    TM 06/16/21 -  Marielos Capone OT Occupational Therapist OT    HB 05/25/21 -  Veronica Robles OT Occupational Therapist OT    DL 03/16/22 -  Nadja Velasquez, RN Registered Nurse Nurse    MB 06/26/23 -  Vinay Fritz, RN Registered Nurse Nurse                        OT Recommendation and Plan                         Time Calculation:      Time Calculation- OT       Row Name 08/17/23 1419 08/17/23 1347          Time Calculation- OT    OT Start Time 0915  - 1000  -     OT Stop Time 1000  - 1045  -     OT Time Calculation (min) 45 min  - 45 min  -     Total Timed Code Minutes- OT 45 minute(s)  - --               User Key  (r) =  Recorded By, (t) = Taken By, (c) = Cosigned By      Initials Name Provider Type    CJ Ashanti Nova, OT Occupational Therapist     Val Pierce, OT Occupational Therapist                  Therapy Charges for Today       Code Description Service Date Service Provider Modifiers Qty    35151088939 HC OT SELF CARE/MGMT/TRAIN EA 15 MIN 8/17/2023 Ashanti Nova OT GO 1    89287932958 HC OT THER PROC EA 15 MIN 8/17/2023 Ashanti Nova OT GO 1    02135751585 HC OT THERAPEUTIC ACT EA 15 MIN 8/17/2023 Ashanti Nova, OT GO 1                     Ashanti Nova OT  8/17/2023

## 2023-08-17 NOTE — PROGRESS NOTES
Assisted By: PT    CC: Follow-up on CVA    Interview History/HPI: Patient states she is doing well without acute complaints no new events overnight.          Current Hospital Meds:  allopurinol, 300 mg, Oral, Daily  atorvastatin, 80 mg, Oral, Daily  magnesium oxide, 400 mg, Oral, Daily  metoprolol tartrate, 25 mg, Oral, Q12H  nystatin, , Topical, Q12H  pantoprazole, 40 mg, Oral, Q AM  polyethylene glycol, 17 g, Oral, Daily  rivaroxaban, 20 mg, Oral, Daily With Dinner  tamsulosin, 0.4 mg, Oral, Daily         Vitals:    08/17/23 0757   BP: 146/76   Pulse: 84   Resp: 20   Temp: 97.7 øF (36.5 øC)   SpO2:          Intake/Output Summary (Last 24 hours) at 8/17/2023 1127  Last data filed at 8/17/2023 0500  Gross per 24 hour   Intake 960 ml   Output 600 ml   Net 360 ml       EXAM: I observed him walking with the therapist, patient again did bring his leg through better than he has.  Lungs are clear heart is a controlled irregular regular rhythm no murmur, no edema, left arm strength is about the same.      Diet: Cardiac Diets; Healthy Heart (2-3 Na+); Texture: Regular Texture (IDDSI 7); Fluid Consistency: Thin (IDDSI 0)        LABS:     Lab Results (last 48 hours)       Procedure Component Value Units Date/Time    Magnesium [[id number]]  (Normal) Collected: 08/17/23 0119    Specimen: Blood Updated: 08/17/23 0220     Magnesium 2.0 mg/dL     Comprehensive Metabolic Panel [[id number]]  (Abnormal) Collected: 08/17/23 0119    Specimen: Blood Updated: 08/17/23 0220     Glucose 105 mg/dL      BUN 17 mg/dL      Creatinine 0.99 mg/dL      Sodium 137 mmol/L      Potassium 4.2 mmol/L      Chloride 101 mmol/L      CO2 24.0 mmol/L      Calcium 8.9 mg/dL      Total Protein 6.0 g/dL      Albumin 3.3 g/dL      ALT (SGPT) 8 U/L      AST (SGOT) 13 U/L      Alkaline Phosphatase 85 U/L      Total Bilirubin 0.4 mg/dL      Globulin 2.7 gm/dL      A/G Ratio 1.2 g/dL      BUN/Creatinine Ratio 17.2     Anion Gap 12.0 mmol/L      eGFR 84.5 mL/min/1.73      Narrative:      GFR Normal >60  Chronic Kidney Disease <60  Kidney Failure <15      CBC & Differential [[id number]]  (Abnormal) Collected: 08/17/23 0119    Specimen: Blood Updated: 08/17/23 0147    Narrative:      The following orders were created for panel order CBC & Differential.  Procedure                               Abnormality         Status                     ---------                               -----------         ------                     CBC Auto Differential[[id number]]        Abnormal            Final result                 Please view results for these tests on the individual orders.    CBC Auto Differential [[id number]]  (Abnormal) Collected: 08/17/23 0119    Specimen: Blood Updated: 08/17/23 0147     WBC 8.69 10*3/mm3      RBC 3.87 10*6/mm3      Hemoglobin 10.1 g/dL      Hematocrit 32.4 %      MCV 83.7 fL      MCH 26.1 pg      MCHC 31.2 g/dL      RDW 12.8 %      RDW-SD 39.1 fl      MPV 8.9 fL      Platelets 289 10*3/mm3      Neutrophil % 69.9 %      Lymphocyte % 15.2 %      Monocyte % 11.9 %      Eosinophil % 2.0 %      Basophil % 0.7 %      Immature Grans % 0.3 %      Neutrophils, Absolute 6.08 10*3/mm3      Lymphocytes, Absolute 1.32 10*3/mm3      Monocytes, Absolute 1.03 10*3/mm3      Eosinophils, Absolute 0.17 10*3/mm3      Basophils, Absolute 0.06 10*3/mm3      Immature Grans, Absolute 0.03 10*3/mm3      nRBC 0.0 /100 WBC                  Radiology:    Imaging Results (Last 72 Hours)       ** No results found for the last 72 hours. **                Assessment/Plan:   Status post right CVA left residual.  Patient is making progress towards his goals.  With OT, he is mod to max bed to chair, bed mobility max, min assist sit to stand and stand to sit with a platform walker.  Yesterday walked 40 feet and 20 feet platform walker mod assist, he is above was requiring less assistance today.  With OT, he is min assist for grooming, max assist 2 person bed mobility, max assist chair to bed,  patient continues to work on coordination functional endurance, neuromuscular function, motor control and coordination interventions.    Non-Hodgkin's lymphoma and head and neck cancer, he will have outpatient oncology follow-up.  Likely he appears to at least be stable    Atrial fibrillation, rate controlled on metoprolol, on Xarelto for further stroke prevention.    Labs unremarkable today.    Constipation, suppository given yesterday, he has had resolution.        Hayder Mendoza MD

## 2023-08-17 NOTE — SIGNIFICANT NOTE
08/17/23 1408   Plan   Plan SS received voicemail from sister Mariah saying she talked to pt's daughter Karolyn who will move in pt to assist with care and daughter talked to pt's significant other Alejandra who will be staying with pt too. Pt will receive assistance from some other people too.   Left message for sister Mariah [phone].  Will follow.

## 2023-08-17 NOTE — THERAPY TREATMENT NOTE
Inpatient Rehabilitation - Physical Therapy Treatment Note        Ronnie     Patient Name: Antonio Canseco  : 1955  MRN: [id number]    Today's Date: 2023                    Admit Date: 2023      Visit Dx:   No diagnosis found.    Patient Active Problem List   Diagnosis    Detrusor instability    Low testosterone in male    Disorder of prostate    Corporo-venous occlusive erectile dysfunction    CVA (cerebral vascular accident)       Past Medical History:   Diagnosis Date    Diabetes mellitus     Hypertension     Stroke        Past Surgical History:   Procedure Laterality Date    US GUIDED LYMPH NODE BIOPSY  2023       PT ASSESSMENT (last 12 hours)       IRF PT Evaluation and Treatment       Row Name 23 1320          PT Time and Intention    Document Type daily treatment  -RG     Mode of Treatment individual therapy;physical therapy  -RG     Patient/Family/Caregiver Comments/Observations Pt and nursing in agreement for skilled PT on this date.  Pt was able to ambulate Mod assist with PRW with only occ. assist advancing L LE.  -RG       Row Name 23 132          General Information    Existing Precautions/Restrictions fall  L HP, <trunk support/balance, L side inattention  -RG       Row Name 23 132          Pain Scale: FACES Pre/Post-Treatment    Pain: FACES Scale, Pretreatment 0-->no hurt  -RG     Posttreatment Pain Rating 0-->no hurt  -RG       Row Name 23 1320          Cognition/Psychosocial    Affect/Mental Status (Cognition) WFL  -RG     Follows Commands (Cognition) verbal cues/prompting required;physical/tactile prompts required  -RG     Personal Safety Interventions fall prevention program maintained;gait belt;nonskid shoes/slippers when out of bed  -RG       Row Name 23 132          Bed Mobility    Supine-Sit Gooding (Bed Mobility) maximum assist (25% patient effort);verbal cues;nonverbal cues (demo/gesture)  -       Row Name 23 132           Bed-Chair Transfer    Bed-Chair Swain (Transfers) maximum assist (25% patient effort);verbal cues;nonverbal cues (demo/gesture);moderate assist (50% patient effort);2 person assist  -RG       Row Name 08/17/23 1320          Sit-Stand Transfer    Sit-Stand Swain (Transfers) verbal cues;nonverbal cues (demo/gesture);minimum assist (75% patient effort)  -RG     Assistive Device (Sit-Stand Transfers) walker, platform;parallel bars  -RG       Row Name 08/17/23 1320          Stand-Sit Transfer    Stand-Sit Swain (Transfers) verbal cues;nonverbal cues (demo/gesture);minimum assist (75% patient effort)  -RG     Assistive Device (Stand-Sit Transfers) walker, platform  -RG       Row Name 08/17/23 1320          Gait/Stairs (Locomotion)    Swain Level (Gait) verbal cues;nonverbal cues (demo/gesture);2 person assist;moderate assist (50% patient effort)  -RG     Assistive Device (Gait) walker, rolling platform  -RG     Distance in Feet (Gait) 20' x 3  -RG     Pattern (Gait) step-to  -RG     Deviations/Abnormal Patterns (Gait) base of support, narrow;hebert decreased;gait speed decreased;stride length decreased;weight shifting decreased;ataxic  -RG     Bilateral Gait Deviations forward flexed posture  -RG     Comment, (Gait/Stairs) improved ambulation with advancing L LE.  -RG       Row Name 08/17/23 1320          Hip (Therapeutic Exercise)    Hip Strengthening (Therapeutic Exercise) bilateral;flexion;aBduction;aDduction;marching while seated;sitting;2 lb free weight;resistance band;green;10 repetitions;2 sets  AAROM L LE  -RG       Row Name 08/17/23 1320          Knee (Therapeutic Exercise)    Knee Strengthening (Therapeutic Exercise) bilateral;flexion;extension;marching while seated;LAQ (long arc quad);sitting;2 lb free weight;resistance band;green;10 repetitions;2 sets  AAROM L LE  -RG       Row Name 08/17/23 1320          Ankle (Therapeutic Exercise)    Ankle Strengthening (Therapeutic Exercise)  bilateral;dorsiflexion;sitting;plantarflexion;10 repetitions;2 sets  AAROM L LE  -RG       Row Name 08/17/23 1320          Aerobic Exercise    Type (Aerobic Exercise) recumbent stationary bike  -RG     Time Performed (Aerobic Exercise) 16  -RG     Comment, Aerobic Exercise (Therapeutic Exercise) L1 in wc  -RG       Row Name 08/17/23 1320          Positioning and Restraints    Pre-Treatment Position in bed  -RG     Post Treatment Position wheelchair  -RG     In Wheelchair notified nsg;sitting;with OT;legs elevated  -RG       Row Name 08/17/23 1320          Bed Mobility Goal 1 (PT-IRF)    Progress/Outcomes (Bed Mobility Goal 1, PT-IRF) goal ongoing  -RG       Row Name 08/17/23 1320          Bed Mobility Goal 2 (PT-IRF)    Progress/Outcomes (Bed Mobility Goal 2, PT-IRF) goal ongoing  -RG       Row Name 08/17/23 1320          Transfer Goal 1 (PT-IRF)    Progress/Outcomes (Transfer Goal 1, PT-IRF) goal ongoing  -RG       Row Name 08/17/23 1320          Transfer Goal 2 (PT-IRF)    Progress/Outcomes (Transfer Goal 2, PT-IRF) goal ongoing  -RG       Row Name 08/17/23 1320          Gait/Walking Locomotion Goal 1 (PT-IRF)    Progress/Outcomes (Gait/Walking Locomotion Goal 1, PT-IRF) goal ongoing;goal partially met  -RG       Row Name 08/17/23 1320          Gait/Walking Locomotion Goal 2 (PT-IRF)    Progress/Outcomes (Gait/Walking Locomotion Goal 2, PT-IRF) goal ongoing  -RG               User Key  (r) = Recorded By, (t) = Taken By, (c) = Cosigned By      Initials Name Provider Type    RG Vu Brown PTA Physical Therapist Assistant                     Physical Therapy Education       Title: PT OT SLP Therapies (Done)       Topic: Physical Therapy (Done)       Point: Mobility training (Done)       Learning Progress Summary             Patient Acceptance, E,D, VU,NR by RG at 8/17/2023 1332    Acceptance, E,D, VU,NR by RG at 8/16/2023 1354    Acceptance, E,D, VU,NR by RG at 8/16/2023 0723    Acceptance, E,TB, VU,DU by  at  8/15/2023 1402    Acceptance, E,D, VU,NR by RG at 8/11/2023 1503    Acceptance, E,D, VU,NR by RG at 8/10/2023 1510    Acceptance, E, VU,NR by LB at 8/9/2023 1314    Acceptance, TB, NR by DL at 8/8/2023 2014    Acceptance, E,D, VU,NR by RG at 8/8/2023 1407    Acceptance, E,D, VU,NR by RG at 8/7/2023 1507    Acceptance, E, VU,NR by LB at 8/4/2023 1521    Acceptance, E,TB, VU,NR by MB at 8/3/2023 2239    Acceptance, E,D, VU,NR by RG at 8/3/2023 1514    Acceptance, E,D, VU,NR by RG at 8/2/2023 1524    Acceptance, E,D, VU,NR by RG at 8/1/2023 1446    Acceptance, E,D, VU,NR by RG at 7/31/2023 1411    Acceptance, E,TB, VU by RM at 7/28/2023 1230    Acceptance, E,D, VU,NR by RG at 7/26/2023 1518    Acceptance, E, VU,NR by LB at 7/25/2023 1133    Acceptance, E, VU,NR by LB at 7/24/2023 1444    Acceptance, E, VU,NR by LB at 7/21/2023 1615    Acceptance, E,D, VU,NR by RG at 7/20/2023 1107    Acceptance, TB, NR by DL at 7/19/2023 2036    Acceptance, E,D, VU,NR by RG at 7/19/2023 1508    Acceptance, E,D, VU,NR by RG at 7/18/2023 1520    Acceptance, E,D, VU,NR by RG at 7/17/2023 1522    Acceptance, E,D, VU,NR by LL at 7/13/2023 1358    Acceptance, E, VU,NR by LB at 7/12/2023 1144    Acceptance, E, VU,NR by LB at 7/11/2023 1426    Acceptance, E, VU,NR by LB at 7/10/2023 1452    Acceptance, E,D, VU,NR by LL at 7/8/2023 1228    Acceptance, E,D, VU,NR by LB at 7/7/2023 1449                         Point: Home exercise program (Done)       Learning Progress Summary             Patient Acceptance, E,D, VU,NR by RG at 8/17/2023 1332    Acceptance, E,D, VU,NR by RG at 8/16/2023 1354    Acceptance, E,D, VU,NR by RG at 8/16/2023 0723    Acceptance, E,TB, VU,DU by HC at 8/15/2023 1402    Acceptance, E,D, VU,NR by RG at 8/11/2023 1503    Acceptance, E,D, VU,NR by RG at 8/10/2023 1510    Acceptance, E, VU,NR by LB at 8/9/2023 1314    Acceptance, TB, NR by DL at 8/8/2023 2014    Acceptance, E,D, VU,NR by RG at 8/8/2023 1407    Acceptance, E,D,  VU,NR by RG at 8/7/2023 1507    Acceptance, E, VU,NR by LB at 8/4/2023 1521    Acceptance, E,TB, VU,NR by MB at 8/3/2023 2239    Acceptance, E,D, VU,NR by RG at 8/3/2023 1514    Acceptance, E,D, VU,NR by RG at 8/2/2023 1524    Acceptance, E,D, VU,NR by RG at 8/1/2023 1446    Acceptance, E,D, VU,NR by RG at 7/31/2023 1411    Acceptance, E,TB, VU by RM at 7/28/2023 1230    Acceptance, E,D, VU,NR by RG at 7/26/2023 1518    Acceptance, E, VU,NR by LB at 7/25/2023 1133    Acceptance, E, VU,NR by LB at 7/24/2023 1444    Acceptance, E, VU,NR by LB at 7/21/2023 1615    Acceptance, E,D, VU,NR by RG at 7/20/2023 1107    Acceptance, TB, NR by DL at 7/19/2023 2036    Acceptance, E,D, VU,NR by RG at 7/19/2023 1508    Acceptance, E,D, VU,NR by RG at 7/18/2023 1520    Acceptance, E,D, VU,NR by RG at 7/17/2023 1522    Acceptance, E,D, VU,NR by LL at 7/13/2023 1358    Acceptance, E, VU,NR by LB at 7/12/2023 1144    Acceptance, E, VU,NR by LB at 7/11/2023 1426    Acceptance, E, VU,NR by LB at 7/10/2023 1452    Acceptance, E,D, VU,NR by LL at 7/8/2023 1228    Acceptance, E,D, VU,NR by LB at 7/7/2023 1449                         Point: Body mechanics (Done)       Learning Progress Summary             Patient Acceptance, E,D, VU,NR by RG at 8/17/2023 1332    Acceptance, E,D, VU,NR by RG at 8/16/2023 1354    Acceptance, E,D, VU,NR by RG at 8/16/2023 0723    Acceptance, E,TB, VU,DU by HC at 8/15/2023 1402    Acceptance, E,D, VU,NR by RG at 8/11/2023 1503    Acceptance, E,D, VU,NR by RG at 8/10/2023 1510    Acceptance, E, VU,NR by LB at 8/9/2023 1314    Acceptance, TB, NR by DL at 8/8/2023 2014    Acceptance, E,D, VU,NR by RG at 8/8/2023 1407    Acceptance, E,D, VU,NR by RG at 8/7/2023 1507    Acceptance, E, VU,NR by LB at 8/4/2023 1521    Acceptance, E,TB, VU,NR by MB at 8/3/2023 2239    Acceptance, E,D, VU,NR by RG at 8/3/2023 1514    Acceptance, E,D, VU,NR by RG at 8/2/2023 1524    Acceptance, E,D, VU,NR by RG at 8/1/2023 1446     Acceptance, E,D, VU,NR by RG at 7/31/2023 1411    Acceptance, E,TB, VU by RM at 7/28/2023 1230    Acceptance, E,D, VU,NR by RG at 7/26/2023 1518    Acceptance, E, VU,NR by LB at 7/25/2023 1133    Acceptance, E, VU,NR by LB at 7/24/2023 1444    Acceptance, E, VU,NR by LB at 7/21/2023 1615    Acceptance, E,D, VU,NR by RG at 7/20/2023 1107    Acceptance, TB, NR by DL at 7/19/2023 2036    Acceptance, E,D, VU,NR by RG at 7/19/2023 1508    Acceptance, E,D, VU,NR by RG at 7/18/2023 1520    Acceptance, E,D, VU,NR by RG at 7/17/2023 1522    Acceptance, E,D, VU,NR by LL at 7/13/2023 1358    Acceptance, E, VU,NR by LB at 7/12/2023 1144    Acceptance, E, VU,NR by LB at 7/11/2023 1426    Acceptance, E, VU,NR by LB at 7/10/2023 1452    Acceptance, E,D, VU,NR by LL at 7/8/2023 1228    Acceptance, E,D, VU,NR by LB at 7/7/2023 1449                         Point: Precautions (Done)       Learning Progress Summary             Patient Acceptance, E,D, VU,NR by RG at 8/17/2023 1332    Acceptance, E,D, VU,NR by RG at 8/16/2023 1354    Acceptance, E,D, VU,NR by RG at 8/16/2023 0723    Acceptance, E,TB, VU,DU by HC at 8/15/2023 1402    Acceptance, E,D, VU,NR by RG at 8/11/2023 1503    Acceptance, E,D, VU,NR by RG at 8/10/2023 1510    Acceptance, E, VU,NR by LB at 8/9/2023 1314    Acceptance, TB, NR by DL at 8/8/2023 2014    Acceptance, E,D, VU,NR by RG at 8/8/2023 1407    Acceptance, E,D, VU,NR by RG at 8/7/2023 1507    Acceptance, E, VU,NR by LB at 8/4/2023 1521    Acceptance, E,TB, VU,NR by MB at 8/3/2023 2239    Acceptance, E,D, VU,NR by RG at 8/3/2023 1514    Acceptance, E,D, VU,NR by RG at 8/2/2023 1524    Acceptance, E,D, VU,NR by RG at 8/1/2023 1446    Acceptance, E,D, VU,NR by RG at 7/31/2023 1411    Acceptance, E,TB, VU by RM at 7/28/2023 1230    Acceptance, E,D, VU,NR by RG at 7/26/2023 1518    Acceptance, E, VU,NR by LB at 7/25/2023 1133    Acceptance, E, VU,NR by LB at 7/24/2023 1444    Acceptance, E, VU,NR by LB at 7/21/2023  1615    Acceptance, E,D, VU,NR by RG at 7/20/2023 1107    Acceptance, TB, NR by DL at 7/19/2023 2036    Acceptance, E,D, VU,NR by RG at 7/19/2023 1508    Acceptance, E,D, VU,NR by RG at 7/18/2023 1520    Acceptance, E,D, VU,NR by RG at 7/17/2023 1522    Acceptance, E,D, VU,NR by LL at 7/13/2023 1358    Acceptance, E, VU,NR by LB at 7/12/2023 1144    Acceptance, E, VU,NR by LB at 7/11/2023 1426    Acceptance, E, VU,NR by LB at 7/10/2023 1452    Acceptance, E,D, VU,NR by LL at 7/8/2023 1228    Acceptance, E,D, VU,NR by LB at 7/7/2023 1449                                         User Key       Initials Effective Dates Name Provider Type Discipline    LB 06/16/21 -  Joslyn Garcia, PT Physical Therapist PT    LL 05/02/16 -  Connie Velasquez, JILL Physical Therapist Assistant PT    RM 02/17/23 -  Shelley Hanson, PTA Physical Therapist Assistant PT    RG 06/16/21 -  Vu Brown PTA Physical Therapist Assistant PT    HC 01/20/23 -  Kirti Arroyo, JILL Physical Therapist Assistant PT    DL 03/16/22 -  Nadja Velasquez, RN Registered Nurse Nurse    MB 06/26/23 -  Vinay Fritz, RN Registered Nurse Nurse                    PT Recommendation and Plan          Daily Progress Summary (PT)  Impairments Still Limiting Function (PT): balance impairment, coordination impairment, functional activity tolerance impairment, motor control impaired, pain, postural control impaired, strength deficit, sensory impairment               Time Calculation:      PT Charges       Row Name 08/17/23 1332             Time Calculation    Start Time 0745  -RG      Stop Time 0915  -RG      Time Calculation (min) 90 min  -RG      PT Received On 08/17/23  -RG         Time Calculation- PT    Total Timed Code Minutes- PT 90 minute(s)  -RG                User Key  (r) = Recorded By, (t) = Taken By, (c) = Cosigned By      Initials Name Provider Type    RG Vu Brown, JILL Physical Therapist Assistant                    Therapy  Charges for Today       Code Description Service Date Service Provider Modifiers Qty    09058104295 HC GAIT TRAINING EA 15 MIN 8/16/2023 Vu Brown, PTA GP, CQ 1    94879818494 HC PT THERAPEUTIC ACT EA 15 MIN 8/16/2023 Vu Brown, PTA GP, CQ 2    98381571174 HC PT NEUROMUSC RE EDUCATION EA 15 MIN 8/16/2023 Vu Brown, PTA GP, CQ 1    56806699478 HC PT THER PROC EA 15 MIN 8/16/2023 Vu Brown, PTA GP, CQ 2    90506001215 HC GAIT TRAINING EA 15 MIN 8/17/2023 Vu Brown, PTA GP, CQ 1    35854221305 HC PT THERAPEUTIC ACT EA 15 MIN 8/17/2023 Vu Brown, PTA GP, CQ 2    75407989073 HC PT THER PROC EA 15 MIN 8/17/2023 Vu Brown, PTA GP, CQ 3              PT G-Codes  AM-PAC 6 Clicks Score (PT): 12      Vu Brown PTA  8/17/2023

## 2023-08-18 PROCEDURE — 97530 THERAPEUTIC ACTIVITIES: CPT

## 2023-08-18 PROCEDURE — 97110 THERAPEUTIC EXERCISES: CPT

## 2023-08-18 PROCEDURE — 97112 NEUROMUSCULAR REEDUCATION: CPT | Performed by: OCCUPATIONAL THERAPIST

## 2023-08-18 PROCEDURE — 97110 THERAPEUTIC EXERCISES: CPT | Performed by: OCCUPATIONAL THERAPIST

## 2023-08-18 PROCEDURE — 97535 SELF CARE MNGMENT TRAINING: CPT | Performed by: OCCUPATIONAL THERAPIST

## 2023-08-18 PROCEDURE — 97116 GAIT TRAINING THERAPY: CPT

## 2023-08-18 RX ADMIN — MAGNESIUM GLUCONATE 500 MG ORAL TABLET 400 MG: 500 TABLET ORAL at 08:37

## 2023-08-18 RX ADMIN — RIVAROXABAN 20 MG: 20 TABLET, FILM COATED ORAL at 16:58

## 2023-08-18 RX ADMIN — POLYETHYLENE GLYCOL (3350) 17 G: 17 POWDER, FOR SOLUTION ORAL at 08:37

## 2023-08-18 RX ADMIN — NYSTATIN: 100000 POWDER TOPICAL at 08:37

## 2023-08-18 RX ADMIN — NYSTATIN: 100000 POWDER TOPICAL at 20:53

## 2023-08-18 RX ADMIN — ATORVASTATIN CALCIUM 80 MG: 40 TABLET, FILM COATED ORAL at 08:37

## 2023-08-18 RX ADMIN — ALLOPURINOL 300 MG: 300 TABLET ORAL at 08:37

## 2023-08-18 RX ADMIN — HYDROXYZINE HYDROCHLORIDE 25 MG: 25 TABLET, FILM COATED ORAL at 20:52

## 2023-08-18 RX ADMIN — PANTOPRAZOLE SODIUM 40 MG: 40 TABLET, DELAYED RELEASE ORAL at 05:13

## 2023-08-18 RX ADMIN — METOPROLOL TARTRATE 25 MG: 25 TABLET, FILM COATED ORAL at 08:37

## 2023-08-18 RX ADMIN — TAMSULOSIN HYDROCHLORIDE 0.4 MG: 0.4 CAPSULE ORAL at 08:37

## 2023-08-18 NOTE — SIGNIFICANT NOTE
08/18/23 2101   Plan   Plan Spoke to Alejandra Quinonez who has been in rehab today observing pt in therapy/receiving education.  She is agreeable to stay in pt's home and assist with caregiving including changing depends. Discussed arranging home health and DME based on recommendations made by therapists before pt is discharged home on 8-23-23.  Will follow.

## 2023-08-18 NOTE — PLAN OF CARE
Goal Outcome Evaluation:  Plan of Care Reviewed With: patient        Progress: improving       Problem: Rehabilitation (IRF) Plan of Care  Goal: Plan of Care Review  Outcome: Ongoing, Progressing  Flowsheets (Taken 8/18/2023 0933)  Progress: improving  Plan of Care Reviewed With: patient  Goal: Patient-Specific Goal (Individualized)  Outcome: Ongoing, Progressing  Goal: Absence of New-Onset Illness or Injury  Outcome: Ongoing, Progressing  Intervention: Prevent Fall and Fall Injury  Recent Flowsheet Documentation  Taken 8/18/2023 0800 by Maurisio Estrella RN  Safety Promotion/Fall Prevention: safety round/check completed  Intervention: Prevent Infection  Recent Flowsheet Documentation  Taken 8/18/2023 0800 by Maurisio Estrella RN  Infection Prevention:   hand hygiene promoted   rest/sleep promoted  Intervention: Prevent VTE (Venous Thromboembolism)  Recent Flowsheet Documentation  Taken 8/18/2023 0800 by Maurisio Estrella RN  VTE Prevention/Management: (xarelto) other (see comments)  Goal: Optimal Comfort and Wellbeing  Outcome: Ongoing, Progressing  Goal: Home and Community Transition Plan Established  Outcome: Ongoing, Progressing     Problem: Diabetes Comorbidity  Goal: Blood Glucose Level Within Targeted Range  Outcome: Ongoing, Progressing     Problem: Skin Injury Risk Increased  Goal: Skin Health and Integrity  Outcome: Ongoing, Progressing  Intervention: Promote and Optimize Oral Intake  Recent Flowsheet Documentation  Taken 8/18/2023 0800 by Maurisio Estrella RN  Oral Nutrition Promotion: physical activity promoted  Intervention: Optimize Skin Protection  Recent Flowsheet Documentation  Taken 8/18/2023 0800 by Maurisio Estrella RN  Pressure Reduction Techniques:   frequent weight shift encouraged   heels elevated off bed   weight shift assistance provided  Pressure Reduction Devices:   heel offloading device utilized   positioning supports utilized   pressure-redistributing mattress utilized  Skin Protection:   adhesive  use limited   incontinence pads utilized     Problem: Fall Injury Risk  Goal: Absence of Fall and Fall-Related Injury  Outcome: Ongoing, Progressing  Intervention: Identify and Manage Contributors  Recent Flowsheet Documentation  Taken 8/18/2023 0800 by Maurisio Estrella RN  Medication Review/Management: medications reviewed  Intervention: Promote Injury-Free Environment  Recent Flowsheet Documentation  Taken 8/18/2023 0800 by Maurisio Estrella, RN  Safety Promotion/Fall Prevention: safety round/check completed     Problem: Mobility Impairment  Goal: Optimal Mobility [place] and Safety  Outcome: Ongoing, Progressing

## 2023-08-18 NOTE — PROGRESS NOTES
Assisted By: Patient was seen while in bed with [facility] and later with physical therapy ambulating.  His family is also here.    CC: Follow-up on CVA    Interview History/HPI: No new complaints no new events overnight          Current Hospital Meds:  allopurinol, 300 mg, Oral, Daily  atorvastatin, 80 mg, Oral, Daily  magnesium oxide, 400 mg, Oral, Daily  metoprolol tartrate, 25 mg, Oral, Q12H  nystatin, , Topical, Q12H  pantoprazole, 40 mg, Oral, Q AM  polyethylene glycol, 17 g, Oral, Daily  rivaroxaban, 20 mg, Oral, Daily With Dinner  tamsulosin, 0.4 mg, Oral, Daily         Vitals:    08/18/23 0735   BP: 146/73   Pulse: 87   Resp: 18   Temp: 97.9 øF (36.6 øC)   SpO2: 97%         Intake/Output Summary (Last 24 hours) at 8/18/2023 1026  Last data filed at 8/18/2023 0900  Gross per 24 hour   Intake 600 ml   Output 1100 ml   Net -500 ml       EXAM: Neurologically he still moves his left hand can , can lift the arm over his head.  The left leg continues to improve movement, watched him ambulating, he is still moving the leg forward on his own and doing better with weight shifting.  Lungs are clear, heart is an irregular irregular controlled rhythm, no edema      Diet: Cardiac Diets; Healthy Heart (2-3 Na+); Texture: Regular Texture (IDDSI 7); Fluid Consistency: Thin (IDDSI 0)        LABS:     Lab Results (last 48 hours)       Procedure Component Value Units Date/Time    Magnesium [[id number]]  (Normal) Collected: 08/17/23 0119    Specimen: Blood Updated: 08/17/23 0220     Magnesium 2.0 mg/dL     Comprehensive Metabolic Panel [[id number]]  (Abnormal) Collected: 08/17/23 0119    Specimen: Blood Updated: 08/17/23 0220     Glucose 105 mg/dL      BUN 17 mg/dL      Creatinine 0.99 mg/dL      Sodium 137 mmol/L      Potassium 4.2 mmol/L      Chloride 101 mmol/L      CO2 24.0 mmol/L      Calcium 8.9 mg/dL      Total Protein 6.0 g/dL      Albumin 3.3 g/dL      ALT (SGPT) 8 U/L      AST (SGOT) 13 U/L      Alkaline Phosphatase  85 U/L      Total Bilirubin 0.4 mg/dL      Globulin 2.7 gm/dL      A/G Ratio 1.2 g/dL      BUN/Creatinine Ratio 17.2     Anion Gap 12.0 mmol/L      eGFR 84.5 mL/min/1.73     Narrative:      GFR Normal >60  Chronic Kidney Disease <60  Kidney Failure <15      CBC & Differential [[id number]]  (Abnormal) Collected: 08/17/23 0119    Specimen: Blood Updated: 08/17/23 0147    Narrative:      The following orders were created for panel order CBC & Differential.  Procedure                               Abnormality         Status                     ---------                               -----------         ------                     CBC Auto Differential[[id number]]        Abnormal            Final result                 Please view results for these tests on the individual orders.    CBC Auto Differential [[id number]]  (Abnormal) Collected: 08/17/23 0119    Specimen: Blood Updated: 08/17/23 0147     WBC 8.69 10*3/mm3      RBC 3.87 10*6/mm3      Hemoglobin 10.1 g/dL      Hematocrit 32.4 %      MCV 83.7 fL      MCH 26.1 pg      MCHC 31.2 g/dL      RDW 12.8 %      RDW-SD 39.1 fl      MPV 8.9 fL      Platelets 289 10*3/mm3      Neutrophil % 69.9 %      Lymphocyte % 15.2 %      Monocyte % 11.9 %      Eosinophil % 2.0 %      Basophil % 0.7 %      Immature Grans % 0.3 %      Neutrophils, Absolute 6.08 10*3/mm3      Lymphocytes, Absolute 1.32 10*3/mm3      Monocytes, Absolute 1.03 10*3/mm3      Eosinophils, Absolute 0.17 10*3/mm3      Basophils, Absolute 0.06 10*3/mm3      Immature Grans, Absolute 0.03 10*3/mm3      nRBC 0.0 /100 WBC                  Radiology:    Imaging Results (Last 72 Hours)       ** No results found for the last 72 hours. **                Assessment/Plan:   Status post right CVA with left residual.  Patient continues to improve with physical and Occupational Therapy.  OT continues to work on functional endurance coordination motor control and coordination interventions especially for the left arm and hand.   Continues to work on endurance range of motion and strengthening.  PT, patient is max assist with bed mobility, max assist to mod assist with bed to chair transfer, min assist sit to stand with a platform walker in parallel bars, min assist stand to sit platform walker, patient walked 20 feet x 3 yesterday rolling platform walker.    Atrial fibrillation, rate controlled, on Xarelto for further stroke prevention which also serves for DVT prophylax    Blood pressure and vitals have been good    Labs unremarkable yesterday    Lymphoma and head neck cancer, he continues to have a mass effect left side of his neck, he will need outpatient follow-up with oncology.    Patient continues to progress towards his goals.  Plan is discharged from here to home now on the 23rd        Hayder Mendoza MD

## 2023-08-18 NOTE — THERAPY TREATMENT NOTE
Inpatient Rehabilitation - Physical Therapy Treatment Note        Ronnie     Patient Name: Antonio Canseco  : 1955  MRN: [id number]    Today's Date: 2023                    Admit Date: 2023      Visit Dx:   No diagnosis found.    Patient Active Problem List   Diagnosis    Detrusor instability    Low testosterone in male    Disorder of prostate    Corporo-venous occlusive erectile dysfunction    CVA (cerebral vascular accident)       Past Medical History:   Diagnosis Date    Diabetes mellitus     Hypertension     Stroke        Past Surgical History:   Procedure Laterality Date    US GUIDED LYMPH NODE BIOPSY  2023       PT ASSESSMENT (last 12 hours)       IRF PT Evaluation and Treatment       Row Name 23 1313          PT Time and Intention    Document Type daily treatment  -RG     Mode of Treatment individual therapy;physical therapy  -RG     Patient/Family/Caregiver Comments/Observations Pts family present for PT session.  They observed pt transferring from bed to chair and back using RW, ambulating with PRW, and safety awareness.  -RG       Row Name 23 1313          General Information    Existing Precautions/Restrictions fall  L HP, <trunk support/balance, L side inattention  -RG       Row Name 23 1313          Pain Scale: FACES Pre/Post-Treatment    Pain: FACES Scale, Pretreatment 0-->no hurt  -RG     Posttreatment Pain Rating 0-->no hurt  -RG       Row Name 23 1313          Cognition/Psychosocial    Affect/Mental Status (Cognition) WFL  -RG     Follows Commands (Cognition) verbal cues/prompting required;physical/tactile prompts required  -RG       Row Name 23 1313          Bed Mobility    Supine-Sit [place] (Bed Mobility) maximum assist (25% patient effort);verbal cues;nonverbal cues (demo/gesture)  -RG       Row Name 23 1313          Bed-Chair Transfer    Bed-Chair [place] (Transfers) maximum assist (25% patient effort);verbal cues;nonverbal  cues (demo/gesture);moderate assist (50% patient effort);2 person assist  -RG       Row Name 08/18/23 1313          Sit-Stand Transfer    Sit-Stand [place] (Transfers) verbal cues;nonverbal cues (demo/gesture);minimum assist (75% patient effort)  -RG     Assistive Device (Sit-Stand Transfers) walker, platform;parallel bars  -RG       Row Name 08/18/23 1313          Stand-Sit Transfer    Stand-Sit [place] (Transfers) verbal cues;nonverbal cues (demo/gesture);minimum assist (75% patient effort)  -RG     Assistive Device (Stand-Sit Transfers) walker, platform  -RG       Row Name 08/18/23 1313          Gait/Stairs (Locomotion)    [place] Level (Gait) verbal cues;nonverbal cues (demo/gesture);2 person assist;moderate assist (50% patient effort)  -RG     Assistive Device (Gait) walker, rolling platform  -RG     Pattern (Gait) step-to  -RG     Deviations/Abnormal Patterns (Gait) base of support, narrow;hebert decreased;gait speed decreased;stride length decreased;weight shifting decreased;ataxic  -RG     Bilateral Gait Deviations forward flexed posture  -RG       Row Name 08/18/23 1313          Hip (Therapeutic Exercise)    Hip Strengthening (Therapeutic Exercise) bilateral;flexion;aBduction;aDduction;marching while seated;sitting;resistance band;green;10 repetitions;2 sets  AAROM L LE  -RG       Row Name 08/18/23 1313          Knee (Therapeutic Exercise)    Knee Strengthening (Therapeutic Exercise) bilateral;flexion;extension;marching while seated;LAQ (long arc quad);sitting;resistance band;green;10 repetitions;2 sets  AAROM L LE  -RG       Row Name 08/18/23 1313          Ankle (Therapeutic Exercise)    Ankle Strengthening (Therapeutic Exercise) bilateral;dorsiflexion;plantarflexion;sitting;10 repetitions;2 sets  AAROM L LE  -RG       Row Name 08/18/23 1313          Positioning and Restraints    Pre-Treatment Position in bed  -RG     Post Treatment Position wheelchair  -RG     In Wheelchair notified  nsg;sitting;with OT;legs elevated  -       Row Name 08/18/23 1313          Bed Mobility Goal 1 (PT-IRF)    Progress/Outcomes (Bed Mobility Goal 1, PT-IRF) goal ongoing  -RG       Row Name 08/18/23 1313          Bed Mobility Goal 2 (PT-IRF)    Progress/Outcomes (Bed Mobility Goal 2, PT-IRF) goal ongoing  -       Row Name 08/18/23 1313          Transfer Goal 1 (PT-IRF)    Progress/Outcomes (Transfer Goal 1, PT-IRF) goal ongoing  -RG       Row Name 08/18/23 1313          Transfer Goal 2 (PT-IRF)    Progress/Outcomes (Transfer Goal 2, PT-IRF) goal ongoing  -       Row Name 08/18/23 1313          Gait/Walking Locomotion Goal 1 (PT-IRF)    Progress/Outcomes (Gait/Walking Locomotion Goal 1, PT-IRF) goal ongoing;goal partially met  -       Row Name 08/18/23 1313          Gait/Walking Locomotion Goal 2 (PT-IRF)    Progress/Outcomes (Gait/Walking Locomotion Goal 2, PT-IRF) goal ongoing  -               User Key  (r) = Recorded By, (t) = Taken By, (c) = Cosigned By      Initials Name Provider Type    Vu Galloway PTA Physical Therapist Assistant                     Physical Therapy Education       Title: PT OT SLP Therapies (Done)       Topic: Physical Therapy (Done)       Point: Mobility training (Done)       Learning Progress Summary             Patient Acceptance, E,D, VU,NR by RG at 8/18/2023 1320    Acceptance, E,D, VU,NR by RG at 8/17/2023 1332    Acceptance, E,D, VU,NR by RG at 8/16/2023 1354    Acceptance, E,D, VU,NR by RG at 8/16/2023 0723    Acceptance, E,TB, VU,DU by HC at 8/15/2023 1402    Acceptance, E,D, VU,NR by RG at 8/11/2023 1503    Acceptance, E,D, VU,NR by RG at 8/10/2023 1510    Acceptance, E, VU,NR by LB at 8/9/2023 1314    Acceptance, TB, NR by DL at 8/8/2023 2014    Acceptance, E,D, VU,NR by RG at 8/8/2023 1407    Acceptance, E,D, VU,NR by RG at 8/7/2023 1507    Acceptance, E, VU,NR by JOYA at 8/4/2023 1521    Acceptance, E,TB, VU,NR by MB at 8/3/2023 2239    Acceptance, E,D, VU,NR by RG  at 8/3/2023 1514    Acceptance, E,D, VU,NR by RG at 8/2/2023 1524    Acceptance, E,D, VU,NR by RG at 8/1/2023 1446    Acceptance, E,D, VU,NR by RG at 7/31/2023 1411    Acceptance, E,TB, VU by RM at 7/28/2023 1230    Acceptance, E,D, VU,NR by RG at 7/26/2023 1518    Acceptance, E, VU,NR by LB at 7/25/2023 1133    Acceptance, E, VU,NR by LB at 7/24/2023 1444    Acceptance, E, VU,NR by LB at 7/21/2023 1615    Acceptance, E,D, VU,NR by RG at 7/20/2023 1107    Acceptance, TB, NR by DL at 7/19/2023 2036    Acceptance, E,D, VU,NR by RG at 7/19/2023 1508    Acceptance, E,D, VU,NR by RG at 7/18/2023 1520    Acceptance, E,D, VU,NR by RG at 7/17/2023 1522    Acceptance, E,D, VU,NR by LL at 7/13/2023 1358    Acceptance, E, VU,NR by LB at 7/12/2023 1144    Acceptance, E, VU,NR by LB at 7/11/2023 1426    Acceptance, E, VU,NR by LB at 7/10/2023 1452    Acceptance, E,D, VU,NR by LL at 7/8/2023 1228    Acceptance, E,D, VU,NR by LB at 7/7/2023 1449                         Point: Home exercise program (Done)       Learning Progress Summary             Patient Acceptance, E,D, VU,NR by RG at 8/18/2023 1320    Acceptance, E,D, VU,NR by RG at 8/17/2023 1332    Acceptance, E,D, VU,NR by RG at 8/16/2023 1354    Acceptance, E,D, VU,NR by RG at 8/16/2023 0723    Acceptance, E,TB, VU,DU by HC at 8/15/2023 1402    Acceptance, E,D, VU,NR by RG at 8/11/2023 1503    Acceptance, E,D, VU,NR by RG at 8/10/2023 1510    Acceptance, E, VU,NR by LB at 8/9/2023 1314    Acceptance, TB, NR by DL at 8/8/2023 2014    Acceptance, E,D, VU,NR by RG at 8/8/2023 1407    Acceptance, E,D, VU,NR by RG at 8/7/2023 1507    Acceptance, E, VU,NR by LB at 8/4/2023 1521    Acceptance, E,TB, VU,NR by MB at 8/3/2023 2239    Acceptance, E,D, VU,NR by RG at 8/3/2023 1514    Acceptance, E,D, VU,NR by RG at 8/2/2023 1524    Acceptance, E,D, VU,NR by RG at 8/1/2023 1446    Acceptance, E,D, VU,NR by RG at 7/31/2023 1411    Acceptance, E,TB, VU by RM at 7/28/2023 1230     Acceptance, E,D, VU,NR by RG at 7/26/2023 1518    Acceptance, E, VU,NR by LB at 7/25/2023 1133    Acceptance, E, VU,NR by LB at 7/24/2023 1444    Acceptance, E, VU,NR by LB at 7/21/2023 1615    Acceptance, E,D, VU,NR by RG at 7/20/2023 1107    Acceptance, TB, NR by DL at 7/19/2023 2036    Acceptance, E,D, VU,NR by RG at 7/19/2023 1508    Acceptance, E,D, VU,NR by RG at 7/18/2023 1520    Acceptance, E,D, VU,NR by RG at 7/17/2023 1522    Acceptance, E,D, VU,NR by LL at 7/13/2023 1358    Acceptance, E, VU,NR by LB at 7/12/2023 1144    Acceptance, E, VU,NR by LB at 7/11/2023 1426    Acceptance, E, VU,NR by LB at 7/10/2023 1452    Acceptance, E,D, VU,NR by LL at 7/8/2023 1228    Acceptance, E,D, VU,NR by LB at 7/7/2023 1449                         Point: Body mechanics (Done)       Learning Progress Summary             Patient Acceptance, E,D, VU,NR by RG at 8/18/2023 1320    Acceptance, E,D, VU,NR by RG at 8/17/2023 1332    Acceptance, E,D, VU,NR by RG at 8/16/2023 1354    Acceptance, E,D, VU,NR by RG at 8/16/2023 0723    Acceptance, E,TB, VU,DU by HC at 8/15/2023 1402    Acceptance, E,D, VU,NR by RG at 8/11/2023 1503    Acceptance, E,D, VU,NR by RG at 8/10/2023 1510    Acceptance, E, VU,NR by LB at 8/9/2023 1314    Acceptance, TB, NR by DL at 8/8/2023 2014    Acceptance, E,D, VU,NR by RG at 8/8/2023 1407    Acceptance, E,D, VU,NR by RG at 8/7/2023 1507    Acceptance, E, VU,NR by LB at 8/4/2023 1521    Acceptance, E,TB, VU,NR by MB at 8/3/2023 2239    Acceptance, E,D, VU,NR by RG at 8/3/2023 1514    Acceptance, E,D, VU,NR by RG at 8/2/2023 1524    Acceptance, E,D, VU,NR by RG at 8/1/2023 1446    Acceptance, E,D, VU,NR by RG at 7/31/2023 1411    Acceptance, E,TB, VU by RM at 7/28/2023 1230    Acceptance, E,D, VU,NR by RG at 7/26/2023 1518    Acceptance, E, VU,NR by LB at 7/25/2023 1133    Acceptance, E, VU,NR by LB at 7/24/2023 1444    Acceptance, E, VU,NR by LB at 7/21/2023 1615    Acceptance, E,D, VU,NR by RG at  7/20/2023 1107    Acceptance, TB, NR by DL at 7/19/2023 2036    Acceptance, E,D, VU,NR by RG at 7/19/2023 1508    Acceptance, E,D, VU,NR by RG at 7/18/2023 1520    Acceptance, E,D, VU,NR by RG at 7/17/2023 1522    Acceptance, E,D, VU,NR by LL at 7/13/2023 1358    Acceptance, E, VU,NR by LB at 7/12/2023 1144    Acceptance, E, VU,NR by LB at 7/11/2023 1426    Acceptance, E, VU,NR by LB at 7/10/2023 1452    Acceptance, E,D, VU,NR by LL at 7/8/2023 1228    Acceptance, E,D, VU,NR by LB at 7/7/2023 1449                         Point: Precautions (Done)       Learning Progress Summary             Patient Acceptance, E,D, VU,NR by RG at 8/18/2023 1320    Acceptance, E,D, VU,NR by RG at 8/17/2023 1332    Acceptance, E,D, VU,NR by RG at 8/16/2023 1354    Acceptance, E,D, VU,NR by RG at 8/16/2023 0723    Acceptance, E,TB, VU,DU by HC at 8/15/2023 1402    Acceptance, E,D, VU,NR by RG at 8/11/2023 1503    Acceptance, E,D, VU,NR by RG at 8/10/2023 1510    Acceptance, E, VU,NR by LB at 8/9/2023 1314    Acceptance, TB, NR by DL at 8/8/2023 2014    Acceptance, E,D, VU,NR by RG at 8/8/2023 1407    Acceptance, E,D, VU,NR by RG at 8/7/2023 1507    Acceptance, E, VU,NR by LB at 8/4/2023 1521    Acceptance, E,TB, VU,NR by MB at 8/3/2023 2239    Acceptance, E,D, VU,NR by RG at 8/3/2023 1514    Acceptance, E,D, VU,NR by RG at 8/2/2023 1524    Acceptance, E,D, VU,NR by RG at 8/1/2023 1446    Acceptance, E,D, VU,NR by RG at 7/31/2023 1411    Acceptance, E,TB, VU by RM at 7/28/2023 1230    Acceptance, E,D, VU,NR by RG at 7/26/2023 1518    Acceptance, E, VU,NR by LB at 7/25/2023 1133    Acceptance, E, VU,NR by LB at 7/24/2023 1444    Acceptance, E, VU,NR by LB at 7/21/2023 1615    Acceptance, E,D, VU,NR by RG at 7/20/2023 1107    Acceptance, TB, NR by DL at 7/19/2023 2036    Acceptance, E,D, VU,NR by RG at 7/19/2023 1508    Acceptance, E,D, VU,NR by RG at 7/18/2023 1520    Acceptance, E,D, VU,NR by RG at 7/17/2023 1522    Acceptance, E,D,  VU,NR by LL at 7/13/2023 1358    Acceptance, E, VU,NR by LB at 7/12/2023 1144    Acceptance, E, VU,NR by LB at 7/11/2023 1426    Acceptance, E, VU,NR by LB at 7/10/2023 1452    Acceptance, E,D, VU,NR by LL at 7/8/2023 1228    Acceptance, E,D, VU,NR by LB at 7/7/2023 1449                                         User Key       Initials Effective Dates Name Provider Type Discipline    LB 06/16/21 -  Joslyn Garcia, PT Physical Therapist PT    LL 05/02/16 -  Connie Velasquez, JILL Physical Therapist Assistant PT    RM 02/17/23 -  Shelley Hanson, JILL Physical Therapist Assistant PT    RG 06/16/21 -  Vu Brown PTA Physical Therapist Assistant PT     01/20/23 -  Kirti Arroyo PTA Physical Therapist Assistant PT    DL 03/16/22 -  Nadja Velasquez, RN Registered Nurse Nurse    MB 06/26/23 -  Vinay Fritz RN Registered Nurse Nurse                    PT Recommendation and Plan          Daily Progress Summary (PT)  Impairments Still Limiting Function (PT): balance impairment, coordination impairment, functional activity tolerance impairment, motor control impaired, pain, postural control impaired, strength deficit, sensory impairment               Time Calculation:      PT Charges       Row Name 08/18/23 1321             Time Calculation    Start Time 0745  -RG      Stop Time 0915  -RG      Time Calculation (min) 90 min  -      PT Received On 08/18/23  -RG         Time Calculation- PT    Total Timed Code Minutes- PT 90 minute(s)  -RG                User Key  (r) = Recorded By, (t) = Taken By, (c) = Cosigned By      Initials Name Provider Type    RG Vu Brown PTA Physical Therapist Assistant                    Therapy Charges for Today       Code Description Service Date Service Provider Modifiers Qty    16148489547 HC GAIT TRAINING EA 15 MIN 8/17/2023 Vu Brown PTA GP, CQ 1    25033920008 HC PT THERAPEUTIC ACT EA 15 MIN 8/17/2023 Vu Brown PTA GP, CQ 2    82217993833 HC PT  THER PROC EA 15 MIN 8/17/2023 Vu Brown PTA GP, CQ 3    41188292040 HC GAIT TRAINING EA 15 MIN 8/18/2023 Vu Brown, JILL GP, CQ 1    03915545371 HC PT THERAPEUTIC ACT EA 15 MIN 8/18/2023 Vu Brown PTA GP, CQ 2    10046137705 HC PT THER PROC EA 15 MIN 8/18/2023 Vu Brown PTA GP, CQ 3              PT G-Codes  AM-PAC 6 Clicks Score (PT): 12      Vu Brown PTA  8/18/2023

## 2023-08-18 NOTE — SIGNIFICANT NOTE
08/18/23 1379   Plan   Plan Spoke to Alejandra Quinonez who has been in rehab today observing pt in therapy/receiving education.  She is agreeable to stay in pt's home and assist with caregiving including changing depends. Pt's daughter Karolyn will be moving in with pt to assist when she is not working.  Pt's cousin Wilson will also be assisting.  Sister Mariah will be helping too and friend of daughter Karolyn is suppose to help too.  Discussed arranging home health and DME based on recommendations made by therapists before pt is discharged home on 8-23-23.  Will follow.

## 2023-08-18 NOTE — THERAPY TREATMENT NOTE
Inpatient Rehabilitation - Occupational Therapy Progress Note and Treatment Note     Ronnie     Patient Name: Antonio Canseco  : 1955  MRN: [id number]    Today's Date: 2023                 Admit Date: 2023       No diagnosis found.    Patient Active Problem List   Diagnosis    Detrusor instability    Low testosterone in male    Disorder of prostate    Corporo-venous occlusive erectile dysfunction    CVA (cerebral vascular accident)       Past Medical History:   Diagnosis Date    Diabetes mellitus     Hypertension     Stroke        Past Surgical History:   Procedure Laterality Date    US GUIDED LYMPH NODE BIOPSY  2023             IRF OT ASSESSMENT FLOWSHEET (last 12 hours)       IRF OT Evaluation and Treatment       Row Name 23 1239          OT Time and Intention    Document Type daily treatment;progress note  -BF     Mode of Treatment occupational therapy  -BF     Patient Effort good  -BF     Symptoms Noted During/After Treatment fatigue  -BF       Row Name 23 1239          General Information    Patient/Family/Caregiver Comments/Observations Pt's s/o present for OT, educated in self-care/assistance needed at home including dressing and toileting. Pt's s/o reports she is comfortable with caring for pt at home and does not have questions at this time.  -BF     Existing Precautions/Restrictions fall  L HP, L side inattention  -BF     Limitations/Impairments safety/cognitive  -BF       Row Name 23 1239          Cognition/Psychosocial    Orientation Status (Cognition) oriented x 3  -BF     Follows Commands (Cognition) verbal cues/prompting required;repetition of directions required;physical/tactile prompts required  -BF       Row Name 23 1239          Bathing    Comment (Bathing) Mod A  -BF       Row Name 23 1239          Upper Body Dressing    [place] Level (Upper Body Dressing) contact guard;verbal cues;nonverbal cues (demo/gesture)  -BF     Position (Upper  Body Dressing) supported sitting  -BF     Comment (Upper Body Dressing) CGA  -BF       Row Name 08/18/23 1239          Lower Body Dressing    [place] Level (Lower Body Dressing) moderate assist (50% patient effort);maximum assist (25% patient effort);verbal cues;nonverbal cues (demo/gesture)  -BF     Position (Lower Body Dressing) supine  -BF     Comment (Lower Body Dressing) Mod/Max A  -BF       Row Name 08/18/23 1239          Grooming    Comment (Grooming) Min A  -BF       Row Name 08/18/23 1239          Toileting    [place] Level (Toileting) dependent (less than 25% patient effort);verbal cues;nonverbal cues (demo/gesture)  -BF     Position (Toileting) supine  -BF     Comment (Toileting) Total A  -BF       Row Name 08/18/23 1239          Self-Feeding    Comment (Self-Feeding) Set-up  -BF       Row Name 08/18/23 1239          Chair-Bed Transfer    Chair-Bed [place] (Transfers) moderate assist (50% patient effort);2 person assist;verbal cues;nonverbal cues (demo/gesture)  -BF     Assistive Device (Chair-Bed Transfers) wheelchair;walker, platform  -BF       Row Name 08/18/23 1239          Motor Skills    Motor Control/Coordination Interventions fine motor manipulation/dexterity activities;gross motor coordination activities;therapeutic exercise/ROM;neuro-muscular re-education  LUE C/FMC theract, strengthening; LUE theraband therex; BUE pulleys, 3 lb dowel therex  -BF       Row Name 08/18/23 1239          Positioning and Restraints    In Bed supine;call light within reach;encouraged to call for assist;CHRISE elevated  -BF               User Key  (r) = Recorded By, (t) = Taken By, (c) = Cosigned By      Initials Name Effective Dates    BF Lisa Sanchez OT 07/11/23 -                      Occupational Therapy Education       Title: PT OT SLP Therapies (Done)       Topic: Occupational Therapy (Done)       Point: ADL training (Done)       Description:   Instruct learner(s) on proper safety  adaptation and remediation techniques during self care or transfers.   Instruct in proper use of assistive devices.                  Learning Progress Summary             Patient Acceptance, E,D, VU,NR,DU by BF at 8/18/2023 1238    Acceptance, E,D, VU,NR by CJ at 8/17/2023 1419    Acceptance, E, VU,NR by HB at 8/16/2023 1059    Acceptance, E, VU,NR by BF at 8/15/2023 1222    Acceptance, E, VU,NR by BF at 8/14/2023 1527    Acceptance, E, VU,NR by BF at 8/11/2023 1204    Acceptance, E,D, VU,DU by BF at 8/10/2023 1524    Acceptance, TB, NR by DL at 8/8/2023 2014    Acceptance, E, VU,NR by BF at 8/8/2023 1440    Acceptance, E, VU,NR by BF at 8/7/2023 1526    Acceptance, E, VU,NR by BF at 8/4/2023 1221    Acceptance, E,TB, VU,NR by MB at 8/3/2023 2239    Acceptance, E, VU,NR by BF at 8/3/2023 1217    Acceptance, E, VU,NR by BF at 8/1/2023 1427    Acceptance, E, VU,NR by BF at 7/31/2023 1409    Acceptance, E, VU,NR by BF at 7/28/2023 1442    Acceptance, E, VU,NR by BF at 7/27/2023 1445    Acceptance, E, VU,NR by BF at 7/25/2023 1251    Acceptance, E, VU,NR by HB at 7/24/2023 1355    Acceptance, E, NR,VU by BF at 7/21/2023 1248    Acceptance, E, NR by BF at 7/20/2023 1259    Acceptance, TB, NR by DL at 7/19/2023 2036    Acceptance, E, VU,NR by HB at 7/19/2023 1421    Acceptance, E, VU,NR by BF at 7/18/2023 1334    Acceptance, E, VU,NR by BF at 7/17/2023 1431    Acceptance, E, VU,NR by BF at 7/17/2023 1430    Acceptance, E, VU,NR by BF at 7/14/2023 1508    Acceptance, E, VU,NR by BF at 7/13/2023 1343    Acceptance, E,D, VU,NR by TM at 7/12/2023 1407    Acceptance, D,E, NR,VU by TM at 7/11/2023 1434    Acceptance, E, VU,NR by BF at 7/10/2023 1456    Acceptance, E, VU,NR by HB at 7/8/2023 1140    Acceptance, E, VU,NR by BF at 7/7/2023 1442   Family Acceptance, E,D, VU,DU by BF at 8/10/2023 1524    Acceptance, E, VU,NR by BF at 8/1/2023 1427   Significant Other Acceptance, E,D, VU,NR,DU by BF at 8/18/2023 1238                          Point: Precautions (Done)       Description:   Instruct learner(s) on prescribed precautions during self-care and functional transfers.                  Learning Progress Summary             Patient Acceptance, E,D, VU,NR,DU by BF at 8/18/2023 1238    Acceptance, E,D, VU,NR by CJ at 8/17/2023 1419    Acceptance, E, VU,NR by HB at 8/16/2023 1059    Acceptance, E, VU,NR by BF at 8/15/2023 1222    Acceptance, E, VU,NR by BF at 8/14/2023 1527    Acceptance, E, VU,NR by BF at 8/11/2023 1204    Acceptance, E,D, VU,DU by BF at 8/10/2023 1524    Acceptance, TB, NR by DL at 8/8/2023 2014    Acceptance, E, VU,NR by BF at 8/8/2023 1440    Acceptance, E, VU,NR by BF at 8/7/2023 1526    Acceptance, E, VU,NR by BF at 8/4/2023 1221    Acceptance, E,TB, VU,NR by MB at 8/3/2023 2239    Acceptance, E, VU,NR by BF at 8/3/2023 1217    Acceptance, E, VU,NR by BF at 8/1/2023 1427    Acceptance, E, VU,NR by BF at 7/31/2023 1409    Acceptance, E, VU,NR by BF at 7/28/2023 1442    Acceptance, E, VU,NR by BF at 7/27/2023 1445    Acceptance, E, VU,NR by BF at 7/25/2023 1251    Acceptance, E, VU,NR by HB at 7/24/2023 1355    Acceptance, E, NR,VU by BF at 7/21/2023 1248    Acceptance, E, NR by BF at 7/20/2023 1259    Acceptance, TB, NR by DL at 7/19/2023 2036    Acceptance, E, VU,NR by HB at 7/19/2023 1421    Acceptance, E, VU,NR by BF at 7/18/2023 1334    Acceptance, E, VU,NR by BF at 7/17/2023 1431    Acceptance, E, VU,NR by BF at 7/17/2023 1430    Acceptance, E, VU,NR by BF at 7/14/2023 1508    Acceptance, E, VU,NR by BF at 7/13/2023 1343    Acceptance, E,D, VU,NR by TM at 7/12/2023 1407    Acceptance, D,E, NR,VU by TM at 7/11/2023 1434    Acceptance, E, VU,NR by BF at 7/10/2023 1456    Acceptance, E, VU,NR by HB at 7/8/2023 1140    Acceptance, E, VU,NR by BF at 7/7/2023 1442   Family Acceptance, E,D, VU,DU by BF at 8/10/2023 1524    Acceptance, E, VU,NR by BF at 8/1/2023 1427   Significant Other Acceptance, E,D, VU,NR,DU by BF at  8/18/2023 1238                                         User Key       Initials Effective Dates Name Provider Type Discipline    BF 05/31/23 - 07/10/23 Lisa Sanchez OT Occupational Therapist OT    BF 07/11/23 -  Lisa Sanchez, OT Occupational Therapist OT    CJ 06/16/21 -  Ashanti Nova OT Occupational Therapist OT    TM 06/16/21 -  Marielos Capone OT Occupational Therapist OT    HB 05/25/21 -  Veronica Robles OT Occupational Therapist OT    DL 03/16/22 -  Nadja Velasquez, RN Registered Nurse Nurse    MB 06/26/23 -  Vinay Fritz, RN Registered Nurse Nurse                        OT Recommendation and Plan    Planned Therapy Interventions (OT): activity tolerance training, adaptive equipment training, BADL retraining, neuromuscular control/coordination retraining, passive ROM/stretching, ROM/therapeutic exercise, strengthening exercise, transfer/mobility retraining                    Time Calculation:      Time Calculation- OT       Row Name 08/18/23 1245             Time Calculation- OT    OT Start Time 0915  -BF      OT Stop Time 1045  -BF      OT Time Calculation (min) 90 min  -BF      Total Timed Code Minutes- OT 90 minute(s)  -BF      OT Non-Billable Time (min) 10 min  -BF                User Key  (r) = Recorded By, (t) = Taken By, (c) = Cosigned By      Initials Name Provider Type    BF Daniel Lisa Lima, OT Occupational Therapist                  Therapy Charges for Today       Code Description Service Date Service Provider Modifiers Qty    08969260372 HC OT SELF CARE/MGMT/TRAIN EA 15 MIN 8/18/2023 Lisa Sanchez OT GO 2    [id number] HC OT NEUROMUSC RE EDUCATION EA 15 MIN 8/18/2023 Lisa Sanchez OT GO 2    [id number] HC OT THER PROC EA 15 MIN 8/18/2023 Lisa Sanchez OT GO 2                     Lisa Sanchez OT  8/18/2023

## 2023-08-19 RX ORDER — BISACODYL 10 MG
10 SUPPOSITORY, RECTAL RECTAL ONCE
Status: COMPLETED | OUTPATIENT
Start: 2023-08-19 | End: 2023-08-19

## 2023-08-19 RX ADMIN — METOPROLOL TARTRATE 25 MG: 25 TABLET, FILM COATED ORAL at 10:11

## 2023-08-19 RX ADMIN — ALLOPURINOL 300 MG: 300 TABLET ORAL at 10:11

## 2023-08-19 RX ADMIN — ATORVASTATIN CALCIUM 80 MG: 40 TABLET, FILM COATED ORAL at 10:11

## 2023-08-19 RX ADMIN — BISACODYL 10 MG: 10 SUPPOSITORY RECTAL at 10:18

## 2023-08-19 RX ADMIN — PANTOPRAZOLE SODIUM 40 MG: 40 TABLET, DELAYED RELEASE ORAL at 05:42

## 2023-08-19 RX ADMIN — POLYETHYLENE GLYCOL (3350) 17 G: 17 POWDER, FOR SOLUTION ORAL at 10:11

## 2023-08-19 RX ADMIN — NYSTATIN: 100000 POWDER TOPICAL at 10:12

## 2023-08-19 RX ADMIN — NYSTATIN: 100000 POWDER TOPICAL at 20:53

## 2023-08-19 RX ADMIN — TAMSULOSIN HYDROCHLORIDE 0.4 MG: 0.4 CAPSULE ORAL at 10:11

## 2023-08-19 RX ADMIN — METOPROLOL TARTRATE 25 MG: 25 TABLET, FILM COATED ORAL at 20:53

## 2023-08-19 RX ADMIN — HYDROXYZINE HYDROCHLORIDE 25 MG: 25 TABLET, FILM COATED ORAL at 20:53

## 2023-08-19 RX ADMIN — MAGNESIUM GLUCONATE 500 MG ORAL TABLET 400 MG: 500 TABLET ORAL at 10:11

## 2023-08-19 RX ADMIN — RIVAROXABAN 20 MG: 20 TABLET, FILM COATED ORAL at 17:47

## 2023-08-19 NOTE — PROGRESS NOTES
CC: Follow-up CVA    Interview History/HPI: Patient is without complaints, he is eating well, no chest pain palpitations or shortness of breath.          Current Hospital Meds:  allopurinol, 300 mg, Oral, Daily  atorvastatin, 80 mg, Oral, Daily  magnesium oxide, 400 mg, Oral, Daily  metoprolol tartrate, 25 mg, Oral, Q12H  nystatin, , Topical, Q12H  pantoprazole, 40 mg, Oral, Q AM  polyethylene glycol, 17 g, Oral, Daily  rivaroxaban, 20 mg, Oral, Daily With Dinner  tamsulosin, 0.4 mg, Oral, Daily         Vitals:    08/19/23 0700   BP: 140/78   Pulse: 90   Resp: 16   Temp: 97.9 øF (36.6 øC)   SpO2: 95%         Intake/Output Summary (Last 24 hours) at 8/19/2023 1004  Last data filed at 8/19/2023 0900  Gross per 24 hour   Intake 1680 ml   Output 800 ml   Net 880 ml       EXAM: Heart is an irregular irregular controlled rhythm without murmur he still has a slight left facial droop, mass effect left side of the neck from his cancer, he can move his left arm and hand, some clumsiness of the hand persists, his proximal leg movement of the left leg is improving.  No edema is good      Diet: Cardiac Diets; Healthy Heart (2-3 Na+); Texture: Regular Texture (IDDSI 7); Fluid Consistency: Thin (IDDSI 0)        LABS:     Lab Results (last 48 hours)       ** No results found for the last 48 hours. **                 Radiology:    Imaging Results (Last 72 Hours)       ** No results found for the last 72 hours. **                Assessment/Plan:   Status post right CVA with left residual.  Patient is making progress with occupational and physical therapy.  With PT, patient was max assist with bed mobility, mod to max with bed to chair, min assist sit to stand with a platform walker, min assist stand to sit, patient was able to ambulate with mod assist 2 person with a platform rolling walker.  He is improving his swing 3 with his left leg.  With OT, patient is mod assist with bathing, contact-guard for upper body dressing, mod to  max for lower body dressing, min assist for grooming, he is dependent with his toileting hygiene.,  Set up for self.  Continue current physical and occupational plan.  Plan is for home on the 23rd    Atrial fibrillation, rate controlled, continue Xarelto for further stroke prevention    Blood pressure was borderline low last night but improved this a.m., continue current medication    DVT prophylaxis, Xarelto will serve for this as well    Lymphoma and head neck cancer, he will need follow-up with oncology as an outpatient    Constipation, no BM in the last 2 days, I am going to give it an additional suppository today        Hayder Mendoza MD

## 2023-08-19 NOTE — PLAN OF CARE
Goal Outcome Evaluation:  Plan of Care Reviewed With: patient       Problem: Rehabilitation (IRF) Plan of Care  Goal: Plan of Care Review  Outcome: Ongoing, Progressing  Flowsheets  Taken 8/19/2023 1056 by Madie Maza RN  Plan of Care Reviewed With: patient  Taken 8/18/2023 0933 by Maurisio Estrella RN  Progress: improving  Goal: Patient-Specific Goal (Individualized)  Outcome: Ongoing, Progressing  Goal: Absence of New-Onset Illness or Injury  Outcome: Ongoing, Progressing  Intervention: Prevent Fall and Fall Injury  Recent Flowsheet Documentation  Taken 8/19/2023 1000 by Madie Maza RN  Safety Promotion/Fall Prevention: safety round/check completed  Taken 8/19/2023 0800 by Madie Maza RN  Safety Promotion/Fall Prevention: safety round/check completed  Intervention: Prevent Infection  Recent Flowsheet Documentation  Taken 8/19/2023 1000 by Madie Maza RN  Infection Prevention: hand hygiene promoted  Taken 8/19/2023 0800 by Madie Maza RN  Infection Prevention: hand hygiene promoted  Goal: Optimal Comfort and Wellbeing  Outcome: Ongoing, Progressing  Goal: Home and Community Transition Plan Established  Outcome: Ongoing, Progressing

## 2023-08-20 RX ADMIN — TAMSULOSIN HYDROCHLORIDE 0.4 MG: 0.4 CAPSULE ORAL at 08:38

## 2023-08-20 RX ADMIN — ATORVASTATIN CALCIUM 80 MG: 40 TABLET, FILM COATED ORAL at 08:38

## 2023-08-20 RX ADMIN — NYSTATIN: 100000 POWDER TOPICAL at 21:04

## 2023-08-20 RX ADMIN — HYDROXYZINE HYDROCHLORIDE 25 MG: 25 TABLET, FILM COATED ORAL at 21:04

## 2023-08-20 RX ADMIN — NYSTATIN: 100000 POWDER TOPICAL at 08:39

## 2023-08-20 RX ADMIN — RIVAROXABAN 20 MG: 20 TABLET, FILM COATED ORAL at 17:29

## 2023-08-20 RX ADMIN — PANTOPRAZOLE SODIUM 40 MG: 40 TABLET, DELAYED RELEASE ORAL at 05:32

## 2023-08-20 RX ADMIN — METOPROLOL TARTRATE 25 MG: 25 TABLET, FILM COATED ORAL at 08:38

## 2023-08-20 RX ADMIN — ALLOPURINOL 300 MG: 300 TABLET ORAL at 08:38

## 2023-08-20 RX ADMIN — METOPROLOL TARTRATE 25 MG: 25 TABLET, FILM COATED ORAL at 21:04

## 2023-08-20 RX ADMIN — ACETAMINOPHEN 650 MG: 325 TABLET ORAL at 03:33

## 2023-08-20 RX ADMIN — MAGNESIUM GLUCONATE 500 MG ORAL TABLET 400 MG: 500 TABLET ORAL at 08:38

## 2023-08-20 NOTE — PROGRESS NOTES
No complaints, bowels did move yesterday, he has tolerated his significant portion of his breakfast.

## 2023-08-20 NOTE — PLAN OF CARE
Goal Outcome Evaluation:  Plan of Care Reviewed With: patient       Problem: Rehabilitation (IRF) Plan of Care  Goal: Plan of Care Review  Outcome: Ongoing, Progressing  Flowsheets  Taken 8/19/2023 1056 by Madie Maza RN  Plan of Care Reviewed With: patient  Taken 8/18/2023 0933 by Maurisio Estrella RN  Progress: improving  Goal: Patient-Specific Goal (Individualized)  Outcome: Ongoing, Progressing  Goal: Absence of New-Onset Illness or Injury  Outcome: Ongoing, Progressing  Intervention: Prevent Fall and Fall Injury  Recent Flowsheet Documentation  Taken 8/20/2023 1200 by Madie Maza RN  Safety Promotion/Fall Prevention: safety round/check completed  Taken 8/20/2023 1000 by Madie Maza RN  Safety Promotion/Fall Prevention: safety round/check completed  Intervention: Prevent Infection  Recent Flowsheet Documentation  Taken 8/20/2023 1200 by Madie Maza RN  Infection Prevention: hand hygiene promoted  Taken 8/20/2023 1000 by Madie Maza RN  Infection Prevention: hand hygiene promoted  Goal: Optimal Comfort and Wellbeing  Outcome: Ongoing, Progressing  Goal: Home and Community Transition Plan Established  Outcome: Ongoing, Progressing

## 2023-08-21 PROCEDURE — 97116 GAIT TRAINING THERAPY: CPT

## 2023-08-21 PROCEDURE — 97110 THERAPEUTIC EXERCISES: CPT

## 2023-08-21 PROCEDURE — 97530 THERAPEUTIC ACTIVITIES: CPT

## 2023-08-21 PROCEDURE — 97535 SELF CARE MNGMENT TRAINING: CPT | Performed by: OCCUPATIONAL THERAPIST

## 2023-08-21 PROCEDURE — 97110 THERAPEUTIC EXERCISES: CPT | Performed by: OCCUPATIONAL THERAPIST

## 2023-08-21 PROCEDURE — 97112 NEUROMUSCULAR REEDUCATION: CPT | Performed by: OCCUPATIONAL THERAPIST

## 2023-08-21 RX ADMIN — METOPROLOL TARTRATE 25 MG: 25 TABLET, FILM COATED ORAL at 08:22

## 2023-08-21 RX ADMIN — PANTOPRAZOLE SODIUM 40 MG: 40 TABLET, DELAYED RELEASE ORAL at 05:21

## 2023-08-21 RX ADMIN — RIVAROXABAN 20 MG: 20 TABLET, FILM COATED ORAL at 17:39

## 2023-08-21 RX ADMIN — HYDROXYZINE HYDROCHLORIDE 25 MG: 25 TABLET, FILM COATED ORAL at 20:45

## 2023-08-21 RX ADMIN — NYSTATIN 1 APPLICATION: 100000 POWDER TOPICAL at 08:23

## 2023-08-21 RX ADMIN — TAMSULOSIN HYDROCHLORIDE 0.4 MG: 0.4 CAPSULE ORAL at 08:22

## 2023-08-21 RX ADMIN — ALLOPURINOL 300 MG: 300 TABLET ORAL at 08:22

## 2023-08-21 RX ADMIN — METOPROLOL TARTRATE 25 MG: 25 TABLET, FILM COATED ORAL at 20:45

## 2023-08-21 RX ADMIN — POLYETHYLENE GLYCOL (3350) 17 G: 17 POWDER, FOR SOLUTION ORAL at 08:23

## 2023-08-21 RX ADMIN — NYSTATIN: 100000 POWDER TOPICAL at 20:45

## 2023-08-21 RX ADMIN — MAGNESIUM GLUCONATE 500 MG ORAL TABLET 400 MG: 500 TABLET ORAL at 08:22

## 2023-08-21 RX ADMIN — ATORVASTATIN CALCIUM 80 MG: 40 TABLET, FILM COATED ORAL at 08:22

## 2023-08-21 NOTE — THERAPY TREATMENT NOTE
Inpatient Rehabilitation - Physical Therapy Treatment Note        Ronnie     Patient Name: Antonio Canseco  : 1955  MRN: [id number]    Today's Date: 2023                    Admit Date: 2023      Visit Dx:   No diagnosis found.    Patient Active Problem List   Diagnosis    Detrusor instability    Low testosterone in male    Disorder of prostate    Corporo-venous occlusive erectile dysfunction    CVA (cerebral vascular accident)       Past Medical History:   Diagnosis Date    Diabetes mellitus     Hypertension     Stroke        Past Surgical History:   Procedure Laterality Date    US GUIDED LYMPH NODE BIOPSY  2023       PT ASSESSMENT (last 12 hours)       IRF PT Evaluation and Treatment       Row Name 23 1444          PT Time and Intention    Document Type daily treatment  -LL     Mode of Treatment individual therapy;physical therapy  -LL     Patient/Family/Caregiver Comments/Observations Patient had no new c/o this date and was agreeable to PT participation.  -LL       Row Name 23 1444          General Information    Existing Precautions/Restrictions fall  L HP, <trunk support/balance, L side inattention  -LL       Row Name 23 1444          Pain Scale: FACES Pre/Post-Treatment    Pain: FACES Scale, Pretreatment 0-->no hurt  -LL     Posttreatment Pain Rating 0-->no hurt  -LL       Row Name 23 1444          Cognition/Psychosocial    Affect/Mental Status (Cognition) WFL  -LL     Orientation Status (Cognition) oriented x 3  -LL     Follows Commands (Cognition) verbal cues/prompting required;physical/tactile prompts required  -LL     Personal Safety Interventions fall prevention program maintained;gait belt;nonskid shoes/slippers when out of bed;supervised activity  -LL     Cognitive Function safety deficit  -LL     Safety Deficit (Cognition) impulsivity  -LL       Row Name 23 1444          Bed Mobility    Comment, (Bed Mobility) Patient UIC  -LL       Row Name 23  1444          Bed-Chair Transfer    Bed-Chair Sims (Transfers) maximum assist (25% patient effort);verbal cues;nonverbal cues (demo/gesture);moderate assist (50% patient effort);2 person assist  -LL     Assistive Device (Bed-Chair Transfers) wheelchair;walker, front-wheeled  -LL     Comment, (Bed-Chair Transfer) x 3 trials WC <> mat table  -LL       Row Name 08/21/23 1444          Chair-Bed Transfer    Chair-Bed Sims (Transfers) maximum assist (25% patient effort);2 person assist;verbal cues;nonverbal cues (demo/gesture)  -LL     Assistive Device (Chair-Bed Transfers) walker, front-wheeled;wheelchair  -LL     Comment, (Chair-Bed Transfer) x 3 trials WC <> mat table  -LL       Row Name 08/21/23 1444          Sit-Stand Transfer    Sit-Stand Sims (Transfers) verbal cues;nonverbal cues (demo/gesture);minimum assist (75% patient effort)  -LL     Assistive Device (Sit-Stand Transfers) walker, front-wheeled  -LL       Row Name 08/21/23 1444          Stand-Sit Transfer    Stand-Sit Sims (Transfers) verbal cues;nonverbal cues (demo/gesture);minimum assist (75% patient effort)  -LL     Assistive Device (Stand-Sit Transfers) walker, platform  -LL       Row Name 08/21/23 1444          Gait/Stairs (Locomotion)    Sims Level (Gait) verbal cues;nonverbal cues (demo/gesture);2 person assist;moderate assist (50% patient effort)  -LL     Assistive Device (Gait) walker, front-wheeled  -LL     Distance in Feet (Gait) 60', 100'  -LL     Pattern (Gait) step-to  -LL     Deviations/Abnormal Patterns (Gait) base of support, narrow;hebert decreased;gait speed decreased;stride length decreased;weight shifting decreased;ataxic  -LL     Bilateral Gait Deviations forward flexed posture  -       Row Name 08/21/23 1444          Motor Skills    Comments, Therapeutic Exercise --  GS; stretching L LE in all planes to all joints  -LL       Row Name 08/21/23 1444          Hip (Therapeutic Exercise)    Hip  Strengthening (Therapeutic Exercise) bilateral;flexion;extension;aBduction;aDduction;marching while seated;sitting;resistance band;green  3# R LE; AAROM L LE  -LL       Row Name 08/21/23 1444          Knee (Therapeutic Exercise)    Knee Strengthening (Therapeutic Exercise) bilateral;flexion;extension;marching while seated;LAQ (long arc quad);hamstring curls;sitting;resistance band;green  3# R LE; AAROM L LE  -LL       Row Name 08/21/23 1444          Ankle (Therapeutic Exercise)    Ankle Strengthening (Therapeutic Exercise) plantarflexion;sitting;right;dorsiflexion  -LL       Row Name 08/21/23 1444          Positioning and Restraints    Pre-Treatment Position --  WC w/ OT  -LL     Post Treatment Position wheelchair  -LL     In Wheelchair sitting;with other staff  -LL       Row Name 08/21/23 1444          Bed Mobility Goal 1 (PT-IRF)    Progress/Outcomes (Bed Mobility Goal 1, PT-IRF) goal ongoing  -LL       Row Name 08/21/23 1444          Bed Mobility Goal 2 (PT-IRF)    Progress/Outcomes (Bed Mobility Goal 2, PT-IRF) goal ongoing  -LL       Row Name 08/21/23 1444          Transfer Goal 1 (PT-IRF)    Progress/Outcomes (Transfer Goal 1, PT-IRF) goal ongoing  -LL       Row Name 08/21/23 1444          Transfer Goal 2 (PT-IRF)    Progress/Outcomes (Transfer Goal 2, PT-IRF) goal ongoing  -LL       Row Name 08/21/23 1444          Gait/Walking Locomotion Goal 1 (PT-IRF)    Progress/Outcomes (Gait/Walking Locomotion Goal 1, PT-IRF) goal ongoing;goal partially met  -LL       Row Name 08/21/23 1444          Gait/Walking Locomotion Goal 2 (PT-IRF)    Progress/Outcomes (Gait/Walking Locomotion Goal 2, PT-IRF) goal ongoing  -LL               User Key  (r) = Recorded By, (t) = Taken By, (c) = Cosigned By      Initials Name Provider Type    Connie Isabel PTA Physical Therapist Assistant                     Physical Therapy Education       Title: PT OT SLP Therapies (In Progress)       Topic: Physical Therapy (Done)       Point:  Mobility training (Done)       Learning Progress Summary             Patient Acceptance, E,D, VU,NR by LL at 8/21/2023 1451    Acceptance, TB, NR by DL at 8/20/2023 2104    Acceptance, TB, NR by DL at 8/20/2023 2041    Acceptance, E,D, VU,NR by RG at 8/18/2023 1320    Acceptance, E,D, VU,NR by RG at 8/17/2023 1332    Acceptance, E,D, VU,NR by RG at 8/16/2023 1354    Acceptance, E,D, VU,NR by RG at 8/16/2023 0723    Acceptance, E,TB, VU,DU by HC at 8/15/2023 1402    Acceptance, E,D, VU,NR by RG at 8/11/2023 1503    Acceptance, E,D, VU,NR by RG at 8/10/2023 1510    Acceptance, E, VU,NR by LB at 8/9/2023 1314    Acceptance, TB, NR by DL at 8/8/2023 2014    Acceptance, E,D, VU,NR by RG at 8/8/2023 1407    Acceptance, E,D, VU,NR by RG at 8/7/2023 1507    Acceptance, E, VU,NR by LB at 8/4/2023 1521    Acceptance, E,TB, VU,NR by MB at 8/3/2023 2239    Acceptance, E,D, VU,NR by RG at 8/3/2023 1514    Acceptance, E,D, VU,NR by RG at 8/2/2023 1524    Acceptance, E,D, VU,NR by RG at 8/1/2023 1446    Acceptance, E,D, VU,NR by RG at 7/31/2023 1411    Acceptance, E,TB, VU by RM at 7/28/2023 1230    Acceptance, E,D, VU,NR by RG at 7/26/2023 1518    Acceptance, E, VU,NR by LB at 7/25/2023 1133    Acceptance, E, VU,NR by LB at 7/24/2023 1444    Acceptance, E, VU,NR by LB at 7/21/2023 1615    Acceptance, E,D, VU,NR by RG at 7/20/2023 1107    Acceptance, TB, NR by DL at 7/19/2023 2036    Acceptance, E,D, VU,NR by RG at 7/19/2023 1508    Acceptance, E,D, VU,NR by RG at 7/18/2023 1520    Acceptance, E,D, VU,NR by RG at 7/17/2023 1522    Acceptance, E,D, VU,NR by LL at 7/13/2023 1358    Acceptance, E, VU,NR by LB at 7/12/2023 1144    Acceptance, E, VU,NR by LB at 7/11/2023 1426    Acceptance, E, VU,NR by LB at 7/10/2023 1452    Acceptance, E,D, VU,NR by LL at 7/8/2023 1228    Acceptance, E,D, VU,NR by LB at 7/7/2023 1449                         Point: Home exercise program (Done)       Learning Progress Summary             Patient  Acceptance, E,D, VU,NR by LL at 8/21/2023 1451    Acceptance, TB, NR by DL at 8/20/2023 2104    Acceptance, TB, NR by DL at 8/20/2023 2041    Acceptance, E,D, VU,NR by RG at 8/18/2023 1320    Acceptance, E,D, VU,NR by RG at 8/17/2023 1332    Acceptance, E,D, VU,NR by RG at 8/16/2023 1354    Acceptance, E,D, VU,NR by RG at 8/16/2023 0723    Acceptance, E,TB, VU,DU by HC at 8/15/2023 1402    Acceptance, E,D, VU,NR by RG at 8/11/2023 1503    Acceptance, E,D, VU,NR by RG at 8/10/2023 1510    Acceptance, E, VU,NR by LB at 8/9/2023 1314    Acceptance, TB, NR by DL at 8/8/2023 2014    Acceptance, E,D, VU,NR by RG at 8/8/2023 1407    Acceptance, E,D, VU,NR by RG at 8/7/2023 1507    Acceptance, E, VU,NR by LB at 8/4/2023 1521    Acceptance, E,TB, VU,NR by MB at 8/3/2023 2239    Acceptance, E,D, VU,NR by RG at 8/3/2023 1514    Acceptance, E,D, VU,NR by RG at 8/2/2023 1524    Acceptance, E,D, VU,NR by RG at 8/1/2023 1446    Acceptance, E,D, VU,NR by RG at 7/31/2023 1411    Acceptance, E,TB, VU by RM at 7/28/2023 1230    Acceptance, E,D, VU,NR by RG at 7/26/2023 1518    Acceptance, E, VU,NR by LB at 7/25/2023 1133    Acceptance, E, VU,NR by LB at 7/24/2023 1444    Acceptance, E, VU,NR by LB at 7/21/2023 1615    Acceptance, E,D, VU,NR by RG at 7/20/2023 1107    Acceptance, TB, NR by DL at 7/19/2023 2036    Acceptance, E,D, VU,NR by RG at 7/19/2023 1508    Acceptance, E,D, VU,NR by RG at 7/18/2023 1520    Acceptance, E,D, VU,NR by RG at 7/17/2023 1522    Acceptance, E,D, VU,NR by LL at 7/13/2023 1358    Acceptance, E, VU,NR by LB at 7/12/2023 1144    Acceptance, E, VU,NR by LB at 7/11/2023 1426    Acceptance, E, VU,NR by LB at 7/10/2023 1452    Acceptance, E,D, VU,NR by LL at 7/8/2023 1228    Acceptance, E,D, VU,NR by LB at 7/7/2023 1449                         Point: Body mechanics (Done)       Learning Progress Summary             Patient Acceptance, E,D, VU,NR by LL at 8/21/2023 1451    Acceptance, TB, NR by DL at 8/20/2023  2104    Acceptance, TB, NR by DL at 8/20/2023 2041    Acceptance, E,D, VU,NR by RG at 8/18/2023 1320    Acceptance, E,D, VU,NR by RG at 8/17/2023 1332    Acceptance, E,D, VU,NR by RG at 8/16/2023 1354    Acceptance, E,D, VU,NR by RG at 8/16/2023 0723    Acceptance, E,TB, VU,DU by HC at 8/15/2023 1402    Acceptance, E,D, VU,NR by RG at 8/11/2023 1503    Acceptance, E,D, VU,NR by RG at 8/10/2023 1510    Acceptance, E, VU,NR by LB at 8/9/2023 1314    Acceptance, TB, NR by DL at 8/8/2023 2014    Acceptance, E,D, VU,NR by RG at 8/8/2023 1407    Acceptance, E,D, VU,NR by RG at 8/7/2023 1507    Acceptance, E, VU,NR by LB at 8/4/2023 1521    Acceptance, E,TB, VU,NR by MB at 8/3/2023 2239    Acceptance, E,D, VU,NR by RG at 8/3/2023 1514    Acceptance, E,D, VU,NR by RG at 8/2/2023 1524    Acceptance, E,D, VU,NR by RG at 8/1/2023 1446    Acceptance, E,D, VU,NR by RG at 7/31/2023 1411    Acceptance, E,TB, VU by RM at 7/28/2023 1230    Acceptance, E,D, VU,NR by RG at 7/26/2023 1518    Acceptance, E, VU,NR by LB at 7/25/2023 1133    Acceptance, E, VU,NR by LB at 7/24/2023 1444    Acceptance, E, VU,NR by LB at 7/21/2023 1615    Acceptance, E,D, VU,NR by RG at 7/20/2023 1107    Acceptance, TB, NR by DL at 7/19/2023 2036    Acceptance, E,D, VU,NR by RG at 7/19/2023 1508    Acceptance, E,D, VU,NR by RG at 7/18/2023 1520    Acceptance, E,D, VU,NR by RG at 7/17/2023 1522    Acceptance, E,D, VU,NR by LL at 7/13/2023 1358    Acceptance, E, VU,NR by LB at 7/12/2023 1144    Acceptance, E, VU,NR by LB at 7/11/2023 1426    Acceptance, E, VU,NR by LB at 7/10/2023 1452    Acceptance, E,D, VU,NR by LL at 7/8/2023 1228    Acceptance, E,D, VU,NR by LB at 7/7/2023 1449                         Point: Precautions (Done)       Learning Progress Summary             Patient Acceptance, E,D, VU,NR by LL at 8/21/2023 1451    Acceptance, TB, NR by DL at 8/20/2023 2104    Acceptance, TB, NR by DL at 8/20/2023 2041    Acceptance, E,D, VU,NR by RG at  8/18/2023 1320    Acceptance, E,D, VU,NR by RG at 8/17/2023 1332    Acceptance, E,D, VU,NR by RG at 8/16/2023 1354    Acceptance, E,D, VU,NR by RG at 8/16/2023 0723    Acceptance, E,TB, VU,DU by HC at 8/15/2023 1402    Acceptance, E,D, VU,NR by RG at 8/11/2023 1503    Acceptance, E,D, VU,NR by RG at 8/10/2023 1510    Acceptance, E, VU,NR by LB at 8/9/2023 1314    Acceptance, TB, NR by DL at 8/8/2023 2014    Acceptance, E,D, VU,NR by RG at 8/8/2023 1407    Acceptance, E,D, VU,NR by RG at 8/7/2023 1507    Acceptance, E, VU,NR by LB at 8/4/2023 1521    Acceptance, E,TB, VU,NR by MB at 8/3/2023 2239    Acceptance, E,D, VU,NR by RG at 8/3/2023 1514    Acceptance, E,D, VU,NR by RG at 8/2/2023 1524    Acceptance, E,D, VU,NR by RG at 8/1/2023 1446    Acceptance, E,D, VU,NR by RG at 7/31/2023 1411    Acceptance, E,TB, VU by RM at 7/28/2023 1230    Acceptance, E,D, VU,NR by RG at 7/26/2023 1518    Acceptance, E, VU,NR by LB at 7/25/2023 1133    Acceptance, E, VU,NR by LB at 7/24/2023 1444    Acceptance, E, VU,NR by LB at 7/21/2023 1615    Acceptance, E,D, VU,NR by RG at 7/20/2023 1107    Acceptance, TB, NR by DL at 7/19/2023 2036    Acceptance, E,D, VU,NR by RG at 7/19/2023 1508    Acceptance, E,D, VU,NR by RG at 7/18/2023 1520    Acceptance, E,D, VU,NR by RG at 7/17/2023 1522    Acceptance, E,D, VU,NR by LL at 7/13/2023 1358    Acceptance, E, VU,NR by LB at 7/12/2023 1144    Acceptance, E, VU,NR by LB at 7/11/2023 1426    Acceptance, E, VU,NR by LB at 7/10/2023 1452    Acceptance, E,D, VU,NR by LL at 7/8/2023 1228    Acceptance, E,D, VU,NR by LB at 7/7/2023 1449                                         User Key       Initials Effective Dates Name Provider Type Discipline    LB 06/16/21 -  Joslyn Garcia, PT Physical Therapist PT    LL 05/02/16 -  Connie Velasquez, PTA Physical Therapist Assistant PT    RM 02/17/23 -  Shelley Hanson, PTA Physical Therapist Assistant PT    RG 06/16/21 -  Vu Brown, PTA  Physical Therapist Assistant PT    HC 01/20/23 -  Kirti Arroyo PTA Physical Therapist Assistant PT    DL 03/16/22 -  Nadja Velasquez, RN Registered Nurse Nurse    MB 06/26/23 -  Vinay Fritz, RN Registered Nurse Nurse                    PT Recommendation and Plan    Frequency of Treatment (PT): 5 times per week  Anticipated Equipment Needs at Discharge (PT Eval):  (tbd)                  Time Calculation:      PT Charges       Row Name 08/21/23 1451             Time Calculation    Start Time 1000  -LL      Stop Time 1130  -LL      Time Calculation (min) 90 min  -LL      PT Received On 08/21/23  -LL      PT Goal Re-Cert Due Date 08/25/23  -LL         Time Calculation- PT    Total Timed Code Minutes- PT 90 minute(s)  -LL                User Key  (r) = Recorded By, (t) = Taken By, (c) = Cosigned By      Initials Name Provider Type    LL Connie Velasquez PTA Physical Therapist Assistant                    Therapy Charges for Today       Code Description Service Date Service Provider Modifiers Qty    88212343229 HC GAIT TRAINING EA 15 MIN 8/21/2023 Connie Velasquez PTA GP, CQ 1    46580627364 HC PT THERAPEUTIC ACT EA 15 MIN 8/21/2023 Connie Velasquez PTA GP, CQ 2    51484039859 HC PT THER PROC EA 15 MIN 8/21/2023 Connie Velasquez PTA GP, CQ 3              PT G-Codes  AM-PAC 6 Clicks Score (PT): 12      Aneesh Velasquez PTA  8/21/2023

## 2023-08-21 NOTE — PLAN OF CARE
Goal Outcome Evaluation:  Plan of Care Reviewed With: patient lying in bed resting. No complaints at present, continues to participate in therapy. Will continue to monitor.

## 2023-08-21 NOTE — PROGRESS NOTES
Inpatient Rehabilitation Functional Measures Assessment and Plan of Care    Plan of Care  Self Care    [OT] Dressing (Upper)(Active)  Current Status(08/21/2023): Min A  Weekly Goal(08/29/2023): CGA  Discharge Goal: CGA    Functional Measures  SOBIA Eating:  SOBIA Grooming:  SOBIA Bathing:  SOBIA Upper Body Dressing:  SOBIA Lower Body Dressing:  SOBIA Toileting:    SOBIA Bladder Management  Level of Assistance:  Frequency/Number of Accidents this Shift:    SOBIA Bowel Management  Level of Assistance:  Frequency/Number of Accidents this Shift:    SOBIA Bed/Chair/Wheelchair Transfer:  SOBIA Toilet Transfer:  SOBIA Tub/Shower Transfer:    Previously Documented Mode of Locomotion at Discharge:  SOBIA Expected Mode of Locomotion at Discharge:  SOBIA Walk/Wheelchair:  SOBIA Stairs:    SOBIA Comprehension:  SOBIA Expression:  SOBIA Social Interaction:  SOBIA Problem Solving:  SOBIA Memory:    Therapy Mode Minutes  Occupational Therapy: Individual: 115 minutes.  Physical Therapy:  Speech Language Pathology:    Discharge Functional Goals:    Signed by: Lisa Sanchez OT

## 2023-08-21 NOTE — PAYOR COMM NOTE
"Carmen Doe, RN   Utilization Review Nurse for Inpatient Rehab   Phone: [phone]  Fax: [phone]  Email: [email or web address] NPI: [id number]     CLINICAL UPDATE ONLY; NO ADDITIONAL DAYS ARE BEING REQUESTED  Member:  Antonio Canseco  :  1955  ID# [id number]  Auth# [id number]  Admission Date:  23  Planning for Discharge HOME on 23  *Member is already approved until 23.  *This is only an update.  No additional days are being requested.      Antonio Canseco (67 y.o. Male)       Date of Birth   1955    Social Security Number       Address   [address] [place] [address]    Home Phone   [phone]    MRN   [id number]       Advent   None    Marital Status                               Admission Date   23    Admission Type   Elective    Admitting Provider   Hayder Mendoza MD    Attending Provider   Hayder Mendoza MD    Department, Room/Bed   [facility] REHABILITATION, 106/2S       Discharge Date       Discharge Disposition       Discharge Destination                                 Attending Provider: Hayder Mendoza MD    Allergies: No Known Allergies    Isolation: None   Infection: None   Code Status: CPR    Ht: 172.7 cm (67.99\")   Wt: 121 kg (266 lb 12.1 oz)    Admission Cmt: None   Principal Problem: CVA (cerebral vascular accident) [I63.9]                 Greer Conner MSW     Case Management  Significant Note      Signed  Date of Service:  23  Creation Time:  23     Signed                           23   Plan   Plan Spoke to Alejandra Quinonez who has been in rehab today observing pt in therapy/receiving education.  She is agreeable to stay in pt's home and assist with caregiving including changing depends. Pt's daughter Karolyn will be moving in with pt to assist when she is not working.  Pt's cousin Wilson will also be assisting.  Sister Mariah will be " helping too and friend of daughter Karolyn is suppose to help too.  Discussed arranging home health and DME based on recommendations made by therapists before pt is discharged home on 8-23-23.  Will follow.                   Greer Conner, MSW     Case Management  Significant Note      Signed  Date of Service:  08/18/23 0956  Creation Time:  08/18/23 0956     Signed                           08/18/23 0930   Plan   Plan Spoke to Alejandra Quinonez who has been in rehab today observing pt in therapy/receiving education.  She is agreeable to stay in pt's home and assist with caregiving including changing depends. Discussed arranging home health and DME based on recommendations made by therapists before pt is discharged home on 8-23-23.  Will follow.                    Greer Conner, SURAJ     Case Management  Significant Note      Signed  Date of Service:  08/17/23 1410  Creation Time:  08/17/23 1410     Signed                                   08/17/23 1408   Plan   Plan SS received voicemail from sister Mariah saying she talked to pt's daughter Karolyn who will move in pt to assist with care and daughter talked to pt's significant other Alejandra who will be staying with pt too. Pt will receive assistance from some other people too.   Left message for sister Mariah [phone].  Will follow.                    Greer Conner, [facility]     Case Management  Significant Note      Signed  Date of Service:  08/17/23 1405  Creation Time:  08/17/23 1405     Signed                                   08/17/23 1010   Plan   Plan SS received call from significant other Alejandra [phone] who says she will help with pt's care at home including changing adult diapers.  Informed her pt's discharge is scheduled for 8-23-23 to return to his home with assistance from her, daughter Karolyn, sister Mariah, cousin Wilson, and friend Hamidaashish.  Alejandra would like to observe pt in therapy and  receive education on 8-18-23 at 8:00 am.  Will follow.                   Greer Conner, MSW     Case Management  Significant Note      Signed  Date of Service:  08/15/23 1624  Creation Time:  08/15/23 1624     Signed                           08/15/23 1100   Plan   Plan Pt is approved for continued rehab stay until 8-23-23.  Rehab MD would like for pt to continue inpatient rehab stay then discharge to home if possible.  Spoke to sister Mariah [phone] about pending SNF placement, [facility] reviewing pt for admission, how he is doing in therapy, MD request for pt to continue rehab stay and discharge home if family are able to meet caregiving needs.  Sister says she will make sure pt has food/meals and will transport to MD appointments, significant other Alejandra has agreed to change pt's adult diapers, cook for him, eliseo Parr is suppose to move in with pt and when she works a friend Joey Ang is suppose to stay with pt, cousin Wilson says he will help some with pt's care, son will help some too, and sister will come and go to assist.  Sister says she will contact daughter Karolyn to confirm her plans to move in pt and follow-up with SS.  Sister is agreeable to pt returning home from rehab if family/friend/significant other will assist as they say they will.  SS reviewed this information with MD, OT/PT and pt.  Pt prefers to continue rehab stay until 8-23-23 then return home with family, significant other and friend assisting with care.  Pt says he feels he can depend on them to help at home.  Pt would like to receive home health services and wheelchair.  Pt says the wheelchair he had at home was donated to him.  Pt says [facility] van is needed to transport him home at discharge and he will pay for transport.  Explained cost at discharge for [facility] will be $1.50 per mile and scheduled trips for MD appointments are $1 per mile; 24 hour advance notice for scheduling is needed.  SS sent  message to Joselo, , at [facility]& about new discharge plans.   Patient/Family in Agreement with Plan yes                   Hayder Mendoza MD   Physician  Medicine     Progress Notes      Signed     Date of Service: 08/19/23 1004  Creation Time: 08/19/23 1004     Signed               CC: Follow-up CVA     Interview History/HPI: Patient is without complaints, he is eating well, no chest pain palpitations or shortness of breath.              Current Hospital Meds:  allopurinol, 300 mg, Oral, Daily  atorvastatin, 80 mg, Oral, Daily  magnesium oxide, 400 mg, Oral, Daily  metoprolol tartrate, 25 mg, Oral, Q12H  nystatin, , Topical, Q12H  pantoprazole, 40 mg, Oral, Q AM  polyethylene glycol, 17 g, Oral, Daily  rivaroxaban, 20 mg, Oral, Daily With Dinner  tamsulosin, 0.4 mg, Oral, Daily            Vitals:     08/19/23 0700   BP: 140/78   Pulse: 90   Resp: 16   Temp: 97.9 øF (36.6 øC)   SpO2: 95%            Intake/Output Summary (Last 24 hours) at 8/19/2023 1004  Last data filed at 8/19/2023 0900      Gross per 24 hour   Intake 1680 ml   Output 800 ml   Net 880 ml         EXAM: Heart is an irregular irregular controlled rhythm without murmur he still has a slight left facial droop, mass effect left side of the neck from his cancer, he can move his left arm and hand, some clumsiness of the hand persists, his proximal leg movement of the left leg is improving.  No edema is good        Diet: Cardiac Diets; Healthy Heart (2-3 Na+); Texture: Regular Texture (IDDSI 7); Fluid Consistency: Thin (IDDSI 0)           LABS:      Lab Results (last 48 hours)         ** No results found for the last 48 hours. **                      Radiology:     Imaging Results (Last 72 Hours)         ** No results found for the last 72 hours. **                      Assessment/Plan:   Status post right CVA with left residual.  Patient is making progress with occupational and physical therapy.  With PT, patient was max assist with bed  mobility, mod to max with bed to chair, min assist sit to stand with a platform walker, min assist stand to sit, patient was able to ambulate with mod assist 2 person with a platform rolling walker.  He is improving his swing 3 with his left leg.  With OT, patient is mod assist with bathing, contact-guard for upper body dressing, mod to max for lower body dressing, min assist for grooming, he is dependent with his toileting hygiene.,  Set up for self.  Continue current physical and occupational plan.  Plan is for home on the      Atrial fibrillation, rate controlled, continue Xarelto for further stroke prevention     Blood pressure was borderline low last night but improved this a.m., continue current medication     DVT prophylaxis, Xarelto will serve for this as well     Lymphoma and head neck cancer, he will need follow-up with oncology as an outpatient     Constipation, no BM in the last 2 days, I am going to give it an additional suppository today           MD Kevin Haney Randy, PTA   Physical Therapist Assistant  Physical Therapy     Therapy Treatment Note      Signed     Date of Service: 23  Creation Time: 23     Signed       Expand All Collapse All    Inpatient Rehabilitation - Physical Therapy Treatment Note                                                              THALIA Trujillo     Patient Name: Antonio Canseco                  : 1955                        MRN: [id number]     Today's Date: 2023                                                                                            Admit Date: 2023                 Visit Dx:   Visit Diagnosis   No diagnosis found.        Problem List       Patient Active Problem List   Diagnosis    Detrusor instability    Low testosterone in male    Disorder of prostate    Corporo-venous occlusive erectile dysfunction    CVA (cerebral vascular accident)            Medical History        Past Medical  History:   Diagnosis Date    Diabetes mellitus      Hypertension      Stroke              Surgical History         Past Surgical History:   Procedure Laterality Date    US GUIDED LYMPH NODE BIOPSY   6/23/2023            PT ASSESSMENT (last 12 hours)            IRF PT Evaluation and Treatment         Row Name 08/18/23 1313                 PT Time and Intention     Document Type daily treatment  -RG       Mode of Treatment individual therapy;physical therapy  -RG       Patient/Family/Caregiver Comments/Observations Pts family present for PT session.  They observed pt transferring from bed to chair and back using RW, ambulating with PRW, and safety awareness.  -RG          Row Name 08/18/23 1313                 General Information     Existing Precautions/Restrictions fall  L HP, <trunk support/balance, L side inattention  -RG          Row Name 08/18/23 1313                 Pain Scale: FACES Pre/Post-Treatment     Pain: FACES Scale, Pretreatment 0-->no hurt  -RG       Posttreatment Pain Rating 0-->no hurt  -RG          Row Name 08/18/23 1313                 Cognition/Psychosocial     Affect/Mental Status (Cognition) WFL  -RG       Follows Commands (Cognition) verbal cues/prompting required;physical/tactile prompts required  -          Row Name 08/18/23 1313                 Bed Mobility     Supine-Sit [place] (Bed Mobility) maximum assist (25% patient effort);verbal cues;nonverbal cues (demo/gesture)  -          Row Name 08/18/23 1313                 Bed-Chair Transfer     Bed-Chair [place] (Transfers) maximum assist (25% patient effort);verbal cues;nonverbal cues (demo/gesture);moderate assist (50% patient effort);2 person assist  -          Row Name 08/18/23 1313                 Sit-Stand Transfer     Sit-Stand [place] (Transfers) verbal cues;nonverbal cues (demo/gesture);minimum assist (75% patient effort)  -       Assistive Device (Sit-Stand Transfers) walker, platform;parallel bars  -           Row Name 08/18/23 1313                 Stand-Sit Transfer     Stand-Sit [place] (Transfers) verbal cues;nonverbal cues (demo/gesture);minimum assist (75% patient effort)  -RG       Assistive Device (Stand-Sit Transfers) walker, platform  -RG          Row Name 08/18/23 1313                 Gait/Stairs (Locomotion)     [place] Level (Gait) verbal cues;nonverbal cues (demo/gesture);2 person assist;moderate assist (50% patient effort)  -RG       Assistive Device (Gait) walker, rolling platform  -RG       Pattern (Gait) step-to  -RG       Deviations/Abnormal Patterns (Gait) base of support, narrow;hebert decreased;gait speed decreased;stride length decreased;weight shifting decreased;ataxic  -RG       Bilateral Gait Deviations forward flexed posture  -RG          Row Name 08/18/23 1313                 Hip (Therapeutic Exercise)     Hip Strengthening (Therapeutic Exercise) bilateral;flexion;aBduction;aDduction;marching while seated;sitting;resistance band;green;10 repetitions;2 sets  AAROM L LE  -RG          Row Name 08/18/23 1313                 Knee (Therapeutic Exercise)     Knee Strengthening (Therapeutic Exercise) bilateral;flexion;extension;marching while seated;LAQ (long arc quad);sitting;resistance band;green;10 repetitions;2 sets  AAROM L LE  -RG          Row Name 08/18/23 1313                 Ankle (Therapeutic Exercise)     Ankle Strengthening (Therapeutic Exercise) bilateral;dorsiflexion;plantarflexion;sitting;10 repetitions;2 sets  AAROM L LE  -RG          Row Name 08/18/23 1313                 Positioning and Restraints     Pre-Treatment Position in bed  -RG       Post Treatment Position wheelchair  -RG       In Wheelchair notified nsg;sitting;with OT;legs elevated  -RG          Row Name 08/18/23 1313                 Bed Mobility Goal 1 (PT-IRF)     Progress/Outcomes (Bed Mobility Goal 1, PT-IRF) goal ongoing  -RG          Row Name 08/18/23 1313                 Bed Mobility Goal 2 (PT-IRF)      Progress/Outcomes (Bed Mobility Goal 2, PT-IRF) goal ongoing  -RG          Row Name 23 1313                 Transfer Goal 1 (PT-IRF)     Progress/Outcomes (Transfer Goal 1, PT-IRF) goal ongoing  -RG          Row Name 23 1313                 Transfer Goal 2 (PT-IRF)     Progress/Outcomes (Transfer Goal 2, PT-IRF) goal ongoing  -RG          Row Name 23 1313                 Gait/Walking Locomotion Goal 1 (PT-IRF)     Progress/Outcomes (Gait/Walking Locomotion Goal 1, PT-IRF) goal ongoing;goal partially met  -RG          Row Name 23 1313                 Gait/Walking Locomotion Goal 2 (PT-IRF)     Progress/Outcomes (Gait/Walking Locomotion Goal 2, PT-IRF) goal ongoing  -                    User Key  (r) = Recorded By, (t) = Taken By, (c) = Cosigned By        Initials Name Provider Type     Vu Galloway PTA Physical Therapist Assistant                               PT Recommendation and Plan     Daily Progress Summary (PT)  Impairments Still Limiting Function (PT): balance impairment, coordination impairment, functional activity tolerance impairment, motor control impaired, pain, postural control impaired, strength deficit, sensory impairment         Time Calculation:        PT Charges         Row Name 23 1321                       Time Calculation     Start Time 0745  -RG         Stop Time 0915  -         Time Calculation (min) 90 min  -         PT Received On 23  -                 Time Calculation- PT     Total Timed Code Minutes- PT 90 minute(s)  -Vu Galloway PTA   Physical Therapist Assistant  Physical Therapy     Therapy Treatment Note      Signed     Date of Service: 23  Creation Time: 23     Signed       Expand All Collapse All    Inpatient Rehabilitation - Physical Therapy Treatment Note                                                              THALIA Trujillo     Patient Name: Antonio Canseco                  :  1955                        MRN: [id number]     Today's Date: 8/17/2023                                                                                            Admit Date: 7/6/2023                 Visit Dx:   Visit Diagnosis   No diagnosis found.        Problem List       Patient Active Problem List   Diagnosis    Detrusor instability    Low testosterone in male    Disorder of prostate    Corporo-venous occlusive erectile dysfunction    CVA (cerebral vascular accident)            Medical History        Past Medical History:   Diagnosis Date    Diabetes mellitus      Hypertension      Stroke              Surgical History         Past Surgical History:   Procedure Laterality Date    US GUIDED LYMPH NODE BIOPSY   6/23/2023            PT ASSESSMENT (last 12 hours)            IRF PT Evaluation and Treatment         Row Name 08/17/23 1320                 PT Time and Intention     Document Type daily treatment  -RG       Mode of Treatment individual therapy;physical therapy  -RG       Patient/Family/Caregiver Comments/Observations Pt and nursing in agreement for skilled PT on this date.  Pt was able to ambulate Mod assist with PRW with only occ. assist advancing L LE.  -RG          Row Name 08/17/23 1320                 General Information     Existing Precautions/Restrictions fall  L HP, <trunk support/balance, L side inattention  -RG          Row Name 08/17/23 1320                 Pain Scale: FACES Pre/Post-Treatment     Pain: FACES Scale, Pretreatment 0-->no hurt  -RG       Posttreatment Pain Rating 0-->no hurt  -RG          Row Name 08/17/23 1320                 Cognition/Psychosocial     Affect/Mental Status (Cognition) WFL  -RG       Follows Commands (Cognition) verbal cues/prompting required;physical/tactile prompts required  -RG       Personal Safety Interventions fall prevention program maintained;gait belt;nonskid shoes/slippers when out of bed  -RG          Row Name 08/17/23 1320                 Bed  Mobility     Supine-Sit [place] (Bed Mobility) maximum assist (25% patient effort);verbal cues;nonverbal cues (demo/gesture)  -          Row Name 08/17/23 1320                 Bed-Chair Transfer     Bed-Chair [place] (Transfers) maximum assist (25% patient effort);verbal cues;nonverbal cues (demo/gesture);moderate assist (50% patient effort);2 person assist  -          Row Name 08/17/23 1320                 Sit-Stand Transfer     Sit-Stand [place] (Transfers) verbal cues;nonverbal cues (demo/gesture);minimum assist (75% patient effort)  -RG       Assistive Device (Sit-Stand Transfers) walker, platform;parallel bars  -          Row Name 08/17/23 1320                 Stand-Sit Transfer     Stand-Sit [place] (Transfers) verbal cues;nonverbal cues (demo/gesture);minimum assist (75% patient effort)  -RG       Assistive Device (Stand-Sit Transfers) walker, platform  -          Row Name 08/17/23 1320                 Gait/Stairs (Locomotion)     [place] Level (Gait) verbal cues;nonverbal cues (demo/gesture);2 person assist;moderate assist (50% patient effort)  -RG       Assistive Device (Gait) walker, rolling platform  -RG       Distance in Feet (Gait) 20' x 3  -RG       Pattern (Gait) step-to  -RG       Deviations/Abnormal Patterns (Gait) base of support, narrow;hebert decreased;gait speed decreased;stride length decreased;weight shifting decreased;ataxic  -RG       Bilateral Gait Deviations forward flexed posture  -RG       Comment, (Gait/Stairs) improved ambulation with advancing L LE.  -RG          Row Name 08/17/23 1320                 Hip (Therapeutic Exercise)     Hip Strengthening (Therapeutic Exercise) bilateral;flexion;aBduction;aDduction;marching while seated;sitting;2 lb free weight;resistance band;green;10 repetitions;2 sets  AAROM L LE  -RG          Row Name 08/17/23 1320                 Knee (Therapeutic Exercise)     Knee Strengthening (Therapeutic Exercise)  bilateral;flexion;extension;marching while seated;LAQ (long arc quad);sitting;2 lb free weight;resistance band;green;10 repetitions;2 sets  AAROM L LE  -RG          Row Name 08/17/23 1320                 Ankle (Therapeutic Exercise)     Ankle Strengthening (Therapeutic Exercise) bilateral;dorsiflexion;sitting;plantarflexion;10 repetitions;2 sets  AAROM L LE  -RG          Row Name 08/17/23 1320                 Aerobic Exercise     Type (Aerobic Exercise) recumbent stationary bike  -RG       Time Performed (Aerobic Exercise) 16  -RG       Comment, Aerobic Exercise (Therapeutic Exercise) L1 in wc  -RG          Row Name 08/17/23 1320                 Positioning and Restraints     Pre-Treatment Position in bed  -RG       Post Treatment Position wheelchair  -RG       In Wheelchair notified nsg;sitting;with OT;legs elevated  -RG          Row Name 08/17/23 1320                 Bed Mobility Goal 1 (PT-IRF)     Progress/Outcomes (Bed Mobility Goal 1, PT-IRF) goal ongoing  -RG          Row Name 08/17/23 1320                 Bed Mobility Goal 2 (PT-IRF)     Progress/Outcomes (Bed Mobility Goal 2, PT-IRF) goal ongoing  -RG          Row Name 08/17/23 1320                 Transfer Goal 1 (PT-IRF)     Progress/Outcomes (Transfer Goal 1, PT-IRF) goal ongoing  -RG          Row Name 08/17/23 1320                 Transfer Goal 2 (PT-IRF)     Progress/Outcomes (Transfer Goal 2, PT-IRF) goal ongoing  -RG          Row Name 08/17/23 1320                 Gait/Walking Locomotion Goal 1 (PT-IRF)     Progress/Outcomes (Gait/Walking Locomotion Goal 1, PT-IRF) goal ongoing;goal partially met  -RG          Row Name 08/17/23 1320                 Gait/Walking Locomotion Goal 2 (PT-IRF)     Progress/Outcomes (Gait/Walking Locomotion Goal 2, PT-IRF) goal ongoing  -RG                    User Key  (r) = Recorded By, (t) = Taken By, (c) = Cosigned By        Initials Name Provider Type     Vu Galloway PTA Physical Therapist Assistant                                PT Recommendation and Plan     Daily Progress Summary (PT)  Impairments Still Limiting Function (PT): balance impairment, coordination impairment, functional activity tolerance impairment, motor control impaired, pain, postural control impaired, strength deficit, sensory impairment            Time Calculation:        PT Charges         Row Name 23 1332                       Time Calculation     Start Time 0745  -RG         Stop Time 0915  -RG         Time Calculation (min) 90 min  -RG         PT Received On 23  -RG                 Time Calculation- PT     Total Timed Code Minutes- PT 90 minute(s)  -Lisa Louie OT   Occupational Therapist  Occupational Therapy     Therapy Treatment Note      Signed     Date of Service: 23 1246  Creation Time: 23     Signed       Expand All Collapse All    Inpatient Rehabilitation - Occupational Therapy Progress Note and Treatment Note     THALIA Trujillo     Patient Name: Antonio Canseco                  : 1955                        MRN: [id number]     Today's Date: 2023                                                                             Admit Date: 2023        Visit Diagnosis   No diagnosis found.        Problem List       Patient Active Problem List   Diagnosis    Detrusor instability    Low testosterone in male    Disorder of prostate    Corporo-venous occlusive erectile dysfunction    CVA (cerebral vascular accident)            Medical History        Past Medical History:   Diagnosis Date    Diabetes mellitus      Hypertension      Stroke              Surgical History         Past Surgical History:   Procedure Laterality Date    US GUIDED LYMPH NODE BIOPSY   2023                           IRF OT ASSESSMENT FLOWSHEET (last 12 hours)            IRF OT Evaluation and Treatment         Row Name 23 1239                 OT Time and Intention     Document Type daily  treatment;progress note  -BF       Mode of Treatment occupational therapy  -BF       Patient Effort good  -BF       Symptoms Noted During/After Treatment fatigue  -BF          Row Name 08/18/23 1239                 General Information     Patient/Family/Caregiver Comments/Observations Pt's s/o present for OT, educated in self-care/assistance needed at home including dressing and toileting. Pt's s/o reports she is comfortable with caring for pt at home and does not have questions at this time.  -BF       Existing Precautions/Restrictions fall  L HP, L side inattention  -BF       Limitations/Impairments safety/cognitive  -BF          Row Name 08/18/23 1239                 Cognition/Psychosocial     Orientation Status (Cognition) oriented x 3  -BF       Follows Commands (Cognition) verbal cues/prompting required;repetition of directions required;physical/tactile prompts required  -BF          Row Name 08/18/23 1239                 Bathing     Comment (Bathing) Mod A  -BF          Row Name 08/18/23 1239                 Upper Body Dressing     [place] Level (Upper Body Dressing) contact guard;verbal cues;nonverbal cues (demo/gesture)  -BF       Position (Upper Body Dressing) supported sitting  -BF       Comment (Upper Body Dressing) CGA  -BF          Row Name 08/18/23 1239                 Lower Body Dressing     [place] Level (Lower Body Dressing) moderate assist (50% patient effort);maximum assist (25% patient effort);verbal cues;nonverbal cues (demo/gesture)  -BF       Position (Lower Body Dressing) supine  -BF       Comment (Lower Body Dressing) Mod/Max A  -BF          Row Name 08/18/23 1239                 Grooming     Comment (Grooming) Min A  -BF          Row Name 08/18/23 1239                 Toileting     [place] Level (Toileting) dependent (less than 25% patient effort);verbal cues;nonverbal cues (demo/gesture)  -BF       Position (Toileting) supine  -BF       Comment (Toileting) Total A  -BF           Row Name 08/18/23 1239                 Self-Feeding     Comment (Self-Feeding) Set-up  -BF          Row Name 08/18/23 1239                 Chair-Bed Transfer     Chair-Bed [place] (Transfers) moderate assist (50% patient effort);2 person assist;verbal cues;nonverbal cues (demo/gesture)  -       Assistive Device (Chair-Bed Transfers) wheelchair;walker, platform  -BF          Row Name 08/18/23 1239                 Motor Skills     Motor Control/Coordination Interventions fine motor manipulation/dexterity activities;gross motor coordination activities;therapeutic exercise/ROM;neuro-muscular re-education  LUE GMC/FMC theract, strengthening; LUE theraband therex; BUE pulleys, 3 lb dowel therex  -          Row Name 08/18/23 1239                 Positioning and Restraints     In Bed supine;call light within reach;encouraged to call for assist;LUE elevated  -                    User Key  (r) = Recorded By, (t) = Taken By, (c) = Cosigned By        Initials Name Effective Dates     BF Lisa Sanchez, OT 07/11/23 -                                OT Recommendation and Plan     Planned Therapy Interventions (OT): activity tolerance training, adaptive equipment training, BADL retraining, neuromuscular control/coordination retraining, passive ROM/stretching, ROM/therapeutic exercise, strengthening exercise, transfer/mobility retraining        Time Calculation:        Time Calculation- OT         Row Name 08/18/23 1245                       Time Calculation- OT     OT Start Time 0915  -BF         OT Stop Time 1045  -BF         OT Time Calculation (min) 90 min  -BF         Total Timed Code Minutes- OT 90 minute(s)  -BF         OT Non-Billable Time (min) 10 min  -BF

## 2023-08-21 NOTE — PROGRESS NOTES
Occupational Therapy:    Physical Therapy:    Speech Language Pathology: Individual: 45 minutes.    Signed by: Carmen Doe, Supervisor

## 2023-08-21 NOTE — THERAPY TREATMENT NOTE
Inpatient Rehabilitation - Occupational Therapy Treatment Note    THALIA Trujillo     Patient Name: Antonio Canseco  : 1955  MRN: [id number]    Today's Date: 2023                 Admit Date: 2023       No diagnosis found.    Patient Active Problem List   Diagnosis    Detrusor instability    Low testosterone in male    Disorder of prostate    Corporo-venous occlusive erectile dysfunction    CVA (cerebral vascular accident)       Past Medical History:   Diagnosis Date    Diabetes mellitus     Hypertension     Stroke        Past Surgical History:   Procedure Laterality Date    US GUIDED LYMPH NODE BIOPSY  2023             IRF OT ASSESSMENT FLOWSHEET (last 12 hours)       IRF OT Evaluation and Treatment       Row Name 23 1455          OT Time and Intention    Document Type daily treatment  -BF     Mode of Treatment occupational therapy  -BF     Patient Effort good  -BF       Row Name 23 1455          General Information    Existing Precautions/Restrictions fall  L HP, <trunk support/balance, L side inattention  -BF       Row Name 23 1455          Cognition/Psychosocial    Orientation Status (Cognition) oriented x 3  -BF     Follows Commands (Cognition) follows one-step commands;verbal cues/prompting required;repetition of directions required;physical/tactile prompts required  -BF     Executive Function Deficit (Cognition) organization/sequencing;problem-solving/reasoning  -BF       Row Name 23 1455          Bathing    [place] Level (Bathing) minimum assist (75% patient effort);verbal cues;nonverbal cues (demo/gesture)  -BF     Position (Bathing) sitting up in bed  -BF     Comment (Bathing) Min A  -BF       Row Name 23 1455          Upper Body Dressing    [place] Level (Upper Body Dressing) minimum assist (75% or more patient effort);verbal cues;nonverbal cues (demo/gesture)  -BF     Position (Upper Body Dressing) edge of bed sitting  -BF     Comment (Upper Body  Dressing) Min A  -BF       Row Name 08/21/23 1455          Lower Body Dressing    [place] Level (Lower Body Dressing) moderate assist (50% patient effort);maximum assist (25% patient effort);verbal cues  -BF     Position (Lower Body Dressing) supine  -BF     Comment (Lower Body Dressing) Mod/Max A  -BF       Row Name 08/21/23 1455          Grooming    [place] Level (Grooming) minimum assist (75% patient effort);verbal cues;nonverbal cues (demo/gesture)  -BF     Position (Grooming) supported sitting  -BF       Row Name 08/21/23 1455          Bed-Chair Transfer    Bed-Chair [place] (Transfers) moderate assist (50% patient effort);maximum assist (25% patient effort);2 person assist;verbal cues;nonverbal cues (demo/gesture)  -BF     Assistive Device (Bed-Chair Transfers) wheelchair;transfer board  -BF     Comment, (Bed-Chair Transfer) Transfer to w/c completed using a sliding board per pt's request. Pt continues to require mod/max X2 w/ sliding board as well.  -BF       Row Name 08/21/23 1455          Motor Skills    Motor Control/Coordination Interventions gross motor coordination activities;fine motor manipulation/dexterity activities;therapeutic exercise/ROM  LUE GMC/FMC therex, BUE strengthening; BUE PRE 15 mins, pulleys, rickshaw 25 lbs X20X3  -BF       Row Name 08/21/23 1455          Positioning and Restraints    In Wheelchair sitting;with PT  -BF               User Key  (r) = Recorded By, (t) = Taken By, (c) = Cosigned By      Initials Name Effective Dates    Lisa West OT 07/11/23 -                      Occupational Therapy Education       Title: PT OT SLP Therapies (Done)       Topic: Occupational Therapy (Done)       Point: ADL training (Done)       Description:   Instruct learner(s) on proper safety adaptation and remediation techniques during self care or transfers.   Instruct in proper use of assistive devices.                  Learning Progress Summary             Patient  Acceptance, E, VU,NR by BF at 8/21/2023 1455    Acceptance, TB, NR by DL at 8/20/2023 2104    Acceptance, TB, NR by DL at 8/20/2023 2041    Acceptance, E,D, VU,NR,DU by BF at 8/18/2023 1238    Acceptance, E,D, VU,NR by CJ at 8/17/2023 1419    Acceptance, E, VU,NR by HB at 8/16/2023 1059    Acceptance, E, VU,NR by BF at 8/15/2023 1222    Acceptance, E, VU,NR by BF at 8/14/2023 1527    Acceptance, E, VU,NR by BF at 8/11/2023 1204    Acceptance, E,D, VU,DU by BF at 8/10/2023 1524    Acceptance, TB, NR by DL at 8/8/2023 2014    Acceptance, E, VU,NR by BF at 8/8/2023 1440    Acceptance, E, VU,NR by BF at 8/7/2023 1526    Acceptance, E, VU,NR by BF at 8/4/2023 1221    Acceptance, E,TB, VU,NR by MB at 8/3/2023 2239    Acceptance, E, VU,NR by BF at 8/3/2023 1217    Acceptance, E, VU,NR by BF at 8/1/2023 1427    Acceptance, E, VU,NR by BF at 7/31/2023 1409    Acceptance, E, VU,NR by BF at 7/28/2023 1442    Acceptance, E, VU,NR by BF at 7/27/2023 1445    Acceptance, E, VU,NR by BF at 7/25/2023 1251    Acceptance, E, VU,NR by HB at 7/24/2023 1355    Acceptance, E, NR,VU by BF at 7/21/2023 1248    Acceptance, E, NR by BF at 7/20/2023 1259    Acceptance, TB, NR by DL at 7/19/2023 2036    Acceptance, E, VU,NR by HB at 7/19/2023 1421    Acceptance, E, VU,NR by BF at 7/18/2023 1334    Acceptance, E, VU,NR by BF at 7/17/2023 1431    Acceptance, E, VU,NR by BF at 7/17/2023 1430    Acceptance, E, VU,NR by BF at 7/14/2023 1508    Acceptance, E, VU,NR by BF at 7/13/2023 1343    Acceptance, E,D, VU,NR by TM at 7/12/2023 1407    Acceptance, D,E, NR,VU by TM at 7/11/2023 1434    Acceptance, E, VU,NR by BF at 7/10/2023 1456    Acceptance, E, VU,NR by HB at 7/8/2023 1140    Acceptance, E, VU,NR by BF at 7/7/2023 1442   Family Acceptance, E,D, VU,DU by BF at 8/10/2023 1524    Acceptance, E, VU,NR by BF at 8/1/2023 1427   Significant Other Acceptance, E,D, VU,NR,DU by BF at 8/18/2023 1238                         Point: Precautions (Done)        Description:   Instruct learner(s) on prescribed precautions during self-care and functional transfers.                  Learning Progress Summary             Patient Acceptance, E, VU,NR by BF at 8/21/2023 1455    Acceptance, TB, NR by DL at 8/20/2023 2104    Acceptance, TB, NR by DL at 8/20/2023 2041    Acceptance, E,D, VU,NR,DU by BF at 8/18/2023 1238    Acceptance, E,D, VU,NR by CJ at 8/17/2023 1419    Acceptance, E, VU,NR by HB at 8/16/2023 1059    Acceptance, E, VU,NR by BF at 8/15/2023 1222    Acceptance, E, VU,NR by BF at 8/14/2023 1527    Acceptance, E, VU,NR by BF at 8/11/2023 1204    Acceptance, E,D, VU,DU by BF at 8/10/2023 1524    Acceptance, TB, NR by DL at 8/8/2023 2014    Acceptance, E, VU,NR by BF at 8/8/2023 1440    Acceptance, E, VU,NR by BF at 8/7/2023 1526    Acceptance, E, VU,NR by BF at 8/4/2023 1221    Acceptance, E,TB, VU,NR by MB at 8/3/2023 2239    Acceptance, E, VU,NR by BF at 8/3/2023 1217    Acceptance, E, VU,NR by BF at 8/1/2023 1427    Acceptance, E, VU,NR by BF at 7/31/2023 1409    Acceptance, E, VU,NR by BF at 7/28/2023 1442    Acceptance, E, VU,NR by BF at 7/27/2023 1445    Acceptance, E, VU,NR by BF at 7/25/2023 1251    Acceptance, E, VU,NR by HB at 7/24/2023 1355    Acceptance, E, NR,VU by BF at 7/21/2023 1248    Acceptance, E, NR by BF at 7/20/2023 1259    Acceptance, TB, NR by DL at 7/19/2023 2036    Acceptance, E, VU,NR by HB at 7/19/2023 1421    Acceptance, E, VU,NR by BF at 7/18/2023 1334    Acceptance, E, VU,NR by BF at 7/17/2023 1431    Acceptance, E, VU,NR by BF at 7/17/2023 1430    Acceptance, E, VU,NR by BF at 7/14/2023 1508    Acceptance, E, VU,NR by BF at 7/13/2023 1343    Acceptance, E,D, VU,NR by TM at 7/12/2023 1407    Acceptance, D,E, NR,VU by TM at 7/11/2023 1434    Acceptance, E, VU,NR by BF at 7/10/2023 1456    Acceptance, E, VU,NR by HB at 7/8/2023 1140    Acceptance, E, VU,NR by BF at 7/7/2023 1442   Family Acceptance, E,D, VU,DU by BF at 8/10/2023 1524     Acceptance, E, VU,NR by BF at 8/1/2023 1427   Significant Other Acceptance, E,D, VU,NR,DU by BF at 8/18/2023 1238                                         User Key       Initials Effective Dates Name Provider Type Discipline    BF 05/31/23 - 07/10/23 SanchezLisa easley, OT Occupational Therapist OT    BF 07/11/23 -  SanchezLisa, OT Occupational Therapist OT    CJ 06/16/21 -  Ashanti Nova OT Occupational Therapist OT    TM 06/16/21 -  Marielos Capone, OT Occupational Therapist OT    HB 05/25/21 -  Veronica Robles OT Occupational Therapist OT    DL 03/16/22 -  Nadja Velasquez, RN Registered Nurse Nurse    MB 06/26/23 -  Vinay Fritz RN Registered Nurse Nurse                        OT Recommendation and Plan    Planned Therapy Interventions (OT): activity tolerance training, adaptive equipment training, BADL retraining, neuromuscular control/coordination retraining, passive ROM/stretching, ROM/therapeutic exercise, strengthening exercise, transfer/mobility retraining                    Time Calculation:      Time Calculation- OT       Row Name 08/21/23 1504             Time Calculation- OT    OT Start Time 0805  -BF      OT Stop Time 1000  -BF      OT Time Calculation (min) 115 min  -BF      Total Timed Code Minutes-  minute(s)  -BF      OT Non-Billable Time (min) 10 min  -BF                User Key  (r) = Recorded By, (t) = Taken By, (c) = Cosigned By      Initials Name Provider Type     Daniel Lisa Lima, OT Occupational Therapist                  Therapy Charges for Today       Code Description Service Date Service Provider Modifiers Qty    37004751059 HC OT SELF CARE/MGMT/TRAIN EA 15 MIN 8/21/2023 Lisa Sanchez OT GO 3    36108653965 HC OT NEUROMUSC RE EDUCATION EA 15 MIN 8/21/2023 Lisa Sanchez OT GO 3    59690163454 HC OT THER PROC EA 15 MIN 8/21/2023 Lisa Sanchez OT GO 2                     Lisa Sanchez, OT  8/21/2023

## 2023-08-21 NOTE — PROGRESS NOTES
[facility]  PROGRESS NOTE     Patient Identification:  Name:  Antonio Canseco  Age:  67 y.o.  Sex:  male  :  1955  MRN:  [id number]  Visit Number:  [id number]  ROOM: [facility]     Primary Care Provider:  Adriana Ledesma MD    Length of stay in inpatient status:  46    Subjective     Chief Compliant:  No chief complaint on file.      History of Presenting Illness: 67-year-old gentleman who is status post right-sided with left-sided weakness.  Patient has made excellent improvement over the past week and appears the patient will be able to go home later this week.    Objective     Current Hospital Meds:allopurinol, 300 mg, Oral, Daily  atorvastatin, 80 mg, Oral, Daily  magnesium oxide, 400 mg, Oral, Daily  metoprolol tartrate, 25 mg, Oral, Q12H  nystatin, , Topical, Q12H  pantoprazole, 40 mg, Oral, Q AM  polyethylene glycol, 17 g, Oral, Daily  rivaroxaban, 20 mg, Oral, Daily With Dinner  tamsulosin, 0.4 mg, Oral, Daily       ----------------------------------------------------------------------------------------------------------------------  Vital Signs:  Temp:  [97.7 øF (36.5 øC)] 97.7 øF (36.5 øC)  Heart Rate:  [91-94] 94  Resp:  [20] 20  BP: (116-134)/(74-75) 116/75  SpO2:  [94 %] 94 %  on   ;   Device (Oxygen Therapy): room air  Body mass index is 40.57 kg/mý.    Wt Readings from Last 3 Encounters:   23 121 kg (266 lb 12.1 oz)   22 121 kg (267 lb)   07/15/19 121 kg (267 lb 1.6 oz)     Intake & Output (last 3 days)          0701  08/19 07 07 07 07 07 0700    P.O. 1560 1680 2040     Total Intake(mL/kg) 1560 (12.9) 1680 (13.9) 2040 (16.9)     Urine (mL/kg/hr) 1200 (0.4) 1100 (0.4)      Stool  0      Total Output 1200 1100      Net +360 +580 +2040             Urine Unmeasured Occurrence 4 x 6 x 5 x     Stool Unmeasured Occurrence  2 x            Diet: Cardiac Diets; Healthy Heart (2-3 Na+); Texture: Regular Texture (IDDSI 7);  Fluid Consistency: Thin (IDDSI 0)  ----------------------------------------------------------------------------------------------------------------------  Physical exam:  Constitutional: No acute distress  HEENT: Normocephalic atraumatic  Neck:   Supple  Cardiovascular: Regular rate and rhythm  Pulmonary/Chest: Clear to auscultation  Abdominal: Positive bowel sounds soft.   Musculoskeletal: No arthropathy  Neurological: Left hemiparesis  Skin: No rash  Peripheral vascular: No edema  Genitourinary:  ----------------------------------------------------------------------------------------------------------------------    Last echocardiogram:    ----------------------------------------------------------------------------------------------------------------------  Results from last 7 days   Lab Units 08/17/23 0119   WBC 10*3/mm3 8.69   HEMOGLOBIN g/dL 10.1*   HEMATOCRIT % 32.4*   MCV fL 83.7   MCHC g/dL 31.2*   PLATELETS 10*3/mm3 289         Results from last 7 days   Lab Units 08/17/23  0119   SODIUM mmol/L 137   POTASSIUM mmol/L 4.2   MAGNESIUM mg/dL 2.0   CHLORIDE mmol/L 101   CO2 mmol/L 24.0   BUN mg/dL 17   CREATININE mg/dL 0.99   CALCIUM mg/dL 8.9   GLUCOSE mg/dL 105*   ALBUMIN g/dL 3.3*   BILIRUBIN mg/dL 0.4   ALK PHOS U/L 85   AST (SGOT) U/L 13   ALT (SGPT) U/L 8   Estimated Creatinine Clearance: 94.1 mL/min (by C-G formula based on SCr of 0.99 mg/dL).  No results found for: AMMONIA              No results found for: HGBA1C, POCGLU  No results found for: TSH, FREET4  No results found for: PREGTESTUR, PREGSERUM, HCG, HCGQUANT  Pain Management Panel           No data to display              Brief Urine Lab Results       None          No results found for: BLOODCX      No results found for: URINECX  No results found for: WOUNDCX  No results found for: STOOLCX        I have personally looked at the labs and they are summarized  above.  ----------------------------------------------------------------------------------------------------------------------  Detailed radiology reports for the last 24 hours:    Imaging Results (Last 24 Hours)       ** No results found for the last 24 hours. **          Final impressions for the last 30 days of radiology reports:    EEG    Result Date: 8/7/2023  Focal cerebral dysfunction impacting the right hemisphere.  This is consistent with a known prior right hemispheric stroke No evidence for epilepsy is seen on the study This report is transcribed using the Dragon dictation system.     I have personally looked at the radiology images and read the final radiology report.    Assessment & Plan    Status post right-sided CVA with left residual hemiparesis--last week was requiring maximum assist for bed mobility; moderate to maximum assistance for bed to chair transfer; minimum assist for sit to stand and stand to sit transfers.  Was able to ambulate with two-person moderate assistance using a rolling platform walker last week.  Requires moderate assistance for bathing; contact-guard for upper body dressing; moderate to maximum assist for lower body dressing; minimum assist for grooming and dependent for toileting.  Continue Xarelto.  Continue high-dose statin therapy    Atrial fibrillation rate is well controlled is on metoprolol/Xarelto for stroke prevention    History of lymphoma/head neck cancer follow-up with Dr. Gregory as an outpatient    BPH continue Flomax    VTE Prophylaxis:   Mechanical Order History:       None          Pharmalogical Order History:        Ordered     Dose Route Frequency Stop    07/06/23 5222  rivaroxaban (XARELTO) tablet 20 mg        Question:  Are you ordering rivaroxaban 10 mg for the prevention of blood clots in an acutely ill medical patient?  Answer:  No    20 mg PO Daily With Dinner --                        Sj Jay MD  [facility]  08/21/23  09:49  EDT

## 2023-08-21 NOTE — SIGNIFICANT NOTE
08/21/23 9116   Plan   Plan Spoke to pt about plans for discharge on 8-23-23, recommendations for home health PT, OT, nursing, 20 inch standard wheelchair, anti-tippers, basic cushion and rolling walker.  Educated about home health agency providers.  Pt prefers [facility] (pt is established with them).  Pt says his sister, friend Alejandra and daughter Karolyn are suppose to come to rehab for discharge and transport home.  Pt declines [facility] W/C van transport home and says he wants family to transport him in his vehicle.  Pt is aware of option for [facility] W/[facility] van transport home; he will inform SS if he decides to let them transport home at discharge.  Reviewed cost of [facility] which is $1 per mile.   Patient/Family in Agreement with Plan yes

## 2023-08-21 NOTE — SIGNIFICANT NOTE
08/21/23 1300   Plan   Plan Contacted [facility] [phone] per preference per Vickie about pt being discharged on 8-23-23, need for rolling walker and 20 inch standard wheelchair with anti-tippers, elevating leg rests, anti-tippers, basic cushion.  Vickie says pt received lightweight wheelchair in 2020, therefore, they are unable to bill insurance for another chair at this time.  Rolling walker can be delivered to rehab on 8-23-23 around 11:00 am.  Reviewed this information with pt who is agreeable to use the W/C he has at home.  Contacted [facility] [phone] per preference per Hope with discharge on 8-23-23, referral, need for PT 2-3 x wk x 8 wks for strengthening, endurance, gait training, transfer training, balance, therapeutic exercise, bed mobility, neuro re-education, home safety, OT 2-3 x wk x 8 wks for ADL re-training, home safety, functional mobility, strengthening, ROM, coordination, neuro re-education, and endurance, nursing for medication education/post-CVA education.  Ivonne confirmed they are able to accept pt's insurance.  Faxed face sheet, H&P, PT/OT notes/evaluations, PT re-evaluation, MD progress notes and diet orders to HH via [facility].  HH to contact  to confirm acceptance of referral.  Ambulatory referral for HH with face to face and discharge summary to be faxed to HH at discharge.

## 2023-08-22 PROCEDURE — 97112 NEUROMUSCULAR REEDUCATION: CPT | Performed by: OCCUPATIONAL THERAPIST

## 2023-08-22 PROCEDURE — 97110 THERAPEUTIC EXERCISES: CPT

## 2023-08-22 PROCEDURE — 97530 THERAPEUTIC ACTIVITIES: CPT

## 2023-08-22 PROCEDURE — 97116 GAIT TRAINING THERAPY: CPT

## 2023-08-22 PROCEDURE — 97535 SELF CARE MNGMENT TRAINING: CPT | Performed by: OCCUPATIONAL THERAPIST

## 2023-08-22 PROCEDURE — 97110 THERAPEUTIC EXERCISES: CPT | Performed by: OCCUPATIONAL THERAPIST

## 2023-08-22 RX ADMIN — ALLOPURINOL 300 MG: 300 TABLET ORAL at 09:46

## 2023-08-22 RX ADMIN — HYDROXYZINE HYDROCHLORIDE 25 MG: 25 TABLET, FILM COATED ORAL at 22:11

## 2023-08-22 RX ADMIN — MAGNESIUM GLUCONATE 500 MG ORAL TABLET 400 MG: 500 TABLET ORAL at 09:48

## 2023-08-22 RX ADMIN — METOPROLOL TARTRATE 25 MG: 25 TABLET, FILM COATED ORAL at 09:46

## 2023-08-22 RX ADMIN — POLYETHYLENE GLYCOL (3350) 17 G: 17 POWDER, FOR SOLUTION ORAL at 09:47

## 2023-08-22 RX ADMIN — ATORVASTATIN CALCIUM 80 MG: 40 TABLET, FILM COATED ORAL at 09:46

## 2023-08-22 RX ADMIN — TAMSULOSIN HYDROCHLORIDE 0.4 MG: 0.4 CAPSULE ORAL at 09:46

## 2023-08-22 RX ADMIN — METOPROLOL TARTRATE 25 MG: 25 TABLET, FILM COATED ORAL at 22:11

## 2023-08-22 RX ADMIN — PANTOPRAZOLE SODIUM 40 MG: 40 TABLET, DELAYED RELEASE ORAL at 05:48

## 2023-08-22 RX ADMIN — NYSTATIN 1 APPLICATION: 100000 POWDER TOPICAL at 09:47

## 2023-08-22 RX ADMIN — NYSTATIN: 100000 POWDER TOPICAL at 22:12

## 2023-08-22 RX ADMIN — RIVAROXABAN 20 MG: 20 TABLET, FILM COATED ORAL at 18:08

## 2023-08-22 NOTE — THERAPY TREATMENT NOTE
Inpatient Rehabilitation - Physical Therapy Treatment Note       THALIA Trujillo     Patient Name: Antonio Canseco  : 1955  MRN: [id number]    Today's Date: 2023                    Admit Date: 2023      Visit Dx:   No diagnosis found.    Patient Active Problem List   Diagnosis    Detrusor instability    Low testosterone in male    Disorder of prostate    Corporo-venous occlusive erectile dysfunction    CVA (cerebral vascular accident)       Past Medical History:   Diagnosis Date    Diabetes mellitus     Hypertension     Stroke        Past Surgical History:   Procedure Laterality Date    US GUIDED LYMPH NODE BIOPSY  2023       PT ASSESSMENT (last 12 hours)       [facility] PT Evaluation and Treatment       Row Name 23 1058          PT Time and Intention    Document Type daily treatment  -LL     Mode of Treatment individual therapy;physical therapy  -LL     Patient/Family/Caregiver Comments/Observations Patient had no new c/o this date and was agreeable to PT participation.  -LL       Row Name 23 1058          General Information    Existing Precautions/Restrictions fall  L HP, <trunk support/balance, L side inattention  -LL       Row Name 23 1058          Pain Scale: FACES Pre/Post-Treatment    Pain: FACES Scale, Pretreatment 0-->no hurt  -LL     Posttreatment Pain Rating 0-->no hurt  -LL       Row Name 23 1058          Cognition/Psychosocial    Affect/Mental Status (Cognition) WFL  -LL     Orientation Status (Cognition) oriented x 3  -LL     Follows Commands (Cognition) verbal cues/prompting required;physical/tactile prompts required  -LL     Personal Safety Interventions fall prevention program maintained;gait belt;nonskid shoes/slippers when out of bed;supervised activity  -LL     Cognitive Function safety deficit  -LL     Safety Deficit (Cognition) impulsivity  -LL       Row Name 23 1058          Bed Mobility    Supine-Sit [place] (Bed Mobility) maximum assist (25%  patient effort);verbal cues;nonverbal cues (demo/gesture)  -     Assistive Device (Bed Mobility) draw sheet  -       Row Name 08/22/23 1058          Bed-Chair Transfer    Bed-Chair [place] (Transfers) maximum assist (25% patient effort);verbal cues;nonverbal cues (demo/gesture);moderate assist (50% patient effort);2 person assist  -LL     Assistive Device (Bed-Chair Transfers) wheelchair;walker, front-wheeled  -       Row Name 08/22/23 1058          Sit-Stand Transfer    Sit-Stand [place] (Transfers) verbal cues;nonverbal cues (demo/gesture);minimum assist (75% patient effort)  -     Assistive Device (Sit-Stand Transfers) walker, front-wheeled;parallel bars  -LL     Comment, (Sit-Stand Transfer) x 5 trials  -       Row Name 08/22/23 1058          Stand-Sit Transfer    Stand-Sit [place] (Transfers) verbal cues;nonverbal cues (demo/gesture);minimum assist (75% patient effort)  -     Assistive Device (Stand-Sit Transfers) walker, platform;parallel bars  -LL     Comment, (Stand-Sit Transfer) x 5 trials  -       Row Name 08/22/23 1058          Gait/Stairs (Locomotion)    [place] Level (Gait) verbal cues;nonverbal cues (demo/gesture);2 person assist;moderate assist (50% patient effort)  -     Assistive Device (Gait) walker, front-wheeled  -LL     Distance in Feet (Gait) 160'  -LL     Pattern (Gait) step-to  -LL     Deviations/Abnormal Patterns (Gait) base of support, narrow;hebert decreased;gait speed decreased;stride length decreased;weight shifting decreased;ataxic  -LL     Bilateral Gait Deviations forward flexed posture  -       Row Name 08/22/23 1058          Motor Skills    Comments, Therapeutic Exercise --  GS  -       Row Name 08/22/23 1058          Hip (Therapeutic Exercise)    Hip Strengthening (Therapeutic Exercise) bilateral;flexion;extension;aBduction;aDduction;marching while seated;sitting;resistance band;blue;mini squats  3# R LE; AAROM L LE; Standing R hip flexion   -LL       Row Name 08/22/23 1058          Knee (Therapeutic Exercise)    Knee Strengthening (Therapeutic Exercise) bilateral;flexion;extension;marching while seated;LAQ (long arc quad);hamstring curls;sitting;resistance band;blue  3# R LE; AAROM L LE  -LL       Row Name 08/22/23 1058          Ankle (Therapeutic Exercise)    Ankle Strengthening (Therapeutic Exercise) right;bilateral;dorsiflexion;sitting;3 lb free weight  -LL       Row Name 08/22/23 1058          Positioning and Restraints    Pre-Treatment Position --  WC w/ OT  -LL     Post Treatment Position bed  -LL     In Bed supine;call light within reach;encouraged to call for assist;side rails up x2;heels elevated  -LL       Row Name 08/22/23 1058          Bed Mobility Goal 1 (PT-IRF)    Progress/Outcomes (Bed Mobility Goal 1, PT-IRF) goal ongoing  -LL       Row Name 08/22/23 1058          Bed Mobility Goal 2 (PT-IRF)    Progress/Outcomes (Bed Mobility Goal 2, PT-IRF) goal ongoing  -LL       Row Name 08/22/23 1058          Transfer Goal 1 (PT-IRF)    Progress/Outcomes (Transfer Goal 1, PT-IRF) goal ongoing  -LL       Row Name 08/22/23 1058          Transfer Goal 2 (PT-IRF)    Progress/Outcomes (Transfer Goal 2, PT-IRF) goal ongoing  -LL       Row Name 08/22/23 1058          Gait/Walking Locomotion Goal 1 (PT-IRF)    Progress/Outcomes (Gait/Walking Locomotion Goal 1, PT-IRF) goal ongoing;goal partially met  -LL       Row Name 08/22/23 1058          Gait/Walking Locomotion Goal 2 (PT-IRF)    Progress/Outcomes (Gait/Walking Locomotion Goal 2, PT-IRF) goal ongoing  -LL               User Key  (r) = Recorded By, (t) = Taken By, (c) = Cosigned By      Initials Name Provider Type    Connie Isabel PTA Physical Therapist Assistant                     Physical Therapy Education       Title: PT OT SLP Therapies (Done)       Topic: Physical Therapy (Done)       Point: Mobility training (Done)       Learning Progress Summary             Patient Acceptance, E,D, VU,NR  by LL at 8/22/2023 1112    Acceptance, E,D, VU,NR by LL at 8/21/2023 1451    Acceptance, TB, NR by DL at 8/20/2023 2104    Acceptance, TB, NR by DL at 8/20/2023 2041    Acceptance, E,D, VU,NR by RG at 8/18/2023 1320    Acceptance, E,D, VU,NR by RG at 8/17/2023 1332    Acceptance, E,D, VU,NR by RG at 8/16/2023 1354    Acceptance, E,D, VU,NR by RG at 8/16/2023 0723    Acceptance, E,TB, VU,DU by HC at 8/15/2023 1402    Acceptance, E,D, VU,NR by RG at 8/11/2023 1503    Acceptance, E,D, VU,NR by RG at 8/10/2023 1510    Acceptance, E, VU,NR by LB at 8/9/2023 1314    Acceptance, TB, NR by DL at 8/8/2023 2014    Acceptance, E,D, VU,NR by RG at 8/8/2023 1407    Acceptance, E,D, VU,NR by RG at 8/7/2023 1507    Acceptance, E, VU,NR by LB at 8/4/2023 1521    Acceptance, E,TB, VU,NR by MB at 8/3/2023 2239    Acceptance, E,D, VU,NR by RG at 8/3/2023 1514    Acceptance, E,D, VU,NR by RG at 8/2/2023 1524    Acceptance, E,D, VU,NR by RG at 8/1/2023 1446    Acceptance, E,D, VU,NR by RG at 7/31/2023 1411    Acceptance, E,TB, VU by RM at 7/28/2023 1230    Acceptance, E,D, VU,NR by RG at 7/26/2023 1518    Acceptance, E, VU,NR by LB at 7/25/2023 1133    Acceptance, E, VU,NR by LB at 7/24/2023 1444    Acceptance, E, VU,NR by LB at 7/21/2023 1615    Acceptance, E,D, VU,NR by RG at 7/20/2023 1107    Acceptance, TB, NR by DL at 7/19/2023 2036    Acceptance, E,D, VU,NR by RG at 7/19/2023 1508    Acceptance, E,D, VU,NR by RG at 7/18/2023 1520    Acceptance, E,D, VU,NR by RG at 7/17/2023 1522    Acceptance, E,D, VU,NR by LL at 7/13/2023 1358    Acceptance, E, VU,NR by LB at 7/12/2023 1144    Acceptance, E, VU,NR by LB at 7/11/2023 1426    Acceptance, E, VU,NR by LB at 7/10/2023 1452    Acceptance, E,D, VU,NR by LL at 7/8/2023 1228    Acceptance, E,D, VU,NR by LB at 7/7/2023 1449                         Point: Home exercise program (Done)       Learning Progress Summary             Patient Acceptance, E,D, VU,NR by LL at 8/22/2023 1112     Acceptance, E,D, VU,NR by LL at 8/21/2023 1451    Acceptance, TB, NR by DL at 8/20/2023 2104    Acceptance, TB, NR by DL at 8/20/2023 2041    Acceptance, E,D, VU,NR by RG at 8/18/2023 1320    Acceptance, E,D, VU,NR by RG at 8/17/2023 1332    Acceptance, E,D, VU,NR by RG at 8/16/2023 1354    Acceptance, E,D, VU,NR by RG at 8/16/2023 0723    Acceptance, E,TB, VU,DU by HC at 8/15/2023 1402    Acceptance, E,D, VU,NR by RG at 8/11/2023 1503    Acceptance, E,D, VU,NR by RG at 8/10/2023 1510    Acceptance, E, VU,NR by LB at 8/9/2023 1314    Acceptance, TB, NR by DL at 8/8/2023 2014    Acceptance, E,D, VU,NR by RG at 8/8/2023 1407    Acceptance, E,D, VU,NR by RG at 8/7/2023 1507    Acceptance, E, VU,NR by LB at 8/4/2023 1521    Acceptance, E,TB, VU,NR by MB at 8/3/2023 2239    Acceptance, E,D, VU,NR by RG at 8/3/2023 1514    Acceptance, E,D, VU,NR by RG at 8/2/2023 1524    Acceptance, E,D, VU,NR by RG at 8/1/2023 1446    Acceptance, E,D, VU,NR by RG at 7/31/2023 1411    Acceptance, E,TB, VU by RM at 7/28/2023 1230    Acceptance, E,D, VU,NR by RG at 7/26/2023 1518    Acceptance, E, VU,NR by LB at 7/25/2023 1133    Acceptance, E, VU,NR by LB at 7/24/2023 1444    Acceptance, E, VU,NR by LB at 7/21/2023 1615    Acceptance, E,D, VU,NR by RG at 7/20/2023 1107    Acceptance, TB, NR by DL at 7/19/2023 2036    Acceptance, E,D, VU,NR by RG at 7/19/2023 1508    Acceptance, E,D, VU,NR by RG at 7/18/2023 1520    Acceptance, E,D, VU,NR by RG at 7/17/2023 1522    Acceptance, E,D, VU,NR by LL at 7/13/2023 1358    Acceptance, E, VU,NR by LB at 7/12/2023 1144    Acceptance, E, VU,NR by LB at 7/11/2023 1426    Acceptance, E, VU,NR by LB at 7/10/2023 1452    Acceptance, E,D, VU,NR by LL at 7/8/2023 1228    Acceptance, E,D, VU,NR by LB at 7/7/2023 1449                         Point: Body mechanics (Done)       Learning Progress Summary             Patient Acceptance, E,D, VU,NR by LL at 8/22/2023 1112    Acceptance, E,D, VU,NR by LL at  8/21/2023 1451    Acceptance, TB, NR by DL at 8/20/2023 2104    Acceptance, TB, NR by DL at 8/20/2023 2041    Acceptance, E,D, VU,NR by RG at 8/18/2023 1320    Acceptance, E,D, VU,NR by RG at 8/17/2023 1332    Acceptance, E,D, VU,NR by RG at 8/16/2023 1354    Acceptance, E,D, VU,NR by RG at 8/16/2023 0723    Acceptance, E,TB, VU,DU by HC at 8/15/2023 1402    Acceptance, E,D, VU,NR by RG at 8/11/2023 1503    Acceptance, E,D, VU,NR by RG at 8/10/2023 1510    Acceptance, E, VU,NR by LB at 8/9/2023 1314    Acceptance, TB, NR by DL at 8/8/2023 2014    Acceptance, E,D, VU,NR by RG at 8/8/2023 1407    Acceptance, E,D, VU,NR by RG at 8/7/2023 1507    Acceptance, E, VU,NR by LB at 8/4/2023 1521    Acceptance, E,TB, VU,NR by MB at 8/3/2023 2239    Acceptance, E,D, VU,NR by RG at 8/3/2023 1514    Acceptance, E,D, VU,NR by RG at 8/2/2023 1524    Acceptance, E,D, VU,NR by RG at 8/1/2023 1446    Acceptance, E,D, VU,NR by RG at 7/31/2023 1411    Acceptance, E,TB, VU by RM at 7/28/2023 1230    Acceptance, E,D, VU,NR by RG at 7/26/2023 1518    Acceptance, E, VU,NR by LB at 7/25/2023 1133    Acceptance, E, VU,NR by LB at 7/24/2023 1444    Acceptance, E, VU,NR by LB at 7/21/2023 1615    Acceptance, E,D, VU,NR by RG at 7/20/2023 1107    Acceptance, TB, NR by DL at 7/19/2023 2036    Acceptance, E,D, VU,NR by RG at 7/19/2023 1508    Acceptance, E,D, VU,NR by RG at 7/18/2023 1520    Acceptance, E,D, VU,NR by RG at 7/17/2023 1522    Acceptance, E,D, VU,NR by LL at 7/13/2023 1358    Acceptance, E, VU,NR by LB at 7/12/2023 1144    Acceptance, E, VU,NR by LB at 7/11/2023 1426    Acceptance, E, VU,NR by LB at 7/10/2023 1452    Acceptance, E,D, VU,NR by LL at 7/8/2023 1228    Acceptance, E,D, VU,NR by LB at 7/7/2023 1449                         Point: Precautions (Done)       Learning Progress Summary             Patient Acceptance, E,D, VU,NR by LL at 8/22/2023 1112    Acceptance, E,D, VU,NR by LL at 8/21/2023 1451    Acceptance, TB, NR by DL  at 8/20/2023 2104    Acceptance, TB, NR by DL at 8/20/2023 2041    Acceptance, E,D, VU,NR by RG at 8/18/2023 1320    Acceptance, E,D, VU,NR by RG at 8/17/2023 1332    Acceptance, E,D, VU,NR by RG at 8/16/2023 1354    Acceptance, E,D, VU,NR by RG at 8/16/2023 0723    Acceptance, E,TB, VU,DU by HC at 8/15/2023 1402    Acceptance, E,D, VU,NR by RG at 8/11/2023 1503    Acceptance, E,D, VU,NR by RG at 8/10/2023 1510    Acceptance, E, VU,NR by LB at 8/9/2023 1314    Acceptance, TB, NR by DL at 8/8/2023 2014    Acceptance, E,D, VU,NR by RG at 8/8/2023 1407    Acceptance, E,D, VU,NR by RG at 8/7/2023 1507    Acceptance, E, VU,NR by LB at 8/4/2023 1521    Acceptance, E,TB, VU,NR by MB at 8/3/2023 2239    Acceptance, E,D, VU,NR by RG at 8/3/2023 1514    Acceptance, E,D, VU,NR by RG at 8/2/2023 1524    Acceptance, E,D, VU,NR by RG at 8/1/2023 1446    Acceptance, E,D, VU,NR by RG at 7/31/2023 1411    Acceptance, E,TB, VU by RM at 7/28/2023 1230    Acceptance, E,D, VU,NR by RG at 7/26/2023 1518    Acceptance, E, VU,NR by LB at 7/25/2023 1133    Acceptance, E, VU,NR by LB at 7/24/2023 1444    Acceptance, E, VU,NR by LB at 7/21/2023 1615    Acceptance, E,D, VU,NR by RG at 7/20/2023 1107    Acceptance, TB, NR by DL at 7/19/2023 2036    Acceptance, E,D, VU,NR by RG at 7/19/2023 1508    Acceptance, E,D, VU,NR by RG at 7/18/2023 1520    Acceptance, E,D, VU,NR by RG at 7/17/2023 1522    Acceptance, E,D, VU,NR by LL at 7/13/2023 1358    Acceptance, E, VU,NR by LB at 7/12/2023 1144    Acceptance, E, VU,NR by LB at 7/11/2023 1426    Acceptance, E, VU,NR by LB at 7/10/2023 1452    Acceptance, E,D, VU,NR by LL at 7/8/2023 1228    Acceptance, E,D, VU,NR by LB at 7/7/2023 1449                                         User Key       Initials Effective Dates Name Provider Type Discipline    LB 06/16/21 -  Joslyn Garcia, PT Physical Therapist PT    LL 05/02/16 -  Connie Velasquez PTA Physical Therapist Assistant PT    RM 02/17/23 -   Shelley Hanson, JILL Physical Therapist Assistant PT    RG 06/16/21 -  Vu Brown, JILL Physical Therapist Assistant PT    HC 01/20/23 -  Kirti Arroyo PTA Physical Therapist Assistant PT    DL 03/16/22 -  Nadja Velasquez, RN Registered Nurse Nurse    MB 06/26/23 -  Vinay Fritz, RN Registered Nurse Nurse                    PT Recommendation and Plan    Frequency of Treatment (PT): 5 times per week  Anticipated Equipment Needs at Discharge (PT Eval):  (tbd)                  Time Calculation:      PT Charges       Row Name 08/22/23 1113             Time Calculation    Start Time 0915  -LL      Stop Time 1045  -LL      Time Calculation (min) 90 min  -LL      PT Received On 08/22/23  -LL         Time Calculation- PT    Total Timed Code Minutes- PT 90 minute(s)  -LL                User Key  (r) = Recorded By, (t) = Taken By, (c) = Cosigned By      Initials Name Provider Type    LL Connie Velasquez PTA Physical Therapist Assistant                    Therapy Charges for Today       Code Description Service Date Service Provider Modifiers Qty    68707584594 HC GAIT TRAINING EA 15 MIN 8/21/2023 Connie Velasquez, PTA GP, CQ 1    02478452147 HC PT THERAPEUTIC ACT EA 15 MIN 8/21/2023 Connie Velasquez, PTA GP, CQ 2    21120100147 HC PT THER PROC EA 15 MIN 8/21/2023 Connie Velasquez, PTA GP, CQ 3    27925430812 HC GAIT TRAINING EA 15 MIN 8/22/2023 Connie Velasquez, PTA GP, CQ 1    42811993070 HC PT THERAPEUTIC ACT EA 15 MIN 8/22/2023 Connie Velasquez, PTA GP, CQ 2    52425852117 HC PT THER PROC EA 15 MIN 8/22/2023 Connie Velasquez, PTA GP, CQ 3              PT G-Codes  AM-PAC 6 Clicks Score (PT): 12      Aneesh Velasquez PTA  8/22/2023

## 2023-08-22 NOTE — THERAPY TREATMENT NOTE
Inpatient Rehabilitation - Occupational Therapy Treatment Note    THALIA Trujillo     Patient Name: Antonio Canseco  : 1955  MRN: [id number]    Today's Date: 2023                 Admit Date: 2023       No diagnosis found.    Patient Active Problem List   Diagnosis    Detrusor instability    Low testosterone in male    Disorder of prostate    Corporo-venous occlusive erectile dysfunction    CVA (cerebral vascular accident)       Past Medical History:   Diagnosis Date    Diabetes mellitus     Hypertension     Stroke        Past Surgical History:   Procedure Laterality Date    US GUIDED LYMPH NODE BIOPSY  2023             IRF OT ASSESSMENT FLOWSHEET (last 12 hours)       IRF OT Evaluation and Treatment       Row Name 23 1201          OT Time and Intention    Document Type daily treatment  -BF     Mode of Treatment occupational therapy  -BF     Patient Effort good  -BF       Row Name 23 1201          General Information    Existing Precautions/Restrictions fall  L HP, <trunk support/balance, L side inattention  -BF     Limitations/Impairments safety/cognitive  -BF       Row Name 23 1201          Cognition/Psychosocial    Orientation Status (Cognition) oriented x 3  -BF     Follows Commands (Cognition) verbal cues/prompting required;repetition of directions required;physical/tactile prompts required;follows one-step commands  -BF     Executive Function Deficit (Cognition) organization/sequencing;problem-solving/reasoning  -BF       Row Name 23 1201          Bathing    [place] Level (Bathing) minimum assist (75% patient effort);verbal cues  -BF     Position (Bathing) sitting up in bed  -BF     Comment (Bathing) Min A  -BF       Row Name 23 1201          Upper Body Dressing    Comment (Upper Body Dressing) Min A  -BF       Row Name 23 1201          Lower Body Dressing    [place] Level (Lower Body Dressing) moderate assist (50% patient effort);maximum assist (25%  patient effort);verbal cues  -BF     Position (Lower Body Dressing) supine  -BF     Comment (Lower Body Dressing) Mod/Max A  -BF       Row Name 08/22/23 1201          Grooming    Comment (Grooming) Min A  -BF       Row Name 08/22/23 1201          Toileting    Comment (Toileting) Total A  -BF       Row Name 08/22/23 1201          Self-Feeding    [place] Level (Self-Feeding) set up  -BF     Position (Self-Feeding) sitting up in bed  -BF     Comment (Self-Feeding) Set-up  -BF       Row Name 08/22/23 1201          Bed-Chair Transfer    Bed-Chair [place] (Transfers) maximum assist (25% patient effort);verbal cues;nonverbal cues (demo/gesture);moderate assist (50% patient effort);2 person assist  -BF     Assistive Device (Bed-Chair Transfers) wheelchair  -BF       Row Name 08/22/23 1201          Motor Skills    Motor Control/Coordination Interventions fine motor manipulation/dexterity activities;gross motor coordination activities;therapeutic exercise/ROM  LUE GMC therex, BUE strengthening; BUE rickshaw 25 lbs X20X4, pulleys, theraband  -BF               User Key  (r) = Recorded By, (t) = Taken By, (c) = Cosigned By      Initials Name Effective Dates    BF Lisa Sanchez OT 07/11/23 -                      Occupational Therapy Education       Title: PT OT SLP Therapies (Done)       Topic: Occupational Therapy (Done)       Point: ADL training (Done)       Description:   Instruct learner(s) on proper safety adaptation and remediation techniques during self care or transfers.   Instruct in proper use of assistive devices.                  Learning Progress Summary             Patient Acceptance, E, VU,NR by BF at 8/22/2023 1201    Acceptance, E, VU,NR by BF at 8/21/2023 1455    Acceptance, TB, NR by DL at 8/20/2023 2104    Acceptance, TB, NR by DL at 8/20/2023 2041    Acceptance, E,D, VU,NR,DU by BF at 8/18/2023 1238    Acceptance, E,D, VU,NR by CJ at 8/17/2023 1419    Acceptance, E, VU,NR by HB at  8/16/2023 1059    Acceptance, E, VU,NR by BF at 8/15/2023 1222    Acceptance, E, VU,NR by BF at 8/14/2023 1527    Acceptance, E, VU,NR by BF at 8/11/2023 1204    Acceptance, E,D, VU,DU by BF at 8/10/2023 1524    Acceptance, TB, NR by DL at 8/8/2023 2014    Acceptance, E, VU,NR by BF at 8/8/2023 1440    Acceptance, E, VU,NR by BF at 8/7/2023 1526    Acceptance, E, VU,NR by BF at 8/4/2023 1221    Acceptance, E,TB, VU,NR by MB at 8/3/2023 2239    Acceptance, E, VU,NR by BF at 8/3/2023 1217    Acceptance, E, VU,NR by BF at 8/1/2023 1427    Acceptance, E, VU,NR by BF at 7/31/2023 1409    Acceptance, E, VU,NR by BF at 7/28/2023 1442    Acceptance, E, VU,NR by BF at 7/27/2023 1445    Acceptance, E, VU,NR by BF at 7/25/2023 1251    Acceptance, E, VU,NR by HB at 7/24/2023 1355    Acceptance, E, NR,VU by BF at 7/21/2023 1248    Acceptance, E, NR by BF at 7/20/2023 1259    Acceptance, TB, NR by DL at 7/19/2023 2036    Acceptance, E, VU,NR by HB at 7/19/2023 1421    Acceptance, E, VU,NR by BF at 7/18/2023 1334    Acceptance, E, VU,NR by BF at 7/17/2023 1431    Acceptance, E, VU,NR by BF at 7/17/2023 1430    Acceptance, E, VU,NR by BF at 7/14/2023 1508    Acceptance, E, VU,NR by BF at 7/13/2023 1343    Acceptance, E,D, VU,NR by TM at 7/12/2023 1407    Acceptance, D,E, NR,VU by TM at 7/11/2023 1434    Acceptance, E, VU,NR by BF at 7/10/2023 1456    Acceptance, E, VU,NR by HB at 7/8/2023 1140    Acceptance, E, VU,NR by BF at 7/7/2023 1442   Family Acceptance, E,D, VU,DU by BF at 8/10/2023 1524    Acceptance, E, VU,NR by BF at 8/1/2023 1427   Significant Other Acceptance, E,D, VU,NR,DU by BF at 8/18/2023 1238                         Point: Precautions (Done)       Description:   Instruct learner(s) on prescribed precautions during self-care and functional transfers.                  Learning Progress Summary             Patient Acceptance, E, VU,NR by BF at 8/22/2023 1201    Acceptance, E, VU,NR by BF at 8/21/2023 1453     Acceptance, TB, NR by DL at 8/20/2023 2104    Acceptance, TB, NR by DL at 8/20/2023 2041    Acceptance, E,D, VU,NR,DU by BF at 8/18/2023 1238    Acceptance, E,D, VU,NR by CJ at 8/17/2023 1419    Acceptance, E, VU,NR by HB at 8/16/2023 1059    Acceptance, E, VU,NR by BF at 8/15/2023 1222    Acceptance, E, VU,NR by BF at 8/14/2023 1527    Acceptance, E, VU,NR by BF at 8/11/2023 1204    Acceptance, E,D, VU,DU by BF at 8/10/2023 1524    Acceptance, TB, NR by DL at 8/8/2023 2014    Acceptance, E, VU,NR by BF at 8/8/2023 1440    Acceptance, E, VU,NR by BF at 8/7/2023 1526    Acceptance, E, VU,NR by BF at 8/4/2023 1221    Acceptance, E,TB, VU,NR by MB at 8/3/2023 2239    Acceptance, E, VU,NR by BF at 8/3/2023 1217    Acceptance, E, VU,NR by BF at 8/1/2023 1427    Acceptance, E, VU,NR by BF at 7/31/2023 1409    Acceptance, E, VU,NR by BF at 7/28/2023 1442    Acceptance, E, VU,NR by BF at 7/27/2023 1445    Acceptance, E, VU,NR by BF at 7/25/2023 1251    Acceptance, E, VU,NR by HB at 7/24/2023 1355    Acceptance, E, NR,VU by BF at 7/21/2023 1248    Acceptance, E, NR by BF at 7/20/2023 1259    Acceptance, TB, NR by DL at 7/19/2023 2036    Acceptance, E, VU,NR by HB at 7/19/2023 1421    Acceptance, E, VU,NR by BF at 7/18/2023 1334    Acceptance, E, VU,NR by BF at 7/17/2023 1431    Acceptance, E, VU,NR by BF at 7/17/2023 1430    Acceptance, E, VU,NR by BF at 7/14/2023 1508    Acceptance, E, VU,NR by BF at 7/13/2023 1343    Acceptance, E,D, VU,NR by TM at 7/12/2023 1407    Acceptance, D,E, NR,VU by TM at 7/11/2023 1434    Acceptance, E, VU,NR by BF at 7/10/2023 1456    Acceptance, E, VU,NR by HB at 7/8/2023 1140    Acceptance, E, VU,NR by BF at 7/7/2023 1442   Family Acceptance, E,D, VU,DU by BF at 8/10/2023 1524    Acceptance, E, VU,NR by BF at 8/1/2023 1427   Significant Other Acceptance, E,D, VU,NR,DU by BF at 8/18/2023 1238                                         User Key       Initials Effective Dates Name Provider Type  Discipline    BF 05/31/23 - 07/10/23 DanielLisa, OT Occupational Therapist OT    BF 07/11/23 -  DanielLisa, OT Occupational Therapist OT    CJ 06/16/21 -  Ashanti Nova, OT Occupational Therapist OT    TM 06/16/21 -  Marielos Capone OT Occupational Therapist OT    HB 05/25/21 -  Veronica Robles OT Occupational Therapist OT    DL 03/16/22 -  Nadja Velasquez, RN Registered Nurse Nurse    MB 06/26/23 -  Vinay Fritz RN Registered Nurse Nurse                        OT Recommendation and Plan    Planned Therapy Interventions (OT): activity tolerance training, adaptive equipment training, BADL retraining, neuromuscular control/coordination retraining, passive ROM/stretching, ROM/therapeutic exercise, strengthening exercise, transfer/mobility retraining                    Time Calculation:      Time Calculation- OT       Row Name 08/22/23 1211 08/22/23 0815          Time Calculation- OT    OT Start Time 1115  -BF 0815  -BF     OT Stop Time 1145  -BF 0915  -BF     OT Time Calculation (min) 30 min  -BF 60 min  -BF     Total Timed Code Minutes- OT 30 minute(s)  -BF 60 minute(s)  -BF     OT Non-Billable Time (min) -- 10 min  -BF               User Key  (r) = Recorded By, (t) = Taken By, (c) = Cosigned By      Initials Name Provider Type     Daniel Lisa Lima, OT Occupational Therapist                  Therapy Charges for Today       Code Description Service Date Service Provider Modifiers Qty    34454901266 HC OT SELF CARE/MGMT/TRAIN EA 15 MIN 8/21/2023 DanielLisa OT GO 3    73227945619 HC OT NEUROMUSC RE EDUCATION EA 15 MIN 8/21/2023 DanielLisa OT GO 3    86230891762 HC OT THER PROC EA 15 MIN 8/21/2023 SanchezLisa easley OT GO 2    94736194161 HC OT SELF CARE/MGMT/TRAIN EA 15 MIN 8/22/2023 DanielLisa OT GO 2    92056182516 HC OT NEUROMUSC RE EDUCATION EA 15 MIN 8/22/2023 SanchezLisa easley OT GO 2    07056383518 HC OT THER PROC EA 15  MIN 8/22/2023 Daniel, Lisa Lima, OT GO 2                     Lisa Sanchez, OT  8/22/2023

## 2023-08-22 NOTE — PROGRESS NOTES
Rehabilitation Nursing  Inpatient Rehabilitation Plan of Care Note    Plan of Care  Copy from POCBody Function Structure    Skin Integrity (Active)  Current Status (7/6/2023 4:18:00 PM): At Risk for Skin Breakdown  Weekly Goal: No Skin Breakdown  Discharge Goal: No Skin Breakdown    Safety    Potential for Injury (Active)  Current Status (7/6/2023 4:18:00 PM): Potential for Falls  Weekly Goal: No Falls  Discharge Goal: No Falls    Signed by: Nadja Velasquez RN     [Patient] : Patient

## 2023-08-22 NOTE — SIGNIFICANT NOTE
08/22/23 2885   Plan   Plan Team conference held today.  Pt is scheduled for discharge on 8-23-23 to go home with friend Alejandra, daughter Karolyn, sister Mariah, cousin Wilson, son Antonio (will help occassionally) and daughter's friend Joey assisting with caregiving needs.  Friend Alejandra plans to stay with pt all the time.  Spoke to Alejandra [phone] about how pt is doing in therapy, plans for discharge on 8-23-23, referral to [facility] for PT, OT, nursing, [facility] to provide rolling walker, insurance paying for W/C in 2020, therefore, pt unable to get another W/C until five years have went by.  Alejandra understands pt needs 24 hour assistance and will make sure he receives this.  Alejandra plans to come to rehab for discharge and transportation home with pt's sister Mariah.  Informed Alejandra to come to rehab around 11:00 am for discharge.  Informed her pt declined [facility] W/C van transport and wanted to ride in his vehicle to his home.   Patient/Family in Agreement with Plan yes

## 2023-08-22 NOTE — PROGRESS NOTES
[facility]  PROGRESS NOTE     Patient Identification:  Name:  Antonio Canseco  Age:  67 y.o.  Sex:  male  :  1955  MRN:  [id number]  Visit Number:  [id number]  ROOM: [facility]     Primary Care Provider:  Adriana Ledesma MD    Length of stay in inpatient status:  47    Subjective     Chief Compliant:  No chief complaint on file.      History of Presenting Illness: Patient 67-year-old gentleman with history of right-sided CVA with left residual 3-4 out of 5.  Patient doing well today and is made excellent progress towards his therapy goals.  Patient scheduled for discharge to home tomorrow    Objective     Current Hospital Meds:allopurinol, 300 mg, Oral, Daily  atorvastatin, 80 mg, Oral, Daily  magnesium oxide, 400 mg, Oral, Daily  metoprolol tartrate, 25 mg, Oral, Q12H  nystatin, , Topical, Q12H  pantoprazole, 40 mg, Oral, Q AM  polyethylene glycol, 17 g, Oral, Daily  rivaroxaban, 20 mg, Oral, Daily With Dinner  tamsulosin, 0.4 mg, Oral, Daily       ----------------------------------------------------------------------------------------------------------------------  Vital Signs:  Temp:  [97.7 øF (36.5 øC)] 97.7 øF (36.5 øC)  Heart Rate:  [84-97] 97  Resp:  [20] 20  BP: (116)/(69-70) 116/69  SpO2:  [96 %] 96 %  on   ;   Device (Oxygen Therapy): room air  Body mass index is 40.57 kg/mý.    Wt Readings from Last 3 Encounters:   23 121 kg (266 lb 12.1 oz)   22 121 kg (267 lb)   07/15/19 121 kg (267 lb 1.6 oz)     Intake & Output (last 3 days)          07 07 07 07 07 07 0700    P.O. 1680 2040 462 360    Total Intake(mL/kg) 1680 (13.9) 2040 (16.9) 462 (3.8) 360 (3)    Urine (mL/kg/hr) 1100 (0.4)       Stool 0       Total Output 1100       Net +580 +2040 +462 +360            Urine Unmeasured Occurrence 6 x 5 x 3 x 1 x    Stool Unmeasured Occurrence 2 x  1 x           Diet: Cardiac Diets; Healthy Heart (2-3 Na+); Texture:  Regular Texture (IDDSI 7); Fluid Consistency: Thin (IDDSI 0)  ----------------------------------------------------------------------------------------------------------------------  Physical exam:  Constitutional: No acute distress  HEENT: Normocephalic atraumatic  Neck:   Supple  Cardiovascular: Regular rate and rhythm  Pulmonary/Chest: Clear to auscultation  Abdominal: Positive bowel sounds soft.   Musculoskeletal: No arthropathy  Neurological: Left hemiparesis 3-4 out of 5  Skin: No rash  Peripheral vascular: No edema  Genitourinary:  ----------------------------------------------------------------------------------------------------------------------    Last echocardiogram:    ----------------------------------------------------------------------------------------------------------------------  Results from last 7 days   Lab Units 08/17/23  0119   WBC 10*3/mm3 8.69   HEMOGLOBIN g/dL 10.1*   HEMATOCRIT % 32.4*   MCV fL 83.7   MCHC g/dL 31.2*   PLATELETS 10*3/mm3 289         Results from last 7 days   Lab Units 08/17/23  0119   SODIUM mmol/L 137   POTASSIUM mmol/L 4.2   MAGNESIUM mg/dL 2.0   CHLORIDE mmol/L 101   CO2 mmol/L 24.0   BUN mg/dL 17   CREATININE mg/dL 0.99   CALCIUM mg/dL 8.9   GLUCOSE mg/dL 105*   ALBUMIN g/dL 3.3*   BILIRUBIN mg/dL 0.4   ALK PHOS U/L 85   AST (SGOT) U/L 13   ALT (SGPT) U/L 8   Estimated Creatinine Clearance: 94.1 mL/min (by C-G formula based on SCr of 0.99 mg/dL).  No results found for: AMMONIA              No results found for: HGBA1C, POCGLU  No results found for: TSH, FREET4  No results found for: PREGTESTUR, PREGSERUM, HCG, HCGQUANT  Pain Management Panel           No data to display              Brief Urine Lab Results       None          No results found for: BLOODCX      No results found for: URINECX  No results found for: WOUNDCX  No results found for: STOOLCX        I have personally looked at the labs and they are summarized  above.  ----------------------------------------------------------------------------------------------------------------------  Detailed radiology reports for the last 24 hours:    Imaging Results (Last 24 Hours)       ** No results found for the last 24 hours. **          Final impressions for the last 30 days of radiology reports:    EEG    Result Date: 8/7/2023  Focal cerebral dysfunction impacting the right hemisphere.  This is consistent with a known prior right hemispheric stroke No evidence for epilepsy is seen on the study This report is transcribed using the Dragon dictation system.     I have personally looked at the radiology images and read the final radiology report.    Assessment & Plan    Status post right-sided CVA with residual hemiparesis.  Continue Xarelto and high-dose statin therapy.  Patient requiring maximum assistance of 2 for transfers.  Was able to moderate assistance of 2 for ambulation yesterday and did ambulate 100 feet with front wheel walker.  Patient requires minimum assist for upper body dressing and bathing.  Moderate assistance to maximum assistance for lower body dressing and total assistance for toileting at this time.  Will require home health services tomorrow at discharge    Atrial fibrillation rate is controlled patient has been on Xarelto and metoprolol.    History of lymphoma/head and neck cancer-arrange for follow-up with Dr. Gregory at discharge tomorrow    BPH continue Flomax    VTE Prophylaxis:   Mechanical Order History:       None          Pharmalogical Order History:        Ordered     Dose Route Frequency Stop    07/06/23 6562  rivaroxaban (XARELTO) tablet 20 mg        Question:  Are you ordering rivaroxaban 10 mg for the prevention of blood clots in an acutely ill medical patient?  Answer:  No    20 mg PO Daily With Dinner --                        Sj Jay MD  [facility]  08/22/23  11:01 EDT

## 2023-08-22 NOTE — SIGNIFICANT NOTE
08/22/23 1400   Plan   Plan Spoke to Rachel at [facility] [phone] who says they can accept referral.  HH to be contacted at discharge.  Ambulatory referral for home health with face to face and discharge summary to be faxed to HH at discharge.  SS spoke to pt about plans for discharge on 8-23-23 and [facility] accepting referral.  [facility] delivered rolling walker to pt's room.  Informed pt about talking to friend Alejandra who is coming to rehab in the am for discharge and transport home with his sister.  Will follow.

## 2023-08-22 NOTE — PLAN OF CARE
Goal Outcome Evaluation:  Plan of Care Reviewed With: patient resting in bed, reports continues to make progress in therapy. Will continue to monitor.

## 2023-08-22 NOTE — PROGRESS NOTES
Occupational Therapy:    Physical Therapy: Individual: 90 minutes.    Speech Language Pathology:    Signed by: Connie Velasquez PTA

## 2023-08-23 VITALS
BODY MASS INDEX: 40.43 KG/M2 | RESPIRATION RATE: 18 BRPM | HEIGHT: 68 IN | OXYGEN SATURATION: 96 % | DIASTOLIC BLOOD PRESSURE: 89 MMHG | TEMPERATURE: 97.9 F | WEIGHT: 266.76 LBS | SYSTOLIC BLOOD PRESSURE: 148 MMHG | HEART RATE: 88 BPM

## 2023-08-23 PROCEDURE — 97535 SELF CARE MNGMENT TRAINING: CPT

## 2023-08-23 PROCEDURE — 97116 GAIT TRAINING THERAPY: CPT

## 2023-08-23 PROCEDURE — 97530 THERAPEUTIC ACTIVITIES: CPT

## 2023-08-23 RX ORDER — RIVAROXABAN 20 MG/1
20 TABLET, FILM COATED ORAL DAILY
Qty: 30 TABLET | Refills: 0 | Status: SHIPPED | OUTPATIENT
Start: 2023-08-23 | End: 2023-09-22

## 2023-08-23 RX ORDER — POLYETHYLENE GLYCOL 3350 17 G/17G
17 POWDER, FOR SOLUTION ORAL DAILY
Qty: 510 G | Refills: 0 | Status: SHIPPED | OUTPATIENT
Start: 2023-08-24 | End: 2023-09-23

## 2023-08-23 RX ORDER — TAMSULOSIN HYDROCHLORIDE 0.4 MG/1
1 CAPSULE ORAL NIGHTLY
Qty: 30 CAPSULE | Refills: 0 | Status: SHIPPED | OUTPATIENT
Start: 2023-08-23 | End: 2023-09-22

## 2023-08-23 RX ORDER — BLOOD-GLUCOSE METER
1 EACH MISCELLANEOUS 2 TIMES DAILY
Qty: 1 EACH | Refills: 0 | Status: SHIPPED | OUTPATIENT
Start: 2023-08-23

## 2023-08-23 RX ORDER — LANCETS 33 GAUGE
1 EACH MISCELLANEOUS 2 TIMES DAILY
Qty: 100 EACH | Refills: 11 | Status: SHIPPED | OUTPATIENT
Start: 2023-08-23

## 2023-08-23 RX ORDER — ATORVASTATIN CALCIUM 80 MG/1
80 TABLET, FILM COATED ORAL DAILY
Qty: 90 TABLET | Refills: 0 | Status: SHIPPED | OUTPATIENT
Start: 2023-08-24 | End: 2023-11-22

## 2023-08-23 RX ORDER — PANTOPRAZOLE SODIUM 40 MG/1
40 TABLET, DELAYED RELEASE ORAL
Qty: 30 TABLET | Refills: 0 | Status: SHIPPED | OUTPATIENT
Start: 2023-08-24 | End: 2023-09-23

## 2023-08-23 RX ADMIN — PANTOPRAZOLE SODIUM 40 MG: 40 TABLET, DELAYED RELEASE ORAL at 05:50

## 2023-08-23 RX ADMIN — MAGNESIUM GLUCONATE 500 MG ORAL TABLET 400 MG: 500 TABLET ORAL at 08:30

## 2023-08-23 RX ADMIN — METOPROLOL TARTRATE 25 MG: 25 TABLET, FILM COATED ORAL at 08:30

## 2023-08-23 RX ADMIN — NYSTATIN: 100000 POWDER TOPICAL at 08:30

## 2023-08-23 RX ADMIN — ALLOPURINOL 300 MG: 300 TABLET ORAL at 08:30

## 2023-08-23 RX ADMIN — TAMSULOSIN HYDROCHLORIDE 0.4 MG: 0.4 CAPSULE ORAL at 08:30

## 2023-08-23 RX ADMIN — ATORVASTATIN CALCIUM 80 MG: 40 TABLET, FILM COATED ORAL at 08:30

## 2023-08-23 NOTE — PAYOR COMM NOTE
"Carmen Doe, RN   Utilization Review Nurse for Inpatient Rehab   Phone: [phone]  Fax: [phone]  Email: [email or web address]  [facility] NPI: [id number]     DISCHARGE NOTIFICATION WITH DISCHARGE SUMMARY  Member:  Antonio Canseco  :  1955  ID# [id number]  Auth# [id number]  Admission Date:  23  Discharge Date:  23  Disposition:  Home with family & friend; [facility] for PT, OT, and Nursing services.    Antonio Canseco (67 y.o. Male)       Date of Birth   1955    Social Security Number       Address   [address] [place] [address]    Home Phone   [phone]    MRN   [id number]       Oriental orthodox   None    Marital Status                               Admission Date   23    Admission Type   Elective    Admitting Provider   Hayder Mendoza MD    Attending Provider   Hayder Mendoza MD    Department, Room/Bed   [facility] REHABILITATION, 106/2S       Discharge Date       Discharge Disposition   [place] [facility]    [place]charge Destination                                 Attending Provider: Hayder Mendoza MD    Allergies: No Known Allergies    Isolation: None   Infection: None   Code Status: CPR    Ht: 172.7 cm (67.99\")   Wt: 121 kg (266 lb 12.1 oz)    Admission Cmt: None   Principal Problem: CVA (cerebral vascular accident) [I63.9]                   Greer Conner MSW     Case Management  Significant Note      Signed  Date of Service:  23  Creation Time:  23     Signed                           23 1400   Plan   Plan Spoke to Rachel at [facility] [phone] who says they can accept referral.  HH to be contacted at discharge.  Ambulatory referral for home health with face to face and discharge summary to be faxed to HH at discharge.  SS spoke to pt about plans for discharge on 23 and [facility] accepting referral.  [facility] " delivered rolling walker to pt's room.  Informed pt about talking to friend Alejandra who is coming to rehab in the am for discharge and transport home with his sister.  Will follow.                    Greer Conner MSW     Case Management  Significant Note      Signed  Date of Service:  08/22/23 1611  Creation Time:  08/22/23 1611     Signed                           08/22/23 1225   Plan   Plan Team conference held today.  Pt is scheduled for discharge on 8-23-23 to go home with friend Alejandra, daughter Karolyn, sister Mariah, cousin Wilson, son Antonio (will help occassionally) and daughter's friend Joey assisting with caregiving needs.  Friend Alejandra plans to stay with pt all the time.  Spoke to Alejandra [phone] about how pt is doing in therapy, plans for discharge on 8-23-23, referral to [facility] for PT, OT, nursing, [facility] to provide rolling walker, insurance paying for W/C in 2020, therefore, pt unable to get another W/C until five years have went by.  Alejandra understands pt needs 24 hour assistance and will make sure he receives this.  Alejandra plans to come to rehab for discharge and transportation home with pt's sister Mariah.  Informed Alejandra to come to rehab around 11:00 am for discharge.  Informed her pt declined [facility] W/C van transport and wanted to ride in his vehicle to his home.   Patient/Family in Agreement with Plan yes                    Greer Conner, SURAJ     Case Management  Significant Note      Signed  Date of Service:  08/21/23 1612  Creation Time:  08/21/23 1612     Signed                           08/21/23 1300   Plan   Plan Contacted [facility] [phone] per preference per Vickie about pt being discharged on 8-23-23, need for rolling walker and 20 inch standard wheelchair with anti-tippers, elevating leg rests, anti-tippers, basic cushion.  Vickie says pt received lightweight wheelchair in 2020, therefore, they are  unable to bill insurance for another chair at this time.  Rolling walker can be delivered to rehab on 8-23-23 around 11:00 am.  Reviewed this information with pt who is agreeable to use the W/C he has at home.  Faxed face sheet, H&P, PT note and MD progress note to [facility] via [employer].  Contacted [facility] [phone] per preference per Hope with discharge on 8-23-23, referral, need for PT 2-3 x wk x 8 wks for strengthening, endurance, gait training, transfer training, balance, therapeutic exercise, bed mobility, neuro re-education, home safety, OT 2-3 x wk x 8 wks for ADL re-training, home safety, functional mobility, strengthening, ROM, coordination, neuro re-education, and endurance, nursing for medication education/post-CVA education.  Ivonne confirmed they are able to accept pt's insurance.  Faxed face sheet, H&P, PT/OT notes/evaluations, PT re-evaluation, MD progress notes and diet orders to  via [employer].  HH to contact  to confirm acceptance of referral.  Ambulatory referral for HH with face to face and discharge summary to be faxed to HH at discharge.                    Greer Conner, MSW     Case Management  Significant Note      Signed  Date of Service:  08/21/23 1349  Creation Time:  08/21/23 1349     Signed                           08/21/23 1135   Plan   Plan Spoke to pt about plans for discharge on 8-23-23, recommendations for home health PT, OT, nursing, 20 inch standard wheelchair, anti-tippers, basic cushion and rolling walker.  Educated about home health agency providers.  Pt prefers [facility] and [facility] (pt is established with them).  Pt says his sister, friend Alejandra and daughter Karolyn are suppose to come to rehab for discharge and transport home.  Pt declines [facility] W/[facility] van transport home and says he wants family to transport him in his vehicle.  Pt is aware of option for [facility] W/[facility] van transport home; he will inform SS if  he decides to let them transport home at discharge.  Reviewed cost of [facility] which is $1 per mile.   Patient/Family in Agreement with Plan yes                    Sj Jay MD   Physician  Hospitalist     Progress Notes      Signed     Date of Service: 23  Creation Time: 23     Signed              [facility] HOSPITALISTS DISCHARGE SUMMARY     Patient Identification:  Name:  Antonio Canseco  Age:  67 y.o.  Sex:  male  :  1955  MRN:  [id number]  Visit Number:  [id number]     Date of Admission: 2023  Date of Discharge:  2023      PCP: Adriana Ledesma MD     DISCHARGE DIAGNOSIS  Multiple CVA probably cardioembolic in nature with significant left-sided hemiparesis     Diabetes mellitus  Hypertension  History of gout  Laryngeal CA  B- lineage non-Hodgkin's lymphoma  Atrial fibrillation     CONSULTS         PROCEDURES PERFORMED        HOSPITAL COURSE  Patient is a 67 y.o. male presented to [facility] acute inpatient physical rehab in transfer from [facility].  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  Patient was transferred [facility] for further evaluation.  Patient was intubated for airway protection prior to  Transfer.  CT was done at [facility] hospital showed extravasation into the oropharynx likely originated from possible mass.  Once patient was arrived [facility] there was no further bleeding and patient did have CT angiogram on  without evidence of extravasation.  Patient developed left-sided weakness and confusion immediately after the  angiogram and stroke alert was called.  Because of risk of bleeding tPA was not pursued.  Patient been  Been off  anticoagulation secondary to bleeding and MRI done on June 23 showed multiple embolic strokes predominantly in the right cerebellar right posterior communicating artery and right MCA and RADHA territories.  Patient was started on Xarelto and has not had no further bleeding.  Patient also diagnosed with diabetes medication lymphoma from iliac lymph node biopsy on June 23.  Patient afterwards.  May be candidate for physical rehab.     Status post bilateral CVA likely cardioembolic in nature with significant left-sided hemiparesis.  At the time of admission, patient dependent for transfers and was unable to stand or walk as his left lower extremity was flaccid.  At the time of admission patient required total assistance for bathing; max assist for upper body dressing; dependent for lower body dressing; moderate assist with grooming; dependent for toileting.  At the time of discharge patient requiring minimum assist for bathing; minimum assist for upper body dressing; moderate to maximum assist for lower body dressing; minimum assist for grooming; total assist for toileting.  Requires maximum assist for bed mobility; moderate to maximum assist for bed to chair transfer; minimum assist for sit to stand and stand to sit transfer; ambulated 160 feet with front wheel walker with moderate two-person assistance.  We will arrange for home health to continue to follow patient.  Patient's been on Xarelto during the admission without difficulty.  Patient on high-dose statin therapy as well     Atrial fibrillation rate has been well controlled throughout the admission.  Continue patient on metoprolol and Xarelto.     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 Gregory as an outpatient next week     BPH symptoms well controlled continue Flomax     Diabetes mellitus has been well controlled off of medications.  Continue to monitor closely at  home        VITAL SIGNS:  Temp:  [97.8 øF (36.6 øC)-97.9 øF (36.6 øC)] 97.9 øF (36.6 øC)  Heart Rate:  [86-88] 88  Resp:  [18] 18  BP: (132-148)/(70-89) 148/89  SpO2:  [96 %-97 %] 96 %  on   ;   Device (Oxygen Therapy): room air     Body mass index is 40.57 kg/mý.      Wt Readings from Last 3 Encounters:   07/07/23 121 kg (266 lb 12.1 oz)   11/17/22 121 kg (267 lb)   07/15/19 121 kg (267 lb 1.6 oz)         PHYSICAL EXAM:  Constitutional: No acute distress  HEENT: Normocephalic atraumatic  Neck: Supple  Cardiovascular: Irregular irregular, ventricular rate well controlled  Pulmonary/Chest: Clear to auscultation  Abdominal:  .  Positive bowel sounds soft  Musculoskeletal: No arthropathy  Neurological: 3-4 out of 5 strength in the left upper extremity; 4 out of 5 strength in the left lower extremity  Skin: No rash  Peripheral vascular: No edema  Genitourinary::     DISCHARGE DISPOSITION   Stable     DISCHARGE MEDICATIONS:      Discharge Medications          New Medications         Instructions Start Date   atorvastatin 80 MG tablet  Commonly known as: LIPITOR    80 mg, Oral, Daily    Start Date: August 24, 2023      glucose blood test strip    Use 2 (Two) Times a Day as instructed        magnesium oxide 400 tablet tablet  Commonly known as: MAG-OX    400 mg, Oral, Daily    Start Date: August 24, 2023      ONE TOUCH ULTRA 2 w/Device kit    Use 2 (Two) Times a Day.        OneTouch Delica Plus [id number] misc    Use 2 (Two) Times a Day.        pantoprazole 40 MG EC tablet  Commonly known as: PROTONIX    40 mg, Oral, Every Early Morning    Start Date: August 24, 2023      polyethylene glycol 17 GM/SCOOP powder  Commonly known as: MIRALAX    Mix 17 grams of powder in 4 to 8 ounces of liquid and take by mouth Daily for 30 days    Start Date: August 24, 2023                Changes to Medications         Instructions Start Date   metoprolol tartrate 25 MG tablet  Commonly known as: LOPRESSOR  What changed:   medication  strength  how much to take  when to take this    25 mg, Oral, Every 12 Hours Scheduled                  Continue These Medications         Instructions Start Date   allopurinol 300 MG tablet  Commonly known as: ZYLOPRIM    300 mg, Oral, Daily        tamsulosin 0.4 MG capsule 24 hr capsule  Commonly known as: FLOMAX    0.4 mg, Oral, Nightly        Xarelto 20 MG tablet  Generic drug: rivaroxaban    20 mg, Oral, Daily                  Stop These Medications       dilTIAZem 90 MG tablet  Commonly known as: CARDIZEM      glyburide 5 MG tablet  Commonly known as: DIAbeta      ondansetron ODT 8 MG disintegrating tablet  Commonly known as: ZOFRAN-ODT      Vitamin D3 1.25 MG (41012 UT) capsule                    Your medication list          START taking these medications         Instructions Last Dose Given Next Dose Due   atorvastatin 80 MG tablet  Commonly known as: LIPITOR  Start taking on: August 24, 2023       Take 1 tablet by mouth Daily for 90 days.          glucose blood test strip       Use 2 (Two) Times a Day as instructed          magnesium oxide 400 tablet tablet  Commonly known as: MAG-OX  Start taking on: August 24, 2023       Take 1 tablet by mouth Daily for 30 days.          ONE TOUCH ULTRA 2 w/Device kit       Use 2 (Two) Times a Day.          OneTouch Delica Plus [id number] misc       Use 2 (Two) Times a Day.          pantoprazole 40 MG EC tablet  Commonly known as: PROTONIX  Start taking on: August 24, 2023       Take 1 tablet by mouth Every Morning for 30 days.          polyethylene glycol 17 GM/SCOOP powder  Commonly known as: MIRALAX  Start taking on: August 24, 2023       Mix 17 grams of powder in 4 to 8 ounces of liquid and take by mouth Daily for 30 days                    CHANGE how you take these medications         Instructions Last Dose Given Next Dose Due   metoprolol tartrate 25 MG tablet  Commonly known as: LOPRESSOR  What changed:   medication strength  how much to take  when to take this        Take 1 tablet by mouth Every 12 (Twelve) Hours for 30 days.                    CONTINUE taking these medications         Instructions Last Dose Given Next Dose Due   allopurinol 300 MG tablet  Commonly known as: ZYLOPRIM       Take 1 tablet by mouth Daily.          tamsulosin 0.4 MG capsule 24 hr capsule  Commonly known as: FLOMAX       Take 1 capsule by mouth Every Night for 30 days.          Xarelto 20 MG tablet  Generic drug: rivaroxaban       Take 1 tablet by mouth Daily for 30 days.                    STOP taking these medications       dilTIAZem 90 MG tablet  Commonly known as: CARDIZEM         glyburide 5 MG tablet  Commonly known as: DIAbeta         ondansetron ODT 8 MG disintegrating tablet  Commonly known as: ZOFRAN-ODT         Vitamin D3 1.25 MG (50846 UT) capsule                       Where to Get Your Medications          These medications were sent to [address] [address]        Hours: 8AM-6PM Mon-Fri Phone: [phone]   atorvastatin 80 MG tablet  glucose blood test strip  magnesium oxide 400 tablet tablet  metoprolol tartrate 25 MG tablet  ONE TOUCH ULTRA 2 w/Device kit  OneTouch Delica Plus [id number] misc  pantoprazole 40 MG EC tablet  polyethylene glycol 17 GM/SCOOP powder  tamsulosin 0.4 MG capsule 24 hr capsule  Xarelto 20 MG tablet            Diet Instructions         Diet: Cardiac Diets; Healthy Heart (2-3 Na+); Regular Texture (IDDSI 7); Thin (IDDSI 0)       Discharge Diet: Cardiac Diets     Cardiac Diet: Healthy Heart (2-3 Na+)     Texture: Regular Texture (IDDSI 7)     Fluid Consistency: Thin (IDDSI 0)             No future appointments.  Additional Instructions for the Follow-ups that You Need to Schedule         Ambulatory Referral to Home Health   As directed        Face to Face Visit Date: 8/23/2023   Follow-up provider for Plan of Care?: I treated the patient in an acute care facility and will not continue treatment after discharge.    Follow-up provider: LAYA LOVE [[id number]]   Reason/Clinical Findings: hx cva with left hemiparesis   Describe mobility limitations that make leaving home difficult: left hemiparesis   Nursing/Therapeutic Services Requested: Skilled Nursing Physical Therapy Occupational Therapy   Skilled nursing orders: Medication education Cardiopulmonary assessments   PT orders: Therapeutic exercise Gait Training Transfer training Strengthening Home safety assessment   Weight Bearing Status: As Tolerated   Occupational orders: Activities of daily living Strengthening Fine motor Home safety assessment   Frequency: 1 Week 1           Discharge Follow-up with Specified Provider: Dr Laya Schulte; 2 Weeks   As directed        To: Dr Laya Schulte   Follow Up: 2 Weeks   Follow Up Details: followup cva           Discharge Follow-up with Specified Provider: Keep appt arranged with DR Gregory next week   As directed        To: Keep appt arranged with DR Gregory next week                     Contact information for follow-up providers         Adriana Ledesma MD. Go on 8/30/2023.    Specialty: Gastroenterology  Why: at 12:30pm for hospital follow up.  You will see Amita Uriostegui NP for this visit.     Fax D/C summary at discharge.  Contact information:  [address] [place] [address] DR TEJEDA [address] [place] [address] [place] [address] [phone]                    Nicolas Gregory MD. Go on 8/30/2023.    Specialty: Hematology and Oncology  Why: 1:30pm for oncology follow up.  Contact information:  [address] [place] [address] [address] [place] [address] [phone]                              Contact information for after-discharge care         Durable Medical Equipment         [facility] .    Service: Durable Medical Equipment  Contact information:  [address] [facility] [address]  [phone]                           Home Medical Care         [facility] .    Services: Home Nursing, Home  Rehabilitation  Contact information:  [address]  [facility] [address] [phone]                                             TEST  RESULTS PENDING AT DISCHARGE        CODE STATUS      Code Status and Medical Interventions:   Ordered at: 07/06/23 1649     Code Status (Patient has no pulse and is not breathing):     CPR (Attempt to Resuscitate)     Medical Interventions (Patient has pulse or is breathing):     Full Support         Sj Jay MD  [facility]  08/23/23  10:01 EDT     Please note that this discharge summary required less than 30 minutes to complete.

## 2023-08-23 NOTE — SIGNIFICANT NOTE
08/23/23 1544   PT Discharge Summary   Reason for Discharge Discharge from facility   Outcomes Achieved Patient able to partially acheive established goals   Discharge Destination Home with assist;Home with home health

## 2023-08-23 NOTE — PROGRESS NOTES
Case Management  Inpatient Rehabilitation Team Conference    Conference Date/Time: 8/22/2023 6:12:47 AM    Team Conference Attendees:  MD Wendi James SW Jessica Bill, RN,   ALON Horowitz, PT  Lisa Sanchez OT    Demographics            Age: 67Y            Gender: Male    Admission Date: 7/6/2023 4:18:00 PM  Rehabilitation Diagnosis:  stroke  Comorbidities: C85.15 Unspecified B-cell lymphoma, lymph nodes of inguinal  region and lower limb  C32.9 Malignant neoplasm of larynx, unspecified  E11.9 Type 2 diabetes mellitus without complications  I10 Essential (primary) hypertension  K59.00 Constipation, unspecified  I48.91 Unspecified atrial fibrillation  M10.9 Gout, unspecified  Z51.89 Encounter for other specified aftercare  Z60.4 Social exclusion and rejection  Z91.81 History of falling      Plan of Care  Anticipated Discharge Date/Estimated Length of Stay: 8-23-23  Anticipated Discharge Destination: Community discharge with assistance  Discharge Plan : Pt plans to return home with cousin at discharge.  Pt says his  cousin, sister and daughter can assist with needs.  Medical Necessity Expected Level Rationale: fair-good  Intensity and Duration: an average of 3 hours/5 days per week  Medical Supervision and 24 Hour Rehab Nursing: x  Physical Therapy: x  PT Intensity/Duration: PT 1.5 hours per day/5 days per week  Occupational Therapy: x  OT Intensity/Duration: OT 1.5 hours per day/5 days per week  Social Work: x  Therapeutic Recreation: x  Updated (if changes indicated)    Anticipated Discharge Date/Estimated Length of Stay:   8-23-23      Discharge Plan of Care: Home Health Services Physical Therapy strengthening,  endurance, gait training, transfer training, balance, therapeutic exercise, bed  mobility, neuro re-education, home safety 3 times per week for 4 weeks  Ambulatory: walker with wheels    Based on the patient's medical and functional status, their  prognosis and  expected level of functional improvement is: fair-good      Interdisciplinary Problem/Goals/Status  Body Function Structure    [RN] Skin Integrity(Active)  Current Status(07/06/2023): At Risk for Skin Breakdown  Weekly Goal(08/25/2023): No Skin Breakdown  Discharge Goal: No Skin Breakdown        Safety    [RN] Potential for Injury(Active)  Current Status(07/06/2023): Potential for Falls  Weekly Goal(08/25/2023): No Falls  Discharge Goal: No Falls        Mobility    [PT] Bed/Chair/Wheelchair(Active)  Current Status(07/07/2023): Dep/Tye  Weekly Goal(07/14/2023): mod A  Discharge Goal: SBA    [PT] Walk(Active)  Current Status(07/07/2023): unable  Weekly Goal(07/14/2023): amb 20' mod A x2 hallrail  Discharge Goal: amb 150' HW SBA        Self Care    [OT] Dressing (Upper)(Active)  Current Status(08/21/2023): Min A  Weekly Goal(08/29/2023): CGA  Discharge Goal: CGA    Comments: Pt plans to return home with friend Alejandra staying with him and  assisting with care.  Daughter Karolyn is suppose to move into pt's home too.  Pt  will have assistance from sister, cousin, son and daughter's friend.    Signed by: SUJATA Breen    Physician CoSigned By: Sj Jay 08/23/2023 09:30:09

## 2023-08-23 NOTE — PROGRESS NOTES
Occupational Therapy: Individual: 15 minutes.    Physical Therapy:    Speech Language Pathology:    Signed by: Veronica Robles OT

## 2023-08-23 NOTE — PROGRESS NOTES
Patient Assessment Instrument  Quality Indicators - Discharge FY 2023    Section A. Transportation      Section A. Medication List        Section B. Health Literacy      Section C. BIMS      Section C. Signs and Symptoms of Delirium (from CAM)      Section D. Mood      Section D. Social Isolation      Section GG. Self-Care Performance      Section GG. Mobility Performance     Roll Left and Right: [place] does less than half the effort. [place] lifts, holds  or supports trunk or limbs but provides less than half the effort.   Sit to Lying: [place] does more than half the effort. [place] lifts or holds  trunk or limbs and provides more than half the effort.   Lying to Sitting on Side of Bed: [place] does more than half the effort. [place]  lifts or holds trunk or limbs and provides more than half the effort.   Sit to Stand: [place] does less than half the effort. [place] lifts, holds or  supports trunk or limbs but provides less than half the effort.   Chair/Bed to Chair Transfer: [place] does more than half the effort. [place]  lifts or holds trunk or limbs and provides more than half the effort.   Toilet Transfer [place] does more than half the effort. [place] lifts or holds  trunk or limbs and provides more than half the effort.   Car Transfer: [place] does more than half the effort. [place] lifts or holds  trunk or limbs and provides more than half the effort.   Walk 10 Feet:   [place] does less than half the effort. [place] lifts, holds or  supports trunk or limbs but provides less than half the effort.  Walk 50 Feet with 2 Turns:   [place] does less than half the effort. [place]  lifts, holds or supports trunk or limbs but provides less than half the effort.  Walk 150 Feet:   [place] does less than half the effort. [place] lifts, holds or  supports trunk or limbs but provides less than half the effort.  Walking 10 Feet on Uneven Surfaces:   Not attempted due to medical or safety  concerns.  1 Step Over Curb or Up/Down Stair:   Not  attempted due to medical or safety  concerns.  Picking up an Object:   Not attempted due to medical or safety concerns. Uses  Wheelchair and/or Scooter: Yes  Wheel 50 Feet with Two Turns: [place] does more than half the effort. [place]  lifts or holds trunk or limbs and provides more than half the effort.  Type of Wheelchair or Scooter Used: Manual  Wheel 150 Feet: [place] does all of the effort. Patient does none of the effort  to complete the activity. Or, the assistance of 2 or more helpers is required  for the patient to complete the activity.  Type of Wheelchair or Scooter Used: Manual    Section J. Health Conditions Discharge      Section J. Health Conditions (Pain)      Section K. Swallowing/Nutritional Status  Nutritional Approaches Past 7 Days:  Nutritional Approaches at Discharge:    Section M. Skin Conditions Discharge      . Current Number of Unhealed Pressure Ulcers      Section N. Medication        Section O. Special Treatments, Procedures, and Programs    Signed by: Connie Velasquez PTA

## 2023-08-23 NOTE — PROGRESS NOTES
"Patient Assessment Instrument  Quality Indicators - Discharge FY 2023    Section A. Transportation      Section A. Medication List  Subsequent Provider:  06 - Home under care of organized home health service  organization  Medication List to Subsequent Provider at Discharge:  Yes - Current reconciled  medication list provided to the subsequent provider  Route(s) of Medication List Transmission to Subsequent Provider:  Paper-based  (e.g., fax, copies, printouts)  Medication List to Patient:  Not applicable.  Patient discharged to a subsequent  provider as defined in 44D    Section B. Health Literacy  Frequency of Needing Assistance Reading:  Never    Section C. BIMS  Brief Interview for Mental Status (BIMS) was conducted.  Repetition of Three Words: Three words  Able to report correct year: Correct  Able to report correct month: Accurate within 5 days  Able to report correct day of the week: Correct  Able to recall \"sock\": Yes, no cue required  Able to recall \"blue\": Yes, no cue required  Able to recall \"bed\": Yes, no cue required    BIMS SUMMARY SCORE: 15 Cognitively intact    Section C. Signs and Symptoms of Delirium (from CAM)  Acute Change in Mental Status:   No  Inattention:   Behavior not present  Disorganized Thinking:   Behavior not present  Altered Level of Consciousness:   Behavior not present    Section D. Mood  Presence of little interest or pleasure in doing things:   No  Frequency of having little interest or pleasure in doing things:   Never or 1  day  Presence of feeling down, depressed, or hopeless:   No  Frequency of feeling down, depressed, or hopeless:   Never or 1 day   Interview Ended. Above responses do not meet criteria to continue  Total Severity Score:   00    Section D. Social Isolation  Frequency of Feeling Lonely or Isolated:  Never    Section GG. Self-Care Performance      Section GG. Mobility Performance      Section J. Health Conditions Discharge  Fall(s) Since Admission:  " No    Section J. Health Conditions (Pain)  Pain Effect on Sleep:   Rarely or not at all  Pain Interference with Therapy Activities:   Rarely or not at all  Pain Interference with Day-to-Day Activities:   Rarely or not at all    Section K. Swallowing/Nutritional Status  Nutritional Approaches Past 7 Days:  Therapeutic diet (e.g., low salt, diabetic,  low cholesterol)  Nutritional Approaches at Discharge:  Therapeutic diet (e.g., low salt,  diabetic, low cholesterol)    Section M. Skin Conditions Discharge  Unhealed Pressure Ulcer(s)/Injurie(s) at Stage 1 or Higher:  No    . Current Number of Unhealed Pressure Ulcers      Section N. Medication    Medication Intervention: Not applicable - There were no potential clinically  significant medication issues identified since admission or patient is not  taking any medications.  Patient is taking medications in the following pharmacological classification:  E. Anticoagulant An indication is noted for all medications in the Anticoagulant  drug class.    Section O. Special Treatments, Procedures, and Programs  IV Access: Central  (e.g., PICC, tunneled, port)    Signed by: Val Duncan Nurse

## 2023-08-23 NOTE — PLAN OF CARE
Goal Outcome Evaluation:  Plan of Care Reviewed With: patient resting in bed, no complaints at present, will continue to monitor.

## 2023-08-23 NOTE — PROGRESS NOTES
Patient Assessment Instrument  Quality Indicators - Discharge FY 2023    Section A. Transportation  Issues Due to Lack of Transportation:  No    Section A. Medication List        Section B. Health Literacy      Section C. BIMS      Section C. Signs and Symptoms of Delirium (from CAM)      Section D. Mood      Section D. Social Isolation      Section GG. Self-Care Performance      Section GG. Mobility Performance      Section J. Health Conditions Discharge      Section J. Health Conditions (Pain)      Section K. Swallowing/Nutritional Status  Nutritional Approaches Past 7 Days:  Nutritional Approaches at Discharge:    Section M. Skin Conditions Discharge      . Current Number of Unhealed Pressure Ulcers      Section N. Medication        Section O. Special Treatments, Procedures, and Programs    Signed by: SUJATA Breen

## 2023-08-23 NOTE — PROGRESS NOTES
Occupational Therapy:    Physical Therapy: Individual: 25 minutes.    Speech Language Pathology:    Signed by: Connie Velasquez PTA

## 2023-08-23 NOTE — THERAPY DISCHARGE NOTE
Inpatient Rehabilitation - IRF Occupational Therapy Treatment Note/Discharge   [place]     Patient Name: Antonio Canseco  : 1955  MRN: [id number]  Today's Date: 2023               Admit Date: 2023       ICD-10-CM ICD-9-CM   1. Cerebrovascular accident (CVA), unspecified mechanism  I63.9 434.91     Patient Active Problem List   Diagnosis    Detrusor instability    Low testosterone in male    Disorder of prostate    Corporo-venous occlusive erectile dysfunction    CVA (cerebral vascular accident)     Past Medical History:   Diagnosis Date    Diabetes mellitus     Hypertension     Stroke      Past Surgical History:   Procedure Laterality Date    US GUIDED LYMPH NODE BIOPSY  2023       IRF OT ASSESSMENT FLOWSHEET (last 12 hours)       IRF OT Evaluation and Treatment       Row Name 23 1222          OT Time and Intention    Document Type discharge evaluation  -HB     Mode of Treatment individual therapy;occupational therapy  -HB     Total Minutes, Occupational Therapy 15  -HB     Patient Effort good  -HB     Symptoms Noted During/After Treatment none  -HB       Row Name 23 1222          General Information    Patient Profile Reviewed yes  -HB     Patient/Family/Caregiver Comments/Observations Patient/caregivers present for discharge education on ADLs/home safety/LUE/left attention/functional mobility/ with good understanding from pt and cargivers/family.  -HB     General Observations of Patient Patient tolerated OT well with no adverse reactions.  -HB     Existing Precautions/Restrictions fall  left inattention  -HB     Limitations/Impairments safety/cognitive  -HB       Row Name 23 1222          Cognition/Psychosocial    Affect/Mental Status (Cognition) WFL  -HB     Orientation Status (Cognition) oriented x 3  -HB     Follows Commands (Cognition) verbal cues/prompting required;repetition of directions required;physical/tactile prompts required;follows one-step commands  -HB        Row Name 08/23/23 1222          Bathing    [place] Level (Bathing) minimum assist (75% patient effort);verbal cues  -       Row Name 08/23/23 1222          Upper Body Dressing    [place] Level (Upper Body Dressing) upper body dressing skills;minimum assist (75% or more patient effort)  -HB       Row Name 08/23/23 1222          Lower Body Dressing    [place] Level (Lower Body Dressing) moderate assist (50% patient effort);maximum assist (25% patient effort);verbal cues  -       Row Name 08/23/23 1222          Grooming    [place] Level (Grooming) minimum assist (75% patient effort);verbal cues;nonverbal cues (demo/gesture)  -       Row Name 08/23/23 1222          Toileting    [place] Level (Toileting) dependent (less than 25% patient effort);verbal cues;nonverbal cues (demo/gesture)  -       Row Name 08/23/23 1222          Self-Feeding    [place] Level (Self-Feeding) feeding skills;set up  -[facility] Name 08/23/23 1222          Positioning and Restraints    Pre-Treatment Position sitting in chair/recliner  -     Post Treatment Position wheelchair  -HB     In Bed call light within reach;encouraged to call for assist  family present  -       Row Name 08/23/23 1222          Therapy Plan Review/Discharge Plan (OT)    Therapy Plan Review (OT) evaluation/treatment results reviewed;care plan/treatment goals reviewed;caregiver  sister and daughter  -     Anticipated Discharge Disposition (OT) home with assist;home with home health  -       Row Name 08/23/23 1222          UB Dressing Goal 1 (OT-IRF)    Activity/Device (UB Dressing Goal 1, OT-IRF) upper body dressing  -HB     [place] (UB Dress Goal 1, OT-IRF) moderate assist (50-74% patient effort)  -HB     Time Frame (UB Dressing Goal 1, OT-IRF) short-term goal (STG);1 week;2 weeks  -HB     Progress/Outcomes (UB Dressing Goal 1, OT-IRF) goal met  -       Row Name 08/23/23 1222          UB Dressing Goal 2 (OT-IRF)     Activity/Device (UB Dressing Goal 2, OT-IRF) upper body dressing  -HB     [place] (UB Dress Goal 2, OT-IRF) contact guard required;minimum assist (75% or more patient effort)  -HB     Time Frame (UB Dressing Goal 2, OT-IRF) by discharge  -HB     Progress/Outcomes (UB Dressing Goal 2, OT-IRF) goal met  -HB       Row Name 08/23/23 1222          LB Dressing Goal 1 (OT-IRF)    Activity/Device (LB Dressing Goal 1, OT-IRF) lower body dressing  -HB     [place] (LB Dressing Goal 1, OT-IRF) maximum assist (25-49% patient effort)  -HB     Time Frame (LB Dressing Goal 1, OT-IRF) 1 week;2 weeks  -HB     Progress/Outcomes (LB Dressing Goal 1, OT-IRF) goal met  -HB       Row Name 08/23/23 1222          LB Dressing Goal 2 (OT-IRF)    Activity/Device (LB Dressing Goal 2, OT-IRF) lower body dressing  -HB     [place] (LB Dressing Goal 2, OT-IRF) moderate assist (50-74% patient effort)  -HB     Time Frame (LB Dressing Goal 2, OT-IRF) by discharge  -HB     Progress/Outcomes (LB Dressing Goal 2, OT-IRF) good progress toward goal  -HB       Row Name 08/23/23 1222          Discharge Summary (OT)    Outcomes Achieved Upon Discharge (OT) goals partially achieved prior to discharge  -HB     Discharge Summary Statement (OT) Patient and family educated on ADLs/functional mobility/ etc.Good understanding verbalized.  -HB     Transfer to Another Level of Care or Facility (OT) home with home health recommended  -HB               User Key  (r) = Recorded By, (t) = Taken By, (c) = Cosigned By      Initials Name Effective Dates    HB Veronica Robles, OT 05/25/21 -                        Occupational Therapy Education       Title: PT OT SLP Therapies (Resolved)       Topic: Occupational Therapy (Resolved)       Point: ADL training (Resolved)       Description:   Instruct learner(s) on proper safety adaptation and remediation techniques during self care or transfers.   Instruct in proper use of assistive devices.                  Learning  Progress Summary             Patient Acceptance, E, VU,NR by BF at 8/22/2023 1201    Acceptance, E, VU,NR by BF at 8/21/2023 1455    Acceptance, TB, NR by DL at 8/20/2023 2104    Acceptance, TB, NR by DL at 8/20/2023 2041    Acceptance, E,D, VU,NR,DU by BF at 8/18/2023 1238    Acceptance, E,D, VU,NR by CJ at 8/17/2023 1419    Acceptance, E, VU,NR by HB at 8/16/2023 1059    Acceptance, E, VU,NR by BF at 8/15/2023 1222    Acceptance, E, VU,NR by BF at 8/14/2023 1527    Acceptance, E, VU,NR by BF at 8/11/2023 1204    Acceptance, E,D, VU,DU by BF at 8/10/2023 1524    Acceptance, TB, NR by DL at 8/8/2023 2014    Acceptance, E, VU,NR by BF at 8/8/2023 1440    Acceptance, E, VU,NR by BF at 8/7/2023 1526    Acceptance, E, VU,NR by BF at 8/4/2023 1221    Acceptance, E,TB, VU,NR by MB at 8/3/2023 2239    Acceptance, E, VU,NR by BF at 8/3/2023 1217    Acceptance, E, VU,NR by BF at 8/1/2023 1427    Acceptance, E, VU,NR by BF at 7/31/2023 1409    Acceptance, E, VU,NR by BF at 7/28/2023 1442    Acceptance, E, VU,NR by BF at 7/27/2023 1445    Acceptance, E, VU,NR by BF at 7/25/2023 1251    Acceptance, E, VU,NR by HB at 7/24/2023 1355    Acceptance, E, NR,VU by BF at 7/21/2023 1248    Acceptance, E, NR by BF at 7/20/2023 1259    Acceptance, TB, NR by DL at 7/19/2023 2036    Acceptance, E, VU,NR by HB at 7/19/2023 1421    Acceptance, E, VU,NR by BF at 7/18/2023 1334    Acceptance, E, VU,NR by BF at 7/17/2023 1431    Acceptance, E, VU,NR by BF at 7/17/2023 1430    Acceptance, E, VU,NR by BF at 7/14/2023 1508    Acceptance, E, VU,NR by BF at 7/13/2023 1343    Acceptance, E,D, VU,NR by TM at 7/12/2023 1407    Acceptance, D,E, NR,VU by TM at 7/11/2023 1434    Acceptance, E, VU,NR by BF at 7/10/2023 1456    Acceptance, E, VU,NR by HB at 7/8/2023 1140    Acceptance, E, VU,NR by BF at 7/7/2023 1442   Family Acceptance, E,D, VU,DU by BF at 8/10/2023 1524    Acceptance, E, VU,NR by BF at 8/1/2023 1427   Significant Other Acceptance, E,D,  VU,NR,DU by BF at 8/18/2023 1238                         Point: Precautions (Resolved)       Description:   Instruct learner(s) on prescribed precautions during self-care and functional transfers.                  Learning Progress Summary             Patient Acceptance, E, VU,NR by BF at 8/22/2023 1201    Acceptance, E, VU,NR by BF at 8/21/2023 1455    Acceptance, TB, NR by DL at 8/20/2023 2104    Acceptance, TB, NR by DL at 8/20/2023 2041    Acceptance, E,D, VU,NR,DU by BF at 8/18/2023 1238    Acceptance, E,D, VU,NR by CJ at 8/17/2023 1419    Acceptance, E, VU,NR by HB at 8/16/2023 1059    Acceptance, E, VU,NR by BF at 8/15/2023 1222    Acceptance, E, VU,NR by BF at 8/14/2023 1527    Acceptance, E, VU,NR by BF at 8/11/2023 1204    Acceptance, E,D, VU,DU by BF at 8/10/2023 1524    Acceptance, TB, NR by DL at 8/8/2023 2014    Acceptance, E, VU,NR by BF at 8/8/2023 1440    Acceptance, E, VU,NR by BF at 8/7/2023 1526    Acceptance, E, VU,NR by BF at 8/4/2023 1221    Acceptance, E,TB, VU,NR by MB at 8/3/2023 2239    Acceptance, E, VU,NR by BF at 8/3/2023 1217    Acceptance, E, VU,NR by BF at 8/1/2023 1427    Acceptance, E, VU,NR by BF at 7/31/2023 1409    Acceptance, E, VU,NR by BF at 7/28/2023 1442    Acceptance, E, VU,NR by BF at 7/27/2023 1445    Acceptance, E, VU,NR by BF at 7/25/2023 1251    Acceptance, E, VU,NR by HB at 7/24/2023 1355    Acceptance, E, NR,VU by BF at 7/21/2023 1248    Acceptance, E, NR by BF at 7/20/2023 1259    Acceptance, TB, NR by DL at 7/19/2023 2036    Acceptance, E, VU,NR by HB at 7/19/2023 1421    Acceptance, E, VU,NR by BF at 7/18/2023 1334    Acceptance, E, VU,NR by BF at 7/17/2023 1431    Acceptance, E, VU,NR by BF at 7/17/2023 1430    Acceptance, E, VU,NR by BF at 7/14/2023 1508    Acceptance, E, VU,NR by BF at 7/13/2023 1343    Acceptance, E,D, VU,NR by TM at 7/12/2023 1407    Acceptance, D,E, NR,VU by TM at 7/11/2023 1434    Acceptance, E, VU,NR by BF at 7/10/2023 1456    Acceptance,  E, VU,NR by HB at 7/8/2023 1140    Acceptance, E, VU,NR by BF at 7/7/2023 1442   Family Acceptance, E,D, VU,DU by BF at 8/10/2023 1524    Acceptance, E, VU,NR by BF at 8/1/2023 1427   Significant Other Acceptance, E,D, VU,NR,DU by BF at 8/18/2023 1238                                         User Key       Initials Effective Dates Name Provider Type Discipline    BF 05/31/23 - 07/10/23 Lisa Sanchez, OT Occupational Therapist OT    BF 07/11/23 -  Lisa Sanchez, OT Occupational Therapist OT    CJ 06/16/21 -  Ashanti Nova OT Occupational Therapist OT    TM 06/16/21 -  Marielos Capone, OT Occupational Therapist OT    HB 05/25/21 -  Veronica Robles OT Occupational Therapist OT    DL 03/16/22 -  Nadja Velasquez, RN Registered Nurse Nurse    MB 06/26/23 -  Vinay Fritz RN Registered Nurse Nurse                    OT Recommendation and Plan  Planned Therapy Interventions (OT): activity tolerance training, adaptive equipment training, BADL retraining, neuromuscular control/coordination retraining, passive ROM/stretching, ROM/therapeutic exercise, strengthening exercise, transfer/mobility retraining           OT IRF GOALS       Row Name 08/23/23 1222 08/18/23 1200 08/11/23 1200       UB Dressing Goal 1 (OT-IRF)    Activity/Device (UB Dressing Goal 1, OT-IRF) upper body dressing  -HB upper body dressing  -BF upper body dressing  -BF    [place] (UB Dress Goal 1, OT-IRF) moderate assist (50-74% patient effort)  -HB moderate assist (50-74% patient effort)  -BF moderate assist (50-74% patient effort)  -BF    Time Frame (UB Dressing Goal 1, OT-IRF) short-term goal (STG);1 week;2 weeks  -HB short-term goal (STG);1 week;2 weeks  -BF short-term goal (STG);1 week;2 weeks  -BF    Progress/Outcomes (UB Dressing Goal 1, OT-IRF) goal met  -HB goal met  -BF goal met  -BF       UB Dressing Goal 2 (OT-IRF)    Activity/Device (UB Dressing Goal 2, OT-IRF) upper body dressing  -HB upper body dressing  -BF  upper body dressing  -BF    [place] (UB Dress Goal 2, OT-IRF) contact guard required;minimum assist (75% or more patient effort)  -HB contact guard required;minimum assist (75% or more patient effort)  -BF contact guard required;minimum assist (75% or more patient effort)  -BF    Time Frame (UB Dressing Goal 2, OT-IRF) by discharge  -HB by discharge  -BF by discharge  -BF    Progress/Outcomes (UB Dressing Goal 2, OT-IRF) goal met  -HB goal ongoing;good progress toward goal  -BF goal ongoing  -BF       LB Dressing Goal 1 (OT-IRF)    Activity/Device (LB Dressing Goal 1, OT-IRF) lower body dressing  -HB lower body dressing  -BF lower body dressing  -BF    [place] (LB Dressing Goal 1, OT-IRF) maximum assist (25-49% patient effort)  -HB maximum assist (25-49% patient effort)  -BF maximum assist (25-49% patient effort)  -BF    Time Frame (LB Dressing Goal 1, OT-IRF) 1 week;2 weeks  -HB 1 week;2 weeks  -BF 1 week;2 weeks  -BF    Progress/Outcomes (LB Dressing Goal 1, OT-IRF) goal met  -HB goal met  -BF goal met  -BF       LB Dressing Goal 2 (OT-IRF)    Activity/Device (LB Dressing Goal 2, OT-IRF) lower body dressing  -HB lower body dressing  -BF lower body dressing  -BF    [place] (LB Dressing Goal 2, OT-IRF) moderate assist (50-74% patient effort)  -HB moderate assist (50-74% patient effort)  -BF moderate assist (50-74% patient effort)  -BF    Time Frame (LB Dressing Goal 2, OT-IRF) by discharge  -HB by discharge  -BF by discharge  -BF    Progress/Outcomes (LB Dressing Goal 2, OT-IRF) good progress toward goal  -HB goal ongoing  -BF goal ongoing  -BF              User Key  (r) = Recorded By, (t) = Taken By, (c) = Cosigned By      Initials Name Provider Type    Lisa West, OT Occupational Therapist    Veronica Erwin OT Occupational Therapist                        Time Calculation:    Time Calculation- OT       Row Name 08/23/23 1224             Time Calculation- OT    OT Start Time 1355   -HB      OT Stop Time 1150  -HB      OT Time Calculation (min) 15 min  -HB      Total Timed Code Minutes- OT 15 minute(s)  -HB                User Key  (r) = Recorded By, (t) = Taken By, (c) = Cosigned By      Initials Name Provider Type    Veronica Erwin OT Occupational Therapist                    Therapy Charges for Today       Code Description Service Date Service Provider Modifiers Qty    [id number]  OT SELF CARE/MGMT/TRAIN EA 15 MIN 8/23/2023 Veronica Robles OT GO 1                      Veronica Robles OT  8/23/2023

## 2023-08-23 NOTE — SIGNIFICANT NOTE
08/23/23 1150   Plan   Plan Spoke to pt, daughter Karolyn, friend Alejandra and sister Mariah about [facility] nurse to visit pt on 8-24-23 and orders for HH PT/OT.  [facility] delivered pt's rolling walker to rehab.  Pt is returning to his home with Alejandra and Karolyn assisting with caregiving needs.  Sister Mariah will come in and out of pt's home and assist with transportation to MD appointments.  Pt has other family and friend to assist with caregiving needs.  Daughter has a job and works in the evenings.  Family will transport pt home today.   Patient/Family in Agreement with Plan yes

## 2023-08-23 NOTE — PROGRESS NOTES
Patient Assessment Instrument  Quality Indicators - Discharge FY 2023    Section A. Transportation      Section A. Medication List        Section B. Health Literacy      Section C. BIMS      Section C. Signs and Symptoms of Delirium (from CAM)      Section D. Mood      Section D. Social Isolation      Section GG. Self-Care Performance     Eating: [place] sets up or cleans up; patient completes activity. [place] assists  only prior to or following the activity.   Oral Hygiene: [place] provides verbal cues and/or touching/steadying and/or  contact guard assistance as patient completes activity.   Toileting Hygiene: : [place] does more than half the effort. [place] lifts or  holds trunk or limbs and provides more than half the effort.   Shower/Bathe Self: [place] provides verbal cues and/or touching/steadying and/or  contact guard assistance as patient completes activity.   Upper Body Dressing: [place] provides verbal cues and/or touching/steadying  and/or contact guard assistance as patient completes activity.   Lower Body Dressing: [place] does less than half the effort. [place] lifts, holds  or supports trunk or limbs but provides less than half the effort.   Putting On/Taking Off Footwear: [place] does more than half the effort. [place]  lifts or holds trunk or limbs and provides more than half the effort.    Section GG. Mobility Performance      Section J. Health Conditions Discharge      Section J. Health Conditions (Pain)      Section K. Swallowing/Nutritional Status  Nutritional Approaches Past 7 Days:  Nutritional Approaches at Discharge:    Section M. Skin Conditions Discharge      . Current Number of Unhealed Pressure Ulcers      Section N. Medication        Section O. Special Treatments, Procedures, and Programs    Signed by: Veronica Robles, OT

## 2023-08-23 NOTE — THERAPY DISCHARGE NOTE
Inpatient Rehabilitation - Physical Therapy Treatment Note/Discharge   Ronnie     Patient Name: Antonio Canseco  : 1955  MRN: [id number]  Today's Date: 2023                Admit Date: 2023    Visit Dx:    ICD-10-CM ICD-9-CM   1. Cerebrovascular accident (CVA), unspecified mechanism  I63.9 434.91     Patient Active Problem List   Diagnosis    Detrusor instability    Low testosterone in male    Disorder of prostate    Corporo-venous occlusive erectile dysfunction    CVA (cerebral vascular accident)     Past Medical History:   Diagnosis Date    Diabetes mellitus     Hypertension     Stroke      Past Surgical History:   Procedure Laterality Date    US GUIDED LYMPH NODE BIOPSY  2023       PT ASSESSMENT (last 12 hours)       IRF PT Evaluation and Treatment       Row Name 23 1539          PT Time and Intention    Document Type --  Discharge treatment  -LL     Mode of Treatment individual therapy;physical therapy  -LL     Patient/Family/Caregiver Comments/Observations Patient discharged from IRF to home this date with assistance of family and with home health PT.  Education completed with patient, daughter, sister & s/o regarding home safety, patient & CG safety, current level of assistance required for functional mobility, HEP, adjustment of AD, proper use of gait belt.  Written materials issued and no questions/concerns voiced.  -LL       Row Name 23 1539          General Information    Existing Precautions/Restrictions fall  L HP, <trunk support/balance, L side inattention  -LL       Row Name 23 1539          Pain Scale: FACES Pre/Post-Treatment    Pain: FACES Scale, Pretreatment 0-->no hurt  -LL     Posttreatment Pain Rating 0-->no hurt  -LL       Row Name 23 1539          Cognition/Psychosocial    Affect/Mental Status (Cognition) WFL  -LL     Orientation Status (Cognition) oriented x 3  -LL     Follows Commands (Cognition) verbal cues/prompting required;physical/tactile  prompts required  -LL     Personal Safety Interventions fall prevention program maintained;gait belt;nonskid shoes/slippers when out of bed;supervised activity  -LL     Cognitive Function safety deficit  -LL     Safety Deficit (Cognition) impulsivity  -LL       Row Name 08/23/23 1539          Bed Mobility    Supine-Sit [place] (Bed Mobility) maximum assist (25% patient effort);verbal cues;nonverbal cues (demo/gesture)  -LL     Assistive Device (Bed Mobility) draw sheet  -       Row Name 08/23/23 1539          Bed-Chair Transfer    Bed-Chair [place] (Transfers) maximum assist (25% patient effort);verbal cues;nonverbal cues (demo/gesture);moderate assist (50% patient effort);2 person assist  -LL     Assistive Device (Bed-Chair Transfers) wheelchair;walker, front-wheeled  -LL       Row Name 08/23/23 1539          Sit-Stand Transfer    Sit-Stand [place] (Transfers) verbal cues;nonverbal cues (demo/gesture);minimum assist (75% patient effort)  -LL     Assistive Device (Sit-Stand Transfers) walker, front-wheeled  -LL       Row Name 08/23/23 1539          Stand-Sit Transfer    Stand-Sit [place] (Transfers) verbal cues;nonverbal cues (demo/gesture);minimum assist (75% patient effort)  -LL     Assistive Device (Stand-Sit Transfers) walker, platform  -LL       Row Name 08/23/23 1539          Gait/Stairs (Locomotion)    [place] Level (Gait) verbal cues;nonverbal cues (demo/gesture);2 person assist;moderate assist (50% patient effort)  -LL     Assistive Device (Gait) walker, front-wheeled  -     Distance in Feet (Gait) 160'  -LL     Pattern (Gait) step-to  -LL     Deviations/Abnormal Patterns (Gait) base of support, narrow;hebert decreased;gait speed decreased;stride length decreased;weight shifting decreased;ataxic  -LL     Bilateral Gait Deviations forward flexed posture  -       Row Name 08/23/23 1539          Motor Skills    Comments, Therapeutic Exercise --  HEP reviewed; GTB issued  -LL        Row Name 08/23/23 1539          Positioning and Restraints    Pre-Treatment Position in bed  -LL     Post Treatment Position wheelchair  -LL     In Wheelchair sitting;with family/caregiver  With tray table in front of him  -LL       Row Name 08/23/23 1539          Bed Mobility Goal 1 (PT-IRF)    Progress/Outcomes (Bed Mobility Goal 1, PT-IRF) goal not met  -LL       Row Name 08/23/23 1539          Bed Mobility Goal 2 (PT-IRF)    Progress/Outcomes (Bed Mobility Goal 2, PT-IRF) goal not met  -LL       Row Name 08/23/23 1539          Transfer Goal 1 (PT-IRF)    Progress/Outcomes (Transfer Goal 1, PT-IRF) goal partially met  -LL       Row Name 08/23/23 1539          Transfer Goal 2 (PT-IRF)    Progress/Outcomes (Transfer Goal 2, PT-IRF) goal not met  -LL       Row Name 08/23/23 1539          Gait/Walking Locomotion Goal 1 (PT-IRF)    Progress/Outcomes (Gait/Walking Locomotion Goal 1, PT-IRF) goal met  -LL       Row Name 08/23/23 1539          Gait/Walking Locomotion Goal 2 (PT-IRF)    Progress/Outcomes (Gait/Walking Locomotion Goal 2, PT-IRF) goal partially met  -LL               User Key  (r) = Recorded By, (t) = Taken By, (c) = Cosigned By      Initials Name Provider Type    Connie Isabel PTA Physical Therapist Assistant                    Physical Therapy Education       Title: PT OT SLP Therapies (Resolved)       Topic: Physical Therapy (Resolved)       Point: Mobility training (Resolved)       Learning Progress Summary             Patient Acceptance, E,D, VU,NR by LL at 8/22/2023 1112    Acceptance, E,D, VU,NR by LL at 8/21/2023 1451    Acceptance, TB, NR by DL at 8/20/2023 2104    Acceptance, TB, NR by DL at 8/20/2023 2041    Acceptance, E,D, VU,NR by RG at 8/18/2023 1320    Acceptance, E,D, VU,NR by RG at 8/17/2023 1332    Acceptance, E,D, VU,NR by RG at 8/16/2023 1354    Acceptance, E,D, VU,NR by RG at 8/16/2023 0723    Acceptance, E,TB, VU,DU by  at 8/15/2023 1402    Acceptance, E,D, VU,NR by RG at  8/11/2023 1503    Acceptance, E,D, VU,NR by RG at 8/10/2023 1510    Acceptance, E, VU,NR by LB at 8/9/2023 1314    Acceptance, TB, NR by DL at 8/8/2023 2014    Acceptance, E,D, VU,NR by RG at 8/8/2023 1407    Acceptance, E,D, VU,NR by RG at 8/7/2023 1507    Acceptance, E, VU,NR by LB at 8/4/2023 1521    Acceptance, E,TB, VU,NR by MB at 8/3/2023 2239    Acceptance, E,D, VU,NR by RG at 8/3/2023 1514    Acceptance, E,D, VU,NR by RG at 8/2/2023 1524    Acceptance, E,D, VU,NR by RG at 8/1/2023 1446    Acceptance, E,D, VU,NR by RG at 7/31/2023 1411    Acceptance, E,TB, VU by RM at 7/28/2023 1230    Acceptance, E,D, VU,NR by RG at 7/26/2023 1518    Acceptance, E, VU,NR by LB at 7/25/2023 1133    Acceptance, E, VU,NR by LB at 7/24/2023 1444    Acceptance, E, VU,NR by LB at 7/21/2023 1615    Acceptance, E,D, VU,NR by RG at 7/20/2023 1107    Acceptance, TB, NR by DL at 7/19/2023 2036    Acceptance, E,D, VU,NR by RG at 7/19/2023 1508    Acceptance, E,D, VU,NR by RG at 7/18/2023 1520    Acceptance, E,D, VU,NR by RG at 7/17/2023 1522    Acceptance, E,D, VU,NR by LL at 7/13/2023 1358    Acceptance, E, VU,NR by LB at 7/12/2023 1144    Acceptance, E, VU,NR by LB at 7/11/2023 1426    Acceptance, E, VU,NR by LB at 7/10/2023 1452    Acceptance, E,D, VU,NR by LL at 7/8/2023 1228    Acceptance, E,D, VU,NR by LB at 7/7/2023 1449                         Point: Home exercise program (Resolved)       Learning Progress Summary             Patient Acceptance, E,D, VU,NR by LL at 8/22/2023 1112    Acceptance, E,D, VU,NR by LL at 8/21/2023 1451    Acceptance, TB, NR by DL at 8/20/2023 2104    Acceptance, TB, NR by DL at 8/20/2023 2041    Acceptance, E,D, VU,NR by RG at 8/18/2023 1320    Acceptance, E,D, VU,NR by RG at 8/17/2023 1332    Acceptance, E,D, VU,NR by RG at 8/16/2023 1354    Acceptance, E,D, VU,NR by RG at 8/16/2023 0723    Acceptance, E,TB, VU,DU by HC at 8/15/2023 1402    Acceptance, E,D, VU,NR by RG at 8/11/2023 1503    Acceptance,  E,D, VU,NR by RG at 8/10/2023 1510    Acceptance, E, VU,NR by LB at 8/9/2023 1314    Acceptance, TB, NR by DL at 8/8/2023 2014    Acceptance, E,D, VU,NR by RG at 8/8/2023 1407    Acceptance, E,D, VU,NR by RG at 8/7/2023 1507    Acceptance, E, VU,NR by LB at 8/4/2023 1521    Acceptance, E,TB, VU,NR by MB at 8/3/2023 2239    Acceptance, E,D, VU,NR by RG at 8/3/2023 1514    Acceptance, E,D, VU,NR by RG at 8/2/2023 1524    Acceptance, E,D, VU,NR by RG at 8/1/2023 1446    Acceptance, E,D, VU,NR by RG at 7/31/2023 1411    Acceptance, E,TB, VU by RM at 7/28/2023 1230    Acceptance, E,D, VU,NR by RG at 7/26/2023 1518    Acceptance, E, VU,NR by LB at 7/25/2023 1133    Acceptance, E, VU,NR by LB at 7/24/2023 1444    Acceptance, E, VU,NR by LB at 7/21/2023 1615    Acceptance, E,D, VU,NR by RG at 7/20/2023 1107    Acceptance, TB, NR by DL at 7/19/2023 2036    Acceptance, E,D, VU,NR by RG at 7/19/2023 1508    Acceptance, E,D, VU,NR by RG at 7/18/2023 1520    Acceptance, E,D, VU,NR by RG at 7/17/2023 1522    Acceptance, E,D, VU,NR by LL at 7/13/2023 1358    Acceptance, E, VU,NR by LB at 7/12/2023 1144    Acceptance, E, VU,NR by LB at 7/11/2023 1426    Acceptance, E, VU,NR by LB at 7/10/2023 1452    Acceptance, E,D, VU,NR by LL at 7/8/2023 1228    Acceptance, E,D, VU,NR by LB at 7/7/2023 1449                         Point: Body mechanics (Resolved)       Learning Progress Summary             Patient Acceptance, E,D, VU,NR by LL at 8/22/2023 1112    Acceptance, E,D, VU,NR by LL at 8/21/2023 1451    Acceptance, TB, NR by DL at 8/20/2023 2104    Acceptance, TB, NR by DL at 8/20/2023 2041    Acceptance, E,D, VU,NR by RG at 8/18/2023 1320    Acceptance, E,D, VU,NR by RG at 8/17/2023 1332    Acceptance, E,D, VU,NR by RG at 8/16/2023 1354    Acceptance, E,D, VU,NR by RG at 8/16/2023 0723    Acceptance, E,TB, VU,DU by HC at 8/15/2023 1402    Acceptance, E,D, VU,NR by RG at 8/11/2023 1503    Acceptance, E,D, VU,NR by RG at 8/10/2023 1510     Acceptance, E, VU,NR by LB at 8/9/2023 1314    Acceptance, TB, NR by DL at 8/8/2023 2014    Acceptance, E,D, VU,NR by RG at 8/8/2023 1407    Acceptance, E,D, VU,NR by RG at 8/7/2023 1507    Acceptance, E, VU,NR by LB at 8/4/2023 1521    Acceptance, E,TB, VU,NR by MB at 8/3/2023 2239    Acceptance, E,D, VU,NR by RG at 8/3/2023 1514    Acceptance, E,D, VU,NR by RG at 8/2/2023 1524    Acceptance, E,D, VU,NR by RG at 8/1/2023 1446    Acceptance, E,D, VU,NR by RG at 7/31/2023 1411    Acceptance, E,TB, VU by RM at 7/28/2023 1230    Acceptance, E,D, VU,NR by RG at 7/26/2023 1518    Acceptance, E, VU,NR by LB at 7/25/2023 1133    Acceptance, E, VU,NR by LB at 7/24/2023 1444    Acceptance, E, VU,NR by LB at 7/21/2023 1615    Acceptance, E,D, VU,NR by RG at 7/20/2023 1107    Acceptance, TB, NR by DL at 7/19/2023 2036    Acceptance, E,D, VU,NR by RG at 7/19/2023 1508    Acceptance, E,D, VU,NR by RG at 7/18/2023 1520    Acceptance, E,D, VU,NR by RG at 7/17/2023 1522    Acceptance, E,D, VU,NR by LL at 7/13/2023 1358    Acceptance, E, VU,NR by LB at 7/12/2023 1144    Acceptance, E, VU,NR by LB at 7/11/2023 1426    Acceptance, E, VU,NR by LB at 7/10/2023 1452    Acceptance, E,D, VU,NR by LL at 7/8/2023 1228    Acceptance, E,D, VU,NR by LB at 7/7/2023 1449                         Point: Precautions (Resolved)       Learning Progress Summary             Patient Acceptance, E,D, VU,NR by LL at 8/22/2023 1112    Acceptance, E,D, VU,NR by LL at 8/21/2023 1451    Acceptance, TB, NR by DL at 8/20/2023 2104    Acceptance, TB, NR by DL at 8/20/2023 2041    Acceptance, E,D, VU,NR by RG at 8/18/2023 1320    Acceptance, E,D, VU,NR by RG at 8/17/2023 1332    Acceptance, E,D, VU,NR by RG at 8/16/2023 1354    Acceptance, E,D, VU,NR by RG at 8/16/2023 0723    Acceptance, E,TB, VU,DU by HC at 8/15/2023 1402    Acceptance, E,D, VU,NR by RG at 8/11/2023 1503    Acceptance, E,D, VU,NR by RG at 8/10/2023 1510    Acceptance, E, VU,NR by LB at  8/9/2023 1314    Acceptance, TB, NR by DL at 8/8/2023 2014    Acceptance, E,D, VU,NR by RG at 8/8/2023 1407    Acceptance, E,D, VU,NR by RG at 8/7/2023 1507    Acceptance, E, VU,NR by LB at 8/4/2023 1521    Acceptance, E,TB, VU,NR by MB at 8/3/2023 2239    Acceptance, E,D, VU,NR by RG at 8/3/2023 1514    Acceptance, E,D, VU,NR by RG at 8/2/2023 1524    Acceptance, E,D, VU,NR by RG at 8/1/2023 1446    Acceptance, E,D, VU,NR by RG at 7/31/2023 1411    Acceptance, E,TB, VU by RM at 7/28/2023 1230    Acceptance, E,D, VU,NR by RG at 7/26/2023 1518    Acceptance, E, VU,NR by LB at 7/25/2023 1133    Acceptance, E, VU,NR by LB at 7/24/2023 1444    Acceptance, E, VU,NR by LB at 7/21/2023 1615    Acceptance, E,D, VU,NR by RG at 7/20/2023 1107    Acceptance, TB, NR by DL at 7/19/2023 2036    Acceptance, E,D, VU,NR by RG at 7/19/2023 1508    Acceptance, E,D, VU,NR by RG at 7/18/2023 1520    Acceptance, E,D, VU,NR by RG at 7/17/2023 1522    Acceptance, E,D, VU,NR by LL at 7/13/2023 1358    Acceptance, E, VU,NR by LB at 7/12/2023 1144    Acceptance, E, VU,NR by LB at 7/11/2023 1426    Acceptance, E, VU,NR by LB at 7/10/2023 1452    Acceptance, E,D, VU,NR by LL at 7/8/2023 1228    Acceptance, E,D, VU,NR by LB at 7/7/2023 1449                                         User Key       Initials Effective Dates Name Provider Type Discipline     06/16/21 -  Joslyn Garcia, PT Physical Therapist PT    LL 05/02/16 -  Connie Velasquez, PTA Physical Therapist Assistant PT    RM 02/17/23 -  Shelley Hanson, PTA Physical Therapist Assistant PT    RG 06/16/21 -  Vu Brown, PTA Physical Therapist Assistant PT    HC 01/20/23 -  Kirti Arroyo, PTA Physical Therapist Assistant PT    DL 03/16/22 -  Nadja Velasquez, RN Registered Nurse Nurse    MB 06/26/23 -  Vinay Fritz, RN Registered Nurse Nurse                    PT Recommendation and Plan  Frequency of Treatment (PT): 5 times per week  Anticipated Equipment Needs  at Discharge (PT Eval):  (tbd)            Time Calculation:    PT Charges       Row Name 08/23/23 1545             Time Calculation    PT Received On 08/23/23  -LL         Time Calculation- PT    Total Timed Code Minutes- PT 25 minute(s)  -LL                User Key  (r) = Recorded By, (t) = Taken By, (c) = Cosigned By      Initials Name Provider Type    LL Connie Velasquez PTA Physical Therapist Assistant                    Therapy Charges for Today       Code Description Service Date Service Provider Modifiers Qty    25949006155 HC GAIT TRAINING EA 15 MIN 8/22/2023 Connie Velasquez, JILL GP, CQ 1    24752577389 HC PT THERAPEUTIC ACT EA 15 MIN 8/22/2023 Connie Velasquez, JILL GP, CQ 2    79352458436 HC PT THER PROC EA 15 MIN 8/22/2023 Connie Velasquez, JILL GP, CQ 3    90134804873 HC GAIT TRAINING EA 15 MIN 8/23/2023 Connie Velasquez, JILL GP, CQ 1    57113588897 HC PT THERAPEUTIC ACT EA 15 MIN 8/23/2023 Connie Velasquez, JILL GP, CQ 1            PT G-Codes  AM-PAC 6 Clicks Score (PT): 12    PT Discharge Summary  Reason for Discharge: Discharge from facility  Outcomes Achieved: Patient able to partially acheive established goals  Discharge Destination: Home with assist, Home with home health    Aneesh Velasquez PTA  8/23/2023

## 2023-08-23 NOTE — DISCHARGE SUMMARY
[facility] HOSPITALISTS DISCHARGE SUMMARY    Patient Identification:  Name:  Antonio Canseco  Age:  67 y.o.  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 male presented to [facility] acute inpatient physical rehab in transfer from [facility].  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  Patient was transferred [facility] for further evaluation.  Patient was intubated for airway protection prior to  Transfer.  CT was done at [facility] showed extravasation into the oropharynx likely originated from possible mass.  Once patient was arrived [facility] there was no further bleeding and patient did have CT angiogram on  without evidence of extravasation.  Patient developed left-sided weakness and confusion immediately after the  angiogram and stroke alert was called.  Because of risk of bleeding tPA was not pursued.  Patient been  Been off anticoagulation secondary to bleeding and MRI done on  showed multiple embolic strokes predominantly in the right cerebellar right posterior communicating artery and right MCA and RADHA territories.  Patient was started on Xarelto and has not had no further bleeding.  Patient also diagnosed with diabetes medication lymphoma from  iliac lymph node biopsy on June 23.  Patient afterwards.  May be candidate for physical rehab.    Status post bilateral CVA likely cardioembolic in nature with significant left-sided hemiparesis.  At the time of admission, patient dependent for transfers and was unable to stand or walk as his left lower extremity was flaccid.  At the time of admission patient required total assistance for bathing; max assist for upper body dressing; dependent for lower body dressing; moderate assist with grooming; dependent for toileting.  At the time of discharge patient requiring minimum assist for bathing; minimum assist for upper body dressing; moderate to maximum assist for lower body dressing; minimum assist for grooming; total assist for toileting.  Requires maximum assist for bed mobility; moderate to maximum assist for bed to chair transfer; minimum assist for sit to stand and stand to sit transfer; ambulated 160 feet with front wheel walker with moderate two-person assistance.  We will arrange for home health to continue to follow patient.  Patient's been on Xarelto during the admission without difficulty.  Patient on high-dose statin therapy as well    Atrial fibrillation rate has been well controlled throughout the admission.  Continue patient on metoprolol and Xarelto.    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 Gregory as an outpatient next week    BPH symptoms well controlled continue Flomax    Diabetes mellitus has been well controlled off of medications.  Continue to monitor closely at home      VITAL SIGNS:  Temp:  [97.8 øF (36.6 øC)-97.9 øF (36.6 øC)] 97.9 øF (36.6 øC)  Heart Rate:  [86-88] 88  Resp:  [18] 18  BP: (132-148)/(70-89) 148/89  SpO2:  [96 %-97 %] 96 %  on   ;   Device (Oxygen Therapy): room air    Body mass index is 40.57 kg/mý.  Wt Readings from Last 3 Encounters:   07/07/23 121 kg (266 lb 12.1 oz)   11/17/22  121 kg (267 lb)   07/15/19 121 kg (267 lb 1.6 oz)       PHYSICAL EXAM:  Constitutional: No acute distress  HEENT: Normocephalic atraumatic  Neck: Supple  Cardiovascular: Irregular irregular, ventricular rate well controlled  Pulmonary/Chest: Clear to auscultation  Abdominal:  .  Positive bowel sounds soft  Musculoskeletal: No arthropathy  Neurological: 3-4 out of 5 strength in the left upper extremity; 4 out of 5 strength in the left lower extremity  Skin: No rash  Peripheral vascular: No edema  Genitourinary::    DISCHARGE DISPOSITION   Stable    DISCHARGE MEDICATIONS:     Discharge Medications        New Medications        Instructions Start Date   atorvastatin 80 MG tablet  Commonly known as: LIPITOR   80 mg, Oral, Daily   Start Date: August 24, 2023     glucose blood test strip   Use 2 (Two) Times a Day as instructed      magnesium oxide 400 tablet tablet  Commonly known as: MAG-OX   400 mg, Oral, Daily   Start Date: August 24, 2023     ONE TOUCH ULTRA 2 w/Device kit   Use 2 (Two) Times a Day.      OneTouch Delica Plus [id number] misc   Use 2 (Two) Times a Day.      pantoprazole 40 MG EC tablet  Commonly known as: PROTONIX   40 mg, Oral, Every Early Morning   Start Date: August 24, 2023     polyethylene glycol 17 GM/SCOOP powder  Commonly known as: MIRALAX   Mix 17 grams of powder in 4 to 8 ounces of liquid and take by mouth Daily for 30 days   Start Date: August 24, 2023            Changes to Medications        Instructions Start Date   metoprolol tartrate 25 MG tablet  Commonly known as: LOPRESSOR  What changed:   medication strength  how much to take  when to take this   25 mg, Oral, Every 12 Hours Scheduled             Continue These Medications        Instructions Start Date   allopurinol 300 MG tablet  Commonly known as: ZYLOPRIM   300 mg, Oral, Daily      tamsulosin 0.4 MG capsule 24 hr capsule  Commonly known as: FLOMAX   0.4 mg, Oral, Nightly      Xarelto 20 MG tablet  Generic drug: rivaroxaban   20 mg,  Oral, Daily             Stop These Medications      dilTIAZem 90 MG tablet  Commonly known as: CARDIZEM     glyburide 5 MG tablet  Commonly known as: DIAbeta     ondansetron ODT 8 MG disintegrating tablet  Commonly known as: ZOFRAN-ODT     Vitamin D3 1.25 MG (30926 UT) capsule               Your medication list        START taking these medications        Instructions Last Dose Given Next Dose Due   atorvastatin 80 MG tablet  Commonly known as: LIPITOR  Start taking on: August 24, 2023      Take 1 tablet by mouth Daily for 90 days.       glucose blood test strip      Use 2 (Two) Times a Day as instructed       magnesium oxide 400 tablet tablet  Commonly known as: MAG-OX  Start taking on: August 24, 2023      Take 1 tablet by mouth Daily for 30 days.       ONE TOUCH ULTRA 2 w/Device kit      Use 2 (Two) Times a Day.       OneTouch Delica Plus [id number] misc      Use 2 (Two) Times a Day.       pantoprazole 40 MG EC tablet  Commonly known as: PROTONIX  Start taking on: August 24, 2023      Take 1 tablet by mouth Every Morning for 30 days.       polyethylene glycol 17 GM/SCOOP powder  Commonly known as: MIRALAX  Start taking on: August 24, 2023      Mix 17 grams of powder in 4 to 8 ounces of liquid and take by mouth Daily for 30 days              CHANGE how you take these medications        Instructions Last Dose Given Next Dose Due   metoprolol tartrate 25 MG tablet  Commonly known as: LOPRESSOR  What changed:   medication strength  how much to take  when to take this      Take 1 tablet by mouth Every 12 (Twelve) Hours for 30 days.              CONTINUE taking these medications        Instructions Last Dose Given Next Dose Due   allopurinol 300 MG tablet  Commonly known as: ZYLOPRIM      Take 1 tablet by mouth Daily.       tamsulosin 0.4 MG capsule 24 hr capsule  Commonly known as: FLOMAX      Take 1 capsule by mouth Every Night for 30 days.       Xarelto 20 MG tablet  Generic drug: rivaroxaban      Take 1 tablet by  mouth Daily for 30 days.              STOP taking these medications      dilTIAZem 90 MG tablet  Commonly known as: CARDIZEM        glyburide 5 MG tablet  Commonly known as: DIAbeta        ondansetron ODT 8 MG disintegrating tablet  Commonly known as: ZOFRAN-ODT        Vitamin D3 1.25 MG (96184 UT) capsule                  Where to Get Your Medications        These medications were sent to [facility] [place] [address] [address] [place] [address]      Hours: 8AM-6PM Mon-Fri Phone: [phone]   atorvastatin 80 MG tablet  glucose blood test strip  magnesium oxide 400 tablet tablet  metoprolol tartrate 25 MG tablet  ONE TOUCH ULTRA 2 w/Device kit  OneTouch Delica Plus [id number] misc  pantoprazole 40 MG EC tablet  polyethylene glycol 17 GM/SCOOP powder  tamsulosin 0.4 MG capsule 24 hr capsule  Xarelto 20 MG tablet         Diet Instructions       Diet: Cardiac Diets; Healthy Heart (2-3 Na+); Regular Texture (IDDSI 7); Thin (IDDSI 0)      Discharge Diet: Cardiac Diets    Cardiac Diet: Healthy Heart (2-3 Na+)    Texture: Regular Texture (IDDSI 7)    Fluid Consistency: Thin (IDDSI 0)          No future appointments.  Additional Instructions for the Follow-ups that You Need to Schedule       Ambulatory Referral to Home Health   As directed      Face to Face Visit Date: 8/23/2023   Follow-up provider for Plan of Care?: I treated the patient in an acute care facility and will not continue treatment after discharge.   Follow-up provider: LAYA LOVE [[id number]]   Reason/Clinical Findings: hx cva with left hemiparesis   Describe mobility limitations that make leaving home difficult: left hemiparesis   Nursing/Therapeutic Services Requested: Skilled Nursing Physical Therapy Occupational Therapy   Skilled nursing orders: Medication education Cardiopulmonary assessments   PT orders: Therapeutic exercise Gait Training Transfer training Strengthening Home safety assessment   Weight Bearing Status: As Tolerated   Occupational  orders: Activities of daily living Strengthening Fine motor Home safety assessment   Frequency: 1 Week 1        Discharge Follow-up with Specified Provider: Dr Seth Schulte; 2 Weeks   As directed      To: Dr Seth Schulte   Follow Up: 2 Weeks   Follow Up Details: followup cva        Discharge Follow-up with Specified Provider: Keep appt arranged with DR Gregory next week   As directed      To: Keep appt arranged with DR Gregory next week               Contact information for follow-up providers       Adriana Ledesma MD. Go on 8/30/2023.    Specialty: Gastroenterology  Why: at 12:30pm for hospital follow up.  You will see Amita Uriostegui NP for this visit.    Fax D/C summary at discharge.  Contact information:  [address] [place] [address]   [address] [address]  [phone]               Nicolas Gregory MD. Go on 8/30/2023.    Specialty: Hematology and Oncology  Why: 1:30pm for oncology follow up.  Contact information:  [address] [place] [address] [address] [place] [address] [phone]                       Contact information for after-discharge care       Durable Medical Equipment       [facility] .    Service: Durable Medical Equipment  Contact information:  [address]  [facility] [place] [address]  [phone]                     Home Medical Care       [facility] .    Services: Home Nursing, Home Rehabilitation  Contact information:  [address]  [facility] [address] [phone]                                    TEST  RESULTS PENDING AT DISCHARGE       CODE STATUS  Code Status and Medical Interventions:   Ordered at: 07/06/23 1649     Code Status (Patient has no pulse and is not breathing):    CPR (Attempt to Resuscitate)     Medical Interventions (Patient has pulse or is breathing):    Full Support       Sj Jay MD  [facility] Hospitalist  08/23/23  10:01 EDT    Please note that this discharge summary required less than 30  minutes to complete.

## 2023-08-23 NOTE — SIGNIFICANT NOTE
08/23/23 1045   Plan   Plan Contacted [facility] [phone] per Jing with discharge.   nurse to visit pt on 8-24-23 to start care.  Pt is ordered to receive HH PT/OT.  Faxed ambulatory referral for home health with face to face to HH.  Jing printed discharge summary from epic.  Faxed MD order for rolling walker to [facility].   Final Discharge Disposition Code 06 - home with home health care

## 2023-08-23 NOTE — PLAN OF CARE
Goal Outcome Evaluation:      Problem: Rehabilitation (IRF) Plan of Care  Goal: Plan of Care Review  Outcome: Met  Goal: Patient-Specific Goal (Individualized)  Outcome: Met  Goal: Absence of New-Onset Illness or Injury  Outcome: Met  Intervention: Prevent Fall and Fall Injury  Recent Flowsheet Documentation  Taken 8/23/2023 0800 by Maurisio Estrella RN  Safety Promotion/Fall Prevention: safety round/check completed  Intervention: Prevent Infection  Recent Flowsheet Documentation  Taken 8/23/2023 0800 by Maurisio Estrella RN  Infection Prevention:   hand hygiene promoted   rest/sleep promoted  Intervention: Prevent VTE (Venous Thromboembolism)  Recent Flowsheet Documentation  Taken 8/23/2023 0800 by Maurisio Estrella RN  VTE Prevention/Management: (xarelto) other (see comments)  Goal: Optimal Comfort and Wellbeing  Outcome: Met  Goal: Home and Community Transition Plan Established  Outcome: Met     Problem: Diabetes Comorbidity  Goal: Blood Glucose Level Within Targeted Range  Outcome: Met     Problem: Skin Injury Risk Increased  Goal: Skin Health and Integrity  Outcome: Met  Intervention: Promote and Optimize Oral Intake  Recent Flowsheet Documentation  Taken 8/23/2023 0800 by Maurisio Estrella RN  Oral Nutrition Promotion: physical activity promoted  Intervention: Optimize Skin Protection  Recent Flowsheet Documentation  Taken 8/23/2023 0800 by Maurisio Estrella RN  Pressure Reduction Techniques:   frequent weight shift encouraged   heels elevated off bed   weight shift assistance provided  Pressure Reduction Devices:   pressure-redistributing mattress utilized   heel offloading device utilized   positioning supports utilized  Skin Protection:   adhesive use limited   incontinence pads utilized     Problem: Fall Injury Risk  Goal: Absence of Fall and Fall-Related Injury  Outcome: Met  Intervention: Identify and Manage Contributors  Recent Flowsheet Documentation  Taken 8/23/2023 0800 by Maurisio Estrella RN  Medication  Review/Management: medications reviewed  Intervention: Promote Injury-Free Environment  Recent Flowsheet Documentation  Taken 8/23/2023 0800 by Maurisio Estrella RN  Safety Promotion/Fall Prevention: safety round/check completed     Problem: Mobility Impairment  Goal: Optimal Mobility [place] and Safety  Outcome: Met

## 2023-08-28 NOTE — PROGRESS NOTES
[facility] CMG Coordinator  Inpatient Rehabilitation Discharge    Mode of Locomotion: Walking.    Discharge Against Medical Advice:  No.  Discharge Information  Patient Discharged Alive:  Yes  Discharge Destination/Living Setting: Home with Home Health Services  Diagnosis for Interruption/Death:    Impairment Group: Stroke: 01.1 Left Body Involvement (Right Brain)    Comorbidities: Rank Code      Description      1    C85.15    Unspecified B-cell lymphoma, lymph nodes of                 inguinal region and lower limb  2    C32.9     Malignant neoplasm of larynx, unspecified  3    E11.9     Type 2 diabetes mellitus without complications  4    I10       Essential (primary) hypertension  5    K59.00    Constipation, unspecified  6    I48.91    Unspecified atrial fibrillation  7    M10.9     Gout, unspecified  8    Z51.89    Encounter for other specified aftercare  9    Z60.4     Social exclusion and rejection  10   Z91.81    History of falling    Complications:      SOBIA Bladder Accidents:   - Accidents.    SOBIA Bowel Accident:   - Accidents.    Signed by: Val Duncan Nurse

## 2024-09-09 NOTE — SIGNIFICANT NOTE
08/15/23 1100   Plan   Plan Pt is approved for continued rehab stay until 8-23-23.  Rehab MD would like for pt to continue inpatient rehab stay then discharge to home if possible.  Spoke to sister Mariah [phone] about pending SNF placement, [facility] reviewing pt for admission, how he is doing in therapy, MD request for pt to continue rehab stay and discharge home if family are able to meet caregiving needs.  Sister says she will make sure pt has food/meals and will transport to MD appointments, significant other Alejandra has agreed to change pt's adult diapers, cook for him, daughter Karolyn is suppose to move in with pt and when she works a friend Joey Ang is suppose to stay with pt, cousin Wilson says he will help some with pt's care, son will help some too, and sister will come and go to assist.  Sister says she will contact daughter Karolyn to confirm her plans to move in pt and follow-up with SS.  Sister is agreeable to pt returning home from rehab if family/friend/significant other will assist as they say they will.  SS reviewed this information with MD, OT/PT and pt.  Pt prefers to continue rehab stay until 8-23-23 then return home with family, significant other and friend assisting with care.  Pt says he feels he can depend on them to help at home.  Pt would like to receive home health services and wheelchair.  Pt says the wheelchair he had at home was donated to him.  Pt says [facility] W[facility]C van is needed to transport him home at discharge and he will pay for transport.  Explained cost at discharge for [facility] will be $1.50 per mile and scheduled trips for MD appointments are $1 per mile; 24 hour advance notice for scheduling is needed.  SS sent message to Joselo, , at [facility] about new discharge plans.   Patient/Family in Agreement with Plan yes        Prior to admission this patient was being followed by a Care Transition RN.  This team follows patients who are at high risk for 30 a day readmission.   If this patient discharges to home or assisted living the Care Transition RN will again follow this patient for 30 days after discharge.

## 2025-01-03 NOTE — SIGNIFICANT NOTE
08/21/23 1300   Plan   Plan Contacted [facility] [phone] per preference per Vickie about pt being discharged on 8-23-23, need for rolling walker and 20 inch standard wheelchair with anti-tippers, elevating leg rests, anti-tippers, basic cushion.  Vickie says pt received lightweight wheelchair in 2020, therefore, they are unable to bill insurance for another chair at this time.  Rolling walker can be delivered to rehab on 8-23-23 around 11:00 am.  Reviewed this information with pt who is agreeable to use the W/C he has at home.  Faxed face sheet, H&P, PT note and MD progress note to [facility] via [employer].  Contacted [facility] [phone] per preference per Hope with discharge on 8-23-23, referral, need for PT 2-3 x wk x 8 wks for strengthening, endurance, gait training, transfer training, balance, therapeutic exercise, bed mobility, neuro re-education, home safety, OT 2-3 x wk x 8 wks for ADL re-training, home safety, functional mobility, strengthening, ROM, coordination, neuro re-education, and endurance, nursing for medication education/post-CVA education.  Ivonne confirmed they are able to accept pt's insurance.  Faxed face sheet, H&P, PT/OT notes/evaluations, PT re-evaluation, MD progress notes and diet orders to  via [employer].  HH to contact  to confirm acceptance of referral.  Ambulatory referral for HH with face to face and discharge summary to be faxed to HH at discharge.        yes

## 2025-06-10 NOTE — SIGNIFICANT NOTE
07/13/23 0910   Plan   Plan SS spoke to sister Mariah [phone] about how pt is doing in therapy and plans for discharge on 8-4-23 if family are able to meet caregiving needs.  Pt lives with cousin Alejandra and wants to return to this home at discharge.  Sister, cousin and daughter Karolyn plan to assist with pt's care at home.  Discussed family coming to rehab prior to discharge to observe therapy and receive education.  Sister will inform cousin about pt's discharge date.  Will follow.        46